# Patient Record
Sex: FEMALE | Race: WHITE | NOT HISPANIC OR LATINO | Employment: OTHER | URBAN - METROPOLITAN AREA
[De-identification: names, ages, dates, MRNs, and addresses within clinical notes are randomized per-mention and may not be internally consistent; named-entity substitution may affect disease eponyms.]

---

## 2017-01-05 ENCOUNTER — PROCEDURE VISIT (OUTPATIENT)
Dept: OPHTHALMOLOGY | Facility: CLINIC | Age: 72
End: 2017-01-05
Payer: COMMERCIAL

## 2017-01-05 DIAGNOSIS — H35.81 RETINAL EDEMA: ICD-10-CM

## 2017-01-05 DIAGNOSIS — H35.342 MACULAR HOLE OF LEFT EYE: Primary | ICD-10-CM

## 2017-01-05 PROCEDURE — 67028 INJECTION EYE DRUG: CPT | Mod: LT,S$GLB,, | Performed by: OPHTHALMOLOGY

## 2017-01-05 PROCEDURE — 99499 UNLISTED E&M SERVICE: CPT | Mod: S$GLB,,, | Performed by: OPHTHALMOLOGY

## 2017-01-05 RX ORDER — CIPROFLOXACIN HYDROCHLORIDE 3 MG/ML
1 SOLUTION/ DROPS OPHTHALMIC 4 TIMES DAILY
Qty: 5 ML | Refills: 0 | Status: SHIPPED | OUTPATIENT
Start: 2017-01-05 | End: 2017-01-09

## 2017-01-05 RX ADMIN — Medication 1.25 MG: at 08:01

## 2017-01-05 NOTE — PROGRESS NOTES
===============================  Kaylin Mccollum,   71 y.o. female   Last visit [facility]: :12/6/2016   Last [place] [facility] dept. 12/6/2016  [facility]:  Corrected distance visual acuity was 20/20 in the right eye and 20/400 in the left eye.   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 (History)      Read Studies:   Vitals    ________________  1/5/2017  1. Macular hole of left eye    2. Retinal edema          1/5/2017  Diagnosis :  cme aw MH  Today:   Avastin (Bevacizumab) 1.25 mg/0.05 ml Intravitreal Injection , OS   Follow up: rtc 1 m o      Call 24/7 for any worsening of vision. Check  OU QD. Gave my home phone number.      Procedure  Note:   OS}  Avastin (Bevacizumab) 1.25 mg/0.05 ml Intravitreal Injection    I have explained the Risks, Benefits and Alternatives of the procedure in detail.  The patient voices understanding and all questions have been answered.  The patient agrees to proceed as discussed.  LIDOCAINE 2%  subconj bleb  was used for anesthesia.  Topical betadine was used for antisepsis.  0.05 cc was  injected 3.7 mm from corneal limbus in the inferotemporal quadrant.  Following injection the IOP was less than thirty (<30) by tonopen.  The eye was then thoroughly irrigated with BSS.  Patient tolerated procedure well.  No complications were observed.  The Patient was educated that mild irritation tonight was normal secondary to topical antispsis use.  Pt was advised to call at any time day or night for pain, redness, or any decline in vision. I gave the patient my home number as well as the clinic on call number. Daily visual checks and Amsler grid testing were reviewed.    CELESTE Petty MD  Procedure ordered:  y  Consent: y  Pre auth: y  MAR:y  Opnote: y  Charge capture:y  Sided procedure note: y  .       ===========================

## 2017-01-05 NOTE — MR AVS SNAPSHOT
[facility]  [address] [address]  Phone: [phone]  Fax: [phone]                  Kaylin Mccollum   2017 8:15 AM   Procedure visit    Description:  Female : 1945   Provider:  CELESTE Petty MD   Department:  [facility]           Reason for Visit     VITREOMCULAR ADHESION           Diagnoses this Visit        Comments    Macular hole of left eye    -  Primary     Retinal edema                To Do List           Future Appointments        Provider Department Dept Phone    8/15/2017 1:30 PM TOUSSAINT, VISUAL-ONE [facility] Op[facility]mology [phone]    8/15/2017 2:00 PM Bhavesh Mccurdy, OD [facility] [phone]      Goals (5 Years of Data)     None      Follow-Up and Disposition     Return in about 1 month (around 2017).       These Medications        Disp Refills Start End    ciprofloxacin HCl (CILOXAN) 0.3 % ophthalmic solution 5 mL 0 2017    Place 1 drop into the left eye 4 (four) times daily. - Left Eye    Pharmacy: [facility] [place] - [address] Ph #: [phone]         [facility]     [facility] Nurse Care Line -  Assistance  Registered nurses in the [facility] On Call Center provide clinical advisement, health education, appointment booking, and other advisory services.  Call for this free service at [phone].             Medications           Message regarding Medications     Verify the changes and/or additions to your medication regime listed below are the same as discussed with your clinician today.  If any of these changes or additions are incorrect, please notify your healthcare provider.        START taking these NEW medications        Refills    ciprofloxacin HCl (CILOXAN) 0.3 % ophthalmic solution 0    Sig: Place 1 drop into the left eye 4 (four) times daily.    Class: Normal    Route: Left Eye      These medications were administered today         Dose Freq    bevacizumab (AVASTIN) 2.5 mg/0.10 mL 1.25 mg 1.25 mg Clinic/HOD 1 time    Sig: Inject 0.05 mLs (1.25 mg total) into the eye one time.    Class: Normal    Route: Intraocular           Verify that the below list of medications is an accurate representation of the medications you are currently taking.  If none reported, the list may be blank. If incorrect, please contact your healthcare provider. Carry this list with you in case of emergency.           Current Medications     b complex vitamins tablet Take 1 tablet by mouth once daily.    biotin 1 mg tablet Take 1,000 mcg by mouth once daily.    butalbital-acetaminophen  mg Tab Take 1 tablet by mouth daily as needed.    canagliflozin (INVOKANA) 100 mg Tab Take 1 tablet (100 mg total) by mouth once daily.    celecoxib (CELEBREX) 100 MG capsule Take 1 capsule (100 mg total) by mouth 2 (two) times daily as needed.    fluoxetine (PROZAC) 40 MG capsule Take 1 capsule (40 mg total) by mouth once daily.    fluticasone (FLONASE) 50 mcg/actuation nasal spray 2 sprays by Each Nare route once daily.    gabapentin (NEURONTIN) 300 MG capsule take 1 capsule by mouth twice a day    lisinopril (PRINIVIL,ZESTRIL) 5 MG tablet Take 1 tablet (5 mg total) by mouth once daily.    melatonin 5 mg Tab Take 1 tablet by mouth every evening.    metformin (GLUCOPHAGE) 1000 MG tablet Take 1 tablet (1,000 mg total) by mouth 2 (two) times daily with meals.    pravastatin (PRAVACHOL) 80 MG tablet Take 1 tablet (80 mg total) by mouth once daily.    tramadol (ULTRAM) 50 mg tablet take 1 to 2 tablet by mouth three times a day    verapamil (CALAN) 80 MG tablet Take 1 tablet (80 mg total) by mouth 2 (two) times daily.    ciprofloxacin HCl (CILOXAN) 0.3 % ophthalmic solution Place 1 drop into the left eye 4 (four) times daily.           Clinical Reference Information           Allergies as of 1/5/2017     Demerol [Meperidine]    Latex, Natural Rubber    Zocor [Simvastatin]       Immunizations Administered on Date of Encounter - 1/5/2017     None      Orders Placed During Today's Visit      Normal Orders This Visit    Prior Authorization Order

## 2017-02-09 ENCOUNTER — PROCEDURE VISIT (OUTPATIENT)
Dept: OPHTHALMOLOGY | Facility: CLINIC | Age: 72
End: 2017-02-09
Payer: MEDICARE

## 2017-02-09 DIAGNOSIS — H35.81 RETINAL EDEMA: ICD-10-CM

## 2017-02-09 DIAGNOSIS — H35.342 MACULAR HOLE OF LEFT EYE: Primary | ICD-10-CM

## 2017-02-09 PROCEDURE — 92012 INTRM OPH EXAM EST PATIENT: CPT | Mod: S$GLB,,, | Performed by: OPHTHALMOLOGY

## 2017-02-09 PROCEDURE — 92134 CPTRZ OPH DX IMG PST SGM RTA: CPT | Mod: S$GLB,,, | Performed by: OPHTHALMOLOGY

## 2017-02-09 NOTE — PROGRESS NOTES
===============================  Kaylin STEWARD Young,   72 y.o. female   Last visit [facility]: :1/5/2017   Last visit [facility] dept. 1/5/2017  [facility]:  Corrected distance visual acuity was 20/25 in the right eye and 20/400 in the left eye.   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 (History)      Read Studies: y  Vitalsy    ________________  2/9/2017  1. Macular hole of left eye    2. Retinal edema      OS macular hole, cme  S/p avastin os last month  No improvement  I recommend surgery  Consult Dr. Goode for surgery  I will be happy to take care of her post operatively   No injection today  .       ===========================

## 2017-02-09 NOTE — Clinical Note
I am sending her to you for surgery, I will be happy to follow her post operatively to save her a trip to [place].

## 2017-02-09 NOTE — MR AVS SNAPSHOT
[facility]  [address] [place] [address]  Phone: [phone]  Fax: [phone]                  Kaylin Mccollum   2017 8:45 AM   Procedure visit    Description:  Female : 1945   Provider:  CELESTE Petty MD   Department:  [facility]           Reason for Visit     VITREOMCULAR ADHESION           Diagnoses this Visit        Comments    Macular hole of left eye    -  Primary     Retinal edema                To Do List           Future Appointments        Provider Department Dept Phone    2017 8:45 AM CELESTE Petty MD [facility] Op[facility] [phone]    8/15/2017 1:30 PM TOUSSAINT, VISUAL-ONE [facility] Ophthal[facility] [phone]    8/15/2017 2:00 PM Bhavesh Mccurdy, OD [facility] Ophthalmology [phone]      Goals (5 Years of Data)     None      Follow-Up and Disposition     Return if symptoms worsen or fail to improve.      [facility] On Call     [facility] On Call Nurse Care Line -  Assistance  Registered nurses in the [facility] On Call Center provide clinical advisement, health education, appointment booking, and other advisory services.  Call for this free service at [phone].             Medications           Message regarding Medications     Verify the changes and/or additions to your medication regime listed below are the same as discussed with your clinician today.  If any of these changes or additions are incorrect, please notify your healthcare provider.             Verify that the below list of medications is an accurate representation of the medications you are currently taking.  If none reported, the list may be blank. If incorrect, please contact your healthcare provider. Carry this list with you in case of emergency.           Current Medications     b complex vitamins tablet Take 1 tablet by mouth once daily.    biotin 1 mg tablet Take 1,000 mcg by mouth once daily.    butalbital-acetaminophen  mg Tab Take 1 tablet by mouth daily as  needed.    canagliflozin (INVOKANA) 100 mg Tab Take 1 tablet (100 mg total) by mouth once daily.    celecoxib (CELEBREX) 100 MG capsule Take 1 capsule (100 mg total) by mouth 2 (two) times daily as needed.    fluoxetine (PROZAC) 40 MG capsule Take 1 capsule (40 mg total) by mouth once daily.    fluticasone (FLONASE) 50 mcg/actuation nasal spray 2 sprays by Each Nare route once daily.    gabapentin (NEURONTIN) 300 MG capsule take 1 capsule by mouth twice a day    melatonin 5 mg Tab Take 1 tablet by mouth every evening.    metformin (GLUCOPHAGE) 1000 MG tablet Take 1 tablet (1,000 mg total) by mouth 2 (two) times daily with meals.    pravastatin (PRAVACHOL) 80 MG tablet Take 1 tablet (80 mg total) by mouth once daily.    tramadol (ULTRAM) 50 mg tablet take 1 to 2 tablet by mouth three times a day    verapamil (CALAN) 80 MG tablet Take 1 tablet (80 mg total) by mouth 2 (two) times daily.    lisinopril (PRINIVIL,ZESTRIL) 5 MG tablet Take 1 tablet (5 mg total) by mouth once daily.           Clinical Reference Information           Allergies as of 2/9/2017     Demerol [Meperidine]    Latex, Natural Rubber    Zocor [Simvastatin]      Immunizations Administered on Date of Encounter - 2/9/2017     None      Orders Placed During Today's Visit      Normal Orders This Visit    Posterior Segment OCT Retina-Both eyes       Language Assistance Services     ATTENTION: Language assistance services are available, free of charge. Please call [phone].      ATENCIÓN: Si habla kat, tiene a collazo disposición servicios gratuitos de asistencia lingüística. Llame al [phone].     [facility] Ý: N?u b?n nói Ti?ng Vi?t, có các d?ch v? h? tr? ngôn ng? mi?n phí dành cho b?n. G?i s? [phone].         [facility] complies with applicable Federal civil rights laws and does not discriminate on the basis of race, color, national origin, age, disability, or sex.

## 2017-02-14 ENCOUNTER — INITIAL CONSULT (OUTPATIENT)
Dept: OPHTHALMOLOGY | Facility: CLINIC | Age: 72
End: 2017-02-14
Payer: COMMERCIAL

## 2017-02-14 DIAGNOSIS — H25.13 NS (NUCLEAR SCLEROSIS), BILATERAL: ICD-10-CM

## 2017-02-14 DIAGNOSIS — H35.342 MACULAR HOLE OF LEFT EYE: Primary | ICD-10-CM

## 2017-02-14 PROBLEM — H25.10 NS (NUCLEAR SCLEROSIS): Status: ACTIVE | Noted: 2017-02-14

## 2017-02-14 PROCEDURE — 92225 PR SPECIAL EYE EXAM, INITIAL: CPT | Mod: LT,S$GLB,, | Performed by: OPHTHALMOLOGY

## 2017-02-14 PROCEDURE — 92014 COMPRE OPH EXAM EST PT 1/>: CPT | Mod: S$GLB,,, | Performed by: OPHTHALMOLOGY

## 2017-02-14 PROCEDURE — 99999 PR PBB SHADOW E&M-EST. PATIENT-LVL III: CPT | Mod: PBBFAC,,, | Performed by: OPHTHALMOLOGY

## 2017-02-14 NOTE — LETTER
February 14, 2017      CELESTE Petty MD  [address] [place] [address]           [facility]  [address]  [place] [address]  Phone: [phone]  Fax: [phone]          Patient: Kaylin Mccollum   MR Number: [id number]   YOB: 1945   Date of Visit: 2/14/2017       Dear Dr. CELESTE Petty:    Thank you for referring Kaylin Mccollum to me for evaluation. Attached you will find relevant portions of my assessment and plan of care.    If you have questions, please do not hesitate to call me. I look forward to following Kaylin Mccollum along with you.    Sincerely,    MINGO Goode MD    Enclosure  CC:  No Recipients    If you would like to receive this communication electronically, please contact [email or web address] or [phone] to request more information on [employer] Link access.    For providers and/or their staff who would like to refer a patient to [facility], please contact us through our one-stop-shop provider referral line, [facility], [place], at [phone].    If you feel you have received this communication in error or would no longer like to receive these types of communications, please e-mail [email or web address]

## 2017-02-14 NOTE — PROGRESS NOTES
OCT   OD: flat, normal foveal contour  OS:  Full thickness central macular hole     A/P    1) FTMH OS  ~4 months duration  - s/p JALIL x1 with Dr. Petty on 1/5/17 without improvement    Plan 25g PPV/MP/C3F8 OS for FTMH OS  Local MAC  LOC 45 min    Risks, benefits, and alternatives to treatment discussed in detail with the patient.  The patient voiced understanding and wished to proceed with the procedure      2) DM  - no retinopathy on exam  - encouraged BG control    3. NS OU    To OR

## 2017-02-14 NOTE — MR AVS SNAPSHOT
[facility]  [address]  [place] [address]  Phone: [phone]  Fax: [phone]                  Kaylin Mccollum   2017 1:30 PM   Initial consult    Description:  Female : 1945   Provider:  MINGO Goode MD   Department[facility]  [facility] - [facility]           Reason for Visit     Concerns About Ocular Health           Diagnoses this Visit        Comments    Macular hole of left eye    -  Primary     NS (nuclear sclerosis), bilateral                To Do List           Future Appointments        Provider Department Dept Phone    8/15/2017 1:30 PM TOUSSAINT, VISUAL-ONE [facility] [phone]    8/15/2017 2:00 PM Bhavesh Mccurdy, OD [facility] [phone]      Goals (5 Years of Data)     None      [facility]     [facility] On Call Nurse Care Line -  Assistance  Registered nurses in the [facility] On Call Center provide clinical advisement, health education, appointment booking, and other advisory services.  Call for this free service at [phone].             Medications           Message regarding Medications     Verify the changes and/or additions to your medication regime listed below are the same as discussed with your clinician today.  If any of these changes or additions are incorrect, please notify your healthcare provider.             Verify that the below list of medications is an accurate representation of the medications you are currently taking.  If none reported, the list may be blank. If incorrect, please contact your healthcare provider. Carry this list with you in case of emergency.           Current Medications     b complex vitamins tablet Take 1 tablet by mouth once daily.    biotin 1 mg tablet Take 1,000 mcg by mouth once daily.    butalbital-acetaminophen  mg Tab Take 1 tablet by mouth daily as needed.    canagliflozin (INVOKANA) 100 mg Tab Take 1 tablet (100 mg total) by mouth once daily.    celecoxib  (CELEBREX) 100 MG capsule Take 1 capsule (100 mg total) by mouth 2 (two) times daily as needed.    fluoxetine (PROZAC) 40 MG capsule Take 1 capsule (40 mg total) by mouth once daily.    fluticasone (FLONASE) 50 mcg/actuation nasal spray 2 sprays by Each Nare route once daily.    gabapentin (NEURONTIN) 300 MG capsule take 1 capsule by mouth twice a day    melatonin 5 mg Tab Take 1 tablet by mouth every evening.    metformin (GLUCOPHAGE) 1000 MG tablet Take 1 tablet (1,000 mg total) by mouth 2 (two) times daily with meals.    pravastatin (PRAVACHOL) 80 MG tablet Take 1 tablet (80 mg total) by mouth once daily.    tramadol (ULTRAM) 50 mg tablet take 1 to 2 tablet by mouth three times a day    verapamil (CALAN) 80 MG tablet Take 1 tablet (80 mg total) by mouth 2 (two) times daily.    lisinopril (PRINIVIL,ZESTRIL) 5 MG tablet Take 1 tablet (5 mg total) by mouth once daily.           Clinical Reference Information           Allergies as of 2/14/2017     Demerol [Meperidine]    Latex, Natural Rubber    Zocor [Simvastatin]      Immunizations Administered on Date of Encounter - 2/14/2017     None      Orders Placed During Today's Visit      Normal Orders This Visit    Posterior Segment OCT Retina-Both eyes       Language Assistance Services     ATTENTION: Language assistance services are available, free of charge. Please call [phone].      ATENCIÓN: Si habla español, tiene a collazo disposición servicios gratuitos de asistencia lingüística. Llame al [phone].     [facility] Ý: N?u b?n nói Ti?ng Vi?t, có các d?ch v? h? tr? ngôn ng? mi?n phí dành cho b?n. G?i s? [phone].         [facility] complies with applicable Federal civil rights laws and does not discriminate on the basis of race, color, national origin, age, disability, or sex.

## 2017-02-17 ENCOUNTER — TELEPHONE (OUTPATIENT)
Dept: OPHTHALMOLOGY | Facility: CLINIC | Age: 72
End: 2017-02-17

## 2017-02-17 DIAGNOSIS — H35.342 LAMELLAR MACULAR HOLE OF LEFT EYE: Primary | ICD-10-CM

## 2017-02-21 ENCOUNTER — ANESTHESIA EVENT (OUTPATIENT)
Dept: SURGERY | Facility: HOSPITAL | Age: 72
End: 2017-02-21
Payer: MEDICARE

## 2017-02-21 RX ORDER — UBIDECARENONE 75 MG
500 CAPSULE ORAL NIGHTLY
COMMUNITY
End: 2017-02-21 | Stop reason: SDUPTHER

## 2017-02-21 RX ORDER — UBIDECARENONE 75 MG
500 CAPSULE ORAL NIGHTLY
COMMUNITY
End: 2020-03-06

## 2017-02-21 NOTE — H&P
Pre-Operative History & Physical  Ophthalmology      SUBJECTIVE:     History of Present Illness:  Patient is a 72 y.o. female presents with full thickness macular hole OS    MEDICATIONS:   No prescriptions prior to admission.       ALLERGIES:   Review of patient's allergies indicates:   Allergen Reactions    Demerol [meperidine] Other (See Comments)     Burning when adm IV  Able to tolerate IM    Latex, natural rubber     Zocor [simvastatin] Other (See Comments)     tighting of muscle       PAST MEDICAL HISTORY:   Past Medical History   Diagnosis Date    Arthritis     Cataract     Diabetes mellitus     General anesthetics causing adverse effect in therapeutic use      bradycardia and tachycardia    Hypertension     Migraines     Mitral valve prolapse     Rotator cuff tear 4/1/2015     PAST SURGICAL HISTORY:   Past Surgical History   Procedure Laterality Date    Cholecystectomy      Knee arthroscopy Bilateral     Shoulder arthroscopy Right 06/04/15     PAST FAMILY HISTORY:   Family History   Problem Relation Age of Onset    Heart disease Mother     Glaucoma Mother     Cataracts Mother     Cancer Mother      colon    Kidney disease Daughter     Diabetes Maternal Grandmother     Heart disease Maternal Grandmother     Diabetes Maternal Grandfather     Cancer Maternal Grandfather      SOCIAL HISTORY:   Social History   Substance Use Topics    Smoking status: Never Smoker    Smokeless tobacco: Never Used    Alcohol use 0.0 oz/week     0 Standard drinks or equivalent per week      Comment: Rarely        MENTAL STATUS: Alert    REVIEW OF SYSTEMS: Negative    OBJECTIVE:     Vital Signs (Most Recent)       Physical Exam:  General: NAD  HEENT: , Atraumatic  Lungs: Adequate respirations  Heart: + pulses  Abdomen: Soft    ASSESSMENT/PLAN:     Patient is a 72 y.o. female with full thickness macular hole OS     - Plan for surgical correction FTMH OS   - Risks/benefits/alternatives of the procedure  including, but not limited to scarring, bleeding, infection, loss or decreased vision, and/or need for possible repeat surgery discussed with the patient and family.   - Informed consent obtained prior to surgery and the patient/family voiced good understanding.

## 2017-02-21 NOTE — PRE-PROCEDURE INSTRUCTIONS
Spoke with Patient.  NPO, medication, and pre-op instructions reviewed.  Stated that she had Bradycardia following an Arthroscopy before 1990.  Stated that she has had several surgeries without any Anesthesia issues since that episode.  Stated that she does not check her glucose at home.  Did not adjust her evening Metformin dose.  Reconciled her medications with the medicine bottles.  Verbalized understanding of instructions.

## 2017-02-21 NOTE — ANESTHESIA PREPROCEDURE EVALUATION
02/21/2017  Kaylin Mccollum is a 72 y.o., female.    [facility] Anesthesia Evaluation    I have reviewed the Patient Summary Reports.    I have reviewed the Nursing Notes.   I have reviewed the Medications.     Review of Systems  Anesthesia Hx:  Post-op bradycardia with previous GA surgery-  No other issues History of prior surgery of interest to airway management or planning: Previous anesthesia: General Denies Family Hx of Anesthesia complications.   Denies Personal Hx of Anesthesia complications.   Social:  Non-Smoker, No Alcohol Use    Cardiovascular:   Exercise tolerance: good Hypertension, well controlled    Pulmonary:  Pulmonary Normal    Renal/:  Renal/ Normal     Hepatic/GI:  Hepatic/GI Normal    Musculoskeletal:   Arthritis     Neurological:   Neuromuscular Disease, Headaches Fibromyalgia   Endocrine:   Diabetes, type 2    Psych:   Psychiatric History depression          Physical Exam  General:  Well nourished, Obesity    Airway/Jaw/Neck:  Airway Findings: Mouth Opening: Normal Tongue: Normal  General Airway Assessment: Adult  Mallampati: II  Improves to II with phonation.  TM Distance: Normal, at least 6 cm      Dental:  Dental Findings: In tact        Mental Status:  Mental Status Findings:  Cooperative, Alert and Oriented         Anesthesia Plan  Type of Anesthesia, risks & benefits discussed:  Anesthesia Type:  general, MAC  Patient's Preference: mac  Intra-op Monitoring Plan:   Intra-op Monitoring Plan Comments:   Post Op Pain Control Plan:   Post Op Pain Control Plan Comments:   Induction:    Beta Blocker:  Patient is not currently on a Beta-Blocker (No further documentation required).       Informed Consent: Patient understands risks and agrees with Anesthesia plan.  Questions answered. Anesthesia consent signed with patient.  ASA Score: 3     Day of Surgery Review of History & Physical:    H&P  update referred to the surgeon.         Ready For Surgery From Anesthesia Perspective.     Stated that she had Bradycardia following an Arthroscopy before 1990.  Stated that she has had several surgeries without any Anesthesia issues since that episode.

## 2017-02-22 ENCOUNTER — HOSPITAL ENCOUNTER (OUTPATIENT)
Facility: HOSPITAL | Age: 72
Discharge: HOME | End: 2017-02-22
Attending: OPHTHALMOLOGY | Admitting: OPHTHALMOLOGY
Payer: MEDICARE

## 2017-02-22 ENCOUNTER — ANESTHESIA (OUTPATIENT)
Dept: SURGERY | Facility: HOSPITAL | Age: 72
End: 2017-02-22
Payer: MEDICARE

## 2017-02-22 ENCOUNTER — SURGERY (OUTPATIENT)
Age: 72
End: 2017-02-22

## 2017-02-22 VITALS
SYSTOLIC BLOOD PRESSURE: 143 MMHG | BODY MASS INDEX: 31.5 KG/M2 | HEIGHT: 70 IN | WEIGHT: 220 LBS | DIASTOLIC BLOOD PRESSURE: 61 MMHG | RESPIRATION RATE: 16 BRPM | TEMPERATURE: 98 F | HEART RATE: 70 BPM | OXYGEN SATURATION: 96 %

## 2017-02-22 DIAGNOSIS — H35.342 MACULAR HOLE, LEFT: ICD-10-CM

## 2017-02-22 DIAGNOSIS — H35.342 MACULAR HOLE OF LEFT EYE: Primary | ICD-10-CM

## 2017-02-22 PROBLEM — H35.349 MACULAR HOLE: Status: ACTIVE | Noted: 2017-02-22

## 2017-02-22 LAB
POCT GLUCOSE: 115 MG/DL (ref 70–110)
POCT GLUCOSE: 115 MG/DL (ref 70–110)

## 2017-02-22 PROCEDURE — 25000003 PHARM REV CODE 250: Performed by: OPHTHALMOLOGY

## 2017-02-22 PROCEDURE — 25000003 PHARM REV CODE 250

## 2017-02-22 PROCEDURE — 37000008 HC ANESTHESIA 1ST 15 MINUTES: Performed by: OPHTHALMOLOGY

## 2017-02-22 PROCEDURE — 25000003 PHARM REV CODE 250: Performed by: NURSE ANESTHETIST, CERTIFIED REGISTERED

## 2017-02-22 PROCEDURE — 71000033 HC RECOVERY, INTIAL HOUR: Performed by: OPHTHALMOLOGY

## 2017-02-22 PROCEDURE — 82962 GLUCOSE BLOOD TEST: CPT | Performed by: OPHTHALMOLOGY

## 2017-02-22 PROCEDURE — 27600004 OPTIME MED/SURG SUP & DEVICES INTRAOCULAR LENS: Performed by: OPHTHALMOLOGY

## 2017-02-22 PROCEDURE — D9220A PRA ANESTHESIA: Mod: CRNA,,, | Performed by: NURSE ANESTHETIST, CERTIFIED REGISTERED

## 2017-02-22 PROCEDURE — 67042 VIT FOR MACULAR HOLE: CPT | Mod: LT,,, | Performed by: OPHTHALMOLOGY

## 2017-02-22 PROCEDURE — 36000707: Performed by: OPHTHALMOLOGY

## 2017-02-22 PROCEDURE — 37000009 HC ANESTHESIA EA ADD 15 MINS: Performed by: OPHTHALMOLOGY

## 2017-02-22 PROCEDURE — 36000706: Performed by: OPHTHALMOLOGY

## 2017-02-22 PROCEDURE — 71000015 HC POSTOP RECOV 1ST HR: Performed by: OPHTHALMOLOGY

## 2017-02-22 PROCEDURE — 63600175 PHARM REV CODE 636 W HCPCS: Performed by: OPHTHALMOLOGY

## 2017-02-22 PROCEDURE — 27201423 OPTIME MED/SURG SUP & DEVICES STERILE SUPPLY: Performed by: OPHTHALMOLOGY

## 2017-02-22 PROCEDURE — 99499 UNLISTED E&M SERVICE: CPT | Mod: ,,, | Performed by: OPHTHALMOLOGY

## 2017-02-22 PROCEDURE — C1784 OCULAR DEV, INTRAOP, DET RET: HCPCS | Performed by: OPHTHALMOLOGY

## 2017-02-22 PROCEDURE — 63600175 PHARM REV CODE 636 W HCPCS: Performed by: NURSE ANESTHETIST, CERTIFIED REGISTERED

## 2017-02-22 PROCEDURE — D9220A PRA ANESTHESIA: Mod: ANES,,, | Performed by: ANESTHESIOLOGY

## 2017-02-22 RX ORDER — EPINEPHRINE 1 MG/ML
INJECTION, SOLUTION INTRACARDIAC; INTRAMUSCULAR; INTRAVENOUS; SUBCUTANEOUS
Status: DISCONTINUED
Start: 2017-02-22 | End: 2017-02-22 | Stop reason: HOSPADM

## 2017-02-22 RX ORDER — NEOMYCIN SULFATE, POLYMYXIN B SULFATE, AND DEXAMETHASONE 3.5; 10000; 1 MG/G; [USP'U]/G; MG/G
OINTMENT OPHTHALMIC
Status: DISCONTINUED | OUTPATIENT
Start: 2017-02-22 | End: 2017-02-22 | Stop reason: HOSPADM

## 2017-02-22 RX ORDER — DEXAMETHASONE SODIUM PHOSPHATE 4 MG/ML
INJECTION, SOLUTION INTRA-ARTICULAR; INTRALESIONAL; INTRAMUSCULAR; INTRAVENOUS; SOFT TISSUE
Status: DISCONTINUED | OUTPATIENT
Start: 2017-02-22 | End: 2017-02-22 | Stop reason: HOSPADM

## 2017-02-22 RX ORDER — LIDOCAINE HYDROCHLORIDE 10 MG/ML
1 INJECTION, SOLUTION EPIDURAL; INFILTRATION; INTRACAUDAL; PERINEURAL ONCE
Status: COMPLETED | OUTPATIENT
Start: 2017-02-22 | End: 2017-02-22

## 2017-02-22 RX ORDER — OXYCODONE AND ACETAMINOPHEN 5; 325 MG/1; MG/1
1 TABLET ORAL EVERY 6 HOURS PRN
Qty: 12 TABLET | Refills: 0 | Status: SHIPPED | OUTPATIENT
Start: 2017-02-22 | End: 2017-04-21

## 2017-02-22 RX ORDER — SODIUM CHLORIDE 9 MG/ML
INJECTION, SOLUTION INTRAVENOUS CONTINUOUS
Status: DISCONTINUED | OUTPATIENT
Start: 2017-02-22 | End: 2017-02-22 | Stop reason: HOSPADM

## 2017-02-22 RX ORDER — FENTANYL CITRATE 50 UG/ML
INJECTION, SOLUTION INTRAMUSCULAR; INTRAVENOUS
Status: DISCONTINUED | OUTPATIENT
Start: 2017-02-22 | End: 2017-02-22

## 2017-02-22 RX ORDER — VANCOMYCIN HYDROCHLORIDE 500 MG/10ML
INJECTION, POWDER, LYOPHILIZED, FOR SOLUTION INTRAVENOUS
Status: DISCONTINUED
Start: 2017-02-22 | End: 2017-02-22 | Stop reason: HOSPADM

## 2017-02-22 RX ORDER — TROPICAMIDE 10 MG/ML
SOLUTION/ DROPS OPHTHALMIC
Status: COMPLETED
Start: 2017-02-22 | End: 2017-02-22

## 2017-02-22 RX ORDER — ACETAMINOPHEN 325 MG/1
650 TABLET ORAL EVERY 4 HOURS PRN
Status: DISCONTINUED | OUTPATIENT
Start: 2017-02-22 | End: 2017-02-22 | Stop reason: HOSPADM

## 2017-02-22 RX ORDER — CYCLOPENTOLATE HYDROCHLORIDE 10 MG/ML
SOLUTION/ DROPS OPHTHALMIC
Status: COMPLETED
Start: 2017-02-22 | End: 2017-02-22

## 2017-02-22 RX ORDER — HYDROCODONE BITARTRATE AND ACETAMINOPHEN 5; 325 MG/1; MG/1
1 TABLET ORAL EVERY 4 HOURS PRN
Status: DISCONTINUED | OUTPATIENT
Start: 2017-02-22 | End: 2017-02-22 | Stop reason: HOSPADM

## 2017-02-22 RX ORDER — ONDANSETRON 4 MG/1
4 TABLET, FILM COATED ORAL EVERY 8 HOURS PRN
Qty: 12 TABLET | Refills: 0 | Status: SHIPPED | OUTPATIENT
Start: 2017-02-22 | End: 2018-05-17

## 2017-02-22 RX ORDER — DEXTROSE MONOHYDRATE 25 G/50ML
INJECTION, SOLUTION INTRAVENOUS
Status: DISCONTINUED
Start: 2017-02-22 | End: 2017-02-22 | Stop reason: HOSPADM

## 2017-02-22 RX ORDER — MIDAZOLAM HYDROCHLORIDE 1 MG/ML
INJECTION, SOLUTION INTRAMUSCULAR; INTRAVENOUS
Status: DISCONTINUED | OUTPATIENT
Start: 2017-02-22 | End: 2017-02-22

## 2017-02-22 RX ORDER — LIDOCAINE HYDROCHLORIDE 10 MG/ML
1 INJECTION, SOLUTION EPIDURAL; INFILTRATION; INTRACAUDAL; PERINEURAL ONCE
Status: DISCONTINUED | OUTPATIENT
Start: 2017-02-22 | End: 2017-02-22 | Stop reason: ALTCHOICE

## 2017-02-22 RX ORDER — PREDNISOLONE ACETATE 10 MG/ML
1 SUSPENSION/ DROPS OPHTHALMIC
Status: DISCONTINUED | OUTPATIENT
Start: 2017-02-22 | End: 2017-02-22 | Stop reason: HOSPADM

## 2017-02-22 RX ORDER — PHENYLEPHRINE HYDROCHLORIDE 25 MG/ML
1 SOLUTION/ DROPS OPHTHALMIC
Status: DISCONTINUED | OUTPATIENT
Start: 2017-02-22 | End: 2017-02-22 | Stop reason: HOSPADM

## 2017-02-22 RX ORDER — TETRACAINE HYDROCHLORIDE 5 MG/ML
1 SOLUTION OPHTHALMIC
Status: DISCONTINUED | OUTPATIENT
Start: 2017-02-22 | End: 2017-02-22 | Stop reason: HOSPADM

## 2017-02-22 RX ORDER — BUPIVACAINE HYDROCHLORIDE 7.5 MG/ML
INJECTION, SOLUTION EPIDURAL; RETROBULBAR
Status: DISCONTINUED | OUTPATIENT
Start: 2017-02-22 | End: 2017-02-22 | Stop reason: HOSPADM

## 2017-02-22 RX ORDER — CYCLOPENTOLATE HYDROCHLORIDE 10 MG/ML
1 SOLUTION/ DROPS OPHTHALMIC
Status: DISCONTINUED | OUTPATIENT
Start: 2017-02-22 | End: 2017-02-22 | Stop reason: HOSPADM

## 2017-02-22 RX ORDER — TETRACAINE HYDROCHLORIDE 5 MG/ML
SOLUTION OPHTHALMIC
Status: COMPLETED
Start: 2017-02-22 | End: 2017-02-22

## 2017-02-22 RX ORDER — MOXIFLOXACIN 5 MG/ML
1 SOLUTION/ DROPS OPHTHALMIC
Status: DISCONTINUED | OUTPATIENT
Start: 2017-02-22 | End: 2017-02-22 | Stop reason: HOSPADM

## 2017-02-22 RX ORDER — TROPICAMIDE 10 MG/ML
1 SOLUTION/ DROPS OPHTHALMIC
Status: DISCONTINUED | OUTPATIENT
Start: 2017-02-22 | End: 2017-02-22 | Stop reason: HOSPADM

## 2017-02-22 RX ORDER — INDOCYANINE GREEN AND WATER 25 MG
KIT INJECTION
Status: DISCONTINUED | OUTPATIENT
Start: 2017-02-22 | End: 2017-02-22 | Stop reason: HOSPADM

## 2017-02-22 RX ORDER — INDOCYANINE GREEN AND WATER 25 MG
KIT INJECTION
Status: DISCONTINUED
Start: 2017-02-22 | End: 2017-02-22 | Stop reason: HOSPADM

## 2017-02-22 RX ORDER — PREDNISOLONE ACETATE 10 MG/ML
SUSPENSION/ DROPS OPHTHALMIC
Status: DISCONTINUED
Start: 2017-02-22 | End: 2017-02-22 | Stop reason: WASHOUT

## 2017-02-22 RX ORDER — MOXIFLOXACIN 5 MG/ML
SOLUTION/ DROPS OPHTHALMIC
Status: DISCONTINUED
Start: 2017-02-22 | End: 2017-02-22 | Stop reason: WASHOUT

## 2017-02-22 RX ORDER — PREDNISOLONE ACETATE 10 MG/ML
SUSPENSION/ DROPS OPHTHALMIC
Status: COMPLETED
Start: 2017-02-22 | End: 2017-02-22

## 2017-02-22 RX ORDER — PHENYLEPHRINE HYDROCHLORIDE 25 MG/ML
SOLUTION/ DROPS OPHTHALMIC
Status: COMPLETED
Start: 2017-02-22 | End: 2017-02-22

## 2017-02-22 RX ORDER — BUPIVACAINE HYDROCHLORIDE 7.5 MG/ML
INJECTION, SOLUTION EPIDURAL; RETROBULBAR
Status: DISCONTINUED
Start: 2017-02-22 | End: 2017-02-22 | Stop reason: HOSPADM

## 2017-02-22 RX ORDER — ONDANSETRON 8 MG/1
8 TABLET, ORALLY DISINTEGRATING ORAL EVERY 8 HOURS PRN
Status: DISCONTINUED | OUTPATIENT
Start: 2017-02-22 | End: 2017-02-22 | Stop reason: HOSPADM

## 2017-02-22 RX ORDER — MOXIFLOXACIN 5 MG/ML
SOLUTION/ DROPS OPHTHALMIC
Status: COMPLETED
Start: 2017-02-22 | End: 2017-02-22

## 2017-02-22 RX ORDER — DEXAMETHASONE SODIUM PHOSPHATE 4 MG/ML
INJECTION, SOLUTION INTRA-ARTICULAR; INTRALESIONAL; INTRAMUSCULAR; INTRAVENOUS; SOFT TISSUE
Status: DISCONTINUED
Start: 2017-02-22 | End: 2017-02-22 | Stop reason: HOSPADM

## 2017-02-22 RX ORDER — LIDOCAINE HYDROCHLORIDE 20 MG/ML
INJECTION, SOLUTION EPIDURAL; INFILTRATION; INTRACAUDAL; PERINEURAL
Status: DISCONTINUED | OUTPATIENT
Start: 2017-02-22 | End: 2017-02-22 | Stop reason: HOSPADM

## 2017-02-22 RX ORDER — PROPOFOL 10 MG/ML
VIAL (ML) INTRAVENOUS
Status: DISCONTINUED | OUTPATIENT
Start: 2017-02-22 | End: 2017-02-22

## 2017-02-22 RX ORDER — LIDOCAINE HYDROCHLORIDE 20 MG/ML
INJECTION, SOLUTION EPIDURAL; INFILTRATION; INTRACAUDAL; PERINEURAL
Status: DISCONTINUED
Start: 2017-02-22 | End: 2017-02-22 | Stop reason: HOSPADM

## 2017-02-22 RX ORDER — NEOMYCIN SULFATE, POLYMYXIN B SULFATE, AND DEXAMETHASONE 3.5; 10000; 1 MG/G; [USP'U]/G; MG/G
OINTMENT OPHTHALMIC
Status: DISCONTINUED
Start: 2017-02-22 | End: 2017-02-22 | Stop reason: HOSPADM

## 2017-02-22 RX ORDER — EPINEPHRINE CONVENIENCE KIT 1 MG/ML(1)
KIT INTRAMUSCULAR; SUBCUTANEOUS
Status: DISCONTINUED | OUTPATIENT
Start: 2017-02-22 | End: 2017-02-22 | Stop reason: HOSPADM

## 2017-02-22 RX ORDER — LIDOCAINE HCL/PF 100 MG/5ML
SYRINGE (ML) INTRAVENOUS
Status: DISCONTINUED | OUTPATIENT
Start: 2017-02-22 | End: 2017-02-22

## 2017-02-22 RX ADMIN — CYCLOPENTOLATE HYDROCHLORIDE 1 DROP: 10 SOLUTION/ DROPS OPHTHALMIC at 07:02

## 2017-02-22 RX ADMIN — SODIUM CHLORIDE: 0.9 INJECTION, SOLUTION INTRAVENOUS at 07:02

## 2017-02-22 RX ADMIN — LIDOCAINE HYDROCHLORIDE 5 ML: 20 INJECTION, SOLUTION EPIDURAL; INFILTRATION; INTRACAUDAL; PERINEURAL at 08:02

## 2017-02-22 RX ADMIN — BALANCED SALT SOLUTION 500 ML: 6.4; .75; .48; .3; 3.9; 1.7 SOLUTION OPHTHALMIC at 08:02

## 2017-02-22 RX ADMIN — PREDNISOLONE ACETATE 1 DROP: 10 SUSPENSION/ DROPS OPHTHALMIC at 07:02

## 2017-02-22 RX ADMIN — INDOCYANINE GREEN 10 MG: KIT INTRAVENOUS at 08:02

## 2017-02-22 RX ADMIN — TROPICAMIDE 1 DROP: 10 SOLUTION/ DROPS OPHTHALMIC at 07:02

## 2017-02-22 RX ADMIN — MIDAZOLAM HYDROCHLORIDE 1 MG: 1 INJECTION, SOLUTION INTRAMUSCULAR; INTRAVENOUS at 08:02

## 2017-02-22 RX ADMIN — FENTANYL CITRATE 50 MCG: 50 INJECTION, SOLUTION INTRAMUSCULAR; INTRAVENOUS at 08:02

## 2017-02-22 RX ADMIN — NEOMYCIN SULFATE, POLYMYXIN B SULFATE, AND DEXAMETHASONE 1 APPLICATION: 3.5; 10000; 1 OINTMENT OPHTHALMIC at 08:02

## 2017-02-22 RX ADMIN — VANCOMYCIN HYDROCHLORIDE 25 MG: 1 INJECTION, POWDER, LYOPHILIZED, FOR SOLUTION INTRAVENOUS at 08:02

## 2017-02-22 RX ADMIN — PROPOFOL 80 MG: 10 INJECTION, EMULSION INTRAVENOUS at 08:02

## 2017-02-22 RX ADMIN — TETRACAINE HYDROCHLORIDE 1 DROP: 5 SOLUTION OPHTHALMIC at 07:02

## 2017-02-22 RX ADMIN — HOMATROPINE HYDROBROMIDE 1 DROP: 50 SOLUTION OPHTHALMIC at 07:02

## 2017-02-22 RX ADMIN — MOXIFLOXACIN 1 DROP: 5 SOLUTION/ DROPS OPHTHALMIC at 07:02

## 2017-02-22 RX ADMIN — PHENYLEPHRINE HYDROCHLORIDE 1 DROP: 2.5 SOLUTION/ DROPS OPHTHALMIC at 07:02

## 2017-02-22 RX ADMIN — LIDOCAINE HYDROCHLORIDE 30 MG: 20 INJECTION, SOLUTION INTRAVENOUS at 08:02

## 2017-02-22 RX ADMIN — PHENYLEPHRINE HYDROCHLORIDE 1 DROP: 25 SOLUTION/ DROPS OPHTHALMIC at 07:02

## 2017-02-22 RX ADMIN — EPINEPHRINE 0.3 MG: 1 INJECTION, SOLUTION INTRAMUSCULAR; SUBCUTANEOUS at 08:02

## 2017-02-22 RX ADMIN — LIDOCAINE HYDROCHLORIDE 0.2 MG: 10 INJECTION, SOLUTION EPIDURAL; INFILTRATION; INTRACAUDAL; PERINEURAL at 07:02

## 2017-02-22 RX ADMIN — BUPIVACAINE HYDROCHLORIDE 5 ML: 7.5 INJECTION, SOLUTION EPIDURAL; RETROBULBAR at 08:02

## 2017-02-22 RX ADMIN — DEXAMETHASONE SODIUM PHOSPHATE 2 MG: 4 INJECTION, SOLUTION INTRAMUSCULAR; INTRAVENOUS at 08:02

## 2017-02-22 RX ADMIN — MOXIFLOXACIN HYDROCHLORIDE 1 DROP: 5 SOLUTION/ DROPS OPHTHALMIC at 07:02

## 2017-02-22 NOTE — IP AVS SNAPSHOT
[facility]  [address]  [facility] [address]  Phone: [phone]           Patient Discharge Instructions     Our goal is to set you up for success. This packet includes information on your condition, medications, and your home care. It will help you to care for yourself so you don't get sicker and need to go back to the hospital.     Please ask your nurse if you have any questions.        There are many details to remember when preparing to leave the hospital. Here is what you will need to do:    1. Take your medicine. If you are prescribed medications, review your Medication List in the following pages. You may have new medications to  at the pharmacy and others that you'll need to stop taking. Review the instructions for how and when to take your medications. Talk with your doctor or nurses if you are unsure of what to do.     2. Go to your follow-up appointments. Specific follow-up information is listed in the following pages. Your may be contacted by a transition nurse or clinical provider about future appointments. Be sure we have all of the phone numbers to reach you, if needed. Please contact your provider's office if you are unable to make an appointment.     3. Watch for warning signs. Your doctor or nurse will give you detailed warning signs to watch for and when to call for assistance. These instructions may also include educational information about your condition. If you experience any of warning signs to your health, call your doctor.               [facility]  Unless otherwise directed by your provider, please contact [facility], our nurse care line that is available for 24/7 assistance.     [phone] (toll-free)    Registered nurses in the [facility] provide clinical advisement, health education, appointment booking, and other advisory services.                    ** Verify the list of medication(s) below is accurate and up  to date. Carry this with you in case of emergency. If your medications have changed, please notify your healthcare provider.             Medication List      START taking these medications        Additional Info                      ondansetron 4 MG tablet   Commonly known as:  ZOFRAN   Quantity:  12 tablet   Refills:  0   Dose:  4 mg    Instructions:  Take 1 tablet (4 mg total) by mouth every 8 (eight) hours as needed for Nausea.     Begin Date    AM    Noon    PM    Bedtime       oxycodone-acetaminophen 5-325 mg per tablet   Commonly known as:  PERCOCET   Quantity:  12 tablet   Refills:  0   Dose:  1 tablet    Instructions:  Take 1 tablet by mouth every 6 (six) hours as needed for Pain.     Begin Date    AM    Noon    PM    Bedtime         CHANGE how you take these medications        Additional Info                      canagliflozin 100 mg Tab   Commonly known as:  INVOKANA   Quantity:  90 tablet   Refills:  3   Dose:  100 mg   What changed:  when to take this    Instructions:  Take 1 tablet (100 mg total) by mouth once daily.     Begin Date    AM    Noon    PM    Bedtime       celecoxib 100 MG capsule   Commonly known as:  CeleBREX   Quantity:  180 capsule   Refills:  1   Dose:  100 mg   What changed:  when to take this    Instructions:  Take 1 capsule (100 mg total) by mouth 2 (two) times daily as needed.     Begin Date    AM    Noon    PM    Bedtime       fluoxetine 40 MG capsule   Commonly known as:  PROZAC   Quantity:  90 capsule   Refills:  3   Dose:  40 mg   What changed:  when to take this    Instructions:  Take 1 capsule (40 mg total) by mouth once daily.     Begin Date    AM    Noon    PM    Bedtime       fluticasone 50 mcg/actuation nasal spray   Commonly known as:  FLONASE   Quantity:  1 Bottle   Refills:  0   Dose:  2 spray   What changed:    - when to take this  - reasons to take this    Instructions:  2 sprays by Each Nare route once daily.     Begin Date    AM    Noon    PM    Bedtime        lisinopril 5 MG tablet   Commonly known as:  PRINIVIL,ZESTRIL   Quantity:  90 tablet   Refills:  3   Dose:  5 mg   What changed:  when to take this    Instructions:  Take 1 tablet (5 mg total) by mouth once daily.     Begin Date    AM    Noon    PM    Bedtime       tramadol 50 mg tablet   Commonly known as:  ULTRAM   Quantity:  180 tablet   Refills:  2   What changed:  See the new instructions.    Instructions:  take 1 to 2 tablet by mouth three times a day     Begin Date    AM    Noon    PM    Bedtime         CONTINUE taking these medications        Additional Info                      biotin 1 mg tablet   Refills:  0   Dose:  1000 mcg    Instructions:  Take 1,000 mcg by mouth every morning.     Begin Date    AM    Noon    PM    Bedtime       butalbital-acetaminophen  mg Tab   Quantity:  30 each   Refills:  1   Dose:  1 tablet    Instructions:  Take 1 tablet by mouth daily as needed.     Begin Date    AM    Noon    PM    Bedtime       gabapentin 300 MG capsule   Commonly known as:  NEURONTIN   Quantity:  180 capsule   Refills:  1    Instructions:  take 1 capsule by mouth twice a day     Begin Date    AM    Noon    PM    Bedtime       melatonin 5 mg Tab   Refills:  0   Dose:  1 tablet    Instructions:  Take 1 tablet by mouth nightly.     Begin Date    AM    Noon    PM    Bedtime       metformin 1000 MG tablet   Commonly known as:  GLUCOPHAGE   Quantity:  180 tablet   Refills:  3   Dose:  1000 mg    Instructions:  Take 1 tablet (1,000 mg total) by mouth 2 (two) times daily with meals.     Begin Date    AM    Noon    PM    Bedtime       ranitidine 150 MG tablet   Commonly known as:  ZANTAC   Refills:  0   Dose:  150 mg    Instructions:  Take 150 mg by mouth nightly.     Begin Date    AM    Noon    PM    Bedtime       verapamil 80 MG tablet   Commonly known as:  CALAN   Quantity:  180 tablet   Refills:  3   Dose:  80 mg    Instructions:  Take 1 tablet (80 mg total) by mouth 2 (two) times daily.     Begin Date     AM    Noon    PM    Bedtime       VITAMIN B-12 500 MCG tablet   Refills:  0   Dose:  500 mcg   Generic drug:  cyanocobalamin    Instructions:  Take 500 mcg by mouth nightly.     Begin Date    AM    Noon    PM    Bedtime            Where to Get Your Medications      You can get these medications from any pharmacy     Bring a paper prescription for each of these medications     ondansetron 4 MG tablet    oxycodone-acetaminophen 5-325 mg per tablet                  Please bring to all follow up appointments:    1. A copy of your discharge instructions.  2. All medicines you are currently taking in their original bottles.  3. Identification and insurance card.    Please arrive 15 minutes ahead of scheduled appointment time.    Please call 24 hours in advance if you must reschedule your appointment and/or time.        Your Scheduled Appointments     Feb 23, 2017  7:50 AM CST   Post OP with MINGO Goode MD   [place] [facility]    [address]  [place] [address]   [phone]            Aug 15, 2017  1:30 PM CDT   Thomas Visual Brock with FIELDS, VISUAL-ONE   [facility] ([facility])    [address] [address]   [phone]            Aug 15, 2017  2:00 PM CDT   Established Patient Visit with Bhavesh Mccurdy, OD   [facility]    [address] [address]   [phone]              Your Future Surgeries/Procedures     Feb 22, 2017   Surgery with MINGO Goode MD   [facility] ([facility])    [address] [address] [phone]              Follow-up Information     Follow up with JOSSIE Goode MD.    Specialty:  Ophthalmology    Why:  [facility] 8:00 am tomorrow - [address]    Contact information:    [address] [address] [phone]          Discharge Instructions     Future Orders    Call MD for:  difficulty  "breathing, headache or visual disturbances     Call MD for:  extreme fatigue     Call MD for:  hives     Call MD for:  persistent dizziness or light-headedness     Call MD for:  persistent nausea and vomiting     Call MD for:  redness, tenderness, or signs of infection (pain, swelling, redness, odor or green/yellow discharge around incision site)     Call MD for:  severe uncontrolled pain     Call MD for:  temperature >100.4     Diet general     Questions:    Total calories:      Fat restriction, if any:      Protein restriction, if any:      Na restriction, if any:      Fluid restriction:      Additional restrictions:      Lifting restrictions     Weight bearing restrictions (specify)         Discharge Instructions       Post Op Instructions:  Patient should Maintain Eye shield & Dressing until seen tomorrow in eye clinic  Tylenol as needed for general discomfort  Use Prescription for pain medication if pain is severe  Use Prescription for Nausea (Zofran) if nausea or vomiting  No excessive exercise   No Bending, Lifting or Straining  Call MD if significant pain or nausea / vomiting uncontrolled by medications  Call MD if temperature in excess of 101' F  Maintain Head in a Face-Down Position  Return to eye clinic for Post Op Examination tomorrow Morning.  Bring Medicine bag to tomorrow's appointment.        Primary Diagnosis     Your primary diagnosis was:  Macular Hole      Admission Information     Date & Time Provider Department CSN    2/22/2017  6:14 AM MINGO Goode MD [facility] [id number]      Care Providers     Provider Role Specialty Primary office phone    MINGO Goode MD Attending Provider Ophthalmology [phone]    MINGO Goode MD Surgeon  Ophthalmology [phone]      Your Vitals Were     BP Pulse Temp Resp Height Weight    159/71 70 97.9 °F (36.6 °C) (Oral) 18 5' 10" (1.778 m) 99.8 kg (220 lb)    SpO2 BMI             98% 31.57 kg/m2         Recent Lab " "Values        3/8/2004 7/15/2004 10/15/2004 12/3/2012 5/6/2014 11/25/2014 6/1/2015 2/9/2016     12:02 PM  7:58 AM  8:14 AM  4:27 PM 10:25 AM  8:09 AM  8:30 AM  9:05 AM    A1C 6.5 (H) 6.2 6.0 7.3 (H) 7.7 (H) 7.2 (H) 6.8 (H) 6.3 (H)      Allergies as of 2/22/2017        Reactions    Demerol [Meperidine] Other (See Comments)    Burning when adm IV  Able to tolerate IM, "Turned my hand purple."    Latex, Natural Rubber Other (See Comments)    "Burns my skin",  Symptoms get worse the longer she is exposed    Zocor [Simvastatin] Other (See Comments)    Tightening of muscles    Sulfa (Sulfonamide Antibiotics)       Advance Directives     An advance directive is a document which, in the event you are no longer able to make decisions for yourself, tells your healthcare team what kind of treatment you do or do not want to receive, or who you would like to make those decisions for you.  If you do not currently have an advance directive, [facility] encourages you to create one.  For more information call:  [phone]), [phone] ([phone]),  or log on to [email or web address].        Language Assistance Services     ATTENTION: Language assistance services are available, free of charge. Please call [phone].      ATENCIÓN: Si habla español, tiene a collazo disposición servicios gratuitos de asistencia lingüística. Llame al [phone].     [facility] Ý: N?u b?n nói Ti?ng Vi?t, có các d?ch v? h? tr? ngôn ng? mi?n phí dành cho b?n. G?i s? [phone].        Diabetes Discharge Instructions                                    [facility] complies with applicable Federal civil rights laws and does not discriminate on the basis of race, color, national origin, age, disability, or sex.        "

## 2017-02-22 NOTE — ANESTHESIA POSTPROCEDURE EVALUATION
"Anesthesia Post Evaluation    Patient: Kaylin Mccollum    Procedure(s) Performed: Procedure(s) (LRB):  REPAIR-RETINA (VITRECTOMY) (Left)    Final Anesthesia Type: general  Patient location during evaluation: PACU  Patient participation: Yes- Able to Participate  Level of consciousness: awake and alert  Post-procedure vital signs: reviewed and stable  Pain management: adequate  Airway patency: patent  PONV status at discharge: No PONV  Anesthetic complications: no      Cardiovascular status: hemodynamically stable  Respiratory status: room air, spontaneous ventilation and unassisted  Hydration status: euvolemic  Follow-up not needed.        Visit Vitals    BP (!) 148/77 (BP Location: Right arm, Patient Position: Lying, BP Method: Automatic)    Pulse 72    Temp 36.7 °C (98.1 °F) (Temporal)    Resp 18    Ht 5' 10" (1.778 m)    Wt 99.8 kg (220 lb)    SpO2 97%    BMI 31.57 kg/m2       Pain/Ana Score: Pain Assessment Performed: Yes (2/22/2017  7:37 AM)  Presence of Pain: denies (2/22/2017  7:37 AM)      "

## 2017-02-22 NOTE — INTERVAL H&P NOTE
The patient has been examined and the H&P has been reviewed:  I concur with the findings and no changes have occurred since H&P was written.      Anesthesia/Surgery risks, benefits and alternative options discussed and understood by patient/family.

## 2017-02-22 NOTE — BRIEF OP NOTE
Pre-Op Dx: FTMH OS    Post Op Dx: same    Procedure Performed: 25g PPV/ILM peel/AFx/C3F8 14% OS    Attending Surgeon: Rupa    Assistant Surgeon: Price    Anesthesia: Local/Mac, retrobulbar injection of 4.0cc mixture 0.75%Marcaine, 2% Xylocaine    Estimated blood loss: Minimal    Complication: None    Specimen: None    Disposition: Stable to recovery    Findings/Outcome: FTMH with taut hyaloid/ILM.  Superior equatorial lattice without breaks    Date of Discharge: 2/22/17    Discharge Disposition: stable to recovery    F/U: tomorrow

## 2017-02-22 NOTE — ANESTHESIA RELEASE NOTE
"Anesthesia Release from PACU Note    Patient: Kaylin Mccollum    Procedure(s) Performed: Procedure(s) (LRB):  REPAIR-RETINA (VITRECTOMY) (Left)    Anesthesia type: MAC    Post pain: Adequate analgesia    Post assessment: no apparent anesthetic complications, tolerated procedure well and no evidence of recall    Last Vitals:   Visit Vitals    BP (!) 148/77 (BP Location: Right arm, Patient Position: Lying, BP Method: Automatic)    Pulse 72    Temp 36.7 °C (98.1 °F) (Temporal)    Resp 18    Ht 5' 10" (1.778 m)    Wt 99.8 kg (220 lb)    SpO2 97%    BMI 31.57 kg/m2       Post vital signs: stable    Level of consciousness: awake, alert  and oriented    Nausea/Vomiting: no nausea/no vomiting    Complications: none    Airway Patency: patent    Respiratory: unassisted, spontaneous ventilation, room air    Cardiovascular: stable and blood pressure at baseline    Hydration: euvolemic  "

## 2017-02-22 NOTE — PLAN OF CARE
Patient and son state they are ready to be discharged. Instructions, eye bag and prescriptions given to patient and family. Both verbalize understanding. Patient tolerating po liquids with no difficulty. Patient states pain is at a tolerable level for them. Anesthesia consent and surgical consent in chart upon patient's discharge from [facility].

## 2017-02-22 NOTE — TRANSFER OF CARE
"Anesthesia Transfer of Care Note    Patient: Kaylin Mccollum    Procedure(s) Performed: Procedure(s) (LRB):  REPAIR-RETINA (VITRECTOMY) (Left)    Patient location: PACU    Anesthesia Type: MAC    Transport from OR: Transported from OR on room air with adequate spontaneous ventilation    Post pain: adequate analgesia    Post assessment: no apparent anesthetic complications    Post vital signs: stable    Level of consciousness: awake and alert    Nausea/Vomiting: no nausea/vomiting    Complications: none          Last vitals:   Visit Vitals    BP (!) 148/77 (BP Location: Right arm, Patient Position: Lying, BP Method: Automatic)    Pulse 72    Temp 36.7 °C (98.1 °F) (Temporal)    Resp 18    Ht 5' 10" (1.778 m)    Wt 99.8 kg (220 lb)    SpO2 97%    BMI 31.57 kg/m2     "

## 2017-02-22 NOTE — OP NOTE
DATE OF PROCEDURE:  02/22/2017.    PREOPERATIVE DIAGNOSIS:  Full-thickness macular hole to the left eye.    POSTOPERATIVE DIAGNOSIS:  Full-thickness macular hole to the left eye.    PROCEDURE PERFORMED:  A 25-gauge pars plana vitrectomy with internal limiting   membrane peel, air-fluid exchange and injection of C3F8 gas 14% to the left eye.    ATTENDING SURGEON:  MINGO Goode M.D.    ASSISTANT SURGEON:  Carlton Thrasher M.D. (FEL).    ANESTHESIA:  Monitored anesthesia care with a retrobulbar injection of 4.0 mL   mixture of 0.75% Marcaine and 2% Xylocaine.    ESTIMATED BLOOD LOSS:  Minimal.    COMPLICATIONS:  None.    DISPOSITION:  Stable to recovery.    INDICATIONS FOR SURGERY:  This is a 72-year-old female who noticed a central   scotoma in her left eye about four to five months ago.  The patient was found to   have a full-thickness macular hole that was confirmed on OCT.  Decision was   made to take the patient to surgery to remove the vitreomacular traction, place   a gas bubble to allow for hole closure to improve vision and improve quality of   life.  Risks, benefits, and alternatives of surgery were discussed in detail;   risks including loss of vision, loss of eye, retinal detachment, infection,   hemorrhage, cataract formation, lens dislocation, glaucoma, hypotony, ptosis and   diplopia.  The patient voiced understanding and wishes to proceed with the   procedure.    DESCRIPTION OF PROCEDURE:  After proper informed consent was obtained, the   patient was brought back to the Operating Suite at [facility] where   MAC anesthesia was induced.  Retrobulbar injection was provided as above without   complication.  The patient was prepped and draped in normal sterile fashion for   ophthalmic surgery and lid speculum was placed in the left eye.  Standard   3-port 25-gauge pars plana vitrectomy was set up with the infusion cannula   inserted 4 mm posterior to the limbus.  The infusion cannula was  turned on only   after observed to be free and clear of all retinal tissue.  Supranasal and   supratemporal trocars were also placed 4 mm posterior to the limbus.  The   vitrector and light pipe were introduced in the vitreous cavity and a core   vitrectomy was performed.  Posterior hyaloid was elevated and propagated out to   the level of the vitreous base.  ICG was used to stain the internal limiting   membrane, which was peeled off under direct contact lens visualization with the   ILM forceps.  Scleral depression was performed 360 degrees to help with the   removal of the cortical vitreous.  No identifiable retinal breaks were found.    An air-fluid exchange was performed.  The supranasal trocar was removed and not   leaking after gentle massage.  A 14% C3F8 gas mixture was created; approximately   40 mL were cycled through the eye.  The supratemporal and infusion trocars were   removed and not leaking after gentle massage.  The eye was normal pressure via   palpation.  Subconjunctival injections of vancomycin and Decadron were given to   the patient.  The drapes were removed from the patient.  She was washed free of   Betadine prep solution.  Maxitrol ointment was placed in the left eye.  The eye   was patch shielded.  MAC anesthesia was reversed and she was brought to Recovery   Room in stable condition, tolerating the procedure well.  Dr. Goode was   present for the entire case.      MICHAEL  dd: 02/22/2017 09:14:39 (CST)  td: 02/22/2017 11:46:42 (CST)  Doc ID   #[id number]  Job ID #[id number]    CC:

## 2017-02-22 NOTE — DISCHARGE INSTRUCTIONS
Post Op Instructions:  Patient should Maintain Eye shield & Dressing until seen tomorrow in eye clinic  Tylenol as needed for general discomfort  Use Prescription for pain medication if pain is severe  Use Prescription for Nausea (Zofran) if nausea or vomiting  No excessive exercise   No Bending, Lifting or Straining  Call MD if significant pain or nausea / vomiting uncontrolled by medications  Call MD if temperature in excess of 101' F  Maintain Head in a Face-Down Position  Return to eye clinic for Post Op Examination tomorrow Morning.  Bring Medicine bag to tomorrow's appointment.

## 2017-02-23 ENCOUNTER — OFFICE VISIT (OUTPATIENT)
Dept: OPHTHALMOLOGY | Facility: CLINIC | Age: 72
End: 2017-02-23
Payer: COMMERCIAL

## 2017-02-23 VITALS — SYSTOLIC BLOOD PRESSURE: 151 MMHG | DIASTOLIC BLOOD PRESSURE: 65 MMHG

## 2017-02-23 DIAGNOSIS — H35.342 MACULAR HOLE OF LEFT EYE: Primary | ICD-10-CM

## 2017-02-23 PROCEDURE — 99024 POSTOP FOLLOW-UP VISIT: CPT | Mod: S$GLB,,, | Performed by: OPHTHALMOLOGY

## 2017-02-23 PROCEDURE — 99999 PR PBB SHADOW E&M-EST. PATIENT-LVL IV: CPT | Mod: PBBFAC,,, | Performed by: OPHTHALMOLOGY

## 2017-02-23 NOTE — PROGRESS NOTES
OCT   OD: flat, normal foveal contour  OS:  Full thickness central macular hole     A/P    1) FTMH OS  ~4 months duration  - s/p JALIL x1 with Dr. Petty on 1/5/17 without improvement    s/p 25g PPV/MP/C3F8 OS for FTMH OS 2/22/17  Doing well, good IOP  Start gtts QID, ointment shield QHS  -at post op 1 week - just continue PF/HA BID    Face down x 3 days    2) DM  - no retinopathy on exam  - encouraged BG control    3. NS OU    To OR

## 2017-02-23 NOTE — MR AVS SNAPSHOT
[place] [facility]   [facility]  [place] [address]  Phone: [phone]  Fax: [phone]                  Kaylin Mccollum   2017 7:50 AM   Office Visit    Description:  Female : 1945   Provider:  MINGO Goode MD   Department:  [place] [facility]           Reason for Visit     Post-op Evaluation                To Do List           Future Appointments        Provider Department Dept Phone    8/15/2017 1:30 PM TOUSSAINT, VISUAL-ONE [facility] [phone]    8/15/2017 2:00 PM Bhavesh Mccurdy, OD [facility] [phone]      Goals (5 Years of Data)     None      Follow-Up and Disposition     Return in about 1 week (around 3/2/2017).      [facility] Call     [facility] On Call Nurse Care Line -  Assistance  Registered nurses in the [facility] provide clinical advisement, health education, appointment booking, and other advisory services.  Call for this free service at [phone].             Medications           Message regarding Medications     Verify the changes and/or additions to your medication regime listed below are the same as discussed with your clinician today.  If any of these changes or additions are incorrect, please notify your healthcare provider.             Verify that the below list of medications is an accurate representation of the medications you are currently taking.  If none reported, the list may be blank. If incorrect, please contact your healthcare provider. Carry this list with you in case of emergency.           Current Medications     biotin 1 mg tablet Take 1,000 mcg by mouth every morning.     butalbital-acetaminophen  mg Tab Take 1 tablet by mouth daily as needed.    canagliflozin (INVOKANA) 100 mg Tab Take 1 tablet (100 mg total) by mouth once daily.    celecoxib (CELEBREX) 100 MG capsule Take 1 capsule (100 mg total) by mouth 2 (two) times daily as needed.    cyanocobalamin (VITAMIN  B-12) 500 MCG tablet Take 500 mcg by mouth nightly.    fluoxetine (PROZAC) 40 MG capsule Take 1 capsule (40 mg total) by mouth once daily.    fluticasone (FLONASE) 50 mcg/actuation nasal spray 2 sprays by Each Nare route once daily.    gabapentin (NEURONTIN) 300 MG capsule take 1 capsule by mouth twice a day    melatonin 5 mg Tab Take 1 tablet by mouth nightly.     metformin (GLUCOPHAGE) 1000 MG tablet Take 1 tablet (1,000 mg total) by mouth 2 (two) times daily with meals.    ondansetron (ZOFRAN) 4 MG tablet Take 1 tablet (4 mg total) by mouth every 8 (eight) hours as needed for Nausea.    oxycodone-acetaminophen (PERCOCET) 5-325 mg per tablet Take 1 tablet by mouth every 6 (six) hours as needed for Pain.    ranitidine (ZANTAC) 150 MG tablet Take 150 mg by mouth nightly.    tramadol (ULTRAM) 50 mg tablet take 1 to 2 tablet by mouth three times a day    verapamil (CALAN) 80 MG tablet Take 1 tablet (80 mg total) by mouth 2 (two) times daily.    lisinopril (PRINIVIL,ZESTRIL) 5 MG tablet Take 1 tablet (5 mg total) by mouth once daily.           Clinical Reference Information           Your Vitals Were     BP                   151/65           Blood Pressure          Most Recent Value    BP  (!)  151/65      Allergies as of 2/23/2017     Demerol [Meperidine]    Latex, Natural Rubber    Zocor [Simvastatin]    Sulfa (Sulfonamide Antibiotics)      Immunizations Administered on Date of Encounter - 2/23/2017     None      Language Assistance Services     ATTENTION: Language assistance services are available, free of charge. Please call [phone].      ATENCIÓN: Si habla kat, tiene a collazo disposición servicios gratuitos de asistencia lingüística. Llame al [phone].     JESS Ý: N?u b?n nói Ti?ng Vi?t, có các d?ch v? h? tr? ngôn ng? mi?n phí dành cho b?n. G?i s? [phone].         [place] [facility] complies with applicable Federal civil rights laws and does not discriminate on the basis of race, color,  national origin, age, disability, or sex.

## 2017-03-02 ENCOUNTER — OFFICE VISIT (OUTPATIENT)
Dept: OPHTHALMOLOGY | Facility: CLINIC | Age: 72
End: 2017-03-02
Payer: COMMERCIAL

## 2017-03-02 DIAGNOSIS — H40.052 OCULAR HYPERTENSION OF LEFT EYE: Primary | ICD-10-CM

## 2017-03-02 PROCEDURE — 99024 POSTOP FOLLOW-UP VISIT: CPT | Mod: S$GLB,,, | Performed by: OPHTHALMOLOGY

## 2017-03-02 PROCEDURE — 99999 PR PBB SHADOW E&M-EST. PATIENT-LVL II: CPT | Mod: PBBFAC,,, | Performed by: OPHTHALMOLOGY

## 2017-03-02 RX ORDER — DORZOLAMIDE HYDROCHLORIDE AND TIMOLOL MALEATE 20; 5 MG/ML; MG/ML
1 SOLUTION/ DROPS OPHTHALMIC 2 TIMES DAILY
Qty: 10 ML | Refills: 1 | Status: SHIPPED | OUTPATIENT
Start: 2017-03-02 | End: 2017-06-29

## 2017-03-02 RX ORDER — BRIMONIDINE TARTRATE 1.5 MG/ML
1 SOLUTION/ DROPS OPHTHALMIC 3 TIMES DAILY
Qty: 5 ML | Refills: 1 | Status: SHIPPED | OUTPATIENT
Start: 2017-03-02 | End: 2017-05-12

## 2017-03-02 NOTE — PROGRESS NOTES
===============================  Kaylin Mccollum,   72 y.o. female   Last visit JCC: :2/9/2017   Last visit eye dept. 2/9/2017  [facility]:  Cor[place] distance visual acuity was 20/30 in the right eye and HM in the left eye.  Tonometry     Tonometry (Applanation, 8:03 AM)      Right Left   Pressure 24 29         Tonometry #2      Right Left   Pressure  35                  Not recorded         Not recorded        Chief Complaint   Patient presents with    Post-op Evaluation     1 week po check/  macular hole os     Ophthalmic Medications     Ophthalmic-Intraocular Press. Reducing, Michelle. Alpha Adrenergic Agonists Start End    brimonidine 0.15 % OPTH DROP (ALPHAGAN) 0.15 % ophthalmic solution 3/2/2017 3/2/2018    Sig: Place 1 drop into the left eye 3 (three) times daily.    Route: Left Eye         HPI     Post-op Evaluation    Additional comments: 1 week po check/  macular hole os           Comments   NS OU  VMA associated shearing MH with CME OS  [facility] OS 2/22/17 (DR. SORENSEN)        AVASTIN OS 1/5/17       Last edited by Rocio Dubois on 3/2/2017  8:00 AM. (History)      Read Studies:   Vitalsy    ________________  3/2/2017  1. Ocular hypertension of left eye      .1 week post MH  T 35  Add  cosopt alphagan  85% bubble  rtc Monday iop check  Face down as tolrated   a1 bi   'pf tid  Ab qid        ===============================

## 2017-03-02 NOTE — MR AVS SNAPSHOT
[facility]  [address]  [place] [address]  Phone: [phone]  Fax: [phone]                  Kalyin Mccollum   3/2/2017 8:00 AM   Office Visit    Description:  Female : 1945   Provider:  CELESTE Petty MD   Department:  [facility]           Reason for Visit     Post-op Evaluation           Diagnoses this Visit        Comments    Ocular hypertension of left eye    -  Primary            To Do List           Future Appointments        Provider Department Dept Phone    3/6/2017 7:45 AM CELESTE Petty MD [facility]mology [phone]    8/15/2017 1:30 PM TOUSSAINT, VISUAL-ONE [facility] Ophthalmology [phone]    8/15/2017 2:00 PM Bhavesh Mccurdy, OD [facility]hthalmology [phone]      Goals (5 Years of Data)     None      Follow-Up and Disposition     Return in about 5 days (around 3/7/2017).       These Medications        Disp Refills Start End    dorzolamide-timolol 2-0.5% (COSOPT) 22.3-6.8 mg/mL ophthalmic solution 10 mL 1 3/2/2017 3/2/2018    Place 1 drop into the left eye 2 (two) times daily. - Left Eye    Pharmacy: [id number] Ph #: [phone]       brimonidine 0.15 % OPTH DROP (ALPHAGAN) 0.15 % ophthalmic solution 5 mL 1 3/2/2017 3/2/2018    Place 1 drop into the left eye 3 (three) times daily. - Left Eye    Pharmacy: [id number] [facility] [address] Ph #: [phone]         [facility] On Call     [facility] On Call Nurse Care Line -  Assistance  Registered nurses in the [facility] provide clinical advisement, health education, appointment booking, and other advisory services.  Call for this free service at [phone].             Medications           Message regarding Medications     Verify the changes and/or additions to your medication regime listed below are the same as discussed with your clinician today.  If any of these changes or  additions are incorrect, please notify your healthcare provider.        START taking these NEW medications        Refills    dorzolamide-timolol 2-0.5% (COSOPT) 22.3-6.8 mg/mL ophthalmic solution 1    Sig: Place 1 drop into the left eye 2 (two) times daily.    Class: Normal    Route: Left Eye    brimonidine 0.15 % OPTH DROP (ALPHAGAN) 0.15 % ophthalmic solution 1    Sig: Place 1 drop into the left eye 3 (three) times daily.    Class: Normal    Route: Left Eye           Verify that the below list of medications is an accurate representation of the medications you are currently taking.  If none reported, the list may be blank. If incorrect, please contact your healthcare provider. Carry this list with you in case of emergency.           Current Medications     biotin 1 mg tablet Take 1,000 mcg by mouth every morning.     butalbital-acetaminophen  mg Tab Take 1 tablet by mouth daily as needed.    canagliflozin (INVOKANA) 100 mg Tab Take 1 tablet (100 mg total) by mouth once daily.    celecoxib (CELEBREX) 100 MG capsule Take 1 capsule (100 mg total) by mouth 2 (two) times daily as needed.    cyanocobalamin (VITAMIN B-12) 500 MCG tablet Take 500 mcg by mouth nightly.    fluoxetine (PROZAC) 40 MG capsule Take 1 capsule (40 mg total) by mouth once daily.    fluticasone (FLONASE) 50 mcg/actuation nasal spray 2 sprays by Each Nare route once daily.    gabapentin (NEURONTIN) 300 MG capsule take 1 capsule by mouth twice a day    melatonin 5 mg Tab Take 1 tablet by mouth nightly.     metformin (GLUCOPHAGE) 1000 MG tablet Take 1 tablet (1,000 mg total) by mouth 2 (two) times daily with meals.    ondansetron (ZOFRAN) 4 MG tablet Take 1 tablet (4 mg total) by mouth every 8 (eight) hours as needed for Nausea.    oxycodone-acetaminophen (PERCOCET) 5-325 mg per tablet Take 1 tablet by mouth every 6 (six) hours as needed for Pain.    ranitidine (ZANTAC) 150 MG tablet Take 150 mg by mouth nightly.    tramadol (ULTRAM) 50 mg  tablet take 1 to 2 tablet by mouth three times a day    verapamil (CALAN) 80 MG tablet Take 1 tablet (80 mg total) by mouth 2 (two) times daily.    brimonidine 0.15 % OPTH DROP (ALPHAGAN) 0.15 % ophthalmic solution Place 1 drop into the left eye 3 (three) times daily.    dorzolamide-timolol 2-0.5% (COSOPT) 22.3-6.8 mg/mL ophthalmic solution Place 1 drop into the left eye 2 (two) times daily.    lisinopril (PRINIVIL,ZESTRIL) 5 MG tablet Take 1 tablet (5 mg total) by mouth once daily.           Clinical Reference Information           Allergies as of 3/2/2017     Demerol [Meperidine]    Latex, Natural Rubber    Zocor [Simvastatin]    Sulfa (Sulfonamide Antibiotics)      Immunizations Administered on Date of Encounter - 3/2/2017     None      Language Assistance Services     ATTENTION: Language assistance services are available, free of charge. Please call [phone].      ATENCIÓN: Si gabriel stephens, tiene a collazo disposición servicios gratuitos de asistencia lingüística. Llame al [phone].     JESS Ý: N?u b?n nói Ti?ng Vi?t, có các d?ch v? h? tr? ngôn ng? mi?n phí dành cho b?n. G?i s? [phone].         [facility] complies with applicable Federal civil rights laws and does not discriminate on the basis of race, color, national origin, age, disability, or sex.

## 2017-03-06 ENCOUNTER — OFFICE VISIT (OUTPATIENT)
Dept: OPHTHALMOLOGY | Facility: CLINIC | Age: 72
End: 2017-03-06
Payer: COMMERCIAL

## 2017-03-06 DIAGNOSIS — Z98.890 POST-OPERATIVE STATE: Primary | ICD-10-CM

## 2017-03-06 PROCEDURE — 99024 POSTOP FOLLOW-UP VISIT: CPT | Mod: S$GLB,,, | Performed by: OPHTHALMOLOGY

## 2017-03-06 PROCEDURE — 99999 PR PBB SHADOW E&M-EST. PATIENT-LVL II: CPT | Mod: PBBFAC,,, | Performed by: OPHTHALMOLOGY

## 2017-03-06 NOTE — MR AVS SNAPSHOT
[facility]  [address] [place] [address]  Phone: [phone]  Fax: [phone]                  Kaylin Mccollum   3/6/2017 7:45 AM   Office Visit    Description:  Female : 1945   Provider:  CELESTE Petty MD   Department:  [facility]           Reason for Visit     Post-op Evaluation           Diagnoses this Visit        Comments    Post-operative state    -  Primary            To Do List           Future Appointments        Provider Department Dept Phone    8/15/2017 1:30 PM TOUSSAINT, VISUAL-ONE [facility] [phone]    8/15/2017 2:00 PM Bhavesh Mccurdy, OD [facility] [phone]      Goals (5 Years of Data)     None      Follow-Up and Disposition     Return in about 1 week (around 3/13/2017).      [facility] On Call     [facility] On Call Nurse Care Line - 24/7 Assistance  Registered nurses in the [facility] On Call Center provide clinical advisement, health education, appointment booking, and other advisory services.  Call for this free service at [phone].             Medications           Message regarding Medications     Verify the changes and/or additions to your medication regime listed below are the same as discussed with your clinician today.  If any of these changes or additions are incorrect, please notify your healthcare provider.             Verify that the below list of medications is an accurate representation of the medications you are currently taking.  If none reported, the list may be blank. If incorrect, please contact your healthcare provider. Carry this list with you in case of emergency.           Current Medications     biotin 1 mg tablet Take 1,000 mcg by mouth every morning.     brimonidine 0.15 % OPTH DROP (ALPHAGAN) 0.15 % ophthalmic solution Place 1 drop into the left eye 3 (three) times daily.    butalbital-acetaminophen  mg Tab Take 1 tablet by mouth daily as needed.    canagliflozin (INVOKANA) 100 mg Tab  Take 1 tablet (100 mg total) by mouth once daily.    celecoxib (CELEBREX) 100 MG capsule Take 1 capsule (100 mg total) by mouth 2 (two) times daily as needed.    cyanocobalamin (VITAMIN B-12) 500 MCG tablet Take 500 mcg by mouth nightly.    dorzolamide-timolol 2-0.5% (COSOPT) 22.3-6.8 mg/mL ophthalmic solution Place 1 drop into the left eye 2 (two) times daily.    fluoxetine (PROZAC) 40 MG capsule Take 1 capsule (40 mg total) by mouth once daily.    fluticasone (FLONASE) 50 mcg/actuation nasal spray 2 sprays by Each Nare route once daily.    gabapentin (NEURONTIN) 300 MG capsule take 1 capsule by mouth twice a day    lisinopril (PRINIVIL,ZESTRIL) 5 MG tablet Take 1 tablet (5 mg total) by mouth once daily.    melatonin 5 mg Tab Take 1 tablet by mouth nightly.     metformin (GLUCOPHAGE) 1000 MG tablet Take 1 tablet (1,000 mg total) by mouth 2 (two) times daily with meals.    ondansetron (ZOFRAN) 4 MG tablet Take 1 tablet (4 mg total) by mouth every 8 (eight) hours as needed for Nausea.    oxycodone-acetaminophen (PERCOCET) 5-325 mg per tablet Take 1 tablet by mouth every 6 (six) hours as needed for Pain.    ranitidine (ZANTAC) 150 MG tablet Take 150 mg by mouth nightly.    tramadol (ULTRAM) 50 mg tablet take 1 to 2 tablet by mouth three times a day    verapamil (CALAN) 80 MG tablet Take 1 tablet (80 mg total) by mouth 2 (two) times daily.           Clinical Reference Information           Allergies as of 3/6/2017     Demerol [Meperidine]    Latex, Natural Rubber    Zocor [Simvastatin]    Sulfa (Sulfonamide Antibiotics)      Immunizations Administered on Date of Encounter - 3/6/2017     None      Language Assistance Services     ATTENTION: Language assistance services are available, free of charge. Please call [phone].      ATENCIÓN: Si marniela kat, tiene a collazo disposición servicios gratuitos de asistencia lingüística. Llame al [phone].     CHÚ Ý: N?u b?n nói Ti?ng Vi?t, có các d?ch v? h? tr? ngôn ng?  mi?n phí dành cho b?n. G?i s? [phone].         [facility] complies with applicable Federal civil rights laws and does not discriminate on the basis of race, color, national origin, age, disability, or sex.

## 2017-03-10 DIAGNOSIS — E11.9 TYPE 2 DIABETES MELLITUS WITHOUT COMPLICATION: ICD-10-CM

## 2017-03-13 ENCOUNTER — OFFICE VISIT (OUTPATIENT)
Dept: OPHTHALMOLOGY | Facility: CLINIC | Age: 72
End: 2017-03-13
Payer: COMMERCIAL

## 2017-03-13 DIAGNOSIS — Z98.890 POST-OPERATIVE STATE: Primary | ICD-10-CM

## 2017-03-13 PROCEDURE — 99024 POSTOP FOLLOW-UP VISIT: CPT | Mod: S$GLB,,, | Performed by: OPHTHALMOLOGY

## 2017-03-13 PROCEDURE — 99999 PR PBB SHADOW E&M-EST. PATIENT-LVL II: CPT | Mod: PBBFAC,,, | Performed by: OPHTHALMOLOGY

## 2017-03-13 NOTE — PROGRESS NOTES
===============================  Kaylin Mccollum,   72 y.o. female   Last visit JCC: :3/6/2017   Last visit eye dept. 3/6/2017  [facility]:  Corrected distance visual acuity was 20/25 in the right eye and CF at 3' in the left eye.   Not recorded         Not recorded         Not recorded        Chief Complaint   Patient presents with    Post-op Evaluation     1 week po check     Ophthalmic Medications     Ophthalmic-Intraocular Press. Reducing, Michelle. Alpha Adrenergic Agonists Start End    brimonidine 0.15 % OPTH DROP (ALPHAGAN) 0.15 % ophthalmic solution 3/2/2017 3/2/2018    Sig: Place 1 drop into the left eye 3 (three) times daily.    Route: Left Eye         HPI     Post-op Evaluation    Additional comments: 1 week po check           Comments   1. NS OU  2. s/p 25g PPV/MP/C3F8 OS for [facility] OS 2/22/17    AVASTIN OS 1/5/17  Cosopt OS BID, Alphagan OS BID, Prednislone OS, Atropine OS       Last edited by Rocio Dubois on 3/13/2017 12:52 PM. (History)      Read Studies:   Vitals    ________________  3/13/2017  1. Post-operative state      Doing well Post PPV/MP for [facility] OS on 2/22/17  csm  rtc 10 days.       ===========================

## 2017-03-13 NOTE — MR AVS SNAPSHOT
[facility]  [address] [place] [address]  Phone: [phone]  Fax: [phone]                  Kaylin Mccollum   3/13/2017 1:00 PM   Office Visit    Description:  Female : 1945   Provider:  CELESTE Petty MD   Department:  [facility]           Reason for Visit     Post-op Evaluation           Diagnoses this Visit        Comments    Post-operative state    -  Primary            To Do List           Future Appointments        Provider Department Dept Phone    3/24/2017 10:45 AM CELESTE Petty MD [facility] Op[facility]mology [phone]    8/15/2017 1:30 PM TOUSSAINT, VISUAL-ONE [facility]mology [phone]    8/15/2017 2:00 PM Bhavesh Mccurdy, ALLEN [facility]hthalmology [phone]      Goals (5 Years of Data)     None      Follow-Up and Disposition     Return in about 10 days (around 3/23/2017).      [facility] On Call     [facility] On Call Nurse Care Line -  Assistance  Registered nurses in the [facility] provide clinical advisement, health education, appointment booking, and other advisory services.  Call for this free service at [phone].             Medications           Message regarding Medications     Verify the changes and/or additions to your medication regime listed below are the same as discussed with your clinician today.  If any of these changes or additions are incorrect, please notify your healthcare provider.             Verify that the below list of medications is an accurate representation of the medications you are currently taking.  If none reported, the list may be blank. If incorrect, please contact your healthcare provider. Carry this list with you in case of emergency.           Current Medications     biotin 1 mg tablet Take 1,000 mcg by mouth every morning.     brimonidine 0.15 % OPTH DROP (ALPHAGAN) 0.15 % ophthalmic solution Place 1 drop into the left eye 3 (three) times daily.    butalbital-acetaminophen  mg Tab  Take 1 tablet by mouth daily as needed.    canagliflozin (INVOKANA) 100 mg Tab Take 1 tablet (100 mg total) by mouth once daily.    celecoxib (CELEBREX) 100 MG capsule Take 1 capsule (100 mg total) by mouth 2 (two) times daily as needed.    cyanocobalamin (VITAMIN B-12) 500 MCG tablet Take 500 mcg by mouth nightly.    dorzolamide-timolol 2-0.5% (COSOPT) 22.3-6.8 mg/mL ophthalmic solution Place 1 drop into the left eye 2 (two) times daily.    fluoxetine (PROZAC) 40 MG capsule Take 1 capsule (40 mg total) by mouth once daily.    fluticasone (FLONASE) 50 mcg/actuation nasal spray 2 sprays by Each Nare route once daily.    gabapentin (NEURONTIN) 300 MG capsule take 1 capsule by mouth twice a day    melatonin 5 mg Tab Take 1 tablet by mouth nightly.     metformin (GLUCOPHAGE) 1000 MG tablet Take 1 tablet (1,000 mg total) by mouth 2 (two) times daily with meals.    ondansetron (ZOFRAN) 4 MG tablet Take 1 tablet (4 mg total) by mouth every 8 (eight) hours as needed for Nausea.    oxycodone-acetaminophen (PERCOCET) 5-325 mg per tablet Take 1 tablet by mouth every 6 (six) hours as needed for Pain.    ranitidine (ZANTAC) 150 MG tablet Take 150 mg by mouth nightly.    tramadol (ULTRAM) 50 mg tablet take 1 to 2 tablet by mouth three times a day    verapamil (CALAN) 80 MG tablet Take 1 tablet (80 mg total) by mouth 2 (two) times daily.    lisinopril (PRINIVIL,ZESTRIL) 5 MG tablet Take 1 tablet (5 mg total) by mouth once daily.           Clinical Reference Information           Allergies as of 3/13/2017     Demerol [Meperidine]    Latex, Natural Rubber    Zocor [Simvastatin]    Sulfa (Sulfonamide Antibiotics)      Immunizations Administered on Date of Encounter - 3/13/2017     None      Language Assistance Services     ATTENTION: Language assistance services are available, free of charge. Please call [phone].      ATENCIÓN: Si habla kat, tiene a collazo disposición servicios gratuitos de asistencia lingüística. Llame al  [phone].     JESS Ý: N?u b?n nói Ti?ng Vi?t, có các d?ch v? h? tr? ngôn ng? mi?n phí dành cho b?n. G?i s? [phone].         [facility] - Ophthalmology complies with applicable Federal civil rights laws and does not discriminate on the basis of race, color, national origin, age, disability, or sex.

## 2017-03-17 DIAGNOSIS — E11.69 HYPERLIPIDEMIA ASSOCIATED WITH TYPE 2 DIABETES MELLITUS: ICD-10-CM

## 2017-03-17 DIAGNOSIS — I34.1 MVP (MITRAL VALVE PROLAPSE): ICD-10-CM

## 2017-03-17 DIAGNOSIS — F32.9 CHRONIC MAJOR DEPRESSIVE DISORDER: Chronic | ICD-10-CM

## 2017-03-17 DIAGNOSIS — M79.7 FIBROMYALGIA: Chronic | ICD-10-CM

## 2017-03-17 DIAGNOSIS — E78.5 HYPERLIPIDEMIA ASSOCIATED WITH TYPE 2 DIABETES MELLITUS: ICD-10-CM

## 2017-03-17 DIAGNOSIS — E11.59 HYPERTENSION ASSOCIATED WITH DIABETES: ICD-10-CM

## 2017-03-17 DIAGNOSIS — I15.2 HYPERTENSION ASSOCIATED WITH DIABETES: ICD-10-CM

## 2017-03-18 DIAGNOSIS — M79.7 FIBROMYALGIA: Chronic | ICD-10-CM

## 2017-03-18 DIAGNOSIS — M47.25 OSTEOARTHRITIS OF SPINE WITH RADICULOPATHY, THORACOLUMBAR REGION: Chronic | ICD-10-CM

## 2017-03-18 DIAGNOSIS — M15.9 PRIMARY OSTEOARTHRITIS INVOLVING MULTIPLE JOINTS: ICD-10-CM

## 2017-03-20 RX ORDER — LISINOPRIL 5 MG/1
TABLET ORAL
Qty: 90 TABLET | Refills: 0 | Status: SHIPPED | OUTPATIENT
Start: 2017-03-20 | End: 2017-09-21 | Stop reason: SDUPTHER

## 2017-03-20 RX ORDER — CANAGLIFLOZIN 100 MG/1
TABLET, FILM COATED ORAL
Qty: 90 TABLET | Refills: 0 | Status: SHIPPED | OUTPATIENT
Start: 2017-03-20 | End: 2017-07-05 | Stop reason: SDUPTHER

## 2017-03-20 RX ORDER — METFORMIN HYDROCHLORIDE 1000 MG/1
TABLET ORAL
Qty: 180 TABLET | Refills: 0 | Status: SHIPPED | OUTPATIENT
Start: 2017-03-20 | End: 2017-09-21 | Stop reason: SDUPTHER

## 2017-03-20 RX ORDER — FLUOXETINE HYDROCHLORIDE 40 MG/1
CAPSULE ORAL
Qty: 90 CAPSULE | Refills: 0 | Status: SHIPPED | OUTPATIENT
Start: 2017-03-20 | End: 2017-07-05 | Stop reason: SDUPTHER

## 2017-03-20 RX ORDER — CELECOXIB 100 MG/1
CAPSULE ORAL
Qty: 180 CAPSULE | Refills: 1 | Status: SHIPPED | OUTPATIENT
Start: 2017-03-20 | End: 2017-11-22 | Stop reason: SDUPTHER

## 2017-03-20 RX ORDER — VERAPAMIL HYDROCHLORIDE 80 MG/1
TABLET ORAL
Qty: 180 TABLET | Refills: 0 | Status: SHIPPED | OUTPATIENT
Start: 2017-03-20 | End: 2017-07-27 | Stop reason: SDUPTHER

## 2017-03-24 ENCOUNTER — OFFICE VISIT (OUTPATIENT)
Dept: OPHTHALMOLOGY | Facility: CLINIC | Age: 72
End: 2017-03-24
Payer: MEDICARE

## 2017-03-24 DIAGNOSIS — H35.342 LAMELLAR MACULAR HOLE OF LEFT EYE: Primary | ICD-10-CM

## 2017-03-24 PROCEDURE — 99999 PR PBB SHADOW E&M-EST. PATIENT-LVL II: CPT | Mod: PBBFAC,,, | Performed by: OPHTHALMOLOGY

## 2017-03-24 PROCEDURE — 99024 POSTOP FOLLOW-UP VISIT: CPT | Mod: S$GLB,,, | Performed by: OPHTHALMOLOGY

## 2017-03-24 NOTE — MR AVS SNAPSHOT
[facility]  [address] [place] [address]  Phone: [phone]  Fax: [phone]                  Kaylin Mccollum   3/24/2017 10:45 AM   Office Visit    Description:  Female : 1945   Provider:  CELESTE Petty MD   Department:  [facility]           Reason for Visit     Post-op Evaluation           Diagnoses this Visit        Comments    Lamellar macular hole of left eye    -  Primary            To Do List           Future Appointments        Provider Department Dept Phone    2017 9:00 AM Renee Sarabia NP [facility] [phone]    8/15/2017 1:30 PM TOUSSAINT, VISUAL-ONE [facility] [phone]    8/15/2017 2:00 PM Bhavesh Mccurdy, OD [facility] [phone]      Goals (5 Years of Data)     None      Follow-Up and Disposition     Return in about 2 weeks (around 2017).      [facility]     [facility] On Call Nurse [facility]  Assistance  Registered nurses in the [facility] provide clinical advisement, health education, appointment booking, and other advisory services.  Call for this free service at [phone].             Medications           Message regarding Medications     Verify the changes and/or additions to your medication regime listed below are the same as discussed with your clinician today.  If any of these changes or additions are incorrect, please notify your healthcare provider.             Verify that the below list of medications is an accurate representation of the medications you are currently taking.  If none reported, the list may be blank. If incorrect, please contact your healthcare provider. Carry this list with you in case of emergency.           Current Medications     biotin 1 mg tablet Take 1,000 mcg by mouth every morning.     brimonidine 0.15 % OPTH DROP (ALPHAGAN) 0.15 % ophthalmic solution Place 1 drop into the left eye 3 (three) times daily.     butalbital-acetaminophen  mg Tab Take 1 tablet by mouth daily as needed.    celecoxib (CELEBREX) 100 MG capsule take 1 capsule by mouth twice a day    cyanocobalamin (VITAMIN B-12) 500 MCG tablet Take 500 mcg by mouth nightly.    dorzolamide-timolol 2-0.5% (COSOPT) 22.3-6.8 mg/mL ophthalmic solution Place 1 drop into the left eye 2 (two) times daily.    fluoxetine (PROZAC) 40 MG capsule take 1 capsule by mouth once daily    fluticasone (FLONASE) 50 mcg/actuation nasal spray 2 sprays by Each Nare route once daily.    gabapentin (NEURONTIN) 300 MG capsule take 1 capsule by mouth twice a day    INVOKANA 100 mg Tab take 1 tablet by mouth once daily    lisinopril (PRINIVIL,ZESTRIL) 5 MG tablet take 1 tablet by mouth once daily    melatonin 5 mg Tab Take 1 tablet by mouth nightly.     metformin (GLUCOPHAGE) 1000 MG tablet take 1 tablet by mouth twice a day with meals    ondansetron (ZOFRAN) 4 MG tablet Take 1 tablet (4 mg total) by mouth every 8 (eight) hours as needed for Nausea.    oxycodone-acetaminophen (PERCOCET) 5-325 mg per tablet Take 1 tablet by mouth every 6 (six) hours as needed for Pain.    ranitidine (ZANTAC) 150 MG tablet Take 150 mg by mouth nightly.    tramadol (ULTRAM) 50 mg tablet take 1 to 2 tablet by mouth three times a day    verapamil (CALAN) 80 MG tablet take 1 tablet by mouth twice a day           Clinical Reference Information           Allergies as of 3/24/2017     Demerol [Meperidine]    Latex, Natural Rubber    Zocor [Simvastatin]    Sulfa (Sulfonamide Antibiotics)      Immunizations Administered on Date of Encounter - 3/24/2017     None      Language Assistance Services     ATTENTION: Language assistance services are available, free of charge. Please call [phone].      ATENCIÓN: Si marniela kat, tiene a collazo disposición servicios gratuitos de asistencia lingüística. Llame al [phone].     CHÚ Ý: N?u b?n nói Ti?ng Vi?t, có các d?ch v? h? tr? ngôn ng? mi?n phí dành cho b?n. G?i  s? [phone].         [facility] complies with applicable Federal civil rights laws and does not discriminate on the basis of race, color, national origin, age, disability, or sex.

## 2017-03-24 NOTE — PROGRESS NOTES
===============================  Kaylin Mccollum,   72 y.o. female   Last visit [facility]: :3/13/2017   [facility] [place] [facility] 3/13/2017  [facility]  Cor[place] visual acuity was 20/25 in the right eye and 20/400 in the left eye.   Not recorded         Not recorded         Not recorded        Chief Complaint   Patient presents with    Post-op Evaluation     s/p ppv for ftmh     Ophthalmic Medications     Ophthalmic-Intraocular Press. Reducing, Michelle. Alpha Adrenergic Agonists Start End    brimonidine 0.15 % OPTH DROP (ALPHAGAN) 0.15 % ophthalmic solution 3/2/2017 3/2/2018    Sig: Place 1 drop into the left eye 3 (three) times daily.    Route: Left Eye         HPI     Post-op Evaluation    Additional comments: s/p ppv for ftmh           Comments   1. NS OU  2. s/p 25g PPV/MP/C3F8 OS for FTMH OS 2/22/17    AVASTIN OS 1/5/17  Cosopt OS BID, Alphagan OS BID, Prednislone OS, Atropine OS       Last edited by DONOVAN Carreno on 3/24/2017 11:00 AM. (History)      Read Studies:   Vitalsy    ________________  3/24/2017  1. Lamellar macular hole of left eye      S/p PPV/MP OS 2/22/17 with Dr. Goode  1/3 bubble, macula looks normal  Slow controlled movements  Continue drops until out  See me back in 2 weeks    .       ===========================

## 2017-03-29 ENCOUNTER — PATIENT OUTREACH (OUTPATIENT)
Dept: ADMINISTRATIVE | Facility: HOSPITAL | Age: 72
End: 2017-03-29
Payer: MEDICARE

## 2017-04-05 ENCOUNTER — PATIENT MESSAGE (OUTPATIENT)
Dept: INTERNAL MEDICINE | Facility: CLINIC | Age: 72
End: 2017-04-05

## 2017-04-05 DIAGNOSIS — G43.909 MIGRAINE WITHOUT STATUS MIGRAINOSUS, NOT INTRACTABLE, UNSPECIFIED MIGRAINE TYPE: ICD-10-CM

## 2017-04-05 RX ORDER — BUTALBITAL AND ACETAMINOPHEN 325; 50 MG/1; MG/1
TABLET ORAL
Refills: 1 | OUTPATIENT
Start: 2017-04-05

## 2017-04-06 DIAGNOSIS — G43.909 MIGRAINE WITHOUT STATUS MIGRAINOSUS, NOT INTRACTABLE, UNSPECIFIED MIGRAINE TYPE: ICD-10-CM

## 2017-04-06 RX ORDER — BUTALBITAL AND ACETAMINOPHEN 325; 50 MG/1; MG/1
1 TABLET ORAL DAILY PRN
Qty: 30 EACH | Refills: 1 | OUTPATIENT
Start: 2017-04-06

## 2017-04-07 ENCOUNTER — HOSPITAL ENCOUNTER (OUTPATIENT)
Dept: RADIOLOGY | Facility: HOSPITAL | Age: 72
Discharge: HOME | End: 2017-04-07
Attending: FAMILY MEDICINE
Payer: MEDICARE

## 2017-04-07 ENCOUNTER — OFFICE VISIT (OUTPATIENT)
Dept: OPHTHALMOLOGY | Facility: CLINIC | Age: 72
End: 2017-04-07
Payer: MEDICARE

## 2017-04-07 DIAGNOSIS — H35.342 MACULAR HOLE, FULL THICKNESS, LEFT: Primary | ICD-10-CM

## 2017-04-07 DIAGNOSIS — Z12.31 ENCOUNTER FOR SCREENING MAMMOGRAM FOR BREAST CANCER: ICD-10-CM

## 2017-04-07 PROCEDURE — 77067 SCR MAMMO BI INCL CAD: CPT | Mod: TC

## 2017-04-07 PROCEDURE — 99024 POSTOP FOLLOW-UP VISIT: CPT | Mod: S$GLB,,, | Performed by: OPHTHALMOLOGY

## 2017-04-07 PROCEDURE — 99999 PR PBB SHADOW E&M-EST. PATIENT-LVL II: CPT | Mod: PBBFAC,,, | Performed by: OPHTHALMOLOGY

## 2017-04-07 PROCEDURE — 77067 SCR MAMMO BI INCL CAD: CPT | Mod: 26,,, | Performed by: RADIOLOGY

## 2017-04-07 NOTE — MR AVS SNAPSHOT
[facility]  [address] [place] [address]  Phone: [phone]  Fax: [phone]                  Kaylin Mccollum   2017 11:15 AM   Office Visit    Description:  Female : 1945   Provider:  CELESTE Petty MD   Department:  [facility]           Reason for Visit     Post-op Evaluation           Diagnoses this Visit        Comments    Macular hole, full thickness, left    -  Primary            To Do List           Future Appointments        Provider Department Dept Phone    2017 1:15 PM [id number] [facility] [phone]    2017 10:45 AM CELESTE Petty MD [facility] [phone]    2017 1:00 PM Renee Sarabia NP [facility] [phone]    8/15/2017 1:30 PM TOUSSAINT, VISUAL-ONE [facility] [phone]    8/15/2017 2:00 PM Bhavesh Mccurdy, OD [facility] [phone]      Goals (5 Years of Data)     None      Follow-Up and Disposition     Return in about 2 weeks (around 2017).      [facility]     [facility] -  Assistance  Unless otherwise directed by your provider, please contact [facility], our nurse care line that is available for  assistance.     Registered nurses in the [facility] provide: appointment scheduling, clinical advisement, health education, and other advisory services.  Call: [phone] (toll free)               Medications           Message regarding Medications     Verify the changes and/or additions to your medication regime listed below are the same as discussed with your clinician today.  If any of these changes or additions are incorrect, please notify your healthcare provider.             Verify that the below list of medications is an accurate representation of the medications you are currently taking.  If none reported, the list may be blank. If incorrect, please contact your healthcare  provider. Carry this list with you in case of emergency.           Current Medications     biotin 1 mg tablet Take 1,000 mcg by mouth every morning.     brimonidine 0.15 % OPTH DROP (ALPHAGAN) 0.15 % ophthalmic solution Place 1 drop into the left eye 3 (three) times daily.    butalbital-acetaminophen  mg Tab Take 1 tablet by mouth daily as needed.    celecoxib (CELEBREX) 100 MG capsule take 1 capsule by mouth twice a day    cyanocobalamin (VITAMIN B-12) 500 MCG tablet Take 500 mcg by mouth nightly.    dorzolamide-timolol 2-0.5% (COSOPT) 22.3-6.8 mg/mL ophthalmic solution Place 1 drop into the left eye 2 (two) times daily.    fluoxetine (PROZAC) 40 MG capsule take 1 capsule by mouth once daily    fluticasone (FLONASE) 50 mcg/actuation nasal spray 2 sprays by Each Nare route once daily.    gabapentin (NEURONTIN) 300 MG capsule take 1 capsule by mouth twice a day    INVOKANA 100 mg Tab take 1 tablet by mouth once daily    lisinopril (PRINIVIL,ZESTRIL) 5 MG tablet take 1 tablet by mouth once daily    melatonin 5 mg Tab Take 1 tablet by mouth nightly.     metformin (GLUCOPHAGE) 1000 MG tablet take 1 tablet by mouth twice a day with meals    ondansetron (ZOFRAN) 4 MG tablet Take 1 tablet (4 mg total) by mouth every 8 (eight) hours as needed for Nausea.    oxycodone-acetaminophen (PERCOCET) 5-325 mg per tablet Take 1 tablet by mouth every 6 (six) hours as needed for Pain.    ranitidine (ZANTAC) 150 MG tablet Take 150 mg by mouth nightly.    tramadol (ULTRAM) 50 mg tablet take 1 to 2 tablet by mouth three times a day    verapamil (CALAN) 80 MG tablet take 1 tablet by mouth twice a day           Clinical Reference Information           Allergies as of 4/7/2017     Demerol [Meperidine]    Latex, Natural Rubber    Zocor [Simvastatin]    Sulfa (Sulfonamide Antibiotics)      Immunizations Administered on Date of Encounter - 4/7/2017     None      Language Assistance Services     ATTENTION: Language assistance services are  available, free of charge. Please call [phone].      ATENCIÓN: Si habla kat, tiene a collazo disposición servicios gratuitos de asistencia lingüística. Llame al [phone].     CHÚ Ý: N?u b?n nói Ti?ng Vi?t, có các d?ch v? h? tr? ngôn ng? mi?n phí dành cho b?n. G?i s? [phone].         [facility] complies with applicable Federal civil rights laws and does not discriminate on the basis of race, color, national origin, age, disability, or sex.

## 2017-04-07 NOTE — PROGRESS NOTES
===============================  Kaylin Mccollum,   72 y.o. female   Last visit [facility]: :3/24/2017   Last visit [facility] dept. 3/24/2017  [facility]:  Uncorrected distance visual acuity was not recorded in the right eye and 20/200 in the left eye. Corrected distance visual acuity was 20/25 in the right eye and not recorded in the left eye.  Tonometry     Tonometry      Right Left   Pressure  22                  Not recorded         Not recorded        Chief Complaint   Patient presents with    Post-op Evaluation     shae os s/p  ppv, pt statesvispin better os with out glasses     Ophthalmic Medications     Ophthalmic-Intraocular Press. Reducing, Michelle. Alpha Adrenergic Agonists Start End    brimonidine 0.15 % OPTH DROP (ALPHAGAN) 0.15 % ophthalmic solution 3/2/2017 3/2/2018    Sig: Place 1 drop into the left eye 3 (three) times daily.    Route: Left Eye         HPI     Post-op Evaluation    Additional comments: shae os s/p  ppv, pt statesvispin better os with out   glasses           Comments   1. NS OU  2. s/p 25g PPV/MP/C3F8 OS for FTMH OS 2/22/17    AVASTIN OS 1/5/17  Cosopt OS BID, Alphagan OS BID, Prednislone OS, Atropine OS       Last edited by DONOVAN Carreno on 4/7/2017 11:25 AM. (History)      Read Studies:   Vitals    ________________  4/7/2017  Problem List Items Addressed This Visit     None      Visit Diagnoses     Macular hole, full thickness, left    -  Primary        .  Doing very well c po MH  PPV 15 bubble  1 ns - 1 vac cts    mr about plano  rtc 2 weeks        ===========================

## 2017-04-21 ENCOUNTER — OFFICE VISIT (OUTPATIENT)
Dept: OPHTHALMOLOGY | Facility: CLINIC | Age: 72
End: 2017-04-21
Payer: MEDICARE

## 2017-04-21 ENCOUNTER — OFFICE VISIT (OUTPATIENT)
Dept: INTERNAL MEDICINE | Facility: CLINIC | Age: 72
End: 2017-04-21
Payer: MEDICARE

## 2017-04-21 VITALS
OXYGEN SATURATION: 97 % | HEART RATE: 85 BPM | TEMPERATURE: 97 F | SYSTOLIC BLOOD PRESSURE: 120 MMHG | BODY MASS INDEX: 31.72 KG/M2 | DIASTOLIC BLOOD PRESSURE: 62 MMHG | WEIGHT: 221.56 LBS | HEIGHT: 70 IN

## 2017-04-21 DIAGNOSIS — I10 HTN (HYPERTENSION), BENIGN: Primary | Chronic | ICD-10-CM

## 2017-04-21 DIAGNOSIS — E78.5 DYSLIPIDEMIA, GOAL LDL BELOW 100: Chronic | ICD-10-CM

## 2017-04-21 DIAGNOSIS — E11.9 TYPE 2 DIABETES MELLITUS WITHOUT COMPLICATION, WITHOUT LONG-TERM CURRENT USE OF INSULIN: Chronic | ICD-10-CM

## 2017-04-21 DIAGNOSIS — G43.909 MIGRAINE WITHOUT STATUS MIGRAINOSUS, NOT INTRACTABLE, UNSPECIFIED MIGRAINE TYPE: ICD-10-CM

## 2017-04-21 DIAGNOSIS — Z98.890 POST-OPERATIVE STATE: Primary | ICD-10-CM

## 2017-04-21 PROCEDURE — 1160F RVW MEDS BY RX/DR IN RCRD: CPT | Mod: S$GLB,,, | Performed by: NURSE PRACTITIONER

## 2017-04-21 PROCEDURE — 3074F SYST BP LT 130 MM HG: CPT | Mod: S$GLB,,, | Performed by: NURSE PRACTITIONER

## 2017-04-21 PROCEDURE — 99999 PR PBB SHADOW E&M-EST. PATIENT-LVL II: CPT | Mod: PBBFAC,,, | Performed by: OPHTHALMOLOGY

## 2017-04-21 PROCEDURE — 3044F HG A1C LEVEL LT 7.0%: CPT | Mod: S$GLB,,, | Performed by: NURSE PRACTITIONER

## 2017-04-21 PROCEDURE — 3078F DIAST BP <80 MM HG: CPT | Mod: S$GLB,,, | Performed by: NURSE PRACTITIONER

## 2017-04-21 PROCEDURE — 99214 OFFICE O/P EST MOD 30 MIN: CPT | Mod: S$GLB,,, | Performed by: NURSE PRACTITIONER

## 2017-04-21 PROCEDURE — 4010F ACE/ARB THERAPY RXD/TAKEN: CPT | Mod: S$GLB,,, | Performed by: NURSE PRACTITIONER

## 2017-04-21 PROCEDURE — 1159F MED LIST DOCD IN RCRD: CPT | Mod: S$GLB,,, | Performed by: NURSE PRACTITIONER

## 2017-04-21 PROCEDURE — 1126F AMNT PAIN NOTED NONE PRSNT: CPT | Mod: S$GLB,,, | Performed by: NURSE PRACTITIONER

## 2017-04-21 PROCEDURE — 99024 POSTOP FOLLOW-UP VISIT: CPT | Mod: S$GLB,,, | Performed by: OPHTHALMOLOGY

## 2017-04-21 PROCEDURE — 99999 PR PBB SHADOW E&M-EST. PATIENT-LVL IV: CPT | Mod: PBBFAC,,, | Performed by: NURSE PRACTITIONER

## 2017-04-21 RX ORDER — BUTALBITAL AND ACETAMINOPHEN 325; 50 MG/1; MG/1
1 TABLET ORAL DAILY PRN
Qty: 30 EACH | Refills: 1 | Status: SHIPPED | OUTPATIENT
Start: 2017-04-21 | End: 2018-08-21 | Stop reason: SDUPTHER

## 2017-04-21 NOTE — MR AVS SNAPSHOT
[facility]  [address] [place] [address]  Phone: [phone]  Fax: [phone]                  Kaylin Mccollum   2017 10:45 AM   Office Visit    Description:  Female : 1945   Provider:  CELESTE Petty MD   Department:  [facility]           Reason for Visit     Post-op Evaluation           Diagnoses this Visit        Comments    Post-operative state    -  Primary            To Do List           Future Appointments        Provider Department Dept Phone    2017 8:30 AM CELESTE Petty MD [facility] - Ophthalmology [phone]    8/15/2017 1:30 PM TOUSSAINT, VISUAL-ONE [facility] - Ophthalmology [phone]    8/15/2017 2:00 PM Bhavesh Mccurdy, ALL[facility] [phone]      Goals (5 Years of Data)     None      Follow-Up and Disposition     Return in about 3 weeks (around 2017).      [facility]     [facility] On Call Nurse Care Line -  Assistance  Unless otherwise directed by your provider, please contact [facility], our nurse care line that is available for  assistance.     Registered nurses in the [facility] provide: appointment scheduling, clinical advisement, health education, and other advisory services.  Call: [phone] (toll free)               Medications           Message regarding Medications     Verify the changes and/or additions to your medication regime listed below are the same as discussed with your clinician today.  If any of these changes or additions are incorrect, please notify your healthcare provider.             Verify that the below list of medications is an accurate representation of the medications you are currently taking.  If none reported, the list may be blank. If incorrect, please contact your healthcare provider. Carry this list with you in case of emergency.           Current Medications     biotin 1 mg tablet Take 1,000 mcg by mouth every morning.     brimonidine 0.15 % OPTH DROP  (ALPHAGAN) 0.15 % ophthalmic solution Place 1 drop into the left eye 3 (three) times daily.    butalbital-acetaminophen  mg Tab Take 1 tablet by mouth daily as needed.    celecoxib (CELEBREX) 100 MG capsule take 1 capsule by mouth twice a day    cyanocobalamin (VITAMIN B-12) 500 MCG tablet Take 500 mcg by mouth nightly.    dorzolamide-timolol 2-0.5% (COSOPT) 22.3-6.8 mg/mL ophthalmic solution Place 1 drop into the left eye 2 (two) times daily.    fluoxetine (PROZAC) 40 MG capsule take 1 capsule by mouth once daily    fluticasone (FLONASE) 50 mcg/actuation nasal spray 2 sprays by Each Nare route once daily.    gabapentin (NEURONTIN) 300 MG capsule take 1 capsule by mouth twice a day    INVOKANA 100 mg Tab take 1 tablet by mouth once daily    lisinopril (PRINIVIL,ZESTRIL) 5 MG tablet take 1 tablet by mouth once daily    melatonin 5 mg Tab Take 1 tablet by mouth nightly.     metformin (GLUCOPHAGE) 1000 MG tablet take 1 tablet by mouth twice a day with meals    ondansetron (ZOFRAN) 4 MG tablet Take 1 tablet (4 mg total) by mouth every 8 (eight) hours as needed for Nausea.    ranitidine (ZANTAC) 150 MG tablet Take 150 mg by mouth nightly.    tramadol (ULTRAM) 50 mg tablet take 1 to 2 tablet by mouth three times a day    verapamil (CALAN) 80 MG tablet take 1 tablet by mouth twice a day           Clinical Reference Information           Allergies as of 4/21/2017     Demerol [Meperidine]    Latex, Natural Rubber    Zocor [Simvastatin]    Sulfa (Sulfonamide Antibiotics)      Immunizations Administered on Date of Encounter - 4/21/2017     None      Language Assistance Services     ATTENTION: Language assistance services are available, free of charge. Please call [phone].      ATENCIÓN: Si gabriel stephens, tiene a collazo disposición servicios gratuitos de asistencia lingüística. Llame al [phone].     CHÚ Ý: N?u b?n nói Ti?ng Vi?t, có các d?ch v? h? tr? ngôn ng? mi?n phí dành cho b?n. G?i s? [phone].          [facility] complies with applicable Federal civil rights laws and does not discriminate on the basis of race, color, national origin, age, disability, or sex.

## 2017-04-21 NOTE — PROGRESS NOTES
Subjective:       Patient ID: Kaylin Mccollum is a 72 y.o. female.    Chief Complaint: 6 month follow up    HPI Comments: Patient presents to clinic today for followup of chronic conditions including HTN, HLD, and diabetes. She is being treated by ophthalomology for macular degneration.      Review of Systems   Constitutional: Positive for fatigue (chronic). Negative for chills, fever and unexpected weight change.   Eyes: Positive for visual disturbance (followed by ophthalomology).   Respiratory: Negative for shortness of breath.    Cardiovascular: Negative for chest pain.   Musculoskeletal: Negative for myalgias.   Neurological: Negative for headaches.       Objective:      Physical Exam   Constitutional: She is oriented to person, place, and time. She appears well-developed and well-nourished. No distress.   HENT:   Head: Normocephalic and atraumatic.   Eyes: Conjunctivae and EOM are normal. Pupils are equal, round, and reactive to light.   Cardiovascular: Normal rate and regular rhythm.  Exam reveals no gallop and no friction rub.    No murmur heard.  Pulmonary/Chest: Effort normal and breath sounds normal.   Neurological: She is alert and oriented to person, place, and time.   Skin: Skin is warm and dry.   Psychiatric: She has a normal mood and affect.   Vitals reviewed.    Protective Sensation (w/ 10 gram monofilament):  Right: Intact  Left: Intact    Visual Inspection:  Normal -  Bilateral, Nails Intact - without Evidence of Foot Deformity- Bilateral and Onychomycosis -  Bilateral    Pedal Pulses:   Right: Present  Left: Present    Posterior tibialis:   Right:Present  Left: Present      Assessment:       1. HTN (hypertension), benign    2. Dyslipidemia, goal LDL below 100    3. Type 2 diabetes mellitus without complication, without long-term current use of insulin    4. Migraine without status migrainosus, not intractable, unspecified migraine type        Plan:   HTN (hypertension),  benign  Comments:  controlled, continue current medications      Dyslipidemia, goal LDL below 100  Comments:  cholesterol 229, patient states she had coffee with cream day of labs, continue current medications    Type 2 diabetes mellitus without complication, without long-term current use of insulin  Comments:  controlled, A1C 6.6, continue current medications    Migraine without status migrainosus, not intractable, unspecified migraine type  -     butalbital-acetaminophen  mg Tab; Take 1 tablet by mouth daily as needed.  Dispense: 30 each; Refill: 1      Health Maintenance reviewed/updated.  Patient states she had PNA vaccines at [facility] pharmacy. She will send records.   Patient to get Tdap from pharmacy.   Return in about 6 months (around 10/21/2017), or if symptoms worsen or fail to improve, for annual wellness.

## 2017-04-21 NOTE — MR AVS SNAPSHOT
[facility]  [address] [address]  Phone: [phone]  Fax: [phone]                  Kaylin Mccollum   2017 1:00 PM   Office Visit    Description:  Female : 1945   Provider:  Renee Sarabia NP   Department:  [facility] - Internal Medicine           Reason for Visit     6 month follow up                To Do List           Future Appointments        Provider Department Dept Phone    2017 8:30 AM CELESTE Petty MD [facility] [phone]    8/15/2017 1:30 PM TOUSSAINT, VISUAL-ONE [facility] - Ophthalmology [phone]    8/15/2017 2:00 PM Bhavesh Mccurdy OD [facility] [phone]    10/25/2017 9:00 AM Lisette Olvera MD [facility] [phone]      Goals (5 Years of Data)     None      Follow-Up and Disposition     Return in about 6 months (around 10/21/2017), or if symptoms worsen or fail to improve, for annual wellness.      [facility] On Call     [facility] On Call Nurse Care Line -  Assistance  Unless otherwise directed by your provider, please contact [facility], our nurse care line that is available for  assistance.     Registered nurses in the [facility] provide: appointment scheduling, clinical advisement, health education, and other advisory services.  Call: [phone] (toll free)               Medications           Message regarding Medications     Verify the changes and/or additions to your medication regime listed below are the same as discussed with your clinician today.  If any of these changes or additions are incorrect, please notify your healthcare provider.        STOP taking these medications     oxycodone-acetaminophen (PERCOCET) 5-325 mg per tablet Take 1 tablet by mouth every 6 (six) hours as needed for Pain.           Verify that the below list of medications is an accurate representation of the medications you are currently taking.  If none reported, the  "list may be blank. If incorrect, please contact your healthcare provider. Carry this list with you in case of emergency.           Current Medications     biotin 1 mg tablet Take 1,000 mcg by mouth every morning.     brimonidine 0.15 % OPTH DROP (ALPHAGAN) 0.15 % ophthalmic solution Place 1 drop into the left eye 3 (three) times daily.    butalbital-acetaminophen  mg Tab Take 1 tablet by mouth daily as needed.    celecoxib (CELEBREX) 100 MG capsule take 1 capsule by mouth twice a day    cyanocobalamin (VITAMIN B-12) 500 MCG tablet Take 500 mcg by mouth nightly.    dorzolamide-timolol 2-0.5% (COSOPT) 22.3-6.8 mg/mL ophthalmic solution Place 1 drop into the left eye 2 (two) times daily.    fluoxetine (PROZAC) 40 MG capsule take 1 capsule by mouth once daily    fluticasone (FLONASE) 50 mcg/actuation nasal spray 2 sprays by Each Nare route once daily.    gabapentin (NEURONTIN) 300 MG capsule take 1 capsule by mouth twice a day    INVOKANA 100 mg Tab take 1 tablet by mouth once daily    lisinopril (PRINIVIL,ZESTRIL) 5 MG tablet take 1 tablet by mouth once daily    melatonin 5 mg Tab Take 1 tablet by mouth nightly.     metformin (GLUCOPHAGE) 1000 MG tablet take 1 tablet by mouth twice a day with meals    ondansetron (ZOFRAN) 4 MG tablet Take 1 tablet (4 mg total) by mouth every 8 (eight) hours as needed for Nausea.    ranitidine (ZANTAC) 150 MG tablet Take 150 mg by mouth nightly.    tramadol (ULTRAM) 50 mg tablet take 1 to 2 tablet by mouth three times a day    verapamil (CALAN) 80 MG tablet take 1 tablet by mouth twice a day           Clinical Reference Information           Your Vitals Were     BP Pulse Temp Height Weight SpO2    120/62 (BP Location: Right arm) 85 96.7 °F (35.9 °C) (Tympanic) 5' 10" (1.778 m) 100.5 kg (221 lb 9 oz) 97%    BMI                31.79 kg/m2          Blood Pressure          Most Recent Value    BP  120/62      Allergies as of 4/21/2017     Demerol [Meperidine]    Latex, Natural Rubber "    Zocor [Simvastatin]    Sulfa (Sulfonamide Antibiotics)      Immunizations Administered on Date of Encounter - 4/21/2017     None      Language Assistance Services     ATTENTION: Language assistance services are available, free of charge. Please call [phone].      ATENCIÓN: Si gabriel stephens, tiene a collazo disposición servicios gratuitos de asistencia lingüística. Llame al [phone].     CHÚ Ý: N?u b?n nói Ti?ng Vi?t, có các d?ch v? h? tr? ngôn ng? mi?n phí dành cho b?n. G?i s? [phone].         [facility] complies with applicable Federal civil rights laws and does not discriminate on the basis of race, color, national origin, age, disability, or sex.

## 2017-04-21 NOTE — PROGRESS NOTES
===============================  04/21/2017   Kaylin Mccollum,   72 y.o. female   Last visit JC: :4/7/2017   Last visit eye dept. 4/7/2017  VA:  Corrected distance visual acuity was 20/25 in the right eye and 20/400 in the left eye.  Tonometry     Tonometry      Right Left   Pressure  18                  Not recorded         Not recorded        Chief Complaint   Patient presents with    Post-op Evaluation     2 week po check  PPV     Ophthalmic Medications     Ophthalmic-Intraocular Press. Reducing, Michelle. Alpha Adrenergic Agonists Start End    brimonidine 0.15 % OPTH DROP (ALPHAGAN) 0.15 % ophthalmic solution 3/2/2017 3/2/2018    Sig: Place 1 drop into the left eye 3 (three) times daily.    Route: Left Eye         HPI     Post-op Evaluation    Additional comments: 2 week po check  PPV           Comments   1. NS OU  2. s/p 25g PPV/MP/C3F8 OS for FTMH OS 2/22/17    AVASTIN OS 1/5/17  Cosopt OS BID, Alphagan OS BID,, Atropine OS       Last edited by Rocio Dubois on 4/21/2017 10:54 AM. (History)      Read Studies:   Vitalsy  ________________  4/21/2017  Problem List Items Addressed This Visit     None      Visit Diagnoses     Post-operative state    -  Primary        2 month post ppv/mp for OS FTMH  MR 20/200 OS.  OS .2+ ns lens no psc, disc sharp 0.5-6, macula looks good no macular hole  IOP ok, continue Cosopt and Alphagan until out  Discontinue atropine  Oct better   rtc 3 weeks        ===========================

## 2017-05-12 ENCOUNTER — OFFICE VISIT (OUTPATIENT)
Dept: OPHTHALMOLOGY | Facility: CLINIC | Age: 72
End: 2017-05-12
Payer: MEDICARE

## 2017-05-12 DIAGNOSIS — Z98.890 POST-OPERATIVE STATE: Primary | ICD-10-CM

## 2017-05-12 DIAGNOSIS — H35.342 MACULAR HOLE, LEFT: ICD-10-CM

## 2017-05-12 PROCEDURE — 99999 PR PBB SHADOW E&M-EST. PATIENT-LVL II: CPT | Mod: PBBFAC,,, | Performed by: OPHTHALMOLOGY

## 2017-05-12 PROCEDURE — 99024 POSTOP FOLLOW-UP VISIT: CPT | Mod: S$GLB,,, | Performed by: OPHTHALMOLOGY

## 2017-05-12 PROCEDURE — 92134 CPTRZ OPH DX IMG PST SGM RTA: CPT | Mod: LT,S$GLB,, | Performed by: OPHTHALMOLOGY

## 2017-05-12 NOTE — MR AVS SNAPSHOT
[facility]  [address] [place] [address]  Phone: [phone]  Fax: [phone]                  Kaylin Mccollum   2017 8:30 AM   Office Visit    Description:  Female : 1945   Provider:  CELESTE Petty MD   Department:  [facility]           Reason for Visit     Eye Exam           Diagnoses this Visit        Comments    Post-operative state    -  Primary     Macular hole, left                To Do List           Future Appointments        Provider Department Dept Phone    8/15/2017 1:30 PM TOUSSAINT, VISUAL-ONE [facility] - Ophthalmology [phone]    8/15/2017 2:00 PM Bhaveshtomy Mccurdy, OD [facility] Ophthalmology [phone]    10/25/2017 9:00 AM Lisette Olvera MD [facility] - Internal Medicine [phone]      Goals (5 Years of Data)     None      Follow-Up and Disposition     Return in about 1 month (around 2017) for shae.      [facility]     [facility] -  Assistance  Unless otherwise directed by your provider, please contact [facility], our nurse care line that is available for  assistance.     Registered nurses in the [facility] provide: appointment scheduling, clinical advisement, health education, and other advisory services.  Call: [phone] (toll free)               Medications           Message regarding Medications     Verify the changes and/or additions to your medication regime listed below are the same as discussed with your clinician today.  If any of these changes or additions are incorrect, please notify your healthcare provider.        STOP taking these medications     tramadol (ULTRAM) 50 mg tablet take 1 to 2 tablet by mouth three times a day    brimonidine 0.15 % OPTH DROP (ALPHAGAN) 0.15 % ophthalmic solution Place 1 drop into the left eye 3 (three) times daily.           Verify that the below list of medications is an accurate representation of the medications you are currently  taking.  If none reported, the list may be blank. If incorrect, please contact your healthcare provider. Carry this list with you in case of emergency.           Current Medications     butalbital-acetaminophen  mg Tab Take 1 tablet by mouth daily as needed.    celecoxib (CELEBREX) 100 MG capsule take 1 capsule by mouth twice a day    cyanocobalamin (VITAMIN B-12) 500 MCG tablet Take 500 mcg by mouth nightly.    dorzolamide-timolol 2-0.5% (COSOPT) 22.3-6.8 mg/mL ophthalmic solution Place 1 drop into the left eye 2 (two) times daily.    fluoxetine (PROZAC) 40 MG capsule take 1 capsule by mouth once daily    fluticasone (FLONASE) 50 mcg/actuation nasal spray 2 sprays by Each Nare route once daily.    gabapentin (NEURONTIN) 300 MG capsule take 1 capsule by mouth twice a day    INVOKANA 100 mg Tab take 1 tablet by mouth once daily    lisinopril (PRINIVIL,ZESTRIL) 5 MG tablet take 1 tablet by mouth once daily    metformin (GLUCOPHAGE) 1000 MG tablet take 1 tablet by mouth twice a day with meals    verapamil (CALAN) 80 MG tablet take 1 tablet by mouth twice a day    biotin 1 mg tablet Take 1,000 mcg by mouth every morning.     melatonin 5 mg Tab Take 1 tablet by mouth nightly.     ondansetron (ZOFRAN) 4 MG tablet Take 1 tablet (4 mg total) by mouth every 8 (eight) hours as needed for Nausea.    ranitidine (ZANTAC) 150 MG tablet Take 150 mg by mouth nightly.           Clinical Reference Information           Allergies as of 5/12/2017     Demerol [Meperidine]    Latex, Natural Rubber    Zocor [Simvastatin]    Sulfa (Sulfonamide Antibiotics)      Immunizations Administered on Date of Encounter - 5/12/2017     None      Orders Placed During Today's Visit      Normal Orders This Visit    Posterior Segment OCT Retina-Both eyes       Language Assistance Services     ATTENTION: Language assistance services are available, free of charge. Please call [phone].      ATENCIÓN: apurva English disposición  servicios gratuitos de asistencia lingüística. Simona matt [phone].     [facility] Ý: N?u b?n nói Ti?ng Vi?t, có các d?ch v? h? tr? ngôn ng? mi?n phí dành cho b?n. G?i s? [phone].         [facility] complies with applicable Federal civil rights laws and does not discriminate on the basis of race, color, national origin, age, disability, or sex.

## 2017-05-12 NOTE — PROGRESS NOTES
===============================  05/12/2017   Kaylin Mccollum,   72 y.o. female   Last visit [facility]: :4/21/2017   Last visit eye dept. 4/21/2017  VA:  Corrected distance visual acuity was 20/20 -2 in the right eye and 20/400 in the left eye.  Tonometry     Tonometry (Applanation, 9:02 AM)      Right Left   Pressure 20 18                  Not recorded         Not recorded        Chief Complaint   Patient presents with    Eye Exam     3 wk OS Macula Hole     Ophthalmic Medications     Ophthalmic-Intraocular Press. Reducing, Michelle. Alpha Adrenergic Agonists Start End    brimonidine 0.15 % OPTH DROP (ALPHAGAN) 0.15 % ophthalmic solution (Discontinued) 3/2/2017 5/12/2017    Sig: Place 1 drop into the left eye 3 (three) times daily.    Route: Left Eye    Reason for Discontinue: Patient no longer taking         HPI     Eye Exam    Additional comments: 3 wk OS Macula Hole           Comments   OS Macula Hole  Having split VA OS again  No pain or discomfort  Only using Cosopt BID OS  1. NS OU  2. s/p 25g PPV/MP/C3F8 OS for FTMH OS 2/22/17    AVASTIN OS 1/5/17  Cosopt OS BID       Last edited by Adrianna Pradhan on 5/12/2017  9:05 AM. (History)      Read Studies: y  Vitalsy  ________________  5/12/2017  Problem List Items Addressed This Visit     None          .    beautifukl surgiocal resulty    but some losss of outer photredceptor layert  No help with refraction  i dont think cat sx would help much  rec pt to see dr Goode       ===========================

## 2017-06-08 ENCOUNTER — TELEPHONE (OUTPATIENT)
Dept: OPHTHALMOLOGY | Facility: CLINIC | Age: 72
End: 2017-06-08

## 2017-06-08 NOTE — TELEPHONE ENCOUNTER
I CALLED DR. CHAVEZ OFFICE AND SPOKE WITH THE OFFICE, THEY ARE GOING TO CALL MS. TORREZ AND SET UP APPT. WITH HER. KF

## 2017-06-08 NOTE — TELEPHONE ENCOUNTER
----- Message from Yasmeencarlos Reece sent at 6/7/2017  3:55 PM CDT -----  Contact: pt  Pt calling to speak to nurse...states that she was recently seen and has questions/ concerns about vision....states that Dr was supposed to contact Dr Dowell and get back with her but has not heard anything back yet...please adv/call pt back at [phone]//thx jw

## 2017-06-13 ENCOUNTER — OFFICE VISIT (OUTPATIENT)
Dept: OPHTHALMOLOGY | Facility: CLINIC | Age: 72
End: 2017-06-13
Payer: MEDICARE

## 2017-06-13 VITALS — HEART RATE: 81 BPM | SYSTOLIC BLOOD PRESSURE: 143 MMHG | DIASTOLIC BLOOD PRESSURE: 74 MMHG

## 2017-06-13 DIAGNOSIS — H35.342 MACULAR HOLE OF LEFT EYE: Primary | ICD-10-CM

## 2017-06-13 DIAGNOSIS — H25.12 NS (NUCLEAR SCLEROSIS), LEFT: ICD-10-CM

## 2017-06-13 PROCEDURE — 92014 COMPRE OPH EXAM EST PT 1/>: CPT | Mod: S$GLB,,, | Performed by: OPHTHALMOLOGY

## 2017-06-13 PROCEDURE — 92134 CPTRZ OPH DX IMG PST SGM RTA: CPT | Mod: S$GLB,,, | Performed by: OPHTHALMOLOGY

## 2017-06-13 PROCEDURE — 92226 PR SPECIAL EYE EXAM, SUBSEQUENT: CPT | Mod: LT,S$GLB,, | Performed by: OPHTHALMOLOGY

## 2017-06-13 PROCEDURE — 99999 PR PBB SHADOW E&M-EST. PATIENT-LVL IV: CPT | Mod: PBBFAC,,, | Performed by: OPHTHALMOLOGY

## 2017-06-13 RX ORDER — NALTREXONE HYDROCHLORIDE 50 MG/1
3 TABLET, FILM COATED ORAL DAILY
COMMUNITY
End: 2017-12-06 | Stop reason: DRUGHIGH

## 2017-06-13 NOTE — PROGRESS NOTES
OCT   OD: flat, normal foveal contour  OS:  Full thickness central macular hole     A/P    1) FTMH OS  ~4 months duration  - s/p JALIL x1 with Dr. Petty on 1/5/17 without improvement    s/p 25g PPV/MP/C3F8 OS for FTMH OS 2/22/17  MH closed - outer segments reorganizing nicely (hole open approx 4-5 months prior to sx)    2) DM  - no retinopathy on exam  - encouraged BG control    3. NS OU  Increased posterior lens changes OS  Will benefit from CE  Consult Dr. Park    Me PRN

## 2017-06-13 NOTE — PROGRESS NOTES
OCT   OD: flat, normal foveal contour  OS:      A/P    1) FTMH OS  - s/p JALIL x1 with Dr. Petty on 1/5/17 without improvement  s/p 25g PPV/MP/C3F8 OS for FTMH OS 2/22/17  VA with some improvement but still poor       2) DM  - no retinopathy on exam  - encouraged BG control    3. NS OU    To OR

## 2017-06-15 ENCOUNTER — PATIENT MESSAGE (OUTPATIENT)
Dept: OPHTHALMOLOGY | Facility: CLINIC | Age: 72
End: 2017-06-15

## 2017-06-29 ENCOUNTER — OFFICE VISIT (OUTPATIENT)
Dept: OPHTHALMOLOGY | Facility: CLINIC | Age: 72
End: 2017-06-29
Payer: MEDICARE

## 2017-06-29 DIAGNOSIS — H40.013 OPEN ANGLE WITH BORDERLINE FINDINGS, LOW RISK, BILATERAL: ICD-10-CM

## 2017-06-29 DIAGNOSIS — H25.13 NS (NUCLEAR SCLEROSIS), BILATERAL: Primary | ICD-10-CM

## 2017-06-29 DIAGNOSIS — H43.822 VITREOMACULAR ADHESION OF LEFT EYE: ICD-10-CM

## 2017-06-29 DIAGNOSIS — H35.342 LAMELLAR MACULAR HOLE OF LEFT EYE: ICD-10-CM

## 2017-06-29 DIAGNOSIS — E11.9 TYPE 2 DIABETES MELLITUS WITHOUT COMPLICATION, WITHOUT LONG-TERM CURRENT USE OF INSULIN: ICD-10-CM

## 2017-06-29 PROCEDURE — 92250 FUNDUS PHOTOGRAPHY W/I&R: CPT | Mod: S$GLB,,, | Performed by: OPHTHALMOLOGY

## 2017-06-29 PROCEDURE — 92014 COMPRE OPH EXAM EST PT 1/>: CPT | Mod: S$GLB,,, | Performed by: OPHTHALMOLOGY

## 2017-06-29 PROCEDURE — 92136 OPHTHALMIC BIOMETRY: CPT | Mod: LT,S$GLB,, | Performed by: OPHTHALMOLOGY

## 2017-06-29 PROCEDURE — 99999 PR PBB SHADOW E&M-EST. PATIENT-LVL III: CPT | Mod: PBBFAC,,, | Performed by: OPHTHALMOLOGY

## 2017-06-29 RX ORDER — KETOROLAC TROMETHAMINE 5 MG/ML
1 SOLUTION OPHTHALMIC 4 TIMES DAILY
Qty: 1 BOTTLE | Refills: 2 | Status: SHIPPED | OUTPATIENT
Start: 2017-06-29 | End: 2017-09-19 | Stop reason: ALTCHOICE

## 2017-06-29 RX ORDER — PREDNISOLONE ACETATE 10 MG/ML
1 SUSPENSION/ DROPS OPHTHALMIC 4 TIMES DAILY
Qty: 1 BOTTLE | Refills: 2 | Status: SHIPPED | OUTPATIENT
Start: 2017-06-29 | End: 2017-09-14

## 2017-06-29 RX ORDER — GATIFLOXACIN 5 MG/ML
1 SOLUTION/ DROPS OPHTHALMIC 2 TIMES DAILY
Qty: 1 BOTTLE | Refills: 2 | Status: SHIPPED | OUTPATIENT
Start: 2017-06-29 | End: 2017-09-14

## 2017-06-29 NOTE — PROGRESS NOTES
SUBJECTIVE:   Kaylin Mccollum is a 72 y.o. female   Corrected distance visual acuity was 20/20 in the right eye and 20/400 in the left eye.   Chief Complaint   Patient presents with    Cataract     cataract eval per Dr. Goode        HPI:  HPI     Cataract    Additional comments: cataract eval per Dr. Goode           Comments   Pt was referred by Dr. Goode for a cataract eval. She's having trouble   seeing her computer and reading clearly with her glasses. Not having any   pain or irritation. Was using Cosopt in the left eye but was told to stop   when she ran out. She's desiring surgery to see better to read and do work   on her computer. Saw Dr. Goode a couple weeks ago and was referred to   Dr. Navarro to see if cataract surgery would improve her vision.       Last edited by Waylon An on 6/29/2017  2:57 PM. (History)        Assessment /Plan :  1. NS (nuclear sclerosis), bilateral  Visually Significant Cataract OS > OD:   Patient reports decreased vision consistent with the clinical amount of lenticular opacity,  which reaches the level of visual significance and affects activities of daily living such as  read. Risks, benefits, and alternatives to cataract surgery were  discussed.  IOL options were discussed, including Premium IOL'S and the associated  side effects and additional patient cost associated with them as well as patient's visual  goals. The pt expressed a desire to proceed with surgery with the potential for some  reasonable degree of visual improvement and was consented.  Risks of loss of vision and eye reviewed as well as possibility of need for spectacle correction after surgery even with premium implants. Verbal and written preop  instructions were provided to the pt.            Pt is interested in traditional monofocal IOL aiming for:       Distance OU and understands that  glasses will be generally needed at all times for near vision and often for finer distance correction  especially for higher degrees of astigmatism.         Final visual acuity may be limited by astigmatism,Diabetes    Pt wishes to have Phaco/IOL    OS     Requests a Monofocal IOL.    Will aim for distance    Other considerations: Macular Hole OS,Glaucoma Suspect,Corneal Precaution      OD will consider IOL for Distance    2. Open angle with borderline findings, low risk, bilateral No evidence of glaucoma at this time but based on risk factors recommend to continue monitoring.     3. Type 2 diabetes mellitus without complication, without long-term current use of insulin No diabetic retinopathy at this time. Reviewed diabetic eye precautions including avoiding tobacco use,  Good glucose control, and importance of regular follow up.      4. Lamellar macular hole of left eye Followed by C   5. Vitreomacular adhesion of left eye        Return for CAT SX OS

## 2017-07-05 DIAGNOSIS — M79.7 FIBROMYALGIA: Chronic | ICD-10-CM

## 2017-07-05 DIAGNOSIS — E11.59 HYPERTENSION ASSOCIATED WITH DIABETES: ICD-10-CM

## 2017-07-05 DIAGNOSIS — I15.2 HYPERTENSION ASSOCIATED WITH DIABETES: ICD-10-CM

## 2017-07-05 DIAGNOSIS — E11.69 HYPERLIPIDEMIA ASSOCIATED WITH TYPE 2 DIABETES MELLITUS: ICD-10-CM

## 2017-07-05 DIAGNOSIS — E78.5 HYPERLIPIDEMIA ASSOCIATED WITH TYPE 2 DIABETES MELLITUS: ICD-10-CM

## 2017-07-05 DIAGNOSIS — F32.9 CHRONIC MAJOR DEPRESSIVE DISORDER: Chronic | ICD-10-CM

## 2017-07-05 RX ORDER — FLUOXETINE HYDROCHLORIDE 40 MG/1
40 CAPSULE ORAL DAILY
Qty: 90 CAPSULE | Refills: 0 | Status: SHIPPED | OUTPATIENT
Start: 2017-07-05 | End: 2017-10-25 | Stop reason: SDUPTHER

## 2017-07-20 ENCOUNTER — OFFICE VISIT (OUTPATIENT)
Dept: OPHTHALMOLOGY | Facility: CLINIC | Age: 72
End: 2017-07-20
Payer: MEDICARE

## 2017-07-20 DIAGNOSIS — Z98.42 CATARACT EXTRACTION STATUS OF EYE, LEFT: ICD-10-CM

## 2017-07-20 DIAGNOSIS — Z98.890 POST-OPERATIVE STATE: Primary | ICD-10-CM

## 2017-07-20 PROCEDURE — 99024 POSTOP FOLLOW-UP VISIT: CPT | Mod: S$GLB,,, | Performed by: OPHTHALMOLOGY

## 2017-07-20 PROCEDURE — 99999 PR PBB SHADOW E&M-EST. PATIENT-LVL II: CPT | Mod: PBBFAC,,, | Performed by: OPHTHALMOLOGY

## 2017-07-20 NOTE — PROGRESS NOTES
"SUBJECTIVE:   Kaylin Mccollum is a 72 y.o. female   Uncorrected distance visual acuity was not recorded in the right eye and 20/80 in the left eye.   Chief Complaint   Patient presents with    Post-op Evaluation     1 day PO PCIOL OS        HPI:  HPI     Post-op Evaluation    Additional comments: 1 day PO PCIOL OS           Comments   1 day PO PCIOL OS  No pain or discomfort  VA improving slightly    EP of dnl, venkat, and jcc  "Jin"    1. NS OD  PCIOL OS (distance)7-19-17  2. s/p 25g PPV/MP/C3F8 OS for FTMH OS 2/22/17  3.Macular hole OS  4. Fhx COAG  5. Type 2 Diabetes  AVASTIN OS 1/5/17-w/[facility]    OS: Gatifloxacin BID, Pred, Ketorolac QID       Last edited by Adrianna Pradhan on 7/20/2017  1:01 PM. (History)        Assessment /Plan :  1. Post-operative state    2. Cataract extraction status of eye, left Exam:  SLE:  S/C: normal  K : trace k fold  AC: trace cell and flare  Iris: normal  Lens: PCIOL    IMP:  PO Day 1 S/P Phaco/IOL OS : Doing well.    Plan:  Continue gtts to operative eye:  Pred 1% QID  Ketorolac QID  Zymaxid BID  Reinstructed in importance of absolute compliance with Post-OP instructions including medications, shield at bedtime, and limitation of activities. Follow up appointments in approximately one and six weeks or call immediately for increased pain, redness or vision loss.                    "

## 2017-07-25 ENCOUNTER — OFFICE VISIT (OUTPATIENT)
Dept: OPHTHALMOLOGY | Facility: CLINIC | Age: 72
End: 2017-07-25
Payer: MEDICARE

## 2017-07-25 DIAGNOSIS — H52.31 ANISOMETROPIA: ICD-10-CM

## 2017-07-25 DIAGNOSIS — Z98.890 POST-OPERATIVE STATE: Primary | ICD-10-CM

## 2017-07-25 DIAGNOSIS — H25.11 NUCLEAR SCLEROSIS, RIGHT: ICD-10-CM

## 2017-07-25 PROCEDURE — 92136 OPHTHALMIC BIOMETRY: CPT | Mod: 26,RT,S$GLB, | Performed by: OPHTHALMOLOGY

## 2017-07-25 PROCEDURE — 99024 POSTOP FOLLOW-UP VISIT: CPT | Mod: S$GLB,,, | Performed by: OPHTHALMOLOGY

## 2017-07-25 PROCEDURE — 99999 PR PBB SHADOW E&M-EST. PATIENT-LVL III: CPT | Mod: PBBFAC,,, | Performed by: OPHTHALMOLOGY

## 2017-07-25 RX ORDER — PREDNISOLONE ACETATE 10 MG/ML
1 SUSPENSION/ DROPS OPHTHALMIC 4 TIMES DAILY
Qty: 1 BOTTLE | Refills: 2 | Status: SHIPPED | OUTPATIENT
Start: 2017-07-25 | End: 2017-09-14 | Stop reason: SDUPTHER

## 2017-07-25 RX ORDER — KETOROLAC TROMETHAMINE 5 MG/ML
1 SOLUTION OPHTHALMIC 4 TIMES DAILY
Qty: 1 BOTTLE | Refills: 2 | Status: SHIPPED | OUTPATIENT
Start: 2017-07-25 | End: 2017-09-14 | Stop reason: SDUPTHER

## 2017-07-25 RX ORDER — GATIFLOXACIN 5 MG/ML
1 SOLUTION/ DROPS OPHTHALMIC 2 TIMES DAILY
Qty: 1 BOTTLE | Refills: 2 | Status: SHIPPED | OUTPATIENT
Start: 2017-07-25 | End: 2017-09-14

## 2017-07-25 NOTE — PROGRESS NOTES
"SUBJECTIVE:   Kaylin Mccollum is a 72 y.o. female   Uncorrected distance visual acuity was 20/200 in the right eye and 20/70 in the left eye.   Chief Complaint   Patient presents with    Post-op Evaluation     OS: Gatifloxacin BID, Pred, Ketorolac QID        HPI:  HPI     Post-op Evaluation    Additional comments: OS: Gatifloxacin BID, Pred, Ketorolac QID           Comments   Patient states no change VA OS. OD VA blurry and ready to schedule sx. No   pain / irritation. Using gtts as directed.        EP of dnl, venkat, and jcc  "Dany-Maria Eugenia"    1. NS OD  PCIOL OS+24.0 SN60WF (distance)7-19-17  2. s/p 25g PPV/MP/C3F8 OS for FTMH OS 2/22/17  3.Macular hole OS  4. Fhx COAG  5. Type 2 Diabetes  AVASTIN OS 1/5/17-w/JC    OS: Gatifloxacin BID, Pred, Ketorolac QID       Last edited by Brenda Henderson MA on 7/25/2017 11:15 AM. (History)        Assessment /Plan :  1. Post-operative state Slit lamp exam:  L/L: nl  K: clear, wound sealed  AC: 0 cell and flare  Iris/Lens: IOL centered and stable      IMP:  PO Week 1 S/P Phaco/ IOL OS : Doing well with no evidence of infection or abnormal inflammation.     Plan:  Pt given and instructed in one week PO instructions. D/C zymaxid and start to taper off Ketorlac and Pred Forte 1% weekly. Can resume normal activitites and d/c eye shield. OTC reading glasses can be used until evaluated for final MR. Follow up in one month or PRN pain, redness, vision loss, or other concerns.     2. Nuclear sclerosis, right   Patient reports decreased vision in the fellow eye consistent with the clinical amount of lenticular opacity, which reaches the level of visual significance and affects activities of daily living including reading and glare. Risks, benefits, and alternatives to cataract surgery were discussed and pt desired to schedule cataract surgery. Pt was consented and the biometry and lens options were reviewed.     Schedule Cataract Surgery OD        Final visual acuity may be limited by " astigmatism and diabetes     Requests a Monofocal  IOL.     Will aim for distance     Other considerations: CORNEAL PRECAUTIONS         3. Anisometropia

## 2017-07-27 DIAGNOSIS — I15.2 HYPERTENSION ASSOCIATED WITH DIABETES: ICD-10-CM

## 2017-07-27 DIAGNOSIS — I34.1 MVP (MITRAL VALVE PROLAPSE): ICD-10-CM

## 2017-07-27 DIAGNOSIS — E11.59 HYPERTENSION ASSOCIATED WITH DIABETES: ICD-10-CM

## 2017-07-27 RX ORDER — VERAPAMIL HYDROCHLORIDE 80 MG/1
TABLET ORAL
Qty: 180 TABLET | Refills: 1 | Status: SHIPPED | OUTPATIENT
Start: 2017-07-27 | End: 2017-10-25 | Stop reason: SDUPTHER

## 2017-08-22 ENCOUNTER — OFFICE VISIT (OUTPATIENT)
Dept: OPHTHALMOLOGY | Facility: CLINIC | Age: 72
End: 2017-08-22
Payer: MEDICARE

## 2017-08-22 DIAGNOSIS — H25.11 NUCLEAR SCLEROSIS, RIGHT: ICD-10-CM

## 2017-08-22 DIAGNOSIS — H52.31 ANISOMETROPIA: ICD-10-CM

## 2017-08-22 DIAGNOSIS — Z98.890 POST-OPERATIVE STATE: Primary | ICD-10-CM

## 2017-08-22 PROCEDURE — 99024 POSTOP FOLLOW-UP VISIT: CPT | Mod: S$GLB,,, | Performed by: OPHTHALMOLOGY

## 2017-08-22 PROCEDURE — 92136 OPHTHALMIC BIOMETRY: CPT | Mod: RT,S$GLB,, | Performed by: OPHTHALMOLOGY

## 2017-08-22 PROCEDURE — 99999 PR PBB SHADOW E&M-EST. PATIENT-LVL III: CPT | Mod: PBBFAC,,, | Performed by: OPHTHALMOLOGY

## 2017-08-22 RX ORDER — KETOROLAC TROMETHAMINE 5 MG/ML
1 SOLUTION OPHTHALMIC 4 TIMES DAILY
Qty: 1 BOTTLE | Refills: 2 | Status: SHIPPED | OUTPATIENT
Start: 2017-08-22 | End: 2017-10-24

## 2017-08-22 RX ORDER — GATIFLOXACIN 5 MG/ML
1 SOLUTION/ DROPS OPHTHALMIC 2 TIMES DAILY
Qty: 1 BOTTLE | Refills: 2 | Status: SHIPPED | OUTPATIENT
Start: 2017-08-22 | End: 2017-10-24

## 2017-08-22 RX ORDER — PREDNISOLONE ACETATE 10 MG/ML
1 SUSPENSION/ DROPS OPHTHALMIC 4 TIMES DAILY
Qty: 1 BOTTLE | Refills: 2 | Status: SHIPPED | OUTPATIENT
Start: 2017-08-22 | End: 2017-10-24

## 2017-08-22 NOTE — PROGRESS NOTES
SUBJECTIVE:   Kaylin Mccollum is a 72 y.o. female   Corrected distance visual acuity was not recorded in the right eye and 20/50 -2 in the left eye.   Chief Complaint   Patient presents with    Post-op Evaluation        HPI:  HPI     PCIOL OS+24.0 SN60WF (distance)7-19-17.  Pt. States vision is          1. NS OD  PCIOL OS+24.0 SN60WF (distance)7-19-17  2. s/p 25g PPV/MP/C3F8 OS for FTMH OS 2/22/17  3.Macular hole OS  4. Fhx COAG  5. Type 2 Diabetes  AVASTIN OS 1/5/17-w/JCC    OS: , Pred, Ketorolac BID    Last edited by Renee Mitchell on 8/22/2017 11:07 AM. (History)        Assessment /Plan :  1. Post-operative state   Exam:    Slit lamp exam:  L/L: nl  S/C: quiet and uninflamed  K: clear, wound sealed  AC: no cell or flare  Iris/Lens: IOL centered and stable      Assessment:    PO Month 1 S/P Phaco/IOL OS : Doing Well and completing healing process. Final visual correction options discussed and appropriate prescriptions and/or OTC reading glass recommendations offered to patient. Pt desires to wait for glasses at this time. Pt instructed to follow up with Dr. Navarro PRN any pain, redness, vision changes or other concerns.     2. Nuclear sclerosis, right   Patient reports decreased vision in the fellow eye consistent with the clinical amount of lenticular opacity, which reaches the level of visual significance and affects activities of daily living including reading and glare. Risks, benefits, and alternatives to cataract surgery were discussed and pt desired to schedule cataract surgery. Pt was consented and the biometry and lens options were reviewed.     Schedule Cataract Surgery OD        Final visual acuity may be limited by astigmatism and diabetes     Requests a Monofocal  IOL.     Will aim for distance     Other considerations: NONE         3. Anisometropia

## 2017-08-29 ENCOUNTER — PATIENT MESSAGE (OUTPATIENT)
Dept: OPHTHALMOLOGY | Facility: CLINIC | Age: 72
End: 2017-08-29

## 2017-09-14 ENCOUNTER — OFFICE VISIT (OUTPATIENT)
Dept: OPHTHALMOLOGY | Facility: CLINIC | Age: 72
End: 2017-09-14
Payer: COMMERCIAL

## 2017-09-14 DIAGNOSIS — Z98.41 CATARACT EXTRACTION STATUS OF RIGHT EYE: ICD-10-CM

## 2017-09-14 DIAGNOSIS — Z98.890 POST-OPERATIVE STATE: Primary | ICD-10-CM

## 2017-09-14 PROCEDURE — 99024 POSTOP FOLLOW-UP VISIT: CPT | Mod: S$GLB,,, | Performed by: OPHTHALMOLOGY

## 2017-09-14 PROCEDURE — 99999 PR PBB SHADOW E&M-EST. PATIENT-LVL II: CPT | Mod: PBBFAC,,, | Performed by: OPHTHALMOLOGY

## 2017-09-14 NOTE — PROGRESS NOTES
"SUBJECTIVE:   Kaylin Mccollum is a 72 y.o. female   Uncorrected distance visual acuity was 20/20 -1 in the right eye and not recorded in the left eye.   Chief Complaint   Patient presents with    Post-op Evaluation        HPI:  HPI     Patient states 0/10 on pain scale.        EP of dnl, venkat, and [facility]  "Jin"    1. PCIOL OD +24.5 SN60WF (distance) 09/13/17  PCIOL OS+24.0 SN60WF (distance)7-19-17  2. s/p 25g PPV/MP/C3F8 OS for FTMH OS 2/22/17  3.Macular hole OS  4. Fhx COAG  5. Type 2 Diabetes  AVASTIN OS 1/5/17-w/[facility]    OD Pred,  QID, Ketorolac QID, GAT BID     Last edited by JAMMIE Cervantes on 9/14/2017  1:09 PM. (History)        Assessment /Plan :  1. Post-operative state    2. Cataract extraction status of right eye Exam:  SLE:  S/C: normal  K : trace k fold  AC: trace cell and flare  Iris: normal  Lens: PCIOL    IMP:  PO Day 1 S/P Phaco/IOL OD : Doing well.    Plan:  Continue gtts to operative eye:  Pred 1% QID  Ketorolac QID  Zymaxid BID  Reinstructed in importance of absolute compliance with Post-OP instructions including medications, shield at bedtime, and limitation of activities. Follow up appointments in approximately one and six weeks or call immediately for increased pain, redness or vision loss.                    "

## 2017-09-19 ENCOUNTER — OFFICE VISIT (OUTPATIENT)
Dept: OPHTHALMOLOGY | Facility: CLINIC | Age: 72
End: 2017-09-19
Payer: COMMERCIAL

## 2017-09-19 DIAGNOSIS — Z98.890 POST-OPERATIVE STATE: Primary | ICD-10-CM

## 2017-09-19 PROCEDURE — 99024 POSTOP FOLLOW-UP VISIT: CPT | Mod: S$GLB,,, | Performed by: OPHTHALMOLOGY

## 2017-09-19 PROCEDURE — 99999 PR PBB SHADOW E&M-EST. PATIENT-LVL II: CPT | Mod: PBBFAC,,, | Performed by: OPHTHALMOLOGY

## 2017-09-19 NOTE — PROGRESS NOTES
"SUBJECTIVE:   Kaylin Mccollum is a 72 y.o. female   Uncorrected distance visual acuity was 20/20 in the right eye and not recorded in the left eye.   Chief Complaint   Patient presents with    Post-op Evaluation     OD Pred, QID, Ketorolac QID, GAT BID        HPI:  HPI     Post-op Evaluation    Additional comments: OD Pred, QID, Ketorolac QID, GAT BID           Comments   Patient states VA great. No pain / irritation. Using OTCR +1.50 - +2.00.   Using gtts as directed.        EP of dnl, venkat, and jcc  "Jin"    1. PCIOL OD +24.5 SN60WF (distance) 09/13/17  PCIOL OS+24.0 SN60WF (distance)7-19-17  2. s/p 25g PPV/MP/C3F8 OS for FTMH OS 2/22/17  3.Macular hole OS  4. Fhx COAG  5. Type 2 Diabetes  AVASTIN OS 1/5/17-w/JCC    OD Pred, QID, Ketorolac QID, GAT BID       Last edited by Brenda Henderson MA on 9/19/2017 10:49 AM. (History)        Assessment /Plan :  1. Post-operative state Slit lamp exam:  L/L: nl  K: clear, wound sealed  AC: 0 cell and flare  Iris/Lens: IOL centered and stable      IMP:  PO Week 1 S/P Phaco/ IOL OD : Doing well with no evidence of infection or abnormal inflammation.     Plan:  Pt given and instructed in one week PO instructions. D/C zymaxid and start to taper off Ketorlac and Pred Forte 1% weekly. Can resume normal activitites and d/c eye shield. OTC reading glasses can be used until evaluated for final MR. Follow up in one month or PRN pain, redness, vision loss, or other concerns.                   "

## 2017-09-21 DIAGNOSIS — E11.69 HYPERLIPIDEMIA ASSOCIATED WITH TYPE 2 DIABETES MELLITUS: ICD-10-CM

## 2017-09-21 DIAGNOSIS — E11.59 HYPERTENSION ASSOCIATED WITH DIABETES: ICD-10-CM

## 2017-09-21 DIAGNOSIS — I15.2 HYPERTENSION ASSOCIATED WITH DIABETES: ICD-10-CM

## 2017-09-21 DIAGNOSIS — E78.5 HYPERLIPIDEMIA ASSOCIATED WITH TYPE 2 DIABETES MELLITUS: ICD-10-CM

## 2017-09-21 DIAGNOSIS — M79.7 FIBROMYALGIA: Chronic | ICD-10-CM

## 2017-09-21 RX ORDER — METFORMIN HYDROCHLORIDE 1000 MG/1
TABLET ORAL
Qty: 60 TABLET | Refills: 0 | Status: SHIPPED | OUTPATIENT
Start: 2017-09-21 | End: 2017-10-25 | Stop reason: SDUPTHER

## 2017-09-21 RX ORDER — GABAPENTIN 300 MG/1
CAPSULE ORAL
Qty: 180 CAPSULE | Refills: 1 | Status: SHIPPED | OUTPATIENT
Start: 2017-09-21 | End: 2018-04-23 | Stop reason: SDUPTHER

## 2017-09-21 RX ORDER — LISINOPRIL 5 MG/1
TABLET ORAL
Qty: 30 TABLET | Refills: 0 | Status: SHIPPED | OUTPATIENT
Start: 2017-09-21 | End: 2017-10-25 | Stop reason: SDUPTHER

## 2017-09-22 RX ORDER — CANAGLIFLOZIN 100 MG/1
TABLET, FILM COATED ORAL
Qty: 90 TABLET | Refills: 0 | Status: SHIPPED | OUTPATIENT
Start: 2017-09-22 | End: 2017-10-25 | Stop reason: SDUPTHER

## 2017-10-24 ENCOUNTER — OFFICE VISIT (OUTPATIENT)
Dept: OPHTHALMOLOGY | Facility: CLINIC | Age: 72
End: 2017-10-24
Payer: COMMERCIAL

## 2017-10-24 DIAGNOSIS — H35.342 MACULAR HOLE OF LEFT EYE: Primary | ICD-10-CM

## 2017-10-24 DIAGNOSIS — E11.9 TYPE 2 DIABETES MELLITUS WITHOUT COMPLICATION, WITHOUT LONG-TERM CURRENT USE OF INSULIN: Chronic | ICD-10-CM

## 2017-10-24 PROBLEM — H25.11 NUCLEAR SCLEROSIS OF RIGHT EYE: Status: RESOLVED | Noted: 2017-07-25 | Resolved: 2017-10-24

## 2017-10-24 PROBLEM — H25.10 NS (NUCLEAR SCLEROSIS): Status: RESOLVED | Noted: 2017-02-14 | Resolved: 2017-10-24

## 2017-10-24 PROCEDURE — 99999 PR PBB SHADOW E&M-EST. PATIENT-LVL III: CPT | Mod: PBBFAC,,, | Performed by: OPHTHALMOLOGY

## 2017-10-24 PROCEDURE — 92014 COMPRE OPH EXAM EST PT 1/>: CPT | Mod: 24,S$GLB,, | Performed by: OPHTHALMOLOGY

## 2017-10-24 NOTE — PROGRESS NOTES
"    ===============================  10/24/2017   Kaylin Mccollum,   72 y.o. female   Last visit [facility]: :5/12/2017   Last visit eye dept. 9/14/2017  VA:  Uncorrected distance visual acuity was 20/25 in the right eye and 20/80 in the left eye.  Tonometry     Tonometry (Applanation, 1:23 PM)       Right Left    Pressure 19 17               Not recorded        Manifest Refraction     Manifest Refraction       Sphere Cylinder Axis Dist VA    Right +0.25 +0.25 165 20/20    Left -0.25 +0.50 180 20/80              Chief Complaint   Patient presents with    MACULAR HOLE  OS     5 week po check  ce od/  pt would like to know what strength readers to buy      Ophthalmic Medications     Ophthalmic - Anti-inflammatory, Glucocorticoids Start End    prednisoLONE acetate (PRED FORTE) 1 % DrpS (Discontinued) 8/22/2017 10/24/2017    Sig: Place 1 drop into the right eye 4 (four) times daily. Eyedrops to start one day before surgery    Route: Right Eye    Reason for Discontinue: Patient no longer taking    Ophthalmic - Anti-inflammatory, NSAIDs Start End    ketorolac 0.5% (ACULAR) 0.5 % Drop (Discontinued) 8/22/2017 10/24/2017    Sig: Place 1 drop into the right eye 4 (four) times daily. Eyedrops to start one day before surgery    Route: Right Eye    Reason for Discontinue: Patient no longer taking         HPI     MACULAR HOLE  OS    Additional comments: 5 week po check  ce od/  pt would like to know what   strength readers to buy            Comments   EP of katherinel, venkat, and jcc  "Jin"    1. PCIOL OD +24.5 SN60WF (distance) 09/13/17  PCIOL OS+24.0 SN60WF (distance)7-19-17  2. s/p 25g PPV/MP/C3F8 OS for FTMH OS 2/22/17  3.Macular hole OS  4. Fhx COAG  5. Type 2 Diabetes  AVASTIN OS 1/5/17-w/JCC           Last edited by Rocio Dubois on 10/24/2017  1:12 PM. (History)          ________________  10/24/2017  Problem List Items Addressed This Visit        Eye/Vision problems    Type 2 diabetes mellitus without complication " (Chronic)    Macular hole - Primary        Recent od CE  Os ppv m h dr crocker  Oct od perfect  No BDR, no DME  pciol ou   Oct notch mh post ppv  od macula good  rtc 1 year         ===========================

## 2017-10-25 ENCOUNTER — OFFICE VISIT (OUTPATIENT)
Dept: INTERNAL MEDICINE | Facility: CLINIC | Age: 72
End: 2017-10-25
Payer: COMMERCIAL

## 2017-10-25 ENCOUNTER — LAB VISIT (OUTPATIENT)
Dept: LAB | Facility: HOSPITAL | Age: 72
End: 2017-10-25
Attending: FAMILY MEDICINE
Payer: COMMERCIAL

## 2017-10-25 VITALS
DIASTOLIC BLOOD PRESSURE: 60 MMHG | OXYGEN SATURATION: 96 % | TEMPERATURE: 98 F | BODY MASS INDEX: 31.28 KG/M2 | HEIGHT: 70 IN | WEIGHT: 218.5 LBS | SYSTOLIC BLOOD PRESSURE: 122 MMHG | HEART RATE: 81 BPM

## 2017-10-25 DIAGNOSIS — Z11.59 NEED FOR HEPATITIS C SCREENING TEST: ICD-10-CM

## 2017-10-25 DIAGNOSIS — I15.2 HYPERTENSION ASSOCIATED WITH DIABETES: ICD-10-CM

## 2017-10-25 DIAGNOSIS — E78.5 HYPERLIPIDEMIA ASSOCIATED WITH TYPE 2 DIABETES MELLITUS: ICD-10-CM

## 2017-10-25 DIAGNOSIS — E11.69 HYPERLIPIDEMIA ASSOCIATED WITH TYPE 2 DIABETES MELLITUS: ICD-10-CM

## 2017-10-25 DIAGNOSIS — E11.59 HYPERTENSION ASSOCIATED WITH DIABETES: ICD-10-CM

## 2017-10-25 DIAGNOSIS — I34.1 MVP (MITRAL VALVE PROLAPSE): ICD-10-CM

## 2017-10-25 DIAGNOSIS — E11.69 DIABETES MELLITUS TYPE 2 IN OBESE: ICD-10-CM

## 2017-10-25 DIAGNOSIS — E66.9 DIABETES MELLITUS TYPE 2 IN OBESE: ICD-10-CM

## 2017-10-25 DIAGNOSIS — M79.7 FIBROMYALGIA: Chronic | ICD-10-CM

## 2017-10-25 DIAGNOSIS — L29.2 VULVAR ITCHING: ICD-10-CM

## 2017-10-25 DIAGNOSIS — F32.9 CHRONIC MAJOR DEPRESSIVE DISORDER: Chronic | ICD-10-CM

## 2017-10-25 DIAGNOSIS — Z00.00 ROUTINE GENERAL MEDICAL EXAMINATION AT A HEALTH CARE FACILITY: Primary | ICD-10-CM

## 2017-10-25 LAB
ALBUMIN SERPL BCP-MCNC: 3.5 G/DL
ALP SERPL-CCNC: 71 U/L
ALT SERPL W/O P-5'-P-CCNC: 11 U/L
ANION GAP SERPL CALC-SCNC: 8 MMOL/L
AST SERPL-CCNC: 16 U/L
BASOPHILS # BLD AUTO: 0.05 K/UL
BASOPHILS NFR BLD: 1 %
BILIRUB SERPL-MCNC: 0.6 MG/DL
BUN SERPL-MCNC: 14 MG/DL
CALCIUM SERPL-MCNC: 9.7 MG/DL
CHLORIDE SERPL-SCNC: 105 MMOL/L
CHOLEST SERPL-MCNC: 226 MG/DL
CHOLEST/HDLC SERPL: 4 {RATIO}
CO2 SERPL-SCNC: 29 MMOL/L
CREAT SERPL-MCNC: 0.9 MG/DL
DIFFERENTIAL METHOD: ABNORMAL
EOSINOPHIL # BLD AUTO: 0.2 K/UL
EOSINOPHIL NFR BLD: 3.1 %
ERYTHROCYTE [DISTWIDTH] IN BLOOD BY AUTOMATED COUNT: 13 %
EST. GFR  (AFRICAN AMERICAN): >60 ML/MIN/1.73 M^2
EST. GFR  (NON AFRICAN AMERICAN): >60 ML/MIN/1.73 M^2
ESTIMATED AVG GLUCOSE: 137 MG/DL
GLUCOSE SERPL-MCNC: 131 MG/DL
HBA1C MFR BLD HPLC: 6.4 %
HCT VFR BLD AUTO: 42.5 %
HCV AB SERPL QL IA: ABNORMAL
HDLC SERPL-MCNC: 56 MG/DL
HDLC SERPL: 24.8 %
HGB BLD-MCNC: 13.5 G/DL
IMM GRANULOCYTES # BLD AUTO: 0.01 K/UL
IMM GRANULOCYTES NFR BLD AUTO: 0.2 %
LDLC SERPL CALC-MCNC: 146.4 MG/DL
LYMPHOCYTES # BLD AUTO: 1.2 K/UL
LYMPHOCYTES NFR BLD: 22.5 %
MCH RBC QN AUTO: 29.2 PG
MCHC RBC AUTO-ENTMCNC: 31.8 G/DL
MCV RBC AUTO: 92 FL
MONOCYTES # BLD AUTO: 0.3 K/UL
MONOCYTES NFR BLD: 6.3 %
NEUTROPHILS # BLD AUTO: 3.4 K/UL
NEUTROPHILS NFR BLD: 66.9 %
NONHDLC SERPL-MCNC: 170 MG/DL
NRBC BLD-RTO: 0 /100 WBC
PLATELET # BLD AUTO: 227 K/UL
PMV BLD AUTO: 10 FL
POTASSIUM SERPL-SCNC: 4.1 MMOL/L
PROT SERPL-MCNC: 6.9 G/DL
RBC # BLD AUTO: 4.62 M/UL
SODIUM SERPL-SCNC: 142 MMOL/L
TRIGL SERPL-MCNC: 118 MG/DL
TSH SERPL DL<=0.005 MIU/L-ACNC: 0.87 UIU/ML
WBC # BLD AUTO: 5.11 K/UL

## 2017-10-25 PROCEDURE — 85025 COMPLETE CBC W/AUTO DIFF WBC: CPT

## 2017-10-25 PROCEDURE — 80053 COMPREHEN METABOLIC PANEL: CPT

## 2017-10-25 PROCEDURE — 36415 COLL VENOUS BLD VENIPUNCTURE: CPT

## 2017-10-25 PROCEDURE — 99999 PR PBB SHADOW E&M-EST. PATIENT-LVL III: CPT | Mod: PBBFAC,,, | Performed by: FAMILY MEDICINE

## 2017-10-25 PROCEDURE — 80061 LIPID PANEL: CPT

## 2017-10-25 PROCEDURE — 84443 ASSAY THYROID STIM HORMONE: CPT

## 2017-10-25 PROCEDURE — 83036 HEMOGLOBIN GLYCOSYLATED A1C: CPT

## 2017-10-25 PROCEDURE — 86803 HEPATITIS C AB TEST: CPT

## 2017-10-25 PROCEDURE — 99397 PER PM REEVAL EST PAT 65+ YR: CPT | Mod: S$GLB,,, | Performed by: FAMILY MEDICINE

## 2017-10-25 RX ORDER — LISINOPRIL 5 MG/1
5 TABLET ORAL DAILY
Qty: 90 TABLET | Refills: 3 | Status: SHIPPED | OUTPATIENT
Start: 2017-10-25 | End: 2018-10-27 | Stop reason: SDUPTHER

## 2017-10-25 RX ORDER — METFORMIN HYDROCHLORIDE 1000 MG/1
1000 TABLET ORAL 2 TIMES DAILY WITH MEALS
Qty: 180 TABLET | Refills: 3 | Status: SHIPPED | OUTPATIENT
Start: 2017-10-25 | End: 2018-11-28 | Stop reason: SDUPTHER

## 2017-10-25 RX ORDER — FLUOXETINE HYDROCHLORIDE 40 MG/1
40 CAPSULE ORAL DAILY
Qty: 90 CAPSULE | Refills: 3 | Status: SHIPPED | OUTPATIENT
Start: 2017-10-25 | End: 2018-11-02 | Stop reason: SDUPTHER

## 2017-10-25 RX ORDER — VERAPAMIL HYDROCHLORIDE 80 MG/1
80 TABLET ORAL 2 TIMES DAILY
Qty: 180 TABLET | Refills: 3 | Status: SHIPPED | OUTPATIENT
Start: 2017-10-25 | End: 2018-08-21

## 2017-10-25 NOTE — PROGRESS NOTES
Subjective:       Patient ID: Kaylin Mccollum is a 72 y.o. female.    Chief Complaint: Annual Exam    Patient presents to clinic today for annual physical exam.      Review of Systems   Constitutional: Positive for fatigue (chronic). Negative for chills, fever and unexpected weight change.   HENT: Negative for congestion, dental problem, ear pain, hearing loss, rhinorrhea and trouble swallowing.    Eyes: Positive for pain and visual disturbance.        Cataract surgery recently, followed by Ophthalmology   Respiratory: Negative for cough and shortness of breath.    Cardiovascular: Negative for chest pain, palpitations and leg swelling.   Gastrointestinal: Negative for abdominal distention, abdominal pain, blood in stool, constipation, diarrhea, nausea and vomiting.   Genitourinary: Negative for difficulty urinating and vaginal discharge.        Chronic vaginal irritation/itching over last 5 months   Musculoskeletal: Negative for arthralgias and myalgias (chronic, fibromyalgia).   Skin: Negative for rash.   Neurological: Negative for dizziness, weakness, numbness and headaches (chronic, migraines).   Hematological: Negative for adenopathy. Does not bruise/bleed easily.   Psychiatric/Behavioral: Positive for dysphoric mood (chronic, occasionally). Negative for sleep disturbance. The patient is nervous/anxious (chronic, occasionally).        Objective:      Physical Exam   Constitutional: She is oriented to person, place, and time. Vital signs are normal. She appears well-developed and well-nourished. No distress.   HENT:   Head: Normocephalic and atraumatic.   Right Ear: Tympanic membrane, external ear and ear canal normal.   Left Ear: Tympanic membrane, external ear and ear canal normal.   Nose: Nose normal. No mucosal edema or rhinorrhea.   Mouth/Throat: Uvula is midline, oropharynx is clear and moist and mucous membranes are normal.   Eyes: Conjunctivae, EOM and lids are normal. Pupils are equal, round, and  reactive to light.   Neck: Normal range of motion. Neck supple. No thyromegaly present.   Cardiovascular: Normal rate and regular rhythm.  Exam reveals no gallop and no friction rub.    No murmur heard.  Pulmonary/Chest: Effort normal and breath sounds normal. She has no wheezes. She has no rhonchi. She has no rales.   Abdominal: Soft. Normal appearance and bowel sounds are normal. She exhibits no distension and no mass. There is no hepatosplenomegaly. There is no tenderness.   Musculoskeletal: Normal range of motion.   Lymphadenopathy:     She has no cervical adenopathy.   Neurological: She is alert and oriented to person, place, and time. She has normal strength. No cranial nerve deficit or sensory deficit. Gait normal.   Reflex Scores:       Patellar reflexes are 2+ on the right side and 2+ on the left side.  Skin: Skin is warm and dry. No lesion and no rash noted. No cyanosis. Nails show no clubbing.   Psychiatric: She has a normal mood and affect.   Vitals reviewed.      Assessment:       1. Routine general medical examination at a health care facility    2. Diabetes mellitus type 2 in obese    3. Hypertension associated with diabetes    4. Hyperlipidemia associated with type 2 diabetes mellitus    5. MVP (mitral valve prolapse)    6. Fibromyalgia    7. Vulvar itching    8. Chronic major depressive disorder    9. Need for hepatitis C screening test        Plan:     Problem List Items Addressed This Visit     Fibromyalgia (Chronic)    Current Assessment & Plan     Stable, continue gabapentin         Relevant Medications    FLUoxetine (PROZAC) 40 MG capsule    Hypertension associated with diabetes (Chronic)    Current Assessment & Plan     Controlled, continue lisinopril and verapamil         Relevant Medications    lisinopril (PRINIVIL,ZESTRIL) 5 MG tablet    verapamil (CALAN) 80 MG tablet    Other Relevant Orders    CBC auto differential    TSH    Chronic major depressive disorder (Chronic)    Current  Assessment & Plan     Stable, continue prozac         Relevant Medications    FLUoxetine (PROZAC) 40 MG capsule    MVP (mitral valve prolapse)    Current Assessment & Plan     Asymptomatic on verapamil         Relevant Medications    verapamil (CALAN) 80 MG tablet    Hyperlipidemia associated with type 2 diabetes mellitus    Current Assessment & Plan     Status pending labs         Diabetes mellitus type 2 in obese    Current Assessment & Plan     Status pending labs, continue metformin and invokana           Relevant Medications    canagliflozin (INVOKANA) 100 mg Tab    metFORMIN (GLUCOPHAGE) 1000 MG tablet      Other Visit Diagnoses     Routine general medical examination at a health care facility    -  Primary    Vulvar itching        Relevant Orders    Ambulatory referral to Obstetrics / Gynecology    Need for hepatitis C screening test        Relevant Orders    Hepatitis C antibody          Health Maintenance reviewed/updated.

## 2017-11-12 ENCOUNTER — PATIENT MESSAGE (OUTPATIENT)
Dept: INTERNAL MEDICINE | Facility: CLINIC | Age: 72
End: 2017-11-12

## 2017-11-22 DIAGNOSIS — M79.7 FIBROMYALGIA: Chronic | ICD-10-CM

## 2017-11-22 DIAGNOSIS — M15.9 PRIMARY OSTEOARTHRITIS INVOLVING MULTIPLE JOINTS: ICD-10-CM

## 2017-11-22 DIAGNOSIS — M47.25 OSTEOARTHRITIS OF SPINE WITH RADICULOPATHY, THORACOLUMBAR REGION: Chronic | ICD-10-CM

## 2017-11-22 RX ORDER — CELECOXIB 100 MG/1
CAPSULE ORAL
Qty: 180 CAPSULE | Refills: 1 | Status: SHIPPED | OUTPATIENT
Start: 2017-11-22 | End: 2018-06-26 | Stop reason: SDUPTHER

## 2017-12-05 DIAGNOSIS — M25.512 LEFT SHOULDER PAIN, UNSPECIFIED CHRONICITY: Primary | ICD-10-CM

## 2017-12-06 ENCOUNTER — HOSPITAL ENCOUNTER (OUTPATIENT)
Dept: RADIOLOGY | Facility: HOSPITAL | Age: 72
Discharge: HOME | End: 2017-12-06
Attending: PHYSICIAN ASSISTANT
Payer: COMMERCIAL

## 2017-12-06 ENCOUNTER — OFFICE VISIT (OUTPATIENT)
Dept: ORTHOPEDICS | Facility: CLINIC | Age: 72
End: 2017-12-06
Payer: COMMERCIAL

## 2017-12-06 VITALS
SYSTOLIC BLOOD PRESSURE: 128 MMHG | WEIGHT: 221.81 LBS | HEIGHT: 70 IN | DIASTOLIC BLOOD PRESSURE: 66 MMHG | BODY MASS INDEX: 31.75 KG/M2 | HEART RATE: 87 BPM | RESPIRATION RATE: 12 BRPM

## 2017-12-06 DIAGNOSIS — E11.69 HYPERLIPIDEMIA ASSOCIATED WITH TYPE 2 DIABETES MELLITUS: ICD-10-CM

## 2017-12-06 DIAGNOSIS — M75.82 ROTATOR CUFF TENDINITIS, LEFT: ICD-10-CM

## 2017-12-06 DIAGNOSIS — G89.29 CHRONIC LEFT SHOULDER PAIN: Primary | ICD-10-CM

## 2017-12-06 DIAGNOSIS — M19.019 ACROMIOCLAVICULAR JOINT ARTHRITIS: ICD-10-CM

## 2017-12-06 DIAGNOSIS — M19.012 DJD OF LEFT AC (ACROMIOCLAVICULAR) JOINT: ICD-10-CM

## 2017-12-06 DIAGNOSIS — M25.512 LEFT SHOULDER PAIN, UNSPECIFIED CHRONICITY: ICD-10-CM

## 2017-12-06 DIAGNOSIS — E78.5 HYPERLIPIDEMIA ASSOCIATED WITH TYPE 2 DIABETES MELLITUS: ICD-10-CM

## 2017-12-06 DIAGNOSIS — M25.512 CHRONIC LEFT SHOULDER PAIN: Primary | ICD-10-CM

## 2017-12-06 PROCEDURE — 99999 PR PBB SHADOW E&M-EST. PATIENT-LVL IV: CPT | Mod: PBBFAC,,, | Performed by: PHYSICIAN ASSISTANT

## 2017-12-06 PROCEDURE — 73030 X-RAY EXAM OF SHOULDER: CPT | Mod: TC,PO,LT

## 2017-12-06 PROCEDURE — 20610 DRAIN/INJ JOINT/BURSA W/O US: CPT | Mod: LT,S$GLB,, | Performed by: PHYSICIAN ASSISTANT

## 2017-12-06 PROCEDURE — 73030 X-RAY EXAM OF SHOULDER: CPT | Mod: 26,LT,, | Performed by: RADIOLOGY

## 2017-12-06 PROCEDURE — 99214 OFFICE O/P EST MOD 30 MIN: CPT | Mod: 25,S$GLB,, | Performed by: PHYSICIAN ASSISTANT

## 2017-12-06 RX ORDER — METHYLPREDNISOLONE ACETATE 80 MG/ML
80 INJECTION, SUSPENSION INTRA-ARTICULAR; INTRALESIONAL; INTRAMUSCULAR; SOFT TISSUE ONCE
Status: COMPLETED | OUTPATIENT
Start: 2017-12-06 | End: 2017-12-06

## 2017-12-06 RX ADMIN — METHYLPREDNISOLONE ACETATE 80 MG: 80 INJECTION, SUSPENSION INTRA-ARTICULAR; INTRALESIONAL; INTRAMUSCULAR; SOFT TISSUE at 03:12
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
## 2017-12-13 DIAGNOSIS — M77.8 TENDINITIS OF LEFT SHOULDER: ICD-10-CM

## 2017-12-13 DIAGNOSIS — G89.29 CHRONIC LEFT SHOULDER PAIN: Primary | ICD-10-CM

## 2017-12-13 DIAGNOSIS — M25.512 CHRONIC LEFT SHOULDER PAIN: Primary | ICD-10-CM

## 2017-12-13 DIAGNOSIS — M19.012 DEGENERATIVE JOINT DISEASE OF LEFT ACROMIOCLAVICULAR JOINT: ICD-10-CM

## 2018-01-25 ENCOUNTER — TELEPHONE (OUTPATIENT)
Dept: ORTHOPEDICS | Facility: CLINIC | Age: 73
End: 2018-01-25

## 2018-01-25 NOTE — TELEPHONE ENCOUNTER
CONTACTED PT TO R/S APPT FROM [facility].  PT STATED SHE WILL R/S ONCE APPROVED BY [employer]

## 2018-03-13 ENCOUNTER — PATIENT MESSAGE (OUTPATIENT)
Dept: INTERNAL MEDICINE | Facility: CLINIC | Age: 73
End: 2018-03-13

## 2018-04-23 DIAGNOSIS — M79.7 FIBROMYALGIA: Chronic | ICD-10-CM

## 2018-04-23 RX ORDER — GABAPENTIN 300 MG/1
CAPSULE ORAL
Qty: 180 CAPSULE | Refills: 1 | OUTPATIENT
Start: 2018-04-23

## 2018-04-23 RX ORDER — GABAPENTIN 300 MG/1
300 CAPSULE ORAL 2 TIMES DAILY
Qty: 180 CAPSULE | Refills: 1 | Status: SHIPPED | OUTPATIENT
Start: 2018-04-23 | End: 2018-12-08 | Stop reason: SDUPTHER

## 2018-04-23 NOTE — TELEPHONE ENCOUNTER
----- Message from Clemencia Murry sent at 4/23/2018  8:58 AM CDT -----  Contact: Pt  ..1. What is the name of the medication you are requesting? Gabapentin   2. What is the dose?   3. How do you take the medication? Orally, topically, etc? Orally   4. How often do you take this medication?   5. Do you need a 30 day or 90 day supply? 90 day   6. How many refills are you requesting?   7. What is your preferred pharmacy and location of the pharmacy?     ..  6      [id number] [address] - [address]  [address] [address]  Phone: [phone] Fax: [phone]      8. Who can we contact with further questions? Pt at..[phone] (home)

## 2018-04-25 ENCOUNTER — PATIENT OUTREACH (OUTPATIENT)
Dept: ADMINISTRATIVE | Facility: HOSPITAL | Age: 73
End: 2018-04-25

## 2018-05-11 DIAGNOSIS — E11.9 TYPE 2 DIABETES MELLITUS WITHOUT COMPLICATION: ICD-10-CM

## 2018-05-14 ENCOUNTER — LAB VISIT (OUTPATIENT)
Dept: LAB | Facility: HOSPITAL | Age: 73
End: 2018-05-14
Attending: FAMILY MEDICINE
Payer: COMMERCIAL

## 2018-05-14 DIAGNOSIS — I15.2 HYPERTENSION ASSOCIATED WITH DIABETES: ICD-10-CM

## 2018-05-14 DIAGNOSIS — E66.9 DIABETES MELLITUS TYPE 2 IN OBESE: ICD-10-CM

## 2018-05-14 DIAGNOSIS — E78.5 HYPERLIPIDEMIA ASSOCIATED WITH TYPE 2 DIABETES MELLITUS: ICD-10-CM

## 2018-05-14 DIAGNOSIS — E11.59 HYPERTENSION ASSOCIATED WITH DIABETES: ICD-10-CM

## 2018-05-14 DIAGNOSIS — E11.69 HYPERLIPIDEMIA ASSOCIATED WITH TYPE 2 DIABETES MELLITUS: ICD-10-CM

## 2018-05-14 DIAGNOSIS — E11.69 DIABETES MELLITUS TYPE 2 IN OBESE: ICD-10-CM

## 2018-05-14 LAB
ALBUMIN SERPL BCP-MCNC: 3.7 G/DL
ALP SERPL-CCNC: 60 U/L
ALT SERPL W/O P-5'-P-CCNC: 14 U/L
ANION GAP SERPL CALC-SCNC: 8 MMOL/L
AST SERPL-CCNC: 19 U/L
BASOPHILS # BLD AUTO: 0.04 K/UL
BASOPHILS NFR BLD: 0.8 %
BILIRUB SERPL-MCNC: 0.3 MG/DL
BUN SERPL-MCNC: 17 MG/DL
CALCIUM SERPL-MCNC: 9.8 MG/DL
CHLORIDE SERPL-SCNC: 105 MMOL/L
CHOLEST SERPL-MCNC: 229 MG/DL
CHOLEST/HDLC SERPL: 4 {RATIO}
CO2 SERPL-SCNC: 28 MMOL/L
CREAT SERPL-MCNC: 0.8 MG/DL
DIFFERENTIAL METHOD: ABNORMAL
EOSINOPHIL # BLD AUTO: 0.2 K/UL
EOSINOPHIL NFR BLD: 2.8 %
ERYTHROCYTE [DISTWIDTH] IN BLOOD BY AUTOMATED COUNT: 12.8 %
EST. GFR  (AFRICAN AMERICAN): >60 ML/MIN/1.73 M^2
EST. GFR  (NON AFRICAN AMERICAN): >60 ML/MIN/1.73 M^2
ESTIMATED AVG GLUCOSE: 140 MG/DL
GLUCOSE SERPL-MCNC: 122 MG/DL
HBA1C MFR BLD HPLC: 6.5 %
HCT VFR BLD AUTO: 43.6 %
HDLC SERPL-MCNC: 57 MG/DL
HDLC SERPL: 24.9 %
HGB BLD-MCNC: 13.8 G/DL
IMM GRANULOCYTES # BLD AUTO: 0.01 K/UL
IMM GRANULOCYTES NFR BLD AUTO: 0.2 %
LDLC SERPL CALC-MCNC: 151 MG/DL
LYMPHOCYTES # BLD AUTO: 1.6 K/UL
LYMPHOCYTES NFR BLD: 29.3 %
MCH RBC QN AUTO: 30.7 PG
MCHC RBC AUTO-ENTMCNC: 31.7 G/DL
MCV RBC AUTO: 97 FL
MONOCYTES # BLD AUTO: 0.4 K/UL
MONOCYTES NFR BLD: 7.2 %
NEUTROPHILS # BLD AUTO: 3.2 K/UL
NEUTROPHILS NFR BLD: 59.7 %
NONHDLC SERPL-MCNC: 172 MG/DL
NRBC BLD-RTO: 0 /100 WBC
PLATELET # BLD AUTO: 219 K/UL
PMV BLD AUTO: 9.6 FL
POTASSIUM SERPL-SCNC: 4.5 MMOL/L
PROT SERPL-MCNC: 6.8 G/DL
RBC # BLD AUTO: 4.5 M/UL
SODIUM SERPL-SCNC: 141 MMOL/L
TRIGL SERPL-MCNC: 105 MG/DL
TSH SERPL DL<=0.005 MIU/L-ACNC: 1.03 UIU/ML
WBC # BLD AUTO: 5.29 K/UL

## 2018-05-14 PROCEDURE — 36415 COLL VENOUS BLD VENIPUNCTURE: CPT

## 2018-05-14 PROCEDURE — 80053 COMPREHEN METABOLIC PANEL: CPT

## 2018-05-14 PROCEDURE — 85025 COMPLETE CBC W/AUTO DIFF WBC: CPT

## 2018-05-14 PROCEDURE — 84443 ASSAY THYROID STIM HORMONE: CPT

## 2018-05-14 PROCEDURE — 83036 HEMOGLOBIN GLYCOSYLATED A1C: CPT

## 2018-05-14 PROCEDURE — 80061 LIPID PANEL: CPT

## 2018-05-17 ENCOUNTER — OFFICE VISIT (OUTPATIENT)
Dept: INTERNAL MEDICINE | Facility: CLINIC | Age: 73
End: 2018-05-17
Payer: COMMERCIAL

## 2018-05-17 VITALS
BODY MASS INDEX: 31.66 KG/M2 | HEART RATE: 77 BPM | HEIGHT: 70 IN | WEIGHT: 221.13 LBS | DIASTOLIC BLOOD PRESSURE: 60 MMHG | OXYGEN SATURATION: 96 % | SYSTOLIC BLOOD PRESSURE: 118 MMHG | TEMPERATURE: 98 F

## 2018-05-17 DIAGNOSIS — E11.59 HYPERTENSION ASSOCIATED WITH DIABETES: Chronic | ICD-10-CM

## 2018-05-17 DIAGNOSIS — M60.9 STATIN-INDUCED MYOSITIS: ICD-10-CM

## 2018-05-17 DIAGNOSIS — Z12.39 BREAST CANCER SCREENING: ICD-10-CM

## 2018-05-17 DIAGNOSIS — R41.3 MEMORY PROBLEM: ICD-10-CM

## 2018-05-17 DIAGNOSIS — Z12.11 COLON CANCER SCREENING: ICD-10-CM

## 2018-05-17 DIAGNOSIS — E11.69 HYPERLIPIDEMIA ASSOCIATED WITH TYPE 2 DIABETES MELLITUS: Primary | ICD-10-CM

## 2018-05-17 DIAGNOSIS — I15.2 HYPERTENSION ASSOCIATED WITH DIABETES: Chronic | ICD-10-CM

## 2018-05-17 DIAGNOSIS — F32.9 CHRONIC MAJOR DEPRESSIVE DISORDER: Chronic | ICD-10-CM

## 2018-05-17 DIAGNOSIS — E78.5 HYPERLIPIDEMIA ASSOCIATED WITH TYPE 2 DIABETES MELLITUS: Primary | ICD-10-CM

## 2018-05-17 DIAGNOSIS — T46.6X5A STATIN-INDUCED MYOSITIS: ICD-10-CM

## 2018-05-17 DIAGNOSIS — E66.9 DIABETES MELLITUS TYPE 2 IN OBESE: ICD-10-CM

## 2018-05-17 DIAGNOSIS — E11.69 DIABETES MELLITUS TYPE 2 IN OBESE: ICD-10-CM

## 2018-05-17 PROCEDURE — 99999 PR PBB SHADOW E&M-EST. PATIENT-LVL V: CPT | Mod: PBBFAC,,, | Performed by: NURSE PRACTITIONER

## 2018-05-17 PROCEDURE — 3078F DIAST BP <80 MM HG: CPT | Mod: CPTII,S$GLB,, | Performed by: NURSE PRACTITIONER

## 2018-05-17 PROCEDURE — 3074F SYST BP LT 130 MM HG: CPT | Mod: CPTII,S$GLB,, | Performed by: NURSE PRACTITIONER

## 2018-05-17 PROCEDURE — 3044F HG A1C LEVEL LT 7.0%: CPT | Mod: CPTII,S$GLB,, | Performed by: NURSE PRACTITIONER

## 2018-05-17 PROCEDURE — 99214 OFFICE O/P EST MOD 30 MIN: CPT | Mod: S$GLB,,, | Performed by: NURSE PRACTITIONER

## 2018-05-17 RX ORDER — SODIUM, POTASSIUM,MAG SULFATES 17.5-3.13G
SOLUTION, RECONSTITUTED, ORAL ORAL
Qty: 354 ML | Refills: 0 | Status: ON HOLD | OUTPATIENT
Start: 2018-05-17 | End: 2018-09-25 | Stop reason: ALTCHOICE

## 2018-05-17 NOTE — PROGRESS NOTES
"Subjective:       Patient ID: Kaylin Mccollum is a 73 y.o. female.    Chief Complaint: 6 month follow up    Patient presents to clinic today for follow up of chronic conditions including HTN, HLD, DMII, and depression. She has concerns for dementia and also requesting "cancer screening". She reports having difficulty remember actors names and names of movies. She also states she has been writing down when she took her medications as she cannot recall if she's taken them or not. She reports a family history of both dementia and cancer. Mother had colon cancer and dementia.      Review of Systems   Constitutional: Negative for chills, fatigue, fever and unexpected weight change.   Eyes: Negative for visual disturbance.   Respiratory: Negative for shortness of breath.    Cardiovascular: Negative for chest pain.   Musculoskeletal: Positive for arthralgias and myalgias.   Neurological: Negative for headaches.   Psychiatric/Behavioral: Positive for dysphoric mood.       Objective:      Physical Exam   Constitutional: She is oriented to person, place, and time. She appears well-developed and well-nourished. No distress.   HENT:   Head: Normocephalic and atraumatic.   Eyes: Conjunctivae and EOM are normal. Pupils are equal, round, and reactive to light.   Cardiovascular: Normal rate and regular rhythm.  Exam reveals no gallop and no friction rub.    No murmur heard.  Pulmonary/Chest: Effort normal and breath sounds normal.   Neurological: She is alert and oriented to person, place, and time.   Skin: Skin is warm and dry.   Psychiatric: She exhibits a depressed mood.   Vitals reviewed.      Protective Sensation (w/ 10 gram monofilament):  Right: Intact  Left: Intact    Visual Inspection:  Normal -  Bilateral and Nails Intact - without Evidence of Foot Deformity- Bilateral  Bilateral great toes onychomycosis   Pedal Pulses:   Right: Present  Left: Present    Posterior tibialis:   Right:Present  Left: Present      Assessment: "       1. Hyperlipidemia associated with type 2 diabetes mellitus    2. Hypertension associated with diabetes    3. Diabetes mellitus type 2 in obese    4. Chronic major depressive disorder    5. Breast cancer screening    6. Colon cancer screening    7. Statin-induced myositis    8. Memory problem        Plan:   Hyperlipidemia associated with type 2 diabetes mellitus  Comments:  cholesterol and LDL above goal, A1C 6.5; continue current medications; allergy to statins    Hypertension associated with diabetes  Comments:  controlled, continue current medications    Diabetes mellitus type 2 in obese  Comments:  controlled, continue current medications    Chronic major depressive disorder  Comments:  stable, continue prozac    Breast cancer screening  -     Mammo Digital Screening Bilateral With CAD; Future; Expected date: 05/18/2018    Colon cancer screening  -     Case request GI: COLONOSCOPY  -     sodium,potassium,mag sulfates (SUPREP BOWEL PREP KIT) 17.5-3.13-1.6 gram SolR; Follow instructions per pharmacy  Dispense: 354 mL; Refill: 0    Statin-induced myositis    Memory problem  -     Ambulatory Referral to Neurology    Other orders  -     Cancel: Microalbumin/creatinine urine ratio; Future; Expected date: 05/10/2018  -     Cancel: Case request GI: COLONOSCOPY      Health Maintenance reviewed/updated.  Labs reviewed, questions answered.  minicog normal today    Follow-up in about 6 months (around 11/17/2018), or if symptoms worsen or fail to improve, for annual wellness/EPP with Dr. Olvera.

## 2018-05-17 NOTE — PATIENT INSTRUCTIONS
Some dietary changes that may help lower cholesterol including avoid red meat, butter, fried foods, cheese, and other foods that have a lot of saturated fat. Other things that might help lower cholesterol include:  Eating more soluble fiber - Soluble fiber is found in fruits, oats, barley, beans, and peas.  A vegan diet - A vegan diet contains no animal products, such as meat, eggs, or milk.  Eggs are OK, but don't overdo it. The news is often littered with stories about the health benefits or risks of eggs. The truth is, eggs are a good source of protein and do not raise cholesterol much, even though they have a lot of cholesterol in them.    Specific foods that may lower my cholesterol:  Foods rich in omega-3 fatty acids include oily fish, and olive and canola oil.   Nuts   Fiber-rich foods

## 2018-05-21 ENCOUNTER — DOCUMENTATION ONLY (OUTPATIENT)
Dept: ENDOSCOPY | Facility: HOSPITAL | Age: 73
End: 2018-05-21

## 2018-06-15 ENCOUNTER — OFFICE VISIT (OUTPATIENT)
Dept: PODIATRY | Facility: CLINIC | Age: 73
End: 2018-06-15
Payer: COMMERCIAL

## 2018-06-15 VITALS
SYSTOLIC BLOOD PRESSURE: 129 MMHG | BODY MASS INDEX: 31.12 KG/M2 | WEIGHT: 217.38 LBS | DIASTOLIC BLOOD PRESSURE: 66 MMHG | HEART RATE: 79 BPM | HEIGHT: 70 IN

## 2018-06-15 DIAGNOSIS — L03.031 ACUTE PARONYCHIA OF TOE OF RIGHT FOOT: ICD-10-CM

## 2018-06-15 DIAGNOSIS — E11.9 COMPREHENSIVE DIABETIC FOOT EXAMINATION, TYPE 2 DM, ENCOUNTER FOR: ICD-10-CM

## 2018-06-15 DIAGNOSIS — B35.1 DERMATOPHYTOSIS OF NAIL: Primary | ICD-10-CM

## 2018-06-15 PROCEDURE — 11750 EXCISION NAIL&NAIL MATRIX: CPT | Mod: T5,S$GLB,, | Performed by: PODIATRIST

## 2018-06-15 PROCEDURE — 3078F DIAST BP <80 MM HG: CPT | Mod: CPTII,S$GLB,, | Performed by: PODIATRIST

## 2018-06-15 PROCEDURE — 99204 OFFICE O/P NEW MOD 45 MIN: CPT | Mod: 25,S$GLB,, | Performed by: PODIATRIST

## 2018-06-15 PROCEDURE — 3074F SYST BP LT 130 MM HG: CPT | Mod: CPTII,S$GLB,, | Performed by: PODIATRIST

## 2018-06-15 PROCEDURE — 3044F HG A1C LEVEL LT 7.0%: CPT | Mod: CPTII,S$GLB,, | Performed by: PODIATRIST

## 2018-06-15 PROCEDURE — 99999 PR PBB SHADOW E&M-EST. PATIENT-LVL III: CPT | Mod: PBBFAC,,, | Performed by: PODIATRIST

## 2018-06-24 NOTE — PROGRESS NOTES
Subjective:     Patient ID: Kaylin Mccollum is a 73 y.o. female.    Chief Complaint: Diabetic Foot Exam (PCP: ELISA Olvera 10/25/2017) and Nail Problem (bilateral great toenails)    Kaylin is a 73 y.o. female who presents to the clinic complaining of bilateral big toenail problems. Patient states the nails have fallen off before and now the right one is swollen, painful, and loose. Patient states the nail feels like its digging into the side of her skin. Patient states the pain has been present for several weeks. Patient does not relate a history of trauma. Patient rates pain 2/10. When asked to indicate where the pain is located, patient points to proximal right nail border. Patient denies other complaints at this time.      Patient Active Problem List   Diagnosis    Fibromyalgia    Osteoarthritis of spine with radiculopathy, thoracolumbar region    Hypertension associated with diabetes    Migraine headache    Osteoarthritis    MVP (mitral valve prolapse)    Chronic major depressive disorder    Acromioclavicular joint arthritis    Chondromalacia of right shoulder    Macular hole    Hyperlipidemia associated with type 2 diabetes mellitus    Diabetes mellitus type 2 in obese    Statin-induced myositis       Medication List with Changes/Refills   Current Medications    BIOTIN 1 MG TABLET    Take 1,000 mcg by mouth every morning.     BUTALBITAL-ACETAMINOPHEN  MG TAB    Take 1 tablet by mouth daily as needed.    CANAGLIFLOZIN (INVOKANA) 100 MG TAB    Take 1 tablet (100 mg total) by mouth once daily.    CELECOXIB (CELEBREX) 100 MG CAPSULE    take 1 capsule by mouth twice a day    CYANOCOBALAMIN (VITAMIN B-12) 500 MCG TABLET    Take 500 mcg by mouth nightly.    FLUOXETINE (PROZAC) 40 MG CAPSULE    Take 1 capsule (40 mg total) by mouth once daily.    FLUTICASONE (FLONASE) 50 MCG/ACTUATION NASAL SPRAY    2 sprays by Each Nare route once daily.    GABAPENTIN (NEURONTIN) 300 MG CAPSULE    Take 1 capsule  "(300 mg total) by mouth 2 (two) times daily.    LISINOPRIL (PRINIVIL,ZESTRIL) 5 MG TABLET    Take 1 tablet (5 mg total) by mouth once daily.    METFORMIN (GLUCOPHAGE) 1000 MG TABLET    Take 1 tablet (1,000 mg total) by mouth 2 (two) times daily with meals.    NALTREXONE CAPSULE    Take 4.5 mg by mouth every evening.    SODIUM,POTASSIUM,MAG SULFATES (SUPREP BOWEL PREP KIT) 17.5-3.13-1.6 GRAM SOLR    Follow instructions per pharmacy    VERAPAMIL (CALAN) 80 MG TABLET    Take 1 tablet (80 mg total) by mouth 2 (two) times daily.       Review of patient's allergies indicates:   Allergen Reactions    Demerol [meperidine] Other (See Comments)     Burning when adm IV  Able to tolerate IM, "Turned my hand purple."    Latex, natural rubber Other (See Comments)     "Burns my skin",  Symptoms get worse the longer she is exposed    Zocor [simvastatin] Other (See Comments)     Tightening of muscles    Statins-hmg-coa reductase inhibitors Other (See Comments)     myopathy    Sulfa (sulfonamide antibiotics)        Past Surgical History:   Procedure Laterality Date    CATARACT EXTRACTION W/  INTRAOCULAR LENS IMPLANT Left 07/19/2017    CHOLECYSTECTOMY      KNEE ARTHROSCOPY Bilateral     PARS PLANA VITRECTOMY W/ REPAIR OF MACULAR HOLE Left 02/22/2017    SHOULDER ARTHROSCOPY Right 06/04/15       Family History   Problem Relation Age of Onset    Heart disease Mother     Glaucoma Mother     Cataracts Mother     Cancer Mother         colon    Kidney disease Daughter     Diabetes Maternal Grandmother     Heart disease Maternal Grandmother     Diabetes Maternal Grandfather     Cancer Maternal Grandfather        Social History     Social History    Marital status:      Spouse name: N/A    Number of children: N/A    Years of education: N/A     Occupational History     [employer]     Social History Main Topics    Smoking status: Never Smoker    Smokeless tobacco: Never Used    Alcohol use 0.0 oz/week    " "  Comment: Rarely    Drug use: No    Sexual activity: Not on file     Other Topics Concern    Not on file     Social History Narrative    . 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       Vitals:    06/15/18 0934   BP: 129/66   Pulse: 79   Weight: 98.6 kg (217 lb 6 oz)   Height: 5' 9.5" (1.765 m)   PainSc: 0-No pain       Hemoglobin A1C   Date Value Ref Range Status   05/14/2018 6.5 (H) 4.0 - 5.6 % Final     Comment:     According to ADA guidelines, hemoglobin A1c <7.0% represents  optimal control in non-pregnant diabetic patients. Different  metrics may apply to specific patient populations.   Standards of Medical Care in Diabetes-2016.  For the purpose of screening for the presence of diabetes:  <5.7%     Consistent with the absence of diabetes  5.7-6.4%  Consistent with increasing risk for diabetes   (prediabetes)  >or=6.5%  Consistent with diabetes  Currently, no consensus exists for use of hemoglobin A1c  for diagnosis of diabetes for children.  This Hemoglobin A1c assay has significant interference with fetal   hemoglobin   (HbF). The results are invalid for patients with abnormal amounts of   HbF,   including those with known Hereditary Persistence   of Fetal Hemoglobin. Heterozygous hemoglobin variants (HbAS, HbAC,   HbAD, HbAE, HbA2) do not significantly interfere with this assay;   however, presence of multiple variants in a sample may impact the %   interference.     10/25/2017 6.4 (H) 4.0 - 5.6 % Final     Comment:     According to ADA guidelines, hemoglobin A1c <7.0% represents  optimal control in non-pregnant diabetic patients. Different  metrics may apply to specific patient populations.   Standards of Medical Care in Diabetes-2016.  For the purpose of screening for the presence of diabetes:  <5.7%     Consistent with the absence of diabetes  5.7-6.4%  Consistent with increasing risk for diabetes   (prediabetes)  >or=6.5%  Consistent with diabetes  Currently, no consensus exists for use of hemoglobin " A1c  for diagnosis of diabetes for children.  This Hemoglobin A1c assay has significant interference with fetal   hemoglobin   (HbF). The results are invalid for patients with abnormal amounts of   HbF,   including those with known Hereditary Persistence   of Fetal Hemoglobin. Heterozygous hemoglobin variants (HbAS, HbAC,   HbAD, HbAE, HbA2) do not significantly interfere with this assay;   however, presence of multiple variants in a sample may impact the %   interference.     04/07/2017 6.6 (H) 4.5 - 6.2 % Final     Comment:     According to ADA guidelines, hemoglobin A1C <7.0% represents  optimal control in non-pregnant diabetic patients.  Different  metrics may apply to specific populations.   Standards of Medical Care in Diabetes - 2016.  For the purpose of screening for the presence of diabetes:  <5.7%     Consistent with the absence of diabetes  5.7-6.4%  Consistent with increasing risk for diabetes   (prediabetes)  >or=6.5%  Consistent with diabetes  Currently no consensus exists for use of hemoglobin A1C  for diagnosis of diabetes for children.         Review of Systems   Constitutional: Negative for chills and fever.   Respiratory: Negative for shortness of breath.    Cardiovascular: Negative for chest pain, palpitations, orthopnea, claudication and leg swelling.   Gastrointestinal: Negative for diarrhea, nausea and vomiting.   Musculoskeletal: Negative for joint pain.   Skin: Negative for rash.   Neurological: Negative for dizziness, tingling, sensory change, focal weakness and weakness.   Psychiatric/Behavioral: Negative.              Objective:       PHYSICAL EXAM: Apperance: Alert and orient in no distress,well developed, and with good attention to grooming and body habits  LOWER EXTREMITY EXAM:  VASCULAR: Dorsalis pedis pulses 2/4 bilateral and Posterior Tibial pulses 2/4 bilateral. Capillary fill time <4 seconds bilateral. No edema observed bilateral. Varicosities absent bilateral. Skin temperature of  the lower extremities is warm to warm, proximal to distal. Hair growth WNL bilateral.  DERMATOLOGICAL: No skin rashes, subcutaneous nodules, lesions, or ulcers observed bilateral. Nails 2,3,4,5 bilateral normal length and thickness. Webspaces 1,2,3,4 bilateral clean, dry and without evidence of break in skin integrity. Right hallux nail observed to be mildly incurvated at proximal borders and slight obstructed in the nail grooves with soft tissue. The right hallux proximal nail fold is grossly edematous and firm from chronic inflammation and scarring. No purulent drainage, no odor, and no increased temperature observed to right hallux.   NEUROLOGICAL: Light touch, sharp-dull, proprioception all present and equal bilaterally.  Vibratory sensation intact at bilateral hallux. Protective sensation intact at all 10 sites as tested with a [place]-Tanya 5.07 monofilament.   MUSCULOSKELETAL: Muscle strength is 5/5 for foot inverters, everters, plantarflexors, and dorsiflexors. Muscle tone is normal. Mild tenderness on palpation of right hallux proximal nail border.         Assessment:       Encounter Diagnoses   Name Primary?    Dermatophytosis of nail Yes    Acute paronychia of toe of right foot     Comprehensive diabetic foot examination, type 2 DM, encounter for          Plan:   Dermatophytosis of nail    Acute paronychia of toe of right foot    Comprehensive diabetic foot examination, type 2 DM, encounter for      I counseled the patient on her conditions, regarding findings of my examination, my impressions, and usual treatment plan.   Greater than 50% of this visit spent on counseling and coordination of care.  Greater than 15 minutes of a 20 minute appointment spent on education about the diabetic foot, neuropathy, and prevention of limb loss.  Shoe inspection. Diabetic Foot Education. Patient reminded of the importance of good nutrition and blood sugar control to help prevent podiatric complications of  diabetes. Patient instructed on proper foot hygeine. We discussed wearing proper shoe gear, daily foot inspections, never walking without protective shoe gear, never putting sharp instruments to feet.    Patient consented verbal and written to permanent total nail avulsion of right great toe.   Procedure performed: A local digital justin was administered to the right hallux of  6cc of 1% Lidocaine plain. Attention was then directed to the right hallux to check for adequate anesthesia. A penrose drain tourniquet was applied to the base of the right hallux for hemostasis.  Attention was then directed the medial and lateral nail border to separate using a spatula the most proximal nail border at the eponychium was released to the area of the matrix. Then the border was released at the medial and lateral aspect and at the plantar aspect distally to proximally. Using a straight hemostat, the free nail plate was clamped and removed. Using a tissue nipper, residual tissue was then removed. The nail bed area was inspected and probed proximally with a curette for any spicules. Next a 60 second application of phenol was applied to the entire nail bed. The area was flushed with copious amounts of sterile normal saline. Betadine was applied to the area and dry sterile dressing of gauze and Coflex. The penrose drain tourniquet was released. Neurovascular status was assessed and noted to be intact. Patient tolerated procedure well.   The patient was given oral and written instructions for post-op care including daily soaks of water and Epson salt followed by application of antibiotic ointment  and light dressing.  The patient was instructed to take Tylenol over-the-counter q4h prn pain and to call clinic immediately if there is increased pain, increased redness, pus, fever, chills, nausea, or vomiting present.   Patient to return 2 weeks or sooner if needed.           Lorna Horvath DPM  [facility]

## 2018-06-26 DIAGNOSIS — M47.25 OSTEOARTHRITIS OF SPINE WITH RADICULOPATHY, THORACOLUMBAR REGION: Chronic | ICD-10-CM

## 2018-06-26 DIAGNOSIS — M15.9 PRIMARY OSTEOARTHRITIS INVOLVING MULTIPLE JOINTS: ICD-10-CM

## 2018-06-26 DIAGNOSIS — M79.7 FIBROMYALGIA: Chronic | ICD-10-CM

## 2018-06-27 RX ORDER — CELECOXIB 100 MG/1
CAPSULE ORAL
Qty: 180 CAPSULE | Refills: 3 | Status: SHIPPED | OUTPATIENT
Start: 2018-06-27 | End: 2019-08-18 | Stop reason: SDUPTHER

## 2018-06-29 ENCOUNTER — TELEPHONE (OUTPATIENT)
Dept: PODIATRY | Facility: CLINIC | Age: 73
End: 2018-06-29

## 2018-06-29 NOTE — TELEPHONE ENCOUNTER
Spoke with Pt and explained that Dr. Horvath is now in [place] on Tuesdays (Pt doesn't want to travel) and that next available on a Thursday is 8/2/18. Pt asked to cancel 7/6/18 apt and take 8/2/18 instead.

## 2018-06-29 NOTE — TELEPHONE ENCOUNTER
----- Message from Meng Carty sent at 6/29/2018  7:43 AM CDT -----  Contact: pt   States she's calling to be worked in to see  on a Tuesday or Thursday for 2 week follow up (toenail avulsion). Pt can be reached at [phone]//[email or web address]

## 2018-07-05 ENCOUNTER — HOSPITAL ENCOUNTER (OUTPATIENT)
Dept: RADIOLOGY | Facility: HOSPITAL | Age: 73
Discharge: HOME | End: 2018-07-05
Attending: NURSE PRACTITIONER
Payer: COMMERCIAL

## 2018-07-05 DIAGNOSIS — Z12.39 BREAST CANCER SCREENING: ICD-10-CM

## 2018-07-05 PROCEDURE — 77063 BREAST TOMOSYNTHESIS BI: CPT | Mod: 26,,, | Performed by: RADIOLOGY

## 2018-07-05 PROCEDURE — 77067 SCR MAMMO BI INCL CAD: CPT | Mod: 26,,, | Performed by: RADIOLOGY

## 2018-07-05 PROCEDURE — 77063 BREAST TOMOSYNTHESIS BI: CPT | Mod: TC

## 2018-07-31 ENCOUNTER — PATIENT MESSAGE (OUTPATIENT)
Dept: INTERNAL MEDICINE | Facility: CLINIC | Age: 73
End: 2018-07-31

## 2018-08-21 ENCOUNTER — OFFICE VISIT (OUTPATIENT)
Dept: NEUROLOGY | Facility: CLINIC | Age: 73
End: 2018-08-21
Payer: COMMERCIAL

## 2018-08-21 VITALS
DIASTOLIC BLOOD PRESSURE: 66 MMHG | SYSTOLIC BLOOD PRESSURE: 116 MMHG | WEIGHT: 217.13 LBS | HEIGHT: 69 IN | BODY MASS INDEX: 32.16 KG/M2 | HEART RATE: 76 BPM

## 2018-08-21 DIAGNOSIS — G43.909 MIGRAINE WITHOUT STATUS MIGRAINOSUS, NOT INTRACTABLE, UNSPECIFIED MIGRAINE TYPE: ICD-10-CM

## 2018-08-21 DIAGNOSIS — I34.1 MVP (MITRAL VALVE PROLAPSE): ICD-10-CM

## 2018-08-21 DIAGNOSIS — G43.109 MIGRAINE WITH AURA AND WITHOUT STATUS MIGRAINOSUS, NOT INTRACTABLE: Primary | Chronic | ICD-10-CM

## 2018-08-21 DIAGNOSIS — I15.2 HYPERTENSION ASSOCIATED WITH DIABETES: ICD-10-CM

## 2018-08-21 DIAGNOSIS — E11.59 HYPERTENSION ASSOCIATED WITH DIABETES: ICD-10-CM

## 2018-08-21 PROCEDURE — 99999 PR PBB SHADOW E&M-EST. PATIENT-LVL III: CPT | Mod: PBBFAC,,, | Performed by: PSYCHIATRY & NEUROLOGY

## 2018-08-21 PROCEDURE — 3074F SYST BP LT 130 MM HG: CPT | Mod: CPTII,S$GLB,, | Performed by: PSYCHIATRY & NEUROLOGY

## 2018-08-21 PROCEDURE — 3044F HG A1C LEVEL LT 7.0%: CPT | Mod: CPTII,S$GLB,, | Performed by: PSYCHIATRY & NEUROLOGY

## 2018-08-21 PROCEDURE — 3078F DIAST BP <80 MM HG: CPT | Mod: CPTII,S$GLB,, | Performed by: PSYCHIATRY & NEUROLOGY

## 2018-08-21 PROCEDURE — 99204 OFFICE O/P NEW MOD 45 MIN: CPT | Mod: S$GLB,,, | Performed by: PSYCHIATRY & NEUROLOGY

## 2018-08-21 RX ORDER — BUTALBITAL, ASPIRIN, AND CAFFEINE 325; 50; 40 MG/1; MG/1; MG/1
1 CAPSULE ORAL EVERY 4 HOURS PRN
Qty: 25 CAPSULE | Refills: 0 | Status: SHIPPED | OUTPATIENT
Start: 2018-08-21 | End: 2018-09-20

## 2018-08-21 RX ORDER — VERAPAMIL HYDROCHLORIDE 120 MG/1
120 TABLET, FILM COATED ORAL 2 TIMES DAILY
Qty: 60 TABLET | Refills: 11 | Status: SHIPPED | OUTPATIENT
Start: 2018-08-21 | End: 2019-10-09 | Stop reason: SDUPTHER

## 2018-08-21 RX ORDER — BUTALBITAL AND ACETAMINOPHEN 325; 50 MG/1; MG/1
1 TABLET ORAL DAILY PRN
Qty: 30 EACH | Refills: 1 | Status: SHIPPED | OUTPATIENT
Start: 2018-08-21 | End: 2018-08-21 | Stop reason: ALTCHOICE
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
## 2018-08-21 NOTE — LETTER
August 21, 2018      Renee Sarabia NP  [address] [place] [address] [address] [place] [id number]           [facility]  [address] [place] [address] [address] [place] [id number]  Phone: [phone]  Fax: [phone]          Patient: Kaylin Mccollum   MR Number: [id number]   YOB: 1945   Date of Visit: 8/21/2018       Dear Renee Sarabia:    Thank you for referring Kaylin Mccollum to me for evaluation. Attached you will find relevant portions of my assessment and plan of care.    If you have questions, please do not hesitate to call me. I look forward to following Kaylin Mccollum along with you.    Sincerely,    Mane Norton MD    Enclosure  CC:  No Recipients    If you would like to receive this communication electronically, please contact [email or web address] or [phone] to request more information on [facility] Link access.    For providers and/or their staff who would like to refer a patient to [facility], please contact us through our one-stop-shop provider referral line, [facility] , at [phone].    If you feel you have received this communication in error or would no longer like to receive these types of communications, please e-mail [email or web address]

## 2018-08-28 ENCOUNTER — OFFICE VISIT (OUTPATIENT)
Dept: OBSTETRICS AND GYNECOLOGY | Facility: CLINIC | Age: 73
End: 2018-08-28
Payer: COMMERCIAL

## 2018-08-28 VITALS
DIASTOLIC BLOOD PRESSURE: 66 MMHG | WEIGHT: 214.19 LBS | BODY MASS INDEX: 31.72 KG/M2 | HEIGHT: 69 IN | SYSTOLIC BLOOD PRESSURE: 128 MMHG

## 2018-08-28 DIAGNOSIS — Z01.419 ENCOUNTER FOR GYNECOLOGICAL EXAMINATION WITHOUT ABNORMAL FINDING: Primary | ICD-10-CM

## 2018-08-28 DIAGNOSIS — B35.4 TINEA CORPORIS: ICD-10-CM

## 2018-08-28 DIAGNOSIS — Z78.0 POSTMENOPAUSAL: ICD-10-CM

## 2018-08-28 DIAGNOSIS — N95.2 ATROPHIC VAGINITIS: ICD-10-CM

## 2018-08-28 PROCEDURE — 88175 CYTOPATH C/V AUTO FLUID REDO: CPT

## 2018-08-28 PROCEDURE — 87210 SMEAR WET MOUNT SALINE/INK: CPT | Mod: S$GLB,,, | Performed by: NURSE PRACTITIONER

## 2018-08-28 PROCEDURE — 99387 INIT PM E/M NEW PAT 65+ YRS: CPT | Mod: S$GLB,,, | Performed by: NURSE PRACTITIONER

## 2018-08-28 PROCEDURE — 99999 PR PBB SHADOW E&M-EST. PATIENT-LVL IV: CPT | Mod: PBBFAC,,, | Performed by: NURSE PRACTITIONER

## 2018-08-28 PROCEDURE — 87070 CULTURE OTHR SPECIMN AEROBIC: CPT

## 2018-08-28 RX ORDER — AMOXICILLIN 875 MG/1
875 TABLET, FILM COATED ORAL 2 TIMES DAILY
Qty: 14 TABLET | Refills: 0 | Status: SHIPPED | OUTPATIENT
Start: 2018-08-28 | End: 2018-09-04

## 2018-08-28 RX ORDER — TRIAMCINOLONE ACETONIDE 0.25 MG/G
OINTMENT TOPICAL 2 TIMES DAILY
Qty: 15 G | Refills: 2 | Status: SHIPPED | OUTPATIENT
Start: 2018-08-28 | End: 2023-09-05

## 2018-08-28 RX ORDER — NYSTATIN AND TRIAMCINOLONE ACETONIDE 100000; 1 [USP'U]/G; MG/G
OINTMENT TOPICAL 2 TIMES DAILY
Qty: 30 G | Refills: 1 | Status: SHIPPED | OUTPATIENT
Start: 2018-08-28 | End: 2018-12-12 | Stop reason: ALTCHOICE

## 2018-08-28 RX ORDER — FLUCONAZOLE 150 MG/1
TABLET ORAL
Qty: 2 TABLET | Refills: 1 | Status: SHIPPED | OUTPATIENT
Start: 2018-08-28 | End: 2018-12-12 | Stop reason: ALTCHOICE

## 2018-08-28 NOTE — PATIENT INSTRUCTIONS
Fungal Skin Infection (Tinea)  A fungal infection occurs when too much fungus grows on or in the body. Fungus normally lives on the skin in small amounts and does not cause harm. But when too much grows on the skin, it causes an infection. This is also known as tinea. Fungal skin infections are common and not usually serious.  The infection often starts as a small red area the size of a pea. The skin may turn dry and flaky. The area may itch. As the fungus grows, it spreads out in a red [place]. Because of how it looks, fungal skin infection is often called ringworm, but it is not caused by a worm. Fungal skin infections can occur on many parts of the body. They can grow on the head, chest, arms, or legs. They can occur on the buttocks. On the feet, fungal infection is known as athletes foot. It causes itchy, sometimes painful sores between the toes and the bottom or sides of the feet. In the groin, the rash is called jock itch.  People with weak immune systems can get a fungal infection more easily. This includes people with diabetes or HIV, or who are being treated for cancer. In these cases, the fungal infection can spread and cause severe illness. Fungal infections are also more common in people who are overweight.  In most cases, treatment is done with antifungal cream or ointment. If the infection is on your scalp, you may take oral medicine. In some cases, a tiny piece of the skin (biopsy) may be taken. This is so it can be tested in a lab.  Common fungal infections are treated with creams on the skin or oral medicine.  Home care  Follow all instructions when using antifungal cream or ointment on your skin. Your healthcare provider may advise using cornstarch powder to keep your skin dry or petroleum jelly to provide a barrier.  General care:  · If you were prescribed an oral medicine, read the patient information. Talk with your healthcare provider about the risks and side effects.  · Let your skin dry  completely after bathing. Carefully dry your feet and between your toes.  · Dress in loose cotton clothing.  · Dont scratch the affected area. This can delay healing and may spread the infection. It can also cause a bacterial infection.  · Keep your skin clean, but dont wash the skin too much. This can irritate your skin.  · Keep in mind that it may take a week before the fungus starts to go away. It can take 2 to 4 weeks to fully clear. To prevent it from coming back, use the medicine until the rash is all gone.  Follow-up care  Follow up with your healthcare provider if the rash does not get better after 10 days of treatment. Also follow up if the rash spreads to other parts of your body.  When to seek medical advice  Call your healthcare provider right away if any of these occur:  · Fever of 100.4°F (38°C) or higher  · Redness or swelling that gets worse  · Pain that gets worse  · Foul-smelling fluid leaking from the skin  Date Last Reviewed: 11/1/2016  © 1335-3627 [employer]. [address] [place] [address]. All rights reserved. This information is not intended as a substitute for professional medical care. Always follow your healthcare professional's instructions.        The Range of Pap Test Results  When your Pap test is sent to the lab, the lab studies your cell samples and reports any abnormal cell changes. Your health care provider can discuss these changes with you. In some cases, an abnormal Pap test is due to an infection. More serious cell changes range from dysplasia to cancer. Talk to your health care provider about your Pap test.    Normal results  Cervical cells, even normal ones, are always changing. As they mature, normal squamous cells move from deeper layers within the cervix. Over time, these cells flatten and cover the surface of the cervix. Within the cervical canal, the cells are different. These glandular cells are taller and not as flat as the cells on the  surface of the cervix. When a Pap test sample shows healthy cells of both types, the results are negative. Keep having Pap tests as often as directed.  Abnormal results  A positive Pap test result means some cells in the sample showed abnormal changes. These results are grouped by the type of cell change and the location, or extent, of the changes. Depending on the results, you may need further testing.  · Inflammation: Noncancerous changes are present. They may be due to normal cell repair. Or, they may be caused by an infection, such as HPV or yeast. Further testing may be needed. (Also called reactive cellular changes.)  · Atypical squamous cells: Test results are unclear. Cells on the surface of the cervix show changes, but their significance is not yet known. Testing for HPV and other sexually transmitted infections (STIs) may be needed. Treatment may be required. (Reported as ASC-US or ASC-H.)  · Atypical glandular cells: Cells lining the cervical canal show abnormal changes. Further testing is likely. You may also have treatment to destroy or remove problem cells. (Reported as AGC.)  · Mild dysplasia: Cells show distinct changes. More testing or HPV typing may be done. You may also have treatment to destroy or remove problem cells. (Reported as low-grade EARLE or AMY 1.)  · Moderate to severe dysplasia: Cells show precancerous changes. Or, noninvasive cancer (carcinoma in situ) may be present. Treatment to destroy or remove problem cells is likely. (Reported as high-grade EARLE or AMY 2 or AMY 3.)  · Cancer: Different types of cancer may be detected by your Pap test. More tests to assess the cancer's extent are likely. The type of treatment will depend on the test results and other factors, such as age and health history. (Reported as squamous cell carcinoma, endocervical adenocarcinoma in situ, or adenocarcinoma.)  Date Last Reviewed: 5/12/2015  © 9218-6244 [employer]. [address] [place] [address],  J[place] [address]. All rights reserved. This information is not intended as a substitute for professional medical care. Always follow your healthcare professional's instructions.        Atrophic Vaginitis    Atrophic vaginitis means the walls of your vagina have become thin. This happens when your body makes too little of the hormone estrogen. Menopause or surgical removal of the ovaries are the most common causes for a drop in estrogen. Breastfeeding can also cause the hormone level to drop.  Symptoms of atrophic vaginitis include:  · Dryness, soreness, burning, or itching in the vagina  · Vaginal discharge  Sex can be uncomfortable, even painful. After sex, you may have bleeding from your vagina. You may also have burning or pain when you urinate.  Home care  Your healthcare provider may recommend 1 or more of these as treatment:  · Vaginal creams, lotions, and lubricants. These products help relieve vaginal dryness. They dont need a prescription. They can be found in the personal care section of most pharmacies. Creams and lotions are used daily to help keep the vagina moist. Lubricants help reduce dryness and pain during sex. Choose water-based lubricants. Dont use petroleum jelly, mineral oil, or other oils. These increase the chance of infection.   · Hormone therapy (HT). HT increases the amount of estrogen in your body. This can help manage or relieve symptoms. HT can be given in pill form. It may be given as a lotion, cream, ring put into the vagina, or a patch on the skin. The risks and benefits of HT vary for each woman. For instance, your risk may be higher if you have had breast cancer. Discuss this treatment with your healthcare provider. Not every woman can use HT.  You dont need to give up (abstain from) sex. In fact, regular sex can help keep vaginal tissues healthy. Take steps to make sex more comfortable by using water-based lubricants.  Preventing infections  Atrophic vaginitis makes an  infection of the vagina or the urinary tract more likely. To help reduce your risk:  · Keep your genitals clean. When you bathe, wash the outside of your vagina with mild soap and water. Clean gently between the folds of your vagina.  · Wipe from front to back after a bowel movement.  · Dont douche unless your healthcare provider tells you to.  · Avoid scented toilet paper, scented vaginal sprays, and scented tampons.  · Avoid wearing clothes that are tight in the crotch. These include pantyhose, jeans, and leggings. Wear cotton underwear. Change it every day.  Follow-up care  Follow up with your healthcare provider, or as advised.  When to seek medical advice  Call your health care provider right away if any of these occur:  · Fever of 100.4°F (38°C) or higher, or as directed by your healthcare provider  · Symptoms dont go away or get worse even with treatment  · Vaginal area swells or becomes painful  · Vaginal area bleeds, but not because of your period  · Bad-smelling discharge from the vagina  · Pain or burning when you urinate or you have trouble passing urine  · Open sores develop around vagina   Date Last Reviewed: 12/1/2016  © 2390-1503 [employer]. [address] [place] [address], [place] [address]. All rights reserved. This information is not intended as a substitute for professional medical care. Always follow your healthcare professional's instructions.

## 2018-08-28 NOTE — PROGRESS NOTES
CC: Well woman exam    Kaylin Mccollum is a 73 y.o. female  presents for well woman exam.  LMP: No LMP recorded. Patient is postmenopausal..  No issues, problems, or complaints.    MMG done in July and is negative.     Past Medical History:   Diagnosis Date    Arthritis     Cataract     Diabetes mellitus     Diabetes mellitus, type 2     Fibromyalgia     General anesthetics causing adverse effect in therapeutic use     bradycardia and tachycardia    Hypertension     Migraines     Mitral valve prolapse     Osteoarthritis     Rotator cuff tear 2015     Past Surgical History:   Procedure Laterality Date    CATARACT EXTRACTION W/  INTRAOCULAR LENS IMPLANT Left 2017    CHOLECYSTECTOMY      KNEE ARTHROSCOPY Bilateral     PARS PLANA VITRECTOMY W/ REPAIR OF MACULAR HOLE Left 2017    SHOULDER ARTHROSCOPY Right 06/04/15     Social History     Socioeconomic History    Marital status:      Spouse name: Not on file    Number of children: Not on file    Years of education: Not on file    Highest education level: Not on file   Social Needs    Financial resource strain: Not on file    Food insecurity - worry: Not on file    Food insecurity - inability: Not on file    Transportation needs - medical: Not on file    Transportation needs - non-medical: Not on file   Occupational History     Employer: [employer]   Tobacco Use    Smoking status: Never Smoker    Smokeless tobacco: Never Used   Substance and Sexual Activity    Alcohol use: Yes     Alcohol/week: 0.0 oz     Comment: Rarely    Drug use: No    Sexual activity: Not Currently   Other Topics Concern    Not on file   Social History Narrative    . 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     Family History   Problem Relation Age of Onset    Heart disease Mother     Glaucoma Mother     Cataracts Mother     Cancer Mother         colon    Kidney disease Daughter     Diabetes Maternal Grandmother     Heart  "disease Maternal Grandmother     Diabetes Maternal Grandfather     Cancer Maternal Grandfather     Breast cancer Paternal Aunt      OB History      Para Term  AB Living    5 4 4   1 4    SAB TAB Ectopic Multiple Live Births    1       4          /66   Ht 5' 9" (1.753 m)   Wt 97.1 kg (214 lb 2.8 oz)   BMI 31.63 kg/m²       ROS:  GENERAL: Denies weight gain or weight loss. Feeling well overall.   SKIN: Denies rash or lesions.   HEAD: Denies head injury or headache.   NODES: Denies enlarged lymph nodes.   CHEST: Denies chest pain or shortness of breath.   CARDIOVASCULAR: Denies palpitations or left sided chest pain.   ABDOMEN: No abdominal pain, constipation, diarrhea, nausea, vomiting or rectal bleeding.   URINARY: No frequency, dysuria, hematuria, or burning on urination.  REPRODUCTIVE: See HPI.   BREASTS: The patient performs breast self-examination and denies pain, lumps, or nipple discharge.   HEMATOLOGIC: No easy bruisability or excessive bleeding.   MUSCULOSKELETAL: Denies joint pain or swelling.   NEUROLOGIC: Denies syncope or weakness.   PSYCHIATRIC: Denies depression, anxiety or mood swings.    PHYSICAL EXAM:  APPEARANCE: Well nourished, well developed, in no acute distress.  AFFECT: WNL, alert and oriented x 3  SKIN: No acne or hirsutism  NECK: Neck symmetric without masses or thyromegaly  NODES: No inguinal, cervical, axillary, or femoral lymph node enlargement  CHEST: Good respiratory effect  ABDOMEN: Soft.  No tenderness or masses.  No hepatosplenomegaly.  No hernias.  BREASTS: Symmetrical, no skin changes or visible lesions.  No palpable masses, nipple discharge bilaterally.  PELVIC: Normal external genitalia without lesions.  Normal hair distribution.  Adequate perineal body, normal urethral meatus.  Vagina moist and well rugated without lesions or discharge.  Cervix pink, without lesions, discharge or tenderness.  No significant cystocele or rectocele.  Bimanual exam shows " uterus to be normal size, regular, mobile and nontender.  Adnexa without masses or tenderness.    EXTREMITIES: No edema.  Physical Exam    1. Encounter for gynecological examination without abnormal finding  Liquid-based pap smear, screening   2. Postmenopausal     3. Tinea corporis  Genital Culture    POCT Wet Prep    nystatin-triamcinolone (MYCOLOG) ointment    triamcinolone acetonide 0.025% (KENALOG) 0.025 % Oint    fluconazole (DIFLUCAN) 150 MG Tab   4. Atrophic vaginitis  amoxicillin (AMOXIL) 875 MG tablet    fluconazole (DIFLUCAN) 150 MG Tab    AND PLAN:    Patient was counseled today on A.C.S. Pap guidelines and recommendations for yearly pelvic exams, mammograms and monthly self breast exams; to see her PCP for other health maintenance.

## 2018-08-31 ENCOUNTER — PATIENT MESSAGE (OUTPATIENT)
Dept: OBSTETRICS AND GYNECOLOGY | Facility: CLINIC | Age: 73
End: 2018-08-31

## 2018-08-31 LAB — BACTERIA GENITAL AEROBE CULT: NORMAL

## 2018-09-17 ENCOUNTER — DOCUMENTATION ONLY (OUTPATIENT)
Dept: ENDOSCOPY | Facility: HOSPITAL | Age: 73
End: 2018-09-17

## 2018-09-17 NOTE — PROGRESS NOTES
Incoming call. Pt rescheduled procedure to 9/25/18. Pt forgot to follow prep instructions.    New suprep instructions sent to pt via portal.

## 2018-09-24 PROBLEM — Z12.11 COLON CANCER SCREENING: Status: ACTIVE | Noted: 2018-09-24

## 2018-09-25 ENCOUNTER — ANESTHESIA (OUTPATIENT)
Dept: ENDOSCOPY | Facility: HOSPITAL | Age: 73
End: 2018-09-25
Payer: MEDICARE

## 2018-09-25 ENCOUNTER — HOSPITAL ENCOUNTER (OUTPATIENT)
Facility: HOSPITAL | Age: 73
Discharge: HOME | End: 2018-09-25
Attending: FAMILY MEDICINE | Admitting: FAMILY MEDICINE
Payer: MEDICARE

## 2018-09-25 ENCOUNTER — ANESTHESIA EVENT (OUTPATIENT)
Dept: ENDOSCOPY | Facility: HOSPITAL | Age: 73
End: 2018-09-25
Payer: MEDICARE

## 2018-09-25 DIAGNOSIS — Z86.010 HISTORY OF COLON POLYPS: ICD-10-CM

## 2018-09-25 DIAGNOSIS — Z12.11 COLON CANCER SCREENING: Primary | ICD-10-CM

## 2018-09-25 DIAGNOSIS — Z80.0 FAMILY HISTORY OF COLON CANCER: ICD-10-CM

## 2018-09-25 DIAGNOSIS — Z12.11 SPECIAL SCREENING FOR MALIGNANT NEOPLASMS, COLON: ICD-10-CM

## 2018-09-25 PROBLEM — Z86.0100 HISTORY OF COLON POLYPS: Status: ACTIVE | Noted: 2018-09-25

## 2018-09-25 LAB — POCT GLUCOSE: 99 MG/DL (ref 70–110)

## 2018-09-25 PROCEDURE — 37000008 HC ANESTHESIA 1ST 15 MINUTES: Performed by: FAMILY MEDICINE

## 2018-09-25 PROCEDURE — G0105 COLORECTAL SCRN; HI RISK IND: HCPCS | Mod: ,,, | Performed by: FAMILY MEDICINE

## 2018-09-25 PROCEDURE — 82962 GLUCOSE BLOOD TEST: CPT | Performed by: FAMILY MEDICINE

## 2018-09-25 PROCEDURE — 25000003 PHARM REV CODE 250: Performed by: FAMILY MEDICINE

## 2018-09-25 PROCEDURE — G0105 COLORECTAL SCRN; HI RISK IND: HCPCS | Performed by: FAMILY MEDICINE

## 2018-09-25 PROCEDURE — 63600175 PHARM REV CODE 636 W HCPCS: Performed by: NURSE ANESTHETIST, CERTIFIED REGISTERED

## 2018-09-25 PROCEDURE — 25000003 PHARM REV CODE 250: Performed by: NURSE ANESTHETIST, CERTIFIED REGISTERED

## 2018-09-25 PROCEDURE — 37000009 HC ANESTHESIA EA ADD 15 MINS: Performed by: FAMILY MEDICINE

## 2018-09-25 RX ORDER — PROPOFOL 10 MG/ML
VIAL (ML) INTRAVENOUS
Status: DISCONTINUED | OUTPATIENT
Start: 2018-09-25 | End: 2018-09-25

## 2018-09-25 RX ORDER — SODIUM CHLORIDE 0.9 % (FLUSH) 0.9 %
3 SYRINGE (ML) INJECTION
Status: DISCONTINUED | OUTPATIENT
Start: 2018-09-25 | End: 2018-09-25 | Stop reason: HOSPADM

## 2018-09-25 RX ORDER — SODIUM CHLORIDE, SODIUM LACTATE, POTASSIUM CHLORIDE, CALCIUM CHLORIDE 600; 310; 30; 20 MG/100ML; MG/100ML; MG/100ML; MG/100ML
INJECTION, SOLUTION INTRAVENOUS CONTINUOUS
Status: DISCONTINUED | OUTPATIENT
Start: 2018-09-26 | End: 2018-09-25 | Stop reason: HOSPADM

## 2018-09-25 RX ORDER — LIDOCAINE HYDROCHLORIDE 10 MG/ML
INJECTION INFILTRATION; PERINEURAL
Status: DISCONTINUED | OUTPATIENT
Start: 2018-09-25 | End: 2018-09-25

## 2018-09-25 RX ADMIN — PROPOFOL 50 MG: 10 INJECTION, EMULSION INTRAVENOUS at 02:09

## 2018-09-25 RX ADMIN — PROPOFOL 80 MG: 10 INJECTION, EMULSION INTRAVENOUS at 02:09

## 2018-09-25 RX ADMIN — SODIUM CHLORIDE, SODIUM LACTATE, POTASSIUM CHLORIDE, AND CALCIUM CHLORIDE: .6; .31; .03; .02 INJECTION, SOLUTION INTRAVENOUS at 02:09

## 2018-09-25 RX ADMIN — LIDOCAINE HYDROCHLORIDE 50 MG: 10 INJECTION, SOLUTION INFILTRATION; PERINEURAL at 02:09

## 2018-09-25 NOTE — ANESTHESIA PREPROCEDURE EVALUATION
09/25/2018  Kaylin Mccollum is a 73 y.o., female.    Anesthesia Evaluation    I have reviewed the Patient Summary Reports.    I have reviewed the Nursing Notes.   I have reviewed the Medications.     Review of Systems  Anesthesia Hx:  Hx of Anesthetic complications    Cardiovascular:   Hypertension ECG has been reviewed. Normal sinus rhythm  Normal ECG  When compared with ECG of 27-NOV-2012 20:36,  T wave inversion no longer evident in Anterior leads  Confirmed by ZACHARIAH FERMIN MD ([id number]) on 5/25/2015 1:43:29 PM   Musculoskeletal:   Arthritis     Neurological:   Neuromuscular Disease, (Fibromyalia) Headaches    Endocrine:   Diabetes, well controlled, type 2    Psych:   Psychiatric History          Physical Exam  General:  Obesity    Airway/Jaw/Neck:  Airway Findings: Mouth Opening: Normal Tongue: Normal  General Airway Assessment: Adult  Mallampati: II  TM Distance: Normal, at least 6 cm      Dental:  Dental Findings: In tact   Chest/Lungs:  Chest/Lungs Findings: Normal Respiratory Rate              Anesthesia Plan  Type of Anesthesia, risks & benefits discussed:  Anesthesia Type:  MAC  Patient's Preference:   Intra-op Monitoring Plan:   Intra-op Monitoring Plan Comments:   Post Op Pain Control Plan:   Post Op Pain Control Plan Comments:   Induction:   IV  Beta Blocker:  Patient is not currently on a Beta-Blocker (No further documentation required).       Informed Consent: Patient understands risks and agrees with Anesthesia plan.  Questions answered. Anesthesia consent signed with patient.  ASA Score: 2     Day of Surgery Review of History & Physical: I have interviewed and examined the patient. I have reviewed the patient's H&P dated:  There are no significant changes.          Ready For Surgery From Anesthesia Perspective.

## 2018-09-25 NOTE — TRANSFER OF CARE
"Anesthesia Transfer of Care Note    Patient: Kaylin Mccollum    Procedure(s) Performed: Procedure(s) (LRB):  COLONOSCOPY (N/A)    Patient location: GI    Anesthesia Type: MAC    Transport from OR: Transported from OR on room air with adequate spontaneous ventilation    Post pain: adequate analgesia    Post assessment: no apparent anesthetic complications    Post vital signs: stable    Level of consciousness: awake, alert and oriented    Nausea/Vomiting: no nausea/vomiting    Complications: none    Transfer of care protocol was followed      Last vitals:   Visit Vitals  BP (!) 155/90 (BP Location: Left arm, Patient Position: Lying)   Pulse 76   Temp 36.8 °C (98.2 °F) (Oral)   Resp 18   Ht 5' 9.5" (1.765 m)   Wt 95.3 kg (210 lb)   SpO2 99%   Breastfeeding? No   BMI 30.57 kg/m²     "

## 2018-09-25 NOTE — ANESTHESIA POSTPROCEDURE EVALUATION
"Anesthesia Post Evaluation    Patient: Kaylin Mccollum    Procedure(s) Performed: Procedure(s) (LRB):  COLONOSCOPY (N/A)    Final Anesthesia Type: MAC  Patient location during evaluation: PACU  Patient participation: Yes- Able to Participate  Level of consciousness: awake and alert  Pain management: adequate  Airway patency: patent    Anesthetic complications: no      Cardiovascular status: blood pressure returned to baseline  Respiratory status: unassisted  Hydration status: euvolemic  Follow-up not needed.        Visit Vitals  BP (!) 169/87 (BP Location: Left arm, Patient Position: Lying)   Pulse 65   Temp 36.8 °C (98.2 °F) (Oral)   Resp 20   Ht 5' 9.5" (1.765 m)   Wt 95.3 kg (210 lb)   SpO2 98%   Breastfeeding? No   BMI 30.57 kg/m²       Pain/Ana Score: Pain Assessment Performed: Yes (9/25/2018  3:20 PM)  Presence of Pain: denies (9/25/2018  3:20 PM)  Ana Score: 9 (9/25/2018  3:20 PM)        "

## 2018-09-25 NOTE — H&P
Short Stay Endoscopy History and Physical    PCP - Lisette Olvera MD    Procedure - Colonoscopy  ASA - 2  Mallampati - per anesthesia  History of Anesthesia problems - no  Family history Anesthesia problems -  no     HPI:  This is a 73 y.o. female here for evaluation of :   Active Hospital Problems    Diagnosis  POA    *Colon cancer screening [Z12.11]  Not Applicable    Special screening for malignant neoplasms, colon [Z12.11]  Not Applicable    History of colon polyps [Z86.010]  Not Applicable    Family history of colon cancer [Z80.0]  Not Applicable     Her mother had colon cancer.        Resolved Hospital Problems   No resolved problems to display.         Health Maintenance       Date Due Completion Date    Colonoscopy 12/10/2017 12/10/2012    Influenza Vaccine 08/01/2018 8/22/2017    Override on 11/21/2014: Done    Pneumococcal (65+) (2 of 2 - PPSV23) 08/22/2018 8/22/2017    Eye Exam 10/24/2018 10/24/2017    Override on 8/10/2016: Done    Override on 7/7/2015: Done (Negative Retinopathy)    DEXA SCAN 05/13/2019 5/13/2014    Lipid Panel 05/14/2019 5/14/2018    Hemoglobin A1c 05/14/2019 5/14/2018    Urine Microalbumin 05/14/2019 5/14/2018    Foot Exam 06/15/2019 6/15/2018    Override on 6/15/2018: Done    Override on 6/19/2014: Done    Mammogram 07/05/2019 7/5/2018    TETANUS VACCINE 04/24/2027 4/24/2017          Screening - yes  History of polyps - yes  Diarrhea - no  Anemia - no  Blood in stools - no  Abdominal pain - no  Other - her mother had colon cancer.    ROS:  CONSTITUTIONAL: Denies weight change,  fatigue, fevers, chills, night sweats.  CARDIOVASCULAR: Denies chest pain, shortness of breath, orthopnea and edema.  RESPIRATORY: Denies cough, hemoptysis, dyspnea, and wheezing.  GI: See HPI.    Medical History:   Past Medical History:   Diagnosis Date    Arthritis     Cataract     Diabetes mellitus     Diabetes mellitus, type 2     Fibromyalgia     General anesthetics causing adverse effect in  "therapeutic use     bradycardia and tachycardia    Hypertension     Migraines     Mitral valve prolapse     Osteoarthritis     Rotator cuff tear 4/1/2015       Surgical History:   Past Surgical History:   Procedure Laterality Date    ARTHROSCOPY-SHOULDER-RIGHT WITH EXCISION OF CA LIGAMENT, PARTIAL ACROMIONECTOMY Right 6/4/2015    Per[facility] at [facility] OR    BURSECTOMY-SHOULDER-RIGHT Right 6/4/2015    [facility] at [facility] OR    CATARACT EXTRACTION W/  INTRAOCULAR LENS IMPLANT Left 07/19/2017    CHOLECYSTECTOMY      ESOPHAGOGASTRODUODENOSCOPY (EGD) N/A 6/24/2016    Performed by Kamaljit Gutierres III, MD at [facility] ENDO    KNEE ARTHROSCOPY Bilateral     PARS PLANA VITRECTOMY W/ REPAIR OF MACULAR HOLE Left 02/22/2017    REPAIR ROTATOR CUFF ARTHROSCOPIC-RIGHT Right 6/4/2015    Performed by [facility]ota [facility] at [facility] OR    REPAIR-RETINA (VITRECTOMY) Left 2/22/2017    Performed by [facility]INGO Goode [facility] at [facility] OR 1ST FLR    SHOULDER ARTHROSCOPY Right 06/04/15    SYNOVECTOMY-SHOULDER-RIGHT Right 6/4/2015    Performed by [facility]ust[facility] Cota [facility] at [facility] OR       Family History:   Family History   Problem Relation Age of Onset    Heart disease Mother     Glaucoma Mother     Cataracts Mother     Cancer Mother         colon    Kidney disease Daughter     Diabetes Maternal Grandmother     Heart disease Maternal Grandmother     Diabetes Maternal Grandfather     Cancer Maternal Grandfather     Breast cancer Paternal Aunt        Social History:   Social History     Tobacco Use    Smoking status: Never Smoker    Smokeless tobacco: Never Used   Substance Use Topics    Alcohol use: Yes     Alcohol/week: 0.0 oz     Comment: Rarely    Drug use: No       Allergies:   Review of patient's allergies indicates:   Allergen Reactions    Demerol [meperidine] Other (See Comments)     Burning when adm IV  Able to tolerate IM, "Turned my hand purple."    Latex, natural rubber Other (See " "Comments)     "Burns my skin",  Symptoms get worse the longer she is exposed    Zocor [simvastatin] Other (See Comments)     Tightening of muscles    Statins-hmg-coa reductase inhibitors Other (See Comments)     myopathy    Sulfa (sulfonamide antibiotics)        Medications:   No current facility-administered medications on file prior to encounter.      Current Outpatient Medications on File Prior to Encounter   Medication Sig Dispense Refill    biotin 1 mg tablet Take 1,000 mcg by mouth every morning.       canagliflozin (INVOKANA) 100 mg Tab Take 1 tablet (100 mg total) by mouth once daily. 90 tablet 3    cyanocobalamin (VITAMIN B-12) 500 MCG tablet Take 500 mcg by mouth nightly.      FLUoxetine (PROZAC) 40 MG capsule Take 1 capsule (40 mg total) by mouth once daily. 90 capsule 3    fluticasone (FLONASE) 50 mcg/actuation nasal spray 2 sprays by Each Nare route once daily. (Patient taking differently: 2 sprays by Each Nare route nightly as needed. ) 1 Bottle 0    gabapentin (NEURONTIN) 300 MG capsule Take 1 capsule (300 mg total) by mouth 2 (two) times daily. 180 capsule 1    lisinopril (PRINIVIL,ZESTRIL) 5 MG tablet Take 1 tablet (5 mg total) by mouth once daily. 90 tablet 3    metFORMIN (GLUCOPHAGE) 1000 MG tablet Take 1 tablet (1,000 mg total) by mouth 2 (two) times daily with meals. 180 tablet 3    naltrexone capsule Take 4.5 mg by mouth every evening.      sodium,potassium,mag sulfates (SUPREP BOWEL PREP KIT) 17.5-3.13-1.6 gram SolR Follow instructions per pharmacy 354 mL 0       Physical Exam:  Vital Signs: see nurses notes.  General Appearance: Well appearing in no acute distress  ENT: OP clear  Chest: CTA B  CV: RRR, no m/r/g  Abd: s/nt/nd/nabs  Ext: no edema    Labs:Reviewed    IMP:  Active Hospital Problems    Diagnosis  POA    *Colon cancer screening [Z12.11]  Not Applicable    Special screening for malignant neoplasms, colon [Z12.11]  Not Applicable    History of colon polyps [Z86.010]  " Not Applicable    Family history of colon cancer [Z80.0]  Not Applicable     Her mother had colon cancer.        Resolved Hospital Problems   No resolved problems to display.         Plan:   I have explained the risks and benefits of colonoscopy to the patient including but not limited to bleeding, perforation, infection, and death. The patient wishes to proceed.

## 2018-09-25 NOTE — OR NURSING
Final time out, anesthesia agrees, patient adequately sedated, view anesthesia for vital signs, medication/fluid documentation.

## 2018-09-25 NOTE — DISCHARGE SUMMARY
Endoscopy Discharge Summary      Admit Date: 9/25/2018    Discharge Date and Time:  9/25/2018 3:06 PM    Attending Physician: Bethel Carmona MD     Discharge Physician: Bethel Carmona MD     Principal Admitting Diagnoses: Colon cancer screening         Discharge Diagnosis: The primary encounter diagnosis was Colon cancer screening. Diagnoses of Special screening for malignant neoplasms, colon, History of colon polyps, and Family history of colon cancer were also pertinent to this visit.     Discharged Condition: Good    Indication for Admission: Colon cancer screening     Hospital Course: Patient was admitted for an inpatient procedure and tolerated the procedure well with no complications.    Significant Diagnostic Studies: Colonoscopy    Pathology (if any):  Specimen (12h ago, onward)    None          Estimated Blood Loss: 0 ml.    Discussed with: patient and family.    Disposition: Home.    Follow Up/Patient Instructions:   Current Discharge Medication List      CONTINUE these medications which have NOT CHANGED    Details   biotin 1 mg tablet Take 1,000 mcg by mouth every morning.       canagliflozin (INVOKANA) 100 mg Tab Take 1 tablet (100 mg total) by mouth once daily.  Qty: 90 tablet, Refills: 3    Associated Diagnoses: Diabetes mellitus type 2 in obese      celecoxib (CELEBREX) 100 MG capsule take 1 capsule by mouth twice a day  Qty: 180 capsule, Refills: 3    Associated Diagnoses: Fibromyalgia; Osteoarthritis of spine with radiculopathy, thoracolumbar region; Primary osteoarthritis involving multiple joints      cyanocobalamin (VITAMIN B-12) 500 MCG tablet Take 500 mcg by mouth nightly.      fluconazole (DIFLUCAN) 150 MG Tab Take one tablet and repeat dose in 3 days  Qty: 2 tablet, Refills: 1    Associated Diagnoses: Tinea corporis; Atrophic vaginitis      FLUoxetine (PROZAC) 40 MG capsule Take 1 capsule (40 mg total) by mouth once daily.  Qty: 90 capsule, Refills: 3    Associated Diagnoses:  Fibromyalgia; Chronic major depressive disorder      fluticasone (FLONASE) 50 mcg/actuation nasal spray 2 sprays by Each Nare route once daily.  Qty: 1 Bottle, Refills: 0    Associated Diagnoses: Sinusitis      gabapentin (NEURONTIN) 300 MG capsule Take 1 capsule (300 mg total) by mouth 2 (two) times daily.  Qty: 180 capsule, Refills: 1    Associated Diagnoses: Fibromyalgia      lisinopril (PRINIVIL,ZESTRIL) 5 MG tablet Take 1 tablet (5 mg total) by mouth once daily.  Qty: 90 tablet, Refills: 3    Associated Diagnoses: Hypertension associated with diabetes      metFORMIN (GLUCOPHAGE) 1000 MG tablet Take 1 tablet (1,000 mg total) by mouth 2 (two) times daily with meals.  Qty: 180 tablet, Refills: 3    Associated Diagnoses: Diabetes mellitus type 2 in obese      naltrexone capsule Take 4.5 mg by mouth every evening.      verapamil (CALAN) 120 MG tablet Take 1 tablet (120 mg total) by mouth 2 (two) times daily.  Qty: 60 tablet, Refills: 11    Associated Diagnoses: MVP (mitral valve prolapse); Hypertension associated with diabetes      nystatin-triamcinolone (MYCOLOG) ointment Apply topically 2 (two) times daily.  Qty: 30 g, Refills: 1    Associated Diagnoses: Tinea corporis      triamcinolone acetonide 0.025% (KENALOG) 0.025 % Oint Apply topically 2 (two) times daily. for 10 days  Qty: 15 g, Refills: 2    Associated Diagnoses: Tinea corporis             Discharge Procedure Orders   Diet general     Call MD for:  temperature >100.4     Call MD for:  persistent nausea and vomiting     Call MD for:  severe uncontrolled pain     Call MD for:  difficulty breathing, headache or visual disturbances     Call MD for:  redness, tenderness, or signs of infection (pain, swelling, redness, odor or green/yellow discharge around incision site)     Call MD for:  hives     Call MD for:  persistent dizziness or light-headedness     No dressing needed       Follow-up Information     Go to Lisette Olvera MD.    Specialty:  Family  Medicine  Why:  As needed  Contact information:  [address] [place] [address] [phone]

## 2018-09-25 NOTE — [employer] PATIENT INSTRUCTIONS
Discharge Summary/Instructions after an Endoscopic Procedure  Patient Name: Kaylin Mccollum  Patient MRN: [id number]  Patient YOB: 1945 Tuesday, September 25, 2018 Bethel Carmona MD  RESTRICTIONS:  During your procedure today, you received medications for sedation.  These   medications may affect your judgment, balance and coordination.  Therefore,   for 24 hours, you have the following restrictions:   - DO NOT drive a car, operate machinery, make legal/financial decisions,   sign important papers or drink alcohol.    ACTIVITY:  Today: no heavy lifting, straining or running due to procedural   sedation/anesthesia.  The following day: return to full activity including work.  DIET:  Eat and drink normally unless instructed otherwise.     TREATMENT FOR COMMON SIDE EFFECTS:  - Mild abdominal pain, nausea, belching, bloating or excessive gas:  rest,   eat lightly and use a heating pad.  - Sore Throat: treat with throat lozenges and/or gargle with warm salt   water.  - Because air was used during the procedure, expelling large amounts of air   from your rectum or belching is normal.  - If a bowel prep was taken, you may not have a bowel movement for 1-3 days.    This is normal.  SYMPTOMS TO WATCH FOR AND REPORT TO YOUR PHYSICIAN:  1. Abdominal pain or bloating, other than gas cramps.  2. Chest pain.  3. Back pain.  4. Signs of infection such as: chills or fever occurring within 24 hours   after the procedure.  5. Rectal bleeding, which would show as bright red, maroon, or black stools.   (A tablespoon of blood from the rectum is not serious, especially if   hemorrhoids are present.)  6. Vomiting.  7. Weakness or dizziness.  GO DIRECTLY TO THE NEAREST EMERGENCY ROOM IF YOU HAVE ANY OF THE FOLLOWING:      Difficulty breathing              Chills and/or fever over 101 F   Persistent vomiting and/or vomiting blood   Severe abdominal pain   Severe chest pain   Black, tarry stools   Bleeding- more than one  tablespoon   Any other symptom or condition that you feel may need urgent attention  Your doctor recommends these additional instructions:  If any biopsies were taken, your doctors clinic will contact you in 1 to 2   weeks with any results.  - Patient has a contact number available for emergencies.  The signs and   symptoms of potential delayed complications were discussed with the   patient.  Return to normal activities tomorrow.  Written discharge   instructions were provided to the patient.   - Resume previous diet.   - Continue present medications.   - Repeat colonoscopy in 5 years for screening purposes.   - Return to primary care physician PRN.   - Discharge patient to home (via wheelchair).  For questions, problems or results please call your physician Bethel Carmona MD at Work:  [phone]  If you have any questions about the above instructions, call the GI   department at [phone] or call the endoscopy unit at [phone]   from 7am until 3 pm.  [facility], EMERGENCY ROOM PHONE NUMBER:   [phone]  IF A COMPLICATION OR EMERGENCY SITUATION ARISES AND YOU ARE UNABLE TO REACH   YOUR PHYSICIAN - GO DIRECTLY TO THE EMERGENCY ROOM.  I have read or have had read to me these discharge instructions for my   procedure and have received a written copy.  I understand these   instructions and will follow-up with my physician if I have any questions.     __________________________________       _____________________________________  Nurse Signature                                          Patient/Designated   Responsible Party Signature  Bethel Carmona MD  9/25/2018 2:58:18 PM  This report has been verified and signed electronically.  [employer]

## 2018-09-26 VITALS
OXYGEN SATURATION: 98 % | HEART RATE: 65 BPM | RESPIRATION RATE: 20 BRPM | DIASTOLIC BLOOD PRESSURE: 87 MMHG | WEIGHT: 210 LBS | TEMPERATURE: 98 F | SYSTOLIC BLOOD PRESSURE: 169 MMHG | HEIGHT: 70 IN | BODY MASS INDEX: 30.06 KG/M2

## 2018-10-27 DIAGNOSIS — I15.2 HYPERTENSION ASSOCIATED WITH DIABETES: ICD-10-CM

## 2018-10-27 DIAGNOSIS — E11.59 HYPERTENSION ASSOCIATED WITH DIABETES: ICD-10-CM

## 2018-10-27 RX ORDER — LISINOPRIL 5 MG/1
TABLET ORAL
Qty: 90 TABLET | Refills: 0 | Status: SHIPPED | OUTPATIENT
Start: 2018-10-27 | End: 2018-11-09 | Stop reason: SDUPTHER

## 2018-11-02 DIAGNOSIS — M79.7 FIBROMYALGIA: Chronic | ICD-10-CM

## 2018-11-02 DIAGNOSIS — F32.9 CHRONIC MAJOR DEPRESSIVE DISORDER: Chronic | ICD-10-CM

## 2018-11-02 RX ORDER — FLUOXETINE HYDROCHLORIDE 40 MG/1
40 CAPSULE ORAL DAILY
Qty: 90 CAPSULE | Refills: 3 | Status: SHIPPED | OUTPATIENT
Start: 2018-11-02 | End: 2019-12-09 | Stop reason: SDUPTHER

## 2018-11-09 DIAGNOSIS — E11.59 HYPERTENSION ASSOCIATED WITH DIABETES: ICD-10-CM

## 2018-11-09 DIAGNOSIS — I15.2 HYPERTENSION ASSOCIATED WITH DIABETES: ICD-10-CM

## 2018-11-09 RX ORDER — LISINOPRIL 5 MG/1
TABLET ORAL
Qty: 90 TABLET | Refills: 1 | Status: SHIPPED | OUTPATIENT
Start: 2018-11-09 | End: 2019-02-08 | Stop reason: SDUPTHER

## 2018-11-28 DIAGNOSIS — E66.9 DIABETES MELLITUS TYPE 2 IN OBESE: ICD-10-CM

## 2018-11-28 DIAGNOSIS — E11.69 DIABETES MELLITUS TYPE 2 IN OBESE: ICD-10-CM

## 2018-11-28 RX ORDER — METFORMIN HYDROCHLORIDE 1000 MG/1
1000 TABLET ORAL 2 TIMES DAILY WITH MEALS
Qty: 180 TABLET | Refills: 0 | Status: SHIPPED | OUTPATIENT
Start: 2018-11-28 | End: 2019-02-27 | Stop reason: SDUPTHER

## 2018-12-08 DIAGNOSIS — M79.7 FIBROMYALGIA: Chronic | ICD-10-CM

## 2018-12-08 DIAGNOSIS — E11.69 DIABETES MELLITUS TYPE 2 IN OBESE: ICD-10-CM

## 2018-12-08 DIAGNOSIS — E66.9 DIABETES MELLITUS TYPE 2 IN OBESE: ICD-10-CM

## 2018-12-10 RX ORDER — GABAPENTIN 300 MG/1
CAPSULE ORAL
Qty: 180 CAPSULE | Refills: 1 | Status: SHIPPED | OUTPATIENT
Start: 2018-12-10 | End: 2019-06-06 | Stop reason: SDUPTHER

## 2018-12-10 RX ORDER — CANAGLIFLOZIN 100 MG/1
TABLET, FILM COATED ORAL
Qty: 90 TABLET | Refills: 0 | Status: SHIPPED | OUTPATIENT
Start: 2018-12-10 | End: 2019-02-20 | Stop reason: ALTCHOICE

## 2018-12-12 ENCOUNTER — OFFICE VISIT (OUTPATIENT)
Dept: INTERNAL MEDICINE | Facility: CLINIC | Age: 73
End: 2018-12-12
Payer: MEDICARE

## 2018-12-12 VITALS
SYSTOLIC BLOOD PRESSURE: 132 MMHG | OXYGEN SATURATION: 95 % | TEMPERATURE: 97 F | WEIGHT: 214.75 LBS | HEART RATE: 83 BPM | HEIGHT: 70 IN | DIASTOLIC BLOOD PRESSURE: 60 MMHG | BODY MASS INDEX: 30.74 KG/M2

## 2018-12-12 DIAGNOSIS — E11.69 HYPERLIPIDEMIA ASSOCIATED WITH TYPE 2 DIABETES MELLITUS: ICD-10-CM

## 2018-12-12 DIAGNOSIS — F32.9 CHRONIC MAJOR DEPRESSIVE DISORDER: Chronic | ICD-10-CM

## 2018-12-12 DIAGNOSIS — Z23 NEED FOR 23-POLYVALENT PNEUMOCOCCAL POLYSACCHARIDE VACCINE: ICD-10-CM

## 2018-12-12 DIAGNOSIS — E11.69 DIABETES MELLITUS TYPE 2 IN OBESE: ICD-10-CM

## 2018-12-12 DIAGNOSIS — M79.7 FIBROMYALGIA: Chronic | ICD-10-CM

## 2018-12-12 DIAGNOSIS — Z11.59 NEED FOR HEPATITIS C SCREENING TEST: ICD-10-CM

## 2018-12-12 DIAGNOSIS — E66.9 DIABETES MELLITUS TYPE 2 IN OBESE: ICD-10-CM

## 2018-12-12 DIAGNOSIS — E11.59 HYPERTENSION ASSOCIATED WITH DIABETES: Chronic | ICD-10-CM

## 2018-12-12 DIAGNOSIS — E78.5 HYPERLIPIDEMIA ASSOCIATED WITH TYPE 2 DIABETES MELLITUS: ICD-10-CM

## 2018-12-12 DIAGNOSIS — I15.2 HYPERTENSION ASSOCIATED WITH DIABETES: Chronic | ICD-10-CM

## 2018-12-12 DIAGNOSIS — Z00.00 ROUTINE GENERAL MEDICAL EXAMINATION AT A HEALTH CARE FACILITY: Primary | ICD-10-CM

## 2018-12-12 PROCEDURE — 3044F HG A1C LEVEL LT 7.0%: CPT | Mod: CPTII,S$GLB,, | Performed by: FAMILY MEDICINE

## 2018-12-12 PROCEDURE — G0008 ADMIN INFLUENZA VIRUS VAC: HCPCS | Mod: S$GLB,,, | Performed by: FAMILY MEDICINE

## 2018-12-12 PROCEDURE — 99999 PR PBB SHADOW E&M-EST. PATIENT-LVL V: CPT | Mod: PBBFAC,,, | Performed by: FAMILY MEDICINE

## 2018-12-12 PROCEDURE — 3078F DIAST BP <80 MM HG: CPT | Mod: CPTII,S$GLB,, | Performed by: FAMILY MEDICINE

## 2018-12-12 PROCEDURE — 99397 PER PM REEVAL EST PAT 65+ YR: CPT | Mod: 25,S$GLB,, | Performed by: FAMILY MEDICINE

## 2018-12-12 PROCEDURE — 90662 IIV NO PRSV INCREASED AG IM: CPT | Mod: S$GLB,,, | Performed by: FAMILY MEDICINE

## 2018-12-12 PROCEDURE — G0009 ADMIN PNEUMOCOCCAL VACCINE: HCPCS | Mod: S$GLB,,, | Performed by: FAMILY MEDICINE

## 2018-12-12 PROCEDURE — 3075F SYST BP GE 130 - 139MM HG: CPT | Mod: CPTII,S$GLB,, | Performed by: FAMILY MEDICINE

## 2018-12-12 PROCEDURE — 90732 PPSV23 VACC 2 YRS+ SUBQ/IM: CPT | Mod: 59,S$GLB,, | Performed by: FAMILY MEDICINE

## 2018-12-19 ENCOUNTER — LAB VISIT (OUTPATIENT)
Dept: LAB | Facility: HOSPITAL | Age: 73
End: 2018-12-19
Attending: FAMILY MEDICINE
Payer: MEDICARE

## 2018-12-19 DIAGNOSIS — E78.5 HYPERLIPIDEMIA ASSOCIATED WITH TYPE 2 DIABETES MELLITUS: ICD-10-CM

## 2018-12-19 DIAGNOSIS — E66.9 DIABETES MELLITUS TYPE 2 IN OBESE: ICD-10-CM

## 2018-12-19 DIAGNOSIS — Z11.59 NEED FOR HEPATITIS C SCREENING TEST: ICD-10-CM

## 2018-12-19 DIAGNOSIS — I15.2 HYPERTENSION ASSOCIATED WITH DIABETES: ICD-10-CM

## 2018-12-19 DIAGNOSIS — E11.59 HYPERTENSION ASSOCIATED WITH DIABETES: ICD-10-CM

## 2018-12-19 DIAGNOSIS — E11.69 HYPERLIPIDEMIA ASSOCIATED WITH TYPE 2 DIABETES MELLITUS: ICD-10-CM

## 2018-12-19 DIAGNOSIS — E11.69 DIABETES MELLITUS TYPE 2 IN OBESE: ICD-10-CM

## 2018-12-19 LAB
ALBUMIN SERPL BCP-MCNC: 3.7 G/DL
ALP SERPL-CCNC: 60 U/L
ALT SERPL W/O P-5'-P-CCNC: 16 U/L
ANION GAP SERPL CALC-SCNC: 8 MMOL/L
AST SERPL-CCNC: 20 U/L
BILIRUB SERPL-MCNC: 0.7 MG/DL
BUN SERPL-MCNC: 17 MG/DL
CALCIUM SERPL-MCNC: 9.7 MG/DL
CHLORIDE SERPL-SCNC: 102 MMOL/L
CHOLEST SERPL-MCNC: 246 MG/DL
CHOLEST/HDLC SERPL: 3.8 {RATIO}
CO2 SERPL-SCNC: 29 MMOL/L
CREAT SERPL-MCNC: 0.8 MG/DL
EST. GFR  (AFRICAN AMERICAN): >60 ML/MIN/1.73 M^2
EST. GFR  (NON AFRICAN AMERICAN): >60 ML/MIN/1.73 M^2
ESTIMATED AVG GLUCOSE: 134 MG/DL
GLUCOSE SERPL-MCNC: 114 MG/DL
HBA1C MFR BLD HPLC: 6.3 %
HDLC SERPL-MCNC: 64 MG/DL
HDLC SERPL: 26 %
LDLC SERPL CALC-MCNC: 163.2 MG/DL
NONHDLC SERPL-MCNC: 182 MG/DL
POTASSIUM SERPL-SCNC: 3.6 MMOL/L
PROT SERPL-MCNC: 7.1 G/DL
SODIUM SERPL-SCNC: 139 MMOL/L
TRIGL SERPL-MCNC: 94 MG/DL

## 2018-12-19 PROCEDURE — 36415 COLL VENOUS BLD VENIPUNCTURE: CPT

## 2018-12-19 PROCEDURE — 83036 HEMOGLOBIN GLYCOSYLATED A1C: CPT

## 2018-12-19 PROCEDURE — 80061 LIPID PANEL: CPT

## 2018-12-19 PROCEDURE — 80053 COMPREHEN METABOLIC PANEL: CPT

## 2018-12-19 PROCEDURE — 86803 HEPATITIS C AB TEST: CPT

## 2018-12-19 NOTE — PROGRESS NOTES
Subjective:       Patient ID: Kaylin Mccollum is a 73 y.o. female.    Chief Complaint: Annual Exam    Patient presents to clinic today for annual physical exam.      Review of Systems   Constitutional: Negative for activity change and unexpected weight change.   HENT: Negative for hearing loss, rhinorrhea and trouble swallowing.    Eyes: Negative for discharge and visual disturbance.   Respiratory: Negative for chest tightness and wheezing.    Cardiovascular: Negative for chest pain and palpitations.   Gastrointestinal: Negative for blood in stool, constipation, diarrhea and vomiting.   Endocrine: Negative for polydipsia and polyuria.   Genitourinary: Positive for hematuria (reports noting blood when wiping x 1 day; has resolved). Negative for difficulty urinating, dysuria and menstrual problem.   Musculoskeletal: Positive for neck pain (chronic, stable). Negative for arthralgias and joint swelling.   Neurological: Positive for headaches. Negative for weakness.   Psychiatric/Behavioral: Negative for confusion and dysphoric mood.       Objective:      Physical Exam   Constitutional: She is oriented to person, place, and time. Vital signs are normal. She appears well-developed and well-nourished. No distress.   HENT:   Head: Normocephalic and atraumatic.   Right Ear: Tympanic membrane, external ear and ear canal normal.   Left Ear: Tympanic membrane, external ear and ear canal normal.   Nose: Nose normal. No mucosal edema or rhinorrhea.   Mouth/Throat: Uvula is midline, oropharynx is clear and moist and mucous membranes are normal.   Eyes: Conjunctivae, EOM and lids are normal. Pupils are equal, round, and reactive to light.   Neck: Normal range of motion. Neck supple. No thyromegaly present.   Cardiovascular: Normal rate and regular rhythm. Exam reveals no gallop and no friction rub.   No murmur heard.  Pulmonary/Chest: Effort normal and breath sounds normal. She has no wheezes. She has no rhonchi. She has no rales.    Abdominal: Soft. Normal appearance and bowel sounds are normal. She exhibits no distension and no mass. There is no hepatosplenomegaly. There is no tenderness.   Musculoskeletal: Normal range of motion.   Lymphadenopathy:     She has no cervical adenopathy.   Neurological: She is alert and oriented to person, place, and time. She has normal strength. No cranial nerve deficit or sensory deficit. Gait normal.   Reflex Scores:       Patellar reflexes are 2+ on the right side and 2+ on the left side.  Skin: Skin is warm and dry. No lesion and no rash noted. No cyanosis. Nails show no clubbing.   Psychiatric: She has a normal mood and affect.   Vitals reviewed.      Assessment:       1. Routine general medical examination at a health care facility    2. Diabetes mellitus type 2 in obese    3. Hypertension associated with diabetes    4. Hyperlipidemia associated with type 2 diabetes mellitus    5. Chronic major depressive disorder    6. Need for hepatitis C screening test    7. Need for 23-polyvalent pneumococcal polysaccharide vaccine    8. Fibromyalgia        Plan:     Problem List Items Addressed This Visit     Fibromyalgia (Chronic)    Current Assessment & Plan     Followed by Rheumatology         Hypertension associated with diabetes (Chronic)    Current Assessment & Plan     Controlled, continue lisinopril and verapamil         Chronic major depressive disorder (Chronic)    Current Assessment & Plan     Stable on prozac         Hyperlipidemia associated with type 2 diabetes mellitus    Current Assessment & Plan     Status pending labs         Diabetes mellitus type 2 in obese    Current Assessment & Plan     Status pending labs, continue metformin and invokana at this time; patient brings in letter from her insurance advising that Invokana will not be available to her in 2019, they advise her to change to jardiance, synjardy xr or farxiga as alternative.           Relevant Orders    Ambulatory referral to  Optometry      Other Visit Diagnoses     Routine general medical examination at a health care facility    -  Primary    Need for hepatitis C screening test        Relevant Orders    Hepatitis C antibody    Need for 23-polyvalent pneumococcal polysaccharide vaccine        Relevant Orders    Pneumococcal Polysaccharide Vaccine (23 Valent) (SQ/IM) (Completed)          Health Maintenance reviewed/updated.  Advised follow up if she notes blood with wiping again, patient expressed understanding.

## 2018-12-19 NOTE — ASSESSMENT & PLAN NOTE
Status pending labs, continue metformin and invokana at this time; patient brings in letter from her insurance advising that Invokana will not be available to her in 2019, they advise her to change to jardiance, synjardy xr or farxiga as alternative.

## 2018-12-20 LAB — HCV AB SERPL QL IA: NEGATIVE

## 2019-01-22 ENCOUNTER — OFFICE VISIT (OUTPATIENT)
Dept: OPHTHALMOLOGY | Facility: CLINIC | Age: 74
End: 2019-01-22
Payer: MEDICARE

## 2019-01-22 DIAGNOSIS — H40.013 OPEN ANGLE WITH BORDERLINE FINDINGS, LOW RISK, BILATERAL: ICD-10-CM

## 2019-01-22 DIAGNOSIS — E11.9 TYPE 2 DIABETES MELLITUS WITHOUT RETINOPATHY: Primary | ICD-10-CM

## 2019-01-22 DIAGNOSIS — H52.7 REFRACTIVE ERROR: ICD-10-CM

## 2019-01-22 DIAGNOSIS — Z96.1 PSEUDOPHAKIA OF BOTH EYES: ICD-10-CM

## 2019-01-22 DIAGNOSIS — H35.342 MACULAR HOLE OF LEFT EYE: ICD-10-CM

## 2019-01-22 PROCEDURE — 92134 CPTRZ OPH DX IMG PST SGM RTA: CPT | Mod: PBBFAC,PN | Performed by: OPTOMETRIST

## 2019-01-22 PROCEDURE — 92014 COMPRE OPH EXAM EST PT 1/>: CPT | Mod: S$PBB,,, | Performed by: OPTOMETRIST

## 2019-01-22 PROCEDURE — 92014 PR EYE EXAM, EST PATIENT,COMPREHESV: ICD-10-PCS | Mod: S$PBB,,, | Performed by: OPTOMETRIST

## 2019-01-22 PROCEDURE — 92015 DETERMINE REFRACTIVE STATE: CPT | Mod: ,,, | Performed by: OPTOMETRIST

## 2019-01-22 PROCEDURE — 99999 PR PBB SHADOW E&M-EST. PATIENT-LVL II: CPT | Mod: PBBFAC,,, | Performed by: OPTOMETRIST

## 2019-01-22 PROCEDURE — 92015 PR REFRACTION: ICD-10-PCS | Mod: ,,, | Performed by: OPTOMETRIST

## 2019-01-22 PROCEDURE — 92134 POSTERIOR SEGMENT OCT RETINA (OCULAR COHERENCE TOMOGRAPHY)-BOTH EYES: ICD-10-PCS | Mod: 26,S$PBB,, | Performed by: OPTOMETRIST

## 2019-01-22 PROCEDURE — 99212 OFFICE O/P EST SF 10 MIN: CPT | Mod: PBBFAC,PN,25 | Performed by: OPTOMETRIST

## 2019-01-22 PROCEDURE — 99999 PR PBB SHADOW E&M-EST. PATIENT-LVL II: ICD-10-PCS | Mod: PBBFAC,,, | Performed by: OPTOMETRIST

## 2019-01-22 NOTE — PROGRESS NOTES
HPI     HPI    Last seen on 10/24/17 by  for macular hole os  Had CT. SX. OU In 2017 w/     Any vision changes since last exam: no  Eye pain: right eye will hurt and go around her head when looking up close  Other ocular symptoms: none    Do you wear currently wear glasses or contacts? readers    Interested in contacts today? no    Do you plan on getting new glasses today? no    Last edited by Chiqui Haque on 1/22/2019  2:08 PM. (History)            Assessment /Plan     For exam results, see Encounter Report.    Type 2 diabetes mellitus without retinopathy  No diabetic retinopathy OD, OS  Continue close care with PCP  Monitor 12 months    Macular hole of left eye  -     Posterior Segment OCT Retina-Both eyes  S/p repair with Dr Dowell  Stable compared to previous mOCT scan  Monitor 12 months    Open angle with borderline findings, low risk, bilateral  Suspect based on ONH cupping asymmetry, +fam hx-mother  Monitor 6 months    Pseudophakia of both eyes  Doing well OU  Monitor 12 months    Refractive error  Eyeglass Final Rx     Eyeglass Final Rx       Sphere Cylinder Axis Add    Right [place] +0.50 175 +2.75    Left +0.25   +2.75    Expiration Date:  1/23/2020              RTC 6 months for 24-2VF, gOCT and IOP check or PRN  Discussed above and all questions were answered.

## 2019-01-22 NOTE — LETTER
January 22, 2019      Lisette Olvera MD  [address] [place] [address] [address] [place] [id number]           [facility]  [address]  [place] [address]  Phone: [phone]  Fax: [phone]          Patient: Kaylin Mccollum   MR Number: [id number]   YOB: 1945   Date of Visit: 1/22/2019       Dear Dr. Lisette Olvera:    Thank you for referring Kaylin Mccollum to me for evaluation. Attached you will find relevant portions of my assessment and plan of care.    If you have questions, please do not hesitate to call me. I look forward to following Kaylin Mccollum along with you.    Sincerely,    Bhavesh Mccurdy, OD    Enclosure  CC:  No Recipients    If you would like to receive this communication electronically, please contact [email or web address] or [phone] to request more information on [employer] access.    For providers and/or their staff who would like to refer a patient to [facility], please contact us through our one-stop-shop provider referral line, [facility], at [phone].    If you feel you have received this communication in error or would no longer like to receive these types of communications, please e-mail [email or web address]

## 2019-02-06 DIAGNOSIS — E11.59 HYPERTENSION ASSOCIATED WITH DIABETES: ICD-10-CM

## 2019-02-06 DIAGNOSIS — I15.2 HYPERTENSION ASSOCIATED WITH DIABETES: ICD-10-CM

## 2019-02-08 RX ORDER — LISINOPRIL 5 MG/1
TABLET ORAL
Qty: 90 TABLET | Refills: 0 | Status: SHIPPED | OUTPATIENT
Start: 2019-02-08 | End: 2019-06-18 | Stop reason: ALTCHOICE

## 2019-02-20 ENCOUNTER — TELEPHONE (OUTPATIENT)
Dept: INTERNAL MEDICINE | Facility: CLINIC | Age: 74
End: 2019-02-20

## 2019-02-20 DIAGNOSIS — E66.9 DIABETES MELLITUS TYPE 2 IN OBESE: Primary | ICD-10-CM

## 2019-02-20 DIAGNOSIS — E11.69 DIABETES MELLITUS TYPE 2 IN OBESE: Primary | ICD-10-CM

## 2019-02-20 NOTE — TELEPHONE ENCOUNTER
Spoke with pt states she uses [facility]. Pharmacy updated.

## 2019-02-20 NOTE — TELEPHONE ENCOUNTER
Jardiance sent to pharmacy to replace invokana. If not covered, will need information from insurance and/or pharmacy on covered alternatives.

## 2019-02-20 NOTE — TELEPHONE ENCOUNTER
Please contact patient. Jardiance is recommended alternative to Invokana. Which pharmacy does she want medication sent to?

## 2019-02-27 DIAGNOSIS — E66.9 DIABETES MELLITUS TYPE 2 IN OBESE: ICD-10-CM

## 2019-02-27 DIAGNOSIS — E11.69 DIABETES MELLITUS TYPE 2 IN OBESE: ICD-10-CM

## 2019-02-27 RX ORDER — METFORMIN HYDROCHLORIDE 1000 MG/1
TABLET ORAL
Qty: 180 TABLET | Refills: 3 | Status: SHIPPED | OUTPATIENT
Start: 2019-02-27 | End: 2020-04-29

## 2019-03-08 DIAGNOSIS — E66.9 DIABETES MELLITUS TYPE 2 IN OBESE: ICD-10-CM

## 2019-03-08 DIAGNOSIS — E11.69 DIABETES MELLITUS TYPE 2 IN OBESE: ICD-10-CM

## 2019-03-11 RX ORDER — CANAGLIFLOZIN 100 MG/1
TABLET, FILM COATED ORAL
Qty: 90 TABLET | Refills: 0 | OUTPATIENT
Start: 2019-03-11

## 2019-03-20 ENCOUNTER — OFFICE VISIT (OUTPATIENT)
Dept: ORTHOPEDICS | Facility: CLINIC | Age: 74
End: 2019-03-20
Payer: MEDICARE

## 2019-03-20 ENCOUNTER — HOSPITAL ENCOUNTER (OUTPATIENT)
Dept: RADIOLOGY | Facility: HOSPITAL | Age: 74
Discharge: HOME | End: 2019-03-20
Attending: ORTHOPAEDIC SURGERY
Payer: MEDICARE

## 2019-03-20 VITALS
WEIGHT: 214 LBS | HEIGHT: 69 IN | SYSTOLIC BLOOD PRESSURE: 140 MMHG | HEART RATE: 87 BPM | DIASTOLIC BLOOD PRESSURE: 76 MMHG | BODY MASS INDEX: 31.7 KG/M2

## 2019-03-20 DIAGNOSIS — M22.42 CHONDROMALACIA, PATELLA, LEFT: ICD-10-CM

## 2019-03-20 DIAGNOSIS — M76.31 IT BAND SYNDROME, RIGHT: ICD-10-CM

## 2019-03-20 DIAGNOSIS — M76.32 IT BAND SYNDROME, LEFT: ICD-10-CM

## 2019-03-20 DIAGNOSIS — M70.62 GREATER TROCHANTERIC BURSITIS, LEFT: ICD-10-CM

## 2019-03-20 DIAGNOSIS — G57.02 PIRIFORMIS SYNDROME, LEFT: ICD-10-CM

## 2019-03-20 DIAGNOSIS — M22.41 CHONDROMALACIA, PATELLA, RIGHT: ICD-10-CM

## 2019-03-20 DIAGNOSIS — S76.311A STRAIN OF RIGHT HAMSTRING, INITIAL ENCOUNTER: ICD-10-CM

## 2019-03-20 DIAGNOSIS — M79.605 LEFT LEG PAIN: Primary | ICD-10-CM

## 2019-03-20 DIAGNOSIS — M53.3 SI (SACROILIAC) PAIN: ICD-10-CM

## 2019-03-20 DIAGNOSIS — M70.61 GREATER TROCHANTERIC BURSITIS, RIGHT: Primary | ICD-10-CM

## 2019-03-20 DIAGNOSIS — M79.605 LEFT LEG PAIN: ICD-10-CM

## 2019-03-20 PROCEDURE — 73564 XR KNEE ORTHO RIGHT WITH FLEXION: ICD-10-PCS | Mod: 26,RT,, | Performed by: RADIOLOGY

## 2019-03-20 PROCEDURE — 73562 X-RAY EXAM OF KNEE 3: CPT | Mod: 26,59,RT, | Performed by: RADIOLOGY

## 2019-03-20 PROCEDURE — 99214 PR OFFICE/OUTPT VISIT, EST, LEVL IV, 30-39 MIN: ICD-10-PCS | Mod: S$PBB,,, | Performed by: ORTHOPAEDIC SURGERY

## 2019-03-20 PROCEDURE — 99214 OFFICE O/P EST MOD 30 MIN: CPT | Mod: PBBFAC,25,PN | Performed by: ORTHOPAEDIC SURGERY

## 2019-03-20 PROCEDURE — 73562 X-RAY EXAM OF KNEE 3: CPT | Mod: TC,59,LT

## 2019-03-20 PROCEDURE — 73562 XR KNEE ORTHO RIGHT WITH FLEXION: ICD-10-PCS | Mod: 26,59,RT, | Performed by: RADIOLOGY

## 2019-03-20 PROCEDURE — 99214 OFFICE O/P EST MOD 30 MIN: CPT | Mod: S$PBB,,, | Performed by: ORTHOPAEDIC SURGERY

## 2019-03-20 PROCEDURE — 73502 XR HIP 2 VIEW RIGHT: ICD-10-PCS | Mod: 26,RT,, | Performed by: RADIOLOGY

## 2019-03-20 PROCEDURE — 73564 X-RAY EXAM KNEE 4 OR MORE: CPT | Mod: 26,RT,, | Performed by: RADIOLOGY

## 2019-03-20 PROCEDURE — 73502 X-RAY EXAM HIP UNI 2-3 VIEWS: CPT | Mod: TC,RT

## 2019-03-20 PROCEDURE — 99999 PR PBB SHADOW E&M-EST. PATIENT-LVL IV: CPT | Mod: PBBFAC,,, | Performed by: ORTHOPAEDIC SURGERY

## 2019-03-20 PROCEDURE — 99999 PR PBB SHADOW E&M-EST. PATIENT-LVL IV: ICD-10-PCS | Mod: PBBFAC,,, | Performed by: ORTHOPAEDIC SURGERY

## 2019-03-20 PROCEDURE — 73502 X-RAY EXAM HIP UNI 2-3 VIEWS: CPT | Mod: 26,RT,, | Performed by: RADIOLOGY

## 2019-03-20 RX ORDER — METHYLPREDNISOLONE 4 MG/1
TABLET ORAL
Qty: 1 PACKAGE | Refills: 0 | Status: SHIPPED | OUTPATIENT
Start: 2019-03-20 | End: 2019-04-10

## 2019-03-20 NOTE — PROGRESS NOTES
Subjective:     Patient ID: Kaylin Mccollum is a 74 y.o. female.    Chief Complaint: Pain of the Right Knee    Patient is here for right knee pain. Reports it has been bothering her for 2 months. Reports no PATRICIO. Reports history of bilateral knee scopes.      Knee Pain    The pain is present in the right knee. This is a new problem. The current episode started more than 1 month ago. The problem occurs intermittently. The problem has been gradually worsening. The quality of the pain is described as dull and aching. The pain is at a severity of 2/10. Associated symptoms include stiffness. Pertinent negatives include no fever or numbness. The symptoms are aggravated by walking, standing and activity. She has tried OTC pain meds and OTC ointments for the symptoms. The treatment provided mild relief. Physical therapy was not tried.      Past Medical History:   Diagnosis Date    Arthritis     Cataract     Diabetes mellitus     Diabetes mellitus, type 2     Fibromyalgia     General anesthetics causing adverse effect in therapeutic use     bradycardia and tachycardia    Hypertension     Migraines     Mitral valve prolapse     Osteoarthritis     Rotator cuff tear 4/1/2015     Past Surgical History:   Procedure Laterality Date    ARTHROSCOPY-SHOULDER-RIGHT WITH EXCISION OF CA LIGAMENT, PARTIAL ACROMIONECTOMY Right 6/4/2015    Performed by [facility]ota [facility] at [facility] OR    BURSECTOMY-SHOULDER-RIGHT Right 6/4/2015    Performed by [facility]ota [facility] at [facility] OR    CATARACT EXTRACTION W/  INTRAOCULAR LENS IMPLANT Left 07/19/2017    CATARACT EXTRACTION W/  INTRAOCULAR LENS IMPLANT Right 2017    CHOLECYSTECTOMY      COLONOSCOPY N/A 9/25/2018    Performed by Bethel Carmona MD at [facility] ENDO    ESOPHAGOGASTRODUODENOSCOPY (EGD) N/A 6/24/2016    Performed by Kamaljit Gutierres III, MD at [facility] ENDO    KNEE ARTHROSCOPY Bilateral     PARS PLANA VITRECTOMY W/ REPAIR OF MACULAR HOLE Left 02/22/2017    REPAIR ROTATOR CUFF  ARTHROSCOPIC-RIGHT Right 6/4/2015    Performed [facility] at [facility] OR    REPAIR-RETINA (VITRECTOMY) Left 2/22/2017    Performed by MINGO Goode [facility] at [facility] OR 1ST [facility]    SHOULDER ARTHROSCOPY Right 06/04/15    SYNOVECTOMY-SHOULDER-RIGHT Right 6/4/2015    Performed by [facility] at [facility] OR     Family History   Problem Relation Age of Onset    Heart disease Mother     Glaucoma Mother     Cataracts Mother     Cancer Mother         colon    Kidney disease Daughter     Diabetes Maternal Grandmother     Heart disease Maternal Grandmother     Diabetes Maternal Grandfather     Cancer Maternal Grandfather     Breast cancer Paternal Aunt      Social History     Socioeconomic History    Marital status:      Spouse name: Not on file    Number of children: Not on file    Years of education: Not on file    Highest education level: Not on file   Social Needs    Financial resource strain: Not on file    Food insecurity - worry: Not on file    Food insecurity - inability: Not on file    Transportation needs - medical: Not on file    Transportation needs - non-medical: Not on file   Occupational History     Employer: [employer]   Tobacco Use    Smoking status: Never Smoker    Smokeless tobacco: Never Used   Substance and Sexual Activity    Alcohol use: Yes     Alcohol/week: 0.0 oz     Comment: Rarely    Drug use: No    Sexual activity: Not Currently   Other Topics Concern    Not on file   Social History Narrative    . 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     Medication List with Changes/Refills   New Medications    METHYLPREDNISOLONE (MEDROL DOSEPACK) 4 MG TABLET    use as directed   Current Medications    BIOTIN 1 MG TABLET    Take 1,000 mcg by mouth every morning.     CELECOXIB (CELEBREX) 100 MG CAPSULE    take 1 capsule by mouth twice a day    CYANOCOBALAMIN (VITAMIN B-12) 500 MCG TABLET    Take 500 mcg by mouth nightly.    EMPAGLIFLOZIN (JARDIANCE) 10 MG TAB    " Take 10 mg by mouth once daily.    FLUOXETINE (PROZAC) 40 MG CAPSULE    Take 1 capsule (40 mg total) by mouth once daily.    FLUTICASONE (FLONASE) 50 MCG/ACTUATION NASAL SPRAY    2 sprays by Each Nare route once daily.    GABAPENTIN (NEURONTIN) 300 MG CAPSULE    TAKE ONE CAPSULE BY MOUTH TWICE DAILY    LISINOPRIL (PRINIVIL,ZESTRIL) 5 MG TABLET    TAKE 1 TABLET BY MOUTH EVERY DAY    METFORMIN (GLUCOPHAGE) 1000 MG TABLET    TAKE 1 TABLET(1000 MG) BY MOUTH TWICE DAILY WITH MEALS    NALTREXONE CAPSULE    Take 4.5 mg by mouth every evening.    TRIAMCINOLONE ACETONIDE 0.025% (KENALOG) 0.025 % OINT    Apply topically 2 (two) times daily. for 10 days    VERAPAMIL (CALAN) 120 MG TABLET    Take 1 tablet (120 mg total) by mouth 2 (two) times daily.     Review of patient's allergies indicates:   Allergen Reactions    Demerol [meperidine] Other (See Comments)     Burning when adm IV  Able to tolerate IM, "Turned my hand purple."    Latex, natural rubber Other (See Comments)     "Burns my skin",  Symptoms get worse the longer she is exposed    Zocor [simvastatin] Other (See Comments)     Tightening of muscles    Statins-hmg-coa reductase inhibitors Other (See Comments)     myopathy    Sulfa (sulfonamide antibiotics)      Review of Systems   Constitution: Negative for fever.   HENT: Negative for hearing loss.    Eyes: Negative for blurred vision.   Cardiovascular: Negative for chest pain and leg swelling.   Respiratory: Negative for shortness of breath.    Endocrine: Negative for polyuria.   Hematologic/Lymphatic: Negative for bleeding problem.   Skin: Negative for rash.   Musculoskeletal: Positive for back pain, joint pain, muscle cramps and stiffness. Negative for joint swelling and muscle weakness.   Gastrointestinal: Negative for melena.   Genitourinary: Negative for hematuria.   Neurological: Negative for loss of balance, numbness and paresthesias.   Psychiatric/Behavioral: Negative for altered mental status. "       Objective:   Body mass index is 31.6 kg/m².  Vitals:    03/20/19 1543   BP: (!) 140/76   Pulse: 87       General: Kaylin is well-developed, well-nourished, appears stated age, in no acute distress, alert and oriented.       General    Vitals reviewed.  Constitutional: She is oriented to person, place, and time. She appears well-developed and well-nourished. No distress.   HENT:   Mouth/Throat: No oropharyngeal exudate.   Eyes: Right eye exhibits no discharge. Left eye exhibits no discharge.   Neck: Normal range of motion.   Cardiovascular: Normal rate.    Pulmonary/Chest: Effort normal and breath sounds normal. No respiratory distress.   Neurological: She is alert and oriented to person, place, and time. She has normal reflexes. No cranial nerve deficit. Coordination normal.   Psychiatric: She has a normal mood and affect. Her behavior is normal. Judgment and thought content normal.     General Musculoskeletal Exam   Gait: normal   Pelvic Obliquity: none      Right Knee Exam     Inspection   Alignment:  normal  Erythema: absent  Scars: absent  Swelling: absent  Effusion: absent  Deformity: absent  Bruising: absent    Tenderness   The patient is tender to palpation of the lateral joint line, iliotibial band and medial hamstring.    Crepitus   The patient has crepitus of the patella.    Range of Motion   Extension: 0   Flexion: 130     Tests   Meniscus   Kaleigh:  Medial - negative Lateral - negative  Ligament Examination Lachman: normal (-1 to 2mm) PCL-Posterior Drawer: normal (0 to 2mm)     MCL - Valgus: normal (0 to 2mm)  LCL - Varus: normal  Patella   Patellar apprehension: negative  Passive Patellar Tilt: neutral  Patellar Tracking: normal  Patellar Glide (quadrants): Lateral - 1   Medial - 2  Q-Angle at 90 degrees: normal  Patellar Grind: negative    Other   Meniscal Cyst: absent  Popliteal (Baker's) Cyst: absent  Sensation: normal    Left Knee Exam     Inspection   Alignment:  normal  Erythema:  absent  Scars: absent  Swelling: absent  Effusion: absent  Deformity: absent  Bruising: absent    Tenderness   The patient tender to palpation of the condyle.    Crepitus   The patient has crepitus of the patella.    Range of Motion   Extension: 0   Flexion: 130     Tests   Meniscus   Kaleigh:  Medial - negative Lateral - negative  Stability Lachman: normal (-1 to 2mm) PCL-Posterior Drawer: normal (0 to 2mm)  MCL - Valgus: normal (0 to 2mm)  LCL - Varus: normal (0 to 2mm)  Patella   Patellar apprehension: negative  Passive Patellar Tilt: neutral  Patellar Tracking: normal  Patellar Glide (Quadrants): Lateral - 1 Medial - 2  Q-Angle at 90 degrees: normal  Patellar Grind: negative    Other   Meniscal Cyst: absent  Popliteal (Baker's) Cyst: absent  Sensation: normal    Right Hip Exam     Inspection   Scars: absent  Swelling: absent  Bruising: absent  No deformity of hip.  Quadriceps Atrophy:  Negative  Erythema: absent    Tenderness   The patient tender to palpation of the SI joint and trochanteric bursa.    Range of Motion   Abduction:  40 normal   Adduction:  20 normal   Extension:  0 normal   Flexion:  110 normal   External rotation:  50 normal   Internal rotation:  20 normal     Tests   Pain w/ forced internal rotation (CHERYLE): present  Pain w/ forced external rotation (FADIR): absent  Usama: positive  Trendelenburg Test: negative  Log Roll: negative    Other   Sensation: normal  Left Hip Exam     Inspection   Scars: absent  Swelling: absent  No deformity of hip.  Quadriceps Atrophy:  negative  Erythema: absent  Bruising: absent    Tenderness   The patient tender to palpation of the SI joint, piriformis and trochanteric bursa.    Range of Motion   Abduction:  40 normal   Adduction:  20 normal   Extension:  0 normal   Flexion:  110 normal   External rotation:  50 normal   Internal rotation: 20 normal     Tests   Pain w/ forced internal rotation (CHERYLE): present  Pain w/ forced external rotation (FADIR):  absent  Usama: positive  Trendelenburg Test: negative  Log Roll: negative    Other   Sensation: normal      Back (L-Spine & T-Spine) / Neck (C-Spine) Exam   Back exam is normal.      Muscle Strength   Right Lower Extremity   Hip Abduction: 5/5   Hip Adduction: 5/5   Hip Flexion: 5/5   Quadriceps:  4/5   Hamstrin/5   Left Lower Extremity   Hip Abduction: 5/5   Hip Adduction: 5/5   Hip Flexion: 5/5   Quadriceps:  4/5   Hamstrin/5     Reflexes     Left Side  Quadriceps:  2+  Achilles:  2+    Right Side   Quadriceps:  2+  Achilles:  2+    Vascular Exam     Right Pulses  Dorsalis Pedis:      2+  Posterior Tibial:      2+        Left Pulses  Dorsalis Pedis:      2+  Posterior Tibial:      2+        Capillary Refill  Right Hand: normal capillary refill  Left Hand: normal capillary refill    Edema  Right Upper Leg: absent  Left Upper Leg: absent      Imaging reviewed and interpreted today   Johnathan Dc MD 3/20/2019       Narrative     EXAMINATION:  XR KNEE ORTHO RIGHT WITH FLEXION    CLINICAL HISTORY:  Pain in left leg    TECHNIQUE:  AP standing as well as PA flexion standing and Merchant views of both knees were performed.  A lateral view of the right knee is also performed.    COMPARISON:  None.    FINDINGS:  Bilateral tricompartmental degenerative changes are seen with findings most pronounced in the medial compartments, left greater than right with joint space narrowing and marginal spurring.  No patellar tilt.  No fracture or dislocation.  No joint effusion.  Superior patellar enthesophyte is noted on the right.  Atherosclerosis is noted.  Soft tissues are symmetric in appearance.      Impression       As above      Electronically signed by: Johnathan Dc MD  Date: 2019  Time: 15:41     Reading Physician Reading Date Result Priority   Johnathan Dc MD 3/20/2019       Narrative     EXAMINATION:  XR HIP 2 VIEW RIGHT    CLINICAL HISTORY:  Pain in left leg    TECHNIQUE:  AP view of the pelvis and frog leg  lateral view of the right hip were performed.    COMPARISON:  Hip radiographs from 05/31/2012.    FINDINGS:  Degenerative changes of the hips are again seen, not significant changed since the comparison exam.  No collapse of the femoral heads.  No fracture or dislocation is evident.  There also degenerative changes of the lumbosacral spine and sacroiliac joints and pubic symphysis.  Pelvic phleboliths are present.  Please note exam detail limited by habitus.      Impression       As above      Electronically signed by: Johanthan Dc MD  Date: 03/20/2019  Time: 15:40       Assessment:     Encounter Diagnoses   Name Primary?    Greater trochanteric bursitis, right Yes    Greater trochanteric bursitis, left     Piriformis syndrome, left     It band syndrome, right     It band syndrome, left     Chondromalacia, patella, right     Chondromalacia, patella, left     Strain of right hamstring, initial encounter     SI (sacroiliac) pain         Plan:     We reviewed with Kaylin today, the pathology and natural history of her diagnosis. We had an extensive discussion as to the conservative treatment and management of their condition. We also discussed the variety of treatment options to include medication, physical therapy, diagnostic testing as well as other treatments.The decision was made to go forward with:    PT  Jose A Dutta for SI injections/GT  F/up 3mo

## 2019-03-26 ENCOUNTER — OFFICE VISIT (OUTPATIENT)
Dept: PAIN MEDICINE | Facility: CLINIC | Age: 74
End: 2019-03-26
Payer: MEDICARE

## 2019-03-26 VITALS
BODY MASS INDEX: 31.7 KG/M2 | WEIGHT: 214 LBS | HEIGHT: 69 IN | HEART RATE: 68 BPM | SYSTOLIC BLOOD PRESSURE: 152 MMHG | DIASTOLIC BLOOD PRESSURE: 80 MMHG

## 2019-03-26 DIAGNOSIS — M47.816 LUMBAR SPONDYLOSIS: ICD-10-CM

## 2019-03-26 DIAGNOSIS — M70.60 GREATER TROCHANTERIC BURSITIS, UNSPECIFIED LATERALITY: ICD-10-CM

## 2019-03-26 DIAGNOSIS — M53.3 SACROILIAC JOINT PAIN: Primary | ICD-10-CM

## 2019-03-26 PROCEDURE — 99214 PR OFFICE/OUTPT VISIT, EST, LEVL IV, 30-39 MIN: ICD-10-PCS | Mod: S$PBB,,, | Performed by: ANESTHESIOLOGY

## 2019-03-26 PROCEDURE — 99213 OFFICE O/P EST LOW 20 MIN: CPT | Mod: PBBFAC,PN | Performed by: ANESTHESIOLOGY

## 2019-03-26 PROCEDURE — 99214 OFFICE O/P EST MOD 30 MIN: CPT | Mod: S$PBB,,, | Performed by: ANESTHESIOLOGY

## 2019-03-26 PROCEDURE — 99999 PR PBB SHADOW E&M-EST. PATIENT-LVL III: ICD-10-PCS | Mod: PBBFAC,,, | Performed by: ANESTHESIOLOGY

## 2019-03-26 PROCEDURE — 99999 PR PBB SHADOW E&M-EST. PATIENT-LVL III: CPT | Mod: PBBFAC,,, | Performed by: ANESTHESIOLOGY

## 2019-03-26 NOTE — H&P (VIEW-ONLY)
Chief Pain Complaint:  Back Pain        History of Present Illness:   Kaylin Mccollum is a 74 y.o. female  who is presenting with a chief complaint of Back Pain  . The patient began experiencing this problem insidiously, and the pain has been gradually worsening over the past 4 month(s). The pain is described as throbbing, cramping, aching and heavy and is located in the bilateral buttocks. Pain is intermittent and lasts hours. The pain is nonradiating. The patient rates her pain a 8 out of ten and interferes with activities of daily living a 7 out of ten. Pain is exacerbated by getting up from a seated position, and is improved by rest. Patient reports no prior trauma, no prior spinal surgery     - pertinent negatives: No fever, No chills, No weight loss, No bladder dysfunction, No bowel dysfunction, No saddle anesthesia  - pertinent positives: none    - medications, other therapies tried (physical therapy, injections):     >> NSAIDs, Tylenol, gabapentin and medrol dose pack    >> Has previously undergone Physical Therapy    >> Has previously undergone spinal injection/s          Lumbar JAMIN with good relief in the past     Imaging / Labs / Studies (reviewed on 3/26/2019):    Results for orders placed during the hospital encounter of 11/23/15   MRI Lumbar Spine Without Contrast    Narrative Technique: Standard noncontrast multiplanar multisequence imaging of the lumbar spine was performed.    Findings: There is anatomic spinal alignment.  Very low degree of degenerative disk base narrowing and disk desiccation observed at L1-2, L2-3, L4-5, and L5-S1.  Vertebral marrow signal pattern and architecture are normal.  Distal cord is unremarkable with conus medullaris terminating at L1.  Small nerve root sleeve cysts incidentally noted in the sacral canal.  There are bilateral renal cysts.    L1-2: Small annular bulge and endplate lipping.  Bilateral facet arthropathy.  No significant foraminal narrowing or canal  stenosis.    L2-3: Small annular bulge and endplate lipping.  Bilateral facet arthropathy.  No significant foraminal narrowing or canal stenosis.    L3-4: Posterior disk bulge with small bilateral foraminal disk protrusions.  Bilateral facet arthropathy and ligament hypertrophy.  Mild inferior foraminal narrowing.  No canal stenosis.    L4-5: Posterior disk bulge, hypertrophic facet arthropathy, and ligament thickening resulting in moderate bilateral foraminal narrowing and moderate central canal stenosis.  AP canal diameter measures 8.4 mm.     L5-S1: Broad based posterior disk bulge and endplate lipping.  Hypertrophic facet arthropathy and ligament thickening.  Moderate bilateral foraminal narrowing.  No significant canal stenosis.    Impression  Multilevel lumbar spondylosis and degenerative disk disease as detailed.      Electronically signed by: JOSE NEW MD  Date:     11/23/15  Time:    09:44      Results for orders placed in visit on 05/31/12   X-Ray Lumbar Spine Ap And Lateral    Narrative DATE OF EXAM: May 31 2012      [facility]   0143  -  L-SPINE 2 OR 3 VIEWS:   \  [id number]     CLINICAL HISTORY:   \719.49 0 JOINT PAIN MULT SITE     PROCEDURE COMMENT:   \     ICD 9 CODE(S):   (\)     CPT 4 CODE(S)/MODIFIER(S):   (\)     Findings: Generalized osteopenia.  Multilevel degenerative vertebral end   plate spurring and facet arthropathy.  Moderate to severe disk space   narrowing and associated vacuum disk phenomenon at L5-S1.  Suggestion of   slight diane-listhesis of L4 on L5.  Intact pedicles.  No compression   fracture.        Impression: As above.  ______________________________________      Electronically signed by: JOSE NEW MD  Date:     05/31/12  Time:    10:04            : EUNICE  Transcribe Date/Time: May 31 2012 10:05A  Dictated by : JOSE NEW MD  Report reviewed by:  Read On:   \  Images were reviewed, findings were verified and document was   electronically  SIGNED BY:  "JOSE NEW MD On: May 31 2012 10:05A          Review of Systems:  CONSTITUTIONAL: patient denies any fever, chills, or weight loss  SKIN: patient denies any rash or itching  RESPIRATORY: patient denies having any shortness of breath  GASTROINTESTINAL: patient denies having any diarrhea, constipation, or bowel incontinence  GENITOURINARY: patient denies having any abnormal bladder function    MUSCULOSKELETAL:  - patient complains of the above noted pain/s (see chief pain complaint)    NEUROLOGICAL:   - pain as above  - strength in Lower extremities is intact, BILATERALLY  - sensation in Lower extremities is intact, BILATERALLY  - patient denies any loss of bowel or bladder control      PSYCHIATRIC: patient denies any change in mood    Other:  All other systems reviewed and are negative      Physical Exam:  BP (!) 152/80 (BP Location: Right arm, Patient Position: Sitting)   Pulse 68   Ht 5' 9" (1.753 m)   Wt 97.1 kg (214 lb)   BMI 31.60 kg/m²  (reviewed on 3/26/2019)  General: Alert and oriented, in no apparent distress.  Gait: normal gait.  Skin: No rashes, No discoloration, No obvious lesions  HEENT: Normocephalic, atraumatic. Pupils equal and round.  Cardiovascular: Regular rate and rhythm , no significant peripheral edema present  Respiratory: Without audible wheezing, without use of accessory muscles of respiration.    Musculoskeletal:    Lumbar Spine    - Pain on flexion of lumbar spine Absent  - Straight Leg Raise:  Absent    - Pain on extension of lumbar spine Absent  - TTP over the lumbar facet joints Absent  - Lumbar facet loading Absent    -Pain on palpation over the SI joint  Present  - CHERYLE: Present  - TTP over bilateral GTB       Neuro:    Strength:  LE R/L: HF: 5/5, HE: 5/5, KF: 5/5; KE: 5/5; FE: 5/5; FF: 5/5    Extremity Reflexes: Brisk and symmetric throughout.      Extremity Sensory: Sensation to pinprick and temperature symmetric. Proprioception intact.      Psych:  Mood and affect is " appropriate      Assessment:    Kaylin Mccollum is a 74 y.o. year old female who is presenting with     Encounter Diagnoses   Name Primary?    Lumbar spondylosis     Sacroiliac joint pain Yes    Greater trochanteric bursitis, unspecified laterality        Plan:    1. Interventional: Schedule patient for Bilateral SI Joint and GTB injection.     2. Pharmacologic: Tylenol, NSAID's PRN.    3. Rehabilitative: PT post injection.    4. Diagnostic: None for now.    5. Follow up: Post injection.    20 minutes were spent in this encounter with more than 50% of the time used for counseling and review of the plan.  Imaging / studies reviewed, detailed above.  I discussed in detail the risks, benefits, and alternatives to any and all potential treatment options.  All questions and concerns were fully addressed today in clinic. Medical decision making moderate.    Thank you for the opportunity to assist in the care of this patient.    Best wishes,    Signed:    Manpreet Dutta MD          Disclaimer:  This note may have been prepared using voice recognition software, it may have not been extensively proofed, as such there could be errors within the text such as sound alike errors.

## 2019-03-26 NOTE — LETTER
March 26, 2019      Richi Callaway MD  [address] [address] [facility] [id number]           [facility]  [address] [address] [address]  Phone: [phone]  Fax: [phone]          Patient: Kaylin Mccollum   MR Number: [id number]   YOB: 1945   Date of Visit: 3/26/2019       Dear Dr. Richi Callaway:    Thank you for referring Kaylin Mccollum to me for evaluation. Attached you will find relevant portions of my assessment and plan of care.    If you have questions, please do not hesitate to call me. I look forward to following Kaylin Mccollum along with you.    Sincerely,    Manpreet Dutta MD    Enclosure  CC:  No Recipients    If you would like to receive this communication electronically, please contact [email or web address] or [phone] to request more information on [employer] Link access.    For providers and/or their staff who would like to refer a patient to [facility], please contact us through our one-stop-shop provider referral line, [facility], at [phone].    If you feel you have received this communication in error or would no longer like to receive these types of communications, please e-mail [email or web address]

## 2019-03-26 NOTE — PROGRESS NOTES
Chief Pain Complaint:  Back Pain        History of Present Illness:   Kaylin Mccollum is a 74 y.o. female  who is presenting with a chief complaint of Back Pain  . The patient began experiencing this problem insidiously, and the pain has been gradually worsening over the past 4 month(s). The pain is described as throbbing, cramping, aching and heavy and is located in the bilateral buttocks. Pain is intermittent and lasts hours. The pain is nonradiating. The patient rates her pain a 8 out of ten and interferes with activities of daily living a 7 out of ten. Pain is exacerbated by getting up from a seated position, and is improved by rest. Patient reports no prior trauma, no prior spinal surgery     - pertinent negatives: No fever, No chills, No weight loss, No bladder dysfunction, No bowel dysfunction, No saddle anesthesia  - pertinent positives: none    - medications, other therapies tried (physical therapy, injections):     >> NSAIDs, Tylenol, gabapentin and medrol dose pack    >> Has previously undergone Physical Therapy    >> Has previously undergone spinal injection/s          Lumbar JAMIN with good relief in the past     Imaging / Labs / Studies (reviewed on 3/26/2019):    Results for orders placed during the hospital encounter of 11/23/15   MRI Lumbar Spine Without Contrast    Narrative Technique: Standard noncontrast multiplanar multisequence imaging of the lumbar spine was performed.    Findings: There is anatomic spinal alignment.  Very low degree of degenerative disk base narrowing and disk desiccation observed at L1-2, L2-3, L4-5, and L5-S1.  Vertebral marrow signal pattern and architecture are normal.  Distal cord is unremarkable with conus medullaris terminating at L1.  Small nerve root sleeve cysts incidentally noted in the sacral canal.  There are bilateral renal cysts.    L1-2: Small annular bulge and endplate lipping.  Bilateral facet arthropathy.  No significant foraminal narrowing or canal  stenosis.    L2-3: Small annular bulge and endplate lipping.  Bilateral facet arthropathy.  No significant foraminal narrowing or canal stenosis.    L3-4: Posterior disk bulge with small bilateral foraminal disk protrusions.  Bilateral facet arthropathy and ligament hypertrophy.  Mild inferior foraminal narrowing.  No canal stenosis.    L4-5: Posterior disk bulge, hypertrophic facet arthropathy, and ligament thickening resulting in moderate bilateral foraminal narrowing and moderate central canal stenosis.  AP canal diameter measures 8.4 mm.     L5-S1: Broad based posterior disk bulge and endplate lipping.  Hypertrophic facet arthropathy and ligament thickening.  Moderate bilateral foraminal narrowing.  No significant canal stenosis.    Impression  Multilevel lumbar spondylosis and degenerative disk disease as detailed.      Electronically signed by: JOSE NEW MD  Date:     11/23/15  Time:    09:44      Results for orders placed in visit on 05/31/12   X-Ray Lumbar Spine Ap And Lateral    Narrative DATE OF EXAM: May 31 2012      [facility]   0143  -  L-SPINE 2 OR 3 VIEWS:   \  [id number]     CLINICAL HISTORY:   \719.49 0 JOINT PAIN MULT SITE     PROCEDURE COMMENT:   \     ICD 9 CODE(S):   (\)     CPT 4 CODE(S)/MODIFIER(S):   (\)     Findings: Generalized osteopenia.  Multilevel degenerative vertebral end   plate spurring and facet arthropathy.  Moderate to severe disk space   narrowing and associated vacuum disk phenomenon at L5-S1.  Suggestion of   slight diane-listhesis of L4 on L5.  Intact pedicles.  No compression   fracture.        Impression: As above.  ______________________________________      Electronically signed by: JOSE NEW MD  Date:     05/31/12  Time:    10:04            : EUNICE  Transcribe Date/Time: May 31 2012 10:05A  Dictated by : JOSE NEW MD  Report reviewed by:  Read On:   \  Images were reviewed, findings were verified and document was   electronically  SIGNED BY:  "JOSE NEW MD On: May 31 2012 10:05A          Review of Systems:  CONSTITUTIONAL: patient denies any fever, chills, or weight loss  SKIN: patient denies any rash or itching  RESPIRATORY: patient denies having any shortness of breath  GASTROINTESTINAL: patient denies having any diarrhea, constipation, or bowel incontinence  GENITOURINARY: patient denies having any abnormal bladder function    MUSCULOSKELETAL:  - patient complains of the above noted pain/s (see chief pain complaint)    NEUROLOGICAL:   - pain as above  - strength in Lower extremities is intact, BILATERALLY  - sensation in Lower extremities is intact, BILATERALLY  - patient denies any loss of bowel or bladder control      PSYCHIATRIC: patient denies any change in mood    Other:  All other systems reviewed and are negative      Physical Exam:  BP (!) 152/80 (BP Location: Right arm, Patient Position: Sitting)   Pulse 68   Ht 5' 9" (1.753 m)   Wt 97.1 kg (214 lb)   BMI 31.60 kg/m²  (reviewed on 3/26/2019)  General: Alert and oriented, in no apparent distress.  Gait: normal gait.  Skin: No rashes, No discoloration, No obvious lesions  HEENT: Normocephalic, atraumatic. Pupils equal and round.  Cardiovascular: Regular rate and rhythm , no significant peripheral edema present  Respiratory: Without audible wheezing, without use of accessory muscles of respiration.    Musculoskeletal:    Lumbar Spine    - Pain on flexion of lumbar spine Absent  - Straight Leg Raise:  Absent    - Pain on extension of lumbar spine Absent  - TTP over the lumbar facet joints Absent  - Lumbar facet loading Absent    -Pain on palpation over the SI joint  Present  - CHERYLE: Present  - TTP over bilateral GTB       Neuro:    Strength:  LE R/L: HF: 5/5, HE: 5/5, KF: 5/5; KE: 5/5; FE: 5/5; FF: 5/5    Extremity Reflexes: Brisk and symmetric throughout.      Extremity Sensory: Sensation to pinprick and temperature symmetric. Proprioception intact.      Psych:  Mood and affect is " appropriate      Assessment:    Kaylin Mccollum is a 74 y.o. year old female who is presenting with     Encounter Diagnoses   Name Primary?    Lumbar spondylosis     Sacroiliac joint pain Yes    Greater trochanteric bursitis, unspecified laterality        Plan:    1. Interventional: Schedule patient for Bilateral SI Joint and GTB injection.     2. Pharmacologic: Tylenol, NSAID's PRN.    3. Rehabilitative: PT post injection.    4. Diagnostic: None for now.    5. Follow up: Post injection.    20 minutes were spent in this encounter with more than 50% of the time used for counseling and review of the plan.  Imaging / studies reviewed, detailed above.  I discussed in detail the risks, benefits, and alternatives to any and all potential treatment options.  All questions and concerns were fully addressed today in clinic. Medical decision making moderate.    Thank you for the opportunity to assist in the care of this patient.    Best wishes,    Signed:    Manpreet Dutta MD          Disclaimer:  This note may have been prepared using voice recognition software, it may have not been extensively proofed, as such there could be errors within the text such as sound alike errors.

## 2019-03-29 ENCOUNTER — CLINICAL SUPPORT (OUTPATIENT)
Dept: REHABILITATION | Facility: HOSPITAL | Age: 74
End: 2019-03-29
Attending: ORTHOPAEDIC SURGERY
Payer: MEDICARE

## 2019-03-29 DIAGNOSIS — M22.40 CHONDROMALACIA OF PATELLA, UNSPECIFIED LATERALITY: ICD-10-CM

## 2019-03-29 DIAGNOSIS — M76.30 ILIOTIBIAL BAND SYNDROME, UNSPECIFIED LATERALITY: ICD-10-CM

## 2019-03-29 DIAGNOSIS — G57.02 PIRIFORMIS SYNDROME OF LEFT SIDE: Primary | ICD-10-CM

## 2019-03-29 DIAGNOSIS — M70.61 TROCHANTERIC BURSITIS OF BOTH HIPS: ICD-10-CM

## 2019-03-29 DIAGNOSIS — M70.62 TROCHANTERIC BURSITIS OF BOTH HIPS: ICD-10-CM

## 2019-03-29 PROCEDURE — 97110 THERAPEUTIC EXERCISES: CPT

## 2019-03-29 PROCEDURE — 97161 PT EVAL LOW COMPLEX 20 MIN: CPT

## 2019-03-29 NOTE — PROGRESS NOTES
[facility]  Physical Therapy Initial Evaluation    Name: Kaylin Mccollum  Clinic Number: [id number]    Therapy Diagnosis: No diagnosis found.  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 15, 2019  History of current condition - Kaylin reports: Sitting on bed with leg curled up under her and now has pain both hips and posterior of both knees medially.  Reports difficulty standing after sitting for a longer period of time.     Pain:  Current 3/10, worst 7/10, best 0/10   Location: bilateral knee , torso  and hip   Description: Burning and Sharp  Aggravating Factors: Getting out of bed/chair  Easing Factors: pain medication    Prior Therapy: 2015  Prior Level of Function: Independent  Current Level of Function: difficulty with prolonged sitting and transistional movements      Medical History:   Past Medical History:   Diagnosis Date    Arthritis     Cataract     Diabetes mellitus     Diabetes mellitus, type 2     Fibromyalgia     General anesthetics causing adverse effect in therapeutic use     bradycardia and tachycardia    Hypertension     Migraines     Mitral valve prolapse     Osteoarthritis     Rotator cuff  "tear 4/1/2015       Surgical History:   Kaylin Mccollum  has a past surgical history that includes Cholecystectomy; Knee arthroscopy (Bilateral); Shoulder arthroscopy (Right, 06/04/15); Pars plana vitrectomy w/ repair of macular hole (Left, 02/22/2017); Colonoscopy (N/A, 9/25/2018); Cataract extraction w/  intraocular lens implant (Left, 07/19/2017); and Cataract extraction w/  intraocular lens implant (Right, 2017).    Medications:   Kaylin has a current medication list which includes the following prescription(s): biotin, celecoxib, cyanocobalamin, empagliflozin, fluoxetine, fluticasone, gabapentin, lisinopril, metformin, methylprednisolone, naltrexone, triamcinolone acetonide 0.025%, and verapamil.    Allergies:   Review of patient's allergies indicates:   Allergen Reactions    Demerol [meperidine] Other (See Comments)     Burning when adm IV  Able to tolerate IM, "Turned my hand purple."    Latex, natural rubber Other (See Comments)     "Burns my skin",  Symptoms get worse the longer she is exposed    Zocor [simvastatin] Other (See Comments)     Tightening of muscles    Statins-hmg-coa reductase inhibitors Other (See Comments)     myopathy    Sulfa (sulfonamide antibiotics)         Pts goals: To not have pain    Objective     Posture/Structure:  Good  Gait: Normal    Neuro/Sensation:    Sensation:  Intact but reports ocassional numb feeling to of right foot    L/sp AROM:   % Pain Present (Y/N)   FB 50 Y   RSB 75 Y   LSB 75 Y   BB 10 Y           (L) (R)  Strength:  Hip flexors  4+/5 4/5  Quadriceps  4+/5 4/5      Hamstrings  4+/5 4+/5     Anterior Tibialis 5/5 5/5     Peroneals  5/5 5/5     Gastrocnemius 5/5 5/5     Adductors  4+/5 4/5     External rotators 4+/5 4+/5       Special Test: Slump Test:  Neg    SLR Test:  Neg      Jordon:  Neg      Usama:   Pos      LOWER EXTREMITY FUNCTIONAL SCALE         1. Any of your usual work, housework or school activities   3/4  2. Your usual hobbies, " sporting     3/4  3. Getting in and out of tub        4. Walking between rooms      0  5. Putting on shoes or socks        6. Squatting        0  7. Lifting an object from the ground        8. Performing light activities around the home   3/4  9. Performing heavy activities around the home   3/4  10. Getting in and out of car      3/4  11. Walking 2 blocks       3/4  12.Walking a mile         13. Getting up and down 1 flight of stairs    3/4  14. Standing for 1 hour        15. Sitting for an hour         16. Running on even ground      0  17. Running on uneven ground       18. Making sharp turns when running fast      19. Hopping          20. Rolling over in bed                  44/80    Function: Patient reports 55% disability based on score of the Lower Extremity Functional Score    TREATMENT   Treatment Time In: 1331  Treatment Time Out: 1347  Total Treatment time separate from Evaluation: 16 minutes    Kaylin received therapeutic exercises to develop strength, ROM and flexibility for 16 minutes includin. Supine single knee to chest 2x10  2. Supine trunk rotation 2x10  3. Bridging 2x10  4. Supine hip abduction with red band 2x10  5. Isometric hip adduction 2x10  6. Standing hip extension 2x10      Home Exercises and Patient Education Provided    Education provided:   -Education on condition, HEP  1. Supine trunk rotation 2x10  2. Bridging 2x10  3. Supine hip abduction with red band 2x10  4. Isometric hip adduction 2x10  5. Standing hip extension 2x10    Written Home Exercises Provided: yes.  Exercises were reviewed and Kaylin was able to demonstrate them prior to the end of the session.  Kaylin demonstrated good  understanding of the education provided.       Assessment   Kaylin is a 74 y.o. female referred to outpatient Physical Therapy with a medical diagnosis of left piriformis syndrome, (B)Trochanteric Bursitis,(B) IT band syndrome, (B) patella chondromalacia .  Pt presents with pain in bilateral hips, low back, and legs.    Pt prognosis is Good.   Pt will benefit from skilled outpatient Physical Therapy to address the deficits stated above and in the chart below, provide pt/family education, and to maximize pt's level of independence.     Plan of care discussed with patient: Yes  Pt's spiritual, cultural and educational needs considered and patient is agreeable to the plan of care and goals as stated below:     Anticipated Barriers for therapy: None    Medical Necessity is demonstrated by the following  History  Co-morbidities and personal factors that may impact the plan of care Co-morbidities:   diabetes    Personal Factors:   age     low   Examination  Body Structures and Functions, activity limitations and participation restrictions that may impact the plan of care Body Regions:   back  lower extremities    Body Systems:    ROM  strength    Participation Restrictions:   None    Activity limitations:   Learning and applying knowledge  no deficits    General Tasks and Commands  no deficits    Communication  no deficits    Mobility  lifting and carrying objects  walking    Self care  washing oneself (bathing, drying, washing hands)  caring for body parts (brushing teeth, shaving, grooming)    Domestic Life  doing house work (cleaning house, washing dishes, laundry)    Interactions/Relationships  no deficits    Life Areas  no deficits    Community and Social Life  recreation and leisure         low   Clinical Presentation stable and uncomplicated low   Decision Making/ Complexity Score: low     Goals:  Short Term Goals: In 3 weeks   1.I with HEP  2.Pt to increase BLE strength from 4/5 to 4+/5    3.Pt to have pain less than 4/10 at all times.  4.Pt to score greater than 65% impaired on the Lower Extremity Functional Scale    Long Term Goals: In 6 weeks  1. Pt to score greater than 85% impaired on the Lower Extremity Functional Scale  2. Patient to demo increase in LE  strength to 5/5  3. Patient to have decreased pain to 1/10 at all times.  4.Patient to perform daily activities including bath and shopping without limitation.      Plan   Plan of care Certification: 3/29/2019 to 4/29/2019.    Outpatient Physical Therapy 2 times weekly for 6 weeks to include the following interventions: Manual Therapy, Moist Heat/ Ice, Therapeutic Activites and Therapeutic Exercise.     Jose Roberto Marte, PT    Thank you for this referral.    These services are reasonable and necessary for the conditions set forth above while under my care.

## 2019-04-05 ENCOUNTER — CLINICAL SUPPORT (OUTPATIENT)
Dept: REHABILITATION | Facility: HOSPITAL | Age: 74
End: 2019-04-05
Attending: ORTHOPAEDIC SURGERY
Payer: MEDICARE

## 2019-04-05 DIAGNOSIS — M76.31 ILIOTIBIAL BAND SYNDROME OF BOTH SIDES: ICD-10-CM

## 2019-04-05 DIAGNOSIS — M22.41 CHONDROMALACIA OF BOTH PATELLAE: ICD-10-CM

## 2019-04-05 DIAGNOSIS — M76.32 ILIOTIBIAL BAND SYNDROME OF BOTH SIDES: ICD-10-CM

## 2019-04-05 DIAGNOSIS — M22.42 CHONDROMALACIA OF BOTH PATELLAE: ICD-10-CM

## 2019-04-05 DIAGNOSIS — M70.62 TROCHANTERIC BURSITIS OF BOTH HIPS: Primary | ICD-10-CM

## 2019-04-05 DIAGNOSIS — M70.61 TROCHANTERIC BURSITIS OF BOTH HIPS: Primary | ICD-10-CM

## 2019-04-05 PROCEDURE — 97014 ELECTRIC STIMULATION THERAPY: CPT

## 2019-04-05 PROCEDURE — 97110 THERAPEUTIC EXERCISES: CPT

## 2019-04-05 NOTE — PROGRESS NOTES
Physical Therapy Daily Treatment Note     Name: Kaylin Mccollum  Clinic Number: [id number]    Therapy Diagnosis: No diagnosis found.  Physician: Richi Callaway, *    Visit Date: 4/5/2019    Physician Orders: PT Eval and Treat  Medical Diagnosis: Trochanteric bursitis, IT band syndrome,strain of right hamstring    Evaluation Date: 3/29/2019   Authorization Period Expiration: 4/29/2019  Plan of Care Certification Period: 3/19/2019  Visit #/Visits authorized: 2/20    Time In: 10:50  Time Out: 11:50  Total Billable Time:60 minutes    Precautions: Standard    Subjective     Pt reports: Left quad and knee tenderness.  She was compliant with home exercise program.  Response to previous treatment: Good  Functional change: No change    Pain: 2/10  Location: left knee  and upper legs     Objective     Kaylin received therapeutic exercises to develop strength, ROM and core stabilization for 45 minutes including:    Nustep 15 min  For endurance and repetative hip/knee motion   Supine single knee to chest 2x15   Supine trunk rotation 2x15   Bridging 2x15   Supine hip abduction with red band 2x15   Isometric hip adduction 2x15   Standing hip extension 2x15   Standing hip adduction 2x15    Kaylin received the following supervised modalities after being cleared for contradictions: IFC Electrical Stimulation:  Kaylin received IFC Electrical Stimulation for pain control applied to the lumbar area. Pt received modulated stimulation at  for 15 minutes. Kyalin tolderated treatment well without any adverse effects.      Kaylin received e stim with ice pack for 15 minutes to lumbar.    Home Exercises Provided and Patient Education Provided     Education provided:   Home exs performance and proper body mechanics    Written Home Exercises Provided: Patient instructed to cont prior HEP.  Exercises were reviewed and Kaylin was able to demonstrate them prior to the end of the session.  Kaylin demonstrated good   understanding of the education provided.       Assessment     Patient with complaints of right hip and knee pain.  She is performing exercises at home and brought a red therapy band.  Kaylin is progressing well towards her goals.   Pt prognosis is Good.     Pt will continue to benefit from skilled outpatient physical therapy to address the deficits listed in the problem list box on initial evaluation, provide pt/family education and to maximize pt's level of independence in the home and community environment.     Pt's spiritual, cultural and educational needs considered and pt agreeable to plan of care and goals.     Anticipated barriers to physical therapy: None    Goals:   Short Term Goals: In 3 weeks   1.I with HEP  2.Pt to increase BLE strength from 4/5 to 4+/5    3.Pt to have pain less than 4/10 at all times.  4.Pt to score greater than 65% impaired on the Lower Extremity Functional Scale     Long Term Goals: In 6 weeks  1. Pt to score greater than 85% impaired on the Lower Extremity Functional Scale  2. Patient to demo increase in LE strength to 5/5  3. Patient to have decreased pain to 1/10 at all times.  4.Patient to perform daily activities including bath and shopping without limitation.     Plan     Outpatient Physical Therapy 2 times weekly for 6 weeks to include the following interventions: Manual Therapy, Moist Heat/ Ice, Therapeutic Activites and Therapeutic Exercise.      Jose Roberto Marte, PT

## 2019-04-09 ENCOUNTER — CLINICAL SUPPORT (OUTPATIENT)
Dept: REHABILITATION | Facility: HOSPITAL | Age: 74
End: 2019-04-09
Attending: ORTHOPAEDIC SURGERY
Payer: MEDICARE

## 2019-04-09 DIAGNOSIS — M22.41 CHONDROMALACIA OF BOTH PATELLAE: ICD-10-CM

## 2019-04-09 DIAGNOSIS — M76.31 ILIOTIBIAL BAND SYNDROME OF BOTH SIDES: ICD-10-CM

## 2019-04-09 DIAGNOSIS — M70.62 TROCHANTERIC BURSITIS OF BOTH HIPS: Primary | ICD-10-CM

## 2019-04-09 DIAGNOSIS — M76.32 ILIOTIBIAL BAND SYNDROME OF BOTH SIDES: ICD-10-CM

## 2019-04-09 DIAGNOSIS — G57.02 PIRIFORMIS SYNDROME OF LEFT SIDE: ICD-10-CM

## 2019-04-09 DIAGNOSIS — M22.42 CHONDROMALACIA OF BOTH PATELLAE: ICD-10-CM

## 2019-04-09 DIAGNOSIS — M70.61 TROCHANTERIC BURSITIS OF BOTH HIPS: Primary | ICD-10-CM

## 2019-04-09 PROCEDURE — 97110 THERAPEUTIC EXERCISES: CPT

## 2019-04-09 PROCEDURE — 97014 ELECTRIC STIMULATION THERAPY: CPT

## 2019-04-09 NOTE — PROGRESS NOTES
Physical Therapy Daily Treatment Note     Name: Kaylin Mccollum  Clinic Number: [id number]    Therapy Diagnosis:   Encounter Diagnoses   Name Primary?    Trochanteric bursitis of both hips Yes    Piriformis syndrome of left side     Iliotibial band syndrome of both sides     Chondromalacia of both patellae      Physician: Richi Callaway, *    Visit Date: 4/9/2019    Physician Orders: PT Eval and Treat  Medical Diagnosis: Trochanteric bursitis, IT band syndrome,strain of right hamstring    Evaluation Date: 3/29/2019   Authorization Period Expiration: 4/29/2019  Plan of Care Certification Period: 3/19/2019  Visit #/Visits authorized: 3/20    Time In: 1:08  Time Out: 2:00  Total Billable Time:52 minutes    Precautions: Standard    Subjective     Pt reports: Has complaints of soreness over weekend  She was compliant with home exercise program.  Response to previous treatment: Complaints of lumbar pain    Pain: 3/10  Location: Low back pain    Objective     Kaylin received therapeutic exercises to develop strength, ROM and core stabilization for 37 minutes including:    Recumbent bike 15 min for endurance and repetative hip/knee motion   Supine single knee to chest 2x15   Supine trunk rotation 2x15   Bridging 2x15   Supine hip abduction with red band 2x15   Isometric hip adduction 2x15   Standing hip extension 2x15   Standing hip adduction 2x15  Shuttle 5 bands 2x15  Sit to stand from elevated surface 2 x 15    Kaylin received the following supervised modalities after being cleared for contradictions: IFC Electrical Stimulation:  Kaylin received IFC Electrical Stimulation for pain control applied to the lumbar area. Pt received modulated stimulation at  for 15 minutes. Kaylni tolderated treatment well without any adverse effects.      Kaylin received e stim with ice pack for 15 minutes to lumbar.    Home Exercises Provided and Patient Education Provided     Education provided:   Home exs performance  and proper body mechanics    Written Home Exercises Provided: Patient instructed to cont prior HEP.  Exercises were reviewed and Kaylin was able to demonstrate them prior to the end of the session.  Kaylin demonstrated good  understanding of the education provided.       Assessment     The patient reports improvement in sit<>stand activity.   Complains of low back pain today and soreness over weekend.   She performed exs supine seated and standing to iprove LE strength to decrease pain and improve functional mobility  Kaylin is progressing well towards her goals.   Pt prognosis is Good.     Pt will continue to benefit from skilled outpatient physical therapy to address the deficits listed in the problem list box on initial evaluation, provide pt/family education and to maximize pt's level of independence in the home and community environment.     Pt's spiritual, cultural and educational needs considered and pt agreeable to plan of care and goals.     Anticipated barriers to physical therapy: None    Goals:   Short Term Goals: In 3 weeks   1.I with HEP  2.Pt to increase BLE strength from 4/5 to 4+/5    3.Pt to have pain less than 4/10 at all times.  4.Pt to score greater than 65% impaired on the Lower Extremity Functional Scale     Long Term Goals: In 6 weeks  1. Pt to score greater than 85% impaired on the Lower Extremity Functional Scale  2. Patient to demo increase in LE strength to 5/5  3. Patient to have decreased pain to 1/10 at all times.  4.Patient to perform daily activities including bath and shopping without limitation.     Plan     Outpatient Physical Therapy 2 times weekly for 6 weeks to include the following interventions: Manual Therapy, Moist Heat/ Ice, Therapeutic Activites and Therapeutic Exercise.      Jose Roberto Marte, PT

## 2019-04-10 ENCOUNTER — HOSPITAL ENCOUNTER (OUTPATIENT)
Facility: HOSPITAL | Age: 74
Discharge: HOME | End: 2019-04-10
Attending: ANESTHESIOLOGY | Admitting: ANESTHESIOLOGY
Payer: MEDICARE

## 2019-04-10 VITALS
DIASTOLIC BLOOD PRESSURE: 74 MMHG | RESPIRATION RATE: 17 BRPM | HEART RATE: 65 BPM | BODY MASS INDEX: 28.92 KG/M2 | SYSTOLIC BLOOD PRESSURE: 146 MMHG | OXYGEN SATURATION: 95 % | HEIGHT: 70 IN | WEIGHT: 202 LBS

## 2019-04-10 DIAGNOSIS — M47.816 LUMBAR SPONDYLOSIS: ICD-10-CM

## 2019-04-10 DIAGNOSIS — M70.60 GREATER TROCHANTERIC BURSITIS, UNSPECIFIED LATERALITY: ICD-10-CM

## 2019-04-10 DIAGNOSIS — M53.3 SACROILIAC JOINT PAIN: Primary | ICD-10-CM

## 2019-04-10 PROCEDURE — 63600175 PHARM REV CODE 636 W HCPCS: Performed by: ANESTHESIOLOGY

## 2019-04-10 PROCEDURE — 20610 DRAIN/INJ JOINT/BURSA W/O US: CPT | Mod: 50,59,, | Performed by: ANESTHESIOLOGY

## 2019-04-10 PROCEDURE — 77002 PR FLUOROSCOPIC GUIDANCE NEEDLE PLACEMENT: ICD-10-PCS | Mod: 26,59,, | Performed by: ANESTHESIOLOGY

## 2019-04-10 PROCEDURE — 27096 PR INJECTION,SACROILIAC JOINT: ICD-10-PCS | Mod: 50,,, | Performed by: ANESTHESIOLOGY

## 2019-04-10 PROCEDURE — 77002 NEEDLE LOCALIZATION BY XRAY: CPT | Mod: 26,59,, | Performed by: ANESTHESIOLOGY

## 2019-04-10 PROCEDURE — 25000003 PHARM REV CODE 250

## 2019-04-10 PROCEDURE — 25000003 PHARM REV CODE 250: Performed by: ANESTHESIOLOGY

## 2019-04-10 PROCEDURE — 63600175 PHARM REV CODE 636 W HCPCS

## 2019-04-10 PROCEDURE — 20610 PR DRAIN/INJECT LARGE JOINT/BURSA: ICD-10-PCS | Mod: 50,59,, | Performed by: ANESTHESIOLOGY

## 2019-04-10 PROCEDURE — 27096 INJECT SACROILIAC JOINT: CPT | Mod: 50,,, | Performed by: ANESTHESIOLOGY

## 2019-04-10 RX ORDER — LIDOCAINE HYDROCHLORIDE 20 MG/ML
INJECTION, SOLUTION EPIDURAL; INFILTRATION; INTRACAUDAL; PERINEURAL
Status: DISCONTINUED | OUTPATIENT
Start: 2019-04-10 | End: 2019-04-10 | Stop reason: HOSPADM

## 2019-04-10 RX ORDER — METHYLPREDNISOLONE ACETATE 40 MG/ML
INJECTION, SUSPENSION INTRA-ARTICULAR; INTRALESIONAL; INTRAMUSCULAR; SOFT TISSUE
Status: DISCONTINUED | OUTPATIENT
Start: 2019-04-10 | End: 2019-04-10 | Stop reason: HOSPADM

## 2019-04-10 NOTE — OP NOTE
PROCEDURE: Sacroiliac joint and Greater trochanteric bursa injection under fluoroscopic guidance       SIDE: bilateral Sacroiliac injection and bilateral greater trochanteric bursa injection      PROCEDURE DATE: 4/10/2019    PREOPERATIVE DIAGNOSIS: Sacroiliitis, greater trochanteric bursitis  POSTOPERATIVE DIAGNOSIS: Sacroiliitis, greater trochanteric bursitis    PROVIDER: Manpreet Dutta MD  Assistant(s): none    Anesthesia: Local, No Sedation     >> 0 mg of VERSED    >> 0 mcg of FENTANYL     INDICATION: The patient has a history of pain due to sacroiliitis unresponsive to conservative treatments. Fluoroscopy was used to optimize visualization of needle placement and to maximize safety.       PROCEDURE DESCRIPTION: The patient was seen and identified in the preoperative area. Risks, benefits, complications, and alternatives were discussed with the patient. The patient agreed to proceed with the procedure and signed the consent. The site and side of the procedure was identified and marked.     The patient was taken to the procedural suite. The patient was positioned in prone orientation on procedure table. A time out was performed prior to any intervention. The procedure, site, side, and allergies were stated and agreed to by all present. The lumbosacral area extending to the skin overlying the femoral greater trochanter was widely prepped with ChloraPrep. The procedural site was draped in usual sterile fashion. Vital signs were closely monitored throughout this procedure.     The fluoroscopic camera was placed in contralateral oblique view and was adjusted until the anterior and posterior joint margins of the targeted sacroiliac joint aligned in linear array. The lower pole of the joint was identified, marked, and localized with 1% Lidocaine. A 25 gauge 3.5 inch spinal needle was introduced and advanced to the joint under fluoroscopic guidance. The joint space was entered and after negative aspiration, 2 mL of  injectate was posited into the joint space. The needle was then withdrawn outside of the joint space and after negative aspiration 1 mL of solution was injected outside of the joint space. The injectant solution used was comprised of 7 mL of 1% PF Lidocaine and 3 mL of Methylprednisolone (40 mg/mL). This techniques was performed for the above noted joint/s.    Following Sacroiliac Joint injection, the stylet was replaced and the needle was withdrawn intact. The RIGHT and LEFT greater trochanter was then identified with fluoroscopy. The targeted site of entry was localized with 1% PF Lidocaine. The above noted spinal needle was advanced to the lateral border of the greater trochanter until the osseus interface was met.  After negative aspiration, 2 mL of the above noted solution was injected. No pain or paresthesia was noted on injection. Following injection, the stylet was replaced and the needle was withdrawn intact. This technique was used for the above noted greater trochanteric bursa / bursae. The skin was cleaned and bandages were applied.    Description of Findings: Not applicable    Prosthetic devices, grafts, tissues, or devices implanted: None    Specimen Removed: No    Estimated Blood Loss: minimal    COMPLICATIONS: None    DISPOSITION / PLANS: The patient was transferred to the recovery area in a stable condition for observation. The patient was reexamined prior to discharge. There was no evidence of acute neurologic injury following the procedure.  Patient was discharged from the recovery room after meeting discharge criteria. Home discharge instructions were given to the patient by the staff.

## 2019-04-10 NOTE — PLAN OF CARE
Problem: Adult Inpatient Plan of Care  Goal: Plan of Care Review  Outcome: Outcome(s) achieved Date Met: 04/10/19  Patient d/c home in stable condition via wheelchair with ride. Verbalized understanding of d/c instructions. Patient voiced no complaints. Patient stood at side of bed, walked steps with no new motor deficits. Neuro intact.

## 2019-04-10 NOTE — DISCHARGE SUMMARY
[facility]  Discharge Note       Description of Procedure: Bilateral Sacroiliac Joint and Greater Trochanteric Bursa Injection under Fluoroscopic Guidance    Procedure Date: 4/10/2019    Admit Date: 4/10/2019  Discharge Date: 4/10/2019     Attending Physician: Luzmaria Case   Discharge Provider: Luzmaria Case    Preoperative Diagnosis: Sacroiliitis and Greater Trochanteric Bursitis     Postoperative Diagnosis: as above, same as preoperative diagnosis    Discharged Condition: Stable    Hospital Course: Patient was admitted for an outpatient procedure. The procedure was tolerated well with no complications.    Final Diagnoses: Same as principal problem.    Disposition: Home, self-care.    Follow up/Patient Instructions:  Follow-up in clinic in 2-3 weeks.    Medications: No medications were prescribed today. The patient was advised to resume normal medication regimen without change.  Specific information was provided regarding restarting any anticoagulant/s.    Discharge Procedure Orders (must include Diet, Follow-up, Activity):  Light activity for the remainder of the day, resume normal activity tomorrow. Resume normal diet. Follow-up in clinic in 2-3 weeks.

## 2019-05-24 ENCOUNTER — OFFICE VISIT (OUTPATIENT)
Dept: PAIN MEDICINE | Facility: CLINIC | Age: 74
End: 2019-05-24
Payer: MEDICARE

## 2019-05-24 VITALS
BODY MASS INDEX: 28.92 KG/M2 | HEART RATE: 76 BPM | WEIGHT: 202 LBS | DIASTOLIC BLOOD PRESSURE: 67 MMHG | SYSTOLIC BLOOD PRESSURE: 121 MMHG | RESPIRATION RATE: 18 BRPM | HEIGHT: 70 IN

## 2019-05-24 DIAGNOSIS — M70.62 GREATER TROCHANTERIC BURSITIS OF BOTH HIPS: ICD-10-CM

## 2019-05-24 DIAGNOSIS — M70.61 GREATER TROCHANTERIC BURSITIS OF BOTH HIPS: ICD-10-CM

## 2019-05-24 DIAGNOSIS — M17.11 PRIMARY OSTEOARTHRITIS OF RIGHT KNEE: ICD-10-CM

## 2019-05-24 DIAGNOSIS — M46.1 SACROILIITIS: ICD-10-CM

## 2019-05-24 DIAGNOSIS — M47.816 LUMBAR SPONDYLOSIS: Primary | ICD-10-CM

## 2019-05-24 DIAGNOSIS — M53.3 SACROILIAC JOINT PAIN: ICD-10-CM

## 2019-05-24 PROCEDURE — 99214 PR OFFICE/OUTPT VISIT, EST, LEVL IV, 30-39 MIN: ICD-10-PCS | Mod: S$PBB,,, | Performed by: PHYSICIAN ASSISTANT

## 2019-05-24 PROCEDURE — 99999 PR PBB SHADOW E&M-EST. PATIENT-LVL V: CPT | Mod: PBBFAC,,, | Performed by: PHYSICIAN ASSISTANT

## 2019-05-24 PROCEDURE — 99215 OFFICE O/P EST HI 40 MIN: CPT | Mod: PBBFAC | Performed by: PHYSICIAN ASSISTANT

## 2019-05-24 PROCEDURE — 99214 OFFICE O/P EST MOD 30 MIN: CPT | Mod: S$PBB,,, | Performed by: PHYSICIAN ASSISTANT

## 2019-05-24 PROCEDURE — 99999 PR PBB SHADOW E&M-EST. PATIENT-LVL V: ICD-10-PCS | Mod: PBBFAC,,, | Performed by: PHYSICIAN ASSISTANT

## 2019-05-24 NOTE — PROGRESS NOTES
Chief Pain Complaint:  Back pain  Right knee pain    Interval History: Patient was seen on 4/10/19. At that time she underwent Bilateral SI Joint + GTB injection.  The patient reports that she is/was better following the procedure.  she reports great pain relief, but low back pain is returning  The changes have NOT continued through this visit.      History of Present Illness:   Kaylin Mccollum is a 74 y.o. female  who is presenting with a chief complaint of Low-back Pain. The patient began experiencing this problem insidiously, and the pain has been gradually worsening over time. The pain is described as throbbing, cramping, aching and heavy and is located in the bilateral buttocks. Pain is intermittent and lasts hours. The pain is nonradiating. The patient rates her pain a 8 out of ten and interferes with activities of daily living a 7 out of ten. Pain is exacerbated by getting up from a seated position, and is improved by rest. Patient reports no prior trauma, no prior spinal surgery     She also complains of right knee pain. The patient began experiencing this problem insidiously. The pain is described as cramping, aching and is located in the right knee. Pain is intermittent and lasts hours. The pain is nonradiating. The patient rates her pain a 8 out of ten and interferes with activities of daily living a 7 out of ten. Pain is exacerbated by getting up from a seated position and standing, and is improved by rest. Patient reports no prior trauma, prior arthroscopy bilaterally in 1990s.      - pertinent negatives: No fever, No chills, No weight loss, No bladder dysfunction, No bowel dysfunction, No saddle anesthesia  - pertinent positives: none    - medications, other therapies tried (physical therapy, injections):     >> NSAIDs, Tylenol, gabapentin and medrol dose pack    >> Has previously undergone Physical Therapy    >> Has previously undergone spinal injection/s   - Lumbar JAMIN with good relief in the  past   - Bilateral SI Joint + GTB injection on 4/10/19 with great relief for about 4 weeks        Imaging / Labs / Studies (reviewed on 5/24/2019):    Results for orders placed during the hospital encounter of 11/23/15   MRI Lumbar Spine Without Contrast    Narrative Technique: Standard noncontrast multiplanar multisequence imaging of the lumbar spine was performed.  Findings: There is anatomic spinal alignment.  Very low degree of degenerative disk base narrowing and disk desiccation observed at L1-2, L2-3, L4-5, and L5-S1.  Vertebral marrow signal pattern and architecture are normal.  Distal cord is unremarkable with conus medullaris terminating at L1.  Small nerve root sleeve cysts incidentally noted in the sacral canal.  There are bilateral renal cysts.  L1-2: Small annular bulge and endplate lipping.  Bilateral facet arthropathy.  No significant foraminal narrowing or canal stenosis.  L2-3: Small annular bulge and endplate lipping.  Bilateral facet arthropathy.  No significant foraminal narrowing or canal stenosis.  L3-4: Posterior disk bulge with small bilateral foraminal disk protrusions.  Bilateral facet arthropathy and ligament hypertrophy.  Mild inferior foraminal narrowing.  No canal stenosis.  L4-5: Posterior disk bulge, hypertrophic facet arthropathy, and ligament thickening resulting in moderate bilateral foraminal narrowing and moderate central canal stenosis.  AP canal diameter measures 8.4 mm.   L5-S1: Broad based posterior disk bulge and endplate lipping.  Hypertrophic facet arthropathy and ligament thickening.  Moderate bilateral foraminal narrowing.  No significant canal stenosis.    Impression  Multilevel lumbar spondylosis and degenerative disk disease as detailed.     Results for orders placed in visit on 05/31/12   X-Ray Lumbar Spine Ap And Lateral    Narrative Findings: Generalized osteopenia.  Multilevel degenerative vertebral end plate spurring and facet arthropathy.  Moderate to severe  "disk space narrowing and associated vacuum disk phenomenon at L5-S1.  Suggestion of slight diane-listhesis of L4 on L5.  Intact pedicles.  No compression fracture.         Review of Systems:  CONSTITUTIONAL: patient denies any fever, chills, or weight loss  SKIN: patient denies any rash or itching  RESPIRATORY: patient denies having any shortness of breath  GASTROINTESTINAL: patient denies having any diarrhea, constipation, or bowel incontinence  GENITOURINARY: patient denies having any abnormal bladder function    MUSCULOSKELETAL:  - patient complains of the above noted pain/s (see chief pain complaint)    NEUROLOGICAL:   - pain as above  - strength in Lower extremities is intact, BILATERALLY  - sensation in Lower extremities is intact, BILATERALLY  - patient denies any loss of bowel or bladder control      PSYCHIATRIC: patient denies any change in mood    Other:  All other systems reviewed and are negative      Physical Exam:  Vitals:  /67 (BP Location: Left arm, Patient Position: Sitting, BP Method: Medium (Automatic))   Pulse 76   Resp 18   Ht 5' 10" (1.778 m)   Wt 91.6 kg (202 lb)   BMI 28.98 kg/m²   (reviewed on 5/24/2019)    General: alert and oriented, in no apparent distress.  Gait: normal gait.  Skin: no rashes, no discoloration, no obvious lesions  HEENT: normocephalic, atraumatic. Pupils equal and round.  Cardiovascular: no significant peripheral edema present.  Respiratory: without use of accessory muscles of respiration.    Musculoskeletal - Lumbar Spine:  - Pain on flexion of lumbar spine: Absent   - Pain on extension of lumbar spine: Absent         - Lumbar facet loading: Absent   - TTP over the lumbar facet joints: Absent  - TTP over the lumbar paraspinals: Absent  - TTP over the SI joints:  Present bilaterally, R>L  - TTP over GT bursa: Present, mild, less so than over SIJ  - Straight Leg Raise: Negative  - CHERYLE: Present    Right Knee:  - TTP: Present over medial/ lateral joint line  - " Pain with extension: Present  - Pain with flexion: Present  - Crepitus: Present     Neuro - Lower Extremities:  - BLE Strength: R/L: HF: 5/5, HE: 5/5, KF: 5/5; KE: 5/5; FE: 5/5; FF: 5/5  - Extremity Reflexes: Brisk and symmetric throughout  - Sensory: Sensation to light touch intact bilaterally      Psych:  Mood and affect is appropriate        Assessment:  Kaylin Mccollum is a 74 y.o. year old female who is presenting with     Encounter Diagnoses   Name Primary?    Lumbar spondylosis Yes    Sacroiliac joint pain     Sacroiliitis     Greater trochanteric bursitis of both hips     Primary osteoarthritis of right knee        Plan:  1. Interventional:   - S/p Bilateral SI Joint + GTB injection on 4/10/19 with great relief for about 4 weeks.  - Schedule right then left SIJ RFA with RN IV sedation.  - Schedule right genicular nerve block with local.  Procedure discussed with patient and also discussed possible RFA.  Coolief handout given.     2. Pharmacologic: Continue Celebrex 100mg BID PRN.    3. Rehabilitative: Consider PT post injection.    4. Diagnostic: None for now.    5. Follow up: 4 weeks Post injection.    - I discussed the risks, benefits, and alternatives to potential treatment options. All questions and concerns were fully addressed today in clinic. Dr. Dutta was consulted regarding the patient plan and agrees.

## 2019-05-30 ENCOUNTER — PATIENT OUTREACH (OUTPATIENT)
Dept: ADMINISTRATIVE | Facility: HOSPITAL | Age: 74
End: 2019-05-30

## 2019-05-30 DIAGNOSIS — E11.69 DIABETES MELLITUS TYPE 2 IN OBESE: ICD-10-CM

## 2019-05-30 DIAGNOSIS — Z13.820 ENCOUNTER FOR SCREENING FOR OSTEOPOROSIS: ICD-10-CM

## 2019-05-30 DIAGNOSIS — M89.9 DISORDER OF BONE: ICD-10-CM

## 2019-05-30 DIAGNOSIS — Z12.31 ENCOUNTER FOR SCREENING MAMMOGRAM FOR MALIGNANT NEOPLASM OF BREAST: Primary | ICD-10-CM

## 2019-05-30 DIAGNOSIS — R59.9 ENLARGED LYMPH NODES: ICD-10-CM

## 2019-05-30 DIAGNOSIS — E66.9 DIABETES MELLITUS TYPE 2 IN OBESE: ICD-10-CM

## 2019-06-04 ENCOUNTER — DOCUMENTATION ONLY (OUTPATIENT)
Dept: REHABILITATION | Facility: HOSPITAL | Age: 74
End: 2019-06-04

## 2019-06-06 DIAGNOSIS — M79.7 FIBROMYALGIA: Chronic | ICD-10-CM

## 2019-06-06 RX ORDER — GABAPENTIN 300 MG/1
CAPSULE ORAL
Qty: 180 CAPSULE | Refills: 1 | Status: SHIPPED | OUTPATIENT
Start: 2019-06-06 | End: 2019-07-22 | Stop reason: SDUPTHER

## 2019-06-07 ENCOUNTER — TELEPHONE (OUTPATIENT)
Dept: INTERNAL MEDICINE | Facility: CLINIC | Age: 74
End: 2019-06-07

## 2019-06-07 DIAGNOSIS — R35.0 URINARY FREQUENCY: Primary | ICD-10-CM

## 2019-06-07 NOTE — TELEPHONE ENCOUNTER
Patient is scheduled for labs/urine on Monday and states she is having urine frequency. Patient is asking if she can have a urinalysis and culture done as well.

## 2019-06-07 NOTE — TELEPHONE ENCOUNTER
----- Message from Karley Alen sent at 6/7/2019  4:11 PM CDT -----  Contact: Self  Patient requesting a call back regarding lab work. She states that she has a Urine test scheduled but she would like a culture done also. She believes she has a UTI. Please call patient back at [phone]

## 2019-06-10 ENCOUNTER — LAB VISIT (OUTPATIENT)
Dept: LAB | Facility: HOSPITAL | Age: 74
End: 2019-06-10
Attending: FAMILY MEDICINE
Payer: MEDICARE

## 2019-06-10 DIAGNOSIS — E11.69 DIABETES MELLITUS TYPE 2 IN OBESE: ICD-10-CM

## 2019-06-10 DIAGNOSIS — E66.9 DIABETES MELLITUS TYPE 2 IN OBESE: ICD-10-CM

## 2019-06-10 LAB
ESTIMATED AVG GLUCOSE: 131 MG/DL (ref 68–131)
HBA1C MFR BLD HPLC: 6.2 % (ref 4–5.6)

## 2019-06-10 PROCEDURE — 36415 COLL VENOUS BLD VENIPUNCTURE: CPT

## 2019-06-10 PROCEDURE — 83036 HEMOGLOBIN GLYCOSYLATED A1C: CPT

## 2019-06-18 ENCOUNTER — OFFICE VISIT (OUTPATIENT)
Dept: INTERNAL MEDICINE | Facility: CLINIC | Age: 74
End: 2019-06-18
Payer: MEDICARE

## 2019-06-18 VITALS
DIASTOLIC BLOOD PRESSURE: 66 MMHG | TEMPERATURE: 97 F | SYSTOLIC BLOOD PRESSURE: 137 MMHG | HEART RATE: 75 BPM | WEIGHT: 202.63 LBS | OXYGEN SATURATION: 98 % | BODY MASS INDEX: 29.07 KG/M2

## 2019-06-18 DIAGNOSIS — E66.9 DIABETES MELLITUS TYPE 2 IN OBESE: Primary | ICD-10-CM

## 2019-06-18 DIAGNOSIS — F32.9 CHRONIC MAJOR DEPRESSIVE DISORDER: Chronic | ICD-10-CM

## 2019-06-18 DIAGNOSIS — E11.69 HYPERLIPIDEMIA ASSOCIATED WITH TYPE 2 DIABETES MELLITUS: ICD-10-CM

## 2019-06-18 DIAGNOSIS — E11.59 HYPERTENSION ASSOCIATED WITH DIABETES: ICD-10-CM

## 2019-06-18 DIAGNOSIS — I15.2 HYPERTENSION ASSOCIATED WITH DIABETES: ICD-10-CM

## 2019-06-18 DIAGNOSIS — E11.69 DIABETES MELLITUS TYPE 2 IN OBESE: Primary | ICD-10-CM

## 2019-06-18 DIAGNOSIS — E78.5 HYPERLIPIDEMIA ASSOCIATED WITH TYPE 2 DIABETES MELLITUS: ICD-10-CM

## 2019-06-18 DIAGNOSIS — R39.15 URINARY URGENCY: ICD-10-CM

## 2019-06-18 DIAGNOSIS — R33.9 INCOMPLETE EMPTYING OF BLADDER: ICD-10-CM

## 2019-06-18 PROCEDURE — 99999 PR PBB SHADOW E&M-EST. PATIENT-LVL IV: CPT | Mod: PBBFAC,,, | Performed by: FAMILY MEDICINE

## 2019-06-18 PROCEDURE — 99214 PR OFFICE/OUTPT VISIT, EST, LEVL IV, 30-39 MIN: ICD-10-PCS | Mod: S$PBB,,, | Performed by: FAMILY MEDICINE

## 2019-06-18 PROCEDURE — 99214 OFFICE O/P EST MOD 30 MIN: CPT | Mod: PBBFAC | Performed by: FAMILY MEDICINE

## 2019-06-18 PROCEDURE — 99999 PR PBB SHADOW E&M-EST. PATIENT-LVL IV: ICD-10-PCS | Mod: PBBFAC,,, | Performed by: FAMILY MEDICINE

## 2019-06-18 PROCEDURE — 99214 OFFICE O/P EST MOD 30 MIN: CPT | Mod: S$PBB,,, | Performed by: FAMILY MEDICINE

## 2019-06-18 RX ORDER — LOSARTAN POTASSIUM 25 MG/1
25 TABLET ORAL DAILY
Qty: 90 TABLET | Refills: 3 | Status: SHIPPED | OUTPATIENT
Start: 2019-06-18 | End: 2020-07-01

## 2019-06-18 NOTE — PROGRESS NOTES
Subjective:       Patient ID: Kaylin Mccollum is a 74 y.o. female.    Chief Complaint: Follow-up (right hip and thigh pain)    Patient presents to clinic today for followup of chronic conditions. She reports seeing Dr. Callaway for right hip/knee pain. She reports continued urinary urgency and incomplete emptying. She reports taking OTC AZO with some relief.     Review of Systems   Constitutional: Negative for chills, fatigue, fever and unexpected weight change.   Eyes: Negative for visual disturbance.   Respiratory: Negative for shortness of breath.    Cardiovascular: Negative for chest pain.   Genitourinary: Positive for urgency. Negative for dysuria.   Musculoskeletal: Negative for myalgias.   Neurological: Negative for headaches.       Objective:      Physical Exam   Constitutional: She is oriented to person, place, and time. She appears well-developed and well-nourished. No distress.   HENT:   Head: Normocephalic and atraumatic.   Eyes: Pupils are equal, round, and reactive to light. Conjunctivae and EOM are normal. No scleral icterus.   Cardiovascular: Normal rate and regular rhythm. Exam reveals no gallop and no friction rub.   No murmur heard.  Pulmonary/Chest: Effort normal and breath sounds normal.   Neurological: She is alert and oriented to person, place, and time. No cranial nerve deficit. Gait normal.   Psychiatric: She has a normal mood and affect.   Vitals reviewed.        Protective Sensation (w/ 10 gram monofilament):  Right: Intact  Left: Intact    Visual Inspection:  Normal -  Bilateral    Pedal Pulses:   Right: Present  Left: Present    Posterior tibialis:   Right:Present  Left: Present        Assessment:       1. Diabetes mellitus type 2 in obese    2. Hypertension associated with diabetes    3. Hyperlipidemia associated with type 2 diabetes mellitus    4. Chronic major depressive disorder    5. Urinary urgency    6. Incomplete emptying of bladder        Plan:     Problem List Items Addressed  This Visit     Chronic major depressive disorder (Chronic)    Current Assessment & Plan     stable         Diabetes mellitus type 2 in obese - Primary    Current Assessment & Plan     a1c 6.2, continue metformin and jardiance         Hyperlipidemia associated with type 2 diabetes mellitus    Current Assessment & Plan     Status pending labs         Hypertension associated with diabetes (Chronic)    Current Assessment & Plan     Controlled, continue current meds         Relevant Medications    losartan (COZAAR) 25 MG tablet    Incomplete emptying of bladder    Relevant Orders    US Bladder Post Void Residual    Urinary urgency    Relevant Orders    Urinalysis (Completed)    Urine culture          Health Maintenance reviewed/updated.

## 2019-06-19 ENCOUNTER — LAB VISIT (OUTPATIENT)
Dept: LAB | Facility: HOSPITAL | Age: 74
End: 2019-06-19
Payer: MEDICARE

## 2019-06-19 DIAGNOSIS — R31.9 URINARY TRACT INFECTION WITH HEMATURIA, SITE UNSPECIFIED: Primary | ICD-10-CM

## 2019-06-19 DIAGNOSIS — E78.5 HYPERLIPIDEMIA ASSOCIATED WITH TYPE 2 DIABETES MELLITUS: ICD-10-CM

## 2019-06-19 DIAGNOSIS — E11.69 HYPERLIPIDEMIA ASSOCIATED WITH TYPE 2 DIABETES MELLITUS: ICD-10-CM

## 2019-06-19 DIAGNOSIS — N39.0 URINARY TRACT INFECTION WITH HEMATURIA, SITE UNSPECIFIED: Primary | ICD-10-CM

## 2019-06-19 PROBLEM — R33.9 INCOMPLETE EMPTYING OF BLADDER: Status: ACTIVE | Noted: 2019-06-19

## 2019-06-19 PROBLEM — R39.15 URINARY URGENCY: Status: ACTIVE | Noted: 2019-06-19

## 2019-06-19 LAB
ALBUMIN SERPL BCP-MCNC: 3.7 G/DL (ref 3.5–5.2)
ALP SERPL-CCNC: 59 U/L (ref 55–135)
ALT SERPL W/O P-5'-P-CCNC: 12 U/L (ref 10–44)
ANION GAP SERPL CALC-SCNC: 9 MMOL/L (ref 8–16)
AST SERPL-CCNC: 18 U/L (ref 10–40)
BILIRUB SERPL-MCNC: 0.3 MG/DL (ref 0.1–1)
BUN SERPL-MCNC: 20 MG/DL (ref 8–23)
CALCIUM SERPL-MCNC: 9.7 MG/DL (ref 8.7–10.5)
CHLORIDE SERPL-SCNC: 105 MMOL/L (ref 95–110)
CHOLEST SERPL-MCNC: 228 MG/DL (ref 120–199)
CHOLEST/HDLC SERPL: 4 {RATIO} (ref 2–5)
CO2 SERPL-SCNC: 29 MMOL/L (ref 23–29)
CREAT SERPL-MCNC: 0.8 MG/DL (ref 0.5–1.4)
EST. GFR  (AFRICAN AMERICAN): >60 ML/MIN/1.73 M^2
EST. GFR  (NON AFRICAN AMERICAN): >60 ML/MIN/1.73 M^2
GLUCOSE SERPL-MCNC: 80 MG/DL (ref 70–110)
HDLC SERPL-MCNC: 57 MG/DL (ref 40–75)
HDLC SERPL: 25 % (ref 20–50)
LDLC SERPL CALC-MCNC: 149.4 MG/DL (ref 63–159)
NONHDLC SERPL-MCNC: 171 MG/DL
POTASSIUM SERPL-SCNC: 4.3 MMOL/L (ref 3.5–5.1)
PROT SERPL-MCNC: 6.6 G/DL (ref 6–8.4)
SODIUM SERPL-SCNC: 143 MMOL/L (ref 136–145)
TRIGL SERPL-MCNC: 108 MG/DL (ref 30–150)

## 2019-06-19 PROCEDURE — 36415 COLL VENOUS BLD VENIPUNCTURE: CPT

## 2019-06-19 PROCEDURE — 80053 COMPREHEN METABOLIC PANEL: CPT

## 2019-06-19 PROCEDURE — 80061 LIPID PANEL: CPT

## 2019-06-19 RX ORDER — CIPROFLOXACIN 500 MG/1
500 TABLET ORAL 2 TIMES DAILY
Qty: 14 TABLET | Refills: 0 | Status: SHIPPED | OUTPATIENT
Start: 2019-06-19 | End: 2019-06-21

## 2019-06-21 RX ORDER — FLUCONAZOLE 150 MG/1
150 TABLET ORAL DAILY
Qty: 7 TABLET | Refills: 0 | Status: SHIPPED | OUTPATIENT
Start: 2019-06-21 | End: 2019-06-28

## 2019-06-26 ENCOUNTER — OFFICE VISIT (OUTPATIENT)
Dept: ORTHOPEDICS | Facility: CLINIC | Age: 74
End: 2019-06-26
Payer: MEDICARE

## 2019-06-26 VITALS
HEIGHT: 70 IN | HEART RATE: 71 BPM | DIASTOLIC BLOOD PRESSURE: 73 MMHG | SYSTOLIC BLOOD PRESSURE: 152 MMHG | BODY MASS INDEX: 28.92 KG/M2 | WEIGHT: 202 LBS

## 2019-06-26 DIAGNOSIS — M76.31 IT BAND SYNDROME, RIGHT: ICD-10-CM

## 2019-06-26 DIAGNOSIS — M53.3 SI (SACROILIAC) PAIN: ICD-10-CM

## 2019-06-26 DIAGNOSIS — M79.7 FIBROMYALGIA: Primary | ICD-10-CM

## 2019-06-26 PROCEDURE — 99214 OFFICE O/P EST MOD 30 MIN: CPT | Mod: PBBFAC | Performed by: ORTHOPAEDIC SURGERY

## 2019-06-26 PROCEDURE — 99999 PR PBB SHADOW E&M-EST. PATIENT-LVL IV: CPT | Mod: PBBFAC,,, | Performed by: ORTHOPAEDIC SURGERY

## 2019-06-26 PROCEDURE — 99214 PR OFFICE/OUTPT VISIT, EST, LEVL IV, 30-39 MIN: ICD-10-PCS | Mod: S$PBB,,, | Performed by: ORTHOPAEDIC SURGERY

## 2019-06-26 PROCEDURE — 99999 PR PBB SHADOW E&M-EST. PATIENT-LVL IV: ICD-10-PCS | Mod: PBBFAC,,, | Performed by: ORTHOPAEDIC SURGERY

## 2019-06-26 PROCEDURE — 99214 OFFICE O/P EST MOD 30 MIN: CPT | Mod: S$PBB,,, | Performed by: ORTHOPAEDIC SURGERY

## 2019-06-26 NOTE — PROGRESS NOTES
Subjective:     Patient ID: Kaylin Mccollum is a 74 y.o. female.    Chief Complaint: Pain and Follow-up of the Right Knee and Pain and Follow-up of the Right Hip    Follow-up right hip and knee pain.  Had injections with Dr. timoteo Trujillo significant improvement.  Has some rfa scheduled with him.  History of fibromyalgia    Knee Pain    The pain is present in the right knee and right hip. This is a new problem. The current episode started more than 1 month ago. The problem occurs intermittently. The problem has been gradually improving. The quality of the pain is described as dull and aching. The pain is at a severity of 2/10. Associated symptoms include stiffness. Pertinent negatives include no fever or numbness. The symptoms are aggravated by walking, standing and activity. She has tried OTC pain meds and OTC ointments for the symptoms. The treatment provided mild relief. Physical therapy was not tried.      Past Medical History:   Diagnosis Date    Arthritis     Cataract     Diabetes mellitus     Diabetes mellitus, type 2     Fibromyalgia     General anesthetics causing adverse effect in therapeutic use     bradycardia and tachycardia    Hypertension     Migraines     Mitral valve prolapse     Osteoarthritis     Rotator cuff tear 4/1/2015     Past Surgical History:   Procedure Laterality Date    ARTHROSCOPY-SHOULDER-RIGHT WITH EXCISION OF CA LIGAMENT, PARTIAL ACROMIONECTOMY Right 6/4/2015    Performed [facility] at [facility] OR    BURSECTOMY-SHOULDER-RIGHT Right 6/4/2015    [facility] at [facility] OR    CATARACT EXTRACTION W/  INTRAOCULAR LENS IMPLANT Left 07/19/2017    CATARACT EXTRACTION W/  INTRAOCULAR LENS IMPLANT Right 2017    CHOLECYSTECTOMY      COLONOSCOPY N/A 9/25/2018    Performed by Bethel Carmona MD at [facility] ENDO    ESOPHAGOGASTRODUODENOSCOPY (EGD) N/A 6/24/2016    Performed by Kamaljit Gutierres III, MD at [facility] ENDO    KNEE ARTHROSCOPY Bilateral     PARS  PLANA VITRECTOMY W/ REPAIR OF MACULAR HOLE Left 02/22/2017    REPAIR ROTATOR CUFF ARTHROSCOPIC-RIGHT Right 6/4/2015    [facility] OR    REPAIR-RETINA (VITRECTOMY) Left 2/22/2017    [facility]    SHOULDER ARTHROSCOPY Right 06/04/15    SYNOVECTOMY-SHOULDER-RIGHT Right 6/4/2015    Per[facility] OR     Family History   Problem Relation Age of Onset    Heart disease Mother     Glaucoma Mother     Cataracts Mother     Cancer Mother         colon    Kidney disease Daughter     Diabetes Maternal Grandmother     Heart disease Maternal Grandmother     Diabetes Maternal Grandfather     Cancer Maternal Grandfather     Breast cancer Paternal Aunt      Social History     Socioeconomic History    Marital status:      Spouse name: Not on file    Number of children: Not on file    Years of education: Not on file    Highest education level: Not on file   Occupational History     Employer: [employer]   Social Needs    Financial resource strain: Not on file    Food insecurity:     Worry: Not on file     Inability: Not on file    Transportation needs:     Medical: Not on file     Non-medical: Not on file   Tobacco Use    Smoking status: Never Smoker    Smokeless tobacco: Never Used   Substance and Sexual Activity    Alcohol use: Yes     Alcohol/week: 0.0 oz     Comment: Rarely    Drug use: No    Sexual activity: Not Currently   Lifestyle    Physical activity:     Days per week: Not on file     Minutes per session: Not on file    Stress: Not on file   Relationships    Social connections:     Talks on phone: Not on file     Gets together: Not on file     Attends Hinduism service: Not on file     Active member of club or organization: Not on file     Attends meetings of clubs or organizations: Not on file     Relationship status: Not on file   Other Topics Concern    Not on file   Social History  "Narrative    . 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     Medication List with Changes/Refills   Current Medications    BIOTIN 1 MG TABLET    Take 1,000 mcg by mouth every morning.     CELECOXIB (CELEBREX) 100 MG CAPSULE    take 1 capsule by mouth twice a day    CYANOCOBALAMIN (VITAMIN B-12) 500 MCG TABLET    Take 500 mcg by mouth nightly.    EMPAGLIFLOZIN (JARDIANCE) 10 MG TAB    Take 10 mg by mouth once daily.    FLUCONAZOLE (DIFLUCAN) 150 MG TAB    Take 1 tablet (150 mg total) by mouth once daily. for 7 days    FLUOXETINE (PROZAC) 40 MG CAPSULE    Take 1 capsule (40 mg total) by mouth once daily.    FLUTICASONE (FLONASE) 50 MCG/ACTUATION NASAL SPRAY    2 sprays by Each Nare route once daily.    GABAPENTIN (NEURONTIN) 300 MG CAPSULE    TAKE 1 CAPSULE BY MOUTH TWICE DAILY    LOSARTAN (COZAAR) 25 MG TABLET    Take 1 tablet (25 mg total) by mouth once daily.    METFORMIN (GLUCOPHAGE) 1000 MG TABLET    TAKE 1 TABLET(1000 MG) BY MOUTH TWICE DAILY WITH MEALS    NALTREXONE CAPSULE    Take 4.5 mg by mouth every evening.    TRIAMCINOLONE ACETONIDE 0.025% (KENALOG) 0.025 % OINT    Apply topically 2 (two) times daily. for 10 days    VERAPAMIL (CALAN) 120 MG TABLET    Take 1 tablet (120 mg total) by mouth 2 (two) times daily.     Review of patient's allergies indicates:   Allergen Reactions    Demerol [meperidine] Other (See Comments)     Burning when adm IV  Able to tolerate IM, "Turned my hand purple."    Latex, natural rubber Other (See Comments)     "Burns my skin",  Symptoms get worse the longer she is exposed    Zocor [simvastatin] Other (See Comments)     Tightening of muscles    Azithromycin     Statins-hmg-coa reductase inhibitors Other (See Comments)     myopathy    Sulfa (sulfonamide antibiotics)     Nickel sutures [surgical stainless steel] Rash     Any nickel     Review of Systems   Constitution: Negative for fever.   HENT: Negative for hearing loss.    Eyes: Negative for blurred vision.   Cardiovascular: " Negative for chest pain and leg swelling.   Respiratory: Negative for shortness of breath.    Endocrine: Negative for polyuria.   Hematologic/Lymphatic: Negative for bleeding problem.   Skin: Negative for rash.   Musculoskeletal: Positive for back pain, joint pain, muscle cramps and stiffness. Negative for joint swelling and muscle weakness.   Gastrointestinal: Negative for melena.   Genitourinary: Negative for hematuria.   Neurological: Negative for loss of balance, numbness and paresthesias.   Psychiatric/Behavioral: Negative for altered mental status.       Objective:   Body mass index is 28.98 kg/m².  Vitals:    06/26/19 0914   BP: (!) 152/73   Pulse: 71       General: Kaylin is well-developed, well-nourished, appears stated age, in no acute distress, alert and oriented.       General    Vitals reviewed.  Constitutional: She is oriented to person, place, and time. She appears well-developed and well-nourished. No distress.   HENT:   Mouth/Throat: No oropharyngeal exudate.   Eyes: Right eye exhibits no discharge. Left eye exhibits no discharge.   Neck: Normal range of motion.   Cardiovascular: Normal rate.    Pulmonary/Chest: Effort normal and breath sounds normal. No respiratory distress.   Neurological: She is alert and oriented to person, place, and time. She has normal reflexes. No cranial nerve deficit. Coordination normal.   Psychiatric: She has a normal mood and affect. Her behavior is normal. Judgment and thought content normal.     General Musculoskeletal Exam   Gait: normal   Pelvic Obliquity: none      Right Knee Exam     Inspection   Alignment:  normal  Erythema: absent  Scars: absent  Swelling: absent  Effusion: absent  Deformity: absent  Bruising: absent    Tenderness   The patient is tender to palpation of the lateral joint line, iliotibial band and medial hamstring.    Crepitus   The patient has crepitus of the patella.    Range of Motion   Extension: 0   Flexion: 130     Tests   Meniscus    Kaleigh:  Medial - negative Lateral - negative  Ligament Examination Lachman: normal (-1 to 2mm) PCL-Posterior Drawer: normal (0 to 2mm)     MCL - Valgus: normal (0 to 2mm)  LCL - Varus: normal  Patella   Patellar apprehension: negative  Passive Patellar Tilt: neutral  Patellar Tracking: normal  Patellar Glide (quadrants): Lateral - 1   Medial - 2  Q-Angle at 90 degrees: normal  Patellar Grind: negative    Other   Meniscal Cyst: absent  Popliteal (Baker's) Cyst: absent  Sensation: normal    Left Knee Exam     Inspection   Alignment:  normal  Erythema: absent  Scars: absent  Swelling: absent  Effusion: absent  Deformity: absent  Bruising: absent    Tenderness   The patient tender to palpation of the condyle.    Crepitus   The patient has crepitus of the patella.    Range of Motion   Extension: 0   Flexion: 130     Tests   Meniscus   Kaleigh:  Medial - negative Lateral - negative  Stability Lachman: normal (-1 to 2mm) PCL-Posterior Drawer: normal (0 to 2mm)  MCL - Valgus: normal (0 to 2mm)  LCL - Varus: normal (0 to 2mm)  Patella   Patellar apprehension: negative  Passive Patellar Tilt: neutral  Patellar Tracking: normal  Patellar Glide (Quadrants): Lateral - 1 Medial - 2  Q-Angle at 90 degrees: normal  Patellar Grind: negative    Other   Meniscal Cyst: absent  Popliteal (Baker's) Cyst: absent  Sensation: normal    Right Hip Exam     Inspection   Scars: absent  Swelling: absent  Bruising: absent  No deformity of hip.  Quadriceps Atrophy:  Negative  Erythema: absent    Tenderness   The patient tender to palpation of the SI joint and trochanteric bursa.    Range of Motion   Abduction:  40 normal   Adduction:  20 normal   Extension:  0 normal   Flexion:  110 normal   External rotation:  50 normal   Internal rotation:  20 normal     Tests   Pain w/ forced internal rotation (CHERYLE): present  Pain w/ forced external rotation (FADIR): absent  Usama: positive  Trendelenburg Test: negative  Log Roll: negative    Other    Sensation: normal  Left Hip Exam     Inspection   Scars: absent  Swelling: absent  No deformity of hip.  Quadriceps Atrophy:  negative  Erythema: absent  Bruising: absent    Tenderness   The patient tender to palpation of the SI joint, piriformis and trochanteric bursa.    Range of Motion   Abduction:  40 normal   Adduction:  20 normal   Extension:  0 normal   Flexion:  110 normal   External rotation:  50 normal   Internal rotation: 20 normal     Tests   Pain w/ forced internal rotation (CHERYLE): present  Pain w/ forced external rotation (FADIR): absent  Usama: positive  Trendelenburg Test: negative  Log Roll: negative    Other   Sensation: normal      Back (L-Spine & T-Spine) / Neck (C-Spine) Exam   Back exam is normal.      Muscle Strength   Right Lower Extremity   Hip Abduction: 5/5   Hip Adduction: 5/5   Hip Flexion: 5/5   Quadriceps:  4/5   Hamstrin/5   Left Lower Extremity   Hip Abduction: 5/5   Hip Adduction: 5/5   Hip Flexion: 5/5   Quadriceps:  4/5   Hamstrin/5     Reflexes     Left Side  Quadriceps:  2+  Achilles:  2+    Right Side   Quadriceps:  2+  Achilles:  2+    Vascular Exam     Right Pulses  Dorsalis Pedis:      2+  Posterior Tibial:      2+        Left Pulses  Dorsalis Pedis:      2+  Posterior Tibial:      2+        Capillary Refill  Right Hand: normal capillary refill  Left Hand: normal capillary refill    Edema  Right Upper Leg: absent  Left Upper Leg: absent      Imaging reviewed and interpreted today   Johnathan Dc MD 3/20/2019       Narrative     EXAMINATION:  XR KNEE ORTHO RIGHT WITH FLEXION    CLINICAL HISTORY:  Pain in left leg    TECHNIQUE:  AP standing as well as PA flexion standing and Merchant views of both knees were performed.  A lateral view of the right knee is also performed.    COMPARISON:  None.    FINDINGS:  Bilateral tricompartmental degenerative changes are seen with findings most pronounced in the medial compartments, left greater than right with joint space  narrowing and marginal spurring.  No patellar tilt.  No fracture or dislocation.  No joint effusion.  Superior patellar enthesophyte is noted on the right.  Atherosclerosis is noted.  Soft tissues are symmetric in appearance.      Impression       As above      Electronically signed by: Johnathan Dc MD  Date: 03/20/2019  Time: 15:41     Reading Physician Reading Date Result Priority   Johnathan Dc MD 3/20/2019       Narrative     EXAMINATION:  XR HIP 2 VIEW RIGHT    CLINICAL HISTORY:  Pain in left leg    TECHNIQUE:  AP view of the pelvis and frog leg lateral view of the right hip were performed.    COMPARISON:  Hip radiographs from 05/31/2012.    FINDINGS:  Degenerative changes of the hips are again seen, not significant changed since the comparison exam.  No collapse of the femoral heads.  No fracture or dislocation is evident.  There also degenerative changes of the lumbosacral spine and sacroiliac joints and pubic symphysis.  Pelvic phleboliths are present.  Please note exam detail limited by habitus.      Impression       As above      Electronically signed by: Johnathan Dc MD  Date: 03/20/2019  Time: 15:40       Assessment:     Encounter Diagnoses   Name Primary?    It band syndrome, right     SI (sacroiliac) pain     Fibromyalgia Yes        Plan:     We reviewed with Kaylin today, the pathology and natural history of her diagnosis. We had an extensive discussion as to the conservative treatment and management of their condition. We also discussed the variety of treatment options to include medication, physical therapy, diagnostic testing as well as other treatments.The decision was made to go forward with:    PT for it band  Continue following with Luzmaria for SI injections/GT  Rheumatology referral for fibromyalgia management

## 2019-06-26 NOTE — H&P (VIEW-ONLY)
Subjective:     Patient ID: Kaylin Mccollum is a 74 y.o. female.    Chief Complaint: Pain and Follow-up of the Right Knee and Pain and Follow-up of the Right Hip    Follow-up right hip and knee pain.  Had injections with Dr. timoteo Trujillo significant improvement.  Has some rfa scheduled with him.  History of fibromyalgia    Knee Pain    The pain is present in the right knee and right hip. This is a new problem. The current episode started more than 1 month ago. The problem occurs intermittently. The problem has been gradually improving. The quality of the pain is described as dull and aching. The pain is at a severity of 2/10. Associated symptoms include stiffness. Pertinent negatives include no fever or numbness. The symptoms are aggravated by walking, standing and activity. She has tried OTC pain meds and OTC ointments for the symptoms. The treatment provided mild relief. Physical therapy was not tried.      Past Medical History:   Diagnosis Date    Arthritis     Cataract     Diabetes mellitus     Diabetes mellitus, type 2     Fibromyalgia     General anesthetics causing adverse effect in therapeutic use     bradycardia and tachycardia    Hypertension     Migraines     Mitral valve prolapse     Osteoarthritis     Rotator cuff tear 4/1/2015     Past Surgical History:   Procedure Laterality Date    ARTHROSCOPY-SHOULDER-RIGHT WITH EXCISION OF CA LIGAMENT, PARTIAL ACROMIONECTOMY Right 6/4/2015    Performed [facility] at [facility]    BURSECTOMY-SHOULDER-RIGHT Right 6/4/2015    [facility] at [facility]    CATARACT EXTRACTION W/  INTRAOCULAR LENS IMPLANT Left 07/19/2017    CATARACT EXTRACTION W/  INTRAOCULAR LENS IMPLANT Right 2017    CHOLECYSTECTOMY      COLONOSCOPY N/A 9/25/2018    Performed by Bethel Carmona [facility] at [facility]    ESOPHAGOGASTRODUODENOSCOPY (EGD) N/A 6/24/2016    Performed by Kamaljit Gutierres III, MD at [facility] END[facility]    KNEE ARTHROSCOPY Bilateral     PARS  PLANA VITRECTOMY W/ REPAIR OF MACULAR HOLE Left 02/22/2017    REPAIR ROTATOR CUFF ARTHROSCOPIC-RIGHT Right 6/4/2015    [facility]    REPAIR-RETINA (VITRECTOMY) Left 2/22/2017    [facility]    SHOULDER ARTHROSCOPY Right 06/04/15    SYNOVECTOMY-SHOULDER-RIGHT Right 6/4/2015    [facility] OR     Family History   Problem Relation Age of Onset    Heart disease Mother     Glaucoma Mother     Cataracts Mother     Cancer Mother         colon    Kidney disease Daughter     Diabetes Maternal Grandmother     Heart disease Maternal Grandmother     Diabetes Maternal Grandfather     Cancer Maternal Grandfather     Breast cancer Paternal Aunt      Social History     Socioeconomic History    Marital status:      Spouse name: Not on file    Number of children: Not on file    Years of education: Not on file    Highest education level: Not on file   Occupational History     Employer: [employer]   Social Needs    Financial resource strain: Not on file    Food insecurity:     Worry: Not on file     Inability: Not on file    Transportation needs:     Medical: Not on file     Non-medical: Not on file   Tobacco Use    Smoking status: Never Smoker    Smokeless tobacco: Never Used   Substance and Sexual Activity    Alcohol use: Yes     Alcohol/week: 0.0 oz     Comment: Rarely    Drug use: No    Sexual activity: Not Currently   Lifestyle    Physical activity:     Days per week: Not on file     Minutes per session: Not on file    Stress: Not on file   Relationships    Social connections:     Talks on phone: Not on file     Gets together: Not on file     Attends Cheondoism service: Not on file     Active member of club or organization: Not on file     Attends meetings of clubs or organizations: Not on file     Relationship status: Not on file   Other Topics Concern    Not on file   Social History  "Narrative    . 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     Medication List with Changes/Refills   Current Medications    BIOTIN 1 MG TABLET    Take 1,000 mcg by mouth every morning.     CELECOXIB (CELEBREX) 100 MG CAPSULE    take 1 capsule by mouth twice a day    CYANOCOBALAMIN (VITAMIN B-12) 500 MCG TABLET    Take 500 mcg by mouth nightly.    EMPAGLIFLOZIN (JARDIANCE) 10 MG TAB    Take 10 mg by mouth once daily.    FLUCONAZOLE (DIFLUCAN) 150 MG TAB    Take 1 tablet (150 mg total) by mouth once daily. for 7 days    FLUOXETINE (PROZAC) 40 MG CAPSULE    Take 1 capsule (40 mg total) by mouth once daily.    FLUTICASONE (FLONASE) 50 MCG/ACTUATION NASAL SPRAY    2 sprays by Each Nare route once daily.    GABAPENTIN (NEURONTIN) 300 MG CAPSULE    TAKE 1 CAPSULE BY MOUTH TWICE DAILY    LOSARTAN (COZAAR) 25 MG TABLET    Take 1 tablet (25 mg total) by mouth once daily.    METFORMIN (GLUCOPHAGE) 1000 MG TABLET    TAKE 1 TABLET(1000 MG) BY MOUTH TWICE DAILY WITH MEALS    NALTREXONE CAPSULE    Take 4.5 mg by mouth every evening.    TRIAMCINOLONE ACETONIDE 0.025% (KENALOG) 0.025 % OINT    Apply topically 2 (two) times daily. for 10 days    VERAPAMIL (CALAN) 120 MG TABLET    Take 1 tablet (120 mg total) by mouth 2 (two) times daily.     Review of patient's allergies indicates:   Allergen Reactions    Demerol [meperidine] Other (See Comments)     Burning when adm IV  Able to tolerate IM, "Turned my hand purple."    Latex, natural rubber Other (See Comments)     "Burns my skin",  Symptoms get worse the longer she is exposed    Zocor [simvastatin] Other (See Comments)     Tightening of muscles    Azithromycin     Statins-hmg-coa reductase inhibitors Other (See Comments)     myopathy    Sulfa (sulfonamide antibiotics)     Nickel sutures [surgical stainless steel] Rash     Any nickel     Review of Systems   Constitution: Negative for fever.   HENT: Negative for hearing loss.    Eyes: Negative for blurred vision.   Cardiovascular: " Negative for chest pain and leg swelling.   Respiratory: Negative for shortness of breath.    Endocrine: Negative for polyuria.   Hematologic/Lymphatic: Negative for bleeding problem.   Skin: Negative for rash.   Musculoskeletal: Positive for back pain, joint pain, muscle cramps and stiffness. Negative for joint swelling and muscle weakness.   Gastrointestinal: Negative for melena.   Genitourinary: Negative for hematuria.   Neurological: Negative for loss of balance, numbness and paresthesias.   Psychiatric/Behavioral: Negative for altered mental status.       Objective:   Body mass index is 28.98 kg/m².  Vitals:    06/26/19 0914   BP: (!) 152/73   Pulse: 71       General: Kaylin is well-developed, well-nourished, appears stated age, in no acute distress, alert and oriented.       General    Vitals reviewed.  Constitutional: She is oriented to person, place, and time. She appears well-developed and well-nourished. No distress.   HENT:   Mouth/Throat: No oropharyngeal exudate.   Eyes: Right eye exhibits no discharge. Left eye exhibits no discharge.   Neck: Normal range of motion.   Cardiovascular: Normal rate.    Pulmonary/Chest: Effort normal and breath sounds normal. No respiratory distress.   Neurological: She is alert and oriented to person, place, and time. She has normal reflexes. No cranial nerve deficit. Coordination normal.   Psychiatric: She has a normal mood and affect. Her behavior is normal. Judgment and thought content normal.     General Musculoskeletal Exam   Gait: normal   Pelvic Obliquity: none      Right Knee Exam     Inspection   Alignment:  normal  Erythema: absent  Scars: absent  Swelling: absent  Effusion: absent  Deformity: absent  Bruising: absent    Tenderness   The patient is tender to palpation of the lateral joint line, iliotibial band and medial hamstring.    Crepitus   The patient has crepitus of the patella.    Range of Motion   Extension: 0   Flexion: 130     Tests   Meniscus    Kaleigh:  Medial - negative Lateral - negative  Ligament Examination Lachman: normal (-1 to 2mm) PCL-Posterior Drawer: normal (0 to 2mm)     MCL - Valgus: normal (0 to 2mm)  LCL - Varus: normal  Patella   Patellar apprehension: negative  Passive Patellar Tilt: neutral  Patellar Tracking: normal  Patellar Glide (quadrants): Lateral - 1   Medial - 2  Q-Angle at 90 degrees: normal  Patellar Grind: negative    Other   Meniscal Cyst: absent  Popliteal (Baker's) Cyst: absent  Sensation: normal    Left Knee Exam     Inspection   Alignment:  normal  Erythema: absent  Scars: absent  Swelling: absent  Effusion: absent  Deformity: absent  Bruising: absent    Tenderness   The patient tender to palpation of the condyle.    Crepitus   The patient has crepitus of the patella.    Range of Motion   Extension: 0   Flexion: 130     Tests   Meniscus   Kaleigh:  Medial - negative Lateral - negative  Stability Lachman: normal (-1 to 2mm) PCL-Posterior Drawer: normal (0 to 2mm)  MCL - Valgus: normal (0 to 2mm)  LCL - Varus: normal (0 to 2mm)  Patella   Patellar apprehension: negative  Passive Patellar Tilt: neutral  Patellar Tracking: normal  Patellar Glide (Quadrants): Lateral - 1 Medial - 2  Q-Angle at 90 degrees: normal  Patellar Grind: negative    Other   Meniscal Cyst: absent  Popliteal (Baker's) Cyst: absent  Sensation: normal    Right Hip Exam     Inspection   Scars: absent  Swelling: absent  Bruising: absent  No deformity of hip.  Quadriceps Atrophy:  Negative  Erythema: absent    Tenderness   The patient tender to palpation of the SI joint and trochanteric bursa.    Range of Motion   Abduction:  40 normal   Adduction:  20 normal   Extension:  0 normal   Flexion:  110 normal   External rotation:  50 normal   Internal rotation:  20 normal     Tests   Pain w/ forced internal rotation (CHERYLE): present  Pain w/ forced external rotation (FADIR): absent  Usama: positive  Trendelenburg Test: negative  Log Roll: negative    Other    Sensation: normal  Left Hip Exam     Inspection   Scars: absent  Swelling: absent  No deformity of hip.  Quadriceps Atrophy:  negative  Erythema: absent  Bruising: absent    Tenderness   The patient tender to palpation of the SI joint, piriformis and trochanteric bursa.    Range of Motion   Abduction:  40 normal   Adduction:  20 normal   Extension:  0 normal   Flexion:  110 normal   External rotation:  50 normal   Internal rotation: 20 normal     Tests   Pain w/ forced internal rotation (CHERYLE): present  Pain w/ forced external rotation (FADIR): absent  Usama: positive  Trendelenburg Test: negative  Log Roll: negative    Other   Sensation: normal      Back (L-Spine & T-Spine) / Neck (C-Spine) Exam   Back exam is normal.      Muscle Strength   Right Lower Extremity   Hip Abduction: 5/5   Hip Adduction: 5/5   Hip Flexion: 5/5   Quadriceps:  4/5   Hamstrin/5   Left Lower Extremity   Hip Abduction: 5/5   Hip Adduction: 5/5   Hip Flexion: 5/5   Quadriceps:  4/5   Hamstrin/5     Reflexes     Left Side  Quadriceps:  2+  Achilles:  2+    Right Side   Quadriceps:  2+  Achilles:  2+    Vascular Exam     Right Pulses  Dorsalis Pedis:      2+  Posterior Tibial:      2+        Left Pulses  Dorsalis Pedis:      2+  Posterior Tibial:      2+        Capillary Refill  Right Hand: normal capillary refill  Left Hand: normal capillary refill    Edema  Right Upper Leg: absent  Left Upper Leg: absent      Imaging reviewed and interpreted today   Johnathan Dc MD 3/20/2019       Narrative     EXAMINATION:  XR KNEE ORTHO RIGHT WITH FLEXION    CLINICAL HISTORY:  Pain in left leg    TECHNIQUE:  AP standing as well as PA flexion standing and Merchant views of both knees were performed.  A lateral view of the right knee is also performed.    COMPARISON:  None.    FINDINGS:  Bilateral tricompartmental degenerative changes are seen with findings most pronounced in the medial compartments, left greater than right with joint space  narrowing and marginal spurring.  No patellar tilt.  No fracture or dislocation.  No joint effusion.  Superior patellar enthesophyte is noted on the right.  Atherosclerosis is noted.  Soft tissues are symmetric in appearance.      Impression       As above      Electronically signed by: Johnathan Dc MD  Date: 03/20/2019  Time: 15:41     Reading Physician Reading Date Result Priority   Johnathan Dc MD 3/20/2019       Narrative     EXAMINATION:  XR HIP 2 VIEW RIGHT    CLINICAL HISTORY:  Pain in left leg    TECHNIQUE:  AP view of the pelvis and frog leg lateral view of the right hip were performed.    COMPARISON:  Hip radiographs from 05/31/2012.    FINDINGS:  Degenerative changes of the hips are again seen, not significant changed since the comparison exam.  No collapse of the femoral heads.  No fracture or dislocation is evident.  There also degenerative changes of the lumbosacral spine and sacroiliac joints and pubic symphysis.  Pelvic phleboliths are present.  Please note exam detail limited by habitus.      Impression       As above      Electronically signed by: Johnathan Dc MD  Date: 03/20/2019  Time: 15:40       Assessment:     Encounter Diagnoses   Name Primary?    It band syndrome, right     SI (sacroiliac) pain     Fibromyalgia Yes        Plan:     We reviewed with Kaylin today, the pathology and natural history of her diagnosis. We had an extensive discussion as to the conservative treatment and management of their condition. We also discussed the variety of treatment options to include medication, physical therapy, diagnostic testing as well as other treatments.The decision was made to go forward with:    PT for it band  Continue following with Luzmaria for SI injections/GT  Rheumatology referral for fibromyalgia management

## 2019-07-03 ENCOUNTER — CLINICAL SUPPORT (OUTPATIENT)
Dept: REHABILITATION | Facility: HOSPITAL | Age: 74
End: 2019-07-03
Attending: ORTHOPAEDIC SURGERY
Payer: MEDICARE

## 2019-07-03 DIAGNOSIS — G89.29 CHRONIC PAIN OF BOTH HIPS: ICD-10-CM

## 2019-07-03 DIAGNOSIS — R29.898 WEAKNESS OF BOTH LOWER EXTREMITIES: ICD-10-CM

## 2019-07-03 DIAGNOSIS — M25.551 CHRONIC PAIN OF BOTH HIPS: ICD-10-CM

## 2019-07-03 DIAGNOSIS — M53.86 DECREASED ROM OF LUMBAR SPINE: ICD-10-CM

## 2019-07-03 DIAGNOSIS — M25.552 CHRONIC PAIN OF BOTH HIPS: ICD-10-CM

## 2019-07-03 DIAGNOSIS — G89.29 CHRONIC BILATERAL LOW BACK PAIN WITHOUT SCIATICA: ICD-10-CM

## 2019-07-03 DIAGNOSIS — M54.50 CHRONIC BILATERAL LOW BACK PAIN WITHOUT SCIATICA: ICD-10-CM

## 2019-07-03 PROCEDURE — 97110 THERAPEUTIC EXERCISES: CPT

## 2019-07-03 PROCEDURE — 97161 PT EVAL LOW COMPLEX 20 MIN: CPT

## 2019-07-03 NOTE — PLAN OF CARE
"[facility]  Physical Therapy Initial Evaluation    Name: Kaylin Mccollum "Jillian"  Clinic Number: [id number]    Therapy Diagnosis:   Encounter Diagnoses   Name Primary?    Decreased ROM of lumbar spine     Chronic bilateral low back pain without sciatica     Chronic pain of both hips     Weakness of both lower extremities      Physician: Richi Callaway, *     Physician Orders: PT Eval and Treat   Medical Diagnosis from Referral:   M76.31 (ICD-10-CM) - It band syndrome, right   M53.3 (ICD-10-CM) - SI (sacroiliac) pain   Evaluation Date: 7/3/2019  Authorization Period Expiration: 6/25/2020  Plan of Care Expiration: 7/31/2019  Visit # / Visits authorized: 1/1    Time In: 3:10  Time Out: 4:15  Total Billable Time: 60 minutes    Precautions: Standard and Diabetes    Subjective   Date of onset: 6/26/2019  History of current condition - Kaylin reports: first having an onset of low back and hip pain from how she was sitting in her bed in November 2015. She states that pain has went away however has come back around January 2019. Pt reports also having R knee pain as well. Jillian had gotten cortisone shots in April and has felt little relief. She is now planning to have a procedure to get multiple nerves burned.      Pain:  Current 3/10, worst 7/10, best 3/10   Location: bilateral back , buttocks  and hips   Description: Burning  Aggravating Factors: Walking for prolonged periods  Easing Factors: ice, heating pad and biofreeze    Prior Therapy: in April for her back  Social History: lives alone in apartment on 2nd floor  Occupation: retired  Prior Level of Function: independent  Current Level of Function: able to complete all ADL's however has difficulty with squatting and forward bending.    Imaging, bone scan films: degenerative changes in the hip joint and lumbosacral spine.    Medical History:   Past Medical History:   Diagnosis Date    Arthritis     Cataract     Diabetes mellitus  " "   Diabetes mellitus, type 2     Fibromyalgia     General anesthetics causing adverse effect in therapeutic use     bradycardia and tachycardia    Hypertension     Migraines     Mitral valve prolapse     Osteoarthritis     Rotator cuff tear 4/1/2015       Surgical History:   Kaylin Mccollum  has a past surgical history that includes Cholecystectomy; Knee arthroscopy (Bilateral); Shoulder arthroscopy (Right, 06/04/15); Pars plana vitrectomy w/ repair of macular hole (Left, 02/22/2017); Colonoscopy (N/A, 9/25/2018); Cataract extraction w/  intraocular lens implant (Left, 07/19/2017); and Cataract extraction w/  intraocular lens implant (Right, 2017).    Medications:   Kaylin has a current medication list which includes the following prescription(s): biotin, celecoxib, cyanocobalamin, empagliflozin, fluoxetine, fluticasone propionate, gabapentin, losartan, metformin, naltrexone, triamcinolone acetonide 0.025%, and verapamil.    Allergies:   Review of patient's allergies indicates:   Allergen Reactions    Demerol [meperidine] Other (See Comments)     Burning when adm IV  Able to tolerate IM, "Turned my hand purple."    Latex, natural rubber Other (See Comments)     "Burns my skin",  Symptoms get worse the longer she is exposed    Zocor [simvastatin] Other (See Comments)     Tightening of muscles    Azithromycin     Statins-hmg-coa reductase inhibitors Other (See Comments)     myopathy    Sulfa (sulfonamide antibiotics)     Nickel sutures [surgical stainless steel] Rash     Any nickel        Pts goals: to decrease pain    Objective     Gait: WNL    Neuro/Sensation: Sensation: WNL    Balance: SL R= 15 seconds, L= WNL.    L/sp AROM:     % Pain Present (Y/N)   FB 75deg N   BB 20deg Y     Hip AROM:     Degrees (R/L) Pain Present (Y/N)   Hip flex WNL N              R L  Strength:  Hip " flexors  4/5 4+/5  Quadriceps  4/5 5/5      Hamstrings  4+/5 4+/5     PF   5/5 5/5     DF   5/5 5/5     IR   4/5 5/5     ER   4/5 5/5     Gluteus Medius 4-/5 4+/5     Gluteus Germain 4-/5       4/5       Special Test:  Distraction:  +    SLR Test:  -     Leg Length  +       SI Provocation: -    Usama:   -    CHERYLE   -        Palpation: Tender to touch along R medial distal hamstring, piriformis, sacrum and SI joint    Function: Patient reports score of 46 on the LEFS Scale.    LOWER EXTREMITY FUNCTIONAL SCALE         1. Any of your usual work, housework or school activities   3/4  2. Your usual hobbies, sporting     3/4  3. Getting in and out of tub      3/4  4. Walking between rooms      4/4  5. Putting on shoes or socks      3/4  6. Squatting        2/4  7. Lifting an object from the ground      3/4  8. Performing light activities around the home    4/4  9. Performing heavy activities around the home   1/4  10. Getting in and out of car      3/4  11. Walking 2 blocks       3/4  12.Walking a mile       1/4  13. Getting up and down 1 flight of stairs    2/4  14. Standing for 1 hour      4/4  15. Sitting for an hour       4/4  16. Running on even ground      0/4  17. Running on uneven ground     0/4  18. Making sharp turns when running fast    0/4  19. Hopping        0/4  20. Rolling over in bed       3/4     TREATMENT   Treatment Time In: 3:50  Treatment Time Out: 4:15  Total Treatment time separate from Evaluation: 25 minutes    Kaylin received therapeutic exercises to develop strength, endurance, ROM, flexibility and core stabilization for 25 minutes including:  Piriformis stretch 30 sec hold, 3x  DKTC 30sec hold, 3x  LTR 3 minutes  TA contraction 3 second hold, 3 minutes    Home Exercises and Patient Education Provided    Education provided:   -Education on condition, HEP, and plan of care.    Written Home Exercises Provided: yes.  Exercises were reviewed and Kaylin was able to demonstrate them prior to the  end of the session.  Kaylin demonstrated good  understanding of the education provided.     See EMR under Media for exercises provided 7/3/2019.    Assessment   Kaylin is a 74 y.o. female referred to outpatient Physical Therapy with a medical diagnosis of SI joint pain and IT band syndrome. The patient presents with impairments which include decreased ROM, decreased strength, decreased joint mobility and decreased muscle length.  These impairments are limiting patient's ability to perform ADL's pain free. Pt prognosis is Good due to personal factors and co-morbidities listed below. Pt will benefit from skilled outpatient Physical Therapy to address the deficits stated above and in the chart below, provide pt/family education, and to maximize pt's level of independence.     Pt prognosis is Good.   Pt will benefit from skilled outpatient Physical Therapy to address the deficits stated above and in the chart below, provide pt/family education, and to maximize pt's level of independence.     Plan of care discussed with patient: Yes  Pt's spiritual, cultural and educational needs considered and patient is agreeable to the plan of care and goals as stated below:     Anticipated Barriers for therapy: fibromyalgia    Medical Necessity is demonstrated by the following  History  Co-morbidities and personal factors that may impact the plan of care Co-morbidities:   diabetes and HTN    Personal Factors:   no deficits     low   Examination  Body Structures and Functions, activity limitations and participation restrictions that may impact the plan of care Body Regions:   back  lower extremities    Body Systems:    ROM  strength    Participation Restrictions:   none    Activity limitations:   Learning and applying knowledge  no deficits    General Tasks and Commands  no deficits    Communication  no deficits    Mobility  no deficits    Self care  no deficits    Domestic Life  no deficits    Interactions/Relationships  no  deficits    Life Areas  no deficits    Community and Social Life  no deficits         moderate   Clinical Presentation stable and uncomplicated low   Decision Making/ Complexity Score: low     Goals:  Short Term Goals: In 3 weeks   1.I with HEP  2.Pt to increase lumbar ROM to 90 degrees flexion.  4.Pt to increase BLE strength by 1/2 grade.   5.Pt to have pain less than 2/10 at all times.  6.Pt to score 60 or more on the LEFS.  7. Pt to be educated on postural/body mechanics awareness.    Long Term Goals: In 6 weeks  1.Pt to score 70 or more on the LEFS.  2. Patient to demo increase in LE strength to 5/5  3. Patient to have decreased pain to 3/10 at worse.  4. Patient to demo increase lumbar ROM to 100 deg flexion.  5. Patient to perform daily activities including all ADLs without limitation.      Plan   Plan of care Certification: 7/3/2019 to 7/31/2019.    Outpatient Physical Therapy 2 times weekly for 6 - 8 weeks to include the following interventions: Electrical Stimulation TENS, Manual Therapy, Moist Heat/ Ice, Patient Education and Therapeutic Exercise.     Lyubov Salter, PT    Thank you for this referral.    These services are reasonable and necessary for the conditions set forth above while under my care.

## 2019-07-10 ENCOUNTER — CLINICAL SUPPORT (OUTPATIENT)
Dept: REHABILITATION | Facility: HOSPITAL | Age: 74
End: 2019-07-10
Attending: ORTHOPAEDIC SURGERY
Payer: MEDICARE

## 2019-07-10 DIAGNOSIS — R29.898 WEAKNESS OF BOTH LOWER EXTREMITIES: ICD-10-CM

## 2019-07-10 DIAGNOSIS — G89.29 CHRONIC BILATERAL LOW BACK PAIN WITHOUT SCIATICA: ICD-10-CM

## 2019-07-10 DIAGNOSIS — M53.86 DECREASED ROM OF LUMBAR SPINE: ICD-10-CM

## 2019-07-10 DIAGNOSIS — M25.552 CHRONIC PAIN OF BOTH HIPS: ICD-10-CM

## 2019-07-10 DIAGNOSIS — G89.29 CHRONIC PAIN OF BOTH HIPS: ICD-10-CM

## 2019-07-10 DIAGNOSIS — M54.50 CHRONIC BILATERAL LOW BACK PAIN WITHOUT SCIATICA: ICD-10-CM

## 2019-07-10 DIAGNOSIS — M25.551 CHRONIC PAIN OF BOTH HIPS: ICD-10-CM

## 2019-07-10 PROCEDURE — 97140 MANUAL THERAPY 1/> REGIONS: CPT

## 2019-07-10 PROCEDURE — 97110 THERAPEUTIC EXERCISES: CPT

## 2019-07-10 NOTE — PROGRESS NOTES
Physical Therapy Daily Treatment Note     Name: Kaylin Mccollum  Clinic Number: [id number]    Therapy Diagnosis:   Encounter Diagnoses   Name Primary?    Decreased ROM of lumbar spine     Chronic bilateral low back pain without sciatica     Chronic pain of both hips     Weakness of both lower extremities      Physician: Richi Callaway, *  Visit Date: 7/10/2019  Physician Orders: PT Eval and Treat   Medical Diagnosis from Referral:   M76.31 (ICD-10-CM) - It band syndrome, right   M53.3 (ICD-10-CM) - SI (sacroiliac) pain   Evaluation Date: 7/3/2019  Authorization Period Expiration: 6/25/2020  Plan of Care Expiration: 7/31/2019  Visit # / Visits authorized: 2/20     Time In: 3:02  Time Out: 4:02  Total Billable Time: 60 minutes    Precautions: Standard and Diabetes    Subjective     Pt reports: increased pain today secondary to having a fibromyalgia flare up over the weekend and is still recovering from it today.  She was compliant with home exercise program.  Response to previous treatment: patient reported minimal decrease in pain after last treatment  Functional change: pt reported no functional change    Pain: 5/10  Location: bilateral back      Objective     Kaylin received therapeutic exercises to develop strength, endurance, ROM, flexibility and core stabilization for 45 minutes including:  Nustep 5 minutes to gain thoracic and lumbar mobility  Piriformis stretch 30 sec hold, 3x  DKTC 30sec hold, 3x  LTR 3 minutes  TA contraction 3 second hold, 3 minutes  SL clams 3 sets of 10  SL hip abduction 2 sets of 10  Prone hip extension 2 sets of 10  Shuttle squats 5 bands 3 minutes  paloff press GTB 1 set of 15     Kaylin received the following manual therapy techniques: Joint mobilizations and Soft tissue Mobilization were applied to the: low back and B hips for 15 minutes, including:  Soft tissue to thoracic and lumbar paraspinals and B quadratus lumborum    Home Exercises Provided and Patient Education  Provided     Education provided:   - encouraged to continue HEP and to be mobile with flare ups however just decrease intensity with activities.     Written Home Exercises Provided: Patient instructed to cont prior HEP.  Exercises were reviewed and Kaylin was able to demonstrate them prior to the end of the session.  Kaylin demonstrated good  understanding of the education provided.     See EMR under Patient Instructions for exercises provided prior visit.    Assessment   Patient demonstrated poor hip strength, however she was able to tolerate new therex. She required frequent rest breaks during therex.   She responded well after treatment today.   Kaylin is progressing well towards her goals.   Pt prognosis is Excellent.     Pt will continue to benefit from skilled outpatient physical therapy to address the deficits listed in the problem list box on initial evaluation, provide pt/family education and to maximize pt's level of independence in the home and community environment.     Pt's spiritual, cultural and educational needs considered and pt agreeable to plan of care and goals.     Anticipated barriers to physical therapy: fibromyalgia    Goals:  Short Term Goals: In 3 weeks   1.I with HEP  2.Pt to increase lumbar ROM to 90 degrees flexion.  4.Pt to increase BLE strength by 1/2 grade.   5.Pt to have pain less than 2/10 at all times.  6.Pt to score 60 or more on the LEFS.  7. Pt to be educated on postural/body mechanics awareness.     Long Term Goals: In 6 weeks  1.Pt to score 70 or more on the LEFS.  2. Patient to demo increase in LE strength to 5/5  3. Patient to have decreased pain to 3/10 at worse.  4. Patient to demo increase lumbar ROM to 100 deg flexion.  5. Patient to perform daily activities including all ADLs without limitation.      Plan   Continue to strength B hip musculature and core stabilization as well as increase flexibility.     Lyubov Salter, PT

## 2019-07-11 ENCOUNTER — HOSPITAL ENCOUNTER (OUTPATIENT)
Facility: HOSPITAL | Age: 74
Discharge: HOME | End: 2019-07-11
Attending: ANESTHESIOLOGY | Admitting: ANESTHESIOLOGY
Payer: MEDICARE

## 2019-07-11 VITALS
TEMPERATURE: 96 F | HEIGHT: 70 IN | DIASTOLIC BLOOD PRESSURE: 61 MMHG | RESPIRATION RATE: 18 BRPM | SYSTOLIC BLOOD PRESSURE: 127 MMHG | OXYGEN SATURATION: 96 % | WEIGHT: 200.06 LBS | HEART RATE: 61 BPM | BODY MASS INDEX: 28.64 KG/M2

## 2019-07-11 DIAGNOSIS — M53.3 SACROILIAC JOINT DYSFUNCTION: ICD-10-CM

## 2019-07-11 PROCEDURE — 64636 DESTROY L/S FACET JNT ADDL: CPT | Mod: LT,,, | Performed by: ANESTHESIOLOGY

## 2019-07-11 PROCEDURE — 63600175 PHARM REV CODE 636 W HCPCS: Performed by: ANESTHESIOLOGY

## 2019-07-11 PROCEDURE — S0020 INJECTION, BUPIVICAINE HYDRO: HCPCS | Performed by: ANESTHESIOLOGY

## 2019-07-11 PROCEDURE — 25000003 PHARM REV CODE 250: Performed by: ANESTHESIOLOGY

## 2019-07-11 PROCEDURE — 99152 PR MOD CONSCIOUS SEDATION, SAME PHYS, 5+ YRS, FIRST 15 MIN: ICD-10-PCS | Mod: ,,, | Performed by: ANESTHESIOLOGY

## 2019-07-11 PROCEDURE — 64635 PR DESTROY LUMB/SAC FACET JNT: ICD-10-PCS | Mod: LT,,, | Performed by: ANESTHESIOLOGY

## 2019-07-11 PROCEDURE — 64635 DESTROY LUMB/SAC FACET JNT: CPT | Mod: LT,,, | Performed by: ANESTHESIOLOGY

## 2019-07-11 PROCEDURE — 64636 DESTROY L/S FACET JNT ADDL: CPT | Performed by: ANESTHESIOLOGY

## 2019-07-11 PROCEDURE — 64635 DESTROY LUMB/SAC FACET JNT: CPT | Performed by: ANESTHESIOLOGY

## 2019-07-11 PROCEDURE — 99152 MOD SED SAME PHYS/QHP 5/>YRS: CPT | Mod: ,,, | Performed by: ANESTHESIOLOGY

## 2019-07-11 PROCEDURE — 64636 PR DESTROY L/S FACET JNT ADDL: ICD-10-PCS | Mod: LT,,, | Performed by: ANESTHESIOLOGY

## 2019-07-11 RX ORDER — FENTANYL CITRATE 50 UG/ML
INJECTION, SOLUTION INTRAMUSCULAR; INTRAVENOUS
Status: DISCONTINUED | OUTPATIENT
Start: 2019-07-11 | End: 2019-07-11 | Stop reason: HOSPADM

## 2019-07-11 RX ORDER — BUPIVACAINE HYDROCHLORIDE 5 MG/ML
INJECTION, SOLUTION EPIDURAL; INTRACAUDAL
Status: DISCONTINUED | OUTPATIENT
Start: 2019-07-11 | End: 2019-07-11 | Stop reason: HOSPADM

## 2019-07-11 RX ORDER — MIDAZOLAM HYDROCHLORIDE 1 MG/ML
INJECTION, SOLUTION INTRAMUSCULAR; INTRAVENOUS
Status: DISCONTINUED | OUTPATIENT
Start: 2019-07-11 | End: 2019-07-11 | Stop reason: HOSPADM

## 2019-07-11 RX ORDER — METHYLPREDNISOLONE ACETATE 80 MG/ML
INJECTION, SUSPENSION INTRA-ARTICULAR; INTRALESIONAL; INTRAMUSCULAR; SOFT TISSUE
Status: DISCONTINUED | OUTPATIENT
Start: 2019-07-11 | End: 2019-07-11 | Stop reason: HOSPADM

## 2019-07-11 RX ORDER — LIDOCAINE HYDROCHLORIDE 20 MG/ML
INJECTION, SOLUTION EPIDURAL; INFILTRATION; INTRACAUDAL; PERINEURAL
Status: DISCONTINUED | OUTPATIENT
Start: 2019-07-11 | End: 2019-07-11 | Stop reason: HOSPADM

## 2019-07-11 NOTE — DISCHARGE INSTRUCTIONS

## 2019-07-11 NOTE — OP NOTE
PROCEDURE: L5 dorsal ramus and Sacral 1, Sacral 2, and Sacral 3 lateral branch radiofrequency (RF) ablation under fluoroscopic guidance    SIDE: left    LEVEL:   Sacral ala (Corresponding to the L5 dorsal ramus),   Sacral 1 lateral branch,   Sacral 2 lateral brach,   Sacral 3 lateral branch    PROCEDURE DATE: 7/11/2019    PREOPERATIVE DIAGNOSIS: Sacroiliitis, Sacroiliac Joint Dysfunction  POSTOPERATIVE DIAGNOSIS: Sacroiliitis, Sacroiliac Joint Dysfunction    PROVIDER: Manpreet Dutta MD  Assistant(s): None    ANESTHESIA: Local, IV Sedation    >> 1 mg of VERSED    >> 150 mcg of FENTANYL     INDICATION: The patient has chronic sacroiliac joint pain unresponsive to conservative treatments. Fluoroscopy was used to optimize visualization of needle placement and to maximize safety.        PROCEDURE DESCRIPTION / TECHNIQUE:   The patient was seen and identified in the preoperative area. Risks, benefits, complications, and alternatives were discussed with the patient. The patient agreed to proceed with the procedure and signed the consent. The site and side of the procedure was identified and marked. An iv was started.    The patient was taken to the procedural suite. The patient was positioned in prone orientation on procedure table. A time out was performed prior to any intervention. The procedure, site, side, and allergies were stated and agreed to by all present. The lumbosacral area was widely prepped with ChloraPrep. The procedural site was draped in usual sterile fashion. Vital signs were closely monitored throughout this procedure. Conscious sedation was used for this procedure to decrease patient anxiety.     Using fluoroscopic imaging, the dorsal left S1, S2, and S3 foramen were identified. Superior and inferior points along the lateral aspect of each foramen (corresponding to 2 o'clock and 4 o'clock for right side foramen and 10 o'clock and 8 o'clock for left side foramen) were identified and marked. Superficial  tissues were localized with 2% PF Lidocaine. [employer] 100 mm 18-gauge radiofrequency cannulae with curved 10-mm active tips were advanced under fluoroscopic guidance until osseus interface was met at the above noted locations. A RF needle was also advanced to the Sacral Superior Articular Process (SAP)-Sacral Ala junction under fluoroscopic guidance. After osseus contact was made, the needle was walked off of the sulcus. The fluoroscope was obliqued to the ipsilateral side and the needle was repositioned as needed until the active tip was aligned along the base of the SAP and the needle tip met the anterior fluoroscopic shadow of the SAP. Lateral view was also checked to ensure appropriate depth of the needle tips. RF needles were also positioned along the inferior 1/2 of the joint, needles were positioned using a leap-frog technique just medial to the dorsal SI joint line.     Nerves were tested sequentially. Sensory testing was not performed at 50 Hz up to  0.5 volt with production of concordant pain. Motor testing was performed at 2 Hz up to 1.5 volts with elicitation of mulitifidus muscle contraction and with no radicular pain, paresthesias, or distal muscle contraction beyond the buttocks produced during motor testing. Following testing, RF electrodes were removed and 1 mL of a solution containing 2% Lidocaine. was injected through each cannula following negative aspiration. The RF electrodes were then replaced and each targeted medial branch was sequentially subjected to thermal coagulation at 80 degrees Celsius for 90 seconds. RF electrodes were then removed and 1 mL of a solution containing 5 mL 0.25% Bupivacaine and 2 mL Methylprednisolone (40 mg/mL) was injected through each cannula following negative aspiration, stylets were replaced, and each cannua was removed intact.    Description of Findings: Not applicable    Prosthetic devices, grafts, tissues, or devices implanted: None    Specimen Removed:  No    Estimated Blood Loss: minimal    COMPLICATIONS: None    DISPOSITION / PLANS: The patient was transferred to the recovery area in a stable condition for observation. The patient was reexamined prior to discharge. There was no evidence of acute neurologic injury following the procedure.  Patient was discharged from the recovery room after meeting discharge criteria. Home discharge instructions were given to the patient by the staff.

## 2019-07-11 NOTE — PLAN OF CARE
Patient d/c home in stable condition via wheelchair with ride. Verbalized understanding of d/c instructions. Patient voiced no complaints. Patient stood at side of bed, walked steps with no new motor deficits. Neuro intact.

## 2019-07-11 NOTE — PLAN OF CARE
Pt aaa o x's 4, denies any pain/discomfort, what to expect during recovery discussed with pt at bedside. Pt and verbalized understanding of post op instructions. All questions and concerns addressed, will continue to monitor until discharged.

## 2019-07-11 NOTE — DISCHARGE SUMMARY
[facility]  Discharge Note       Description of Procedure: Left SI Joint RFA under Fluoroscopic Guidance    Procedure Date: 7/11/2019    Admit Date: 7/11/2019  Discharge Date: 7/11/2019     Attending Physician: Luzmaria Case   Discharge Provider: Luzmaria Case    Preoperative Diagnosis: Sacroiliitis     Postoperative Diagnosis: as above, same as preoperative diagnosis    Discharged Condition: Stable    Hospital Course: Patient was admitted for an outpatient procedure. The procedure was tolerated well with no complications.    Final Diagnoses: Same as principal problem.    Disposition: Home, self-care.    Follow up/Patient Instructions:  Follow-up in clinic in 2-3 weeks.    Medications: No medications were prescribed today. The patient was advised to resume normal medication regimen without change.  Specific information was provided regarding restarting any anticoagulant/s.    Discharge Procedure Orders (must include Diet, Follow-up, Activity):  Light activity for the remainder of the day, resume normal activity tomorrow. Resume normal diet. Follow-up in clinic in 2-3 weeks.

## 2019-07-12 LAB — POCT GLUCOSE: 121 MG/DL (ref 70–110)

## 2019-07-15 ENCOUNTER — TELEPHONE (OUTPATIENT)
Dept: PAIN MEDICINE | Facility: CLINIC | Age: 74
End: 2019-07-15

## 2019-07-15 NOTE — TELEPHONE ENCOUNTER
Pt s/p Left SIJ RFA 7/11/19.  on the phone with pt now. Informed pt that steroids may take up to two weeks until full effects are noted. Pt was able to verbalize all understanding. All questions answered.//lp

## 2019-07-15 NOTE — TELEPHONE ENCOUNTER
----- Message from Chiqui Ramesh LPN sent at 7/15/2019  3:00 PM CDT -----  Contact: Patient  Discussed with pt. She was confused about who was doing the injections for her hip. After discussing she requested that I send this to Dr. Dutta for hip injections/burn. She says she has severe pain in her hips after PT. Please call pt to discuss. Thank you so much.   ----- Message -----  From: Carlota Krishnamurthy  Sent: 7/15/2019   1:38 PM  To: [facility] Staff    Patient needs to know if she can get an inj/burn for her hip as well, as therapy has caused her a lot of pain, please call her back at [phone]. Thank you

## 2019-07-22 ENCOUNTER — INITIAL CONSULT (OUTPATIENT)
Dept: RHEUMATOLOGY | Facility: CLINIC | Age: 74
End: 2019-07-22
Payer: MEDICARE

## 2019-07-22 ENCOUNTER — APPOINTMENT (OUTPATIENT)
Dept: RADIOLOGY | Facility: HOSPITAL | Age: 74
End: 2019-07-22
Attending: NURSE PRACTITIONER
Payer: MEDICARE

## 2019-07-22 VITALS
HEART RATE: 80 BPM | DIASTOLIC BLOOD PRESSURE: 66 MMHG | BODY MASS INDEX: 27.81 KG/M2 | WEIGHT: 193.81 LBS | SYSTOLIC BLOOD PRESSURE: 126 MMHG

## 2019-07-22 DIAGNOSIS — Z13.21 ENCOUNTER FOR VITAMIN DEFICIENCY SCREENING: ICD-10-CM

## 2019-07-22 DIAGNOSIS — M79.7 FIBROMYALGIA: Primary | ICD-10-CM

## 2019-07-22 DIAGNOSIS — M81.0 AGE-RELATED OSTEOPOROSIS WITHOUT CURRENT PATHOLOGICAL FRACTURE: ICD-10-CM

## 2019-07-22 DIAGNOSIS — E11.42 DIABETIC POLYNEUROPATHY ASSOCIATED WITH TYPE 2 DIABETES MELLITUS: ICD-10-CM

## 2019-07-22 DIAGNOSIS — M89.9 DISORDER OF BONE: ICD-10-CM

## 2019-07-22 DIAGNOSIS — M47.25 OSTEOARTHRITIS OF SPINE WITH RADICULOPATHY, THORACOLUMBAR REGION: ICD-10-CM

## 2019-07-22 DIAGNOSIS — Z13.820 ENCOUNTER FOR SCREENING FOR OSTEOPOROSIS: ICD-10-CM

## 2019-07-22 PROCEDURE — 99999 PR PBB SHADOW E&M-EST. PATIENT-LVL III: ICD-10-PCS | Mod: PBBFAC,,, | Performed by: INTERNAL MEDICINE

## 2019-07-22 PROCEDURE — 77080 DXA BONE DENSITY AXIAL: CPT | Mod: 26,,, | Performed by: RADIOLOGY

## 2019-07-22 PROCEDURE — 77080 DXA BONE DENSITY AXIAL: CPT | Mod: TC

## 2019-07-22 PROCEDURE — 99999 PR PBB SHADOW E&M-EST. PATIENT-LVL III: CPT | Mod: PBBFAC,,, | Performed by: INTERNAL MEDICINE

## 2019-07-22 PROCEDURE — 77080 DEXA BONE DENSITY SPINE HIP: ICD-10-PCS | Mod: 26,,, | Performed by: RADIOLOGY

## 2019-07-22 PROCEDURE — 99204 PR OFFICE/OUTPT VISIT, NEW, LEVL IV, 45-59 MIN: ICD-10-PCS | Mod: S$PBB,,, | Performed by: INTERNAL MEDICINE

## 2019-07-22 PROCEDURE — 99204 OFFICE O/P NEW MOD 45 MIN: CPT | Mod: S$PBB,,, | Performed by: INTERNAL MEDICINE

## 2019-07-22 PROCEDURE — 99213 OFFICE O/P EST LOW 20 MIN: CPT | Mod: PBBFAC,25 | Performed by: INTERNAL MEDICINE

## 2019-07-22 RX ORDER — GABAPENTIN 300 MG/1
300 CAPSULE ORAL 3 TIMES DAILY
Qty: 270 CAPSULE | Refills: 1 | Status: SHIPPED | OUTPATIENT
Start: 2019-07-22 | End: 2019-12-04 | Stop reason: SDUPTHER

## 2019-07-22 NOTE — LETTER
July 22, 2019      Richi Callaway MD  [address]  [place] [address]           [facility]  [address]  [place] [address]  Phone: [phone]  Fax: [phone]          Patient: Kaylin Mccollum   MR Number: [id number]   YOB: 1945   Date of Visit: 7/22/2019       Dear Dr. Richi Callaway:    Thank you for referring Kaylin Mccollum to me for evaluation. Attached you will find relevant portions of my assessment and plan of care.    If you have questions, please do not hesitate to call me. I look forward to following Kaylin Mccollum along with you.    Sincerely,    Jessica Whitley MD    Enclosure  CC:  No Recipients    If you would like to receive this communication electronically, please contact [email or web address] or [phone] to request more information on [employer] access.    For providers and/or their staff who would like to refer a patient to [facility], please contact us through our one-stop-shop provider referral line, [facility], at [phone].    If you feel you have received this communication in error or would no longer like to receive these types of communications, please e-mail [email or web address]

## 2019-07-22 NOTE — H&P (VIEW-ONLY)
CC:  Chief Complaint   Patient presents with    Consult     Fibro- pain in neck, spine, & both hips       History of Present Illness:  Kaylin Archuleta a 74 y.o.yo female   Patient Active Problem List   Diagnosis    Fibromyalgia    Osteoarthritis of spine with radiculopathy, thoracolumbar region    Hypertension associated with diabetes    Migraine headache    Osteoarthritis    MVP (mitral valve prolapse)    Chronic major depressive disorder    Acromioclavicular joint arthritis    Chondromalacia of right shoulder    Macular hole    Hyperlipidemia associated with type 2 diabetes mellitus    Diabetes mellitus type 2 in obese    Statin-induced myositis    Colon cancer screening    Special screening for malignant neoplasms, colon    History of colon polyps    Family history of colon cancer    Lumbar spondylosis    Urinary urgency    Incomplete emptying of bladder    Decreased ROM of lumbar spine    Chronic bilateral low back pain without sciatica    Chronic pain of both hips    Weakness of both lower extremities     Here to reestablish care for fibromyalgia  She was evaluated in our clinic in the past  She had negative RF/DENNIS in the past  HLA B27 negative    For many years she had chronic neck and back pain  She also hurts in her knees  Pain aggravated with activity  Relief with Celebrex she has been on for few years now  She takes gabapentin 300 mg p.o. b.i.d.  Hands and feet do not bother her much  Denies any joint swelling  No history of gout    Currently she sees pain management and received JAMIN which helped  She has also received steroid injections in her hips  Scheduled for nerve block injections in right knee    History of diabetes with diabetic neuropathy involving upper and lower extremities  She was on tramadol in the past  But now switched to naltrexone and she is doing well    No history of rashes  No significant dry eyes or dry mouth    She does not take any vitamin-D  supplements  In 90s when she was on statins she developed statin induced myopathy and so there was stopped    Past Medical History:   Diagnosis Date    Arthritis     Cataract     Diabetes mellitus     Diabetes mellitus, type 2     Fibromyalgia     General anesthetics causing adverse effect in therapeutic use     bradycardia and tachycardia    Hypertension     Migraines     Mitral valve prolapse     Osteoarthritis     Rotator cuff tear 4/1/2015       Past Surgical History:   Procedure Laterality Date    ARTHROSCOPY-SHOULDER-RIGHT WITH EXCISION OF CA LIGAMENT, PARTIAL ACROMIONECTOMY Right 6/4/2015    [facility] OR    BURSECTOMY-SHOULDER-RIGHT Right 6/4/2015    [facility] at [facility] OR    CATARACT EXTRACTION W/  INTRAOCULAR LENS IMPLANT Left 07/19/2017    CATARACT EXTRACTION W/  INTRAOCULAR LENS IMPLANT Right 2017    CHOLECYSTECTOMY      COLONOSCOPY N/A 9/25/2018    Performed by Bethel Carmona [facility] at [facility] ENDO    ESOPHAGOGASTRODUODENOSCOPY (EGD) N/A 6/24/2016    Performed by Kamaljit Gutierres III, MD at [facility] ENDO    KNEE ARTHROSCOPY Bilateral     PARS PLANA VITRECTOMY W/ REPAIR OF MACULAR HOLE Left 02/22/2017    REPAIR ROTATOR CUFF ARTHROSCOPIC-RIGHT Right 6/4/2015    [facility] at [facility] OR    REPAIR-RETINA (VITRECTOMY) Left 2/22/2017    [facility] OR 1ST F[facility]    Right SIJ RFA Left 7/11/2019    [facility] PAIN MGT    SHOULDER ARTHROSCOPY Right 06/04/15    SYNOVECTOMY-SHOULDER-RIGHT Right 6/4/2015    Performed by [facility] at [facility] OR         Social History     Tobacco Use    Smoking status: Never Smoker    Smokeless tobacco: Never Used   Substance Use Topics    Alcohol use: Yes     Alcohol/week: 0.0 oz     Comment: Rarely    Drug use: No       Family History   Problem Relation Age of Onset    Heart disease Mother     Glaucoma Mother     Cataracts Mother   "   Cancer Mother         colon    Kidney disease Daughter     Diabetes Maternal Grandmother     Heart disease Maternal Grandmother     Diabetes Maternal Grandfather     Cancer Maternal Grandfather     Breast cancer Paternal Aunt        Review of patient's allergies indicates:   Allergen Reactions    Demerol [meperidine] Other (See Comments)     Burning when adm IV  Able to tolerate IM, "Turned my hand purple."    Latex, natural rubber Other (See Comments)     "Burns my skin",  Symptoms get worse the longer she is exposed    Zocor [simvastatin] Other (See Comments)     Tightening of muscles    Azithromycin     Statins-hmg-coa reductase inhibitors Other (See Comments)     myopathy    Sulfa (sulfonamide antibiotics)     Nickel sutures [surgical stainless steel] Rash     Any nickel         Statin use:  No  Calcium supplements :  No  Vitamin d supplements :  No    DEXA :  Reviewed.  Osteopenia with low to moderate FRAX  Medications :  Exercise :    Review of Systems:  Constitutional: Denies fever, chills.  Intentional weight loss with dietary changes  Fatigue: no  Muscle weakness: no  Headaches: no new headaches  Eyes: No redness or dryness.  No recent visual changes.  ENT: Denies dry mouth. No oral or nasal ulcers.  Card: No chest pain.  Resp: No cough or sob.   Gastro: No nausea or vomiting.  No heartburn.  Constipation: no  Diarrhea: no  Genito:  No dysuria.  No genital ulcers.  Skin: No rash.  Raynauds:no  Neuro: + numbness / tingling.   Psych: No depression, anxiety  Endo:  no excess thirst.  Heme: no abnormal bleeding or bruising  Clots:none       OBJECTIVE:     Vital Signs   Vitals:    07/22/19 1303   BP: 126/66   Pulse: 80     Physical Exam:  General Appearance:  NAD.   Gait: not favoring.  HEENT: PERRL.  Eyes not dry or injected.  No nasal ulcers.  Mouth not dry, no oral lesions.  Lymph: cervical, supraclavicular or axillary nodes: none abnormal   Cardio: no irregularity of S1 or S2.  No gallops or " rubs.   Resp: Normal respiratory motion. Clear to auscultation bilaterally.   No abnormal chest conformation.  Abd: Soft, non-tender, nondistended.  No masses.   Skin: Head and neck,  and extremities examined. No rashes.   Neuro: Ox3.   Cranial nerves II-XII grossly intact.     Musculoskeletal Exam:  No synovitis  Spine :  No palpable swelling  Pain on range of motion      Tender points:+++  Muscle strength:Equal and full in all mm groups of the upper and lower ext.    Laboratory:   Results for orders placed or performed during the hospital encounter of 07/11/19   POCT glucose   Result Value Ref Range    POCT Glucose 121 (H) 70 - 110 mg/dL     Lab Results   Component Value Date    RF Negative 03/08/2004     Lab Results   Component Value Date    DENNIS Negative 03/08/2004     DEXA:    FINDINGS:  The L1 to L4 vertebral bone mineral density is equal to 1.338 g/cm squared with a T score of 0.2.  There has been a 1.1% increase which is not statistically significant relative to the prior study.    The left femoral neck bone mineral density is equal to 0.897 g/cm squared with a T score of -1.0.  There has been  a 5.5% decrease which is statistically significant relative to the prior study.    There is a 9.3% risk of a major osteoporotic fracture and a 1.2% risk of hip fracture in the next 10 years (FRAX).      Impression       Osteopenia as above         Imaging :FINDINGS:  Degenerative changes of the hips are again seen, not significant changed since the comparison exam.  No collapse of the femoral heads.  No fracture or dislocation is evident.  There also degenerative changes of the lumbosacral spine and sacroiliac joints and pubic symphysis.  Pelvic phleboliths are present.  Please note exam detail limited by habitus.    Notes reviewed  Other procedures:    ASSESSMENT/PLAN:     Fibromyalgia  -     gabapentin (NEURONTIN) 300 MG capsule; Take 1 capsule (300 mg total) by mouth 3 (three) times daily.  Dispense: 270 capsule;  Refill: 1  -     CK; Standing  -     Rheumatoid factor; Future; Expected date: 07/22/2019  -     Cyclic citrul peptide antibody, IgG; Future; Expected date: 07/22/2019    Osteoarthritis of spine with radiculopathy, thoracolumbar region    Diabetic polyneuropathy associated with type 2 diabetes mellitus    Encounter for vitamin deficiency screening  -     Vitamin D; Future; Expected date: 07/22/2019    Age-related osteoporosis without current pathological fracture  -     Vitamin D; Future; Expected date: 07/22/2019      1:  Fibromyalgia:  No evidence of autoimmune arthritis noted on exam  Increase gabapentin to 300 mg p.o. T.i.d.  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 Medication literature given to patient up discharge  Went over uptodate information /literature on the meds prescribed today    Patient advised to hold DMARD and/or biologic therapy for signs of infection or for surgery. If you are unsure what to do please call our office for instruction. [facility] [phone]        Disclaimer: This note was prepared using voice recognition system and is likely to have sound alike errors and is not proof read.  Please call me with any questions.

## 2019-07-22 NOTE — H&P (VIEW-ONLY)
CC:  Chief Complaint   Patient presents with    Consult     Fibro- pain in neck, spine, & both hips       History of Present Illness:  Kaylin Archuleta a 74 y.o.yo female   Patient Active Problem List   Diagnosis    Fibromyalgia    Osteoarthritis of spine with radiculopathy, thoracolumbar region    Hypertension associated with diabetes    Migraine headache    Osteoarthritis    MVP (mitral valve prolapse)    Chronic major depressive disorder    Acromioclavicular joint arthritis    Chondromalacia of right shoulder    Macular hole    Hyperlipidemia associated with type 2 diabetes mellitus    Diabetes mellitus type 2 in obese    Statin-induced myositis    Colon cancer screening    Special screening for malignant neoplasms, colon    History of colon polyps    Family history of colon cancer    Lumbar spondylosis    Urinary urgency    Incomplete emptying of bladder    Decreased ROM of lumbar spine    Chronic bilateral low back pain without sciatica    Chronic pain of both hips    Weakness of both lower extremities     Here to reestablish care for fibromyalgia  She was evaluated in our clinic in the past  She had negative RF/DENNIS in the past  HLA B27 negative    For many years she had chronic neck and back pain  She also hurts in her knees  Pain aggravated with activity  Relief with Celebrex she has been on for few years now  She takes gabapentin 300 mg p.o. b.i.d.  Hands and feet do not bother her much  Denies any joint swelling  No history of gout    Currently she sees pain management and received JAMIN which helped  She has also received steroid injections in her hips  Scheduled for nerve block injections in right knee    History of diabetes with diabetic neuropathy involving upper and lower extremities  She was on tramadol in the past  But now switched to naltrexone and she is doing well    No history of rashes  No significant dry eyes or dry mouth    She does not take any vitamin-D  supplements  In 90s when she was on statins she developed statin induced myopathy and so there was stopped    Past Medical History:   Diagnosis Date    Arthritis     Cataract     Diabetes mellitus     Diabetes mellitus, type 2     Fibromyalgia     General anesthetics causing adverse effect in therapeutic use     bradycardia and tachycardia    Hypertension     Migraines     Mitral valve prolapse     Osteoarthritis     Rotator cuff tear 4/1/2015       Past Surgical History:   Procedure Laterality Date    ARTHROSCOPY-SHOULDER-RIGHT WITH EXCISION OF CA LIGAMENT, PARTIAL ACROMIONECTOMY Right 6/4/2015    [facility]    BURSECTOMY-SHOULDER-RIGHT Right 6/4/2015    [facility] at [facility] OR    CATARACT EXTRACTION W/  INTRAOCULAR LENS IMPLANT Left 07/19/2017    CATARACT EXTRACTION W/  INTRAOCULAR LENS IMPLANT Right 2017    CHOLECYSTECTOMY      COLONOSCOPY N/A 9/25/2018    Performed by Bethel Carmona [facility] at [facility] ENDO    ESOPHAGOGASTRODUODENOSCOPY (EGD) N/A 6/24/2016    Performed by Kamaljit Gutierres III, MD at [facility] ENDO    KNEE ARTHROSCOPY Bilateral     PARS PLANA VITRECTOMY W/ REPAIR OF MACULAR HOLE Left 02/22/2017    REPAIR ROTATOR CUFF ARTHROSCOPIC-RIGHT Right 6/4/2015    Per[facility] at [facility] OR    REPAIR-RETINA (VITRECTOMY) Left 2/22/2017    Performed by [facility] at [facility] OR 1ST FLR    Right SIJ RFA Left 7/11/2019    Performed by Manpreet Dutta [facility] at [facility] PAIN MGT    SHOULDER ARTHROSCOPY Right 06/04/15    SYNOVECTOMY-SHOULDER-RIGHT Right 6/4/2015    Performed by [facility]ota [facility] at [facility] OR         Social History     Tobacco Use    Smoking status: Never Smoker    Smokeless tobacco: Never Used   Substance Use Topics    Alcohol use: Yes     Alcohol/week: 0.0 oz     Comment: Rarely    Drug use: No       Family History   Problem Relation Age of Onset    Heart disease Mother     Glaucoma Mother     Cataracts Mother   "   Cancer Mother         colon    Kidney disease Daughter     Diabetes Maternal Grandmother     Heart disease Maternal Grandmother     Diabetes Maternal Grandfather     Cancer Maternal Grandfather     Breast cancer Paternal Aunt        Review of patient's allergies indicates:   Allergen Reactions    Demerol [meperidine] Other (See Comments)     Burning when adm IV  Able to tolerate IM, "Turned my hand purple."    Latex, natural rubber Other (See Comments)     "Burns my skin",  Symptoms get worse the longer she is exposed    Zocor [simvastatin] Other (See Comments)     Tightening of muscles    Azithromycin     Statins-hmg-coa reductase inhibitors Other (See Comments)     myopathy    Sulfa (sulfonamide antibiotics)     Nickel sutures [surgical stainless steel] Rash     Any nickel         Statin use:  No  Calcium supplements :  No  Vitamin d supplements :  No    DEXA :  Reviewed.  Osteopenia with low to moderate FRAX  Medications :  Exercise :    Review of Systems:  Constitutional: Denies fever, chills.  Intentional weight loss with dietary changes  Fatigue: no  Muscle weakness: no  Headaches: no new headaches  Eyes: No redness or dryness.  No recent visual changes.  ENT: Denies dry mouth. No oral or nasal ulcers.  Card: No chest pain.  Resp: No cough or sob.   Gastro: No nausea or vomiting.  No heartburn.  Constipation: no  Diarrhea: no  Genito:  No dysuria.  No genital ulcers.  Skin: No rash.  Raynauds:no  Neuro: + numbness / tingling.   Psych: No depression, anxiety  Endo:  no excess thirst.  Heme: no abnormal bleeding or bruising  Clots:none       OBJECTIVE:     Vital Signs   Vitals:    07/22/19 1303   BP: 126/66   Pulse: 80     Physical Exam:  General Appearance:  NAD.   Gait: not favoring.  HEENT: PERRL.  Eyes not dry or injected.  No nasal ulcers.  Mouth not dry, no oral lesions.  Lymph: cervical, supraclavicular or axillary nodes: none abnormal   Cardio: no irregularity of S1 or S2.  No gallops or " rubs.   Resp: Normal respiratory motion. Clear to auscultation bilaterally.   No abnormal chest conformation.  Abd: Soft, non-tender, nondistended.  No masses.   Skin: Head and neck,  and extremities examined. No rashes.   Neuro: Ox3.   Cranial nerves II-XII grossly intact.     Musculoskeletal Exam:  No synovitis  Spine :  No palpable swelling  Pain on range of motion      Tender points:+++  Muscle strength:Equal and full in all mm groups of the upper and lower ext.    Laboratory:   Results for orders placed or performed during the hospital encounter of 07/11/19   POCT glucose   Result Value Ref Range    POCT Glucose 121 (H) 70 - 110 mg/dL     Lab Results   Component Value Date    RF Negative 03/08/2004     Lab Results   Component Value Date    DENNIS Negative 03/08/2004     DEXA:    FINDINGS:  The L1 to L4 vertebral bone mineral density is equal to 1.338 g/cm squared with a T score of 0.2.  There has been a 1.1% increase which is not statistically significant relative to the prior study.    The left femoral neck bone mineral density is equal to 0.897 g/cm squared with a T score of -1.0.  There has been  a 5.5% decrease which is statistically significant relative to the prior study.    There is a 9.3% risk of a major osteoporotic fracture and a 1.2% risk of hip fracture in the next 10 years (FRAX).      Impression       Osteopenia as above         Imaging :FINDINGS:  Degenerative changes of the hips are again seen, not significant changed since the comparison exam.  No collapse of the femoral heads.  No fracture or dislocation is evident.  There also degenerative changes of the lumbosacral spine and sacroiliac joints and pubic symphysis.  Pelvic phleboliths are present.  Please note exam detail limited by habitus.    Notes reviewed  Other procedures:    ASSESSMENT/PLAN:     Fibromyalgia  -     gabapentin (NEURONTIN) 300 MG capsule; Take 1 capsule (300 mg total) by mouth 3 (three) times daily.  Dispense: 270 capsule;  Refill: 1  -     CK; Standing  -     Rheumatoid factor; Future; Expected date: 07/22/2019  -     Cyclic citrul peptide antibody, IgG; Future; Expected date: 07/22/2019    Osteoarthritis of spine with radiculopathy, thoracolumbar region    Diabetic polyneuropathy associated with type 2 diabetes mellitus    Encounter for vitamin deficiency screening  -     Vitamin D; Future; Expected date: 07/22/2019    Age-related osteoporosis without current pathological fracture  -     Vitamin D; Future; Expected date: 07/22/2019      1:  Fibromyalgia:  No evidence of autoimmune arthritis noted on exam  Increase gabapentin to 300 mg p.o. T.i.d.  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 Medication literature given to patient up discharge  Went over uptodate information /literature on the meds prescribed today    Patient advised to hold DMARD and/or biologic therapy for signs of infection or for surgery. If you are unsure what to do please call our office for instruction. [facility] [phone]        Disclaimer: This note was prepared using voice recognition system and is likely to have sound alike errors and is not proof read.  Please call me with any questions.

## 2019-07-22 NOTE — PROGRESS NOTES
CC:  Chief Complaint   Patient presents with    Consult     Fibro- pain in neck, spine, & both hips       History of Present Illness:  Kaylin Archuleta a 74 y.o.yo female   Patient Active Problem List   Diagnosis    Fibromyalgia    Osteoarthritis of spine with radiculopathy, thoracolumbar region    Hypertension associated with diabetes    Migraine headache    Osteoarthritis    MVP (mitral valve prolapse)    Chronic major depressive disorder    Acromioclavicular joint arthritis    Chondromalacia of right shoulder    Macular hole    Hyperlipidemia associated with type 2 diabetes mellitus    Diabetes mellitus type 2 in obese    Statin-induced myositis    Colon cancer screening    Special screening for malignant neoplasms, colon    History of colon polyps    Family history of colon cancer    Lumbar spondylosis    Urinary urgency    Incomplete emptying of bladder    Decreased ROM of lumbar spine    Chronic bilateral low back pain without sciatica    Chronic pain of both hips    Weakness of both lower extremities     Here to reestablish care for fibromyalgia  She was evaluated in our clinic in the past  She had negative RF/DENNIS in the past  HLA B27 negative    For many years she had chronic neck and back pain  She also hurts in her knees  Pain aggravated with activity  Relief with Celebrex she has been on for few years now  She takes gabapentin 300 mg p.o. b.i.d.  Hands and feet do not bother her much  Denies any joint swelling  No history of gout    Currently she sees pain management and received JAMIN which helped  She has also received steroid injections in her hips  Scheduled for nerve block injections in right knee    History of diabetes with diabetic neuropathy involving upper and lower extremities  She was on tramadol in the past  But now switched to naltrexone and she is doing well    No history of rashes  No significant dry eyes or dry mouth    She does not take any vitamin-D  supplements  In 90s when she was on statins she developed statin induced myopathy and so there was stopped    Past Medical History:   Diagnosis Date    Arthritis     Cataract     Diabetes mellitus     Diabetes mellitus, type 2     Fibromyalgia     General anesthetics causing adverse effect in therapeutic use     bradycardia and tachycardia    Hypertension     Migraines     Mitral valve prolapse     Osteoarthritis     Rotator cuff tear 4/1/2015       Past Surgical History:   Procedure Laterality Date    ARTHROSCOPY-SHOULDER-RIGHT WITH EXCISION OF CA LIGAMENT, PARTIAL ACROMIONECTOMY Right 6/4/2015    [facility] [place]    BURSECTOMY-SHOULDER-RIGHT Right 6/4/2015    Performed by [facility] at [facility]    CATARACT EXTRACTION W/  INTRAOCULAR LENS IMPLANT Left 07/19/2017    CATARACT EXTRACTION W/  INTRAOCULAR LENS IMPLANT Right 2017    CHOLECYSTECTOMY      COLONOSCOPY N/A 9/25/2018    Performed by Bethel Carmona MD at [facility] ENDO    ESOPHAGOGASTRODUODENOSCOPY (EGD) N/A 6/24/2016    Performed by Kamaljit Gutierres III, MD at [facility] ENDO    KNEE ARTHROSCOPY Bilateral     PARS PLANA VITRECTOMY W/ REPAIR OF MACULAR HOLE Left 02/22/2017    REPAIR ROTATOR CUFF ARTHROSCOPIC-RIGHT Right 6/4/2015    Performed by [facility]o [facility] at [facility] OR    REPAIR-RETINA (VITRECTOMY) Left 2/22/2017    [facility]formed [facility] at [facility] OR 1ST FLR    Right SIJ RFA Left 7/11/2019    Performed by [facility] at [facility] PAIN MGT    SHOULDER ARTHROSCOPY Right 06/04/15    SYNOVECTOMY-SHOULDER-RIGHT Right 6/4/2015    Performed by [facility]usto Cota MD at [facility] OR         Social History     Tobacco Use    Smoking status: Never Smoker    Smokeless tobacco: Never Used   Substance Use Topics    Alcohol use: Yes     Alcohol/week: 0.0 oz     Comment: Rarely    Drug use: No       Family History   Problem Relation Age of Onset    Heart disease Mother     Glaucoma Mother     Cataracts Mother   "   Cancer Mother         colon    Kidney disease Daughter     Diabetes Maternal Grandmother     Heart disease Maternal Grandmother     Diabetes Maternal Grandfather     Cancer Maternal Grandfather     Breast cancer Paternal Aunt        Review of patient's allergies indicates:   Allergen Reactions    Demerol [meperidine] Other (See Comments)     Burning when adm IV  Able to tolerate IM, "Turned my hand purple."    Latex, natural rubber Other (See Comments)     "Burns my skin",  Symptoms get worse the longer she is exposed    Zocor [simvastatin] Other (See Comments)     Tightening of muscles    Azithromycin     Statins-hmg-coa reductase inhibitors Other (See Comments)     myopathy    Sulfa (sulfonamide antibiotics)     Nickel sutures [surgical stainless steel] Rash     Any nickel         Statin use:  No  Calcium supplements :  No  Vitamin d supplements :  No    DEXA :  Reviewed.  Osteopenia with low to moderate FRAX  Medications :  Exercise :    Review of Systems:  Constitutional: Denies fever, chills.  Intentional weight loss with dietary changes  Fatigue: no  Muscle weakness: no  Headaches: no new headaches  Eyes: No redness or dryness.  No recent visual changes.  ENT: Denies dry mouth. No oral or nasal ulcers.  Card: No chest pain.  Resp: No cough or sob.   Gastro: No nausea or vomiting.  No heartburn.  Constipation: no  Diarrhea: no  Genito:  No dysuria.  No genital ulcers.  Skin: No rash.  Raynauds:no  Neuro: + numbness / tingling.   Psych: No depression, anxiety  Endo:  no excess thirst.  Heme: no abnormal bleeding or bruising  Clots:none       OBJECTIVE:     Vital Signs   Vitals:    07/22/19 1303   BP: 126/66   Pulse: 80     Physical Exam:  General Appearance:  NAD.   Gait: not favoring.  HEENT: PERRL.  Eyes not dry or injected.  No nasal ulcers.  Mouth not dry, no oral lesions.  Lymph: cervical, supraclavicular or axillary nodes: none abnormal   Cardio: no irregularity of S1 or S2.  No gallops or " rubs.   Resp: Normal respiratory motion. Clear to auscultation bilaterally.   No abnormal chest conformation.  Abd: Soft, non-tender, nondistended.  No masses.   Skin: Head and neck,  and extremities examined. No rashes.   Neuro: Ox3.   Cranial nerves II-XII grossly intact.     Musculoskeletal Exam:  No synovitis  Spine :  No palpable swelling  Pain on range of motion      Tender points:+++  Muscle strength:Equal and full in all mm groups of the upper and lower ext.    Laboratory:   Results for orders placed or performed during the hospital encounter of 07/11/19   POCT glucose   Result Value Ref Range    POCT Glucose 121 (H) 70 - 110 mg/dL     Lab Results   Component Value Date    RF Negative 03/08/2004     Lab Results   Component Value Date    DENNIS Negative 03/08/2004     DEXA:    FINDINGS:  The L1 to L4 vertebral bone mineral density is equal to 1.338 g/cm squared with a T score of 0.2.  There has been a 1.1% increase which is not statistically significant relative to the prior study.    The left femoral neck bone mineral density is equal to 0.897 g/cm squared with a T score of -1.0.  There has been  a 5.5% decrease which is statistically significant relative to the prior study.    There is a 9.3% risk of a major osteoporotic fracture and a 1.2% risk of hip fracture in the next 10 years (FRAX).      Impression       Osteopenia as above         Imaging :FINDINGS:  Degenerative changes of the hips are again seen, not significant changed since the comparison exam.  No collapse of the femoral heads.  No fracture or dislocation is evident.  There also degenerative changes of the lumbosacral spine and sacroiliac joints and pubic symphysis.  Pelvic phleboliths are present.  Please note exam detail limited by habitus.    Notes reviewed  Other procedures:    ASSESSMENT/PLAN:     Fibromyalgia  -     gabapentin (NEURONTIN) 300 MG capsule; Take 1 capsule (300 mg total) by mouth 3 (three) times daily.  Dispense: 270 capsule;  Refill: 1  -     CK; Standing  -     Rheumatoid factor; Future; Expected date: 07/22/2019  -     Cyclic citrul peptide antibody, IgG; Future; Expected date: 07/22/2019    Osteoarthritis of spine with radiculopathy, thoracolumbar region    Diabetic polyneuropathy associated with type 2 diabetes mellitus    Encounter for vitamin deficiency screening  -     Vitamin D; Future; Expected date: 07/22/2019    Age-related osteoporosis without current pathological fracture  -     Vitamin D; Future; Expected date: 07/22/2019      1:  Fibromyalgia:  No evidence of autoimmune arthritis noted on exam  Increase gabapentin to 300 mg p.o. T.i.d.  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 Medication literature given to patient up discharge  Went over uptodate information /literature on the meds prescribed today    Patient advised to hold DMARD and/or biologic therapy for signs of infection or for surgery. If you are unsure what to do please call our office for instruction. [facility] [phone]        Disclaimer: This note was prepared using voice recognition system and is likely to have sound alike errors and is not proof read.  Please call me with any questions.

## 2019-07-23 ENCOUNTER — OFFICE VISIT (OUTPATIENT)
Dept: OPHTHALMOLOGY | Facility: CLINIC | Age: 74
End: 2019-07-23
Payer: MEDICARE

## 2019-07-23 ENCOUNTER — APPOINTMENT (OUTPATIENT)
Dept: OPHTHALMOLOGY | Facility: CLINIC | Age: 74
End: 2019-07-23
Payer: MEDICARE

## 2019-07-23 DIAGNOSIS — H40.013 OPEN ANGLE WITH BORDERLINE FINDINGS OF BOTH EYES: Primary | ICD-10-CM

## 2019-07-23 PROCEDURE — 99999 PR PBB SHADOW E&M-EST. PATIENT-LVL II: CPT | Mod: PBBFAC,,, | Performed by: OPTOMETRIST

## 2019-07-23 PROCEDURE — 92012 INTRM OPH EXAM EST PATIENT: CPT | Mod: S$PBB,,, | Performed by: OPTOMETRIST

## 2019-07-23 PROCEDURE — 99212 OFFICE O/P EST SF 10 MIN: CPT | Mod: PBBFAC,25 | Performed by: OPTOMETRIST

## 2019-07-23 PROCEDURE — 92133 CPTRZD OPH DX IMG PST SGM ON: CPT | Mod: PBBFAC | Performed by: OPTOMETRIST

## 2019-07-23 PROCEDURE — 92083 HUMPHREY VISUAL FIELD - OU - BOTH EYES: ICD-10-PCS | Mod: 26,S$PBB,, | Performed by: OPTOMETRIST

## 2019-07-23 PROCEDURE — 92012 PR EYE EXAM, EST PATIENT,INTERMED: ICD-10-PCS | Mod: S$PBB,,, | Performed by: OPTOMETRIST

## 2019-07-23 PROCEDURE — 92083 EXTENDED VISUAL FIELD XM: CPT | Mod: PBBFAC | Performed by: OPTOMETRIST

## 2019-07-23 PROCEDURE — 92133 POSTERIOR SEGMENT OCT OPTIC NERVE(OCULAR COHERENCE TOMOGRAPHY) - OU - BOTH EYES: ICD-10-PCS | Mod: 26,S$PBB,, | Performed by: OPTOMETRIST

## 2019-07-23 PROCEDURE — 99999 PR PBB SHADOW E&M-EST. PATIENT-LVL II: ICD-10-PCS | Mod: PBBFAC,,, | Performed by: OPTOMETRIST

## 2019-07-23 NOTE — PROGRESS NOTES
HPI     Pt was last seen on 1/22/19 with DNL for full exam. PT was told to rtc 6   months for 24-2VF, gOCT and IOP check   Medication eye drops if any: none  Last HVF: 7/23/19  Last gOCT: 7/23/19  Last SDP: 6/29/17       Last edited by Enid Herrera MA on 7/23/2019  2:07 PM. (History)            Assessment /Plan     For exam results, see Encounter Report.    Open angle with borderline findings of both eyes  -     Posterior Segment OCT Optic Nerve- Both eyes  -     Thomas Visual Field - OU - Extended - Both Eyes      No VF defects OD, OS  gOCT stable overall OD, OS  Monitor 6 months    RTC 6 months for dilation with Optos SDPs or PRN  Discussed above and all questions were answered.

## 2019-07-25 ENCOUNTER — HOSPITAL ENCOUNTER (OUTPATIENT)
Facility: HOSPITAL | Age: 74
Discharge: HOME | End: 2019-07-25
Attending: ANESTHESIOLOGY | Admitting: ANESTHESIOLOGY
Payer: MEDICARE

## 2019-07-25 VITALS
RESPIRATION RATE: 18 BRPM | WEIGHT: 194.75 LBS | OXYGEN SATURATION: 100 % | HEIGHT: 70 IN | TEMPERATURE: 98 F | SYSTOLIC BLOOD PRESSURE: 156 MMHG | BODY MASS INDEX: 27.88 KG/M2 | DIASTOLIC BLOOD PRESSURE: 70 MMHG | HEART RATE: 72 BPM

## 2019-07-25 DIAGNOSIS — M53.3 SACROILIAC JOINT DYSFUNCTION: Primary | ICD-10-CM

## 2019-07-25 LAB — POCT GLUCOSE: 127 MG/DL (ref 70–110)

## 2019-07-25 PROCEDURE — 64635 DESTROY LUMB/SAC FACET JNT: CPT | Performed by: ANESTHESIOLOGY

## 2019-07-25 PROCEDURE — 99152 PR MOD CONSCIOUS SEDATION, SAME PHYS, 5+ YRS, FIRST 15 MIN: ICD-10-PCS | Mod: ,,, | Performed by: ANESTHESIOLOGY

## 2019-07-25 PROCEDURE — 64636 DESTROY L/S FACET JNT ADDL: CPT | Performed by: ANESTHESIOLOGY

## 2019-07-25 PROCEDURE — 82962 GLUCOSE BLOOD TEST: CPT | Performed by: ANESTHESIOLOGY

## 2019-07-25 PROCEDURE — 64635 DESTROY LUMB/SAC FACET JNT: CPT | Mod: RT,,, | Performed by: ANESTHESIOLOGY

## 2019-07-25 PROCEDURE — S0020 INJECTION, BUPIVICAINE HYDRO: HCPCS | Performed by: ANESTHESIOLOGY

## 2019-07-25 PROCEDURE — 25000003 PHARM REV CODE 250: Performed by: ANESTHESIOLOGY

## 2019-07-25 PROCEDURE — 63600175 PHARM REV CODE 636 W HCPCS: Performed by: ANESTHESIOLOGY

## 2019-07-25 PROCEDURE — 64636 DESTROY L/S FACET JNT ADDL: CPT | Mod: RT,,, | Performed by: ANESTHESIOLOGY

## 2019-07-25 PROCEDURE — 99152 MOD SED SAME PHYS/QHP 5/>YRS: CPT | Mod: ,,, | Performed by: ANESTHESIOLOGY

## 2019-07-25 PROCEDURE — 64635 PR DESTROY LUMB/SAC FACET JNT: ICD-10-PCS | Mod: RT,,, | Performed by: ANESTHESIOLOGY

## 2019-07-25 PROCEDURE — 64636 PR DESTROY L/S FACET JNT ADDL: ICD-10-PCS | Mod: RT,,, | Performed by: ANESTHESIOLOGY

## 2019-07-25 RX ORDER — FENTANYL CITRATE 50 UG/ML
INJECTION, SOLUTION INTRAMUSCULAR; INTRAVENOUS
Status: DISCONTINUED | OUTPATIENT
Start: 2019-07-25 | End: 2019-07-25 | Stop reason: HOSPADM

## 2019-07-25 RX ORDER — BUPIVACAINE HYDROCHLORIDE 5 MG/ML
INJECTION, SOLUTION EPIDURAL; INTRACAUDAL
Status: DISCONTINUED | OUTPATIENT
Start: 2019-07-25 | End: 2019-07-25 | Stop reason: HOSPADM

## 2019-07-25 RX ORDER — LIDOCAINE HYDROCHLORIDE 20 MG/ML
INJECTION, SOLUTION EPIDURAL; INFILTRATION; INTRACAUDAL; PERINEURAL
Status: DISCONTINUED | OUTPATIENT
Start: 2019-07-25 | End: 2019-07-25 | Stop reason: HOSPADM

## 2019-07-25 RX ORDER — MIDAZOLAM HYDROCHLORIDE 1 MG/ML
INJECTION, SOLUTION INTRAMUSCULAR; INTRAVENOUS
Status: DISCONTINUED | OUTPATIENT
Start: 2019-07-25 | End: 2019-07-25 | Stop reason: HOSPADM

## 2019-07-25 RX ORDER — METHYLPREDNISOLONE ACETATE 40 MG/ML
INJECTION, SUSPENSION INTRA-ARTICULAR; INTRALESIONAL; INTRAMUSCULAR; SOFT TISSUE
Status: DISCONTINUED | OUTPATIENT
Start: 2019-07-25 | End: 2019-07-25 | Stop reason: HOSPADM

## 2019-07-25 NOTE — PLAN OF CARE
Pt aaa o x's 4, denies any pain/discomfort, what to expect during recovery discussed with pt and family at bedside. Pt and family verbalized understanding of post op instructions. All questions and concerns addressed, will continue to monitor until discharged.

## 2019-07-25 NOTE — OP NOTE
PROCEDURE: L5 dorsal ramus and Sacral 1, Sacral 2, and Sacral 3 lateral branch radiofrequency (RF) ablation under fluoroscopic guidance     SIDE: Right     LEVEL:   Sacral ala (Corresponding to the L5 dorsal ramus),   Sacral 1 lateral branch,   Sacral 2 lateral brach,   Sacral 3 lateral branch     PROCEDURE DATE: 7/25/2019     PREOPERATIVE DIAGNOSIS: Sacroiliitis, Sacroiliac Joint Dysfunction  POSTOPERATIVE DIAGNOSIS: Sacroiliitis, Sacroiliac Joint Dysfunction     PROVIDER: Manpreet Dutta MD  Assistant(s): None     ANESTHESIA: Local, IV Sedation    >> 2 mg of VERSED    >> 200 mcg of FENTANYL      INDICATION: The patient has chronic sacroiliac joint pain unresponsive to conservative treatments. Fluoroscopy was used to optimize visualization of needle placement and to maximize safety.         PROCEDURE DESCRIPTION / TECHNIQUE:   The patient was seen and identified in the preoperative area. Risks, benefits, complications, and alternatives were discussed with the patient. The patient agreed to proceed with the procedure and signed the consent. The site and side of the procedure was identified and marked. An iv was started.     The patient was taken to the procedural suite. The patient was positioned in prone orientation on procedure table. A time out was performed prior to any intervention. The procedure, site, side, and allergies were stated and agreed to by all present. The lumbosacral area was widely prepped with ChloraPrep. The procedural site was draped in usual sterile fashion. Vital signs were closely monitored throughout this procedure. Conscious sedation was used for this procedure to decrease patient anxiety.      Using fluoroscopic imaging, the dorsal left S1, S2, and S3 foramen were identified. Superior and inferior points along the lateral aspect of each foramen (corresponding to 2 o'clock and 4 o'clock for right side foramen and 10 o'clock and 8 o'clock for left side foramen) were identified and marked.  Superficial tissues were localized with 2% PF Lidocaine. Xsilon 100 mm 18-gauge radiofrequency cannulae with curved 10-mm active tips were advanced under fluoroscopic guidance until osseus interface was met at the above noted locations. A RF needle was also advanced to the Sacral Superior Articular Process (SAP)-Sacral Ala junction under fluoroscopic guidance. After osseus contact was made, the needle was walked off of the sulcus. The fluoroscope was obliqued to the ipsilateral side and the needle was repositioned as needed until the active tip was aligned along the base of the SAP and the needle tip met the anterior fluoroscopic shadow of the SAP. Lateral view was also checked to ensure appropriate depth of the needle tips. RF needles were also positioned along the inferior 1/2 of the joint, needles were positioned using a leap-frog technique just medial to the dorsal SI joint line.      Nerves were tested sequentially. Sensory testing was not performed at 50 Hz up to  0.5 volt with production of concordant pain. Motor testing was performed at 2 Hz up to 1.5 volts with elicitation of mulitifidus muscle contraction and with no radicular pain, paresthesias, or distal muscle contraction beyond the buttocks produced during motor testing. Following testing, RF electrodes were removed and 1 mL of a solution containing 2% Lidocaine. was injected through each cannula following negative aspiration. The RF electrodes were then replaced and each targeted medial branch was sequentially subjected to thermal coagulation at 80 degrees Celsius for 90 seconds. RF electrodes were then removed and 1 mL of a solution containing 5 mL 0.25% Bupivacaine and 2 mL Methylprednisolone (40 mg/mL) was injected through each cannula following negative aspiration, stylets were replaced, and each cannua was removed intact.     Description of Findings: Not applicable     Prosthetic devices, grafts, tissues, or devices implanted: None     Specimen  Removed: No     Estimated Blood Loss: minimal     COMPLICATIONS: None     DISPOSITION / PLANS: The patient was transferred to the recovery area in a stable condition for observation. The patient was reexamined prior to discharge. There was no evidence of acute neurologic injury following the procedure.  Patient was discharged from the recovery room after meeting discharge criteria. Home discharge instructions were given to the patient by the staff.

## 2019-07-25 NOTE — DISCHARGE INSTRUCTIONS

## 2019-07-25 NOTE — DISCHARGE SUMMARY
[facility]  Discharge Note       Description of Procedure: Right SI Joint RFA under Fluoroscopic Guidance    Procedure Date: 7/25/2019    Admit Date: 7/25/2019  Discharge Date: 7/25/2019     Attending Physician: Luzmaria Case   Discharge Provider: Luzmaria Case    Preoperative Diagnosis: Sacroiliitis     Postoperative Diagnosis: as above, same as preoperative diagnosis    Discharged Condition: Stable    Hospital Course: Patient was admitted for an outpatient procedure. The procedure was tolerated well with no complications.    Final Diagnoses: Same as principal problem.    Disposition: Home, self-care.    Follow up/Patient Instructions:  Follow-up in clinic in 2-3 weeks.    Medications: No medications were prescribed today. The patient was advised to resume normal medication regimen without change.  Specific information was provided regarding restarting any anticoagulant/s.    Discharge Procedure Orders (must include Diet, Follow-up, Activity):  Light activity for the remainder of the day, resume normal activity tomorrow. Resume normal diet. Follow-up in clinic in 2-3 weeks.

## 2019-08-02 ENCOUNTER — TELEPHONE (OUTPATIENT)
Dept: REHABILITATION | Facility: HOSPITAL | Age: 74
End: 2019-08-02

## 2019-08-07 ENCOUNTER — CLINICAL SUPPORT (OUTPATIENT)
Dept: REHABILITATION | Facility: HOSPITAL | Age: 74
End: 2019-08-07
Attending: ORTHOPAEDIC SURGERY
Payer: MEDICARE

## 2019-08-07 DIAGNOSIS — G89.29 CHRONIC PAIN OF BOTH HIPS: ICD-10-CM

## 2019-08-07 DIAGNOSIS — M53.86 DECREASED ROM OF LUMBAR SPINE: ICD-10-CM

## 2019-08-07 DIAGNOSIS — M25.551 CHRONIC PAIN OF BOTH HIPS: ICD-10-CM

## 2019-08-07 DIAGNOSIS — R29.898 WEAKNESS OF BOTH LOWER EXTREMITIES: ICD-10-CM

## 2019-08-07 DIAGNOSIS — M54.50 CHRONIC BILATERAL LOW BACK PAIN WITHOUT SCIATICA: ICD-10-CM

## 2019-08-07 DIAGNOSIS — M25.552 CHRONIC PAIN OF BOTH HIPS: ICD-10-CM

## 2019-08-07 DIAGNOSIS — G89.29 CHRONIC BILATERAL LOW BACK PAIN WITHOUT SCIATICA: ICD-10-CM

## 2019-08-07 PROCEDURE — 97140 MANUAL THERAPY 1/> REGIONS: CPT

## 2019-08-07 PROCEDURE — 97110 THERAPEUTIC EXERCISES: CPT

## 2019-08-08 ENCOUNTER — HOSPITAL ENCOUNTER (OUTPATIENT)
Facility: HOSPITAL | Age: 74
Discharge: HOME | End: 2019-08-08
Attending: ANESTHESIOLOGY | Admitting: ANESTHESIOLOGY
Payer: MEDICARE

## 2019-08-08 VITALS
RESPIRATION RATE: 19 BRPM | HEART RATE: 69 BPM | DIASTOLIC BLOOD PRESSURE: 58 MMHG | WEIGHT: 196.75 LBS | BODY MASS INDEX: 28.17 KG/M2 | HEIGHT: 70 IN | SYSTOLIC BLOOD PRESSURE: 119 MMHG | TEMPERATURE: 97 F | OXYGEN SATURATION: 98 %

## 2019-08-08 DIAGNOSIS — M25.562 CHRONIC PAIN OF BOTH KNEES: Primary | ICD-10-CM

## 2019-08-08 DIAGNOSIS — M25.561 CHRONIC PAIN OF BOTH KNEES: Primary | ICD-10-CM

## 2019-08-08 DIAGNOSIS — G89.29 CHRONIC PAIN OF BOTH KNEES: Primary | ICD-10-CM

## 2019-08-08 LAB — POCT GLUCOSE: 124 MG/DL (ref 70–110)

## 2019-08-08 PROCEDURE — 82962 GLUCOSE BLOOD TEST: CPT | Performed by: ANESTHESIOLOGY

## 2019-08-08 PROCEDURE — 64450 NJX AA&/STRD OTHER PN/BRANCH: CPT | Mod: RT,,, | Performed by: ANESTHESIOLOGY

## 2019-08-08 PROCEDURE — 25000003 PHARM REV CODE 250: Performed by: ANESTHESIOLOGY

## 2019-08-08 PROCEDURE — 99152 PR MOD CONSCIOUS SEDATION, SAME PHYS, 5+ YRS, FIRST 15 MIN: ICD-10-PCS | Mod: ,,, | Performed by: ANESTHESIOLOGY

## 2019-08-08 PROCEDURE — 64450 PR NERVE BLOCK INJ, ANES/STEROID, OTHER PERIPHERAL: ICD-10-PCS | Mod: RT,,, | Performed by: ANESTHESIOLOGY

## 2019-08-08 PROCEDURE — 64450 NJX AA&/STRD OTHER PN/BRANCH: CPT | Performed by: ANESTHESIOLOGY

## 2019-08-08 PROCEDURE — 63600175 PHARM REV CODE 636 W HCPCS: Performed by: ANESTHESIOLOGY

## 2019-08-08 PROCEDURE — 99152 MOD SED SAME PHYS/QHP 5/>YRS: CPT | Mod: ,,, | Performed by: ANESTHESIOLOGY

## 2019-08-08 RX ORDER — LIDOCAINE HYDROCHLORIDE 20 MG/ML
INJECTION, SOLUTION EPIDURAL; INFILTRATION; INTRACAUDAL; PERINEURAL
Status: DISCONTINUED | OUTPATIENT
Start: 2019-08-08 | End: 2019-08-08 | Stop reason: HOSPADM

## 2019-08-08 RX ORDER — FENTANYL CITRATE 50 UG/ML
INJECTION, SOLUTION INTRAMUSCULAR; INTRAVENOUS
Status: DISCONTINUED | OUTPATIENT
Start: 2019-08-08 | End: 2019-08-08 | Stop reason: HOSPADM

## 2019-08-08 RX ORDER — MIDAZOLAM HYDROCHLORIDE 1 MG/ML
INJECTION, SOLUTION INTRAMUSCULAR; INTRAVENOUS
Status: DISCONTINUED | OUTPATIENT
Start: 2019-08-08 | End: 2019-08-08 | Stop reason: HOSPADM

## 2019-08-08 RX ORDER — METHYLPREDNISOLONE ACETATE 40 MG/ML
INJECTION, SUSPENSION INTRA-ARTICULAR; INTRALESIONAL; INTRAMUSCULAR; SOFT TISSUE
Status: DISCONTINUED | OUTPATIENT
Start: 2019-08-08 | End: 2019-08-08 | Stop reason: HOSPADM

## 2019-08-08 NOTE — PLAN OF CARE
Physical Therapy Updated Plan of Care/Daily Treatment Note     Name: Kaylin Mccollum  Clinic Number: [id number]    Therapy Diagnosis:   Encounter Diagnoses   Name Primary?    Decreased ROM of lumbar spine     Chronic bilateral low back pain without sciatica     Chronic pain of both hips     Weakness of both lower extremities      Physician: Richi Callaway, *  Visit Date: 8/7/2019  Physician Orders: PT Eval and Treat   Medical Diagnosis from Referral:   M76.31 (ICD-10-CM) - It band syndrome, right   M53.3 (ICD-10-CM) - SI (sacroiliac) pain   Evaluation Date: 7/3/2019  Authorization Period Expiration: 6/25/2020  Plan of Care Expiration: 7/31/2019  Visit # / Visits authorized: 2/20    Current Certification Period:  7/3/2019 to 7/31/2019     Time In: 3:27  Time Out: 4:30  Total Billable Time: 60 minutes    Precautions: Standard and Diabetes    Subjective     Pt reports: missing PT secondary to recovering from her nerve burning procedures on July 11th and 25th. She states she is feeling a lot of relief from both procedures. However, Kaylin reports feeling a little bit of pain in the posterior left hip and down the left lower extremity to the left knee.    She was compliant with home exercise program.  Response to previous treatment: patient reported minimal decrease in pain after last treatment  Functional change: pt reported no functional change    Pain: 4/10  Location: bilateral back      Objective     Kaylin received therapeutic exercises to develop strength, endurance, ROM, flexibility and core stabilization for 40 minutes including:  Nustep 5 minutes to gain thoracic and lumbar mobility  Piriformis stretch 30 sec hold, 3x  DKTC 30sec hold, 3x  LTR 3 minutes  TA contraction 3 second hold, 3 minutes  SL clams 3 sets of 10  SL hip abduction 2 sets of 10 - not today  Prone hip extension 2 sets of 10 - not today  Bridges 2 x 10  Shuttle squats 5 bands 3 minutes  paloff press GTB 1 set of 15 - not today      Kaylin received the following manual therapy techniques: Joint mobilizations and Soft tissue Mobilization were applied to the: low back and B hips for 20 minutes, including:  Soft tissue to thoracic and lumbar paraspinals and B quadratus lumborum       L/sp AROM:       % Pain Present (Y/N)   FB 75deg N   BB 20deg Y                                                                                                        R          L  Strength:                    Hip flexors                   4/5       4+/5  Quadriceps                  5/5       5/5                                             Hamstrings                  4+/5     4+/5                                      PF                                5/5       5/5                                      DF                               5/5       5/5                                      IR                                4+/5       5/5                                      ER                               4/5       5/5                                      Gluteus Medius           4/5      4+/5                                      Gluteus Germain        4-/5       4/5    LOWER EXTREMITY FUNCTIONAL SCALE                                                                1. Any of your usual work, housework or school activities                 1/4  2. Your usual hobbies, sporting                                                          4/4  3. Getting in and out of tub                                                                 4/4  4. Walking between rooms                                                                 4/4  5. Putting on shoes or socks                                                              4/4  6. Squatting                                                                                         1/4  7. Lifting an object from the ground                                                    4/4  8. Performing light activities around the home                                     4/4  9. Performing heavy activities around the home                                2/4  10. Getting in and out of car                                                               3/4  11. Walking 2 blocks                                                                           2/4  12.Walking a mile                                                                               0/4  13. Getting up and down 1 flight of stairs                                           3/4  14. Standing for 1 hour                                                                       4/4  15. Sitting for an hour                                                                         4/4  16. Running on even ground                                                              0/4  17. Running on uneven ground                                                          0/4  18. Making sharp turns when running fast                                         0/4  19. Hopping                                                                                         0/4  20. Rolling over in bed                                                                         3/4      Score: 47/80     Home Exercises Provided and Patient Education Provided     Education provided:   - encouraged to continue HEP and to be mobile with flare ups however just decrease intensity with activities.     Written Home Exercises Provided: Patient instructed to cont prior HEP.  Exercises were reviewed and Kaylin was able to demonstrate them prior to the end of the session.  Kaylin demonstrated good  understanding of the education provided.     See EMR under Patient Instructions for exercises provided prior visit.       Assessment   Patient demonstrated poor hip strength, however she was able to tolerate new therex.   She responded well after treatment today.     UPDATE: Overall, no significant improvements noted with therapy secondary to noncompliance  due to neural procedures. However, due to decreased pain after the procedures, patient has shown very minimal improvements with BLE strength and LEFS measure. No change in ROM.    Kaylin is progressing well towards her goals.   Pt prognosis is Excellent.     Pt will continue to benefit from skilled outpatient physical therapy to address the deficits listed in the problem list box on initial evaluation, provide pt/family education and to maximize pt's level of independence in the home and community environment.     Pt's spiritual, cultural and educational needs considered and pt agreeable to plan of care and goals.     Anticipated barriers to physical therapy: fibromyalgia    Goals:  Short Term Goals: In 3 weeks   1.I with HEP MET  2.Pt to increase lumbar ROM to 90 degrees flexion.  3.Pt to increase BLE strength by 1/2 grade. PARTIALLY MET  4.Pt to have pain less than 2/10 at all times.  5.Pt to score 60 or more on the LEFS.  6. Pt to be educated on postural/body mechanics awareness.     Long Term Goals: In 6 weeks  1.Pt to score 70 or more on the LEFS.  2. Patient to demo increase in LE strength to 5/5  3. Patient to have decreased pain to 3/10 at worse.  4. Patient to demo increase lumbar ROM to 100 deg flexion.  5. Patient to perform daily activities including all ADLs without limitation.    New Short Term Goals Status:  Met 1/6 - continue per initial plan of care  Long Term Goal Status:   continue per initial plan of care.  Reasons for Recertification of Therapy:  Patient has been unable to attend therapy secondary to recovery time recommended for neural procedures. She has shown very minimal improvements secondary to decreased pain after procedure alone. However, Kaylin continues to have deficits in hip strength, and lumbar ROM in which continues to limit her overall function. She will continue to benefit from PT.    Plan   Continue to strength B hip musculature and core stabilization as well as increase  flexibility.     Updated Certification Period: 8/7/2019 to 9/4/2019  Recommended Treatment Plan: 2 times per week for 6-8 weeks: Electrical Stimulation IFC, Manual Therapy, Moist Heat/ Ice, Patient Education and Therapeutic Exercise.    Lyubov Salter, PT  8/7/2019      I CERTIFY THE NEED FOR THESE SERVICES FURNISHED UNDER THIS PLAN OF TREATMENT AND WHILE UNDER MY CARE    Physician's comments:        Physician's Signature: ___________________________________________________

## 2019-08-08 NOTE — DISCHARGE INSTRUCTIONS

## 2019-08-08 NOTE — DISCHARGE SUMMARY
[facility]  Discharge Note       Description of Procedure: Right Genicular Nerve Block (Superior Lateral, Superior Medial, Inferior Medial Genicular Nerves) under Fluoroscopic Guidance    Procedure Date: 8/8/2019    Admit Date: 8/8/2019  Discharge Date: 8/8/2019     Attending Physician: Luzmaria Case   Discharge Provider: Luzmaria Case    Preoperative Diagnosis: Right Knee Pain (chronic) and Right Knee Osteoarthritis     Postoperative Diagnosis: as above, same as preoperative diagnosis    Discharged Condition: Stable    Hospital Course: Patient was admitted for an outpatient procedure. The procedure was tolerated well with no complications.    Final Diagnoses: Same as principal problem.    Disposition: Home, self-care.    Follow up/Patient Instructions:  Follow-up in clinic in 2-3 weeks.    Medications: No medications were prescribed today. The patient was advised to resume normal medication regimen without change.  Specific information was provided regarding restarting any anticoagulant/s.    Discharge Procedure Orders (must include Diet, Follow-up, Activity):  Light activity for the remainder of the day, resume normal activity tomorrow. Resume normal diet. Follow-up in clinic in 2-3 weeks.

## 2019-08-08 NOTE — OP NOTE
PROCEDURE: Genicular Nerve Block (Superior Lateral, Superior Medial, and Inferior Medial Genicular Nerve Block) under fluoroscopic guidance    SIDE: right     PROCEDURE DATE: 8/8/2019    PREOPERATIVE DIAGNOSIS: Mononeuropathies of Lower Limb, Chronic Knee Pain  POSTOPERATIVE DIAGNOSIS: Mononeuropathies of Lower Limb, Chronic Knee Pain    PROVIDER: Manpreet Dutta MD  Assistant(s): None    ANESTHESIA: Local, IV Sedation    >> 2 mg of VERSED    >> 100 mcg of FENTANYL     INDICATION: The patient has knee pain unresponsive to conservative treatments. Fluoroscopy was used to optimize visualization of needle placement and to maximize safety.        PROCEDURE DESCRIPTION / TECHNIQUE:   The patient was seen and identified in the preoperative area. Risks, benefits, complications, and alternatives were discussed with the patient. The patient agreed to proceed with the procedure and signed the consent. The site and side of the procedure was identified and marked. An iv was started.    The patient was taken to the procedural suite. The patient was positioned in supine orientation on procedure table.  The procedural knee/s was/were exposed. A pillow was placed under the procedural knee to offset lateral alignment in order to optimize lateral fluoroscopic visualization. A time out was performed prior to any intervention. The procedure, site, side, and allergies were stated and agreed to by all present. The anterior, lateral, and medial aspects of the procedural knee/s was/were widely prepped with ChloraPrep. The procedural site/s was/were draped in usual sterile fashion. Vital signs were closely monitored throughout this procedure. Conscious sedation was used for this procedure to decrease patient anxiety.    AP fluoroscopy was used to identify the interface of the femoral shaft and the superior medial and superior lateral femoral epicondyle, as well as the interface of the tibial shaft and the medial tibial epicondyle. These  targeted sites were marked and localized with 1% Lidocaine. The target sites were approached under fluoroscopic guidance using 3 seperate 25 gauge 3.5 inch spinal needles. The needles were advanced until osseus contact was made. The fluoroscope was moved into a lateral orientation and the needles were advanced further until each needle tip reached the median aspect of the femoral or tibial shaft. Osseus contact was maintained. After negative aspiration, 1.5 to 2 mL of an intectate solution comprised of 5 mL of 1% PF Lidocaine and 1 mL of Methylprednisolone (40 mg/mL) was injected at each targeted site. After injection, the stylets were replaced and all needles were removed intact. This injection technique was performed for all of the above noted sites. Following injection, the injection sites were cleaned and bandages were applied. The patient tolerated the procedure well.      Description of Findings: Not applicable    Prosthetic devices, grafts, tissues, or devices implanted: None    Specimen Removed: No    Estimated Blood Loss: minimal    COMPLICATIONS: None    DISPOSITION / PLANS: The patient was transferred to the recovery area in a stable condition for observation. The patient was reexamined prior to discharge. There was no evidence of acute neurologic injury following the procedure.  Patient was discharged from the recovery room after meeting discharge criteria. Home discharge instructions were given to the patient by the staff.

## 2019-08-08 NOTE — PLAN OF CARE
"Patient d/c home in stable condition via wheelchair with ride. Verbalized understanding of d/c instructions. Patient reported "the pain is still the same when ambulating to the wheelchair" 3/10 to rt knee. Patient stood at side of bed, walked steps with no new motor deficits. Neuro intact. Daughter is driving patient home.  "

## 2019-08-08 NOTE — PROGRESS NOTES
08/08/19 0843   Handoff Report   Received From [facility]    Pain/Comfort/Sleep   Preferred Pain Scale number (Numeric Rating Pain Scale)   Pain Body Location - Side Right   Pain Body Location - Orientation posterior   Pain Body Location knee   Pain Rating (0-10): Rest 3   Pain Rating (0-10): Activity 3   Frequency intermittent  (discomfort to behind the knee)   Ana Postanesthesia Score   Activity 2-->moves 4 extremities voluntarily or on command   Circulation 2-->BP within 20% of preanesthetic level   Consciousness 2-->fully awake   Respiration 2-->able to breathe and cough freely   HEENT   HEENT WDL WDL   Mouth/Teeth WDL   Mouth/Teeth WDL WDL   Neck WDL   Neck WDL WDL   Cognitive   Cognitive/Neuro/Behavioral WDL WDL   Oxygen Therapy   O2 Device (Oxygen Therapy) nasal cannula        Peripheral IV - Single Lumen 08/08/19 0710 22 G Right Wrist   Placement Date/Time: 08/08/19 0710   Present Prior to Hospital Arrival?: No  Size/Length: 22 G  Orientation: Right  Location: Wrist  Site Prep: Chlorhexidine   Inserted by: (c) RN  Insertion attempts (enter comment if more than 2 attempts): 1  Patient...   Site Assessment Clean;Dry;Intact;No redness;No swelling   Line Status No blood return;Saline locked   Dressing Status Clean;Dry;Intact

## 2019-08-18 DIAGNOSIS — M15.9 PRIMARY OSTEOARTHRITIS INVOLVING MULTIPLE JOINTS: ICD-10-CM

## 2019-08-18 DIAGNOSIS — E66.9 DIABETES MELLITUS TYPE 2 IN OBESE: ICD-10-CM

## 2019-08-18 DIAGNOSIS — E11.69 DIABETES MELLITUS TYPE 2 IN OBESE: ICD-10-CM

## 2019-08-18 DIAGNOSIS — M79.7 FIBROMYALGIA: Chronic | ICD-10-CM

## 2019-08-18 DIAGNOSIS — M47.25 OSTEOARTHRITIS OF SPINE WITH RADICULOPATHY, THORACOLUMBAR REGION: Chronic | ICD-10-CM

## 2019-08-18 RX ORDER — EMPAGLIFLOZIN 10 MG/1
TABLET, FILM COATED ORAL
Qty: 90 TABLET | Refills: 3 | Status: SHIPPED | OUTPATIENT
Start: 2019-08-18 | End: 2020-01-25 | Stop reason: ALTCHOICE

## 2019-08-19 RX ORDER — CELECOXIB 100 MG/1
CAPSULE ORAL
Qty: 180 CAPSULE | Refills: 0 | Status: SHIPPED | OUTPATIENT
Start: 2019-08-19 | End: 2020-01-09

## 2019-08-20 ENCOUNTER — CLINICAL SUPPORT (OUTPATIENT)
Dept: REHABILITATION | Facility: HOSPITAL | Age: 74
End: 2019-08-20
Attending: ORTHOPAEDIC SURGERY
Payer: MEDICARE

## 2019-08-20 ENCOUNTER — TELEPHONE (OUTPATIENT)
Dept: RADIOLOGY | Facility: HOSPITAL | Age: 74
End: 2019-08-20

## 2019-08-20 DIAGNOSIS — R29.898 WEAKNESS OF BOTH LOWER EXTREMITIES: ICD-10-CM

## 2019-08-20 DIAGNOSIS — G89.29 CHRONIC PAIN OF BOTH HIPS: ICD-10-CM

## 2019-08-20 DIAGNOSIS — M25.551 CHRONIC PAIN OF BOTH HIPS: ICD-10-CM

## 2019-08-20 DIAGNOSIS — M25.552 CHRONIC PAIN OF BOTH HIPS: ICD-10-CM

## 2019-08-20 DIAGNOSIS — M54.50 CHRONIC BILATERAL LOW BACK PAIN WITHOUT SCIATICA: ICD-10-CM

## 2019-08-20 DIAGNOSIS — M53.86 DECREASED ROM OF LUMBAR SPINE: ICD-10-CM

## 2019-08-20 DIAGNOSIS — G89.29 CHRONIC BILATERAL LOW BACK PAIN WITHOUT SCIATICA: ICD-10-CM

## 2019-08-20 PROCEDURE — 97140 MANUAL THERAPY 1/> REGIONS: CPT

## 2019-08-20 PROCEDURE — 97110 THERAPEUTIC EXERCISES: CPT

## 2019-08-20 NOTE — PROGRESS NOTES
Physical Therapy Updated Plan of Care/Daily Treatment Note      Name: Kaylin Mccollum  Clinic Number: [id number]     Therapy Diagnosis:        Encounter Diagnoses   Name Primary?    Decreased ROM of lumbar spine      Chronic bilateral low back pain without sciatica      Chronic pain of both hips      Weakness of both lower extremities        Physician: Richi Callaway, *  Visit Date: 8/7/2019  Physician Orders: PT Eval and Treat   Medical Diagnosis from Referral:   M76.31 (ICD-10-CM) - It band syndrome, right   M53.3 (ICD-10-CM) - SI (sacroiliac) pain   Evaluation Date: 7/3/2019  Authorization Period Expiration: 6/25/2020  Plan of Care Expiration: 9/4/2019  Visit # / Visits authorized: 4/20     Time In: 3:30  Time Out: 4:32  Total Billable Time: 60 minutes     Precautions: Standard and Diabetes     Subjective      Pt reports: missing PT secondary to recovering from her knee injection. She states she is feeling a lot of relief from all of her recent procedures.    She was compliant with home exercise program.  Response to previous treatment: patient reported minimal decrease in pain after last treatment  Functional change: pt reported no functional change     Pain: 4/10  Location: bilateral back       Objective      Kaylin received therapeutic exercises to develop strength, endurance, ROM, flexibility and core stabilization for 45 minutes including:  Nustep 5 minutes to gain thoracic and lumbar mobility  Piriformis stretch 30 sec hold, 3x  Hamstring stretch 30 sec 3x  DKTC 30sec hold, 3x  LTR 3 minutes  TA contraction 3 second hold, 3 minutes  SL clamsYTB 3 sets of 10  SL hip abduction 2 sets of 10   Prone hip extension 2 sets of 10   Bridges 2 x 10  Shuttle squats 5 bands 3 minutes  paloff press GTB 1 set of 15 - not today  Step ups 2 x 10     Kaylin received the following manual therapy techniques: Joint mobilizations and Soft tissue Mobilization were applied to the: low back and B hips for 15  minutes, including:  Soft tissue to thoracic and lumbar paraspinals and B quadratus lumborum  Neutral gapping over pillow in SL        Home Exercises Provided and Patient Education Provided      Education provided:   - encouraged to continue HEP and to be mobile with flare ups however just decrease intensity with activities.      Written Home Exercises Provided: Patient instructed to cont prior HEP.  Exercises were reviewed and Kaylin was able to demonstrate them prior to the end of the session.  Kaylin demonstrated good  understanding of the education provided.      See EMR under Patient Instructions for exercises provided prior visit.         Assessment   Patient demonstrated improved hip strength, however she still demonstrates weakness. She was able to tolerate new therex with decrease pain after neutral gapping and left lumbar distraction/soft tissue.   She responded well after treatment today.      UPDATE: Overall, no significant improvements noted with therapy secondary to noncompliance due to neural procedures. However, due to decreased pain after the procedures, patient has shown very minimal improvements with BLE strength and LEFS measure. No change in ROM.     Kaylin is progressing well towards her goals.   Pt prognosis is Excellent.      Pt will continue to benefit from skilled outpatient physical therapy to address the deficits listed in the problem list box on initial evaluation, provide pt/family education and to maximize pt's level of independence in the home and community environment.      Pt's spiritual, cultural and educational needs considered and pt agreeable to plan of care and goals.     Anticipated barriers to physical therapy: fibromyalgia     Goals:  Short Term Goals: In 3 weeks   1.I with HEP MET  2.Pt to increase lumbar ROM to 90 degrees flexion.  3.Pt to increase BLE strength by 1/2 grade. PARTIALLY MET  4.Pt to have pain less than 2/10 at all times.  5.Pt to score 60 or more on  the LEFS.  6. Pt to be educated on postural/body mechanics awareness.     Long Term Goals: In 6 weeks  1.Pt to score 70 or more on the LEFS.  2. Patient to demo increase in LE strength to 5/5  3. Patient to have decreased pain to 3/10 at worse.  4. Patient to demo increase lumbar ROM to 100 deg flexion.  5. Patient to perform daily activities including all ADLs without limitation.     New Short Term Goals Status:  Met 1/6 - continue per initial plan of care  Long Term Goal Status:   continue per initial plan of care.  Reasons for Recertification of Therapy:  Patient has been unable to attend therapy secondary to recovery time recommended for neural procedures. She has shown very minimal improvements secondary to decreased pain after procedure alone. However, Kaylin continues to have deficits in hip strength, and lumbar ROM in which continues to limit her overall function. She will continue to benefit from PT.     Plan   Continue to strength B hip musculature and core stabilization as well as increase flexibility.        Lyubov Salter, PT  8/7/2019

## 2019-08-21 ENCOUNTER — HOSPITAL ENCOUNTER (OUTPATIENT)
Dept: RADIOLOGY | Facility: HOSPITAL | Age: 74
Discharge: HOME | End: 2019-08-21
Attending: NURSE PRACTITIONER
Payer: MEDICARE

## 2019-08-21 ENCOUNTER — HOSPITAL ENCOUNTER (OUTPATIENT)
Dept: RADIOLOGY | Facility: HOSPITAL | Age: 74
Discharge: HOME | End: 2019-08-21
Attending: FAMILY MEDICINE
Payer: MEDICARE

## 2019-08-21 DIAGNOSIS — Z12.31 ENCOUNTER FOR SCREENING MAMMOGRAM FOR MALIGNANT NEOPLASM OF BREAST: ICD-10-CM

## 2019-08-21 DIAGNOSIS — R59.9 ENLARGED LYMPH NODES: ICD-10-CM

## 2019-08-21 DIAGNOSIS — R33.9 INCOMPLETE EMPTYING OF BLADDER: ICD-10-CM

## 2019-08-21 PROCEDURE — 77067 MAMMO DIGITAL SCREENING BILAT WITH TOMOSYNTHESIS_CAD: ICD-10-PCS | Mod: 26,,, | Performed by: RADIOLOGY

## 2019-08-21 PROCEDURE — 76857 US BLADDER POST VOID RESIDUAL: ICD-10-PCS | Mod: 26,,, | Performed by: RADIOLOGY

## 2019-08-21 PROCEDURE — 76857 US EXAM PELVIC LIMITED: CPT | Mod: 26,,, | Performed by: RADIOLOGY

## 2019-08-21 PROCEDURE — 77067 SCR MAMMO BI INCL CAD: CPT | Mod: 26,,, | Performed by: RADIOLOGY

## 2019-08-21 PROCEDURE — 77063 MAMMO DIGITAL SCREENING BILAT WITH TOMOSYNTHESIS_CAD: ICD-10-PCS | Mod: 26,,, | Performed by: RADIOLOGY

## 2019-08-21 PROCEDURE — 77063 BREAST TOMOSYNTHESIS BI: CPT | Mod: 26,,, | Performed by: RADIOLOGY

## 2019-08-21 PROCEDURE — 76857 US EXAM PELVIC LIMITED: CPT | Mod: TC

## 2019-08-21 PROCEDURE — 77067 SCR MAMMO BI INCL CAD: CPT | Mod: TC

## 2019-08-22 ENCOUNTER — CLINICAL SUPPORT (OUTPATIENT)
Dept: REHABILITATION | Facility: HOSPITAL | Age: 74
End: 2019-08-22
Attending: ORTHOPAEDIC SURGERY
Payer: MEDICARE

## 2019-08-22 DIAGNOSIS — G89.29 CHRONIC PAIN OF BOTH HIPS: ICD-10-CM

## 2019-08-22 DIAGNOSIS — M25.551 CHRONIC PAIN OF BOTH HIPS: ICD-10-CM

## 2019-08-22 DIAGNOSIS — M53.86 DECREASED ROM OF LUMBAR SPINE: ICD-10-CM

## 2019-08-22 DIAGNOSIS — R29.898 WEAKNESS OF BOTH LOWER EXTREMITIES: ICD-10-CM

## 2019-08-22 DIAGNOSIS — M25.552 CHRONIC PAIN OF BOTH HIPS: ICD-10-CM

## 2019-08-22 DIAGNOSIS — M54.50 CHRONIC BILATERAL LOW BACK PAIN WITHOUT SCIATICA: ICD-10-CM

## 2019-08-22 DIAGNOSIS — G89.29 CHRONIC BILATERAL LOW BACK PAIN WITHOUT SCIATICA: ICD-10-CM

## 2019-08-22 PROCEDURE — 97110 THERAPEUTIC EXERCISES: CPT

## 2019-08-22 PROCEDURE — 97140 MANUAL THERAPY 1/> REGIONS: CPT

## 2019-08-22 PROCEDURE — 97014 ELECTRIC STIMULATION THERAPY: CPT

## 2019-08-22 NOTE — PROGRESS NOTES
Physical Therapy Updated Plan of Care/Daily Treatment Note      Name: Kaylin Mccollum  Clinic Number: [id number]     Therapy Diagnosis:        Encounter Diagnoses   Name Primary?    Decreased ROM of lumbar spine      Chronic bilateral low back pain without sciatica      Chronic pain of both hips      Weakness of both lower extremities        Physician: Richi Callaway, *  Visit Date: 8/7/2019  Physician Orders: PT Eval and Treat   Medical Diagnosis from Referral:   M76.31 (ICD-10-CM) - It band syndrome, right   M53.3 (ICD-10-CM) - SI (sacroiliac) pain   Evaluation Date: 7/3/2019  Authorization Period Expiration: 6/25/2020  Plan of Care Expiration: 9/4/2019  Visit # / Visits authorized: 5/20     Time In: 3:25  Time Out: 4:50  Total Billable Time: 70 minutes     Precautions: Standard and Diabetes     Subjective      Pt reports: feeling the best she has ever felt after therapy on Tuesday.  She was compliant with home exercise program.  Response to previous treatment: patient reported minimal decrease in pain after last treatment  Functional change: pt reported no functional change     Pain: 4/10  Location: bilateral back       Objective      Kaylin received therapeutic exercises to develop strength, endurance, ROM, flexibility and core stabilization for 4 minutes including:  Nustep 5 minutes to gain thoracic and lumbar mobility  Piriformis stretch 30 sec hold, 3x  Hamstring stretch 30 sec 3x  DKTC 30sec hold, 3x  LTR 3 minutes  TA contraction 3 second hold, 3 minutes  SL clamsYTB 3 sets of 10  SL hip abduction 2 sets of 10   Prone hip extension 2 sets of 10   Bridges 2 x 10  Shuttle squats 5 bands 3 minutes  paloff press GTB 1 set of 15 - not today  Step ups 2 x 10     Kaylin received the following manual therapy techniques: Joint mobilizations and Soft tissue Mobilization were applied to the: low back and B hips for 15 minutes, including:  Soft tissue to thoracic and lumbar paraspinals and B quadratus  lumborum  Neutral gapping over pillow in SL    Sidelying distraction       Kaylin received the following supervised modalities after being cleared for contradictions: IFC Electrical Stimulation:  Kaylin received IFC Electrical Stimulation for pain control applied to the thoracolumbar spine. Pt received stimulation for 15 minutes. Kaylin tolderated treatment well without any adverse effects.    Kaylin received hot pack for 15 minutes to thoracolumbar spine.         Home Exercises Provided and Patient Education Provided      Education provided:   - encouraged to continue HEP and to be mobile with flare ups however just decrease intensity with activities.      Written Home Exercises Provided: Patient instructed to cont prior HEP.  Exercises were reviewed and Kaylin was able to demonstrate them prior to the end of the session.  Kaylin demonstrated good  understanding of the education provided.      See EMR under Patient Instructions for exercises provided prior visit.         Assessment   Patient demonstrated improved hip strength, however she still demonstrates weakness. She was able to tolerate new therex with decrease pain after neutral gapping and left lumbar distraction/soft tissue. Patient demonstrated a tender spot along her right thoracolumbar paraspinal throughout treatment, however pain decreased after treatment.  She responded well with therex today.       Kaylin is progressing well towards her goals.   Pt prognosis is Excellent.      Pt will continue to benefit from skilled outpatient physical therapy to address the deficits listed in the problem list box on initial evaluation, provide pt/family education and to maximize pt's level of independence in the home and community environment.      Pt's spiritual, cultural and educational needs considered and pt agreeable to plan of care and goals.     Anticipated barriers to physical therapy: fibromyalgia     Goals:  Short Term Goals: In 3 weeks   1.I  with HEP MET  2.Pt to increase lumbar ROM to 90 degrees flexion.  3.Pt to increase BLE strength by 1/2 grade. PARTIALLY MET  4.Pt to have pain less than 2/10 at all times.  5.Pt to score 60 or more on the LEFS.  6. Pt to be educated on postural/body mechanics awareness.     Long Term Goals: In 6 weeks  1.Pt to score 70 or more on the LEFS.  2. Patient to demo increase in LE strength to 5/5  3. Patient to have decreased pain to 3/10 at worse.  4. Patient to demo increase lumbar ROM to 100 deg flexion.  5. Patient to perform daily activities including all ADLs without limitation.     New Short Term Goals Status:  Met 1/6 - continue per initial plan of care  Long Term Goal Status:   continue per initial plan of care.  Reasons for Recertification of Therapy:  Patient has been unable to attend therapy secondary to recovery time recommended for neural procedures. She has shown very minimal improvements secondary to decreased pain after procedure alone. However, Kaylin continues to have deficits in hip strength, and lumbar ROM in which continues to limit her overall function. She will continue to benefit from PT.     Plan   Continue to strength B hip musculature and core stabilization as well as increase flexibility.        Lyubov Salter, PT  8/7/2019

## 2019-08-27 ENCOUNTER — OFFICE VISIT (OUTPATIENT)
Dept: PAIN MEDICINE | Facility: CLINIC | Age: 74
End: 2019-08-27
Payer: MEDICARE

## 2019-08-27 ENCOUNTER — CLINICAL SUPPORT (OUTPATIENT)
Dept: REHABILITATION | Facility: HOSPITAL | Age: 74
End: 2019-08-27
Attending: ORTHOPAEDIC SURGERY
Payer: MEDICARE

## 2019-08-27 VITALS
WEIGHT: 196 LBS | HEIGHT: 70 IN | BODY MASS INDEX: 28.06 KG/M2 | HEART RATE: 82 BPM | RESPIRATION RATE: 18 BRPM | DIASTOLIC BLOOD PRESSURE: 65 MMHG | SYSTOLIC BLOOD PRESSURE: 128 MMHG

## 2019-08-27 DIAGNOSIS — M53.86 DECREASED ROM OF LUMBAR SPINE: ICD-10-CM

## 2019-08-27 DIAGNOSIS — G89.29 CHRONIC BILATERAL LOW BACK PAIN WITHOUT SCIATICA: ICD-10-CM

## 2019-08-27 DIAGNOSIS — M70.61 GREATER TROCHANTERIC BURSITIS OF BOTH HIPS: ICD-10-CM

## 2019-08-27 DIAGNOSIS — M54.50 CHRONIC BILATERAL LOW BACK PAIN WITHOUT SCIATICA: ICD-10-CM

## 2019-08-27 DIAGNOSIS — M25.552 CHRONIC PAIN OF BOTH HIPS: ICD-10-CM

## 2019-08-27 DIAGNOSIS — M70.62 GREATER TROCHANTERIC BURSITIS OF BOTH HIPS: ICD-10-CM

## 2019-08-27 DIAGNOSIS — G89.29 CHRONIC PAIN OF BOTH HIPS: ICD-10-CM

## 2019-08-27 DIAGNOSIS — R29.898 WEAKNESS OF BOTH LOWER EXTREMITIES: ICD-10-CM

## 2019-08-27 DIAGNOSIS — M17.11 PRIMARY OSTEOARTHRITIS OF RIGHT KNEE: ICD-10-CM

## 2019-08-27 DIAGNOSIS — M46.1 SACROILIITIS: ICD-10-CM

## 2019-08-27 DIAGNOSIS — M47.816 LUMBAR SPONDYLOSIS: Primary | ICD-10-CM

## 2019-08-27 DIAGNOSIS — M25.551 CHRONIC PAIN OF BOTH HIPS: ICD-10-CM

## 2019-08-27 DIAGNOSIS — M53.3 SACROILIAC JOINT PAIN: ICD-10-CM

## 2019-08-27 PROCEDURE — 97110 THERAPEUTIC EXERCISES: CPT

## 2019-08-27 PROCEDURE — 99214 OFFICE O/P EST MOD 30 MIN: CPT | Mod: PBBFAC,25 | Performed by: PHYSICIAN ASSISTANT

## 2019-08-27 PROCEDURE — 99999 PR PBB SHADOW E&M-EST. PATIENT-LVL IV: CPT | Mod: PBBFAC,,, | Performed by: PHYSICIAN ASSISTANT

## 2019-08-27 PROCEDURE — 96372 THER/PROPH/DIAG INJ SC/IM: CPT | Mod: PBBFAC

## 2019-08-27 PROCEDURE — 99999 PR PBB SHADOW E&M-EST. PATIENT-LVL IV: ICD-10-PCS | Mod: PBBFAC,,, | Performed by: PHYSICIAN ASSISTANT

## 2019-08-27 PROCEDURE — 97014 ELECTRIC STIMULATION THERAPY: CPT

## 2019-08-27 PROCEDURE — 99214 PR OFFICE/OUTPT VISIT, EST, LEVL IV, 30-39 MIN: ICD-10-PCS | Mod: S$PBB,,, | Performed by: PHYSICIAN ASSISTANT

## 2019-08-27 PROCEDURE — 97140 MANUAL THERAPY 1/> REGIONS: CPT

## 2019-08-27 PROCEDURE — 99214 OFFICE O/P EST MOD 30 MIN: CPT | Mod: S$PBB,,, | Performed by: PHYSICIAN ASSISTANT

## 2019-08-27 RX ORDER — KETOROLAC TROMETHAMINE 30 MG/ML
30 INJECTION, SOLUTION INTRAMUSCULAR; INTRAVENOUS
Status: COMPLETED | OUTPATIENT
Start: 2019-08-27 | End: 2019-08-27

## 2019-08-27 RX ADMIN — KETOROLAC TROMETHAMINE 30 MG: 30 INJECTION, SOLUTION INTRAMUSCULAR; INTRAVENOUS at 01:08

## 2019-08-27 NOTE — PROGRESS NOTES
Physical Therapy Updated Plan of Care/Daily Treatment Note      Name: Kaylin Mccollum  Clinic Number: [id number]     Therapy Diagnosis:        Encounter Diagnoses   Name Primary?    Decreased ROM of lumbar spine      Chronic bilateral low back pain without sciatica      Chronic pain of both hips      Weakness of both lower extremities        Physician: Richi Callaway, *  Visit Date: 8/7/2019  Physician Orders: PT Eval and Treat   Medical Diagnosis from Referral:   M76.31 (ICD-10-CM) - It band syndrome, right   M53.3 (ICD-10-CM) - SI (sacroiliac) pain   Evaluation Date: 7/3/2019  Authorization Period Expiration: 6/25/2020  Plan of Care Expiration: 9/4/2019  Visit # / Visits authorized: 6/20     Time In: 3:30  Time Out: 4:50  Total Billable Time: 70 minutes     Precautions: Standard and Diabetes     Subjective      Pt reports: feeling a crick in her back over the weekend.  She was compliant with home exercise program.  Response to previous treatment: patient reported minimal decrease in pain after last treatment  Functional change: pt reported no functional change     Pain: 4/10  Location: bilateral back       Objective      Kaylin received therapeutic exercises to develop strength, endurance, ROM, flexibility and core stabilization for 45 minutes including:  Nustep 5 minutes to gain thoracic and lumbar mobility  Piriformis stretch 30 sec hold, 3x  Hamstring stretch 30 sec 3x  DKTC 30sec hold, 3x  LTR 3 minutes  TA contraction 3 second hold, 3 minutes  SL clamsYTB 3 sets of 10  SL hip abduction 2 sets of 10   Prone hip extension 2 sets of 10   Bridges 2 x 10  Shuttle squats 5 bands 3 minutes  paloff press GTB 1 set of 15 - not today  Step ups 2 x 10 - not today  Seated flexion stretch 3 way with ball 10s hold, 3x     Kaylin received the following manual therapy techniques: Joint mobilizations and Soft tissue Mobilization were applied to the: low back and B hips for 25 minutes, including:  Soft tissue  to thoracic and lumbar paraspinals and B quadratus lumborum  Neutral gapping over pillow in SL  - not today  Sidelying distraction - not today       Kaylin received the following supervised modalities after being cleared for contradictions: IFC Electrical Stimulation:  Kaylin received IFC Electrical Stimulation for pain control applied to the thoracolumbar spine. Pt received stimulation for 15 minutes. Kaylin tolderated treatment well without any adverse effects.    Kaylin received hot pack for 15 minutes to thoracolumbar spine.         Home Exercises Provided and Patient Education Provided      Education provided:   - encouraged to continue HEP and to be mobile with flare ups however just decrease intensity with activities. Educated on importance of breathing with stretching.     Written Home Exercises Provided: Patient instructed to cont prior HEP.  Exercises were reviewed and Kaylin was able to demonstrate them prior to the end of the session.  Kaylin demonstrated good  understanding of the education provided.      See EMR under Patient Instructions for exercises provided prior visit.         Assessment   Patient demonstrated improved hip strength, however she still demonstrates weakness. She was able to tolerate new therex with decrease pain after lumbar distraction and paraspinal soft tissue mobilization. Patient demonstrated a tender spot along her right thoracolumbar paraspinal throughout treatment, however pain decreased after treatment.  She responded well with therex today.       Kaylin is progressing well towards her goals.   Pt prognosis is Excellent.      Pt will continue to benefit from skilled outpatient physical therapy to address the deficits listed in the problem list box on initial evaluation, provide pt/family education and to maximize pt's level of independence in the home and community environment.      Pt's spiritual, cultural and educational needs considered and pt agreeable to  plan of care and goals.     Anticipated barriers to physical therapy: fibromyalgia     Goals:  Short Term Goals: In 3 weeks   1.I with HEP MET  2.Pt to increase lumbar ROM to 90 degrees flexion.  3.Pt to increase BLE strength by 1/2 grade. PARTIALLY MET  4.Pt to have pain less than 2/10 at all times.  5.Pt to score 60 or more on the LEFS.  6. Pt to be educated on postural/body mechanics awareness.     Long Term Goals: In 6 weeks  1.Pt to score 70 or more on the LEFS.  2. Patient to demo increase in LE strength to 5/5  3. Patient to have decreased pain to 3/10 at worse.  4. Patient to demo increase lumbar ROM to 100 deg flexion.  5. Patient to perform daily activities including all ADLs without limitation.        Plan   Continue to strength B hip musculature and core stabilization as well as increase flexibility.        Lyubov Salter, PT  8/7/2019

## 2019-08-27 NOTE — PATIENT INSTRUCTIONS
Pain Management Pre-Procedure Instructions  (also available in your [employer] account)    Patient Name:___Kaylin Mccollum____MRN: [id number] you are scheduled to have the following procedure:__ Joint Injection  _with______Manpreet Dutta MD on: _9/5/19______ at: [facility]    You will be contacted the day before your procedure to be given an arrival and procedure time                                                                                                            Day of Procedure   Ensure you have obtained arrival time from the Pain Management department  o We will call 48 hours in advance with your arrival time. Please check any voicemails you may have  o If you arrive past your scheduled procedure time, you may be asked to reschedule your procedure.   For your safety, ensure you have a  with you to remain present throughout your procedure   o If you arrive without a responsible adult to stay with you and drive you home, you may be asked to reschedule your procedure   Take all of your prescribed medications (exceptions noted below) with a small amount of water  o [x] Nothing by mouth two (2) hours before your procedure.  It is ok to take your regular medications with a small sip of water.  o [] Nothing by mouth after midnight the night before your procedure.  It is ok to take your regular medications with a small sip of water.     Wear loose, comfortable clothing    You may wear glasses, dentures, contact lenses and/or hearing aids. Please leave all valuable items at home.   Contact the Pain Management department at [phone] or via [employer] if you are:  o Running a fever above 100 degrees  o Feel ill, have any type of infection, or are taking antibiotics now or have in the past 2 weeks  o Have had any outpatient procedures in the past 2 weeks (colonoscopy, major dental work, etc.)  o If you are allergic to iodine, IVP dye or shellfish.      Contact Information: [phone],  ask to speak to the pain management department with any questions or concerns or send a message via [employer]

## 2019-08-27 NOTE — PROGRESS NOTES
Chief Pain Complaint:  Back pain  Right knee pain    Interval History: S/p left SIJ RFA on 7/11/19 and right SIJ RFA on 7-25-19.  The patient reports that she is/was better following the procedure.   she reports 80% pain relief.  The changes lasted 4 weeks so far.  The changes have continued through this visit.  She continues to have left GT bursa pain.     Interval History: Patient was seen on 8/8/19. At that time she underwent right genicular nerve block.  The patient reports that she is/was better following the procedure.  she reports 90% pain relief.  The changes lasted 3 weeks so far.  The changes have continued through this visit.        History of Present Illness:   Kaylin Mccollum is a 74 y.o. female  who is presenting with a chief complaint of Low-back Pain. The patient began experiencing this problem insidiously, and the pain has been gradually worsening over time. The pain is described as throbbing, cramping, aching and heavy and is located in the bilateral buttocks. Pain is intermittent and lasts hours. The pain is nonradiating. The patient rates her pain a 8 out of ten and interferes with activities of daily living a 7 out of ten. Pain is exacerbated by getting up from a seated position, and is improved by rest. Patient reports no prior trauma, no prior spinal surgery     She also complains of right knee pain. The patient began experiencing this problem insidiously. The pain is described as cramping, aching and is located in the right knee. Pain is intermittent and lasts hours. The pain is nonradiating. The patient rates her pain a 8 out of ten and interferes with activities of daily living a 7 out of ten. Pain is exacerbated by getting up from a seated position and standing, and is improved by rest. Patient reports no prior trauma, prior arthroscopy bilaterally in 1990s.      - pertinent negatives: No fever, No chills, No weight loss, No bladder dysfunction, No bowel dysfunction, No saddle  anesthesia  - pertinent positives: none    - medications, other therapies tried (physical therapy, injections):     >> NSAIDs, Tylenol, gabapentin and medrol dose pack    >> Has previously undergone Physical Therapy    >> Has previously undergone spinal injection/s   - Lumbar JAMIN with good relief in the past   - Bilateral SI Joint + GTB injection on 4/10/19 with great relief for about 4 weeks   - left SIJ RFA on 7/11/19 and right SIJ RFA on 7-25-19 with 80% pain relief   - right genicular nerve block on 8/8/19 with 90% pain relief      Imaging / Labs / Studies (reviewed on 8/27/2019):    Results for orders placed during the hospital encounter of 11/23/15   MRI Lumbar Spine Without Contrast    Narrative Technique: Standard noncontrast multiplanar multisequence imaging of the lumbar spine was performed.  Findings: There is anatomic spinal alignment.  Very low degree of degenerative disk base narrowing and disk desiccation observed at L1-2, L2-3, L4-5, and L5-S1.  Vertebral marrow signal pattern and architecture are normal.  Distal cord is unremarkable with conus medullaris terminating at L1.  Small nerve root sleeve cysts incidentally noted in the sacral canal.  There are bilateral renal cysts.  L1-2: Small annular bulge and endplate lipping.  Bilateral facet arthropathy.  No significant foraminal narrowing or canal stenosis.  L2-3: Small annular bulge and endplate lipping.  Bilateral facet arthropathy.  No significant foraminal narrowing or canal stenosis.  L3-4: Posterior disk bulge with small bilateral foraminal disk protrusions.  Bilateral facet arthropathy and ligament hypertrophy.  Mild inferior foraminal narrowing.  No canal stenosis.  L4-5: Posterior disk bulge, hypertrophic facet arthropathy, and ligament thickening resulting in moderate bilateral foraminal narrowing and moderate central canal stenosis.  AP canal diameter measures 8.4 mm.   L5-S1: Broad based posterior disk bulge and endplate lipping.   "Hypertrophic facet arthropathy and ligament thickening.  Moderate bilateral foraminal narrowing.  No significant canal stenosis.    Impression  Multilevel lumbar spondylosis and degenerative disk disease as detailed.     Results for orders placed in visit on 05/31/12   X-Ray Lumbar Spine Ap And Lateral    Narrative Findings: Generalized osteopenia.  Multilevel degenerative vertebral end plate spurring and facet arthropathy.  Moderate to severe disk space narrowing and associated vacuum disk phenomenon at L5-S1.  Suggestion of slight diane-listhesis of L4 on L5.  Intact pedicles.  No compression fracture.         Review of Systems:  CONSTITUTIONAL: patient denies any fever, chills, or weight loss  SKIN: patient denies any rash or itching  RESPIRATORY: patient denies having any shortness of breath  GASTROINTESTINAL: patient denies having any diarrhea, constipation, or bowel incontinence  GENITOURINARY: patient denies having any abnormal bladder function    MUSCULOSKELETAL:  - patient complains of the above noted pain/s (see chief pain complaint)    NEUROLOGICAL:   - pain as above  - strength in Lower extremities is intact, BILATERALLY  - sensation in Lower extremities is intact, BILATERALLY  - patient denies any loss of bowel or bladder control      PSYCHIATRIC: patient denies any change in mood    Other:  All other systems reviewed and are negative      Physical Exam:  Vitals:  /65 (BP Location: Right arm, Patient Position: Sitting, BP Method: Medium (Automatic))   Pulse 82   Resp 18   Ht 5' 10" (1.778 m)   Wt 88.9 kg (196 lb)   BMI 28.12 kg/m²   (reviewed on 8/27/2019)    General: alert and oriented, in no apparent distress.  Gait: normal gait.  Skin: no rashes, no discoloration, no obvious lesions  HEENT: normocephalic, atraumatic. Pupils equal and round.  Cardiovascular: no significant peripheral edema present.  Respiratory: without use of accessory muscles of respiration.    Musculoskeletal - Lumbar " Spine:  - Pain on extension of lumbar spine: Absent         - Lumbar facet loading: Absent   - TTP over the lumbar facet joints: Absent  - TTP over the lumbar paraspinals: Absent  - TTP over the SI joints:  Present bilaterally, mild, improved  - TTP over GT bursa: Present on left > right  - Straight Leg Raise: Negative  - CHERYLE: Present    Right Knee:  - TTP: Present over medial/ lateral joint line  - Pain with extension: Present  - Pain with flexion: Present  - Crepitus: Present     Neuro - Lower Extremities:  - BLE Strength: R/L: HF: 5/5, HE: 5/5, KF: 5/5; KE: 5/5; FE: 5/5; FF: 5/5  - Extremity Reflexes: Brisk and symmetric throughout  - Sensory: Sensation to light touch intact bilaterally      Psych:  Mood and affect is appropriate        Assessment:  Kaylin Mccollum is a 74 y.o. year old female who is presenting with     Encounter Diagnoses   Name Primary?    Lumbar spondylosis Yes    Sacroiliac joint pain     Sacroiliitis     Greater trochanteric bursitis of both hips     Primary osteoarthritis of right knee        Plan:  1. Interventional:   - S/p left SIJ RFA on 7/11/19 and right SIJ RFA on 7-25-19 with 80% pain relief  - S/p right genicular nerve block on 8/8/19 with 90% pain relief  - Schedule bilateral GT bursa injections.     Procedure note:  An IM injection of ketolorac 30mg/1mL injection was administered during clinic visit.  This was well tolerated.    2. Pharmacologic: Continue Celebrex 100mg BID PRN.    3. Rehabilitative: Continue physical therapy, which is helping some.     4. Diagnostic: None for now.    5. Follow up: 4 weeks Post injection.    - I discussed the risks, benefits, and alternatives to potential treatment options. All questions and concerns were fully addressed today in clinic. Dr. Dutta was consulted regarding the patient plan and agrees.

## 2019-08-29 ENCOUNTER — CLINICAL SUPPORT (OUTPATIENT)
Dept: REHABILITATION | Facility: HOSPITAL | Age: 74
End: 2019-08-29
Attending: ORTHOPAEDIC SURGERY
Payer: MEDICARE

## 2019-08-29 DIAGNOSIS — M25.551 CHRONIC PAIN OF BOTH HIPS: ICD-10-CM

## 2019-08-29 DIAGNOSIS — G89.29 CHRONIC BILATERAL LOW BACK PAIN WITHOUT SCIATICA: ICD-10-CM

## 2019-08-29 DIAGNOSIS — R29.898 WEAKNESS OF BOTH LOWER EXTREMITIES: ICD-10-CM

## 2019-08-29 DIAGNOSIS — M25.552 CHRONIC PAIN OF BOTH HIPS: ICD-10-CM

## 2019-08-29 DIAGNOSIS — M54.50 CHRONIC BILATERAL LOW BACK PAIN WITHOUT SCIATICA: ICD-10-CM

## 2019-08-29 DIAGNOSIS — M53.86 DECREASED ROM OF LUMBAR SPINE: ICD-10-CM

## 2019-08-29 DIAGNOSIS — G89.29 CHRONIC PAIN OF BOTH HIPS: ICD-10-CM

## 2019-08-29 PROCEDURE — 97140 MANUAL THERAPY 1/> REGIONS: CPT

## 2019-08-29 PROCEDURE — 97014 ELECTRIC STIMULATION THERAPY: CPT

## 2019-08-29 PROCEDURE — 97110 THERAPEUTIC EXERCISES: CPT

## 2019-08-29 NOTE — PROGRESS NOTES
Physical Therapy Updated Plan of Care/Daily Treatment Note      Name: Kaylin Mccollum  Clinic Number: [id number]     Therapy Diagnosis:        Encounter Diagnoses   Name Primary?    Decreased ROM of lumbar spine      Chronic bilateral low back pain without sciatica      Chronic pain of both hips      Weakness of both lower extremities        Physician: Richi Callaway, *  Visit Date: 8/7/2019  Physician Orders: PT Eval and Treat   Medical Diagnosis from Referral:   M76.31 (ICD-10-CM) - It band syndrome, right   M53.3 (ICD-10-CM) - SI (sacroiliac) pain   Evaluation Date: 7/3/2019  Authorization Period Expiration: 6/25/2020  Plan of Care Expiration: 9/4/2019  Visit # / Visits authorized: 6/20     Time In: 3:22  Time Out: 4:45  Total Billable Time: 75 minutes     Precautions: Standard and Diabetes     Subjective      Pt reports: feeling a crick in her back over the weekend.  She was compliant with home exercise program.  Response to previous treatment: patient reported minimal decrease in pain after last treatment  Functional change: pt reported no functional change     Pain: 4/10  Location: bilateral back       Objective      Kaylin received therapeutic exercises to develop strength, endurance, ROM, flexibility and core stabilization for 45 minutes including:  Nustep 5 minutes to gain thoracic and lumbar mobility  Piriformis stretch 30 sec hold, 3x  Hamstring stretch 30 sec 3x  DKTC 30sec hold, 3x  LTR 3 minutes  TA contraction 3 second hold, 3 minutes  SL clamsYTB 3 sets of 10  SL hip abduction 2 sets of 10   Prone hip extension 2 sets of 10   Bridges 2 x 10  Shuttle squats 5 bands 3 minutes  paloff press GTB 1 set of 15 - not today  Step ups 2 x 10 - not today  Seated flexion stretch 3 way with ball 10s hold, 3x     Kaylin received the following manual therapy techniques: Joint mobilizations and Soft tissue Mobilization were applied to the: low back and B hips for 15 minutes, including:  Soft tissue  to thoracic and lumbar paraspinals and B quadratus lumborum  Neutral gapping over pillow in SL  - not today  Sidelying distraction - not today       Kaylin received the following supervised modalities after being cleared for contradictions: IFC Electrical Stimulation:  Kaylin received IFC Electrical Stimulation for pain control applied to the thoracolumbar spine. Pt received stimulation for 15 minutes. Kaylin tolderated treatment well without any adverse effects.    Kaylin received hot pack for 15 minutes to thoracolumbar spine.         Home Exercises Provided and Patient Education Provided      Education provided:   - encouraged to continue HEP and to be mobile with flare ups however just decrease intensity with activities. Educated on importance of breathing with stretching.     Written Home Exercises Provided: Patient instructed to cont prior HEP.  Exercises were reviewed and Kaylin was able to demonstrate them prior to the end of the session.  Kaylin demonstrated good  understanding of the education provided.      See EMR under Patient Instructions for exercises provided prior visit.         Assessment   Patient demonstrated improved hip strength, however she still demonstrates weakness. She was able to tolerate new therex with decrease pain after lumbar distraction and paraspinal soft tissue mobilization. Patient demonstrated a tender spot along her right thoracolumbar paraspinal throughout treatment, however pain decreased after treatment.  She responded well with therex today.       Kaylin is progressing well towards her goals.   Pt prognosis is Excellent.      Pt will continue to benefit from skilled outpatient physical therapy to address the deficits listed in the problem list box on initial evaluation, provide pt/family education and to maximize pt's level of independence in the home and community environment.      Pt's spiritual, cultural and educational needs considered and pt agreeable to  plan of care and goals.     Anticipated barriers to physical therapy: fibromyalgia     Goals:  Short Term Goals: In 3 weeks   1.I with HEP MET  2.Pt to increase lumbar ROM to 90 degrees flexion.  3.Pt to increase BLE strength by 1/2 grade. PARTIALLY MET  4.Pt to have pain less than 2/10 at all times.  5.Pt to score 60 or more on the LEFS.  6. Pt to be educated on postural/body mechanics awareness.     Long Term Goals: In 6 weeks  1.Pt to score 70 or more on the LEFS.  2. Patient to demo increase in LE strength to 5/5  3. Patient to have decreased pain to 3/10 at worse.  4. Patient to demo increase lumbar ROM to 100 deg flexion.  5. Patient to perform daily activities including all ADLs without limitation.        Plan   Continue to strength B hip musculature and core stabilization as well as increase flexibility.        Lyubov Salter, PT  8/7/2019

## 2019-09-05 ENCOUNTER — HOSPITAL ENCOUNTER (OUTPATIENT)
Facility: HOSPITAL | Age: 74
Discharge: HOME | End: 2019-09-05
Attending: ANESTHESIOLOGY | Admitting: ANESTHESIOLOGY
Payer: MEDICARE

## 2019-09-05 VITALS
WEIGHT: 202.5 LBS | HEART RATE: 73 BPM | HEIGHT: 70 IN | SYSTOLIC BLOOD PRESSURE: 141 MMHG | DIASTOLIC BLOOD PRESSURE: 65 MMHG | TEMPERATURE: 99 F | OXYGEN SATURATION: 99 % | RESPIRATION RATE: 16 BRPM | BODY MASS INDEX: 28.99 KG/M2

## 2019-09-05 DIAGNOSIS — M70.62 GREATER TROCHANTERIC BURSITIS OF BOTH HIPS: ICD-10-CM

## 2019-09-05 DIAGNOSIS — M70.61 GREATER TROCHANTERIC BURSITIS OF BOTH HIPS: ICD-10-CM

## 2019-09-05 LAB — POCT GLUCOSE: 96 MG/DL (ref 70–110)

## 2019-09-05 PROCEDURE — 20610 DRAIN/INJ JOINT/BURSA W/O US: CPT | Mod: 50 | Performed by: ANESTHESIOLOGY

## 2019-09-05 PROCEDURE — 25500020 PHARM REV CODE 255: Performed by: ANESTHESIOLOGY

## 2019-09-05 PROCEDURE — 20610 DRAIN/INJ JOINT/BURSA W/O US: CPT | Mod: 50,,, | Performed by: ANESTHESIOLOGY

## 2019-09-05 PROCEDURE — 63600175 PHARM REV CODE 636 W HCPCS: Performed by: ANESTHESIOLOGY

## 2019-09-05 PROCEDURE — 25000003 PHARM REV CODE 250: Performed by: ANESTHESIOLOGY

## 2019-09-05 PROCEDURE — 82962 GLUCOSE BLOOD TEST: CPT | Performed by: ANESTHESIOLOGY

## 2019-09-05 PROCEDURE — 77002 NEEDLE LOCALIZATION BY XRAY: CPT | Mod: 26,,, | Performed by: ANESTHESIOLOGY

## 2019-09-05 PROCEDURE — 20610 PR DRAIN/INJECT LARGE JOINT/BURSA: ICD-10-PCS | Mod: 50,,, | Performed by: ANESTHESIOLOGY

## 2019-09-05 PROCEDURE — 77002 PR FLUOROSCOPIC GUIDANCE NEEDLE PLACEMENT: ICD-10-PCS | Mod: 26,,, | Performed by: ANESTHESIOLOGY

## 2019-09-05 RX ORDER — METHYLPREDNISOLONE ACETATE 40 MG/ML
INJECTION, SUSPENSION INTRA-ARTICULAR; INTRALESIONAL; INTRAMUSCULAR; SOFT TISSUE
Status: DISCONTINUED | OUTPATIENT
Start: 2019-09-05 | End: 2019-09-05 | Stop reason: HOSPADM

## 2019-09-05 RX ORDER — LIDOCAINE HYDROCHLORIDE 20 MG/ML
INJECTION, SOLUTION EPIDURAL; INFILTRATION; INTRACAUDAL; PERINEURAL
Status: DISCONTINUED | OUTPATIENT
Start: 2019-09-05 | End: 2019-09-05 | Stop reason: HOSPADM

## 2019-09-05 NOTE — DISCHARGE INSTRUCTIONS

## 2019-09-06 NOTE — DISCHARGE SUMMARY
[facility]  Discharge Note       Description of Procedure: Greater Trochanteric Bursa Injection under Fluoroscopic Guidance    Procedure Date: 9/5/2019  Admit Date: 9/5/2019  Discharge Date: 9/5/2019     Attending Physician: Luzmaria Case   Discharge Provider: Luzmaria Case    Preoperative Diagnosis: Greater Trochanteric Bursitis     Postoperative Diagnosis: as above, same as preoperative diagnosis    Discharged Condition: Stable    Hospital Course: Patient was admitted for an outpatient procedure. The procedure was tolerated well with no complications.    Final Diagnoses: Same as principal problem.    Disposition: Home, self-care.    Follow up/Patient Instructions:  Follow-up in clinic in 2-3 weeks.    Medications: No medications were prescribed today. The patient was advised to resume normal medication regimen without change.  Specific information was provided regarding restarting any anticoagulant/s.    Discharge Procedure Orders (must include Diet, Follow-up, Activity):  Light activity for the remainder of the day, resume normal activity tomorrow. Resume normal diet. Follow-up in clinic in 2-3 weeks.

## 2019-09-06 NOTE — OP NOTE
PROCEDURE: Greater trochanteric bursa injection under fluoroscopic guidance       SIDE: bilateral greater trochanteric bursa injection      PROCEDURE DATE: 9/5/2019  PREOPERATIVE DIAGNOSIS: Sacroiliitis, greater trochanteric bursitis  POSTOPERATIVE DIAGNOSIS: Sacroiliitis, greater trochanteric bursitis    PROVIDER: Manpreet Dutta MD  Assistant(s): none    Anesthesia: Local, No Sedation     >> 0 mg of VERSED    >> 0 mcg of FENTANYL     INDICATION: The patient has a history of pain due to sacroiliitis unresponsive to conservative treatments. Fluoroscopy was used to optimize visualization of needle placement and to maximize safety.       PROCEDURE DESCRIPTION: The patient was seen and identified in the preoperative area. Risks, benefits, complications, and alternatives were discussed with the patient. The patient agreed to proceed with the procedure and signed the consent. The site and side of the procedure was identified and marked.     The patient was taken to the procedural suite. The patient was positioned in prone orientation on procedure table. A time out was performed prior to any intervention. The procedure, site, side, and allergies were stated and agreed to by all present. The lumbosacral area extending to the skin overlying the femoral greater trochanter was widely prepped with ChloraPrep. The procedural site was draped in usual sterile fashion. Vital signs were closely monitored throughout this procedure.     The RIGHT and LEFT greater trochanter was then identified with fluoroscopy. The targeted site of entry was localized with 1% PF Lidocaine. A 25 gauge 3.5 inch spinal needlewas advanced to the lateral border of the greater trochanter until the osseus interface was met.  After negative aspiration, 2 mL of  4 mL of 1% PF Lidocaine and 1 mL of Methylprednisolone (40 mg/mL) was injected. This techniques was performed for the above noted joint/s. was injected. No pain or paresthesia was noted on injection.  Following injection, the stylet was replaced and the needle was withdrawn intact. This technique was used for the above noted greater trochanteric bursa / bursae. The skin was cleaned and bandages were applied.    Description of Findings: Not applicable    Prosthetic devices, grafts, tissues, or devices implanted: None    Specimen Removed: No    Estimated Blood Loss: minimal    COMPLICATIONS: None    DISPOSITION / PLANS: The patient was transferred to the recovery area in a stable condition for observation. The patient was reexamined prior to discharge. There was no evidence of acute neurologic injury following the procedure.  Patient was discharged from the recovery room after meeting discharge criteria. Home discharge instructions were given to the patient by the staff.

## 2019-10-01 ENCOUNTER — DOCUMENTATION ONLY (OUTPATIENT)
Dept: REHABILITATION | Facility: HOSPITAL | Age: 74
End: 2019-10-01

## 2019-10-02 ENCOUNTER — CLINICAL SUPPORT (OUTPATIENT)
Dept: REHABILITATION | Facility: HOSPITAL | Age: 74
End: 2019-10-02
Attending: ORTHOPAEDIC SURGERY
Payer: MEDICARE

## 2019-10-02 DIAGNOSIS — M25.552 CHRONIC PAIN OF BOTH HIPS: ICD-10-CM

## 2019-10-02 DIAGNOSIS — G89.29 CHRONIC BILATERAL LOW BACK PAIN WITHOUT SCIATICA: ICD-10-CM

## 2019-10-02 DIAGNOSIS — M54.50 CHRONIC BILATERAL LOW BACK PAIN WITHOUT SCIATICA: ICD-10-CM

## 2019-10-02 DIAGNOSIS — R29.898 WEAKNESS OF BOTH LOWER EXTREMITIES: ICD-10-CM

## 2019-10-02 DIAGNOSIS — M25.551 CHRONIC PAIN OF BOTH HIPS: ICD-10-CM

## 2019-10-02 DIAGNOSIS — M53.86 DECREASED ROM OF LUMBAR SPINE: ICD-10-CM

## 2019-10-02 DIAGNOSIS — G89.29 CHRONIC PAIN OF BOTH HIPS: ICD-10-CM

## 2019-10-02 PROCEDURE — 97110 THERAPEUTIC EXERCISES: CPT

## 2019-10-02 PROCEDURE — 97164 PT RE-EVAL EST PLAN CARE: CPT | Mod: 59

## 2019-10-02 NOTE — PLAN OF CARE
Outpatient Therapy Updated Plan of Care     Visit Date: 10/2/2019    Name: Kaylin Mccollum  Clinic Number: [id number]    Therapy Diagnosis:   Encounter Diagnoses   Name Primary?    Decreased ROM of lumbar spine     Chronic bilateral low back pain without sciatica     Chronic pain of both hips     Weakness of both lower extremities      Physician: Richi Callaway, *    Physician: Richi Callaway, *  Visit Date: 10/02/2019  Physician Orders: PT Eval and Treat   Medical Diagnosis from Referral:   M76.31 (ICD-10-CM) - It band syndrome, right   M53.3 (ICD-10-CM) - SI (sacroiliac) pain   Evaluation Date: 7/3/2019  Authorization Period Expiration: 6/25/2020  Plan of Care Expiration: 11/06/2019  Visit # / Visits authorized: 7/20    Time In: 10:40  Time Out: 11:20  Total Billable Time: 20 minutes    Precautions: Standard and Diabetes  Functional Level Prior to Evaluation:  Independent     Subjective     Update: Patient reports that she had injections in her hips at the beginning of September.  Her pain in the hips initially after her injections was increased but then improved over a little while.  She had missed several PT appointments due to taking care of her dying Uncle.  She reports now that her pain in the low back and hips are much better.  She is currently having some pain in the mid/lower back area in 1 spot on the left and 1 on the R.  She did put her granddaughter's bike in the car on Monday and that may be why she is having some pain on the left side of her mid/low back.     Objective     Update:    L/sp AROM:  WNL some soreness in with lateral flexion bilaterally                                                                                                     R          L  Strength:                    Hip flexors                   4+/5       4+/5  Quadriceps                  4+/5       5/5                                             Hamstrings                  4+/5     4+/5                                       PF                                5/5       5/5                                      DF                               5/5       5/5                                      IR                                 5/5       5/5                                      ER                               5/5       5/5                                      Gluteus Medius           4-/5      4+/5                                      Gluteus Germain        4-/5       4/5                                                                                         Palpation: Tender to touch along R back musculature lateral to T9-T11 and along L back musculature at T10-T12     Function: Patient reports score of 46 on the LEFS Scale.       LEFS score of 43/80=53.8%                                                                Kaylin received therapeutic exercises to develop strength, endurance, ROM, flexibility and core stabilization for 20 minutes including:  Performed today:  SKTC 15 sec x 5  LTR 10x  Piriformis Stretch 30 sec x 3  Supine ham stretch 30 sec x 3  Ab bracing 5 sec hold x 10    Not performed this visit:   Nustep 5 minutes to gain thoracic and lumbar mobility  SL clamsYTB 3 sets of 10  SL hip abduction 2 sets of 10   Prone hip extension 2 sets of 10   Bridges 2 x 10  Shuttle squats 5 bands 3 minutes  paloff press GTB 1 set of 15 - not today  Step ups 2 x 10 - not today  Seated flexion stretch 3 way with ball 10s hold, 3x     Kaylin received the following manual therapy techniques: Joint mobilizations and Soft tissue Mobilization were applied to the: low back and B hips for 0 minutes, including:  Soft tissue to thoracic and lumbar paraspinals and B quadratus lumborum  Neutral gapping over pillow in SL  - not today  Sidelying distraction - not today        Kaylin received the following supervised modalities after being cleared for contradictions: IFC Electrical Stimulation:  Kaylin received IFC Electrical  Stimulation for pain control applied to the thoracolumbar spine. Pt received stimulation for 0 minutes. Kaylin tolderated treatment well without any adverse effects.     Kaylin received hot pack for 0 minutes to thoracolumbar spine.            Assessment     Update: Patient has improved pain in her hips and low back since receiving injections at the beginning of September to her hips.  She is able to do more around the house but will still be careful at times as to what she does.  She is having some pain in the sides of the back and has continued weakness in her hips/core/back and will benefit from continued therapy to address her symptoms and improve her overall ability to do her ADLs.       Previous Short Term Goals Status:     Short Term Goals: In 2 weeks   1.I with HEP MET  2.Pt to increase BLE strength by 1/2 grade. PARTIALLY MET  New Short Term Goals Status:     1.Pt to increase BLE strength by 1/2 grade.  Long Term Goal Status:   modified:  Long Term Goals: In 5 weeks  1.Pt to score 70 or more on the LEFS.  2. Patient to demo increase in BLE and core strength to 5/5  3. Patient to have decreased pain to 3/10 at worse.  4. Pt will be able to perform her usual work/housework without difficulty.  Reasons for Recertification of Therapy:   Patient still has difficulty with ADLS.  She has pain in her mid/low back area and still demonstrates weakness in her core and her BLEs.      Plan     Updated Certification Period: 10/2/2019 to 11/06/2019  Recommended Treatment Plan: 2 times per week for 5 weeks: Electrical Stimulation mid/low back, Manual Therapy, Moist Heat/ Ice, Neuromuscular Re-ed, Patient Education, Self Care and Therapeutic Exercise  Other Recommendations: none    Tarah Estevez, PT  10/2/2019      I CERTIFY THE NEED FOR THESE SERVICES FURNISHED UNDER THIS PLAN OF TREATMENT AND WHILE UNDER MY CARE    Physician's comments:        Physician's Signature:  ___________________________________________________

## 2019-10-03 ENCOUNTER — OFFICE VISIT (OUTPATIENT)
Dept: PAIN MEDICINE | Facility: CLINIC | Age: 74
End: 2019-10-03
Payer: MEDICARE

## 2019-10-03 VITALS
RESPIRATION RATE: 18 BRPM | HEIGHT: 70 IN | SYSTOLIC BLOOD PRESSURE: 124 MMHG | DIASTOLIC BLOOD PRESSURE: 63 MMHG | HEART RATE: 78 BPM | WEIGHT: 195 LBS | BODY MASS INDEX: 27.92 KG/M2

## 2019-10-03 DIAGNOSIS — M70.61 GREATER TROCHANTERIC BURSITIS OF BOTH HIPS: ICD-10-CM

## 2019-10-03 DIAGNOSIS — M47.816 LUMBAR SPONDYLOSIS: Primary | ICD-10-CM

## 2019-10-03 DIAGNOSIS — M53.3 SACROILIAC JOINT PAIN: ICD-10-CM

## 2019-10-03 DIAGNOSIS — M17.11 PRIMARY OSTEOARTHRITIS OF RIGHT KNEE: ICD-10-CM

## 2019-10-03 DIAGNOSIS — M46.1 SACROILIITIS: ICD-10-CM

## 2019-10-03 DIAGNOSIS — M70.62 GREATER TROCHANTERIC BURSITIS OF BOTH HIPS: ICD-10-CM

## 2019-10-03 PROCEDURE — 99999 PR PBB SHADOW E&M-EST. PATIENT-LVL IV: ICD-10-PCS | Mod: PBBFAC,,, | Performed by: PHYSICIAN ASSISTANT

## 2019-10-03 PROCEDURE — 99214 OFFICE O/P EST MOD 30 MIN: CPT | Mod: PBBFAC | Performed by: PHYSICIAN ASSISTANT

## 2019-10-03 PROCEDURE — 99214 PR OFFICE/OUTPT VISIT, EST, LEVL IV, 30-39 MIN: ICD-10-PCS | Mod: S$PBB,,, | Performed by: PHYSICIAN ASSISTANT

## 2019-10-03 PROCEDURE — 99214 OFFICE O/P EST MOD 30 MIN: CPT | Mod: S$PBB,,, | Performed by: PHYSICIAN ASSISTANT

## 2019-10-03 PROCEDURE — 99999 PR PBB SHADOW E&M-EST. PATIENT-LVL IV: CPT | Mod: PBBFAC,,, | Performed by: PHYSICIAN ASSISTANT

## 2019-10-03 NOTE — PROGRESS NOTES
Chief Pain Complaint:  Back pain  Right knee pain    Interval History: Patient was seen on 9/5/19. At that time she underwent bilateral GT bursa injections.  The patient reports that she is/was better following the procedure.  she reports 90% pain relief.  The changes lasted 4 weeks so far.  The changes have continued through this visit. She reports 3/10 pain today.    Interval History: S/p left SIJ RFA on 7/11/19 and right SIJ RFA on 7-25-19.  The patient reports that she is/was better following the procedure.   she reports 80% pain relief.  The changes lasted 4 weeks so far.  The changes have continued through this visit.  She continues to have left GT bursa pain.     Interval History: Patient was seen on 8/8/19. At that time she underwent right genicular nerve block.  The patient reports that she is/was better following the procedure.  she reports 90% pain relief.  The changes lasted 3 weeks so far.  The changes have continued through this visit.        History of Present Illness:   Kaylin Mccollum is a 74 y.o. female  who is presenting with a chief complaint of Low-back Pain. The patient began experiencing this problem insidiously, and the pain has been gradually worsening over time. The pain is described as throbbing, cramping, aching and heavy and is located in the bilateral buttocks. Pain is intermittent and lasts hours. The pain is nonradiating. The patient rates her pain a 8 out of ten and interferes with activities of daily living a 7 out of ten. Pain is exacerbated by getting up from a seated position, and is improved by rest. Patient reports no prior trauma, no prior spinal surgery     She also complains of right knee pain. The patient began experiencing this problem insidiously. The pain is described as cramping, aching and is located in the right knee. Pain is intermittent and lasts hours. The pain is nonradiating. The patient rates her pain a 8 out of ten and interferes with activities of daily  living a 7 out of ten. Pain is exacerbated by getting up from a seated position and standing, and is improved by rest. Patient reports no prior trauma, prior arthroscopy bilaterally in 1990s.      - pertinent negatives: No fever, No chills, No weight loss, No bladder dysfunction, No bowel dysfunction, No saddle anesthesia  - pertinent positives: none    - medications, other therapies tried (physical therapy, injections):     >> NSAIDs, Tylenol, gabapentin and medrol dose pack    >> Has previously undergone Physical Therapy    >> Has previously undergone spinal injection/s   - Lumbar JAMIN with good relief in the past   - Bilateral SI Joint + GTB injection on 4/10/19 with great relief for about 4 weeks   - left SIJ RFA on 7/11/19 and right SIJ RFA on 7-25-19 with 80% pain relief   - right genicular nerve block on 8/8/19 with 90% pain relief   - bilateral GT bursa injections on 9/5/19 with 90% pain relief    Imaging / Labs / Studies (reviewed on 10/3/2019):    Results for orders placed during the hospital encounter of 11/23/15   MRI Lumbar Spine Without Contrast    Narrative Technique: Standard noncontrast multiplanar multisequence imaging of the lumbar spine was performed.  Findings: There is anatomic spinal alignment.  Very low degree of degenerative disk base narrowing and disk desiccation observed at L1-2, L2-3, L4-5, and L5-S1.  Vertebral marrow signal pattern and architecture are normal.  Distal cord is unremarkable with conus medullaris terminating at L1.  Small nerve root sleeve cysts incidentally noted in the sacral canal.  There are bilateral renal cysts.  L1-2: Small annular bulge and endplate lipping.  Bilateral facet arthropathy.  No significant foraminal narrowing or canal stenosis.  L2-3: Small annular bulge and endplate lipping.  Bilateral facet arthropathy.  No significant foraminal narrowing or canal stenosis.  L3-4: Posterior disk bulge with small bilateral foraminal disk protrusions.  Bilateral facet  "arthropathy and ligament hypertrophy.  Mild inferior foraminal narrowing.  No canal stenosis.  L4-5: Posterior disk bulge, hypertrophic facet arthropathy, and ligament thickening resulting in moderate bilateral foraminal narrowing and moderate central canal stenosis.  AP canal diameter measures 8.4 mm.   L5-S1: Broad based posterior disk bulge and endplate lipping.  Hypertrophic facet arthropathy and ligament thickening.  Moderate bilateral foraminal narrowing.  No significant canal stenosis.    Impression  Multilevel lumbar spondylosis and degenerative disk disease as detailed.     Results for orders placed in visit on 05/31/12   X-Ray Lumbar Spine Ap And Lateral    Narrative Findings: Generalized osteopenia.  Multilevel degenerative vertebral end plate spurring and facet arthropathy.  Moderate to severe disk space narrowing and associated vacuum disk phenomenon at L5-S1.  Suggestion of slight diane-listhesis of L4 on L5.  Intact pedicles.  No compression fracture.         Review of Systems:  CONSTITUTIONAL: patient denies any fever, chills, or weight loss  SKIN: patient denies any rash or itching  RESPIRATORY: patient denies having any shortness of breath  GASTROINTESTINAL: patient denies having any diarrhea, constipation, or bowel incontinence  GENITOURINARY: patient denies having any abnormal bladder function    MUSCULOSKELETAL:  - patient complains of the above noted pain/s (see chief pain complaint)    NEUROLOGICAL:   - pain as above  - strength in Lower extremities is intact, BILATERALLY  - sensation in Lower extremities is intact, BILATERALLY  - patient denies any loss of bowel or bladder control      PSYCHIATRIC: patient denies any change in mood    Other:  All other systems reviewed and are negative      Physical Exam:  Vitals:  /63 (BP Location: Right arm, Patient Position: Sitting, BP Method: Medium (Automatic))   Pulse 78   Resp 18   Ht 5' 10" (1.778 m)   Wt 88.5 kg (195 lb)   BMI 27.98 " kg/m²   (reviewed on 10/3/2019)    General: alert and oriented, in no apparent distress.  Gait: normal gait.  Skin: no rashes, no discoloration, no obvious lesions  HEENT: normocephalic, atraumatic. Pupils equal and round.  Cardiovascular: no significant peripheral edema present.  Respiratory: without use of accessory muscles of respiration.    Musculoskeletal - Lumbar Spine:  - Pain on flexion of lumbar spine: Absent   - Pain on extension of lumbar spine: Absent         - Lumbar facet loading: Absent   - TTP over the lumbar facet joints: Absent  - TTP over the lumbar paraspinals: Absent  - TTP over the SI joints:  Absent bilaterally   - TTP over GT bursa: Present on left > right  - Straight Leg Raise: Negative  - CHERYLE: Present    Right Knee:  - TTP: Present over medial/ lateral joint line  - Pain with extension: Present  - Pain with flexion: Present  - Crepitus: Present     Neuro - Lower Extremities:  - BLE Strength: R/L: HF: 5/5, HE: 5/5, KF: 5/5; KE: 5/5; FE: 5/5; FF: 5/5  - Extremity Reflexes: Brisk and symmetric throughout  - Sensory: Sensation to light touch intact bilaterally      Psych:  Mood and affect is appropriate        Assessment:  Kaylin Mccollum is a 74 y.o. year old female who is presenting with     Encounter Diagnoses   Name Primary?    Lumbar spondylosis Yes    Sacroiliac joint pain     Sacroiliitis     Greater trochanteric bursitis of both hips     Primary osteoarthritis of right knee        Plan:  1. Interventional: S/p bilateral GT bursa injections on 9/5/19 with 90% pain relief.    2. Pharmacologic: Continue Celebrex 100mg BID PRN.    3. Rehabilitative: Continue physical therapy, which is helping some.     4. Diagnostic: None for now.    5. Follow up: PRN     - I discussed the risks, benefits, and alternatives to potential treatment options. All questions and concerns were fully addressed today in clinic. Dr. Dutta was consulted regarding the patient plan and agrees.

## 2019-10-09 DIAGNOSIS — E11.59 HYPERTENSION ASSOCIATED WITH DIABETES: ICD-10-CM

## 2019-10-09 DIAGNOSIS — I15.2 HYPERTENSION ASSOCIATED WITH DIABETES: ICD-10-CM

## 2019-10-09 DIAGNOSIS — I34.1 MVP (MITRAL VALVE PROLAPSE): ICD-10-CM

## 2019-10-10 RX ORDER — VERAPAMIL HYDROCHLORIDE 120 MG/1
TABLET, FILM COATED ORAL
Qty: 60 TABLET | Refills: 5 | Status: SHIPPED | OUTPATIENT
Start: 2019-10-10 | End: 2020-04-29

## 2019-11-11 ENCOUNTER — DOCUMENTATION ONLY (OUTPATIENT)
Dept: REHABILITATION | Facility: HOSPITAL | Age: 74
End: 2019-11-11

## 2019-11-11 NOTE — DISCHARGE SUMMARY
Outpatient Therapy Discharge Summary     Name: Kaylin Mccollum  [facility] Number: [id number]    Therapy Diagnosis:        Encounter Diagnoses   Name Primary?    Decreased ROM of lumbar spine      Chronic bilateral low back pain without sciatica      Chronic pain of both hips      Weakness of both lower extremities        Physician: Richi Callaway, *    Physician Orders: PT Eval and Treat   Medical Diagnosis from Referral:   M76.31 (ICD-10-CM) - It band syndrome, right   M53.3 (ICD-10-CM) - SI (sacroiliac) pain   Evaluation Date: 7/3/2019    Date of Last visit: 10/2/2019  Total Visits Received: 7  Cancelled Visits: 7  No Show Visits: 0    Assessment    Goals:   Short Term Goals: In 2 weeks   1.I with HEP MET  2.Pt to increase BLE strength by 1/2 grade. PARTIALLY MET  New Short Term Goals Status:     1.Pt to increase BLE strength by 1/2 grade.  Long Term Goal Status:   modified:  Long Term Goals: In 5 weeks  1.Pt to score 70 or more on the LEFS.  2. Patient to demo increase in BLE and core strength to 5/5  3. Patient to have decreased pain to 3/10 at worse.  4. Pt will be able to perform her usual work/housework without difficulty.    Discharge reason: Patient has not attended therapy since 10/2/2019    Plan   This patient is discharged from Physical Therapy

## 2019-12-03 DIAGNOSIS — M79.7 FIBROMYALGIA: Chronic | ICD-10-CM

## 2019-12-04 RX ORDER — GABAPENTIN 300 MG/1
CAPSULE ORAL
Qty: 180 CAPSULE | Refills: 1 | Status: ON HOLD | OUTPATIENT
Start: 2019-12-04 | End: 2021-01-05 | Stop reason: SDUPTHER

## 2019-12-09 DIAGNOSIS — M79.7 FIBROMYALGIA: Chronic | ICD-10-CM

## 2019-12-09 DIAGNOSIS — F32.9 CHRONIC MAJOR DEPRESSIVE DISORDER: Chronic | ICD-10-CM

## 2019-12-10 RX ORDER — FLUOXETINE HYDROCHLORIDE 40 MG/1
CAPSULE ORAL
Qty: 90 CAPSULE | Refills: 1 | Status: SHIPPED | OUTPATIENT
Start: 2019-12-10 | End: 2020-09-10

## 2019-12-10 NOTE — TELEPHONE ENCOUNTER
----- Message from María Lo sent at 12/10/2019  9:42 AM CST -----  Contact: pt  Would like to consult with nurse regarding refill request. States pharmacy is waiting on response from doctor to fill medication, and she'll be out tomorrow. Please give a call back at [phone].          Thanks,  María STEWARD

## 2019-12-12 ENCOUNTER — TELEPHONE (OUTPATIENT)
Dept: INTERNAL MEDICINE | Facility: CLINIC | Age: 74
End: 2019-12-12

## 2019-12-12 NOTE — TELEPHONE ENCOUNTER
----- Message from Monique Jones sent at 12/11/2019  7:08 PM CST -----  Contact: pt  Needs prior auth for jardiance    Please call [phone]

## 2019-12-13 ENCOUNTER — TELEPHONE (OUTPATIENT)
Dept: INTERNAL MEDICINE | Facility: CLINIC | Age: 74
End: 2019-12-13

## 2019-12-13 NOTE — TELEPHONE ENCOUNTER
----- Message from Mee Sarabia sent at 12/13/2019  2:43 PM CST -----  Contact: pt   Pt states that she has been trying to get a refill on JARDIANCE 10 mg Tab for the last week and nothing still has been called in. Pt is requesting that someone from the office contact her regarding this matter.     ..[phone]

## 2020-01-08 DIAGNOSIS — M47.25 OSTEOARTHRITIS OF SPINE WITH RADICULOPATHY, THORACOLUMBAR REGION: Chronic | ICD-10-CM

## 2020-01-08 DIAGNOSIS — M15.9 PRIMARY OSTEOARTHRITIS INVOLVING MULTIPLE JOINTS: ICD-10-CM

## 2020-01-08 DIAGNOSIS — M79.7 FIBROMYALGIA: Chronic | ICD-10-CM

## 2020-01-09 RX ORDER — CELECOXIB 100 MG/1
CAPSULE ORAL
Qty: 180 CAPSULE | Refills: 3 | Status: SHIPPED | OUTPATIENT
Start: 2020-01-09 | End: 2020-04-22 | Stop reason: SDUPTHER

## 2020-01-10 ENCOUNTER — TELEPHONE (OUTPATIENT)
Dept: INTERNAL MEDICINE | Facility: CLINIC | Age: 75
End: 2020-01-10

## 2020-01-10 NOTE — TELEPHONE ENCOUNTER
Spoke with pt states she has been having bone pain specifically femur pain.   States her mother  from bone cancer and has had some other aunts and uncles die from cancer also.   Asking for lab to be added to labs on 1/15/20

## 2020-01-10 NOTE — TELEPHONE ENCOUNTER
----- Message from Lucia Neil sent at 1/10/2020  9:34 AM CST -----  Contact: pt  She's calling in regards to testing for cancer, pls call pt back at [phone] (home)

## 2020-01-13 NOTE — TELEPHONE ENCOUNTER
There is not any other lab I would recommend for this. She has routine labs scheduled for 1/15/20. I would recommend we consider imaging, I'd like to discuss when I see her and examine her. Then we can decide on imaging.

## 2020-01-16 ENCOUNTER — OFFICE VISIT (OUTPATIENT)
Dept: PAIN MEDICINE | Facility: CLINIC | Age: 75
End: 2020-01-16
Payer: MEDICARE

## 2020-01-16 ENCOUNTER — HOSPITAL ENCOUNTER (OUTPATIENT)
Dept: RADIOLOGY | Facility: HOSPITAL | Age: 75
Discharge: HOME | End: 2020-01-16
Attending: ANESTHESIOLOGY
Payer: MEDICARE

## 2020-01-16 VITALS
HEIGHT: 70 IN | HEART RATE: 82 BPM | BODY MASS INDEX: 29.51 KG/M2 | SYSTOLIC BLOOD PRESSURE: 127 MMHG | WEIGHT: 206.13 LBS | DIASTOLIC BLOOD PRESSURE: 72 MMHG

## 2020-01-16 DIAGNOSIS — M47.816 LUMBAR SPONDYLOSIS: ICD-10-CM

## 2020-01-16 DIAGNOSIS — M70.62 GREATER TROCHANTERIC BURSITIS OF BOTH HIPS: ICD-10-CM

## 2020-01-16 DIAGNOSIS — M25.561 CHRONIC PAIN OF BOTH KNEES: ICD-10-CM

## 2020-01-16 DIAGNOSIS — M54.16 LUMBAR RADICULOPATHY: ICD-10-CM

## 2020-01-16 DIAGNOSIS — M25.561 CHRONIC PAIN OF RIGHT KNEE: ICD-10-CM

## 2020-01-16 DIAGNOSIS — M70.61 GREATER TROCHANTERIC BURSITIS OF BOTH HIPS: ICD-10-CM

## 2020-01-16 DIAGNOSIS — M25.562 CHRONIC PAIN OF BOTH KNEES: ICD-10-CM

## 2020-01-16 DIAGNOSIS — M47.812 CERVICAL SPONDYLOSIS: ICD-10-CM

## 2020-01-16 DIAGNOSIS — M47.812 CERVICAL SPONDYLOSIS: Primary | ICD-10-CM

## 2020-01-16 DIAGNOSIS — M53.3 SACROILIAC JOINT PAIN: ICD-10-CM

## 2020-01-16 DIAGNOSIS — M70.60 GREATER TROCHANTERIC BURSITIS, UNSPECIFIED LATERALITY: ICD-10-CM

## 2020-01-16 DIAGNOSIS — G89.29 CHRONIC PAIN OF BOTH KNEES: ICD-10-CM

## 2020-01-16 DIAGNOSIS — G89.29 CHRONIC PAIN OF RIGHT KNEE: ICD-10-CM

## 2020-01-16 PROCEDURE — 99999 PR PBB SHADOW E&M-EST. PATIENT-LVL III: CPT | Mod: PBBFAC,,, | Performed by: ANESTHESIOLOGY

## 2020-01-16 PROCEDURE — 72052 X-RAY EXAM NECK SPINE 6/>VWS: CPT | Mod: 26,,, | Performed by: RADIOLOGY

## 2020-01-16 PROCEDURE — 1125F PR PAIN SEVERITY QUANTIFIED, PAIN PRESENT: ICD-10-PCS | Mod: ,,, | Performed by: ANESTHESIOLOGY

## 2020-01-16 PROCEDURE — 72052 XR CERVICAL SPINE 5 VIEW WITH FLEX AND EXT: ICD-10-PCS | Mod: 26,,, | Performed by: RADIOLOGY

## 2020-01-16 PROCEDURE — 99213 OFFICE O/P EST LOW 20 MIN: CPT | Mod: PBBFAC | Performed by: ANESTHESIOLOGY

## 2020-01-16 PROCEDURE — 1125F AMNT PAIN NOTED PAIN PRSNT: CPT | Mod: ,,, | Performed by: ANESTHESIOLOGY

## 2020-01-16 PROCEDURE — 72052 X-RAY EXAM NECK SPINE 6/>VWS: CPT | Mod: TC

## 2020-01-16 PROCEDURE — 1159F PR MEDICATION LIST DOCUMENTED IN MEDICAL RECORD: ICD-10-PCS | Mod: ,,, | Performed by: ANESTHESIOLOGY

## 2020-01-16 PROCEDURE — 99214 PR OFFICE/OUTPT VISIT, EST, LEVL IV, 30-39 MIN: ICD-10-PCS | Mod: S$PBB,,, | Performed by: ANESTHESIOLOGY

## 2020-01-16 PROCEDURE — 99214 OFFICE O/P EST MOD 30 MIN: CPT | Mod: S$PBB,,, | Performed by: ANESTHESIOLOGY

## 2020-01-16 PROCEDURE — 99999 PR PBB SHADOW E&M-EST. PATIENT-LVL III: ICD-10-PCS | Mod: PBBFAC,,, | Performed by: ANESTHESIOLOGY

## 2020-01-16 PROCEDURE — 1159F MED LIST DOCD IN RCRD: CPT | Mod: ,,, | Performed by: ANESTHESIOLOGY

## 2020-01-16 RX ORDER — GABAPENTIN 300 MG/1
CAPSULE ORAL
Qty: 120 CAPSULE | Refills: 0 | Status: SHIPPED | OUTPATIENT
Start: 2020-01-16 | End: 2020-02-15

## 2020-01-16 RX ORDER — DICLOFENAC SODIUM 10 MG/G
4 GEL TOPICAL 3 TIMES DAILY PRN
Qty: 2 TUBE | Refills: 0 | Status: SHIPPED | OUTPATIENT
Start: 2020-01-16 | End: 2021-01-26 | Stop reason: SDUPTHER

## 2020-01-16 NOTE — PROGRESS NOTES
Chief Pain Complaint:  Back pain  Right knee pain    History of Present Illness:   Kaylin Mccollum is a 74 y.o. female  who is presenting with a chief complaint of Low-back Pain. The patient began experiencing this problem insidiously, and the pain has been gradually worsening over time. The pain is described as throbbing, cramping, aching and heavy and is located in the bilateral buttocks. Pain is intermittent and lasts hours. The pain is nonradiating. The patient rates her pain a 8 out of ten and interferes with activities of daily living a 7 out of ten. Pain is exacerbated by getting up from a seated position, and is improved by rest. Patient reports no prior trauma, no prior spinal surgery     Kaylin Mccollum is a 74 y.o. female  who is presenting with a chief complaint of lumbar back pain. The patient began experiencing this problem insidiously, and the pain has been gradually worsening over the past 5 month(s). The pain is described as throbbing, shooting, burning and electrical and is located in the bilateral lumbar spine. Pain is intermittent and lasts hours. The pain radiates to bilateral lower extremities L4 distribudution. The patient rates her pain a 8 out of ten and interferes with activities of daily living a 7 out of ten. Pain is exacerbated by flexion of the lumbar spine, ambulation, and is improved by rest.     She also complains of right knee pain. The patient began experiencing this problem insidiously. The pain is described as cramping, aching and is located in the right knee. Pain is intermittent and lasts hours. The pain is nonradiating. The patient rates her pain a 8 out of ten and interferes with activities of daily living a 7 out of ten. Pain is exacerbated by getting up from a seated position and standing, and is improved by rest. Patient reports no prior trauma, prior arthroscopy bilaterally in 1990s.      - pertinent negatives: No fever, No chills, No weight loss, No bladder  dysfunction, No bowel dysfunction, No saddle anesthesia  - pertinent positives: none    - medications, other therapies tried (physical therapy, injections):     >> NSAIDs, Tylenol, gabapentin and medrol dose pack    >> Has previously undergone Physical Therapy    >> Has previously undergone spinal injection/s   - Lumbar JAMIN with good relief in the past   - Bilateral SI Joint + GTB injection on 4/10/19 with great relief for about 4 weeks   - left SIJ RFA on 7/11/19 and right SIJ RFA on 7-25-19 with 80% pain relief   - right genicular nerve block on 8/8/19 with 90% pain relief   - bilateral GT bursa injections on 9/5/19 with 90% pain relief    Imaging / Labs / Studies (reviewed on 1/16/2020):    Results for orders placed during the hospital encounter of 11/23/15   MRI Lumbar Spine Without Contrast    Narrative Technique: Standard noncontrast multiplanar multisequence imaging of the lumbar spine was performed.  Findings: There is anatomic spinal alignment.  Very low degree of degenerative disk base narrowing and disk desiccation observed at L1-2, L2-3, L4-5, and L5-S1.  Vertebral marrow signal pattern and architecture are normal.  Distal cord is unremarkable with conus medullaris terminating at L1.  Small nerve root sleeve cysts incidentally noted in the sacral canal.  There are bilateral renal cysts.  L1-2: Small annular bulge and endplate lipping.  Bilateral facet arthropathy.  No significant foraminal narrowing or canal stenosis.  L2-3: Small annular bulge and endplate lipping.  Bilateral facet arthropathy.  No significant foraminal narrowing or canal stenosis.  L3-4: Posterior disk bulge with small bilateral foraminal disk protrusions.  Bilateral facet arthropathy and ligament hypertrophy.  Mild inferior foraminal narrowing.  No canal stenosis.  L4-5: Posterior disk bulge, hypertrophic facet arthropathy, and ligament thickening resulting in moderate bilateral foraminal narrowing and moderate central canal stenosis.  " AP canal diameter measures 8.4 mm.   L5-S1: Broad based posterior disk bulge and endplate lipping.  Hypertrophic facet arthropathy and ligament thickening.  Moderate bilateral foraminal narrowing.  No significant canal stenosis.    Impression  Multilevel lumbar spondylosis and degenerative disk disease as detailed.     Results for orders placed in visit on 05/31/12   X-Ray Lumbar Spine Ap And Lateral    Narrative Findings: Generalized osteopenia.  Multilevel degenerative vertebral end plate spurring and facet arthropathy.  Moderate to severe disk space narrowing and associated vacuum disk phenomenon at L5-S1.  Suggestion of slight diane-listhesis of L4 on L5.  Intact pedicles.  No compression fracture.         Review of Systems:  CONSTITUTIONAL: patient denies any fever, chills, or weight loss  SKIN: patient denies any rash or itching  RESPIRATORY: patient denies having any shortness of breath  GASTROINTESTINAL: patient denies having any diarrhea, constipation, or bowel incontinence  GENITOURINARY: patient denies having any abnormal bladder function    MUSCULOSKELETAL:  - patient complains of the above noted pain/s (see chief pain complaint)    NEUROLOGICAL:   - pain as above  - strength in Lower extremities is intact, BILATERALLY  - sensation in Lower extremities is intact, BILATERALLY  - patient denies any loss of bowel or bladder control      PSYCHIATRIC: patient denies any change in mood    Other:  All other systems reviewed and are negative      Physical Exam:  Vitals:  /72   Pulse 82   Ht 5' 10" (1.778 m)   Wt 93.5 kg (206 lb 2.1 oz)   BMI 29.58 kg/m²   (reviewed on 1/16/2020)    General: alert and oriented, in no apparent distress.  Gait: normal gait.  Skin: no rashes, no discoloration, no obvious lesions  HEENT: normocephalic, atraumatic. Pupils equal and round.  Cardiovascular: no significant peripheral edema present.  Respiratory: without use of accessory muscles of " respiration.    Musculoskeletal - Lumbar Spine:  - Pain on flexion of lumbar spine: Absent   - Pain on extension of lumbar spine: Absent         - Lumbar facet loading: Absent   - TTP over the lumbar facet joints: Absent  - TTP over the lumbar paraspinals: Absent  - TTP over the SI joints:  Absent bilaterally   - TTP over GT bursa: Present on left > right  - Straight Leg Raise: Negative  - CHERYLE: Present    Right Knee:  - TTP: Present over medial/ lateral joint line  - Pain with extension: Present  - Pain with flexion: Present  - Crepitus: Present     Neuro - Lower Extremities:  - BLE Strength: R/L: HF: 5/5, HE: 5/5, KF: 5/5; KE: 5/5; FE: 5/5; FF: 5/5  - Extremity Reflexes: Brisk and symmetric throughout  - Sensory: Sensation to light touch intact bilaterally      Psych:  Mood and affect is appropriate        Assessment:  Kaylin Mccollum is a 74 y.o. year old female who is presenting with     Encounter Diagnoses   Name Primary?    Lumbar spondylosis     Chronic pain of both knees     Greater trochanteric bursitis of both hips     Lumbar radiculopathy     Chronic pain of right knee     Sacroiliac joint pain     Greater trochanteric bursitis, unspecified laterality     Cervical spondylosis Yes       Plan:  1. Interventional: Consider Bilateral L4-5 TFESI with RN IV sedation pending MRI results. Consider Right Genicular RFA.    S/p bilateral GT bursa injections on 9/5/19 with 90% pain relief.    2. Pharmacologic: Continue Celebrex 100mg BID PRN.Increase Gabapentin from 300mg PO BID to 300/600 then 300/900 mg PO BID. Voltaren gel.     3. Rehabilitative: Continue physical therapy, which is helping some.     4. Diagnostic: Update Lumbar MRI. Right Knee MRI. Cervical Xray.     5. Follow up: 4 weeks.

## 2020-01-21 ENCOUNTER — OFFICE VISIT (OUTPATIENT)
Dept: OPHTHALMOLOGY | Facility: CLINIC | Age: 75
End: 2020-01-21
Payer: MEDICARE

## 2020-01-21 DIAGNOSIS — H40.013 OPEN ANGLE WITH BORDERLINE FINDINGS OF BOTH EYES: ICD-10-CM

## 2020-01-21 DIAGNOSIS — E11.9 TYPE 2 DIABETES MELLITUS WITHOUT RETINOPATHY: Primary | ICD-10-CM

## 2020-01-21 DIAGNOSIS — Z96.1 PSEUDOPHAKIA OF BOTH EYES: ICD-10-CM

## 2020-01-21 DIAGNOSIS — H26.492 LEFT POSTERIOR CAPSULAR OPACIFICATION: ICD-10-CM

## 2020-01-21 DIAGNOSIS — H35.342 MACULAR HOLE OF LEFT EYE: ICD-10-CM

## 2020-01-21 PROCEDURE — 99999 PR PBB SHADOW E&M-EST. PATIENT-LVL II: CPT | Mod: PBBFAC,,, | Performed by: OPTOMETRIST

## 2020-01-21 PROCEDURE — 92014 PR EYE EXAM, EST PATIENT,COMPREHESV: ICD-10-PCS | Mod: S$PBB,,, | Performed by: OPTOMETRIST

## 2020-01-21 PROCEDURE — 92250 FUNDUS PHOTOGRAPHY W/I&R: CPT | Mod: PBBFAC | Performed by: OPTOMETRIST

## 2020-01-21 PROCEDURE — 92250 COLOR FUNDUS PHOTOGRAPHY - OU - BOTH EYES: ICD-10-PCS | Mod: 26,S$PBB,, | Performed by: OPTOMETRIST

## 2020-01-21 PROCEDURE — 99212 OFFICE O/P EST SF 10 MIN: CPT | Mod: PBBFAC | Performed by: OPTOMETRIST

## 2020-01-21 PROCEDURE — 99999 PR PBB SHADOW E&M-EST. PATIENT-LVL II: ICD-10-PCS | Mod: PBBFAC,,, | Performed by: OPTOMETRIST

## 2020-01-21 PROCEDURE — 92014 COMPRE OPH EXAM EST PT 1/>: CPT | Mod: S$PBB,,, | Performed by: OPTOMETRIST

## 2020-01-21 NOTE — PROGRESS NOTES
HPI     PT was last seen on 7/23/19 with DNL for 24-2VF, gOCT and IOP check. PT   was told to rtc 6 months for dilation and optos SDPs.  Medication eye drops if any: none  Last HVF: 7/23/19  Last gOCT: 7/23/19  Last SDP: 1/21/20    Last edited by Enid Herrera MA on 1/21/2020  2:18 PM. (History)            Assessment /Plan     For exam results, see Encounter Report.    Type 2 diabetes mellitus without retinopathy  No diabetic retinopathy in either eye  Continue close care with PCP   Monitor 12 months    Open angle with borderline findings of both eyes  -     Color Fundus Photography - OU - Both Eyes  Stable IOP  Baseline optos SDPs done today   Monitor 6 months    Macular hole of left eye  S/p repair with Dr. crocker  Stable    Pseudophakia of both eyes  Left posterior capsular opacification  Mild with good vision  Monitor 12 months    RTC 6 months for 24-2VF, gOCT and IOP check or PRN if any problems.   Discussed above and answered questions.

## 2020-01-22 ENCOUNTER — OFFICE VISIT (OUTPATIENT)
Dept: INTERNAL MEDICINE | Facility: CLINIC | Age: 75
End: 2020-01-22
Payer: MEDICARE

## 2020-01-22 ENCOUNTER — OFFICE VISIT (OUTPATIENT)
Dept: RHEUMATOLOGY | Facility: CLINIC | Age: 75
End: 2020-01-22
Payer: MEDICARE

## 2020-01-22 ENCOUNTER — IMMUNIZATION (OUTPATIENT)
Dept: PHARMACY | Facility: CLINIC | Age: 75
End: 2020-01-22
Payer: MEDICARE

## 2020-01-22 VITALS
SYSTOLIC BLOOD PRESSURE: 121 MMHG | WEIGHT: 204.38 LBS | DIASTOLIC BLOOD PRESSURE: 59 MMHG | BODY MASS INDEX: 29.32 KG/M2 | HEART RATE: 76 BPM

## 2020-01-22 VITALS
HEART RATE: 80 BPM | WEIGHT: 204.38 LBS | OXYGEN SATURATION: 96 % | BODY MASS INDEX: 29.26 KG/M2 | DIASTOLIC BLOOD PRESSURE: 64 MMHG | SYSTOLIC BLOOD PRESSURE: 120 MMHG | HEIGHT: 70 IN | TEMPERATURE: 96 F

## 2020-01-22 DIAGNOSIS — F32.9 CHRONIC MAJOR DEPRESSIVE DISORDER: Chronic | ICD-10-CM

## 2020-01-22 DIAGNOSIS — E11.59 HYPERTENSION ASSOCIATED WITH DIABETES: Chronic | ICD-10-CM

## 2020-01-22 DIAGNOSIS — E66.9 DIABETES MELLITUS TYPE 2 IN OBESE: ICD-10-CM

## 2020-01-22 DIAGNOSIS — E11.69 DIABETES MELLITUS TYPE 2 IN OBESE: ICD-10-CM

## 2020-01-22 DIAGNOSIS — E11.69 HYPERLIPIDEMIA ASSOCIATED WITH TYPE 2 DIABETES MELLITUS: ICD-10-CM

## 2020-01-22 DIAGNOSIS — E78.5 HYPERLIPIDEMIA ASSOCIATED WITH TYPE 2 DIABETES MELLITUS: ICD-10-CM

## 2020-01-22 DIAGNOSIS — M79.7 FIBROMYALGIA: Primary | ICD-10-CM

## 2020-01-22 DIAGNOSIS — I15.2 HYPERTENSION ASSOCIATED WITH DIABETES: Chronic | ICD-10-CM

## 2020-01-22 DIAGNOSIS — Z78.9 STATIN INTOLERANCE: ICD-10-CM

## 2020-01-22 PROBLEM — M51.24 DISPLACEMENT OF THORACIC INTERVERTEBRAL DISC WITHOUT MYELOPATHY: Status: ACTIVE | Noted: 2019-04-04

## 2020-01-22 PROBLEM — M54.50 CHRONIC LOW BACK PAIN: Status: ACTIVE | Noted: 2019-04-04

## 2020-01-22 PROBLEM — G89.29 CHRONIC LOW BACK PAIN: Status: ACTIVE | Noted: 2019-04-04

## 2020-01-22 PROBLEM — G89.29 CHRONIC THORACIC BACK PAIN: Status: ACTIVE | Noted: 2019-04-04

## 2020-01-22 PROBLEM — M50.20 DISPLACEMENT OF CERVICAL INTERVERTEBRAL DISC WITHOUT MYELOPATHY: Status: ACTIVE | Noted: 2019-04-04

## 2020-01-22 PROBLEM — M54.6 CHRONIC THORACIC BACK PAIN: Status: ACTIVE | Noted: 2019-04-04

## 2020-01-22 PROCEDURE — 99213 OFFICE O/P EST LOW 20 MIN: CPT | Mod: PBBFAC,25 | Performed by: INTERNAL MEDICINE

## 2020-01-22 PROCEDURE — 99999 PR PBB SHADOW E&M-EST. PATIENT-LVL IV: ICD-10-PCS | Mod: PBBFAC,,, | Performed by: FAMILY MEDICINE

## 2020-01-22 PROCEDURE — 99214 OFFICE O/P EST MOD 30 MIN: CPT | Mod: PBBFAC,27,25 | Performed by: FAMILY MEDICINE

## 2020-01-22 PROCEDURE — 99214 PR OFFICE/OUTPT VISIT, EST, LEVL IV, 30-39 MIN: ICD-10-PCS | Mod: S$PBB,,, | Performed by: FAMILY MEDICINE

## 2020-01-22 PROCEDURE — 1159F MED LIST DOCD IN RCRD: CPT | Mod: ,,, | Performed by: INTERNAL MEDICINE

## 2020-01-22 PROCEDURE — 99214 OFFICE O/P EST MOD 30 MIN: CPT | Mod: S$PBB,,, | Performed by: INTERNAL MEDICINE

## 2020-01-22 PROCEDURE — 99999 PR PBB SHADOW E&M-EST. PATIENT-LVL IV: CPT | Mod: PBBFAC,,, | Performed by: FAMILY MEDICINE

## 2020-01-22 PROCEDURE — 1159F MED LIST DOCD IN RCRD: CPT | Mod: ,,, | Performed by: FAMILY MEDICINE

## 2020-01-22 PROCEDURE — 1125F AMNT PAIN NOTED PAIN PRSNT: CPT | Mod: ,,, | Performed by: FAMILY MEDICINE

## 2020-01-22 PROCEDURE — 1125F PR PAIN SEVERITY QUANTIFIED, PAIN PRESENT: ICD-10-PCS | Mod: ,,, | Performed by: FAMILY MEDICINE

## 2020-01-22 PROCEDURE — 1125F AMNT PAIN NOTED PAIN PRSNT: CPT | Mod: ,,, | Performed by: INTERNAL MEDICINE

## 2020-01-22 PROCEDURE — 1159F PR MEDICATION LIST DOCUMENTED IN MEDICAL RECORD: ICD-10-PCS | Mod: ,,, | Performed by: INTERNAL MEDICINE

## 2020-01-22 PROCEDURE — 1125F PR PAIN SEVERITY QUANTIFIED, PAIN PRESENT: ICD-10-PCS | Mod: ,,, | Performed by: INTERNAL MEDICINE

## 2020-01-22 PROCEDURE — 99999 PR PBB SHADOW E&M-EST. PATIENT-LVL III: CPT | Mod: PBBFAC,,, | Performed by: INTERNAL MEDICINE

## 2020-01-22 PROCEDURE — 99214 PR OFFICE/OUTPT VISIT, EST, LEVL IV, 30-39 MIN: ICD-10-PCS | Mod: S$PBB,,, | Performed by: INTERNAL MEDICINE

## 2020-01-22 PROCEDURE — 99999 PR PBB SHADOW E&M-EST. PATIENT-LVL III: ICD-10-PCS | Mod: PBBFAC,,, | Performed by: INTERNAL MEDICINE

## 2020-01-22 PROCEDURE — 1159F PR MEDICATION LIST DOCUMENTED IN MEDICAL RECORD: ICD-10-PCS | Mod: ,,, | Performed by: FAMILY MEDICINE

## 2020-01-22 PROCEDURE — G0008 ADMIN INFLUENZA VIRUS VAC: HCPCS | Mod: PBBFAC

## 2020-01-22 PROCEDURE — 99214 OFFICE O/P EST MOD 30 MIN: CPT | Mod: S$PBB,,, | Performed by: FAMILY MEDICINE

## 2020-01-22 NOTE — PROGRESS NOTES
CC:  Chief Complaint   Patient presents with    Fibromyalgia     6mo f/u- right knee & sciatic nerve pain today       History of Present Illness:  Kaylin Archuleta a 75 y.o.yo female   Here for routine follow-up of fibromyalgia  So far negative RF/CCP and a previous DENNIS negative  HLA B27 negative  Since last visit noted to have normal vitamin-D and CK levels  She is currently on gabapentin 300 mg p.o. t.i.d.   For many years she had chronic neck and back pain  She also hurts in her knees  Pain aggravated with activity  Relief with Celebrex she has been on for few years now    Hands and feet do not bother her much  Denies any joint swelling  No history of gout    Currently she sees pain management and received JAMIN which helped  She has also received steroid injections in her hips  Scheduled for nerve block injections in right knee    History of diabetes with diabetic neuropathy involving upper and lower extremities  She was on tramadol in the past  But now switched to naltrexone and she is doing well    No history of rashes  No significant dry eyes or dry mouth    She does not take any vitamin-D supplements  In 90s when she was on statins she developed statin induced myopathy and so there was stopped    Past Medical History:   Diagnosis Date    Arthritis     Cataract     Diabetes mellitus     Diabetes mellitus, type 2     Fibromyalgia     General anesthetics causing adverse effect in therapeutic use     bradycardia and tachycardia    Hypertension     Migraines     Mitral valve prolapse     Osteoarthritis     Rotator cuff tear 4/1/2015       Past Surgical History:   Procedure Laterality Date    CATARACT EXTRACTION W/  INTRAOCULAR LENS IMPLANT Left 07/19/2017    CATARACT EXTRACTION W/  INTRAOCULAR LENS IMPLANT Right 2017    CHOLECYSTECTOMY      COLONOSCOPY N/A 9/25/2018    Procedure: COLONOSCOPY;  Surgeon: Bethel Carmona MD;  Location: [facility];  Service: Endoscopy;  Laterality: N/A;    INJECTION  "OF ANESTHETIC AGENT AROUND NERVE Right 8/8/2019    Procedure: Right Genicular nerve block with local;  Surgeon: Manpreet Dutta MD;  Location: [facility];  Service: Pain Management;  Laterality: Right;    INJECTION OF JOINT Bilateral 9/5/2019    Procedure: Bilateral GT bursa injection;  Surgeon: Manpreet Dutta MD;  Location: [facility];  Service: Pain Management;  Laterality: Bilateral;    KNEE ARTHROSCOPY Bilateral     PARS PLANA VITRECTOMY W/ REPAIR OF MACULAR HOLE Left 02/22/2017    RADIOFREQUENCY THERMOCOAGULATION Left 7/11/2019    Procedure: Right SIJ RFA;  Surgeon: Manpreet Dutta MD;  Location: [facility];  Service: Pain Management;  Laterality: Left;    RADIOFREQUENCY THERMOCOAGULATION Right 7/25/2019    Procedure: Right SIJ RFA;  Surgeon: Manpreet Dutta MD;  Location: [facility];  Service: Pain Management;  Laterality: Right;    SHOULDER ARTHROSCOPY Right 06/04/15         Social History     Tobacco Use    Smoking status: Never Smoker    Smokeless tobacco: Never Used   Substance Use Topics    Alcohol use: Yes     Alcohol/week: 0.0 standard drinks     Comment: Rarely    Drug use: No       Family History   Problem Relation Age of Onset    Heart disease Mother     Glaucoma Mother     Cataracts Mother     Cancer Mother         colon    Kidney disease Daughter     Diabetes Maternal Grandmother     Heart disease Maternal Grandmother     Diabetes Maternal Grandfather     Cancer Maternal Grandfather     Breast cancer Paternal Aunt        Review of patient's allergies indicates:   Allergen Reactions    Demerol [meperidine] Other (See Comments)     Burning when adm IV  Able to tolerate IM, "Turned my hand purple."    Latex, natural rubber Other (See Comments)     "Burns my skin",  Symptoms get worse the longer she is exposed    Zocor [simvastatin] Other (See Comments)     Tightening of muscles    Statins-hmg-coa reductase inhibitors Other (See Comments)     myopathy    Sulfa (sulfonamide " antibiotics)     Nickel sutures [surgical stainless steel] Rash     Any nickel         Statin use:  No  Calcium supplements :  No  Vitamin d supplements :  No    DEXA :  Reviewed.  Osteopenia with low to moderate FRAX  Medications :  Exercise :    Review of Systems:  Constitutional: Denies fever, chills.  Intentional weight loss with dietary changes  Fatigue: no  Muscle weakness: no  Headaches: no new headaches  Eyes: No redness or dryness.  No recent visual changes.  ENT: Denies dry mouth. No oral or nasal ulcers.  Card: No chest pain.  Resp: No cough or sob.   Gastro: No nausea or vomiting.  No heartburn.  Constipation: no  Diarrhea: no  Genito:  No dysuria.  No genital ulcers.  Skin: No rash.  Raynauds:no  Neuro: + numbness / tingling.   Psych: No depression, anxiety  Endo:  no excess thirst.  Heme: no abnormal bleeding or bruising  Clots:none       OBJECTIVE:     Vital Signs   Vitals:    01/22/20 1405   BP: (!) 121/59   Pulse: 76     Physical Exam:  General Appearance:  NAD.   Gait: not favoring.  HEENT: PERRL.  Eyes not dry or injected.  No nasal ulcers.  Mouth not dry, no oral lesions.  Lymph: cervical, supraclavicular or axillary nodes: none abnormal   Cardio: no irregularity of S1 or S2.  No gallops or rubs.   Resp: Normal respiratory motion. Clear to auscultation bilaterally.   No abnormal chest conformation.  Abd: Soft, non-tender, nondistended.  No masses.   Skin: Head and neck,  and extremities examined. No rashes.   Neuro: Ox3.   Cranial nerves II-XII grossly intact.     Musculoskeletal Exam:  No synovitis  Spine :  No palpable swelling  Pain on range of motion      Tender points:+++  Muscle strength:Equal and full in all mm groups of the upper and lower ext.    Laboratory:   Results for orders placed or performed in visit on 01/16/20   Lipid panel   Result Value Ref Range    Cholesterol 234 (H) 120 - 199 mg/dL    Triglycerides 77 30 - 150 mg/dL    HDL 65 40 - 75 mg/dL    LDL Cholesterol 153.6 63.0 -  159.0 mg/dL    Hdl/Cholesterol Ratio 27.8 20.0 - 50.0 %    Total Cholesterol/HDL Ratio 3.6 2.0 - 5.0    Non-HDL Cholesterol 169 mg/dL   Hemoglobin A1c   Result Value Ref Range    Hemoglobin A1C 6.4 (H) 4.0 - 5.6 %    Estimated Avg Glucose 137 (H) 68 - 131 mg/dL   Comprehensive metabolic panel   Result Value Ref Range    Sodium 139 136 - 145 mmol/L    Potassium 3.7 3.5 - 5.1 mmol/L    Chloride 101 95 - 110 mmol/L    CO2 29 23 - 29 mmol/L    Glucose 86 70 - 110 mg/dL    BUN, Bld 24 (H) 8 - 23 mg/dL    Creatinine 0.8 0.5 - 1.4 mg/dL    Calcium 9.9 8.7 - 10.5 mg/dL    Total Protein 7.1 6.0 - 8.4 g/dL    Albumin 3.9 3.5 - 5.2 g/dL    Total Bilirubin 0.4 0.1 - 1.0 mg/dL    Alkaline Phosphatase 67 55 - 135 U/L    AST 21 10 - 40 U/L    ALT 13 10 - 44 U/L    Anion Gap 9 8 - 16 mmol/L    eGFR if African American >60.0 >60 mL/min/1.73 m^2    eGFR if non African American >60.0 >60 mL/min/1.73 m^2   CBC auto differential   Result Value Ref Range    WBC 4.85 3.90 - 12.70 K/uL    RBC 4.45 4.00 - 5.40 M/uL    Hemoglobin 13.4 12.0 - 16.0 g/dL    Hematocrit 43.1 37.0 - 48.5 %    Mean Corpuscular Volume 97 82 - 98 fL    Mean Corpuscular Hemoglobin 30.1 27.0 - 31.0 pg    Mean Corpuscular Hemoglobin Conc 31.1 (L) 32.0 - 36.0 g/dL    RDW 12.7 11.5 - 14.5 %    Platelets 219 150 - 350 K/uL    MPV 10.1 9.2 - 12.9 fL    Immature Granulocytes 0.2 0.0 - 0.5 %    Gran # (ANC) 2.9 1.8 - 7.7 K/uL    Immature Grans (Abs) 0.01 0.00 - 0.04 K/uL    Lymph # 1.5 1.0 - 4.8 K/uL    Mono # 0.3 0.3 - 1.0 K/uL    Eos # 0.1 0.0 - 0.5 K/uL    Baso # 0.05 0.00 - 0.20 K/uL    nRBC 0 0 /100 WBC    Gran% 58.8 38.0 - 73.0 %    Lymph% 30.9 18.0 - 48.0 %    Mono% 6.6 4.0 - 15.0 %    Eosinophil% 2.5 0.0 - 8.0 %    Basophil% 1.0 0.0 - 1.9 %    Differential Method Automated    TSH   Result Value Ref Range    TSH 0.719 0.400 - 4.000 uIU/mL     Lab Results   Component Value Date    RF <10.0 07/22/2019     Lab Results   Component Value Date    DENNIS Negative 03/08/2004      DEXA:    FINDINGS:  The L1 to L4 vertebral bone mineral density is equal to 1.338 g/cm squared with a T score of 0.2.  There has been a 1.1% increase which is not statistically significant relative to the prior study.    The left femoral neck bone mineral density is equal to 0.897 g/cm squared with a T score of -1.0.  There has been  a 5.5% decrease which is statistically significant relative to the prior study.    There is a 9.3% risk of a major osteoporotic fracture and a 1.2% risk of hip fracture in the next 10 years (FRAX).      Impression       Osteopenia as above         Imaging :FINDINGS:  Degenerative changes of the hips are again seen, not significant changed since the comparison exam.  No collapse of the femoral heads.  No fracture or dislocation is evident.  There also degenerative changes of the lumbosacral spine and sacroiliac joints and pubic symphysis.  Pelvic phleboliths are present.  Please note exam detail limited by habitus.    Notes reviewed  Other procedures:    ASSESSMENT/PLAN:     Fibromyalgia      1:  Fibromyalgia:  No evidence of autoimmune arthritis noted on exam  C/w gabapentin to 300 mg p.o. T.i.d.  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 Medication literature given to patient up discharge  Went over uptodate information /literature on the meds prescribed today    If you are unsure what to do please call our office for instruction. [facility] [phone]      Disclaimer: This  note was prepared using voice recognition system and is likely to have sound alike errors and is not proof read.  Please call me with any questions.

## 2020-01-22 NOTE — Clinical Note
CMP, lipid in 3 months please. Thank you!Also, need to attempt PA for invokana (had dry mouth with jardiance). Thank you.

## 2020-01-23 ENCOUNTER — TELEPHONE (OUTPATIENT)
Dept: RADIOLOGY | Facility: HOSPITAL | Age: 75
End: 2020-01-23

## 2020-01-24 ENCOUNTER — HOSPITAL ENCOUNTER (OUTPATIENT)
Dept: RADIOLOGY | Facility: HOSPITAL | Age: 75
Discharge: HOME | End: 2020-01-24
Attending: ANESTHESIOLOGY
Payer: MEDICARE

## 2020-01-24 DIAGNOSIS — G89.29 CHRONIC PAIN OF RIGHT KNEE: ICD-10-CM

## 2020-01-24 DIAGNOSIS — M54.16 LUMBAR RADICULOPATHY: ICD-10-CM

## 2020-01-24 DIAGNOSIS — M25.561 CHRONIC PAIN OF RIGHT KNEE: ICD-10-CM

## 2020-01-24 PROCEDURE — 73721 MRI JNT OF LWR EXTRE W/O DYE: CPT | Mod: 26,RT,, | Performed by: RADIOLOGY

## 2020-01-24 PROCEDURE — 72148 MRI LUMBAR SPINE W/O DYE: CPT | Mod: TC

## 2020-01-24 PROCEDURE — 72148 MRI LUMBAR SPINE W/O DYE: CPT | Mod: 26,,, | Performed by: RADIOLOGY

## 2020-01-24 PROCEDURE — 73721 MRI JNT OF LWR EXTRE W/O DYE: CPT | Mod: TC,RT

## 2020-01-24 PROCEDURE — 72148 MRI LUMBAR SPINE WITHOUT CONTRAST: ICD-10-PCS | Mod: 26,,, | Performed by: RADIOLOGY

## 2020-01-24 PROCEDURE — 73721 MRI KNEE WITHOUT CONTRAST RIGHT: ICD-10-PCS | Mod: 26,RT,, | Performed by: RADIOLOGY

## 2020-01-25 PROBLEM — Z78.9 STATIN INTOLERANCE: Status: ACTIVE | Noted: 2020-01-25

## 2020-01-25 RX ORDER — EZETIMIBE 10 MG/1
10 TABLET ORAL DAILY
Qty: 90 TABLET | Refills: 3 | Status: SHIPPED | OUTPATIENT
Start: 2020-01-25 | End: 2021-01-26 | Stop reason: SDUPTHER

## 2020-01-26 NOTE — PROGRESS NOTES
Subjective:       Patient ID: Kaylin Mccollum is a 75 y.o. female.    Chief Complaint: Annual Exam    Patient presents to clinic today for annual physical exam. Patient reports she discussed her leg pain concerns with Dr. Dutta and he is planning MRI. She states he advised this is likely sciatic pain. She also reports dry mouth since starting Jardiance. She desires to switch back to Invokana. Patient is otherwise without concerns today.      Review of Systems   Constitutional: Positive for fatigue. Negative for chills, fever and unexpected weight change.   HENT: Positive for rhinorrhea (for a few days). Negative for congestion, dental problem, ear pain, hearing loss and trouble swallowing.    Eyes: Negative for pain and visual disturbance.   Respiratory: Negative for cough and shortness of breath.    Cardiovascular: Negative for chest pain, palpitations and leg swelling.   Gastrointestinal: Positive for constipation. Negative for abdominal distention, abdominal pain, blood in stool, diarrhea, nausea and vomiting.   Genitourinary: Positive for difficulty urinating. Negative for vaginal discharge.   Musculoskeletal: Positive for arthralgias and myalgias.   Skin: Negative for rash.   Neurological: Positive for numbness and headaches. Negative for dizziness and weakness.   Hematological: Negative for adenopathy. Bruises/bleeds easily.   Psychiatric/Behavioral: Negative for dysphoric mood and sleep disturbance. The patient is not nervous/anxious.      Above positive ROS are chronic/stable per patient report unless otherwise specified.  Objective:      Physical Exam   Constitutional: She is oriented to person, place, and time. Vital signs are normal. She appears well-developed and well-nourished. No distress.   HENT:   Head: Normocephalic and atraumatic.   Right Ear: Tympanic membrane, external ear and ear canal normal.   Left Ear: Tympanic membrane, external ear and ear canal normal.   Nose: Nose normal. No mucosal  edema or rhinorrhea.   Mouth/Throat: Uvula is midline, oropharynx is clear and moist and mucous membranes are normal.   Eyes: Pupils are equal, round, and reactive to light. Conjunctivae, EOM and lids are normal.   Neck: Normal range of motion. Neck supple. No thyromegaly present.   Cardiovascular: Normal rate and regular rhythm. Exam reveals no gallop and no friction rub.   No murmur heard.  Pulmonary/Chest: Effort normal and breath sounds normal. She has no wheezes. She has no rhonchi. She has no rales.   Abdominal: Soft. Normal appearance and bowel sounds are normal. She exhibits no distension and no mass. There is no hepatosplenomegaly. There is no tenderness.   Musculoskeletal: Normal range of motion.   Lymphadenopathy:     She has no cervical adenopathy.   Neurological: She is alert and oriented to person, place, and time. She has normal strength. No cranial nerve deficit or sensory deficit. Gait normal.   Reflex Scores:       Patellar reflexes are 2+ on the right side and 2+ on the left side.  Skin: Skin is warm and dry. No lesion and no rash noted. No cyanosis. Nails show no clubbing.   Psychiatric: She has a normal mood and affect.   Vitals reviewed.      Assessment:       1. Chronic major depressive disorder    2. Hyperlipidemia associated with type 2 diabetes mellitus    3. Hypertension associated with diabetes    4. Diabetes mellitus type 2 in obese    5. Statin intolerance        Plan:     Problem List Items Addressed This Visit     Chronic major depressive disorder (Chronic)    Current Assessment & Plan     Stable on prozac         Diabetes mellitus type 2 in obese    Current Assessment & Plan     Controlled; a1c 6.4; will attempt PA for invokana due to side effects with jardiance         Hyperlipidemia associated with type 2 diabetes mellitus    Current Assessment & Plan     ; above goal; patient will try zetia; statin intolerant; reassess labs in 3 months.         Hypertension associated with  diabetes (Chronic)    Current Assessment & Plan     Controlled, coontinue losartan and verapamil         Statin intolerance          Health Maintenance reviewed/updated. Flu vaccine today. Discussed shingrix.    ADDENDUM:  - recent labs from Dr. Schneider are within acceptable limits.  - CXR negative for acute process.  - EKG unchanged from previouis.  - Chronic conditions stable.  - May proceed with knee arthroscopy at acceptable risk.

## 2020-02-03 ENCOUNTER — OFFICE VISIT (OUTPATIENT)
Dept: ORTHOPEDICS | Facility: CLINIC | Age: 75
End: 2020-02-03
Payer: MEDICARE

## 2020-02-03 ENCOUNTER — OFFICE VISIT (OUTPATIENT)
Dept: PAIN MEDICINE | Facility: CLINIC | Age: 75
End: 2020-02-03
Payer: MEDICARE

## 2020-02-03 VITALS
WEIGHT: 204 LBS | SYSTOLIC BLOOD PRESSURE: 138 MMHG | HEIGHT: 70 IN | BODY MASS INDEX: 29.2 KG/M2 | DIASTOLIC BLOOD PRESSURE: 73 MMHG | HEART RATE: 93 BPM

## 2020-02-03 VITALS
SYSTOLIC BLOOD PRESSURE: 117 MMHG | HEART RATE: 83 BPM | DIASTOLIC BLOOD PRESSURE: 57 MMHG | BODY MASS INDEX: 29.2 KG/M2 | HEIGHT: 70 IN | WEIGHT: 204 LBS

## 2020-02-03 DIAGNOSIS — Z01.818 PREOP TESTING: Primary | ICD-10-CM

## 2020-02-03 DIAGNOSIS — M25.561 CHRONIC PAIN OF RIGHT KNEE: ICD-10-CM

## 2020-02-03 DIAGNOSIS — S83.241A TEAR OF MEDIAL MENISCUS OF RIGHT KNEE, CURRENT, UNSPECIFIED TEAR TYPE, INITIAL ENCOUNTER: Primary | ICD-10-CM

## 2020-02-03 DIAGNOSIS — M94.261 CHONDROMALACIA, RIGHT KNEE: ICD-10-CM

## 2020-02-03 DIAGNOSIS — M70.62 GREATER TROCHANTERIC BURSITIS OF BOTH HIPS: ICD-10-CM

## 2020-02-03 DIAGNOSIS — G89.29 CHRONIC PAIN OF RIGHT KNEE: ICD-10-CM

## 2020-02-03 DIAGNOSIS — S83.241A TEAR OF MEDIAL MENISCUS OF RIGHT KNEE, CURRENT, UNSPECIFIED TEAR TYPE, INITIAL ENCOUNTER: ICD-10-CM

## 2020-02-03 DIAGNOSIS — M47.816 LUMBAR SPONDYLOSIS: ICD-10-CM

## 2020-02-03 DIAGNOSIS — M54.16 LUMBAR RADICULOPATHY: ICD-10-CM

## 2020-02-03 DIAGNOSIS — M70.60 GREATER TROCHANTERIC BURSITIS, UNSPECIFIED LATERALITY: ICD-10-CM

## 2020-02-03 DIAGNOSIS — M53.3 SACROILIAC JOINT PAIN: ICD-10-CM

## 2020-02-03 DIAGNOSIS — M70.61 GREATER TROCHANTERIC BURSITIS OF BOTH HIPS: ICD-10-CM

## 2020-02-03 DIAGNOSIS — S83.281A TEAR OF LATERAL MENISCUS OF RIGHT KNEE, CURRENT, INITIAL ENCOUNTER: Primary | ICD-10-CM

## 2020-02-03 PROCEDURE — 99999 PR PBB SHADOW E&M-EST. PATIENT-LVL III: CPT | Mod: PBBFAC,,, | Performed by: ANESTHESIOLOGY

## 2020-02-03 PROCEDURE — 99214 PR OFFICE/OUTPT VISIT, EST, LEVL IV, 30-39 MIN: ICD-10-PCS | Mod: S$PBB,,, | Performed by: ANESTHESIOLOGY

## 2020-02-03 PROCEDURE — 99214 OFFICE O/P EST MOD 30 MIN: CPT | Mod: S$PBB,,, | Performed by: ANESTHESIOLOGY

## 2020-02-03 PROCEDURE — 99213 OFFICE O/P EST LOW 20 MIN: CPT | Mod: PBBFAC | Performed by: ANESTHESIOLOGY

## 2020-02-03 PROCEDURE — 99214 OFFICE O/P EST MOD 30 MIN: CPT | Mod: S$PBB,,, | Performed by: ORTHOPAEDIC SURGERY

## 2020-02-03 PROCEDURE — 99214 OFFICE O/P EST MOD 30 MIN: CPT | Mod: PBBFAC,27 | Performed by: ORTHOPAEDIC SURGERY

## 2020-02-03 PROCEDURE — 99214 PR OFFICE/OUTPT VISIT, EST, LEVL IV, 30-39 MIN: ICD-10-PCS | Mod: S$PBB,,, | Performed by: ORTHOPAEDIC SURGERY

## 2020-02-03 PROCEDURE — 99999 PR PBB SHADOW E&M-EST. PATIENT-LVL III: ICD-10-PCS | Mod: PBBFAC,,, | Performed by: ANESTHESIOLOGY

## 2020-02-03 PROCEDURE — 99999 PR PBB SHADOW E&M-EST. PATIENT-LVL IV: ICD-10-PCS | Mod: PBBFAC,,, | Performed by: ORTHOPAEDIC SURGERY

## 2020-02-03 PROCEDURE — 99999 PR PBB SHADOW E&M-EST. PATIENT-LVL IV: CPT | Mod: PBBFAC,,, | Performed by: ORTHOPAEDIC SURGERY

## 2020-02-03 NOTE — LETTER
February 3, 2020      Manpreet Dutta MD  [address]  [place] [address]           [facility]  [address] [place] [id number]  Phone: [phone]  Fax: [phone]          Patient: Kaylin Mccollum   MR Number: [id number]   YOB: 1945   Date of Visit: 2/3/2020       Dear Dr. Manpreet Dutta:    Thank you for referring Kaylin Mccollum to me for evaluation. Attached you will find relevant portions of my assessment and plan of care.    If you have questions, please do not hesitate to call me. I look forward to following Kaylin Mccollum along with you.    Sincerely,    Les Schneider MD    Enclosure  CC:  No Recipients    If you would like to receive this communication electronically, please contact [email or web address] or [phone] to request more information on [employer] Link access.    For providers and/or their staff who would like to refer a patient to [facility], please contact us through our one-stop-shop provider referral line, [facility] , at [phone].    If you feel you have received this communication in error or would no longer like to receive these types of communications, please e-mail [email or web address]

## 2020-02-03 NOTE — PATIENT INSTRUCTIONS
Right knee medial and lateral meniscus tear also chondromalacia/arthritis most pronounced underneath the kneecap  Since having catching locking feeling my recommendation would be arthroscopic surgery and partial resection of torn meniscus.  That will tell us the extent also of her arthritic condition so we can treated in the future.  No need for total knee replacement at this point  Surgeries approximately 20 min under general anesthesia there is slight risk of nerve damage, vascular damage, infection, blood clot, stiffness.  Will take roughly 6 weeks to heal  I usually do not give any crutches for surgery or after surgery. She will be allowed to ambulate as tolerated even though she limps.  Usually the dressing is capped for 3 days and then may be removed and then may shower.  No soaking in tub bottle allowed for at least 2 weeks  Postop sutures will be removed 2 weeks later in the office    Will obtain medical clearance prior to surgery since it is done under a general anesthetic

## 2020-02-03 NOTE — PROGRESS NOTES
Subjective:     Patient ID: Kaylin Mccollum is a 75 y.o. female.    Chief Complaint: Pain of the Right Knee    HPI:  75-year-old retired from  who had been falling down stairs numerous occasions she has been having pain and catching and locking since several months now.  She gets up the knee catches she has to wiggle it some how to unlock it before she can walk. She does have quite a bit of lumbar pain that radiates with burning sensation down to the right anterior tibia and dorsal of the foot.  She is under the care of pain management for that.  Pain in the knee is around 4/10 with catching locking feeling.  Able to ambulate once the catching locking goes away for her minimum 200-300 yd without discomfort.  Unable to squat unable to do stairs due to the catching locking feeling.  She does ambulate without any assistive devices.  She does have an MRI in the electronic records.  At 1 point she had falling down the stairs and sustained right shoulder rotator cuff tear requiring repair.  Denies any numbness tingling in the hands.  She does have some cervical lumbar pain    Past Medical History:   Diagnosis Date    Arthritis     Cataract     Diabetes mellitus     Diabetes mellitus, type 2     Fibromyalgia     General anesthetics causing adverse effect in therapeutic use     bradycardia and tachycardia    Hypertension     Migraines     Mitral valve prolapse     Osteoarthritis     Rotator cuff tear 4/1/2015     Past Surgical History:   Procedure Laterality Date    CATARACT EXTRACTION W/  INTRAOCULAR LENS IMPLANT Left 07/19/2017    CATARACT EXTRACTION W/  INTRAOCULAR LENS IMPLANT Right 2017    CHOLECYSTECTOMY      COLONOSCOPY N/A 9/25/2018    Procedure: COLONOSCOPY;  Surgeon: Bethel Carmona MD;  [facility];  Service: Endoscopy;  Laterality: N/A;    INJECTION OF ANESTHETIC AGENT AROUND NERVE Right 8/8/2019    Procedure: Right Genicular nerve block with local;  Surgeon: Manpreet Dutta MD;   Location: [facility];  Service: Pain Management;  Laterality: Right;    INJECTION OF JOINT Bilateral 9/5/2019    Procedure: Bilateral GT bursa injection;  Surgeon: Manpreet Dutta MD;  [facility];  Service: Pain Management;  Laterality: Bilateral;    KNEE ARTHROSCOPY Bilateral     PARS PLANA VITRECTOMY W/ REPAIR OF MACULAR HOLE Left 02/22/2017    RADIOFREQUENCY THERMOCOAGULATION Left 7/11/2019    Procedure: Right SIJ RFA;  Surgeon: Manpreet Dutta MD;  [facility];  Service: Pain Management;  Laterality: Left;    RADIOFREQUENCY THERMOCOAGULATION Right 7/25/2019    Procedure: Right SIJ RFA;  Surgeon: Manpreet Dutta MD;  Location: [facility];  Service: Pain Management;  Laterality: Right;    SHOULDER ARTHROSCOPY Right 06/04/15     Family History   Problem Relation Age of Onset    Heart disease Mother     Glaucoma Mother     Cataracts Mother     Cancer Mother         colon    Kidney disease Daughter     Diabetes Maternal Grandmother     Heart disease Maternal Grandmother     Diabetes Maternal Grandfather     Cancer Maternal Grandfather     Breast cancer Paternal Aunt      Social History     Socioeconomic History    Marital status:      Spouse name: Not on file    Number of children: Not on file    Years of education: Not on file    Highest education level: Not on file   Occupational History     Employer: [employer]   Social Needs    Financial resource strain: Not on file    Food insecurity:     Worry: Not on file     Inability: Not on file    Transportation needs:     Medical: Not on file     Non-medical: Not on file   Tobacco Use    Smoking status: Never Smoker    Smokeless tobacco: Never Used   Substance and Sexual Activity    Alcohol use: Yes     Alcohol/week: 0.0 standard drinks     Comment: Rarely    Drug use: No    Sexual activity: Not Currently   Lifestyle    Physical activity:     Days per week: Not on file     Minutes per session: Not on file     Stress: Not on file   Relationships    Social connections:     Talks on phone: Not on file     Gets together: Not on file     Attends [employer] service: Not on file     Active member of club or organization: Not on file     Attends meetings of clubs or organizations: Not on file     Relationship status: Not on file   Other Topics Concern    Not on file   Social History Narrative    . 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     Medication List with Changes/Refills   Current Medications    BIOTIN 1 MG TABLET    Take 1,000 mcg by mouth every morning.     CANAGLIFLOZIN (INVOKANA) 100 MG TAB    Take 1 tablet (100 mg total) by mouth once daily.    CELECOXIB (CELEBREX) 100 MG CAPSULE    TAKE 1 CAPSULE BY MOUTH TWICE DAILY.    CYANOCOBALAMIN (VITAMIN B-12) 500 MCG TABLET    Take 500 mcg by mouth nightly.    DICLOFENAC SODIUM (VOLTAREN) 1 % GEL    Apply 4 g topically 3 (three) times daily as needed.    EZETIMIBE (ZETIA) 10 MG TABLET    Take 1 tablet (10 mg total) by mouth once daily.    FLUOXETINE 40 MG CAPSULE    TAKE 1 CAPSULE(40 MG) BY MOUTH EVERY DAY    FLUTICASONE (FLONASE) 50 MCG/ACTUATION NASAL SPRAY    2 sprays by Each Nare route once daily.    GABAPENTIN (NEURONTIN) 300 MG CAPSULE    TAKE 1 CAPSULE BY MOUTH TWICE DAILY    GABAPENTIN (NEURONTIN) 300 MG CAPSULE    Take 1 capsule (300 mg total) by mouth every morning AND 3 capsules (900 mg total) every evening. Can take 3 caps qhs or tid if tolerated, may cause drowsiness.    LOSARTAN (COZAAR) 25 MG TABLET    Take 1 tablet (25 mg total) by mouth once daily.    METFORMIN (GLUCOPHAGE) 1000 MG TABLET    TAKE 1 TABLET(1000 MG) BY MOUTH TWICE DAILY WITH MEALS    NALTREXONE CAPSULE    Take 4.5 mg by mouth every evening.    TRIAMCINOLONE ACETONIDE 0.025% (KENALOG) 0.025 % OINT    Apply topically 2 (two) times daily. for 10 days    VERAPAMIL (CALAN) 120 MG TABLET    TAKE 1 TABLET(120 MG) BY MOUTH TWICE DAILY     Review of patient's allergies indicates:   Allergen  "Reactions    Demerol [meperidine] Other (See Comments)     Burning when adm IV  Able to tolerate IM, "Turned my hand purple."    Latex, natural rubber Other (See Comments)     "Burns my skin",  Symptoms get worse the longer she is exposed    Zocor [simvastatin] Other (See Comments)     Tightening of muscles    Statins-hmg-coa reductase inhibitors Other (See Comments)     myopathy    Sulfa (sulfonamide antibiotics)     Nickel sutures [surgical stainless steel] Rash     Any nickel     Review of Systems   Constitution: Negative for decreased appetite.   HENT: Negative for tinnitus.    Eyes: Negative for double vision.   Cardiovascular: Negative for chest pain.   Respiratory: Negative for wheezing.    Hematologic/Lymphatic: Negative for bleeding problem.   Skin: Negative for dry skin.   Musculoskeletal: Positive for arthritis, back pain, joint pain, neck pain and stiffness. Negative for gout.   Gastrointestinal: Negative for abdominal pain.   Genitourinary: Negative for bladder incontinence.   Neurological: Negative for numbness, paresthesias and sensory change.   Psychiatric/Behavioral: Negative for altered mental status.       Objective:   Body mass index is 29.27 kg/m².  Vitals:    02/03/20 1356   BP: (!) 117/57   Pulse: 83          General    Constitutional: She is oriented to person, place, and time. She appears well-developed.   HENT:   Head: Atraumatic.   Eyes: EOM are normal.   Cardiovascular: Normal rate.    Pulmonary/Chest: Effort normal.   Abdominal: Soft.   Neurological: She is alert and oriented to person, place, and time.   Psychiatric: Judgment normal.            Cervical spine with slight limitation of rotation with clicking in the neck. Some mild discomfort to extreme rotation.  Bilateral upper extremity neurovascularly intact. Radial ulnar pulses 2+.  Sensory intact to touch.  Motor strength is 5/5 throughout.  A lumbar with tenderness around L4-5 paraspinal and mostly to words the right " side.  Pelvis is level  Straight leg raising slightly positive on the left negative on the right  Passive hip motion within normal limits.  No pain in the groin no pain over the greater trochanters.  Hip flexors, abductors, adductors, quads, hamstrings, ankle extensors and flexors all 5/5 and even bilaterally.  And left knee full motion collaterals and cruciate stable negative Kaleigh's negative pain to palpation.  Surgical scar from knee scope done years ago.  Right knee with mild to moderate swelling.  Severe pain medial joint as well as lateral joint with positive Kaleigh medially and laterally.  Collaterals and cruciates are stable to valgus varus stressing as well as anterior posterior drawer. Range of motion 0-120 degrees.  Calves are soft nontender  EHL 5/5 plantar flexion 5/5.  DP 1+ PT 1+      Relevant imaging results reviewed and interpreted by me, discussed with the patient and / or family today   MRI reviewed with the patient showed her the pictures as well as reviewed the report consistent with medial and lateral meniscus stairs, patellofemoral chondromalacia as well as medially  Assessment:     Encounter Diagnoses   Name Primary?    Tear of medial meniscus of right knee, current, unspecified tear type, initial encounter     Tear of lateral meniscus of right knee, current, initial encounter Yes    Chondromalacia, right knee         Plan:   Tear of lateral meniscus of right knee, current, initial encounter    Tear of medial meniscus of right knee, current, unspecified tear type, initial encounter  -     Ambulatory referral/consult to Orthopedics    Chondromalacia, right knee         Patient Instructions   Right knee medial and lateral meniscus tear also chondromalacia/arthritis most pronounced underneath the kneecap  Since having catching locking feeling my recommendation would be arthroscopic surgery and partial resection of torn meniscus.  That will tell us the extent also of her arthritic  condition so we can treated in the future.  No need for total knee replacement at this point  Surgeries approximately 20 min under general anesthesia there is slight risk of nerve damage, vascular damage, infection, blood clot, stiffness.  Will take roughly 6 weeks to heal  I usually do not give any crutches for surgery or after surgery. She will be allowed to ambulate as tolerated even though she limps.  Usually the dressing is capped for 3 days and then may be removed and then may shower.  No soaking in tub bottle allowed for at least 2 weeks  Postop sutures will be removed 2 weeks later in the office    Will obtain medical clearance prior to surgery since it is done under a general anesthetic    All questions asked and answered      Disclaimer: This note was prepared using a voice recognition system and is likely to have sound alike errors within the text.

## 2020-02-03 NOTE — PROGRESS NOTES
Chief Pain Complaint:  Back pain  Right knee pain    History of Present Illness:   Kaylin Mccollum is a 75 y.o. female  who is presenting with a chief complaint of Low-back Pain. The patient began experiencing this problem insidiously, and the pain has been gradually worsening over time. The pain is described as throbbing, cramping, aching and heavy and is located in the bilateral buttocks. Pain is intermittent and lasts hours. The pain is nonradiating. The patient rates her pain a 3 out of ten and interferes with activities of daily living a 3 out of ten. Pain is exacerbated by getting up from a seated position, and is improved by rest. Patient reports no prior trauma, no prior spinal surgery     Kaylin Mccollum is a 75 y.o. female  who is presenting with a chief complaint of lumbar back pain. The patient began experiencing this problem insidiously, and the pain has been gradually worsening over the past 5 month(s). The pain is described as throbbing, shooting, burning and electrical and is located in the bilateral lumbar spine. Pain is intermittent and lasts hours. The pain radiates to bilateral lower extremities L4 distribudution. The patient rates her pain a 3 out of ten and interferes with activities of daily living a 3 out of ten. Pain is exacerbated by flexion of the lumbar spine, ambulation, and is improved by rest.     She also complains of right knee pain. The patient began experiencing this problem insidiously. The pain is described as cramping, aching and is located in the right knee. Pain is intermittent and lasts hours. The pain is nonradiating. The patient rates her pain a 8 out of ten and interferes with activities of daily living a 7 out of ten. Pain is exacerbated by getting up from a seated position and standing, and is improved by rest. Patient reports no prior trauma, prior arthroscopy bilaterally in 1990s.      - pertinent negatives: No fever, No chills, No weight loss, No bladder  dysfunction, No bowel dysfunction, No saddle anesthesia  - pertinent positives: none    - medications, other therapies tried (physical therapy, injections):     >> NSAIDs, Tylenol, gabapentin and medrol dose pack    >> Has previously undergone Physical Therapy    >> Has previously undergone spinal injection/s   - Lumbar JAMIN with good relief in the past   - Bilateral SI Joint + GTB injection on 4/10/19 with great relief for about 4 weeks   - left SIJ RFA on 7/11/19 and right SIJ RFA on 7-25-19 with 80% pain relief   - right genicular nerve block on 8/8/19 with 90% pain relief   - bilateral GT bursa injections on 9/5/19 with 90% pain relief    Imaging / Labs / Studies (reviewed on 2/3/2020):    Results for orders placed during the hospital encounter of 11/23/15   MRI Lumbar Spine Without Contrast    Narrative Technique: Standard noncontrast multiplanar multisequence imaging of the lumbar spine was performed.  Findings: There is anatomic spinal alignment.  Very low degree of degenerative disk base narrowing and disk desiccation observed at L1-2, L2-3, L4-5, and L5-S1.  Vertebral marrow signal pattern and architecture are normal.  Distal cord is unremarkable with conus medullaris terminating at L1.  Small nerve root sleeve cysts incidentally noted in the sacral canal.  There are bilateral renal cysts.  L1-2: Small annular bulge and endplate lipping.  Bilateral facet arthropathy.  No significant foraminal narrowing or canal stenosis.  L2-3: Small annular bulge and endplate lipping.  Bilateral facet arthropathy.  No significant foraminal narrowing or canal stenosis.  L3-4: Posterior disk bulge with small bilateral foraminal disk protrusions.  Bilateral facet arthropathy and ligament hypertrophy.  Mild inferior foraminal narrowing.  No canal stenosis.  L4-5: Posterior disk bulge, hypertrophic facet arthropathy, and ligament thickening resulting in moderate bilateral foraminal narrowing and moderate central canal stenosis.   "AP canal diameter measures 8.4 mm.   L5-S1: Broad based posterior disk bulge and endplate lipping.  Hypertrophic facet arthropathy and ligament thickening.  Moderate bilateral foraminal narrowing.  No significant canal stenosis.    Impression  Multilevel lumbar spondylosis and degenerative disk disease as detailed.     Results for orders placed in visit on 05/31/12   X-Ray Lumbar Spine Ap And Lateral    Narrative Findings: Generalized osteopenia.  Multilevel degenerative vertebral end plate spurring and facet arthropathy.  Moderate to severe disk space narrowing and associated vacuum disk phenomenon at L5-S1.  Suggestion of slight diane-listhesis of L4 on L5.  Intact pedicles.  No compression fracture.         Review of Systems:  CONSTITUTIONAL: patient denies any fever, chills, or weight loss  SKIN: patient denies any rash or itching  RESPIRATORY: patient denies having any shortness of breath  GASTROINTESTINAL: patient denies having any diarrhea, constipation, or bowel incontinence  GENITOURINARY: patient denies having any abnormal bladder function    MUSCULOSKELETAL:  - patient complains of the above noted pain/s (see chief pain complaint)    NEUROLOGICAL:   - pain as above  - strength in Lower extremities is intact, BILATERALLY  - sensation in Lower extremities is intact, BILATERALLY  - patient denies any loss of bowel or bladder control      PSYCHIATRIC: patient denies any change in mood    Other:  All other systems reviewed and are negative      Physical Exam:  Vitals:  /73 (BP Location: Right arm, Patient Position: Sitting)   Pulse 93   Ht 5' 10" (1.778 m)   Wt 92.5 kg (204 lb)   BMI 29.27 kg/m²   (reviewed on 2/3/2020)    General: alert and oriented, in no apparent distress.  Gait: normal gait.  Skin: no rashes, no discoloration, no obvious lesions  HEENT: normocephalic, atraumatic. Pupils equal and round.  Cardiovascular: no significant peripheral edema present.  Respiratory: without use of " accessory muscles of respiration.    Musculoskeletal - Lumbar Spine:  - Pain on flexion of lumbar spine: Absent   - Pain on extension of lumbar spine: Absent         - Lumbar facet loading: Absent   - TTP over the lumbar facet joints: Absent  - TTP over the lumbar paraspinals: Absent  - TTP over the SI joints:  Absent bilaterally   - TTP over GT bursa: Present on left > right  - Straight Leg Raise: Negative  - CHERYLE: Present    Right Knee:  - TTP: Present over medial/ lateral joint line  - Pain with extension: Present  - Pain with flexion: Present  - Crepitus: Present     Neuro - Lower Extremities:  - BLE Strength: R/L: HF: 5/5, HE: 5/5, KF: 5/5; KE: 5/5; FE: 5/5; FF: 5/5  - Extremity Reflexes: Brisk and symmetric throughout  - Sensory: Sensation to light touch intact bilaterally      Psych:  Mood and affect is appropriate        Assessment:  Kaylin Mccollum is a 75 y.o. year old female who is presenting with     Encounter Diagnoses   Name Primary?    Tear of medial meniscus of right knee, current, unspecified tear type, initial encounter Yes    Lumbar spondylosis     Greater trochanteric bursitis of both hips     Lumbar radiculopathy     Chronic pain of right knee     Sacroiliac joint pain     Greater trochanteric bursitis, unspecified laterality        Plan:  1. Interventional: Consider Bilateral L4-5 TFESI with RN IV sedation. Consider Right Genicular RFA.    S/p bilateral GT bursa injections on 9/5/19 with 90% pain relief.    2. Pharmacologic: Continue Celebrex 100mg BID PRN.Increase Gabapentin from 300mg PO BID to 300/600 then 300/900 mg PO BID. Voltaren gel.     3. Rehabilitative: Continue physical therapy, which is helping some.     4. Diagnostic: New Lumbar MRI reviewed with patient. Right Knee MRI reviewed with patient.    5. Consult: Orthopedic for medial and lateral menisci tear of the right knee.     6. Follow up: PRN.

## 2020-03-03 ENCOUNTER — HOSPITAL ENCOUNTER (OUTPATIENT)
Dept: CARDIOLOGY | Facility: HOSPITAL | Age: 75
Discharge: HOME | End: 2020-03-03
Attending: ORTHOPAEDIC SURGERY
Payer: MEDICARE

## 2020-03-03 ENCOUNTER — HOSPITAL ENCOUNTER (OUTPATIENT)
Dept: RADIOLOGY | Facility: HOSPITAL | Age: 75
Discharge: HOME | End: 2020-03-03
Attending: ORTHOPAEDIC SURGERY
Payer: MEDICARE

## 2020-03-03 DIAGNOSIS — Z01.818 PREOP TESTING: ICD-10-CM

## 2020-03-03 PROCEDURE — 93010 ELECTROCARDIOGRAM REPORT: CPT | Mod: ,,, | Performed by: INTERNAL MEDICINE

## 2020-03-03 PROCEDURE — 93005 ELECTROCARDIOGRAM TRACING: CPT

## 2020-03-03 PROCEDURE — 71046 X-RAY EXAM CHEST 2 VIEWS: CPT | Mod: TC

## 2020-03-03 PROCEDURE — 71046 X-RAY EXAM CHEST 2 VIEWS: CPT | Mod: 26,,, | Performed by: RADIOLOGY

## 2020-03-03 PROCEDURE — 93010 EKG 12-LEAD: ICD-10-PCS | Mod: ,,, | Performed by: INTERNAL MEDICINE

## 2020-03-03 PROCEDURE — 71046 XR CHEST PA AND LATERAL PRE-OP: ICD-10-PCS | Mod: 26,,, | Performed by: RADIOLOGY

## 2020-03-06 NOTE — PRE ADMISSION SCREENING
Pre op instructions reviewed with patient per phone:    To confirm, Your surgeon has instructed you:  Surgery is scheduled 03/10/20 at 0700.      Please report to [facility] 1st floor main lobby by 0530.  Pre admit office to call afternoon prior to surgery with final arrival time.  If surgery is on Monday, Pre admit office to call Friday afternoon with with final arrival time changes      INSTRUCTIONS IMPORTANT!!!  ¨ No smoking after 12 midnight, the night before surgery.  ¨ No solid food after 12 midnight, but you may have clear liquids up until 3 hours prior to surgery.  This includes: grape, cranberry, and apple juice (not orange, and no coffee.)   ¨ OK to brush teeth, but no gum, candy or mints!    ¨ Take only these medicines with a small swallow of water-morning of surgery.  Fluoxetine, Gabapentin        ____  Do not wear makeup, including mascara.  ____  No powder, lotions or creams to surgical area.  ____  Please remove all jewelry, including piercings and leave at home.  ____  No money or valuables needed. Please leave at home.  ____  Please bring identification and insurance information to hospital.  ____  If going home the same day, arrange for a ride home. You will not be able to   drive if Anesthesia was used.  ____  Children, under 12 years old, must remain in the waiting room with an adult.  They are not allowed in patient areas.  ____  Wear loose fitting clothing. Allow for dressings, bandages.  ____  Stop Aspirin, Ibuprofen, Motrin and Aleve at least 5-7 days before surgery, unless otherwise instructed by your doctor, or the nurse.   You MAY use Tylenol/acetaminophen until day of surgery.  ____  If you take diabetic medication, do not take am of surgery unless instructed by   Doctor.  ____ Stop taking any Fish Oil supplement or any Vitamins that contain Vitamin E at least 5 days prior to surgery.          Bathing Instructions-- The night before surgery and the morning prior  to coming to the hospital:   -Do not shave the surgical area.   -Shower and wash your hair and body as usual with your regular soap and shampoo.   -Rinse your hair and body completely.   -Use one packet of hibiclens to wash the surgical site (using your hand) gently for 5 minutes.  Do not scrub you skin too hard.   -Do not use hibiclens on your head, face, or genitals.   -Do not wash with regular soap after you use the hibiclens.   -Rinse your body thoroughly.   -Dry with clean, soft towel.  Do not use lotion, cream, deodorant, or powders on   the surgical site.    Use antibacterial soap in place of hibiclens if your surgery is on the head, face or genitals.         Surgical Site Infection    Prevention of surgical site infections:     -Keep incisions clean and dry.   -Do not soak/submerge incisions in water until completely healed.   -Do not apply lotions, powders, creams, or deodorants to site.   -Always make sure hands are cleaned with antibacterial soap/ alcohol-based   prior to touching the surgical site.  (This includes doctors, nurses, staff, and yourself.)    Signs and symptoms:   -Redness and pain around the area where you had surgery   -Drainage of cloudy fluid from your surgical wound   -Fever over 100.4  I have read or had read and explained to me, and understand the above information.

## 2020-03-09 ENCOUNTER — TELEPHONE (OUTPATIENT)
Dept: ORTHOPEDICS | Facility: CLINIC | Age: 75
End: 2020-03-09

## 2020-03-09 NOTE — TELEPHONE ENCOUNTER
Tried to reach out to patient in regards to clearance for surgery - no answer - patients voicemail is not set up

## 2020-03-09 NOTE — PRE ADMISSION SCREENING
Dr Lopez, anesthesiologist, informed of Dr Schneider's note stating pt will obtain medical clearance due to pt having general anesthesia.  Dr Lopez stated pt will need to be cleared prior to having surgery.  Dr Schneider's nurse, Lyndsay, informed.  Msg left for Ziggy Mccollum RN

## 2020-03-09 NOTE — TELEPHONE ENCOUNTER
Spoke with Lyndsay LIANG LPN who states pt is cleared for surgery. Called pt back to relay this message, and told her I did not yet see this in her chart, so she should keep her phone near her in case someone calls to tell her otherwise. She expressed verbal understanding and was thankful for the returned call, AL, LPN.

## 2020-03-09 NOTE — TELEPHONE ENCOUNTER
Unable to leave message - patients voicemail is not set up - patient has not gotten clearance sent to our office for her upcoming surgery. Message was sent to Dr. Olvera's office as high priority on 03/05/2020. No response from Dr. Gomez office or patient.

## 2020-03-09 NOTE — TELEPHONE ENCOUNTER
Spoke with pt who states she spoke with someone around 3:30pm who told her to arrive for 5:30am. She says she has not been told anything about needing clearance. Sent Urgent message to Dr. Lisette Olvera's office for possible clearance without being seen due to being seen recently, AL, LPN.

## 2020-03-10 ENCOUNTER — HOSPITAL ENCOUNTER (OUTPATIENT)
Facility: HOSPITAL | Age: 75
Discharge: HOME | End: 2020-03-10
Attending: ORTHOPAEDIC SURGERY | Admitting: ORTHOPAEDIC SURGERY
Payer: MEDICARE

## 2020-03-10 ENCOUNTER — ANESTHESIA (OUTPATIENT)
Dept: SURGERY | Facility: HOSPITAL | Age: 75
End: 2020-03-10
Payer: MEDICARE

## 2020-03-10 ENCOUNTER — ANESTHESIA EVENT (OUTPATIENT)
Dept: SURGERY | Facility: HOSPITAL | Age: 75
End: 2020-03-10
Payer: MEDICARE

## 2020-03-10 DIAGNOSIS — S83.241D ACUTE MEDIAL MENISCUS TEAR OF RIGHT KNEE, SUBSEQUENT ENCOUNTER: Primary | ICD-10-CM

## 2020-03-10 PROBLEM — S83.241A ACUTE MEDIAL MENISCUS TEAR OF RIGHT KNEE: Status: ACTIVE | Noted: 2020-03-10

## 2020-03-10 LAB
POCT GLUCOSE: 143 MG/DL (ref 70–110)
POCT GLUCOSE: 144 MG/DL (ref 70–110)

## 2020-03-10 PROCEDURE — 63600175 PHARM REV CODE 636 W HCPCS: Performed by: ORTHOPAEDIC SURGERY

## 2020-03-10 PROCEDURE — 63600175 PHARM REV CODE 636 W HCPCS: Performed by: NURSE ANESTHETIST, CERTIFIED REGISTERED

## 2020-03-10 PROCEDURE — 27201423 OPTIME MED/SURG SUP & DEVICES STERILE SUPPLY: Performed by: ORTHOPAEDIC SURGERY

## 2020-03-10 PROCEDURE — 71000033 HC RECOVERY, INTIAL HOUR: Performed by: ORTHOPAEDIC SURGERY

## 2020-03-10 PROCEDURE — 71000015 HC POSTOP RECOV 1ST HR: Performed by: ORTHOPAEDIC SURGERY

## 2020-03-10 PROCEDURE — 71000039 HC RECOVERY, EACH ADD'L HOUR: Performed by: ORTHOPAEDIC SURGERY

## 2020-03-10 PROCEDURE — 29880 PR KNEE SCOPE MED/LAT MENISCECTOMY: ICD-10-PCS | Mod: RT,,, | Performed by: ORTHOPAEDIC SURGERY

## 2020-03-10 PROCEDURE — 37000009 HC ANESTHESIA EA ADD 15 MINS: Performed by: ORTHOPAEDIC SURGERY

## 2020-03-10 PROCEDURE — 25000003 PHARM REV CODE 250: Performed by: ORTHOPAEDIC SURGERY

## 2020-03-10 PROCEDURE — 36000711: Performed by: ORTHOPAEDIC SURGERY

## 2020-03-10 PROCEDURE — 37000008 HC ANESTHESIA 1ST 15 MINUTES: Performed by: ORTHOPAEDIC SURGERY

## 2020-03-10 PROCEDURE — 25000003 PHARM REV CODE 250: Performed by: NURSE ANESTHETIST, CERTIFIED REGISTERED

## 2020-03-10 PROCEDURE — 29880 ARTHRS KNE SRG MNISECTMY M&L: CPT | Mod: RT,,, | Performed by: ORTHOPAEDIC SURGERY

## 2020-03-10 PROCEDURE — 36000710: Performed by: ORTHOPAEDIC SURGERY

## 2020-03-10 PROCEDURE — 63600175 PHARM REV CODE 636 W HCPCS: Performed by: ANESTHESIOLOGY

## 2020-03-10 RX ORDER — LIDOCAINE HYDROCHLORIDE 10 MG/ML
INJECTION, SOLUTION EPIDURAL; INFILTRATION; INTRACAUDAL; PERINEURAL
Status: DISCONTINUED | OUTPATIENT
Start: 2020-03-10 | End: 2020-03-10

## 2020-03-10 RX ORDER — ONDANSETRON 2 MG/ML
4 INJECTION INTRAMUSCULAR; INTRAVENOUS EVERY 12 HOURS PRN
Status: DISCONTINUED | OUTPATIENT
Start: 2020-03-10 | End: 2020-03-10 | Stop reason: HOSPADM

## 2020-03-10 RX ORDER — SODIUM CHLORIDE 9 MG/ML
INJECTION, SOLUTION INTRAVENOUS CONTINUOUS
Status: DISCONTINUED | OUTPATIENT
Start: 2020-03-10 | End: 2020-03-10 | Stop reason: HOSPADM

## 2020-03-10 RX ORDER — HYDROCODONE BITARTRATE AND ACETAMINOPHEN 5; 325 MG/1; MG/1
1 TABLET ORAL EVERY 4 HOURS PRN
Status: DISCONTINUED | OUTPATIENT
Start: 2020-03-10 | End: 2020-03-10 | Stop reason: HOSPADM

## 2020-03-10 RX ORDER — PROPOFOL 10 MG/ML
VIAL (ML) INTRAVENOUS
Status: DISCONTINUED | OUTPATIENT
Start: 2020-03-10 | End: 2020-03-10

## 2020-03-10 RX ORDER — CEFAZOLIN SODIUM 2 G/50ML
2 SOLUTION INTRAVENOUS
Status: COMPLETED | OUTPATIENT
Start: 2020-03-10 | End: 2020-03-10

## 2020-03-10 RX ORDER — BUPIVACAINE HYDROCHLORIDE 2.5 MG/ML
INJECTION, SOLUTION EPIDURAL; INFILTRATION; INTRACAUDAL
Status: DISCONTINUED | OUTPATIENT
Start: 2020-03-10 | End: 2020-03-10 | Stop reason: HOSPADM

## 2020-03-10 RX ORDER — MEPERIDINE HYDROCHLORIDE 25 MG/ML
12.5 INJECTION INTRAMUSCULAR; INTRAVENOUS; SUBCUTANEOUS ONCE AS NEEDED
Status: COMPLETED | OUTPATIENT
Start: 2020-03-10 | End: 2020-03-10

## 2020-03-10 RX ORDER — DIPHENHYDRAMINE HYDROCHLORIDE 50 MG/ML
25 INJECTION INTRAMUSCULAR; INTRAVENOUS EVERY 6 HOURS PRN
Status: DISCONTINUED | OUTPATIENT
Start: 2020-03-10 | End: 2020-03-10 | Stop reason: HOSPADM

## 2020-03-10 RX ORDER — HYDROMORPHONE HYDROCHLORIDE 2 MG/ML
0.2 INJECTION, SOLUTION INTRAMUSCULAR; INTRAVENOUS; SUBCUTANEOUS EVERY 5 MIN PRN
Status: DISCONTINUED | OUTPATIENT
Start: 2020-03-10 | End: 2020-03-10 | Stop reason: HOSPADM

## 2020-03-10 RX ORDER — SUCCINYLCHOLINE CHLORIDE 20 MG/ML
INJECTION INTRAMUSCULAR; INTRAVENOUS
Status: DISCONTINUED | OUTPATIENT
Start: 2020-03-10 | End: 2020-03-10

## 2020-03-10 RX ORDER — SODIUM CHLORIDE 0.9 % (FLUSH) 0.9 %
3 SYRINGE (ML) INJECTION EVERY 8 HOURS
Status: DISCONTINUED | OUTPATIENT
Start: 2020-03-10 | End: 2020-03-10 | Stop reason: HOSPADM

## 2020-03-10 RX ORDER — HYDROCODONE BITARTRATE AND ACETAMINOPHEN 5; 325 MG/1; MG/1
1 TABLET ORAL EVERY 8 HOURS PRN
Qty: 21 TABLET | Refills: 0 | Status: SHIPPED | OUTPATIENT
Start: 2020-03-10 | End: 2020-07-22

## 2020-03-10 RX ORDER — SODIUM CHLORIDE, SODIUM LACTATE, POTASSIUM CHLORIDE, CALCIUM CHLORIDE 600; 310; 30; 20 MG/100ML; MG/100ML; MG/100ML; MG/100ML
INJECTION, SOLUTION INTRAVENOUS CONTINUOUS PRN
Status: DISCONTINUED | OUTPATIENT
Start: 2020-03-10 | End: 2020-03-10

## 2020-03-10 RX ORDER — CHLORHEXIDINE GLUCONATE ORAL RINSE 1.2 MG/ML
10 SOLUTION DENTAL
Status: DISCONTINUED | OUTPATIENT
Start: 2020-03-10 | End: 2020-03-10 | Stop reason: HOSPADM

## 2020-03-10 RX ORDER — METOCLOPRAMIDE HYDROCHLORIDE 5 MG/ML
10 INJECTION INTRAMUSCULAR; INTRAVENOUS EVERY 10 MIN PRN
Status: DISCONTINUED | OUTPATIENT
Start: 2020-03-10 | End: 2020-03-10 | Stop reason: HOSPADM

## 2020-03-10 RX ORDER — ROCURONIUM BROMIDE 10 MG/ML
INJECTION, SOLUTION INTRAVENOUS
Status: DISCONTINUED | OUTPATIENT
Start: 2020-03-10 | End: 2020-03-10

## 2020-03-10 RX ORDER — ONDANSETRON 2 MG/ML
INJECTION INTRAMUSCULAR; INTRAVENOUS
Status: DISCONTINUED | OUTPATIENT
Start: 2020-03-10 | End: 2020-03-10

## 2020-03-10 RX ORDER — FENTANYL CITRATE 50 UG/ML
INJECTION, SOLUTION INTRAMUSCULAR; INTRAVENOUS
Status: DISCONTINUED | OUTPATIENT
Start: 2020-03-10 | End: 2020-03-10

## 2020-03-10 RX ADMIN — PROPOFOL 150 MG: 10 INJECTION, EMULSION INTRAVENOUS at 07:03

## 2020-03-10 RX ADMIN — HYDROCODONE BITARTRATE AND ACETAMINOPHEN 1 TABLET: 5; 325 TABLET ORAL at 08:03

## 2020-03-10 RX ADMIN — HYDROMORPHONE HYDROCHLORIDE 0.2 MG: 2 INJECTION INTRAMUSCULAR; INTRAVENOUS; SUBCUTANEOUS at 08:03

## 2020-03-10 RX ADMIN — CEFAZOLIN SODIUM 2 G: 2 SOLUTION INTRAVENOUS at 07:03

## 2020-03-10 RX ADMIN — CHLORHEXIDINE GLUCONATE 0.12% ORAL RINSE 10 ML: 1.2 LIQUID ORAL at 06:03

## 2020-03-10 RX ADMIN — SUCCINYLCHOLINE CHLORIDE 120 MG: 20 INJECTION, SOLUTION INTRAMUSCULAR; INTRAVENOUS at 07:03

## 2020-03-10 RX ADMIN — MEPERIDINE HYDROCHLORIDE 12.5 MG: 25 INJECTION INTRAMUSCULAR; INTRAVENOUS; SUBCUTANEOUS at 08:03

## 2020-03-10 RX ADMIN — LIDOCAINE HYDROCHLORIDE 50 MG: 10 INJECTION, SOLUTION EPIDURAL; INFILTRATION; INTRACAUDAL; PERINEURAL at 07:03

## 2020-03-10 RX ADMIN — FENTANYL CITRATE 100 MCG: 50 INJECTION, SOLUTION INTRAMUSCULAR; INTRAVENOUS at 06:03

## 2020-03-10 RX ADMIN — ROCURONIUM BROMIDE 5 MG: 10 INJECTION, SOLUTION INTRAVENOUS at 07:03

## 2020-03-10 RX ADMIN — PROPOFOL 50 MG: 10 INJECTION, EMULSION INTRAVENOUS at 07:03

## 2020-03-10 RX ADMIN — SODIUM CHLORIDE, SODIUM LACTATE, POTASSIUM CHLORIDE, AND CALCIUM CHLORIDE: 600; 310; 30; 20 INJECTION, SOLUTION INTRAVENOUS at 06:03

## 2020-03-10 RX ADMIN — ONDANSETRON 4 MG: 2 INJECTION, SOLUTION INTRAMUSCULAR; INTRAVENOUS at 07:03

## 2020-03-10 RX ADMIN — HYDROMORPHONE HYDROCHLORIDE 0.2 MG: 2 INJECTION INTRAMUSCULAR; INTRAVENOUS; SUBCUTANEOUS at 09:03

## 2020-03-10 NOTE — OP NOTE
[facility] -   Orthopedic Surgery  Operative Note    SUMMARY     Date of Procedure: 3/10/2020     Procedure: Procedure(s) (LRB):  ARTHROSCOPY, KNEE (Right)  CHONDROPLASTY, KNEE (Right)  MENISCECTOMY, KNEE, MEDIAL (Right)     Lateral meniscectomy right knee  Chondroplasty anterior compartment  Surgeon(s) and Role:     * Les Schneider MD - Primary    Assisting Trinity Segundo needed to hold the leg    Pre-Operative Diagnosis: Tear of medial meniscus of right knee, current, unspecified tear type, initial encounter [S83.241A]  Chondromalacia, right knee [M94.261]  Also lateral meniscus tear  Post-Operative Diagnosis: Post-Op Diagnosis Codes:     * Tear of medial meniscus of right knee, current, unspecified tear type, initial encounter [S83.241A]     * Chondromalacia, right knee [M94.261]  Right knee medial and lateral complex meniscal tears chondromalacia tricompartmental  Anesthesia: General    Significant Surgical Tasks Conducted by the Assistant(s), if Applicable:  No assistant    Complications: none     Estimated Blood Loss (EBL):  20 cc mL           Implants: None    Specimens: None           Condition: Stable    Disposition: PACU - hemodynamically stable.    Attestation: I performed the procedure.  Tourniquet time 22 min drop for 10 min due to equipment failure and then reapplied for 12 min  Description:   Patient had positive MRI of complex medial and lateral meniscus tears with chondromalacia tricompartmental.  A pros and cons discussed of surgery accepted.  Discussed the need for keeping the dressing dry for 3 4 days at home and then may remove and get the wounds wet with showering.  No soaking in the tub as allowed for 2 weeks.  She is to cover her wounds with Band-Aids.  She will be prescribed Norco 5 for postop pain and expressed that she can take it.  Procedure  After administering general anesthesia patient was prepped and draped usual sterile fashion.  Esmarch used thigh tourniquet  inflated 300 mm of mercury.  Anterolateral portal identified small stab incision was made blunt trocar used to and through the joint.  She had chondromalacia type 3 tricompartmental.  She has complex posterior medial and lateral meniscus tear.  ACL was intact PCL was intact.  The at crab meat appearance to the patella.  There is softness and crab meat appearance to the medial and lateral tibial area as as well as femoral condyles.  Wet and fight the anteromedial portal spinal needle small stab incision was made blunt trocar used to enter the joint.  We probed all 3 compartments and we proceeded with partial medial meniscectomy posteriorly using the biters and the shaver to balance it as well as the lateral partial meniscectomy using the shaver and the biters to balance it.  We did use the shaver again to clean up some rough edges.  Then we directed our attention to some crab meat appearance of cartilage floating on the medial and lateral condyles that were debrided as well on the tibia.  We then moved the shaver to the anterior compartment the partial chondroplasty superficial areas of the large pieces of chondral fragments coming down from the patella.  Irrigated copiously knee joint drained that we proceeded with 3 inspecting the whole knee and then took the scope out.  Injected Marcaine 5 cc 0.25% Marcaine in each portal 5 cc intra-articular.  Closed the skin using 3 O nylon simple sutures tourniquet deflated sterile dressing applied taken to recovery room.  Patient did have Bethel stockings on

## 2020-03-10 NOTE — ANESTHESIA PREPROCEDURE EVALUATION
03/10/2020  Kaylin Mccollum is a 75 y.o., female.    Pre-op Assessment    I have reviewed the Patient Summary Reports.    I have reviewed the Nursing Notes.   I have reviewed the Medications.     Review of Systems  Anesthesia Hx:  No problems with previous Anesthesia  History of prior surgery of interest to airway management or planning: Previous anesthesia: General  Denies Personal Hx of Anesthesia complications.   Social:  Non-Smoker    Hematology/Oncology:  Hematology Normal   Oncology Normal     EENT/Dental:EENT/Dental Normal   Cardiovascular:   Exercise tolerance: good Hypertension ECG has been reviewed.    Pulmonary:  Pulmonary Normal    Renal/:  Renal/ Normal     Hepatic/GI:  Hepatic/GI Normal    Musculoskeletal:  Musculoskeletal Normal    Neurological:   CVA: Migraines. Headaches    Endocrine:   Diabetes, type 2    Dermatological:  Skin Normal    Psych:   Psychiatric History depression          Physical Exam  General:  Well nourished    Airway/Jaw/Neck:  Airway Findings: Mouth Opening: Normal Tongue: Normal  Mallampati: III      Dental:  Dental Findings: In tact   Chest/Lungs:  Chest/Lungs Clear    Heart/Vascular:  Heart Findings: Normal Heart murmur: negative       Mental Status:  Mental Status Findings:  Cooperative, Alert and Oriented         Anesthesia Plan  Type of Anesthesia, risks & benefits discussed:  Anesthesia Type:  general  Patient's Preference:   Intra-op Monitoring Plan: standard ASA monitors  Intra-op Monitoring Plan Comments:   Post Op Pain Control Plan: multimodal analgesia  Post Op Pain Control Plan Comments:   Induction:   IV  Beta Blocker:  Patient is not currently on a Beta-Blocker (No further documentation required).       Informed Consent: Patient understands risks and agrees with Anesthesia plan.  Questions answered. Anesthesia consent signed with patient.  ASA Score: 3      Day of Surgery Review of History & Physical: I have interviewed and examined the patient. I have reviewed the patient's H&P dated:    H&P update referred to the surgeon.

## 2020-03-10 NOTE — PLAN OF CARE
Pt AAOX3, denies pain/discomfort. Pre-op checklist done. Side effects of anesthesia and expectations during recovery discussed with pt and friend. Both verbalized understanding. Questions answered. Will cont to monitor.

## 2020-03-10 NOTE — ANESTHESIA POSTPROCEDURE EVALUATION
Anesthesia Post Evaluation    Patient: Kaylin Mccollum    Procedure(s) Performed: Procedure(s) (LRB):  ARTHROSCOPY, KNEE (Right)  CHONDROPLASTY, KNEE (Right)  MENISCECTOMY, KNEE, MEDIAL (Right)    Final Anesthesia Type: general    Patient location during evaluation: PACU  Patient participation: Yes- Able to Participate  Level of consciousness: awake and alert  Post-procedure vital signs: reviewed and stable  Pain management: adequate  Airway patency: patent  ELVIE mitigation strategies: Extubation while patient is awake  PONV status at discharge: No PONV  Anesthetic complications: no      Cardiovascular status: hemodynamically stable  Respiratory status: spontaneous ventilation  Hydration status: euvolemic  Follow-up not needed.          Vitals Value Taken Time   /76 3/10/2020  9:15 AM   Temp 36.7 °C (98 °F) 3/10/2020  9:15 AM   Pulse 75 3/10/2020  9:15 AM   Resp 16 3/10/2020  9:15 AM   SpO2 98 % 3/10/2020  9:15 AM         Event Time     Out of Recovery 09:17:49          Pain/Ana Score: Pain Rating Prior to Med Admin: 5 (3/10/2020  9:00 AM)  Ana Score: 10 (3/10/2020  9:28 AM)

## 2020-03-10 NOTE — H&P
Subjective:     Patient ID: Kaylin Mccollum is a 75 y.o. female.    Chief Complaint: No chief complaint on file.    HPI:  75-year-old retired from  who had been falling down stairs numerous occasions she has been having pain and catching and locking since several months now.  She gets up the knee catches she has to wiggle it some how to unlock it before she can walk. She does have quite a bit of lumbar pain that radiates with burning sensation down to the right anterior tibia and dorsal of the foot.  She is under the care of pain management for that.  Pain in the knee is around 4/10 with catching locking feeling.  Able to ambulate once the catching locking goes away for her minimum 200-300 yd without discomfort.  Unable to squat unable to do stairs due to the catching locking feeling.  She does ambulate without any assistive devices.  She does have an MRI in the electronic records.  At 1 point she had falling down the stairs and sustained right shoulder rotator cuff tear requiring repair.  Denies any numbness tingling in the hands.  She does have some cervical lumbar pain    Past Medical History:   Diagnosis Date    Arthritis     Cataract     Diabetes mellitus     Diabetes mellitus, type 2     Fibromyalgia     General anesthetics causing adverse effect in therapeutic use     bradycardia     Hypertension     Migraines     Mitral valve prolapse     Osteoarthritis     Rotator cuff tear 4/1/2015     Past Surgical History:   Procedure Laterality Date    CATARACT EXTRACTION W/  INTRAOCULAR LENS IMPLANT Left 07/19/2017    CATARACT EXTRACTION W/  INTRAOCULAR LENS IMPLANT Right 2017    CHOLECYSTECTOMY      COLONOSCOPY N/A 9/25/2018    Procedure: COLONOSCOPY;  Surgeon: Bethel Carmona MD;  Location: [facility];  Service: Endoscopy;  Laterality: N/A;    EYE SURGERY      INJECTION OF ANESTHETIC AGENT AROUND NERVE Right 8/8/2019    Procedure: Right Genicular nerve block with local;  Surgeon: Manpreet  MD Luzmaria;  Location: [facility];  Service: Pain Management;  Laterality: Right;    INJECTION OF JOINT Bilateral 9/5/2019    Procedure: Bilateral GT bursa injection;  Surgeon: Manpreet Dutta MD;  Location: [facility];  Service: Pain Management;  Laterality: Bilateral;    KNEE ARTHROSCOPY Bilateral     PARS PLANA VITRECTOMY W/ REPAIR OF MACULAR HOLE Left 02/22/2017    RADIOFREQUENCY THERMOCOAGULATION Left 7/11/2019    Procedure: Right SIJ RFA;  Surgeon: Manpreet Dutta MD;  Location: [facility];  Service: Pain Management;  Laterality: Left;    RADIOFREQUENCY THERMOCOAGULATION Right 7/25/2019    Procedure: Right SIJ RFA;  Surgeon: Manpreet Dutta MD;  Location: [facility];  Service: Pain Management;  Laterality: Right;    SHOULDER ARTHROSCOPY Right 06/04/15     Family History   Problem Relation Age of Onset    Heart disease Mother     Glaucoma Mother     Cataracts Mother     Cancer Mother         colon    Kidney disease Daughter     Anesthesia problems Daughter         cardiac arrest during nephrectomy    Diabetes Maternal Grandmother     Heart disease Maternal Grandmother     Diabetes Maternal Grandfather     Cancer Maternal Grandfather     Breast cancer Paternal Aunt      Social History     Socioeconomic History    Marital status:      Spouse name: Not on file    Number of children: Not on file    Years of education: Not on file    Highest education level: Not on file   Occupational History     Employer: [employer]   Social Needs    Financial resource strain: Not on file    Food insecurity:     Worry: Not on file     Inability: Not on file    Transportation needs:     Medical: Not on file     Non-medical: Not on file   Tobacco Use    Smoking status: Never Smoker    Smokeless tobacco: Never Used   Substance and Sexual Activity    Alcohol use: Yes     Alcohol/week: 0.0 standard drinks     Comment: Rarely  No alcohol 72h prior to sx    Drug use: No    Sexual activity:  Not Currently   Lifestyle    Physical activity:     Days per week: Not on file     Minutes per session: Not on file    Stress: Not on file   Relationships    Social connections:     Talks on phone: Not on file     Gets together: Not on file     Attends Sabianism service: Not on file     Active member of club or organization: Not on file     Attends meetings of clubs or organizations: Not on file     Relationship status: Not on file   Other Topics Concern    Not on file   Social History Narrative    . 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     Current Discharge Medication List      CONTINUE these medications which have NOT CHANGED    Details   biotin 1 mg tablet Take 1,000 mcg by mouth every morning.       canagliflozin (INVOKANA) 100 mg Tab Take 1 tablet (100 mg total) by mouth once daily.  Qty: 90 tablet, Refills: 3    Associated Diagnoses: Diabetes mellitus type 2 in obese      celecoxib (CELEBREX) 100 MG capsule TAKE 1 CAPSULE BY MOUTH TWICE DAILY.  Qty: 180 capsule, Refills: 3    Associated Diagnoses: Fibromyalgia; Osteoarthritis of spine with radiculopathy, thoracolumbar region; Primary osteoarthritis involving multiple joints      ezetimibe (ZETIA) 10 mg tablet Take 1 tablet (10 mg total) by mouth once daily.  Qty: 90 tablet, Refills: 3    Associated Diagnoses: Hyperlipidemia associated with type 2 diabetes mellitus      FLUoxetine 40 MG capsule TAKE 1 CAPSULE(40 MG) BY MOUTH EVERY DAY  Qty: 90 capsule, Refills: 1    Associated Diagnoses: Fibromyalgia; Chronic major depressive disorder      gabapentin (NEURONTIN) 300 MG capsule TAKE 1 CAPSULE BY MOUTH TWICE DAILY  Qty: 180 capsule, Refills: 1    Associated Diagnoses: Fibromyalgia      losartan (COZAAR) 25 MG tablet Take 1 tablet (25 mg total) by mouth once daily.  Qty: 90 tablet, Refills: 3    Associated Diagnoses: Hypertension associated with diabetes      metFORMIN (GLUCOPHAGE) 1000 MG tablet TAKE 1 TABLET(1000 MG) BY MOUTH TWICE DAILY WITH MEALS  Qty:  "180 tablet, Refills: 3    Associated Diagnoses: Diabetes mellitus type 2 in obese      triamcinolone acetonide 0.025% (KENALOG) 0.025 % Oint Apply topically 2 (two) times daily. for 10 days  Qty: 15 g, Refills: 2    Associated Diagnoses: Tinea corporis      verapamil (CALAN) 120 MG tablet TAKE 1 TABLET(120 MG) BY MOUTH TWICE DAILY  Qty: 60 tablet, Refills: 5    Associated Diagnoses: MVP (mitral valve prolapse); Hypertension associated with diabetes      diclofenac sodium (VOLTAREN) 1 % Gel Apply 4 g topically 3 (three) times daily as needed.  Qty: 2 Tube, Refills: 0      fluticasone (FLONASE) 50 mcg/actuation nasal spray 2 sprays by Each Nare route once daily.  Qty: 1 Bottle, Refills: 0    Associated Diagnoses: Sinusitis      naltrexone capsule Take 4.5 mg by mouth every evening.            Review of patient's allergies indicates:   Allergen Reactions    Demerol [meperidine] Other (See Comments)     Burning when adm IV  Able to tolerate IM, "Turned the veins in my hand purple."    Latex, natural rubber Other (See Comments)     "Burns my skin",  Symptoms get worse the longer she is exposed    Zocor [simvastatin] Other (See Comments)     Tightening of muscles    Statins-hmg-coa reductase inhibitors Other (See Comments)     myopathy    Sulfa (sulfonamide antibiotics)      Blurred vision    Nickel sutures [surgical stainless steel] Rash     Any nickel     Review of Systems   Constitution: Negative for decreased appetite.   HENT: Negative for tinnitus.    Eyes: Negative for double vision.   Cardiovascular: Negative for chest pain.   Respiratory: Negative for wheezing.    Hematologic/Lymphatic: Negative for bleeding problem.   Skin: Negative for dry skin.   Musculoskeletal: Positive for arthritis, back pain, joint pain, neck pain and stiffness. Negative for gout.   Gastrointestinal: Negative for abdominal pain.   Genitourinary: Negative for bladder incontinence.   Neurological: Negative for numbness, paresthesias " and sensory change.   Psychiatric/Behavioral: Negative for altered mental status.       Objective:   Body mass index is 28.7 kg/m².  There were no vitals filed for this visit.       General    Constitutional: She is oriented to person, place, and time. She appears well-developed.   HENT:   Head: Atraumatic.   Eyes: EOM are normal.   Cardiovascular: Normal rate.    Pulmonary/Chest: Effort normal.   Abdominal: Soft.   Neurological: She is alert and oriented to person, place, and time.   Psychiatric: Judgment normal.            Cervical spine with slight limitation of rotation with clicking in the neck. Some mild discomfort to extreme rotation.  Bilateral upper extremity neurovascularly intact. Radial ulnar pulses 2+.  Sensory intact to touch.  Motor strength is 5/5 throughout.  A lumbar with tenderness around L4-5 paraspinal and mostly to words the right side.  Pelvis is level  Straight leg raising slightly positive on the left negative on the right  Passive hip motion within normal limits.  No pain in the groin no pain over the greater trochanters.  Hip flexors, abductors, adductors, quads, hamstrings, ankle extensors and flexors all 5/5 and even bilaterally.  And left knee full motion collaterals and cruciate stable negative Kaleigh's negative pain to palpation.  Surgical scar from knee scope done years ago.  Right knee with mild to moderate swelling.  Severe pain medial joint as well as lateral joint with positive Kaleigh medially and laterally.  Collaterals and cruciates are stable to valgus varus stressing as well as anterior posterior drawer. Range of motion 0-120 degrees.  Calves are soft nontender  EHL 5/5 plantar flexion 5/5.  DP 1+ PT 1+      Relevant imaging results reviewed and interpreted by me, discussed with the patient and / or family today   MRI reviewed with the patient showed her the pictures as well as reviewed the report consistent with medial and lateral meniscus tears, patellofemoral  chondromalacia as well as medially  Assessment:     Encounter Diagnoses   Name Primary?    Tear of medial meniscus of right knee, current, unspecified tear type, initial encounter     Tear of lateral meniscus of right knee, current, initial encounter Yes    Chondromalacia, right knee         Plan:   Acute medial meniscus tear of right knee, subsequent encounter  -     Full code; Standing  -     Place in Outpatient; Standing  -     Vital signs; Standing  -     Insert peripheral IV; Standing  -     Cleanse with Chlorhexidine (CHG); Standing  -     Diet NPO; Standing  -     Place UMM hose; Standing  -     Place sequential compression device; Standing  -     Chlorohexidine Gluconate Bath; Standing    Other orders  -     0.9%  NaCl infusion  -     IP VTE LOW RISK PATIENT; Standing  -     cefazolin (ANCEF) 2 gram in dextrose 5% 50 mL IVPB (premix)  -     chlorhexidine 0.12 % solution 10 mL  -     nozaseptin (NOZIN) nasal          There are no outpatient Patient Instructions on file for this admission.  All questions asked and answered  Pros and cons discussed with the patient as well as alternatives.  The risk of nerve damage, vascular damage, infection, blood clot, fat clot, loss of motion, failure of the procedure to achieve is intended purpose, the need for further care and treatment    Disclaimer: This note was prepared using a voice recognition system and is likely to have sound alike errors within the text.

## 2020-03-10 NOTE — DISCHARGE SUMMARY
[facility]  Discharge Note  Short Stay    Procedure(s) (LRB):  ARTHROSCOPY, KNEE (Right)  CHONDROPLASTY, KNEE (Right)  MENISCECTOMY, KNEE, MEDIAL (Right)     OUTCOME: Patient tolerated treatment/procedure well without complication and is now ready for discharge.    DISPOSITION: Home or Self Care    FINAL DIAGNOSIS:  <principal problem not specified>  Right knee complex medial and lateral meniscus tear and chondromalacia type 3 tricompartmental  FOLLOWUP: In orthopedic clinic

## 2020-03-10 NOTE — TRANSFER OF CARE
"Anesthesia Transfer of Care Note    Patient: Kaylin Mccollum    Procedure(s) Performed: Procedure(s) (LRB):  ARTHROSCOPY, KNEE (Right)  CHONDROPLASTY, KNEE (Right)  MENISCECTOMY, KNEE, MEDIAL (Right)    Patient location: PACU    Anesthesia Type: general    Transport from OR: Transported from OR on room air with adequate spontaneous ventilation    Post pain: adequate analgesia    Post assessment: no apparent anesthetic complications and tolerated procedure well    Post vital signs: stable    Level of consciousness: responds to stimulation    Nausea/Vomiting: no nausea/vomiting    Complications: none    Transfer of care protocol was followed      Last vitals:   Visit Vitals  /60   Pulse 75   Temp 36.7 °C (98.1 °F) (Temporal)   Resp 18   Ht 5' 10" (1.778 m)   Wt 91 kg (200 lb 11.7 oz)   SpO2 97%   Breastfeeding? No   BMI 28.80 kg/m²     "

## 2020-03-11 VITALS
HEART RATE: 75 BPM | BODY MASS INDEX: 28.74 KG/M2 | RESPIRATION RATE: 16 BRPM | TEMPERATURE: 98 F | DIASTOLIC BLOOD PRESSURE: 76 MMHG | WEIGHT: 200.75 LBS | SYSTOLIC BLOOD PRESSURE: 166 MMHG | HEIGHT: 70 IN | OXYGEN SATURATION: 98 %

## 2020-03-16 ENCOUNTER — TELEPHONE (OUTPATIENT)
Dept: ORTHOPEDICS | Facility: CLINIC | Age: 75
End: 2020-03-16

## 2020-03-16 NOTE — TELEPHONE ENCOUNTER
Spoke with patient and rescheduled their 03/25/2020 appointment due to Rocio Salter PA-C being out of the office. Understanding verbalized.

## 2020-03-25 ENCOUNTER — TELEPHONE (OUTPATIENT)
Dept: ORTHOPEDICS | Facility: CLINIC | Age: 75
End: 2020-03-25

## 2020-03-25 ENCOUNTER — DOCUMENTATION ONLY (OUTPATIENT)
Dept: REHABILITATION | Facility: HOSPITAL | Age: 75
End: 2020-03-25

## 2020-03-25 NOTE — PROGRESS NOTES
Outpatient Therapy Discharge Summary     Name: Kaylin Mccollum  [facility] Number: [id number]    Therapy Diagnosis: No diagnosis found.  Physician: Richi Callaway, *     Physician Orders: PT Eval and treat  Medical Diagnosis from Referral:   M70.61 (ICD-10-CM) - Greater trochanteric bursitis, right   M70.62 (ICD-10-CM) - Greater trochanteric bursitis, left   G57.02 (ICD-10-CM) - Piriformis syndrome, left   M76.31 (ICD-10-CM) - It band syndrome, right   M76.32 (ICD-10-CM) - It band syndrome, left   M22.41 (ICD-10-CM) - Chondromalacia, patella, right   M22.42 (ICD-10-CM) - Chondromalacia, patella, left   S76.311A (ICD-10-CM) - Strain of right hamstring, initial encounter      Evaluation Date: 3/29/2019    Date of Last visit: 6/4/2019  Total Visits Received: 3  Cancelled Visits: 0  No Show Visits: 0    Assessment    Goals: Short Term Goals: In 3 weeks   1.I with HEP  2.Pt to increase BLE strength from 4/5 to 4+/5    3.Pt to have pain less than 4/10 at all times.  4.Pt to score greater than 65% impaired on the Lower Extremity Functional Scale     Long Term Goals: In 6 weeks  1. Pt to score greater than 85% impaired on the Lower Extremity Functional Scale  2. Patient to demo increase in LE strength to 5/5  3. Patient to have decreased pain to 1/10 at all times.  4.Patient to perform daily activities including bath and shopping without limitation.    Discharge reason: Patient has not attended therapy since 6/4/2019    Plan   This patient is discharged from Physical Therapy

## 2020-03-25 NOTE — TELEPHONE ENCOUNTER
Spoke with patient and informed her that her message for sent to Rocio Salter PA-C for review and that I would contact her with any further questions or concerns. Patient states that her surgery site looks good and verbalized understanding.

## 2020-03-25 NOTE — TELEPHONE ENCOUNTER
----- Message from Shahrzad Sarabia sent at 3/25/2020  2:44 PM CDT -----  Contact: Patient  Kaylin called in to say that she removed her own stitches and don't feel the need to come in. Please call her at [phone].    Thanks  td

## 2020-03-27 ENCOUNTER — TELEPHONE (OUTPATIENT)
Dept: ORTHOPEDICS | Facility: CLINIC | Age: 75
End: 2020-03-27

## 2020-03-27 NOTE — TELEPHONE ENCOUNTER
I spoke with Ms. Mccollum on 03/25/2020.  She advised me she removed her own stitches.  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  She denied any surrounding erythema.  She denies drainage.  She has been working on some range of motion exercises on her own.  I have advised her to take it easy and not over do it.  She does not need any further pain medication.  She may continue Tylenol as needed.  Additionally, she will keep her follow-up appointment as scheduled with Dr. Schneider in approximately 1 month.  She has been instructed to call if she develops fevers, erythema, purulent drainage.  She may shower and get the incisions wet with clean running water and antibacterial soap.  She should avoid soaking it and submerge water.  Patient verbalized understanding of all instructions.    ----- Message from Katie Servin sent at 3/25/2020  3:19 PM CDT -----  Contact: Patient  EDGAR, Please advise  Katie  ----- Message -----  From: Shahrzad Sarabia  Sent: 3/25/2020   2:44 PM CDT  To: Gaetano Ewing called in to say that she removed her own stitches and don't feel the need to come in. Please call her at [phone].    Thanks  td

## 2020-04-01 ENCOUNTER — TELEPHONE (OUTPATIENT)
Dept: ORTHOPEDICS | Facility: CLINIC | Age: 75
End: 2020-04-01

## 2020-04-01 NOTE — TELEPHONE ENCOUNTER
Called the patient about their appointment on 04/09/20 with Dr. Schneider. Called the patient to let them know that we have to reschedule their appointment to a virtual visit with him due to the Covid-19 concerns. No answer No voice mail.FP

## 2020-04-02 ENCOUNTER — TELEPHONE (OUTPATIENT)
Dept: ORTHOPEDICS | Facility: CLINIC | Age: 75
End: 2020-04-02

## 2020-04-02 NOTE — TELEPHONE ENCOUNTER
Spoke with patient regarding her appt 4-9-20 with Orthopedics. Informed patient [facility] is committed to taking critical steps to ensure our patients health and safety; to prevent the spread of COVID-19, the [facility] is directing all clinic patient visits should now be prioritized for urgent and life-altering conditions. So at this time we can offer a telehealth visit or we can reschedule her appointment to a later date. Pt opted to reschedule visit to a later date, let her know we will call her in several weeks. Dutch RODRÍGUEZ

## 2020-04-22 DIAGNOSIS — M15.9 PRIMARY OSTEOARTHRITIS INVOLVING MULTIPLE JOINTS: ICD-10-CM

## 2020-04-22 DIAGNOSIS — M47.25 OSTEOARTHRITIS OF SPINE WITH RADICULOPATHY, THORACOLUMBAR REGION: Chronic | ICD-10-CM

## 2020-04-22 DIAGNOSIS — M79.7 FIBROMYALGIA: Chronic | ICD-10-CM

## 2020-04-22 RX ORDER — CELECOXIB 100 MG/1
100 CAPSULE ORAL 2 TIMES DAILY
Qty: 180 CAPSULE | Refills: 3 | Status: SHIPPED | OUTPATIENT
Start: 2020-04-22 | End: 2020-07-22

## 2020-04-22 NOTE — TELEPHONE ENCOUNTER
----- Message from María Lo sent at 4/22/2020  3:59 PM CDT -----  Contact: pt  .Type:  RX Refill Request    Who Called: pt  Refill or New Rx:refill  RX Name and Strength:celecoxib (CELEBREX) 100 MG capsule  How is the patient currently taking it? (ex. 1XDay):2xdaily  Is this a 30 day or 90 day RX:90  Preferred Pharmacy with phone number:.  [facility] #[id number]  [place] - [address] [facility] [address]  [address] [place] [place] [address]  Phone: [phone] Fax: [phone]  Local or Mail Order:local  Ordering Provider:Juan Pablo  Would the patient rather a call back or a response via [facility]? Call back  Best Call Back Number:[phone]  Additional Information:

## 2020-04-28 DIAGNOSIS — I15.2 HYPERTENSION ASSOCIATED WITH DIABETES: ICD-10-CM

## 2020-04-28 DIAGNOSIS — E11.59 HYPERTENSION ASSOCIATED WITH DIABETES: ICD-10-CM

## 2020-04-28 DIAGNOSIS — I34.1 MVP (MITRAL VALVE PROLAPSE): ICD-10-CM

## 2020-04-28 DIAGNOSIS — E66.9 DIABETES MELLITUS TYPE 2 IN OBESE: ICD-10-CM

## 2020-04-28 DIAGNOSIS — E11.69 DIABETES MELLITUS TYPE 2 IN OBESE: ICD-10-CM

## 2020-04-29 ENCOUNTER — TELEPHONE (OUTPATIENT)
Dept: PAIN MEDICINE | Facility: CLINIC | Age: 75
End: 2020-04-29

## 2020-04-29 RX ORDER — VERAPAMIL HYDROCHLORIDE 120 MG/1
TABLET, FILM COATED ORAL
Qty: 60 TABLET | Refills: 5 | Status: SHIPPED | OUTPATIENT
Start: 2020-04-29 | End: 2020-06-08 | Stop reason: SDUPTHER

## 2020-04-29 RX ORDER — METFORMIN HYDROCHLORIDE 1000 MG/1
TABLET ORAL
Qty: 180 TABLET | Refills: 2 | Status: SHIPPED | OUTPATIENT
Start: 2020-04-29 | End: 2021-01-15

## 2020-04-29 NOTE — TELEPHONE ENCOUNTER
Per  clinic encounter note dated 2/3/2020:    1. Interventional: Consider Bilateral L4-5 TFESI with RN IV sedation. Consider Right Genicular RFA.    S/p bilateral GT bursa injections on 9/5/19 with 90% pain relief.    Contacted pt. Pt would like to schedule injection. Per  appointment needed for eval. Offered pt virtual visit with  tomorrow. Pt declined and requested audio. Appt scheduled. All questions answered.//lp

## 2020-04-29 NOTE — TELEPHONE ENCOUNTER
----- Message from Marcela Singleton sent at 4/29/2020  4:10 PM CDT -----  Pt is requesting a call back regarding lower back and hip pain. Would like an injection. Please call pt back at [phone]

## 2020-04-30 ENCOUNTER — OFFICE VISIT (OUTPATIENT)
Dept: PAIN MEDICINE | Facility: CLINIC | Age: 75
End: 2020-04-30
Payer: MEDICARE

## 2020-04-30 DIAGNOSIS — M54.16 LUMBAR RADICULOPATHY: ICD-10-CM

## 2020-04-30 DIAGNOSIS — M51.36 DDD (DEGENERATIVE DISC DISEASE), LUMBAR: ICD-10-CM

## 2020-04-30 DIAGNOSIS — M46.1 SACROILIITIS: Primary | ICD-10-CM

## 2020-04-30 DIAGNOSIS — M70.60 GREATER TROCHANTERIC BURSITIS, UNSPECIFIED LATERALITY: ICD-10-CM

## 2020-04-30 DIAGNOSIS — M47.816 LUMBAR SPONDYLOSIS: ICD-10-CM

## 2020-04-30 PROCEDURE — 99441 PR PHYSICIAN TELEPHONE EVALUATION 5-10 MIN: ICD-10-PCS | Mod: 95,,, | Performed by: PHYSICAL MEDICINE & REHABILITATION

## 2020-04-30 PROCEDURE — 99441 PR PHYSICIAN TELEPHONE EVALUATION 5-10 MIN: CPT | Mod: 95,,, | Performed by: PHYSICAL MEDICINE & REHABILITATION

## 2020-04-30 NOTE — H&P (VIEW-ONLY)
Contacted the patient over the telephone to conduct follow-up encounter.  The telephone encounter was offered due to the concerns of the COVID-19 and to limit excess traffic and in and out of the clinic and to mitigate risk of potential spread of this virus during the pandemic to keep our patients and staff safe.    The reason for the audio only service rather than synchronous audio and video virtual visit was related to technical difficulties or patient preference/necessity.        Chronic Pain-Telephone-Established Note (Follow up visit)      The patient location is:  place of residence, [place]  The chief complaint leading to consultation is:   lower back and hip pain  Visit type: Virtual visit with synchronous audio  Total time spent with patient:  5-10 minutes  Each patient to whom he or she provides medical services by telemedicine is: (1) informed of the relationship between the physician and patient and the respective role of any other health care provider with respect to management of the patient; and (2) notified that he or she may decline to receive medical services by telemedicine and may withdraw from such care at any time.     Notes:    SUBJECTIVE:    Kaylin Mccollum was contacted via telephone for a follow-up appointment for low back and hip pain.  She was last seen 2-3 months ago where she was being considered for bilateral L4-5 TFESI.  She currently reports that her back pain is more towards the tail bone area and has lateral hip pain bilaterally.  She denies much pain radiating down into the lower extremities at this time.  She was continued on Celebrex 100 mg twice daily as needed.  Her gabapentin was increased from 300 mg twice daily to 300/600, then 300/900.  Since the last visit, Kaylin Mccollum states the pain has been worsening. Current pain intensity is 8/10.    Interval HPI 02/03/2020:  Kaylin Mccollum is a 75 y.o. female  who is presenting with a chief complaint of Low-back Pain. The  patient began experiencing this problem insidiously, and the pain has been gradually worsening over time. The pain is described as throbbing, cramping, aching and heavy and is located in the bilateral buttocks. Pain is intermittent and lasts hours. The pain is nonradiating. The patient rates her pain a 3 out of ten and interferes with activities of daily living a 3 out of ten. Pain is exacerbated by getting up from a seated position, and is improved by rest. Patient reports no prior trauma, no prior spinal surgery      Initial HPI:  Kaylin Mccollum is a 75 y.o. female  who is presenting with a chief complaint of lumbar back pain. The patient began experiencing this problem insidiously, and the pain has been gradually worsening over the past 5 month(s). The pain is described as throbbing, shooting, burning and electrical and is located in the bilateral lumbar spine. Pain is intermittent and lasts hours. The pain radiates to bilateral lower extremities L4 distribudution. The patient rates her pain a 3 out of ten and interferes with activities of daily living a 3 out of ten. Pain is exacerbated by flexion of the lumbar spine, ambulation, and is improved by rest.      She also complains of right knee pain. The patient began experiencing this problem insidiously. The pain is described as cramping, aching and is located in the right knee. Pain is intermittent and lasts hours. The pain is nonradiating. The patient rates her pain a 8 out of ten and interferes with activities of daily living a 7 out of ten. Pain is exacerbated by getting up from a seated position and standing, and is improved by rest. Patient reports no prior trauma, prior arthroscopy bilaterally in 1990s.        - pertinent negatives: No fever, No chills, No weight loss, No bladder dysfunction, No bowel dysfunction, No saddle anesthesia  - pertinent positives: none    - medications, other therapies tried (physical therapy, injections):     >> NSAIDs,  Tylenol, gabapentin and medrol dose pack    >> Has previously undergone Physical Therapy    >> Has previously undergone spinal injection/s              - Lumbar JAMIN with good relief in the past              - Bilateral SI Joint + GTB injection on 4/10/19 with great relief for about 4 weeks              - left SIJ RFA on 7/11/19 and right SIJ RFA on 7-25-19 with 80% pain relief              - right genicular nerve block on 8/8/19 with 90% pain relief              - bilateral GT bursa injections on 9/5/19 with 90% pain relief     Imaging / Labs / Studies (reviewed on 2/3/2020):     MRI lumbar spine 01/24/2020:  Alignment: There is minimal grade 1 anterolisthesis of L4 on L5.    Vertebrae: Multilevel small Schmorl's nodes noted.  Vertebral body heights are otherwise within normal limits.  No evidence of fracture or marrow replacement process.    Discs: There is disc desiccation and mild disc height loss at L1-2, L2-3, and L5-S1.  Disc desiccation noted at L4-5 with preserved disc height.    Cord: Normal.  Conus terminates at L1    Degenerative findings:    T12-L1:  No spinal canal or neuroforaminal stenosis.    L1-L2: Mild broad-based disc bulge.  Mild bilateral facet arthropathy. No spinal canal or neuroforaminal stenosis.    L2-L3: Mild broad-based disc bulge.  Mild bilateral facet arthropathy. No spinal canal or neuroforaminal stenosis.    L3-L4: Mild broad-based disc bulge.  Mild bilateral facet arthropathy. No spinal canal or neuroforaminal stenosis.    L4-L5: Severe bilateral facet arthropathy with ligamentum flavum thickening.  Mild uncovering of the intervertebral disc.  Mild spinal canal stenosis and mild bilateral neural foraminal narrowing.    L5-S1: Moderate bilateral facet arthropathy and mild broad-based disc bulge.  Mild bilateral neural foraminal narrowing.  No spinal canal stenosis.    No significant change from prior.    Paraspinal muscles & soft tissues: Multiple probable bilateral renal cyst appear  "grossly similar to prior.              PMHx,PSHx, Social history, and Family history:  I have reviewed the patient's medical, surgical, social, and family history in detail and updated the computerized patient record.        Review of patient's allergies indicates:   Allergen Reactions    Demerol [meperidine] Other (See Comments)     Burning when adm IV  Able to tolerate IM, "Turned the veins in my hand purple."    Latex, natural rubber Other (See Comments)     "Burns my skin",  Symptoms get worse the longer she is exposed    Zocor [simvastatin] Other (See Comments)     Tightening of muscles    Statins-hmg-coa reductase inhibitors Other (See Comments)     myopathy    Sulfa (sulfonamide antibiotics)      Blurred vision    Nickel sutures [surgical stainless steel] Rash     Any nickel       Current Outpatient Medications   Medication Sig    biotin 1 mg tablet Take 1,000 mcg by mouth every morning.     canagliflozin (INVOKANA) 100 mg Tab Take 1 tablet (100 mg total) by mouth once daily.    celecoxib (CELEBREX) 100 MG capsule Take 1 capsule (100 mg total) by mouth 2 (two) times daily.    diclofenac sodium (VOLTAREN) 1 % Gel Apply 4 g topically 3 (three) times daily as needed.    ezetimibe (ZETIA) 10 mg tablet Take 1 tablet (10 mg total) by mouth once daily.    FLUoxetine 40 MG capsule TAKE 1 CAPSULE(40 MG) BY MOUTH EVERY DAY (Patient taking differently: Take 40 mg by mouth once daily. )    fluticasone (FLONASE) 50 mcg/actuation nasal spray 2 sprays by Each Nare route once daily. (Patient taking differently: 2 sprays by Each Nare route nightly as needed. )    gabapentin (NEURONTIN) 300 MG capsule TAKE 1 CAPSULE BY MOUTH TWICE DAILY (Patient taking differently: Take 300 mg by mouth 2 (two) times daily. )    HYDROcodone-acetaminophen (NORCO) 5-325 mg per tablet Take 1 tablet by mouth every 8 (eight) hours as needed for Pain.    losartan (COZAAR) 25 MG tablet Take 1 tablet (25 mg total) by mouth once daily. "    metFORMIN (GLUCOPHAGE) 1000 MG tablet TAKE 1 TABLET BY MOUTH TWICE DAILY WITH MEALS    naltrexone capsule Take 4.5 mg by mouth every evening.     triamcinolone acetonide 0.025% (KENALOG) 0.025 % Oint Apply topically 2 (two) times daily. for 10 days    verapamiL (CALAN) 120 MG tablet TAKE 1 TABLET(120 MG) BY MOUTH TWICE DAILY     No current facility-administered medications for this visit.        REVIEW OF SYSTEMS:    GENERAL:  No weight loss, malaise or fevers.  HEENT:   No recent changes in vision or hearing  NECK:  Negative for lumps, no difficulty with swallowing.  RESPIRATORY:  Negative for cough, wheezing or shortness of breath, patient denies any recent URI.  CARDIOVASCULAR:  Negative for chest pain, leg swelling or palpitations.  GI:  Negative for abdominal discomfort, blood in stools or black stools or change in bowel habits.  MUSCULOSKELETAL:  See HPI.  SKIN:  Negative for lesions, rash, and itching.  PSYCH:  No mood disorder or recent psychosocial stressors.  Patients sleep is not disturbed secondary to pain.  HEMATOLOGY/LYMPHOLOGY:  Negative for prolonged bleeding, bruising easily or swollen nodes.  Patient is not currently taking any anti-coagulants  NEURO:   No history of headaches, syncope, paralysis, seizures or tremors.  All other reviewed and negative other than HPI.      LABS:  Lab Results   Component Value Date    WBC 5.39 03/03/2020    HGB 13.2 03/03/2020    HCT 43.3 03/03/2020    MCV 99 (H) 03/03/2020     03/03/2020       CMP  Sodium   Date Value Ref Range Status   03/03/2020 142 136 - 145 mmol/L Final     Potassium   Date Value Ref Range Status   03/03/2020 4.2 3.5 - 5.1 mmol/L Final     Chloride   Date Value Ref Range Status   03/03/2020 103 95 - 110 mmol/L Final     CO2   Date Value Ref Range Status   03/03/2020 31 (H) 23 - 29 mmol/L Final     Glucose   Date Value Ref Range Status   03/03/2020 91 70 - 110 mg/dL Final     BUN, Bld   Date Value Ref Range Status   03/03/2020 16 8 -  23 mg/dL Final     Creatinine   Date Value Ref Range Status   03/03/2020 0.8 0.5 - 1.4 mg/dL Final     Calcium   Date Value Ref Range Status   03/03/2020 9.7 8.7 - 10.5 mg/dL Final     Total Protein   Date Value Ref Range Status   03/03/2020 6.9 6.0 - 8.4 g/dL Final     Albumin   Date Value Ref Range Status   03/03/2020 3.7 3.5 - 5.2 g/dL Final     Total Bilirubin   Date Value Ref Range Status   03/03/2020 0.3 0.1 - 1.0 mg/dL Final     Comment:     For infants and newborns, interpretation of results should be based  on gestational age, weight and in agreement with clinical  observations.  Premature Infant recommended reference ranges:  Up to 24 hours.............<8.0 mg/dL  Up to 48 hours............<12.0 mg/dL  3-5 days..................<15.0 mg/dL  6-29 days.................<15.0 mg/dL       Alkaline Phosphatase   Date Value Ref Range Status   03/03/2020 64 55 - 135 U/L Final     AST   Date Value Ref Range Status   03/03/2020 33 10 - 40 U/L Final     ALT   Date Value Ref Range Status   03/03/2020 29 10 - 44 U/L Final     Anion Gap   Date Value Ref Range Status   03/03/2020 8 8 - 16 mmol/L Final     eGFR if    Date Value Ref Range Status   03/03/2020 >60.0 >60 mL/min/1.73 m^2 Final     eGFR if non    Date Value Ref Range Status   03/03/2020 >60.0 >60 mL/min/1.73 m^2 Final     Comment:     Calculation used to obtain the estimated glomerular filtration  rate (eGFR) is the CKD-EPI equation.          Lab Results   Component Value Date    HGBA1C 6.4 (H) 01/16/2020         Limited evaluation secondary to telephone encounter.    ASSESSMENT: 75 y.o. year old female with lower back and bilateral hip pain, consistent with     1. Sacroiliitis     2. Greater trochanteric bursitis, unspecified laterality     3. Lumbar spondylosis     4. Lumbar radiculopathy     5. DDD (degenerative disc disease), lumbar           PLAN:   - Interventions: Scheduled for bilateral sacroiliac joint and bilateral  greater trochanteric bursa injections under fluoroscopy.. Explained the risks and benefits of the procedure in detail with the patient today in clinic along with alternative treatment options, and the patient elected to pursue the intervention.   - Anticoagulation: no  - Medications: I have stressed the importance of physical activity and a home exercise plan to help with pain and improve health. and Patient can continue with medications for now since they are providing benefits, using them appropriately, and without side effects.  - Therapy:  Advised to continue with activities and home exercises as tolerated  - Labs:  Reviewed  - Imaging:  Reviewed imaging available  - Consults/Referrals:  None at this time  - Records:  Reviewed/Obtain old records from outside physicians and imaging  - Follow up visit: return to clinic 4 weeks post procedure  - Counseled patient regarding the importance of activity modification and physical therapy  - This condition does not require this patient to take time off of work, and the primary goal of our Pain Management services is to improve the patient's functional capacity.  - Patient Questions: Answered all of the patient's questions regarding diagnosis, therapy, and treatment    The above plan and management options were discussed at length with patient. Patient is in agreement with the above and verbalized understanding.      Daniel Burns MD  Interventional Pain Management  [facility]    Disclaimer:  This note was prepared using voice recognition system and is likely to have sound alike errors that may have been overlooked even after proof reading.  Please call me with any questions                 This service was not originating from a related E/M service provided within the previous 7 days nor will  to an E/M service or procedure within the next 24 hours or my soonest available appointment.  Prevailing standard of care was able to be met in this audio-only  visit.

## 2020-05-01 ENCOUNTER — PATIENT MESSAGE (OUTPATIENT)
Dept: RHEUMATOLOGY | Facility: CLINIC | Age: 75
End: 2020-05-01

## 2020-05-01 ENCOUNTER — TELEPHONE (OUTPATIENT)
Dept: RHEUMATOLOGY | Facility: CLINIC | Age: 75
End: 2020-05-01

## 2020-05-01 ENCOUNTER — OFFICE VISIT (OUTPATIENT)
Dept: RHEUMATOLOGY | Facility: CLINIC | Age: 75
End: 2020-05-01
Payer: MEDICARE

## 2020-05-01 ENCOUNTER — TELEPHONE (OUTPATIENT)
Dept: PAIN MEDICINE | Facility: CLINIC | Age: 75
End: 2020-05-01

## 2020-05-01 ENCOUNTER — LAB VISIT (OUTPATIENT)
Dept: LAB | Facility: HOSPITAL | Age: 75
End: 2020-05-01
Attending: FAMILY MEDICINE
Payer: MEDICARE

## 2020-05-01 DIAGNOSIS — E11.69 HYPERLIPIDEMIA ASSOCIATED WITH TYPE 2 DIABETES MELLITUS: ICD-10-CM

## 2020-05-01 DIAGNOSIS — M15.9 PRIMARY OSTEOARTHRITIS INVOLVING MULTIPLE JOINTS: Primary | ICD-10-CM

## 2020-05-01 DIAGNOSIS — E11.42 DIABETIC POLYNEUROPATHY ASSOCIATED WITH TYPE 2 DIABETES MELLITUS: ICD-10-CM

## 2020-05-01 DIAGNOSIS — Z03.818 ENCOUNTER FOR OBSERVATION FOR SUSPECTED EXPOSURE TO OTHER BIOLOGICAL AGENTS RULED OUT: Primary | ICD-10-CM

## 2020-05-01 DIAGNOSIS — M79.7 FIBROMYALGIA: ICD-10-CM

## 2020-05-01 DIAGNOSIS — E78.5 HYPERLIPIDEMIA ASSOCIATED WITH TYPE 2 DIABETES MELLITUS: ICD-10-CM

## 2020-05-01 LAB
ALBUMIN SERPL BCP-MCNC: 3.7 G/DL (ref 3.5–5.2)
ALP SERPL-CCNC: 61 U/L (ref 55–135)
ALT SERPL W/O P-5'-P-CCNC: 13 U/L (ref 10–44)
ANION GAP SERPL CALC-SCNC: 8 MMOL/L (ref 8–16)
AST SERPL-CCNC: 18 U/L (ref 10–40)
BILIRUB SERPL-MCNC: 0.4 MG/DL (ref 0.1–1)
BUN SERPL-MCNC: 20 MG/DL (ref 8–23)
CALCIUM SERPL-MCNC: 9.5 MG/DL (ref 8.7–10.5)
CHLORIDE SERPL-SCNC: 102 MMOL/L (ref 95–110)
CHOLEST SERPL-MCNC: 226 MG/DL (ref 120–199)
CHOLEST/HDLC SERPL: 3.6 {RATIO} (ref 2–5)
CO2 SERPL-SCNC: 31 MMOL/L (ref 23–29)
CREAT SERPL-MCNC: 0.9 MG/DL (ref 0.5–1.4)
EST. GFR  (AFRICAN AMERICAN): >60 ML/MIN/1.73 M^2
EST. GFR  (NON AFRICAN AMERICAN): >60 ML/MIN/1.73 M^2
GLUCOSE SERPL-MCNC: 91 MG/DL (ref 70–110)
HDLC SERPL-MCNC: 62 MG/DL (ref 40–75)
HDLC SERPL: 27.4 % (ref 20–50)
LDLC SERPL CALC-MCNC: 145.2 MG/DL (ref 63–159)
NONHDLC SERPL-MCNC: 164 MG/DL
POTASSIUM SERPL-SCNC: 4 MMOL/L (ref 3.5–5.1)
PROT SERPL-MCNC: 6.8 G/DL (ref 6–8.4)
SODIUM SERPL-SCNC: 141 MMOL/L (ref 136–145)
TRIGL SERPL-MCNC: 94 MG/DL (ref 30–150)

## 2020-05-01 PROCEDURE — 99214 PR OFFICE/OUTPT VISIT, EST, LEVL IV, 30-39 MIN: ICD-10-PCS | Mod: 95,,, | Performed by: INTERNAL MEDICINE

## 2020-05-01 PROCEDURE — 80061 LIPID PANEL: CPT

## 2020-05-01 PROCEDURE — 80053 COMPREHEN METABOLIC PANEL: CPT

## 2020-05-01 PROCEDURE — 99214 OFFICE O/P EST MOD 30 MIN: CPT | Mod: 95,,, | Performed by: INTERNAL MEDICINE

## 2020-05-01 PROCEDURE — 36415 COLL VENOUS BLD VENIPUNCTURE: CPT

## 2020-05-01 RX ORDER — TRAMADOL HYDROCHLORIDE 50 MG/1
50 TABLET ORAL EVERY 12 HOURS PRN
Qty: 40 TABLET | Refills: 0 | Status: ON HOLD | OUTPATIENT
Start: 2020-05-01 | End: 2022-03-01 | Stop reason: HOSPADM

## 2020-05-01 NOTE — PRE-PROCEDURE INSTRUCTIONS
Spoke with  patient     regarding procedure scheduled on 5/6/2020  Arrival time 1030  Has patient been sick with fever or on antibiotics within the last 7 days?no  Has patient received a vaccination within the last 7 days?no  Has the patient stopped all medications as directed?yes (po DM meds the morning of)  Does patient have a pacemaker and or defibrillator?no  Does the patient have a ride to and from procedure and someone reliable to remain with patient? Yolette daughter-[phone]  Is the patient diabetic?yes  Does the patient have sleep apnea? Or use O2 at home?no no  Is the patient receiving sedation?yes  Is the patient instructed to remain NPO beginning at midnight the night before their procedure?yes  Procedure location confirmed with patient?yes  covid testing scheduled 5/4/2020 @ 6227

## 2020-05-01 NOTE — TELEPHONE ENCOUNTER
----- Message from Nellie Varner sent at 5/1/2020  9:39 AM CDT -----  Contact: pt  ..Type:  Same Day Appointment Request    Caller is requesting a same day appointment.  Caller declined first available appointment listed below.    Name of Caller: pt  When is the first available appointment?   Symptoms: pain   Best Call Back Number: [phone] (home)   Additional Information:  Pt also needs a refill on tramadol

## 2020-05-01 NOTE — TELEPHONE ENCOUNTER
----- Message from Trinity Echeverria sent at 5/1/2020 11:03 AM CDT -----  Contact: pt call back [phone]  Pt came in to clinic today requesting to get an injection in both hips and both sides of her spine asap she is also asking about getting tested for the covid before the injections and would like a phone call as soon as she can get one. Thank you much

## 2020-05-01 NOTE — PROGRESS NOTES
The patient location is: Home  The chief complaint leading to consultation is: severe pain , acute visit  Visit type: audiovisual  Total time spent with patient: 25 min   Each patient to whom he or she provides medical services by telemedicine is:  (1) informed of the relationship between the physician and patient and the respective role of any other health care provider with respect to management of the patient; and (2) notified that he or she may decline to receive medical services by telemedicine and may withdraw from such care at any time.      History of Present Illness:  Kaylin Archuleta a 75 y.o.yo female   Presents via video visit with complains of generalized aches and severe flare of fibromyalgia  She complains of severe bilateral hip pain, low back pain and is soon scheduled to have bilateral sacroiliac and greater trochanteric bursa injections from pain management  However this could not be immediately done due to COVID situation  She experiences significant relief with tramadol taken twice or 3 times a day and is requesting a refill  She is currently not on any other narcotics or pain medications  No other joint swelling  No incontinence, loss of sensations    History  So far negative RF/CCP and a previous DENNIS negative  HLA B27 negative  Since last visit noted to have normal vitamin-D and CK levels  She is currently on gabapentin 300 mg p.o. t.i.d.   For many years she had chronic neck and back pain  She also hurts in her knees  Pain aggravated with activity  Relief with Celebrex she has been on for few years now    Hands and feet do not bother her much  Denies any joint swelling  No history of gout      History of diabetes with diabetic neuropathy involving upper and lower extremities  She was on tramadol in the past  But now switched to naltrexone and she is doing well    No history of rashes  No significant dry eyes or dry mouth    She does not take any vitamin-D supplements  In 90s when she was on  statins she developed statin induced myopathy and so there was stopped    Past Medical History:   Diagnosis Date    Arthritis     Cataract     Diabetes mellitus     Diabetes mellitus, type 2     Fibromyalgia     General anesthetics causing adverse effect in therapeutic use     bradycardia     Hypertension     Migraines     Mitral valve prolapse     Osteoarthritis     Rotator cuff tear 4/1/2015       Past Surgical History:   Procedure Laterality Date    ARTHROSCOPY OF KNEE Right 3/10/2020    Procedure: ARTHROSCOPY, KNEE;  Surgeon: Les Schneider MD;  Location: [facility] OR;  Service: Orthopedics;  Laterality: Right;    CATARACT EXTRACTION W/  INTRAOCULAR LENS IMPLANT Left 07/19/2017    CATARACT EXTRACTION W/  INTRAOCULAR LENS IMPLANT Right 2017    CHOLECYSTECTOMY      CHONDROPLASTY OF KNEE Right 3/10/2020    Procedure: CHONDROPLASTY, KNEE;  Surgeon: Les Schneider MD;  Location: [facility] OR;  Service: Orthopedics;  Laterality: Right;  Anterior compartment     COLONOSCOPY N/A 9/25/2018    Procedure: COLONOSCOPY;  Surgeon: Bethel Carmona MD;  Location: [facility] ENDO;  Service: Endoscopy;  Laterality: N/A;    EXCISION OF MEDIAL MENISCUS OF KNEE Right 3/10/2020    Procedure: MENISCECTOMY, KNEE, MEDIAL;  Surgeon: Les Schneider MD;  Location: [facility] OR;  Service: Orthopedics;  Laterality: Right;  Partial, Medial , Lateral     EYE SURGERY      INJECTION OF ANESTHETIC AGENT AROUND NERVE Right 8/8/2019    Procedure: Right Genicular nerve block with local;  Surgeon: Manpreet Dutta MD;  Location: [facility] PAIN [facility]GT;  Service: Pain Management;  Laterality: Right;    INJECTION OF JOINT Bilateral 9/5/2019    Procedure: Bilateral GT bursa injection;  Surgeon: Manpreet Dutta MD;  Location: [facility] PAIN [facility]GT;  Service: Pain Management;  Laterality: Bilateral;    KNEE ARTHROSCOPY Bilateral     PARS PLANA VITRECTOMY W/ REPAIR OF MACULAR HOLE Left 02/22/2017    RADIOFREQUENCY THERMOCOAGULATION Left 7/11/2019     "Procedure: Right SIJ RFA;  Surgeon: Manpreet Dutta MD;  Location: [facility] PAIN [facility]GT;  Service: Pain Management;  Laterality: Left;    RADIOFREQUENCY THERMOCOAGULATION Right 7/25/2019    Procedure: Right SIJ RFA;  Surgeon: Manpreet Dutta MD;  Location: [facility] PAIN [facility]GT;  Service: Pain Management;  Laterality: Right;    SHOULDER ARTHROSCOPY Right 06/04/15         Social History     Tobacco Use    Smoking status: Never Smoker    Smokeless tobacco: Never Used   Substance Use Topics    Alcohol use: Yes     Alcohol/week: 0.0 standard drinks     Comment: Rarely  No alcohol 72h prior to sx    Drug use: No       Family History   Problem Relation Age of Onset    Heart disease Mother     Glaucoma Mother     Cataracts Mother     Cancer Mother         colon    Kidney disease Daughter     Anesthesia problems Daughter         cardiac arrest during nephrectomy    Diabetes Maternal Grandmother     Heart disease Maternal Grandmother     Diabetes Maternal Grandfather     Cancer Maternal Grandfather     Breast cancer Paternal Aunt        Review of patient's allergies indicates:   Allergen Reactions    Demerol [meperidine] Other (See Comments)     Burning when adm IV  Able to tolerate IM, "Turned the veins in my hand purple."    Latex, natural rubber Other (See Comments)     "Burns my skin",  Symptoms get worse the longer she is exposed    Zocor [simvastatin] Other (See Comments)     Tightening of muscles    Statins-hmg-coa reductase inhibitors Other (See Comments)     myopathy    Sulfa (sulfonamide antibiotics)      Blurred vision    Nickel sutures [surgical stainless steel] Rash     Any nickel         Statin use:  No  Calcium supplements :  No  Vitamin d supplements :  No    DEXA :  Reviewed.  Osteopenia with low to moderate FRAX  Medications :  Exercise :    Review of Systems:  Constitutional: Denies fever, chills.  Intentional weight loss with dietary changes  Fatigue: no  Muscle weakness: no  Headaches: no new " headaches  Eyes: No redness or dryness.  No recent visual changes.  ENT: Denies dry mouth. No oral or nasal ulcers.  Card: No chest pain.  Resp: No cough or sob.   Gastro: No nausea or vomiting.  No heartburn.  Constipation: no  Diarrhea: no  Genito:  No dysuria.  No genital ulcers.  Skin: No rash.  Raynauds:no  Neuro: + numbness / tingling.   Psych: No depression, anxiety  Endo:  no excess thirst.  Heme: no abnormal bleeding or bruising  Clots:none       OBJECTIVE:     Vital Signs   There were no vitals filed for this visit.  Physical Exam:  General Appearance:  NAD.         Laboratory:   Results for orders placed or performed during the hospital encounter of 03/10/20   POCT glucose   Result Value Ref Range    POCT Glucose 143 (H) 70 - 110 mg/dL   POCT glucose   Result Value Ref Range    POCT Glucose 144 (H) 70 - 110 mg/dL     Lab Results   Component Value Date    RF <10.0 07/22/2019     Lab Results   Component Value Date    DENNIS Negative 03/08/2004     DEXA:    FINDINGS:  The L1 to L4 vertebral bone mineral density is equal to 1.338 g/cm squared with a T score of 0.2.  There has been a 1.1% increase which is not statistically significant relative to the prior study.    The left femoral neck bone mineral density is equal to 0.897 g/cm squared with a T score of -1.0.  There has been  a 5.5% decrease which is statistically significant relative to the prior study.    There is a 9.3% risk of a major osteoporotic fracture and a 1.2% risk of hip fracture in the next 10 years (FRAX).      Impression       Osteopenia as above         Imaging :FINDINGS:  Degenerative changes of the hips are again seen, not significant changed since the comparison exam.  No collapse of the femoral heads.  No fracture or dislocation is evident.  There also degenerative changes of the lumbosacral spine and sacroiliac joints and pubic symphysis.  Pelvic phleboliths are present.  Please note exam detail limited by habitus.    Notes  reviewed  Other procedures:    ASSESSMENT/PLAN:     Primary osteoarthritis involving multiple joints  -     traMADoL (ULTRAM) 50 mg tablet; Take 1 tablet (50 mg total) by mouth every 12 (twelve) hours as needed for Pain.  Dispense: 40 tablet; Refill: 0    Fibromyalgia    Diabetic polyneuropathy associated with type 2 diabetes mellitus      1:  Fibromyalgia:  No evidence of autoimmune arthritis noted on exam  C/w gabapentin to 300 mg p.o. T.i.d.  Continue to follow up with pain management  Tramadol 1 tablet Q 12 hr p.r.n.    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 Medication literature given to patient up discharge  Went over uptodate information /literature on the meds prescribed today    If you are unsure what to do please call our office for instruction. [facility] [phone]      Disclaimer: This note was prepared using voice recognition system and is likely to have sound alike errors and is not proof read.  Please call me with any questions.

## 2020-05-01 NOTE — TELEPHONE ENCOUNTER
Returned call to pt, states having pain all over & having inj by Ortho, but wants to se if Dr. Whitley can perscribe something until then. Advised how to set up my chart so we can do virtual visit, pt agrees/ scheduled for 12 today

## 2020-05-04 ENCOUNTER — LAB VISIT (OUTPATIENT)
Dept: OTOLARYNGOLOGY | Facility: CLINIC | Age: 75
End: 2020-05-04
Payer: MEDICARE

## 2020-05-04 ENCOUNTER — PATIENT MESSAGE (OUTPATIENT)
Dept: PAIN MEDICINE | Facility: HOSPITAL | Age: 75
End: 2020-05-04

## 2020-05-04 DIAGNOSIS — Z03.818 ENCOUNTER FOR OBSERVATION FOR SUSPECTED EXPOSURE TO OTHER BIOLOGICAL AGENTS RULED OUT: ICD-10-CM

## 2020-05-04 PROCEDURE — U0002 COVID-19 LAB TEST NON-CDC: HCPCS

## 2020-05-04 RX ORDER — TRAMADOL HYDROCHLORIDE 50 MG/1
100 TABLET ORAL EVERY 12 HOURS PRN
Qty: 120 TABLET | Refills: 0 | Status: SHIPPED | OUTPATIENT
Start: 2020-05-04 | End: 2020-06-03

## 2020-05-05 LAB — SARS-COV-2 RNA RESP QL NAA+PROBE: NOT DETECTED

## 2020-05-06 ENCOUNTER — HOSPITAL ENCOUNTER (OUTPATIENT)
Facility: HOSPITAL | Age: 75
Discharge: HOME | End: 2020-05-06
Attending: ANESTHESIOLOGY | Admitting: ANESTHESIOLOGY
Payer: MEDICARE

## 2020-05-06 VITALS
HEIGHT: 70 IN | SYSTOLIC BLOOD PRESSURE: 131 MMHG | HEART RATE: 72 BPM | RESPIRATION RATE: 16 BRPM | WEIGHT: 206.38 LBS | DIASTOLIC BLOOD PRESSURE: 62 MMHG | OXYGEN SATURATION: 98 % | BODY MASS INDEX: 29.54 KG/M2 | TEMPERATURE: 98 F

## 2020-05-06 DIAGNOSIS — M70.60 GREATER TROCHANTERIC BURSITIS, UNSPECIFIED LATERALITY: ICD-10-CM

## 2020-05-06 DIAGNOSIS — M70.62 GREATER TROCHANTERIC BURSITIS OF BOTH HIPS: ICD-10-CM

## 2020-05-06 DIAGNOSIS — M53.3 SACROILIAC JOINT DYSFUNCTION: Primary | ICD-10-CM

## 2020-05-06 DIAGNOSIS — M70.61 GREATER TROCHANTERIC BURSITIS OF BOTH HIPS: ICD-10-CM

## 2020-05-06 DIAGNOSIS — M46.1 SACROILIITIS: ICD-10-CM

## 2020-05-06 LAB — POCT GLUCOSE: 114 MG/DL (ref 70–110)

## 2020-05-06 PROCEDURE — 20610 PR DRAIN/INJECT LARGE JOINT/BURSA: ICD-10-PCS | Mod: 50,59,, | Performed by: ANESTHESIOLOGY

## 2020-05-06 PROCEDURE — 27096 PR INJECTION,SACROILIAC JOINT: ICD-10-PCS | Mod: 50,,, | Performed by: ANESTHESIOLOGY

## 2020-05-06 PROCEDURE — 27096 INJECT SACROILIAC JOINT: CPT | Mod: 50,,, | Performed by: ANESTHESIOLOGY

## 2020-05-06 PROCEDURE — 99152 MOD SED SAME PHYS/QHP 5/>YRS: CPT | Performed by: ANESTHESIOLOGY

## 2020-05-06 PROCEDURE — 27096 INJECT SACROILIAC JOINT: CPT | Mod: 50 | Performed by: ANESTHESIOLOGY

## 2020-05-06 PROCEDURE — 63600175 PHARM REV CODE 636 W HCPCS: Performed by: ANESTHESIOLOGY

## 2020-05-06 PROCEDURE — 25500020 PHARM REV CODE 255: Performed by: ANESTHESIOLOGY

## 2020-05-06 PROCEDURE — 25000003 PHARM REV CODE 250: Performed by: ANESTHESIOLOGY

## 2020-05-06 PROCEDURE — 20610 DRAIN/INJ JOINT/BURSA W/O US: CPT | Mod: 50 | Performed by: ANESTHESIOLOGY

## 2020-05-06 PROCEDURE — 20610 DRAIN/INJ JOINT/BURSA W/O US: CPT | Mod: 50,59,, | Performed by: ANESTHESIOLOGY

## 2020-05-06 RX ORDER — METHYLPREDNISOLONE ACETATE 40 MG/ML
INJECTION, SUSPENSION INTRA-ARTICULAR; INTRALESIONAL; INTRAMUSCULAR; SOFT TISSUE
Status: DISCONTINUED | OUTPATIENT
Start: 2020-05-06 | End: 2020-05-06 | Stop reason: HOSPADM

## 2020-05-06 RX ORDER — LIDOCAINE HYDROCHLORIDE 20 MG/ML
INJECTION, SOLUTION EPIDURAL; INFILTRATION; INTRACAUDAL; PERINEURAL
Status: DISCONTINUED | OUTPATIENT
Start: 2020-05-06 | End: 2020-05-06 | Stop reason: HOSPADM

## 2020-05-06 RX ORDER — MIDAZOLAM HYDROCHLORIDE 1 MG/ML
INJECTION, SOLUTION INTRAMUSCULAR; INTRAVENOUS
Status: DISCONTINUED | OUTPATIENT
Start: 2020-05-06 | End: 2020-05-06 | Stop reason: HOSPADM

## 2020-05-06 RX ORDER — HYDROCODONE BITARTRATE AND ACETAMINOPHEN 10; 325 MG/1; MG/1
1 TABLET ORAL
Qty: 20 TABLET | Refills: 0 | Status: SHIPPED | OUTPATIENT
Start: 2020-05-06 | End: 2020-07-22

## 2020-05-06 RX ORDER — FENTANYL CITRATE 50 UG/ML
INJECTION, SOLUTION INTRAMUSCULAR; INTRAVENOUS
Status: DISCONTINUED | OUTPATIENT
Start: 2020-05-06 | End: 2020-05-06 | Stop reason: HOSPADM

## 2020-05-06 NOTE — OP NOTE
PROCEDURE: Sacroiliac joint and Greater trochanteric bursa injection under fluoroscopic guidance       SIDE: bilateral greater trochanteric bursa injection      PROCEDURE DATE: 5/6/2020    PREOPERATIVE DIAGNOSIS: Sacroiliitis, greater trochanteric bursitis  POSTOPERATIVE DIAGNOSIS: Sacroiliitis, greater trochanteric bursitis    PROVIDER: Hayes Pearl MD  Assistant(s): none    Anesthesia: Local, IV Sedation     >> 2 mg of VERSED    >> 50 mcg of FENTANYL     INDICATION: The patient has a history of pain due to sacroiliitis unresponsive to conservative treatments. Fluoroscopy was used to optimize visualization of needle placement and to maximize safety.       PROCEDURE DESCRIPTION: The patient was seen and identified in the preoperative area. Risks, benefits, complications, and alternatives were discussed with the patient. The patient agreed to proceed with the procedure and signed the consent. The site and side of the procedure was identified and marked.     The patient was taken to the procedural suite. The patient was positioned in prone orientation on procedure table. A time out was performed prior to any intervention. The procedure, site, side, and allergies were stated and agreed to by all present. The lumbosacral area extending to the skin overlying the femoral greater trochanter was widely prepped with ChloraPrep. The procedural site was draped in usual sterile fashion. Vital signs were closely monitored throughout this procedure.     The fluoroscopic camera was placed in contralateral oblique view and was adjusted until the anterior and posterior joint margins of the targeted sacroiliac joint aligned in linear array. The lower pole of the joint was identified, marked, and localized with 1% Lidocaine. A 25 gauge 3.5 inch spinal needle was introduced and advanced to the joint under fluoroscopic guidance. The joint space was entered and after negative aspiration, 2 mL of injectate was posited into the joint  space. The needle was then withdrawn outside of the joint space and after negative aspiration 1 mL of solution was injected outside of the joint space. The injectant solution used was comprised of 7 mL of 1% PF Lidocaine and 3 mL of Methylprednisolone (40 mg/mL). This techniques was performed for the above noted joint/s.    Following Sacroiliac Joint injection, the stylet was replaced and the needle was withdrawn intact. The RIGHT and LEFT greater trochanter was then identified with fluoroscopy. The targeted site of entry was localized with 1% PF Lidocaine. The above noted spinal needle was advanced to the lateral border of the greater trochanter until the osseus interface was met.  After negative aspiration, 2 mL of the above noted solution was injected. No pain or paresthesia was noted on injection. Following injection, the stylet was replaced and the needle was withdrawn intact. This technique was used for the above noted greater trochanteric bursa / bursae. The skin was cleaned and bandages were applied.    Description of Findings: Not applicable    Prosthetic devices, grafts, tissues, or devices implanted: None    Specimen Removed: No    Estimated Blood Loss: minimal    COMPLICATIONS: None    DISPOSITION / PLANS: The patient was transferred to the recovery area in a stable condition for observation. The patient was reexamined prior to discharge. There was no evidence of acute neurologic injury following the procedure.  Patient was discharged from the recovery room after meeting discharge criteria. Home discharge instructions were given to the patient by the staff.

## 2020-05-06 NOTE — DISCHARGE SUMMARY
[facility]  Discharge Note       Description of Procedure: Bilateral Sacroiliac Joint and Greater Trochanteric Bursa Injection under Fluoroscopic Guidance    Procedure Date: 5/6/2020    Admit Date: 5/6/2020  Discharge Date: 5/6/2020     Attending Physician: Luzmaria Case   Discharge Provider: Luzmaria Case    Preoperative Diagnosis: Sacroiliitis and Greater Trochanteric Bursitis     Postoperative Diagnosis: as above, same as preoperative diagnosis    Discharged Condition: Stable    Hospital Course: Patient was admitted for an outpatient procedure. The procedure was tolerated well with no complications.    Final Diagnoses: Same as principal problem.    Disposition: Home, self-care.    Follow up/Patient Instructions:  Follow-up in clinic in 2-3 weeks.    Medications: No medications were prescribed today. The patient was advised to resume normal medication regimen without change.  Specific information was provided regarding restarting any anticoagulant/s.    Discharge Procedure Orders (must include Diet, Follow-up, Activity):  Light activity for the remainder of the day, resume normal activity tomorrow. Resume normal diet. Follow-up in clinic in 2-3 weeks.

## 2020-05-21 ENCOUNTER — OFFICE VISIT (OUTPATIENT)
Dept: ORTHOPEDICS | Facility: CLINIC | Age: 75
End: 2020-05-21
Payer: MEDICARE

## 2020-05-21 VITALS
HEART RATE: 73 BPM | SYSTOLIC BLOOD PRESSURE: 139 MMHG | DIASTOLIC BLOOD PRESSURE: 74 MMHG | WEIGHT: 206 LBS | BODY MASS INDEX: 29.49 KG/M2 | HEIGHT: 70 IN

## 2020-05-21 DIAGNOSIS — M94.261 CHONDROMALACIA, KNEE, RIGHT: Primary | ICD-10-CM

## 2020-05-21 DIAGNOSIS — S83.281D ACUTE LATERAL MENISCUS TEAR OF RIGHT KNEE, SUBSEQUENT ENCOUNTER: ICD-10-CM

## 2020-05-21 DIAGNOSIS — Z98.890 S/P RIGHT KNEE ARTHROSCOPY: ICD-10-CM

## 2020-05-21 DIAGNOSIS — S83.241D ACUTE MEDIAL MENISCUS TEAR OF RIGHT KNEE, SUBSEQUENT ENCOUNTER: ICD-10-CM

## 2020-05-21 DIAGNOSIS — M17.11 PRIMARY OSTEOARTHRITIS OF RIGHT KNEE: Primary | ICD-10-CM

## 2020-05-21 PROCEDURE — 99999 PR PBB SHADOW E&M-EST. PATIENT-LVL III: CPT | Mod: PBBFAC,,, | Performed by: ORTHOPAEDIC SURGERY

## 2020-05-21 PROCEDURE — 99213 OFFICE O/P EST LOW 20 MIN: CPT | Mod: PBBFAC | Performed by: ORTHOPAEDIC SURGERY

## 2020-05-21 PROCEDURE — 20610 LARGE JOINT ASPIRATION/INJECTION: R KNEE: ICD-10-PCS | Mod: S$PBB,58,RT, | Performed by: ORTHOPAEDIC SURGERY

## 2020-05-21 PROCEDURE — 99999 PR PBB SHADOW E&M-EST. PATIENT-LVL III: ICD-10-PCS | Mod: PBBFAC,,, | Performed by: ORTHOPAEDIC SURGERY

## 2020-05-21 PROCEDURE — 99024 PR POST-OP FOLLOW-UP VISIT: ICD-10-PCS | Mod: POP,,, | Performed by: ORTHOPAEDIC SURGERY

## 2020-05-21 PROCEDURE — 99024 POSTOP FOLLOW-UP VISIT: CPT | Mod: POP,,, | Performed by: ORTHOPAEDIC SURGERY

## 2020-05-21 PROCEDURE — 20610 DRAIN/INJ JOINT/BURSA W/O US: CPT | Mod: PBBFAC | Performed by: ORTHOPAEDIC SURGERY

## 2020-05-21 RX ORDER — METHYLPREDNISOLONE ACETATE 80 MG/ML
80 INJECTION, SUSPENSION INTRA-ARTICULAR; INTRALESIONAL; INTRAMUSCULAR; SOFT TISSUE
Status: DISCONTINUED | OUTPATIENT
Start: 2020-05-21 | End: 2020-05-21 | Stop reason: HOSPADM

## 2020-05-21 RX ADMIN — METHYLPREDNISOLONE ACETATE 80 MG: 80 INJECTION, SUSPENSION INTRALESIONAL; INTRAMUSCULAR; INTRASYNOVIAL; SOFT TISSUE at 11:05

## 2020-05-21 NOTE — PROGRESS NOTES
Subjective:     Patient ID: Kaylin Mccollum is a 75 y.o. female.    Chief Complaint: Post-op Evaluation and Pain of the Right Knee    HPI:  75-year-old retired from  who had been falling down stairs numerous occasions she has been having pain and catching and locking since several months now.  She gets up the knee catches she has to wiggle it some how to unlock it before she can walk. She does have quite a bit of lumbar pain that radiates with burning sensation down to the right anterior tibia and dorsal of the foot.  She is under the care of pain management for that.  Pain in the knee is around 4/10 with catching locking feeling.  Able to ambulate once the catching locking goes away for her minimum 200-300 yd without discomfort.  Unable to squat unable to do stairs due to the catching locking feeling.  She does ambulate without any assistive devices.  She does have an MRI in the electronic records.  At 1 point she had falling down the stairs and sustained right shoulder rotator cuff tear requiring repair.  Denies any numbness tingling in the hands.  She does have some cervical lumbar pain    05/21/2020  Status post her right knee arthroscopy 03/10/2020.  Findings of complex medial and lateral meniscus tears as well chondromalacia type 3 anterior and medial.  She was doing great postop for 5 weeks another side in twisted her knee and starting some pain.  Any twisting maneuver she hurts quite a bit.  Pain is 4/10.  She did her independent exercise program.  Denies any fever or chills or shortness of breath or difficulty chewing or swallowing.  Complains of some stiffness and swelling    Past Medical History:   Diagnosis Date    Arthritis     Cataract     Diabetes mellitus     Diabetes mellitus, type 2     Fibromyalgia     General anesthetics causing adverse effect in therapeutic use     bradycardia     Hypertension     Migraines     Mitral valve prolapse     Osteoarthritis     Rotator cuff tear  4/1/2015     Past Surgical History:   Procedure Laterality Date    ARTHROSCOPY OF KNEE Right 3/10/2020    Procedure: ARTHROSCOPY, KNEE;  Surgeon: Les Schneider MD;  Location: [facility] OR;  Service: Orthopedics;  Laterality: Right;    CATARACT EXTRACTION W/  INTRAOCULAR LENS IMPLANT Left 07/19/2017    CATARACT EXTRACTION W/  INTRAOCULAR LENS IMPLANT Right 2017    CHOLECYSTECTOMY      CHONDROPLASTY OF KNEE Right 3/10/2020    Procedure: CHONDROPLASTY, KNEE;  Surgeon: Les Schneider MD;  Location: [facility] OR;  Service: Orthopedics;  Laterality: Right;  Anterior compartment     COLONOSCOPY N/A 9/25/2018    Procedure: COLONOSCOPY;  Surgeon: Bethel Carmona MD;  Location: [facility] ENDO;  Service: Endoscopy;  Laterality: N/A;    EXCISION OF MEDIAL MENISCUS OF KNEE Right 3/10/2020    Procedure: MENISCECTOMY, KNEE, MEDIAL;  Surgeon: Les Schneider MD;  Location: [facility] OR;  Service: Orthopedics;  Laterality: Right;  Partial, Medial , Lateral     EYE SURGERY      INJECTION OF ANESTHETIC AGENT AROUND NERVE Right 8/8/2019    Procedure: Right Genicular nerve block with local;  Surgeon: Manpreet Dutta MD;  Location: [facility] PAIN MGT;  Service: Pain Management;  Laterality: Right;    INJECTION OF ANESTHETIC AGENT INTO SACROILIAC JOINT Bilateral 5/6/2020    Procedure: Bilateral Sacroiliac Joint Injection;  Surgeon: Manpreet Dutta MD;  Location: [facility] PAIN MGT;  Service: Pain Management;  Laterality: Bilateral;    INJECTION OF JOINT Bilateral 9/5/2019    Procedure: Bilateral GT bursa injection;  Surgeon: Manpreet Dutta MD;  Location: [facility] PAIN MGT;  Service: Pain Management;  Laterality: Bilateral;    INJECTION OF JOINT Bilateral 5/6/2020    Procedure: Bilateral GT bursa injection;  Surgeon: Manpreet Dutta MD;  Location: [facility] PAIN MGT;  Service: Pain Management;  Laterality: Bilateral;    KNEE ARTHROSCOPY Bilateral     PARS PLANA VITRECTOMY W/ REPAIR OF MACULAR HOLE Left 02/22/2017    RADIOFREQUENCY THERMOCOAGULATION Left  7/11/2019    Procedure: Right SIJ RFA;  Surgeon: Manpreet Dutta MD;  Location: [facility] PAIN MGT;  Service: Pain Management;  Laterality: Left;    RADIOFREQUENCY THERMOCOAGULATION Right 7/25/2019    Procedure: Right SIJ RFA;  Surgeon: Manpreet Dutta MD;  Location: [facility] PAIN MGT;  Service: Pain Management;  Laterality: Right;    SHOULDER ARTHROSCOPY Right 06/04/15     Family History   Problem Relation Age of Onset    Heart disease Mother     Glaucoma Mother     Cataracts Mother     Cancer Mother         colon    Kidney disease Daughter     Anesthesia problems Daughter         cardiac arrest during nephrectomy    Diabetes Maternal Grandmother     Heart disease Maternal Grandmother     Diabetes Maternal Grandfather     Cancer Maternal Grandfather     Breast cancer Paternal Aunt      Social History     Socioeconomic History    Marital status:      Spouse name: Not on file    Number of children: Not on file    Years of education: Not on file    Highest education level: Not on file   Occupational History     Employer: [employer]   Social Needs    Financial resource strain: Not on file    Food insecurity:     Worry: Not on file     Inability: Not on file    Transportation needs:     Medical: Not on file     Non-medical: Not on file   Tobacco Use    Smoking status: Never Smoker    Smokeless tobacco: Never Used   Substance and Sexual Activity    Alcohol use: Yes     Alcohol/week: 0.0 standard drinks     Comment: Rarely  No alcohol 72h prior to sx    Drug use: No    Sexual activity: Not Currently   Lifestyle    Physical activity:     Days per week: Not on file     Minutes per session: Not on file    Stress: Not on file   Relationships    Social connections:     Talks on phone: Not on file     Gets together: Not on file     Attends Orthodox service: Not on file     Active member of club or organization: Not on file     Attends meetings of clubs or organizations: Not on file     Relationship  status: Not on file   Other Topics Concern    Not on file   Social History Narrative    . 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     Medication List with Changes/Refills   Current Medications    BIOTIN 1 MG TABLET    Take 1,000 mcg by mouth every morning.     CANAGLIFLOZIN (INVOKANA) 100 MG TAB    Take 1 tablet (100 mg total) by mouth once daily.    CELECOXIB (CELEBREX) 100 MG CAPSULE    Take 1 capsule (100 mg total) by mouth 2 (two) times daily.    DICLOFENAC SODIUM (VOLTAREN) 1 % GEL    Apply 4 g topically 3 (three) times daily as needed.    EZETIMIBE (ZETIA) 10 MG TABLET    Take 1 tablet (10 mg total) by mouth once daily.    FLUOXETINE 40 MG CAPSULE    TAKE 1 CAPSULE(40 MG) BY MOUTH EVERY DAY    FLUTICASONE (FLONASE) 50 MCG/ACTUATION NASAL SPRAY    2 sprays by Each Nare route once daily.    GABAPENTIN (NEURONTIN) 300 MG CAPSULE    TAKE 1 CAPSULE BY MOUTH TWICE DAILY    HYDROCODONE-ACETAMINOPHEN (NORCO)  MG PER TABLET    Take 1 tablet by mouth every 24 hours as needed. HYDROCODONE-ACETAMINOPHEN (Norco)  MG ORAL TAB - 1 tab po q 6 hrs prn pain    HYDROCODONE-ACETAMINOPHEN (NORCO) 5-325 MG PER TABLET    Take 1 tablet by mouth every 8 (eight) hours as needed for Pain.    LOSARTAN (COZAAR) 25 MG TABLET    Take 1 tablet (25 mg total) by mouth once daily.    METFORMIN (GLUCOPHAGE) 1000 MG TABLET    TAKE 1 TABLET BY MOUTH TWICE DAILY WITH MEALS    TRAMADOL (ULTRAM) 50 MG TABLET    Take 1 tablet (50 mg total) by mouth every 12 (twelve) hours as needed for Pain.    TRAMADOL (ULTRAM) 50 MG TABLET    Take 2 tablets (100 mg total) by mouth every 12 (twelve) hours as needed for Pain.    TRIAMCINOLONE ACETONIDE 0.025% (KENALOG) 0.025 % OINT    Apply topically 2 (two) times daily. for 10 days    VERAPAMIL (CALAN) 120 MG TABLET    TAKE 1 TABLET(120 MG) BY MOUTH TWICE DAILY     Review of patient's allergies indicates:   Allergen Reactions    Demerol [meperidine] Other (See Comments)     Burning when adm IV  Able to  "tolerate IM, "Turned the veins in my hand purple."    Latex, natural rubber Other (See Comments)     "Burns my skin",  Symptoms get worse the longer she is exposed    Zocor [simvastatin] Other (See Comments)     Tightening of muscles    Statins-hmg-coa reductase inhibitors Other (See Comments)     myopathy    Sulfa (sulfonamide antibiotics)      Blurred vision    Nickel sutures [surgical stainless steel] Rash     Any nickel     Review of Systems   Constitution: Negative for decreased appetite.   HENT: Negative for tinnitus.    Eyes: Negative for double vision.   Cardiovascular: Negative for chest pain.   Respiratory: Negative for wheezing.    Hematologic/Lymphatic: Negative for bleeding problem.   Skin: Negative for dry skin.   Musculoskeletal: Positive for arthritis, back pain, joint pain, neck pain and stiffness. Negative for gout.   Gastrointestinal: Negative for abdominal pain.   Genitourinary: Negative for bladder incontinence.   Neurological: Negative for numbness, paresthesias and sensory change.   Psychiatric/Behavioral: Negative for altered mental status.       Objective:   Body mass index is 29.56 kg/m².  Vitals:    05/21/20 1116   BP: 139/74   Pulse: 73          General    Constitutional: She is oriented to person, place, and time. She appears well-developed.   HENT:   Head: Atraumatic.   Eyes: EOM are normal.   Cardiovascular: Normal rate.    Pulmonary/Chest: Effort normal.   Abdominal: Soft.   Neurological: She is alert and oriented to person, place, and time.   Psychiatric: Judgment normal.            Cervical spine with slight limitation of rotation with clicking in the neck. Some mild discomfort to extreme rotation.  Bilateral upper extremity neurovascularly intact. Radial ulnar pulses 2+.  Sensory intact to touch.  Motor strength is 5/5 throughout.  A lumbar with tenderness around L4-5 paraspinal and mostly to words the right side.  Pelvis is level  Straight leg raising slightly positive on the " left negative on the right  Passive hip motion within normal limits.  No pain in the groin no pain over the greater trochanters.  Hip flexors, abductors, adductors, quads, hamstrings, ankle extensors and flexors all 5/5 and even bilaterally.  And left knee full motion collaterals and cruciate stable negative Kaleigh's negative pain to palpation.  Surgical scar from knee scope done years ago.  Right knee with mild to moderate swelling.  Surgical scars healed well.  Crepitus with motion anteriorly.  Painful to any twisting motion.  Collaterals and cruciates are stable to valgus varus stressing as well as anterior posterior drawer. Range of motion 0-120 degrees.  Calves are soft nontender  No pitting edema  EHL 5/5 plantar flexion 5/5.  DP 1+ PT 1+      Relevant imaging results reviewed and interpreted by me, discussed with the patient and / or family today   MRI reviewed with the patient showed her the pictures as well as reviewed the report consistent with medial and lateral meniscus stairs, patellofemoral chondromalacia as well as medially  Assessment:     Encounter Diagnoses   Name Primary?    S/P right knee arthroscopy     Chondromalacia, knee, right Yes    Acute medial meniscus tear of right knee, subsequent encounter     Acute lateral meniscus tear of right knee, subsequent encounter         Plan:   Chondromalacia, knee, right  -     Large Joint Aspiration/Injection: R knee    S/P right knee arthroscopy    Acute medial meniscus tear of right knee, subsequent encounter    Acute lateral meniscus tear of right knee, subsequent encounter         Patient Instructions   Injection of right knee with 80 mg Depo-Medrol mixed with 5 cc 1% lidocaine 05/21/2020  Ice the needed next few days  Approval for Synvisc-One injection/rooster comb/hyaluronic into the right knee  Return to clinic in 4 weeks    All questions asked and answered      Disclaimer: This note was prepared using a voice recognition system and is likely  to have sound alike errors within the text.

## 2020-05-21 NOTE — PROCEDURES
Large Joint Aspiration/Injection: R knee  Date/Time: 5/21/2020 11:20 AM  Performed by: Les Schneider MD  Authorized by: Les Schneider MD     Consent Done?:  Yes (Verbal)  Site marked: the procedure site was marked    Timeout: prior to procedure the correct patient, procedure, and site was verified      Local anesthesia used?: Yes    Local anesthetic:  Lidocaine 1% without epinephrine    Details:  Needle Size:  22 G  Ultrasonic Guidance for needle placement?: No    Approach:  Anterolateral  Location:  Knee  Site:  R knee  Medications:  80 mg methylPREDNISolone acetate 80 mg/mL  Patient tolerance:  Patient tolerated the procedure well with no immediate complications

## 2020-06-06 ENCOUNTER — PATIENT MESSAGE (OUTPATIENT)
Dept: INTERNAL MEDICINE | Facility: CLINIC | Age: 75
End: 2020-06-06

## 2020-06-06 DIAGNOSIS — E11.59 HYPERTENSION ASSOCIATED WITH DIABETES: ICD-10-CM

## 2020-06-06 DIAGNOSIS — I34.1 MVP (MITRAL VALVE PROLAPSE): ICD-10-CM

## 2020-06-06 DIAGNOSIS — I15.2 HYPERTENSION ASSOCIATED WITH DIABETES: ICD-10-CM

## 2020-06-08 RX ORDER — VERAPAMIL HYDROCHLORIDE 120 MG/1
120 TABLET, FILM COATED ORAL 2 TIMES DAILY
Qty: 180 TABLET | Refills: 1 | Status: SHIPPED | OUTPATIENT
Start: 2020-06-08 | End: 2021-02-25 | Stop reason: SDUPTHER

## 2020-06-11 ENCOUNTER — OFFICE VISIT (OUTPATIENT)
Dept: PAIN MEDICINE | Facility: CLINIC | Age: 75
End: 2020-06-11
Payer: MEDICARE

## 2020-06-11 DIAGNOSIS — M17.11 PRIMARY OSTEOARTHRITIS OF RIGHT KNEE: ICD-10-CM

## 2020-06-11 DIAGNOSIS — M47.816 LUMBAR SPONDYLOSIS: ICD-10-CM

## 2020-06-11 DIAGNOSIS — M25.561 CHRONIC PAIN OF BOTH KNEES: ICD-10-CM

## 2020-06-11 DIAGNOSIS — G89.29 CHRONIC PAIN OF BOTH KNEES: ICD-10-CM

## 2020-06-11 DIAGNOSIS — M54.16 LUMBAR RADICULOPATHY: ICD-10-CM

## 2020-06-11 DIAGNOSIS — M25.562 CHRONIC PAIN OF BOTH KNEES: ICD-10-CM

## 2020-06-11 DIAGNOSIS — M46.1 SACROILIITIS: Primary | ICD-10-CM

## 2020-06-11 DIAGNOSIS — M70.60 GREATER TROCHANTERIC BURSITIS, UNSPECIFIED LATERALITY: ICD-10-CM

## 2020-06-11 PROCEDURE — 99213 PR OFFICE/OUTPT VISIT, EST, LEVL III, 20-29 MIN: ICD-10-PCS | Mod: 95,,, | Performed by: ANESTHESIOLOGY

## 2020-06-11 PROCEDURE — 99213 OFFICE O/P EST LOW 20 MIN: CPT | Mod: 95,,, | Performed by: ANESTHESIOLOGY

## 2020-06-11 NOTE — PROGRESS NOTES
Chief Pain Complaint:  Back pain  Right knee pain    History of Present Illness:   Kaylin Mccollum is a 75 y.o. female  who is presenting with a chief complaint of Low-back Pain. The patient began experiencing this problem insidiously, and the pain has been gradually worsening over time. The pain is described as throbbing, cramping, aching and heavy and is located in the bilateral buttocks. Pain is intermittent and lasts hours. The pain is nonradiating. The patient rates her pain a 3 out of ten and interferes with activities of daily living a 3 out of ten. Pain is exacerbated by getting up from a seated position, and is improved by rest. Patient reports no prior trauma, no prior spinal surgery     Kaylin Mccollum is a 75 y.o. female  who is presenting with a chief complaint of lumbar back pain. The patient began experiencing this problem insidiously, and the pain has been gradually worsening over the past 5 month(s). The pain is described as throbbing, shooting, burning and electrical and is located in the bilateral lumbar spine. Pain is intermittent and lasts hours. The pain radiates to bilateral lower extremities L4 distribudution. The patient rates her pain a 3 out of ten and interferes with activities of daily living a 3 out of ten. Pain is exacerbated by flexion of the lumbar spine, ambulation, and is improved by rest.     She also complains of right knee pain. The patient began experiencing this problem insidiously. The pain is described as cramping, aching and is located in the right knee. Pain is intermittent and lasts hours. The pain is nonradiating. The patient rates her pain a 8 out of ten and interferes with activities of daily living a 7 out of ten. Pain is exacerbated by getting up from a seated position and standing, and is improved by rest. Patient reports no prior trauma, prior arthroscopy bilaterally in 1990s.      - pertinent negatives: No fever, No chills, No weight loss, No bladder  dysfunction, No bowel dysfunction, No saddle anesthesia  - pertinent positives: none    - medications, other therapies tried (physical therapy, injections):     >> NSAIDs, Tylenol, gabapentin and medrol dose pack    >> Has previously undergone Physical Therapy    >> Has previously undergone spinal injection/s   - Lumbar JAMIN with good relief in the past   - Bilateral SI Joint + GTB injection on 4/10/19 with great relief for about 4 weeks   - left SIJ RFA on 7/11/19 and right SIJ RFA on 7-25-19 with 80% pain relief   - right genicular nerve block on 8/8/19 with 90% pain relief   - bilateral GT bursa injections on 9/5/19 with 90% pain relief   - Bilateral SI and GTB 5/6/2020 with 60% pain relief.      Imaging / Labs / Studies (reviewed on 6/11/2020):    Results for orders placed during the hospital encounter of 11/23/15   MRI Lumbar Spine Without Contrast    Narrative Technique: Standard noncontrast multiplanar multisequence imaging of the lumbar spine was performed.  Findings: There is anatomic spinal alignment.  Very low degree of degenerative disk base narrowing and disk desiccation observed at L1-2, L2-3, L4-5, and L5-S1.  Vertebral marrow signal pattern and architecture are normal.  Distal cord is unremarkable with conus medullaris terminating at L1.  Small nerve root sleeve cysts incidentally noted in the sacral canal.  There are bilateral renal cysts.  L1-2: Small annular bulge and endplate lipping.  Bilateral facet arthropathy.  No significant foraminal narrowing or canal stenosis.  L2-3: Small annular bulge and endplate lipping.  Bilateral facet arthropathy.  No significant foraminal narrowing or canal stenosis.  L3-4: Posterior disk bulge with small bilateral foraminal disk protrusions.  Bilateral facet arthropathy and ligament hypertrophy.  Mild inferior foraminal narrowing.  No canal stenosis.  L4-5: Posterior disk bulge, hypertrophic facet arthropathy, and ligament thickening resulting in moderate  bilateral foraminal narrowing and moderate central canal stenosis.  AP canal diameter measures 8.4 mm.   L5-S1: Broad based posterior disk bulge and endplate lipping.  Hypertrophic facet arthropathy and ligament thickening.  Moderate bilateral foraminal narrowing.  No significant canal stenosis.    Impression  Multilevel lumbar spondylosis and degenerative disk disease as detailed.     Results for orders placed in visit on 05/31/12   X-Ray Lumbar Spine Ap And Lateral    Narrative Findings: Generalized osteopenia.  Multilevel degenerative vertebral end plate spurring and facet arthropathy.  Moderate to severe disk space narrowing and associated vacuum disk phenomenon at L5-S1.  Suggestion of slight diane-listhesis of L4 on L5.  Intact pedicles.  No compression fracture.         Review of Systems:  CONSTITUTIONAL: patient denies any fever, chills, or weight loss  SKIN: patient denies any rash or itching  RESPIRATORY: patient denies having any shortness of breath  GASTROINTESTINAL: patient denies having any diarrhea, constipation, or bowel incontinence  GENITOURINARY: patient denies having any abnormal bladder function    MUSCULOSKELETAL:  - patient complains of the above noted pain/s (see chief pain complaint)    NEUROLOGICAL:   - pain as above  - strength in Lower extremities is intact, BILATERALLY  - sensation in Lower extremities is intact, BILATERALLY  - patient denies any loss of bowel or bladder control      PSYCHIATRIC: patient denies any change in mood    Other:  All other systems reviewed and are negative      Physical Exam:  Vitals:  There were no vitals taken for this visit.  (reviewed on 6/11/2020)    General: alert and oriented, in no apparent distress.  Gait: normal gait.  Skin: no rashes, no discoloration, no obvious lesions  HEENT: normocephalic, atraumatic. Pupils equal and round.  Cardiovascular: no significant peripheral edema present.  Respiratory: without use of accessory muscles of  respiration.    Musculoskeletal - Lumbar Spine:  - Pain on flexion of lumbar spine: Absent   - Pain on extension of lumbar spine: Absent         - Lumbar facet loading: Absent   - TTP over the lumbar facet joints: Absent  - TTP over the lumbar paraspinals: Absent  - TTP over the SI joints:  Absent bilaterally   - TTP over GT bursa: Present on left > right  - Straight Leg Raise: Negative  - CHERYLE: Present    Right Knee:  - TTP: Present over medial/ lateral joint line  - Pain with extension: Present  - Pain with flexion: Present  - Crepitus: Present     Neuro - Lower Extremities:  - BLE Strength: R/L: HF: 5/5, HE: 5/5, KF: 5/5; KE: 5/5; FE: 5/5; FF: 5/5  - Extremity Reflexes: Brisk and symmetric throughout  - Sensory: Sensation to light touch intact bilaterally      Psych:  Mood and affect is appropriate        Assessment:  Kaylin Mccollum is a 75 y.o. year old female who is presenting with     Encounter Diagnoses   Name Primary?    Sacroiliitis Yes    Greater trochanteric bursitis, unspecified laterality     Lumbar spondylosis     Lumbar radiculopathy     Chronic pain of both knees     Primary osteoarthritis of right knee        Plan:  1. Interventional:  Consider Right Genicular RFA.    S/p Bilateral SI and GTB 5/6/2020 with 60% pain relief.    S/p bilateral GT bursa injections on 9/5/19 with 90% pain relief.    2. Pharmacologic: Continue Celebrex 100mg BID PRN.Increase Gabapentin from 300mg PO BID to 300/600 then 300/900 mg PO BID. Voltaren gel.     3. Rehabilitative: Continue physical therapy, which is helping some.     4. Diagnostic: New Lumbar MRI reviewed with patient. Right Knee MRI reviewed with patient.    5. Consult: Orthopedic for medial and lateral menisci tear of the right knee.     6. Follow up: PRN.

## 2020-06-25 ENCOUNTER — OFFICE VISIT (OUTPATIENT)
Dept: ORTHOPEDICS | Facility: CLINIC | Age: 75
End: 2020-06-25
Payer: MEDICARE

## 2020-06-25 VITALS
WEIGHT: 206 LBS | DIASTOLIC BLOOD PRESSURE: 66 MMHG | HEIGHT: 70 IN | SYSTOLIC BLOOD PRESSURE: 120 MMHG | HEART RATE: 80 BPM | BODY MASS INDEX: 29.49 KG/M2

## 2020-06-25 DIAGNOSIS — S83.241D ACUTE MEDIAL MENISCUS TEAR OF RIGHT KNEE, SUBSEQUENT ENCOUNTER: ICD-10-CM

## 2020-06-25 DIAGNOSIS — S83.281D ACUTE LATERAL MENISCUS TEAR OF RIGHT KNEE, SUBSEQUENT ENCOUNTER: ICD-10-CM

## 2020-06-25 DIAGNOSIS — Z98.890 S/P RIGHT KNEE ARTHROSCOPY: ICD-10-CM

## 2020-06-25 DIAGNOSIS — M94.261 CHONDROMALACIA OF RIGHT KNEE: Primary | ICD-10-CM

## 2020-06-25 PROCEDURE — 99999 PR PBB SHADOW E&M-EST. PATIENT-LVL IV: ICD-10-PCS | Mod: PBBFAC,,, | Performed by: ORTHOPAEDIC SURGERY

## 2020-06-25 PROCEDURE — 99213 OFFICE O/P EST LOW 20 MIN: CPT | Mod: 25,S$PBB,, | Performed by: ORTHOPAEDIC SURGERY

## 2020-06-25 PROCEDURE — 99999 PR PBB SHADOW E&M-EST. PATIENT-LVL IV: CPT | Mod: PBBFAC,,, | Performed by: ORTHOPAEDIC SURGERY

## 2020-06-25 PROCEDURE — 20610 LARGE JOINT ASPIRATION/INJECTION: R KNEE: ICD-10-PCS | Mod: S$PBB,RT,, | Performed by: ORTHOPAEDIC SURGERY

## 2020-06-25 PROCEDURE — 99213 PR OFFICE/OUTPT VISIT, EST, LEVL III, 20-29 MIN: ICD-10-PCS | Mod: 25,S$PBB,, | Performed by: ORTHOPAEDIC SURGERY

## 2020-06-25 PROCEDURE — 99214 OFFICE O/P EST MOD 30 MIN: CPT | Mod: PBBFAC,25 | Performed by: ORTHOPAEDIC SURGERY

## 2020-06-25 PROCEDURE — 20610 DRAIN/INJ JOINT/BURSA W/O US: CPT | Mod: PBBFAC | Performed by: ORTHOPAEDIC SURGERY

## 2020-06-25 RX ORDER — CELECOXIB 200 MG/1
200 CAPSULE ORAL DAILY
Qty: 30 CAPSULE | Refills: 3 | Status: SHIPPED | OUTPATIENT
Start: 2020-06-25 | End: 2021-01-25 | Stop reason: SDUPTHER

## 2020-06-25 RX ADMIN — Medication 48 MG: at 01:06

## 2020-06-25 NOTE — PROGRESS NOTES
Subjective:     Patient ID: Kaylin Mccollum is a 75 y.o. female.    Chief Complaint: Pain of the Right Knee    HPI:  75-year-old retired from  who had been falling down stairs numerous occasions she has been having pain and catching and locking since several months now.  She gets up the knee catches she has to wiggle it some how to unlock it before she can walk. She does have quite a bit of lumbar pain that radiates with burning sensation down to the right anterior tibia and dorsal of the foot.  She is under the care of pain management for that.  Pain in the knee is around 4/10 with catching locking feeling.  Able to ambulate once the catching locking goes away for her minimum 200-300 yd without discomfort.  Unable to squat unable to do stairs due to the catching locking feeling.  She does ambulate without any assistive devices.  She does have an MRI in the electronic records.  At 1 point she had falling down the stairs and sustained right shoulder rotator cuff tear requiring repair.  Denies any numbness tingling in the hands.  She does have some cervical lumbar pain    05/21/2020  Status post her right knee arthroscopy 03/10/2020.  Findings of complex medial and lateral meniscus tears as well chondromalacia type 3 anterior and medial.  She was doing great postop for 5 weeks another side in twisted her knee and starting some pain.  Any twisting maneuver she hurts quite a bit.  Pain is 4/10.  She did her independent exercise program.  Denies any fever or chills or shortness of breath or difficulty chewing or swallowing.  Complains of some stiffness and swelling    06/25/2020  Right knee arthroscopy 03/10/2020 with complex medial and lateral meniscus tears as well as chondromalacia type 3 anterior medial compartment.  The other day could not get up from kneeling on the floor.  Her pain now is coming back as 6/10 with swelling and tightness in the knee.  Last visit given a steroid injection which helped for a  little bit but not too much.  She does take Celebrex 100 mg twice a day and a helps very little.  Last visit discussed injecting Synvisc-One into her knee and she wants to proceed.  I did tell her that Synvisc-One might take 6-8 weeks to see any effect.  In my not work and we had to resort to other treatments down the road.  Still feeling occasional catching.  Denies any fever chills shortness of breath difficulty chewing swallowing loss of bowel bladder control or loss of taste and smell  Past Medical History:   Diagnosis Date    Arthritis     Cataract     Diabetes mellitus     Diabetes mellitus, type 2     Fibromyalgia     General anesthetics causing adverse effect in therapeutic use     bradycardia     Hypertension     Migraines     Mitral valve prolapse     Osteoarthritis     Rotator cuff tear 4/1/2015     Past Surgical History:   Procedure Laterality Date    ARTHROSCOPY OF KNEE Right 3/10/2020    Procedure: ARTHROSCOPY, KNEE;  Surgeon: Les Schneider MD;  Location: [facility] OR;  Service: Orthopedics;  Laterality: Right;    CATARACT EXTRACTION W/  INTRAOCULAR LENS IMPLANT Left 07/19/2017    CATARACT EXTRACTION W/  INTRAOCULAR LENS IMPLANT Right 2017    CHOLECYSTECTOMY      CHONDROPLASTY OF KNEE Right 3/10/2020    Procedure: CHONDROPLASTY, KNEE;  Surgeon: Les Schneider MD;  Location: [facility] OR;  Service: Orthopedics;  Laterality: Right;  Anterior compartment     COLONOSCOPY N/A 9/25/2018    Procedure: COLONOSCOPY;  Surgeon: Bethel Carmona MD;  Location: [facility] ENDO;  Service: Endoscopy;  Laterality: N/A;    EXCISION OF MEDIAL MENISCUS OF KNEE Right 3/10/2020    Procedure: MENISCECTOMY, KNEE, MEDIAL;  Surgeon: Les Schneider MD;  Location: [facility] OR;  Service: Orthopedics;  Laterality: Right;  Partial, Medial , Lateral     EYE SURGERY      INJECTION OF ANESTHETIC AGENT AROUND NERVE Right 8/8/2019    Procedure: Right Genicular nerve block with local;  Surgeon: Manpreet Dutta MD;   Location: [facility];  Service: Pain Management;  Laterality: Right;    INJECTION OF ANESTHETIC AGENT INTO SACROILIAC JOINT Bilateral 5/6/2020    Procedure: Bilateral Sacroiliac Joint Injection;  Surgeon: Manpreet Dutta MD;  Location: [facility];  Service: Pain Management;  Laterality: Bilateral;    INJECTION OF JOINT Bilateral 9/5/2019    Procedure: Bilateral GT bursa injection;  Surgeon: Manpreet Dutta MD;  Location: [facility];  Service: Pain Management;  Laterality: Bilateral;    INJECTION OF JOINT Bilateral 5/6/2020    Procedure: Bilateral GT bursa injection;  Surgeon: Manpreet Dutta MD;  Location: [facility];  Service: Pain Management;  Laterality: Bilateral;    KNEE ARTHROSCOPY Bilateral     PARS PLANA VITRECTOMY W/ REPAIR OF MACULAR HOLE Left 02/22/2017    RADIOFREQUENCY THERMOCOAGULATION Left 7/11/2019    Procedure: Right SIJ RFA;  Surgeon: Manpreet Dutta MD;  Location: [facility];  Service: Pain Management;  Laterality: Left;    RADIOFREQUENCY THERMOCOAGULATION Right 7/25/2019    Procedure: Right SIJ RFA;  Surgeon: Manpreet Dutta MD;  Location: [facility];  Service: Pain Management;  Laterality: Right;    SHOULDER ARTHROSCOPY Right 06/04/15     Family History   Problem Relation Age of Onset    Heart disease Mother     Glaucoma Mother     Cataracts Mother     Cancer Mother         colon    Kidney disease Daughter     Anesthesia problems Daughter         cardiac arrest during nephrectomy    Diabetes Maternal Grandmother     Heart disease Maternal Grandmother     Diabetes Maternal Grandfather     Cancer Maternal Grandfather     Breast cancer Paternal Aunt      Social History     Socioeconomic History    Marital status:      Spouse name: Not on file    Number of children: Not on file    Years of education: Not on file    Highest education level: Not on file   Occupational History     Employer: [employer]   Social Needs    Financial resource strain: Not on file     Food insecurity     Worry: Not on file     Inability: Not on file    Transportation needs     Medical: Not on file     Non-medical: Not on file   Tobacco Use    Smoking status: Never Smoker    Smokeless tobacco: Never Used   Substance and Sexual Activity    Alcohol use: Yes     Alcohol/week: 0.0 standard drinks     Comment: Rarely  No alcohol 72h prior to sx    Drug use: No    Sexual activity: Not Currently   Lifestyle    Physical activity     Days per week: Not on file     Minutes per session: Not on file    Stress: Not on file   Relationships    Social connections     Talks on phone: Not on file     Gets together: Not on file     Attends [employer] service: Not on file     Active member of club or organization: Not on file     Attends meetings of clubs or organizations: Not on file     Relationship status: Not on file   Other Topics Concern    Not on file   Social History Narrative    . 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     Medication List with Changes/Refills   New Medications    CELECOXIB (CELEBREX) 200 MG CAPSULE    Take 1 capsule (200 mg total) by mouth once daily. Take with food   Current Medications    BIOTIN 1 MG TABLET    Take 1,000 mcg by mouth every morning.     CANAGLIFLOZIN (INVOKANA) 100 MG TAB    Take 1 tablet (100 mg total) by mouth once daily.    CELECOXIB (CELEBREX) 100 MG CAPSULE    Take 1 capsule (100 mg total) by mouth 2 (two) times daily.    DICLOFENAC SODIUM (VOLTAREN) 1 % GEL    Apply 4 g topically 3 (three) times daily as needed.    EZETIMIBE (ZETIA) 10 MG TABLET    Take 1 tablet (10 mg total) by mouth once daily.    FLUOXETINE 40 MG CAPSULE    TAKE 1 CAPSULE(40 MG) BY MOUTH EVERY DAY    FLUTICASONE (FLONASE) 50 MCG/ACTUATION NASAL SPRAY    2 sprays by Each Nare route once daily.    GABAPENTIN (NEURONTIN) 300 MG CAPSULE    TAKE 1 CAPSULE BY MOUTH TWICE DAILY    HYDROCODONE-ACETAMINOPHEN (NORCO)  MG PER TABLET    Take 1 tablet by mouth every 24 hours as needed.  "HYDROCODONE-ACETAMINOPHEN ([place])  MG ORAL TAB - 1 tab po q 6 hrs prn pain    HYDROCODONE-ACETAMINOPHEN (NORCO) 5-325 MG PER TABLET    Take 1 tablet by mouth every 8 (eight) hours as needed for Pain.    LOSARTAN (COZAAR) 25 MG TABLET    Take 1 tablet (25 mg total) by mouth once daily.    METFORMIN (GLUCOPHAGE) 1000 MG TABLET    TAKE 1 TABLET BY MOUTH TWICE DAILY WITH MEALS    TRAMADOL (ULTRAM) 50 MG TABLET    Take 1 tablet (50 mg total) by mouth every 12 (twelve) hours as needed for Pain.    TRIAMCINOLONE ACETONIDE 0.025% (KENALOG) 0.025 % OINT    Apply topically 2 (two) times daily. for 10 days    VERAPAMIL (CALAN) 120 MG TABLET    Take 1 tablet (120 mg total) by mouth 2 (two) times daily.     Review of patient's allergies indicates:   Allergen Reactions    Demerol [meperidine] Other (See Comments)     Burning when adm IV  Able to tolerate IM, "Turned the veins in my hand purple."    Latex, natural rubber Other (See Comments)     "Burns my skin",  Symptoms get worse the longer she is exposed    Zocor [simvastatin] Other (See Comments)     Tightening of muscles    Statins-hmg-coa reductase inhibitors Other (See Comments)     myopathy    Sulfa (sulfonamide antibiotics)      Blurred vision    Nickel sutures [surgical stainless steel] Rash     Any nickel     Review of Systems   Constitution: Negative for decreased appetite.   HENT: Negative for tinnitus.    Eyes: Negative for double vision.   Cardiovascular: Negative for chest pain.   Respiratory: Negative for wheezing.    Hematologic/Lymphatic: Negative for bleeding problem.   Skin: Negative for dry skin.   Musculoskeletal: Positive for arthritis, back pain, joint pain, neck pain and stiffness. Negative for gout.   Gastrointestinal: Negative for abdominal pain.   Genitourinary: Negative for bladder incontinence.   Neurological: Negative for numbness, paresthesias and sensory change.   Psychiatric/Behavioral: Negative for altered mental status. "       Objective:   Body mass index is 29.56 kg/m².  Vitals:    06/25/20 1343   BP: 120/66   Pulse: 80          General    Constitutional: She is oriented to person, place, and time. She appears well-developed.   HENT:   Head: Atraumatic.   Eyes: EOM are normal.   Cardiovascular: Normal rate.    Pulmonary/Chest: Effort normal.   Abdominal: Soft.   Neurological: She is alert and oriented to person, place, and time.   Psychiatric: Judgment normal.            Cervical spine with slight limitation of rotation with clicking in the neck. Some mild discomfort to extreme rotation.  Bilateral upper extremity neurovascularly intact. Radial ulnar pulses 2+.  Sensory intact to touch.  Motor strength is 5/5 throughout.  A lumbar with tenderness around L4-5 paraspinal and mostly to words the right side.  Pelvis is level  Straight leg raising slightly positive on the left negative on the right  Passive hip motion within normal limits.  No pain in the groin no pain over the greater trochanters.  Hip flexors, abductors, adductors, quads, hamstrings, ankle extensors and flexors all 5/5 and even bilaterally.  And left knee full motion collaterals and cruciate stable negative Kaleigh's negative pain to palpation.  Surgical scar from knee scope done years ago.  Right knee with mild to moderate swelling.  Surgical scars healed well.  Crepitus with motion anteriorly.  Painful to any twisting motion.  Collaterals and cruciates are stable to valgus varus stressing as well as anterior posterior drawer. Range of motion 0-120 degrees.  Calves are soft nontender  No pitting edema  EHL 5/5 plantar flexion 5/5.  DP 1+ PT 1+      Relevant imaging results reviewed and interpreted by me, discussed with the patient and / or family today   MRI reviewed with the patient showed her the pictures as well as reviewed the report consistent with medial and lateral meniscus stairs, patellofemoral chondromalacia as well as medially  Assessment:     Encounter  Diagnoses   Name Primary?    Chondromalacia of right knee Yes    Acute medial meniscus tear of right knee, subsequent encounter     Acute lateral meniscus tear of right knee, subsequent encounter     S/P right knee arthroscopy         Plan:   Chondromalacia of right knee  -     Large Joint Aspiration/Injection: R knee    Acute medial meniscus tear of right knee, subsequent encounter    Acute lateral meniscus tear of right knee, subsequent encounter    S/P right knee arthroscopy    Other orders  -     celecoxib (CELEBREX) 200 MG capsule; Take 1 capsule (200 mg total) by mouth once daily. Take with food  Dispense: 30 capsule; Refill: 3         Patient Instructions   Injection of the right knee with Synvisc-One 06/25/2020  Apply ice to the right knee for the next several days  May take Tylenol 650 mg 3 times a day as needed  Try to take Celebrex 200 mg once a day (new prescription is 100 mg twice a day)  Return to clinic in 8 weeks8    All questions asked and answered      Disclaimer: This note was prepared using a voice recognition system and is likely to have sound alike errors within the text.

## 2020-06-25 NOTE — PROCEDURES
Large Joint Aspiration/Injection: R knee    Date/Time: 6/25/2020 1:40 PM  Performed by: Les Schneider MD  Authorized by: Les Schneider MD     Consent Done?:  Yes (Verbal)  Site marked: the procedure site was marked    Timeout: prior to procedure the correct patient, procedure, and site was verified      Local anesthesia used?: Yes      Details:  Needle Size:  22 G  Ultrasonic Guidance for needle placement?: No    Approach:  Anterolateral  Location:  Knee  Site:  R knee  Medications:  48 mg hylan g-f 20 48 mg/6 mL  Patient tolerance:  Patient tolerated the procedure well with no immediate complications

## 2020-06-25 NOTE — PATIENT INSTRUCTIONS
Injection of the right knee with Synvisc-One 06/25/2020  Apply ice to the right knee for the next several days  May take Tylenol 650 mg 3 times a day as needed  Try to take Celebrex 200 mg once a day (new prescription is 100 mg twice a day)  Return to clinic in 8 weeks8

## 2020-06-30 ENCOUNTER — PATIENT MESSAGE (OUTPATIENT)
Dept: INTERNAL MEDICINE | Facility: CLINIC | Age: 75
End: 2020-06-30

## 2020-06-30 DIAGNOSIS — I15.2 HYPERTENSION ASSOCIATED WITH DIABETES: ICD-10-CM

## 2020-06-30 DIAGNOSIS — E11.59 HYPERTENSION ASSOCIATED WITH DIABETES: ICD-10-CM

## 2020-07-01 RX ORDER — LOSARTAN POTASSIUM 25 MG/1
25 TABLET ORAL DAILY
Qty: 90 TABLET | Refills: 3 | OUTPATIENT
Start: 2020-07-01 | End: 2021-07-01

## 2020-07-01 RX ORDER — LOSARTAN POTASSIUM 25 MG/1
TABLET ORAL
Qty: 90 TABLET | Refills: 1 | Status: SHIPPED | OUTPATIENT
Start: 2020-07-01 | End: 2021-01-19 | Stop reason: SDUPTHER

## 2020-07-21 ENCOUNTER — APPOINTMENT (OUTPATIENT)
Dept: OPHTHALMOLOGY | Facility: CLINIC | Age: 75
End: 2020-07-21
Payer: MEDICARE

## 2020-07-21 ENCOUNTER — OFFICE VISIT (OUTPATIENT)
Dept: OPHTHALMOLOGY | Facility: CLINIC | Age: 75
End: 2020-07-21
Payer: MEDICARE

## 2020-07-21 DIAGNOSIS — H40.013 OPEN ANGLE WITH BORDERLINE FINDINGS OF BOTH EYES: Primary | ICD-10-CM

## 2020-07-21 PROCEDURE — 99999 PR PBB SHADOW E&M-EST. PATIENT-LVL III: CPT | Mod: PBBFAC,,, | Performed by: OPTOMETRIST

## 2020-07-21 PROCEDURE — 99213 OFFICE O/P EST LOW 20 MIN: CPT | Mod: PBBFAC | Performed by: OPTOMETRIST

## 2020-07-21 PROCEDURE — 99999 PR PBB SHADOW E&M-EST. PATIENT-LVL III: ICD-10-PCS | Mod: PBBFAC,,, | Performed by: OPTOMETRIST

## 2020-07-21 PROCEDURE — 92133 POSTERIOR SEGMENT OCT OPTIC NERVE(OCULAR COHERENCE TOMOGRAPHY) - OU - BOTH EYES: ICD-10-PCS | Mod: 26,S$PBB,, | Performed by: OPTOMETRIST

## 2020-07-21 PROCEDURE — 92012 INTRM OPH EXAM EST PATIENT: CPT | Mod: S$PBB,,, | Performed by: OPTOMETRIST

## 2020-07-21 PROCEDURE — 92083 EXTENDED VISUAL FIELD XM: CPT | Mod: PBBFAC | Performed by: OPTOMETRIST

## 2020-07-21 PROCEDURE — 92133 CPTRZD OPH DX IMG PST SGM ON: CPT | Mod: PBBFAC | Performed by: OPTOMETRIST

## 2020-07-21 PROCEDURE — 92083 HUMPHREY VISUAL FIELD - OU - BOTH EYES: ICD-10-PCS | Mod: 26,S$PBB,, | Performed by: OPTOMETRIST

## 2020-07-21 PROCEDURE — 92012 PR EYE EXAM, EST PATIENT,INTERMED: ICD-10-PCS | Mod: S$PBB,,, | Performed by: OPTOMETRIST

## 2020-07-21 NOTE — PROGRESS NOTES
HPI     PT was last seen on 1/21/2020 with DNL for Dm eye exam. PT was told to   RTC  6 months for 24-2 VF, gOCT and IOP check.  Medication eye drops if any: None  Last HVF: 7/21/20  Last gOCT: 7/21/20  Last SDPs:1/21/20      Last edited by Silvia Kelly, PCT on 7/21/2020  2:40 PM. (History)              Assessment /Plan     For exam results, see Encounter Report.    Open angle with borderline findings of both eyes  -     Posterior Segment OCT Optic Nerve- Both eyes  -     Thomas Visual Field - OU - Extended - Both Eyes  Good IOPs today and stable gOCT OD, OS  VF OD shows nasal step but does not correlate with gOCT  Will monitor closely for now    RTC 3 months for IOP check or PRN if any problems.   Discussed above and answered questions.

## 2020-07-22 ENCOUNTER — LAB VISIT (OUTPATIENT)
Dept: LAB | Facility: HOSPITAL | Age: 75
End: 2020-07-22
Attending: FAMILY MEDICINE
Payer: MEDICARE

## 2020-07-22 ENCOUNTER — IMMUNIZATION (OUTPATIENT)
Dept: PHARMACY | Facility: CLINIC | Age: 75
End: 2020-07-22
Payer: MEDICARE

## 2020-07-22 ENCOUNTER — OFFICE VISIT (OUTPATIENT)
Dept: INTERNAL MEDICINE | Facility: CLINIC | Age: 75
End: 2020-07-22
Payer: MEDICARE

## 2020-07-22 VITALS
OXYGEN SATURATION: 94 % | HEART RATE: 92 BPM | BODY MASS INDEX: 29.01 KG/M2 | DIASTOLIC BLOOD PRESSURE: 72 MMHG | TEMPERATURE: 98 F | HEIGHT: 70 IN | SYSTOLIC BLOOD PRESSURE: 104 MMHG | WEIGHT: 202.63 LBS

## 2020-07-22 DIAGNOSIS — E11.69 HYPERLIPIDEMIA ASSOCIATED WITH TYPE 2 DIABETES MELLITUS: ICD-10-CM

## 2020-07-22 DIAGNOSIS — I15.2 HYPERTENSION ASSOCIATED WITH DIABETES: ICD-10-CM

## 2020-07-22 DIAGNOSIS — E11.69 DIABETES MELLITUS TYPE 2 IN OBESE: ICD-10-CM

## 2020-07-22 DIAGNOSIS — E11.59 HYPERTENSION ASSOCIATED WITH DIABETES: ICD-10-CM

## 2020-07-22 DIAGNOSIS — E11.59 HYPERTENSION ASSOCIATED WITH DIABETES: Primary | Chronic | ICD-10-CM

## 2020-07-22 DIAGNOSIS — E78.5 HYPERLIPIDEMIA ASSOCIATED WITH TYPE 2 DIABETES MELLITUS: ICD-10-CM

## 2020-07-22 DIAGNOSIS — F32.9 CHRONIC MAJOR DEPRESSIVE DISORDER: Chronic | ICD-10-CM

## 2020-07-22 DIAGNOSIS — E66.9 DIABETES MELLITUS TYPE 2 IN OBESE: ICD-10-CM

## 2020-07-22 DIAGNOSIS — I15.2 HYPERTENSION ASSOCIATED WITH DIABETES: Primary | Chronic | ICD-10-CM

## 2020-07-22 DIAGNOSIS — Z12.31 ENCOUNTER FOR SCREENING MAMMOGRAM FOR BREAST CANCER: ICD-10-CM

## 2020-07-22 PROCEDURE — 36415 COLL VENOUS BLD VENIPUNCTURE: CPT

## 2020-07-22 PROCEDURE — 83036 HEMOGLOBIN GLYCOSYLATED A1C: CPT

## 2020-07-22 PROCEDURE — 99999 PR PBB SHADOW E&M-EST. PATIENT-LVL III: ICD-10-PCS | Mod: PBBFAC,,, | Performed by: FAMILY MEDICINE

## 2020-07-22 PROCEDURE — 99213 OFFICE O/P EST LOW 20 MIN: CPT | Mod: PBBFAC | Performed by: FAMILY MEDICINE

## 2020-07-22 PROCEDURE — 82043 UR ALBUMIN QUANTITATIVE: CPT

## 2020-07-22 PROCEDURE — 99999 PR PBB SHADOW E&M-EST. PATIENT-LVL III: CPT | Mod: PBBFAC,,, | Performed by: FAMILY MEDICINE

## 2020-07-22 PROCEDURE — 99214 PR OFFICE/OUTPT VISIT, EST, LEVL IV, 30-39 MIN: ICD-10-PCS | Mod: S$PBB,,, | Performed by: FAMILY MEDICINE

## 2020-07-22 PROCEDURE — 99214 OFFICE O/P EST MOD 30 MIN: CPT | Mod: S$PBB,,, | Performed by: FAMILY MEDICINE

## 2020-07-22 NOTE — PROGRESS NOTES
Subjective:       Patient ID: Kaylin Mccollum is a 75 y.o. female.    Chief Complaint: Follow-up    Patient presents to clinic today for followup of hypertension, hyperlipidemia and diabetes. Hypertension controlled on losartan and verapamil. Hyperlipidemia controlled on zetia. Diabetes status pending labs on invokana and metformin.     Review of Systems   Constitutional: Positive for activity change. Negative for unexpected weight change.   HENT: Negative for hearing loss, rhinorrhea and trouble swallowing.    Eyes: Negative for discharge and visual disturbance.   Respiratory: Negative for chest tightness and wheezing.    Cardiovascular: Negative for chest pain and palpitations.   Gastrointestinal: Negative for blood in stool, constipation, diarrhea and vomiting.   Endocrine: Negative for polydipsia and polyuria.   Genitourinary: Negative for difficulty urinating, dysuria, hematuria and menstrual problem.   Musculoskeletal: Positive for arthralgias and neck pain. Negative for joint swelling.   Neurological: Positive for weakness and headaches.   Psychiatric/Behavioral: Negative for confusion and dysphoric mood.       Objective:      Physical Exam  Vitals signs reviewed.   Constitutional:       General: She is not in acute distress.     Appearance: She is well-developed.   HENT:      Head: Normocephalic and atraumatic.   Eyes:      General: No scleral icterus.     Conjunctiva/sclera: Conjunctivae normal.      Pupils: Pupils are equal, round, and reactive to light.   Pulmonary:      Effort: Pulmonary effort is normal.   Neurological:      Mental Status: She is alert and oriented to person, place, and time.      Cranial Nerves: No cranial nerve deficit.      Gait: Gait normal.           Protective Sensation (w/ 10 gram monofilament):  Right: Intact  Left: Intact    Visual Inspection:  Normal -  Bilateral    Pedal Pulses:   Right: Present  Left: Present    Posterior tibialis:   Right:Present  Left:  Present        Assessment:       1. Hypertension associated with diabetes    2. Hyperlipidemia associated with type 2 diabetes mellitus    3. Diabetes mellitus type 2 in obese    4. Encounter for screening mammogram for breast cancer    5. Chronic major depressive disorder        Plan:     Problem List Items Addressed This Visit     Chronic major depressive disorder (Chronic)    Current Assessment & Plan     Stable on prozac         Diabetes mellitus type 2 in obese    Current Assessment & Plan     Status pending labs, continue invokana and metformin           Relevant Orders    Microalbumin/creatinine urine ratio    Hyperlipidemia associated with type 2 diabetes mellitus    Current Assessment & Plan     Controlled on zetia         Hypertension associated with diabetes - Primary (Chronic)    Current Assessment & Plan     Controlled, continue losartan and verapamil           Other Visit Diagnoses     Encounter for screening mammogram for breast cancer        Relevant Orders    Mammo Digital Screening [facility] reviewed/updated.

## 2020-07-23 LAB
ALBUMIN/CREAT UR: 11.6 UG/MG (ref 0–30)
CREAT UR-MCNC: 69 MG/DL (ref 15–325)
ESTIMATED AVG GLUCOSE: 151 MG/DL (ref 68–131)
HBA1C MFR BLD HPLC: 6.9 % (ref 4–5.6)
MICROALBUMIN UR DL<=1MG/L-MCNC: 8 UG/ML

## 2020-08-18 ENCOUNTER — TELEPHONE (OUTPATIENT)
Dept: PAIN MEDICINE | Facility: CLINIC | Age: 75
End: 2020-08-18

## 2020-08-18 DIAGNOSIS — M70.62 GREATER TROCHANTERIC BURSITIS OF BOTH HIPS: ICD-10-CM

## 2020-08-18 DIAGNOSIS — M47.816 LUMBAR SPONDYLOSIS: Primary | ICD-10-CM

## 2020-08-18 DIAGNOSIS — M70.61 GREATER TROCHANTERIC BURSITIS OF BOTH HIPS: ICD-10-CM

## 2020-08-18 DIAGNOSIS — M53.3 SACROILIAC JOINT PAIN: ICD-10-CM

## 2020-08-18 NOTE — TELEPHONE ENCOUNTER
Spoke with patient regarding appointment on 08/27/2020. Patient stated that she is having increased pain in her lower back and hips. Patient states that it is the same pain that she has seen Dr. Dutta for before. She also stated that she was told if she starts hurting again to call and that Dr. Dutta will work her in for an injection. Advised patient that Dr. Dutta is in procedures right now but that he will be back in clinic later this afternoon. I will talk to Dr. Dutta regarding the procedure and give her a call back. Patient verbalized understanding.

## 2020-08-18 NOTE — TELEPHONE ENCOUNTER
----- Message from Karley Lowry sent at 8/18/2020 12:48 PM CDT -----  Contact: pt  Pt requesting to have 8/27 appt rescheduled for something sooner. Please call pt back at [phone]

## 2020-08-18 NOTE — TELEPHONE ENCOUNTER
Spoke with patient to schedule procedure per Dr. Dutta. Patient stated that she is currently on a round of antibiotics for an upcoming dental procedure on 09/03/2020. Advised patient that unfortunately we can not get her in for a sooner appointment for her procedure with Dr. Dutta because she will have to finish that round of antibiotics and be off of them for atleast 1.5 weeks prior to her procedure. Patient verbalized understanding and wanted to know if she could be put on the schedule for procedure with Dr. Dutta as soon as possible after her dental procedure on 09/03/2020.  Injection scheduled. Pre-injection instructions taught and mailed to patient. Advised patient to let us know if she gets put on another round of antibiotics after her dental procedure because we will have to reschedule her again if so. Patient verbalized understanding. All questions answered.

## 2020-08-20 ENCOUNTER — OFFICE VISIT (OUTPATIENT)
Dept: ORTHOPEDICS | Facility: CLINIC | Age: 75
End: 2020-08-20
Payer: MEDICARE

## 2020-08-20 VITALS
BODY MASS INDEX: 28.92 KG/M2 | HEART RATE: 77 BPM | DIASTOLIC BLOOD PRESSURE: 65 MMHG | SYSTOLIC BLOOD PRESSURE: 115 MMHG | WEIGHT: 202 LBS | HEIGHT: 70 IN

## 2020-08-20 DIAGNOSIS — M94.261 CHONDROMALACIA, RIGHT KNEE: Primary | ICD-10-CM

## 2020-08-20 DIAGNOSIS — Z98.890 S/P RIGHT KNEE ARTHROSCOPY: ICD-10-CM

## 2020-08-20 DIAGNOSIS — S83.281S ACUTE LATERAL MENISCUS TEAR OF RIGHT KNEE, SEQUELA: ICD-10-CM

## 2020-08-20 DIAGNOSIS — S83.241S ACUTE MEDIAL MENISCUS TEAR OF RIGHT KNEE, SEQUELA: ICD-10-CM

## 2020-08-20 PROCEDURE — 20610 DRAIN/INJ JOINT/BURSA W/O US: CPT | Mod: PBBFAC | Performed by: ORTHOPAEDIC SURGERY

## 2020-08-20 PROCEDURE — 99214 OFFICE O/P EST MOD 30 MIN: CPT | Mod: PBBFAC | Performed by: ORTHOPAEDIC SURGERY

## 2020-08-20 PROCEDURE — 99213 OFFICE O/P EST LOW 20 MIN: CPT | Mod: 25,S$PBB,, | Performed by: ORTHOPAEDIC SURGERY

## 2020-08-20 PROCEDURE — 99213 PR OFFICE/OUTPT VISIT, EST, LEVL III, 20-29 MIN: ICD-10-PCS | Mod: 25,S$PBB,, | Performed by: ORTHOPAEDIC SURGERY

## 2020-08-20 PROCEDURE — 99999 PR PBB SHADOW E&M-EST. PATIENT-LVL IV: CPT | Mod: PBBFAC,,, | Performed by: ORTHOPAEDIC SURGERY

## 2020-08-20 PROCEDURE — 99999 PR PBB SHADOW E&M-EST. PATIENT-LVL IV: ICD-10-PCS | Mod: PBBFAC,,, | Performed by: ORTHOPAEDIC SURGERY

## 2020-08-20 PROCEDURE — 20610 LARGE JOINT ASPIRATION/INJECTION: R KNEE: ICD-10-PCS | Mod: S$PBB,RT,, | Performed by: ORTHOPAEDIC SURGERY

## 2020-08-20 RX ORDER — METHYLPREDNISOLONE ACETATE 80 MG/ML
80 INJECTION, SUSPENSION INTRA-ARTICULAR; INTRALESIONAL; INTRAMUSCULAR; SOFT TISSUE
Status: DISCONTINUED | OUTPATIENT
Start: 2020-08-20 | End: 2020-08-20 | Stop reason: HOSPADM

## 2020-08-20 RX ADMIN — METHYLPREDNISOLONE ACETATE 80 MG: 80 INJECTION, SUSPENSION INTRALESIONAL; INTRAMUSCULAR; INTRASYNOVIAL; SOFT TISSUE at 03:08

## 2020-08-20 NOTE — PROCEDURES
Large Joint Aspiration/Injection: R knee    Date/Time: 8/20/2020 3:00 PM  Performed by: Les Schneider MD  Authorized by: Les Schneider MD     Consent Done?:  Yes (Verbal)  Site marked: the procedure site was marked    Timeout: prior to procedure the correct patient, procedure, and site was verified      Local anesthesia used?: Yes    Local anesthetic:  Lidocaine 1% without epinephrine    Details:  Needle Size:  22 G  Ultrasonic Guidance for needle placement?: No    Approach:  Anterolateral  Location:  Knee  Site:  R knee  Medications:  80 mg methylPREDNISolone acetate 80 mg/mL  Patient tolerance:  Patient tolerated the procedure well with no immediate complications

## 2020-08-20 NOTE — PROGRESS NOTES
Subjective:     Patient ID: Kaylin Mccollum is a 75 y.o. female.    Chief Complaint: Pain of the Right Knee    HPI:  75-year-old retired from  who had been falling down stairs numerous occasions she has been having pain and catching and locking since several months now.  She gets up the knee catches she has to wiggle it some how to unlock it before she can walk. She does have quite a bit of lumbar pain that radiates with burning sensation down to the right anterior tibia and dorsal of the foot.  She is under the care of pain management for that.  Pain in the knee is around 4/10 with catching locking feeling.  Able to ambulate once the catching locking goes away for her minimum 200-300 yd without discomfort.  Unable to squat unable to do stairs due to the catching locking feeling.  She does ambulate without any assistive devices.  She does have an MRI in the electronic records.  At 1 point she had falling down the stairs and sustained right shoulder rotator cuff tear requiring repair.  Denies any numbness tingling in the hands.  She does have some cervical lumbar pain    05/21/2020  Status post her right knee arthroscopy 03/10/2020.  Findings of complex medial and lateral meniscus tears as well chondromalacia type 3 anterior and medial.  She was doing great postop for 5 weeks another side in twisted her knee and starting some pain.  Any twisting maneuver she hurts quite a bit.  Pain is 4/10.  She did her independent exercise program.  Denies any fever or chills or shortness of breath or difficulty chewing or swallowing.  Complains of some stiffness and swelling    06/25/2020  Right knee arthroscopy 03/10/2020 with complex medial and lateral meniscus tears as well as chondromalacia type 3 anterior medial compartment.  The other day could not get up from kneeling on the floor.  Her pain now is coming back as 6/10 with swelling and tightness in the knee.  Last visit given a steroid injection which helped for a  little bit but not too much.  She does take Celebrex 100 mg twice a day and a helps very little.  Last visit discussed injecting Synvisc-One into her knee and she wants to proceed.  I did tell her that Synvisc-One might take 6-8 weeks to see any effect.  In my not work and we had to resort to other treatments down the road.  Still feeling occasional catching.  Denies any fever chills shortness of breath difficulty chewing swallowing loss of bowel bladder control or loss of taste and smell    08/20/2020  Chondromalacia of the right knee anterior and medial compartment and status post partial medial lateral meniscectomy.  Synvisc-One given 06/25/2020 seems to have helped but still having quite excessive pain right now since her daughter was diagnosed with PE and had to run around with her.  But overall she feels Synvisc-One seems to have helped.  She is requesting a steroid injection today.  She does take Celebrex at night.  She is to have back injections she is holding off at this point.  Pain is 4/10.  Denies any fever or chills or shortness of breath or difficulty chewing or swallowing loss of bowel bladder control  Past Medical History:   Diagnosis Date    Arthritis     Cataract     Diabetes mellitus     Diabetes mellitus, type 2     Fibromyalgia     General anesthetics causing adverse effect in therapeutic use     bradycardia     Hypertension     Migraines     Mitral valve prolapse     Osteoarthritis     Rotator cuff tear 4/1/2015     Past Surgical History:   Procedure Laterality Date    ARTHROSCOPY OF KNEE Right 3/10/2020    Procedure: ARTHROSCOPY, KNEE;  Surgeon: Les Schneider MD;  Location: [facility];  Service: Orthopedics;  Laterality: Right;    CATARACT EXTRACTION W/  INTRAOCULAR LENS IMPLANT Left 07/19/2017    CATARACT EXTRACTION W/  INTRAOCULAR LENS IMPLANT Right 2017    CHOLECYSTECTOMY      CHONDROPLASTY OF KNEE Right 3/10/2020    Procedure: CHONDROPLASTY, KNEE;  Surgeon: Les GABRIEL  MD Wyatt;  Location: [facility] OR;  Service: Orthopedics;  Laterality: Right;  Anterior compartment     COLONOSCOPY N/A 9/25/2018    Procedure: COLONOSCOPY;  Surgeon: Bethel Carmona MD;  Location: [facility] ENDO;  Service: Endoscopy;  Laterality: N/A;    EXCISION OF MEDIAL MENISCUS OF KNEE Right 3/10/2020    Procedure: MENISCECTOMY, KNEE, MEDIAL;  Surgeon: Les Schneider MD;  Location: [facility] OR;  Service: Orthopedics;  Laterality: Right;  Partial, Medial , Lateral     EYE SURGERY      INJECTION OF ANESTHETIC AGENT AROUND NERVE Right 8/8/2019    Procedure: Right Genicular nerve block with local;  Surgeon: Manpreet Dutta MD;  Location: [facility] PAIN MGT;  Service: Pain Management;  Laterality: Right;    INJECTION OF ANESTHETIC AGENT INTO SACROILIAC JOINT Bilateral 5/6/2020    Procedure: Bilateral Sacroiliac Joint Injection;  Surgeon: Manpreet Dutta MD;  [facility];  Service: Pain Management;  Laterality: Bilateral;    INJECTION OF JOINT Bilateral 9/5/2019    Procedure: Bilateral GT bursa injection;  Surgeon: Manpreet Dutta MD;  [facility];  Service: Pain Management;  Laterality: Bilateral;    INJECTION OF JOINT Bilateral 5/6/2020    Procedure: Bilateral GT bursa injection;  Surgeon: Manpreet Dutta MD;  Location: [facility] PAIN [facility]GT;  Service: Pain Management;  Laterality: Bilateral;    KNEE ARTHROSCOPY Bilateral     PARS PLANA VITRECTOMY W/ REPAIR OF MACULAR HOLE Left 02/22/2017    RADIOFREQUENCY THERMOCOAGULATION Left 7/11/2019    Procedure: Right SIJ RFA;  Surgeon: Manpreet Dutta MD;  [facility];  Service: Pain Management;  Laterality: Left;    RADIOFREQUENCY THERMOCOAGULATION Right 7/25/2019    Procedure: Right SIJ RFA;  Surgeon: Manpreet Dutta MD;  [facility];  Service: Pain Management;  Laterality: Right;    SHOULDER ARTHROSCOPY Right 06/04/15     Family History   Problem Relation Age of Onset    Heart disease Mother     Glaucoma Mother     Cataracts Mother      Cancer Mother         colon    Kidney disease Daughter     Anesthesia problems Daughter         cardiac arrest during nephrectomy    Diabetes Maternal Grandmother     Heart disease Maternal Grandmother     Diabetes Maternal Grandfather     Cancer Maternal Grandfather     Breast cancer Paternal Aunt      Social History     Socioeconomic History    Marital status:      Spouse name: Not on file    Number of children: Not on file    Years of education: Not on file    Highest education level: Not on file   Occupational History     Employer: [employer]   Social Needs    Financial resource strain: Not hard at all    Food insecurity     Worry: Never true     Inability: Never true    Transportation needs     Medical: No     Non-medical: No   Tobacco Use    Smoking status: Never Smoker    Smokeless tobacco: Never Used   Substance and Sexual Activity    Alcohol use: Yes     Alcohol/week: 0.0 standard drinks     Frequency: Never     Drinks per session: Patient refused     Binge frequency: Never     Comment: Rarely  No alcohol 72h prior to sx    Drug use: No    Sexual activity: Not Currently   Lifestyle    Physical activity     Days per week: 0 days     Minutes per session: 0 min    Stress: Not at all   Relationships    Social connections     Talks on phone: More than three times a week     Gets together: More than three times a week     Attends Gnosticist service: Not on file     Active member of club or organization: No     Attends meetings of clubs or organizations: Never     Relationship status:    Other Topics Concern    Not on file   Social History Narrative    . 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     Medication List with Changes/Refills   Current Medications    BIOTIN 1 MG TABLET    Take 1,000 mcg by mouth every morning.     CANAGLIFLOZIN (INVOKANA) 100 MG TAB    Take 1 tablet (100 mg total) by mouth once daily.    CELECOXIB (CELEBREX) 200 MG CAPSULE    Take 1 capsule  "(200 mg total) by mouth once daily. Take with food    DICLOFENAC SODIUM (VOLTAREN) 1 % GEL    Apply 4 g topically 3 (three) times daily as needed.    EZETIMIBE (ZETIA) 10 MG TABLET    Take 1 tablet (10 mg total) by mouth once daily.    FLUOXETINE 40 MG CAPSULE    TAKE 1 CAPSULE(40 MG) BY MOUTH EVERY DAY    FLUTICASONE (FLONASE) 50 MCG/ACTUATION NASAL SPRAY    2 sprays by Each Nare route once daily.    GABAPENTIN (NEURONTIN) 300 MG CAPSULE    TAKE 1 CAPSULE BY MOUTH TWICE DAILY    LOSARTAN (COZAAR) 25 MG TABLET    TAKE 1 TABLET(25 MG) BY MOUTH EVERY DAY    METFORMIN (GLUCOPHAGE) 1000 MG TABLET    TAKE 1 TABLET BY MOUTH TWICE DAILY WITH MEALS    TRAMADOL (ULTRAM) 50 MG TABLET    Take 1 tablet (50 mg total) by mouth every 12 (twelve) hours as needed for Pain.    TRIAMCINOLONE ACETONIDE 0.025% (KENALOG) 0.025 % OINT    Apply topically 2 (two) times daily. for 10 days    VERAPAMIL (CALAN) 120 MG TABLET    Take 1 tablet (120 mg total) by mouth 2 (two) times daily.     Review of patient's allergies indicates:   Allergen Reactions    Demerol [meperidine] Other (See Comments)     Burning when adm IV  Able to tolerate IM, "Turned the veins in my hand purple."    Latex, natural rubber Other (See Comments)     "Burns my skin",  Symptoms get worse the longer she is exposed    Zocor [simvastatin] Other (See Comments)     Tightening of muscles    Statins-hmg-coa reductase inhibitors Other (See Comments)     myopathy    Sulfa (sulfonamide antibiotics)      Blurred vision    Nickel sutures [surgical stainless steel] Rash     Any nickel     Review of Systems   Constitution: Negative for decreased appetite.   HENT: Negative for tinnitus.    Eyes: Negative for double vision.   Cardiovascular: Negative for chest pain.   Respiratory: Negative for wheezing.    Hematologic/Lymphatic: Negative for bleeding problem.   Skin: Negative for dry skin.   Musculoskeletal: Positive for arthritis, back pain, joint pain, neck pain and stiffness. " Negative for gout.   Gastrointestinal: Negative for abdominal pain.   Genitourinary: Negative for bladder incontinence.   Neurological: Negative for numbness, paresthesias and sensory change.   Psychiatric/Behavioral: Negative for altered mental status.       Objective:   Body mass index is 28.98 kg/m².  Vitals:    08/20/20 1514   BP: 115/65   Pulse: 77          General    Constitutional: She is oriented to person, place, and time. She appears well-developed.   HENT:   Head: Atraumatic.   Eyes: EOM are normal.   Cardiovascular: Normal rate.    Pulmonary/Chest: Effort normal.   Abdominal: Soft.   Neurological: She is alert and oriented to person, place, and time.   Psychiatric: Judgment normal.            Cervical spine with slight limitation of rotation with clicking in the neck. Some mild discomfort to extreme rotation.  Bilateral upper extremity neurovascularly intact. Radial ulnar pulses 2+.  Sensory intact to touch.  Motor strength is 5/5 throughout.  A lumbar with tenderness around L4-5 paraspinal and mostly to words the right side.  Pelvis is level  Straight leg raising slightly positive on the left negative on the right  Passive hip motion within normal limits.  No pain in the groin no pain over the greater trochanters.  Hip flexors, abductors, adductors, quads, hamstrings, ankle extensors and flexors all 5/5 and even bilaterally.  And left knee full motion collaterals and cruciate stable negative Kaleigh's negative pain to palpation.  Surgical scar from knee scope done years ago.  Right knee with mild to moderate swelling.  Surgical scars healed well.  Crepitus with motion anteriorly.  Painful to any twisting motion.  Collaterals and cruciates are stable to valgus varus stressing as well as anterior posterior drawer. Range of motion 0-120 degrees.  Calves are soft nontender.  Multiple varicositiesNo pitting edema  EHL 5/5 plantar flexion 5/5.  DP 1+ PT 1+  Skin is warm to touch no obvious  lesions    Relevant imaging results reviewed and interpreted by me, discussed with the patient and / or family today   MRI reviewed with the patient showed her the pictures as well as reviewed the report consistent with medial and lateral meniscus stairs, patellofemoral chondromalacia as well as medially  Assessment:     Encounter Diagnoses   Name Primary?    Chondromalacia, right knee Yes    Acute medial meniscus tear of right knee, sequela     Acute lateral meniscus tear of right knee, sequela     S/P right knee arthroscopy         Plan:   Chondromalacia, right knee  -     Large Joint Aspiration/Injection: R knee    Acute medial meniscus tear of right knee, sequela    Acute lateral meniscus tear of right knee, sequela    S/P right knee arthroscopy         Patient Instructions   Synvisc-One/rooster comb given and the right knee on 06/25/2020 could be repeated once every 6 months so you do in December  Steroid injection last given 05/21/2020 into the right knee.  We will inject today 80 mg Depo-Medrol mixed with 5 cc 1% lidocaine 08/20/2020  Continue with Celebrex and Tylenol as needed  Continue with the brace  Return to clinic in 3 months    All questions asked and answered      Disclaimer: This note was prepared using a voice recognition system and is likely to have sound alike errors within the text.

## 2020-08-20 NOTE — PATIENT INSTRUCTIONS
Synvisc-One/rooster comb given and the right knee on 06/25/2020 could be repeated once every 6 months so you do in December  Steroid injection last given 05/21/2020 into the right knee.  We will inject today 80 mg Depo-Medrol mixed with 5 cc 1% lidocaine 08/20/2020  Continue with Celebrex and Tylenol as needed  Continue with the brace  Return to clinic in 3 months

## 2020-08-28 RX ORDER — HYDROCODONE BITARTRATE AND ACETAMINOPHEN 10; 325 MG/1; MG/1
1 TABLET ORAL EVERY 6 HOURS PRN
Qty: 10 TABLET | Refills: 0 | Status: ON HOLD | OUTPATIENT
Start: 2020-08-28 | End: 2022-04-28 | Stop reason: HOSPADM

## 2020-09-03 NOTE — PRE-PROCEDURE INSTRUCTIONS
Spoke with patient in regards to procedure on 9/10 with Dr. Dutta. Patient had dental procedure today and prescribed Amoxicillin. Patient will call MD to reschedule.

## 2020-09-10 DIAGNOSIS — F32.9 CHRONIC MAJOR DEPRESSIVE DISORDER: Chronic | ICD-10-CM

## 2020-09-10 DIAGNOSIS — M79.7 FIBROMYALGIA: Chronic | ICD-10-CM

## 2020-09-10 RX ORDER — FLUOXETINE HYDROCHLORIDE 40 MG/1
CAPSULE ORAL
Qty: 90 CAPSULE | Refills: 1 | Status: SHIPPED | OUTPATIENT
Start: 2020-09-10 | End: 2021-04-28

## 2020-09-25 ENCOUNTER — TELEPHONE (OUTPATIENT)
Dept: PAIN MEDICINE | Facility: CLINIC | Age: 75
End: 2020-09-25

## 2020-09-25 NOTE — TELEPHONE ENCOUNTER
Patient called stating that she is ready to reschedule her procedure with Dr. Dutta. Injection scheduled. Pre-injection instructions taught and mailed to patient. Patient verbalized understanding. All questions answered.

## 2020-10-01 NOTE — PRE-PROCEDURE INSTRUCTIONS
Spoke with patient regarding procedure scheduled on 10/8    Arrival time 0640    Has patient been sick with fever or on antibiotics within the last 7 days? No    Has patient received a vaccination within the last 7 days? no    Has the patient stopped all medications as directed? Hold invokana and metformin AM of procedure.     Does patient have a pacemaker and or defibrillator? no    Does the patient have a ride to and from procedure and someone reliable to remain with patient?  Daughter Yolette [phone]    Is the patient diabetic? yes    Does the patient have sleep apnea? Or use O2 at home? No and no     Is the patient receiving sedation? yes    Is the patient instructed to remain NPO beginning at midnight the night before their procedure? yes    Procedure location confirmed with patient? Yes    Covid- Denies signs/symptoms. Instructed to notify PAT/MD if any changes.

## 2020-10-08 ENCOUNTER — HOSPITAL ENCOUNTER (OUTPATIENT)
Facility: HOSPITAL | Age: 75
Discharge: HOME | End: 2020-10-08
Attending: ANESTHESIOLOGY | Admitting: ANESTHESIOLOGY
Payer: MEDICARE

## 2020-10-08 VITALS
TEMPERATURE: 98 F | SYSTOLIC BLOOD PRESSURE: 163 MMHG | BODY MASS INDEX: 30.13 KG/M2 | DIASTOLIC BLOOD PRESSURE: 75 MMHG | HEART RATE: 70 BPM | WEIGHT: 210.44 LBS | OXYGEN SATURATION: 97 % | HEIGHT: 70 IN | RESPIRATION RATE: 19 BRPM

## 2020-10-08 DIAGNOSIS — M53.3 SACROILIAC JOINT DYSFUNCTION: ICD-10-CM

## 2020-10-08 LAB — POCT GLUCOSE: 122 MG/DL (ref 70–110)

## 2020-10-08 PROCEDURE — 99152 MOD SED SAME PHYS/QHP 5/>YRS: CPT | Performed by: ANESTHESIOLOGY

## 2020-10-08 PROCEDURE — 27096 PR INJECTION,SACROILIAC JOINT: ICD-10-PCS | Mod: 50,,, | Performed by: ANESTHESIOLOGY

## 2020-10-08 PROCEDURE — 25500020 PHARM REV CODE 255: Performed by: ANESTHESIOLOGY

## 2020-10-08 PROCEDURE — 20610 PR DRAIN/INJECT LARGE JOINT/BURSA: ICD-10-PCS | Mod: 59,50,, | Performed by: ANESTHESIOLOGY

## 2020-10-08 PROCEDURE — 25000003 PHARM REV CODE 250: Performed by: ANESTHESIOLOGY

## 2020-10-08 PROCEDURE — 27096 INJECT SACROILIAC JOINT: CPT | Mod: 50 | Performed by: ANESTHESIOLOGY

## 2020-10-08 PROCEDURE — 63600175 PHARM REV CODE 636 W HCPCS: Performed by: ANESTHESIOLOGY

## 2020-10-08 PROCEDURE — 27096 INJECT SACROILIAC JOINT: CPT | Mod: 50,,, | Performed by: ANESTHESIOLOGY

## 2020-10-08 PROCEDURE — 20610 DRAIN/INJ JOINT/BURSA W/O US: CPT | Mod: 59,50,, | Performed by: ANESTHESIOLOGY

## 2020-10-08 PROCEDURE — 20610 DRAIN/INJ JOINT/BURSA W/O US: CPT | Mod: 50 | Performed by: ANESTHESIOLOGY

## 2020-10-08 RX ORDER — LIDOCAINE HYDROCHLORIDE 20 MG/ML
INJECTION, SOLUTION EPIDURAL; INFILTRATION; INTRACAUDAL; PERINEURAL
Status: DISCONTINUED | OUTPATIENT
Start: 2020-10-08 | End: 2020-10-08 | Stop reason: HOSPADM

## 2020-10-08 RX ORDER — MIDAZOLAM HYDROCHLORIDE 1 MG/ML
INJECTION, SOLUTION INTRAMUSCULAR; INTRAVENOUS
Status: DISCONTINUED | OUTPATIENT
Start: 2020-10-08 | End: 2020-10-08 | Stop reason: HOSPADM

## 2020-10-08 RX ORDER — FENTANYL CITRATE 50 UG/ML
INJECTION, SOLUTION INTRAMUSCULAR; INTRAVENOUS
Status: DISCONTINUED | OUTPATIENT
Start: 2020-10-08 | End: 2020-10-08 | Stop reason: HOSPADM

## 2020-10-08 RX ORDER — LIDOCAINE HYDROCHLORIDE 10 MG/ML
INJECTION, SOLUTION EPIDURAL; INFILTRATION; INTRACAUDAL; PERINEURAL
Status: DISCONTINUED | OUTPATIENT
Start: 2020-10-08 | End: 2020-10-08 | Stop reason: HOSPADM

## 2020-10-08 RX ORDER — METHYLPREDNISOLONE ACETATE 40 MG/ML
INJECTION, SUSPENSION INTRA-ARTICULAR; INTRALESIONAL; INTRAMUSCULAR; SOFT TISSUE
Status: DISCONTINUED | OUTPATIENT
Start: 2020-10-08 | End: 2020-10-08 | Stop reason: HOSPADM

## 2020-10-08 NOTE — DISCHARGE INSTRUCTIONS

## 2020-10-08 NOTE — H&P
Chief Pain Complaint:  Back pain  Right knee pain     History of Present Illness:   Kaylin Mccollum is a 75 y.o. female  who is presenting with a chief complaint of Low-back Pain. The patient began experiencing this problem insidiously, and the pain has been gradually worsening over time. The pain is described as throbbing, cramping, aching and heavy and is located in the bilateral buttocks. Pain is intermittent and lasts hours. The pain is nonradiating. The patient rates her pain a 3 out of ten and interferes with activities of daily living a 3 out of ten. Pain is exacerbated by getting up from a seated position, and is improved by rest. Patient reports no prior trauma, no prior spinal surgery      Kaylin Mccollum is a 75 y.o. female  who is presenting with a chief complaint of lumbar back pain. The patient began experiencing this problem insidiously, and the pain has been gradually worsening over the past 5 month(s). The pain is described as throbbing, shooting, burning and electrical and is located in the bilateral lumbar spine. Pain is intermittent and lasts hours. The pain radiates to bilateral lower extremities L4 distribudution. The patient rates her pain a 3 out of ten and interferes with activities of daily living a 3 out of ten. Pain is exacerbated by flexion of the lumbar spine, ambulation, and is improved by rest.      She also complains of right knee pain. The patient began experiencing this problem insidiously. The pain is described as cramping, aching and is located in the right knee. Pain is intermittent and lasts hours. The pain is nonradiating. The patient rates her pain a 8 out of ten and interferes with activities of daily living a 7 out of ten. Pain is exacerbated by getting up from a seated position and standing, and is improved by rest. Patient reports no prior trauma, prior arthroscopy bilaterally in 1990s.        - pertinent negatives: No fever, No chills, No weight loss, No bladder  dysfunction, No bowel dysfunction, No saddle anesthesia  - pertinent positives: none    - medications, other therapies tried (physical therapy, injections):     >> NSAIDs, Tylenol, gabapentin and medrol dose pack    >> Has previously undergone Physical Therapy    >> Has previously undergone spinal injection/s              - Lumbar JAMIN with good relief in the past              - Bilateral SI Joint + GTB injection on 4/10/19 with great relief for about 4 weeks              - left SIJ RFA on 7/11/19 and right SIJ RFA on 7-25-19 with 80% pain relief              - right genicular nerve block on 8/8/19 with 90% pain relief              - bilateral GT bursa injections on 9/5/19 with 90% pain relief              - Bilateral SI and GTB 5/6/2020 with 60% pain relief.       Imaging / Labs / Studies (reviewed on 6/11/2020):          Results for orders placed during the hospital encounter of 11/23/15   MRI Lumbar Spine Without Contrast     Narrative Technique: Standard noncontrast multiplanar multisequence imaging of the lumbar spine was performed.  Findings: There is anatomic spinal alignment.  Very low degree of degenerative disk base narrowing and disk desiccation observed at L1-2, L2-3, L4-5, and L5-S1.  Vertebral marrow signal pattern and architecture are normal.  Distal cord is unremarkable with conus medullaris terminating at L1.  Small nerve root sleeve cysts incidentally noted in the sacral canal.  There are bilateral renal cysts.  L1-2: Small annular bulge and endplate lipping.  Bilateral facet arthropathy.  No significant foraminal narrowing or canal stenosis.  L2-3: Small annular bulge and endplate lipping.  Bilateral facet arthropathy.  No significant foraminal narrowing or canal stenosis.  L3-4: Posterior disk bulge with small bilateral foraminal disk protrusions.  Bilateral facet arthropathy and ligament hypertrophy.  Mild inferior foraminal narrowing.  No canal stenosis.  L4-5: Posterior disk bulge, hypertrophic  facet arthropathy, and ligament thickening resulting in moderate bilateral foraminal narrowing and moderate central canal stenosis.  AP canal diameter measures 8.4 mm.   L5-S1: Broad based posterior disk bulge and endplate lipping.  Hypertrophic facet arthropathy and ligament thickening.  Moderate bilateral foraminal narrowing.  No significant canal stenosis.     Impression  Multilevel lumbar spondylosis and degenerative disk disease as detailed.           Results for orders placed in visit on 05/31/12   X-Ray Lumbar Spine Ap And Lateral     Narrative Findings: Generalized osteopenia.  Multilevel degenerative vertebral end plate spurring and facet arthropathy.  Moderate to severe disk space narrowing and associated vacuum disk phenomenon at L5-S1.  Suggestion of slight diane-listhesis of L4 on L5.  Intact pedicles.  No compression fracture.            Review of Systems:  CONSTITUTIONAL: patient denies any fever, chills, or weight loss  SKIN: patient denies any rash or itching  RESPIRATORY: patient denies having any shortness of breath  GASTROINTESTINAL: patient denies having any diarrhea, constipation, or bowel incontinence  GENITOURINARY: patient denies having any abnormal bladder function     MUSCULOSKELETAL:  - patient complains of the above noted pain/s (see chief pain complaint)     NEUROLOGICAL:   - pain as above  - strength in Lower extremities is intact, BILATERALLY  - sensation in Lower extremities is intact, BILATERALLY  - patient denies any loss of bowel or bladder control      PSYCHIATRIC: patient denies any change in mood     Other:  All other systems reviewed and are negative        Physical Exam:  Vitals:  There were no vitals taken for this visit.  (reviewed on 6/11/2020)     General: alert and oriented, in no apparent distress.  Gait: normal gait.  Skin: no rashes, no discoloration, no obvious lesions  HEENT: normocephalic, atraumatic. Pupils equal and round.  Cardiovascular: no significant  peripheral edema present.  Respiratory: without use of accessory muscles of respiration.     Musculoskeletal - Lumbar Spine:  - Pain on flexion of lumbar spine: Absent   - Pain on extension of lumbar spine: Absent         - Lumbar facet loading: Absent   - TTP over the lumbar facet joints: Absent  - TTP over the lumbar paraspinals: Absent  - TTP over the SI joints:  Absent bilaterally   - TTP over GT bursa: Present on left > right  - Straight Leg Raise: Negative  - CHERYLE: Present     Right Knee:  - TTP: Present over medial/ lateral joint line  - Pain with extension: Present  - Pain with flexion: Present  - Crepitus: Present      Neuro - Lower Extremities:  - BLE Strength: R/L: HF: 5/5, HE: 5/5, KF: 5/5; KE: 5/5; FE: 5/5; FF: 5/5  - Extremity Reflexes: Brisk and symmetric throughout  - Sensory: Sensation to light touch intact bilaterally      Psych:  Mood and affect is appropriate           Assessment:  Kaylin Mccollum is a 75 y.o. year old female who is presenting with           Encounter Diagnoses   Name Primary?    Sacroiliitis Yes    Greater trochanteric bursitis, unspecified laterality      Lumbar spondylosis      Lumbar radiculopathy      Chronic pain of both knees      Primary osteoarthritis of right knee           Plan: Proceed with Bilateral SI and GTB with RN IV sedation.

## 2020-10-08 NOTE — OP NOTE
PROCEDURE: Sacroiliac joint and Greater trochanteric bursa injection under fluoroscopic guidance       SIDE: bilateral Sacroiliac injection and bilateral greater trochanteric bursa injection      PROCEDURE DATE: 10/8/2020    PREOPERATIVE DIAGNOSIS: Sacroiliitis, greater trochanteric bursitis  POSTOPERATIVE DIAGNOSIS: Sacroiliitis, greater trochanteric bursitis    PROVIDER: Manpreet Dutta MD  Assistant(s): none    Anesthesia: Local, IV Sedation     >> 2 mg of VERSED    >> 50 mcg of FENTANYL     INDICATION: The patient has a history of pain due to sacroiliitis unresponsive to conservative treatments. Fluoroscopy was used to optimize visualization of needle placement and to maximize safety.       PROCEDURE DESCRIPTION: The patient was seen and identified in the preoperative area. Risks, benefits, complications, and alternatives were discussed with the patient. The patient agreed to proceed with the procedure and signed the consent. The site and side of the procedure was identified and marked.     The patient was taken to the procedural suite. The patient was positioned in prone orientation on procedure table. A time out was performed prior to any intervention. The procedure, site, side, and allergies were stated and agreed to by all present. The lumbosacral area extending to the skin overlying the femoral greater trochanter was widely prepped with ChloraPrep. The procedural site was draped in usual sterile fashion. Vital signs were closely monitored throughout this procedure.     The fluoroscopic camera was placed in contralateral oblique view and was adjusted until the anterior and posterior joint margins of the targeted sacroiliac joint aligned in linear array. The lower pole of the joint was identified, marked, and localized with 1% Lidocaine. A 25 gauge 3.5 inch spinal needle was introduced and advanced to the joint under fluoroscopic guidance. The joint space was entered and after negative aspiration, 2 mL of  injectate was posited into the joint space. The needle was then withdrawn outside of the joint space and after negative aspiration 1 mL of solution was injected outside of the joint space. The injectant solution used was comprised of 7 mL of 1% PF Lidocaine and 3 mL of Methylprednisolone (40 mg/mL). This techniques was performed for the above noted joint/s.    Following Sacroiliac Joint injection, the stylet was replaced and the needle was withdrawn intact. The RIGHT and LEFT greater trochanter was then identified with fluoroscopy. The targeted site of entry was localized with 1% PF Lidocaine. The above noted spinal needle was advanced to the lateral border of the greater trochanter until the osseus interface was met.  After negative aspiration, 2 mL of the above noted solution was injected. No pain or paresthesia was noted on injection. Following injection, the stylet was replaced and the needle was withdrawn intact. This technique was used for the above noted greater trochanteric bursa / bursae. The skin was cleaned and bandages were applied.    Description of Findings: Not applicable    Prosthetic devices, grafts, tissues, or devices implanted: None    Specimen Removed: No    Estimated Blood Loss: minimal    COMPLICATIONS: None    DISPOSITION / PLANS: The patient was transferred to the recovery area in a stable condition for observation. The patient was reexamined prior to discharge. There was no evidence of acute neurologic injury following the procedure.  Patient was discharged from the recovery room after meeting discharge criteria. Home discharge instructions were given to the patient by the staff.

## 2020-10-08 NOTE — DISCHARGE SUMMARY
[facility]  Discharge Note       Description of Procedure: Bilateral Sacroiliac Joint and Greater Trochanteric Bursa Injection under Fluoroscopic Guidance    Procedure Date: 10/8/2020    Admit Date: 10/8/2020  Discharge Date: 10/8/2020     Attending Physician: Manpreet Dutta   Discharge Provider: Manpreet Dutta    Preoperative Diagnosis: Sacroiliitis and Greater Trochanteric Bursitis     Postoperative Diagnosis: as above, same as preoperative diagnosis    Discharged Condition: Stable    Hospital Course: Patient was admitted for an outpatient procedure. The procedure was tolerated well with no complications.    Final Diagnoses: Same as principal problem.    Disposition: Home, self-care.    Follow up/Patient Instructions:  Follow-up in clinic in 2-3 weeks.    Medications: No medications were prescribed today. The patient was advised to resume normal medication regimen without change.  Specific information was provided regarding restarting any anticoagulant/s.    Discharge Procedure Orders (must include Diet, Follow-up, Activity):  Light activity for the remainder of the day, resume normal activity tomorrow. Resume normal diet. Follow-up in clinic in 2-3 weeks.

## 2020-11-19 ENCOUNTER — IMMUNIZATION (OUTPATIENT)
Dept: INTERNAL MEDICINE | Facility: CLINIC | Age: 75
End: 2020-11-19
Payer: MEDICARE

## 2020-11-19 ENCOUNTER — OFFICE VISIT (OUTPATIENT)
Dept: ORTHOPEDICS | Facility: CLINIC | Age: 75
End: 2020-11-19
Payer: MEDICARE

## 2020-11-19 VITALS
BODY MASS INDEX: 30.06 KG/M2 | WEIGHT: 210 LBS | HEIGHT: 70 IN | DIASTOLIC BLOOD PRESSURE: 64 MMHG | SYSTOLIC BLOOD PRESSURE: 122 MMHG | HEART RATE: 77 BPM

## 2020-11-19 DIAGNOSIS — Z98.890 S/P RIGHT KNEE ARTHROSCOPY: ICD-10-CM

## 2020-11-19 DIAGNOSIS — M94.261 CHONDROMALACIA, RIGHT KNEE: Primary | ICD-10-CM

## 2020-11-19 DIAGNOSIS — M17.11 PRIMARY OSTEOARTHRITIS OF RIGHT KNEE: Primary | ICD-10-CM

## 2020-11-19 DIAGNOSIS — S83.241S ACUTE MEDIAL MENISCUS TEAR OF RIGHT KNEE, SEQUELA: ICD-10-CM

## 2020-11-19 DIAGNOSIS — M70.62 GREATER TROCHANTERIC BURSITIS OF BOTH HIPS: ICD-10-CM

## 2020-11-19 DIAGNOSIS — S83.281S ACUTE LATERAL MENISCUS TEAR OF RIGHT KNEE, SEQUELA: ICD-10-CM

## 2020-11-19 DIAGNOSIS — M70.61 GREATER TROCHANTERIC BURSITIS OF BOTH HIPS: ICD-10-CM

## 2020-11-19 PROCEDURE — 99214 OFFICE O/P EST MOD 30 MIN: CPT | Mod: S$PBB,,, | Performed by: ORTHOPAEDIC SURGERY

## 2020-11-19 PROCEDURE — 99999 PR PBB SHADOW E&M-EST. PATIENT-LVL IV: CPT | Mod: PBBFAC,,, | Performed by: ORTHOPAEDIC SURGERY

## 2020-11-19 PROCEDURE — 90694 VACC AIIV4 NO PRSRV 0.5ML IM: CPT | Mod: PBBFAC

## 2020-11-19 PROCEDURE — 99214 PR OFFICE/OUTPT VISIT, EST, LEVL IV, 30-39 MIN: ICD-10-PCS | Mod: S$PBB,,, | Performed by: ORTHOPAEDIC SURGERY

## 2020-11-19 PROCEDURE — 99214 OFFICE O/P EST MOD 30 MIN: CPT | Mod: PBBFAC | Performed by: ORTHOPAEDIC SURGERY

## 2020-11-19 PROCEDURE — 99999 PR PBB SHADOW E&M-EST. PATIENT-LVL IV: ICD-10-PCS | Mod: PBBFAC,,, | Performed by: ORTHOPAEDIC SURGERY

## 2020-11-19 PROCEDURE — G0008 ADMIN INFLUENZA VIRUS VAC: HCPCS | Mod: PBBFAC

## 2020-11-19 RX ORDER — CELECOXIB 200 MG/1
200 CAPSULE ORAL DAILY
Qty: 90 CAPSULE | Refills: 2 | Status: SHIPPED | OUTPATIENT
Start: 2020-11-19 | End: 2021-01-25 | Stop reason: SDUPTHER

## 2020-11-19 NOTE — PROGRESS NOTES
Subjective:     Patient ID: Kaylin Mccollum is a 75 y.o. female.    Chief Complaint: Pain of the Right Knee    HPI:  75-year-old retired from  who had been falling down stairs numerous occasions she has been having pain and catching and locking since several months now.  She gets up the knee catches she has to wiggle it some how to unlock it before she can walk. She does have quite a bit of lumbar pain that radiates with burning sensation down to the right anterior tibia and dorsal of the foot.  She is under the care of pain management for that.  Pain in the knee is around 4/10 with catching locking feeling.  Able to ambulate once the catching locking goes away for her minimum 200-300 yd without discomfort.  Unable to squat unable to do stairs due to the catching locking feeling.  She does ambulate without any assistive devices.  She does have an MRI in the electronic records.  At 1 point she had falling down the stairs and sustained right shoulder rotator cuff tear requiring repair.  Denies any numbness tingling in the hands.  She does have some cervical lumbar pain    05/21/2020  Status post her right knee arthroscopy 03/10/2020.  Findings of complex medial and lateral meniscus tears as well chondromalacia type 3 anterior and medial.  She was doing great postop for 5 weeks another side in twisted her knee and starting some pain.  Any twisting maneuver she hurts quite a bit.  Pain is 4/10.  She did her independent exercise program.  Denies any fever or chills or shortness of breath or difficulty chewing or swallowing.  Complains of some stiffness and swelling    06/25/2020  Right knee arthroscopy 03/10/2020 with complex medial and lateral meniscus tears as well as chondromalacia type 3 anterior medial compartment.  The other day could not get up from kneeling on the floor.  Her pain now is coming back as 6/10 with swelling and tightness in the knee.  Last visit given a steroid injection which helped for a  little bit but not too much.  She does take Celebrex 100 mg twice a day and a helps very little.  Last visit discussed injecting Synvisc-One into her knee and she wants to proceed.  I did tell her that Synvisc-One might take 6-8 weeks to see any effect.  In my not work and we had to resort to other treatments down the road.  Still feeling occasional catching.  Denies any fever chills shortness of breath difficulty chewing swallowing loss of bowel bladder control or loss of taste and smell    08/20/2020  Chondromalacia of the right knee anterior and medial compartment and status post partial medial lateral meniscectomy.  Synvisc-One given 06/25/2020 seems to have helped but still having quite excessive pain right now since her daughter was diagnosed with PE and had to run around with her.  But overall she feels Synvisc-One seems to have helped.  She is requesting a steroid injection today.  She does take Celebrex at night.  She is to have back injections she is holding off at this point.  Pain is 4/10.  Denies any fever or chills or shortness of breath or difficulty chewing or swallowing loss of bowel bladder control    11/19/2020  Right knee arthroscopy 03/10/2020 with medial and lateral partial meniscectomies and finding of chondromalacia type 3 of the anterior medial compartment.  She received Synvisc-One on 06/25/2020 with good relief.  She thought she is getting her Synvisc-One today and I refreshed her memory that it should be 6 months so she is not due until December 25th.  We need to get that approved.  She complains on occasional thigh pain occasional mid tibia pain.  She does have history of fibromyalgia and used to get hip injections by Dr. izquierdo last of which 10/08/2020 bilateral SI and bilateral greater trochanteric areas.  She does complain of both hips hurting over the greater trochanters.  Celebrex seems to help as well as the brace on her knee.  She asked she is going for dental work if she can take  ibuprofen I did tell her not with Celebrex she may take Tylenol.  Denies any fever or chills or shortness of breath or difficulty with chewing or swallowing loss of bowel bladder control or blurry vision double vision or loss of sense smell or taste  Past Medical History:   Diagnosis Date    Arthritis     Cataract     Diabetes mellitus     Diabetes mellitus, type 2     Fibromyalgia     General anesthetics causing adverse effect in therapeutic use     bradycardia     Hypertension     Migraines     Mitral valve prolapse     Osteoarthritis     Rotator cuff tear 4/1/2015     Past Surgical History:   Procedure Laterality Date    ARTHROSCOPY OF KNEE Right 3/10/2020    Procedure: ARTHROSCOPY, KNEE;  Surgeon: Les Schneider MD;  Location: [facility] OR;  Service: Orthopedics;  Laterality: Right;    CATARACT EXTRACTION W/  INTRAOCULAR LENS IMPLANT Left 07/19/2017    CATARACT EXTRACTION W/  INTRAOCULAR LENS IMPLANT Right 2017    CHOLECYSTECTOMY      CHONDROPLASTY OF KNEE Right 3/10/2020    Procedure: CHONDROPLASTY, KNEE;  Surgeon: Les Schneider MD;  Location: [facility] OR;  Service: Orthopedics;  Laterality: Right;  Anterior compartment     COLONOSCOPY N/A 9/25/2018    Procedure: COLONOSCOPY;  Surgeon: Bethel Carmona MD;  Location: [facility] ENDO;  Service: Endoscopy;  Laterality: N/A;    EXCISION OF MEDIAL MENISCUS OF KNEE Right 3/10/2020    Procedure: MENISCECTOMY, KNEE, MEDIAL;  Surgeon: Les Schneider MD;  Location: [facility] OR;  Service: Orthopedics;  Laterality: Right;  Partial, Medial , Lateral     EYE SURGERY      INJECTION OF ANESTHETIC AGENT AROUND NERVE Right 8/8/2019    Procedure: Right Genicular nerve block with local;  Surgeon: Manpreet Dutta MD;  Location: [facility] PAIN MGT;  Service: Pain Management;  Laterality: Right;    INJECTION OF ANESTHETIC AGENT INTO SACROILIAC JOINT Bilateral 5/6/2020    Procedure: Bilateral Sacroiliac Joint Injection;  Surgeon: Manpreet Dutta MD;  Location: [facility] PAIN  MGT;  Service: Pain Management;  Laterality: Bilateral;    INJECTION OF ANESTHETIC AGENT INTO SACROILIAC JOINT Bilateral 10/8/2020    Procedure: Bilateral SI and Bilateral GTB with RN IV sedation;  Surgeon: Manpreet Dutta MD;  Location: [facility] PAIN MGT;  Service: Pain Management;  Laterality: Bilateral;    INJECTION OF JOINT Bilateral 9/5/2019    Procedure: Bilateral GT bursa injection;  Surgeon: Manpreet Dutta MD;  Location: [facility] PAIN MGT;  Service: Pain Management;  Laterality: Bilateral;    INJECTION OF JOINT Bilateral 5/6/2020    Procedure: Bilateral GT bursa injection;  Surgeon: Manpreet Dutta MD;  Location: [facility] PAIN MGT;  Service: Pain Management;  Laterality: Bilateral;    KNEE ARTHROSCOPY Bilateral     PARS PLANA VITRECTOMY W/ REPAIR OF MACULAR HOLE Left 02/22/2017    RADIOFREQUENCY THERMOCOAGULATION Left 7/11/2019    Procedure: Right SIJ RFA;  Surgeon: Manpreet Dutta MD;  Location: [facility] PAIN MGT;  Service: Pain Management;  Laterality: Left;    RADIOFREQUENCY THERMOCOAGULATION Right 7/25/2019    Procedure: Right SIJ RFA;  Surgeon: Manpreet Dutta MD;  [facility];  Service: Pain Management;  Laterality: Right;    SHOULDER ARTHROSCOPY Right 06/04/15     Family History   Problem Relation Age of Onset    Heart disease Mother     Glaucoma Mother     Cataracts Mother     Cancer Mother         colon    Kidney disease Daughter     Anesthesia problems Daughter         cardiac arrest during nephrectomy    Diabetes Maternal Grandmother     Heart disease Maternal Grandmother     Diabetes Maternal Grandfather     Cancer Maternal Grandfather     Breast cancer Paternal Aunt      Social History     Socioeconomic History    Marital status:      Spouse name: Not on file    Number of children: Not on file    Years of education: Not on file    Highest education level: Not on file   Occupational History     Employer: [employer]   Social Needs    Financial resource strain: Not hard at all     Food insecurity     Worry: Never true     Inability: Never true    Transportation needs     Medical: No     Non-medical: No   Tobacco Use    Smoking status: Never Smoker    Smokeless tobacco: Never Used   Substance and Sexual Activity    Alcohol use: Yes     Alcohol/week: 0.0 standard drinks     Frequency: Never     Drinks per session: Patient refused     Binge frequency: Never     Comment: Rarely  No alcohol 72h prior to sx    Drug use: No    Sexual activity: Not Currently   Lifestyle    Physical activity     Days per week: 0 days     Minutes per session: 0 min    Stress: Not at all   Relationships    Social connections     Talks on phone: More than three times a week     Gets together: More than three times a week     Attends Sikh service: Not on file     Active member of club or organization: No     Attends meetings of clubs or organizations: Never     Relationship status:    Other Topics Concern    Not on file   Social History Narrative    . 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     Medication List with Changes/Refills   New Medications    CELECOXIB (CELEBREX) 200 MG CAPSULE    Take 1 capsule (200 mg total) by mouth once daily. Take with food    FLU VAC 2020 65UP-BKDTX30A,PF, (FLUAD QUAD 2020-21,65Y UP,,PF,) 60 MCG (15 MCG X 4)/0.5 ML SYRG    Inject 0.5 mLs into the muscle once. for 1 dose   Current Medications    BIOTIN 1 MG TABLET    Take 1,000 mcg by mouth every morning.     CANAGLIFLOZIN (INVOKANA) 100 MG TAB    Take 1 tablet (100 mg total) by mouth once daily.    CELECOXIB (CELEBREX) 200 MG CAPSULE    Take 1 capsule (200 mg total) by mouth once daily. Take with food    DICLOFENAC SODIUM (VOLTAREN) 1 % GEL    Apply 4 g topically 3 (three) times daily as needed.    EZETIMIBE (ZETIA) 10 MG TABLET    Take 1 tablet (10 mg total) by mouth once daily.    FLUOXETINE 40 MG CAPSULE    TAKE 1 CAPSULE(40 MG) BY MOUTH EVERY DAY    FLUTICASONE (FLONASE) 50 MCG/ACTUATION NASAL SPRAY    2  "sprays by Each Nare route once daily.    GABAPENTIN (NEURONTIN) 300 MG CAPSULE    TAKE 1 CAPSULE BY MOUTH TWICE DAILY    HYDROCODONE-ACETAMINOPHEN (NORCO)  MG PER TABLET    Take 1 tablet by mouth every 6 (six) hours as needed. HYDROCODONE-ACETAMINOPHEN (Norco)  MG ORAL TAB - 1 tab po q 6 hrs prn pain    LOSARTAN (COZAAR) 25 MG TABLET    TAKE 1 TABLET(25 MG) BY MOUTH EVERY DAY    METFORMIN (GLUCOPHAGE) 1000 MG TABLET    TAKE 1 TABLET BY MOUTH TWICE DAILY WITH MEALS    TRAMADOL (ULTRAM) 50 MG TABLET    Take 1 tablet (50 mg total) by mouth every 12 (twelve) hours as needed for Pain.    TRIAMCINOLONE ACETONIDE 0.025% (KENALOG) 0.025 % OINT    Apply topically 2 (two) times daily. for 10 days    VERAPAMIL (CALAN) 120 MG TABLET    Take 1 tablet (120 mg total) by mouth 2 (two) times daily.     Review of patient's allergies indicates:   Allergen Reactions    Demerol [meperidine] Other (See Comments)     Burning when adm IV  Able to tolerate IM, "Turned the veins in my hand purple."    Latex, natural rubber Other (See Comments)     "Burns my skin",  Symptoms get worse the longer she is exposed    Zocor [simvastatin] Other (See Comments)     Tightening of muscles    Statins-hmg-coa reductase inhibitors Other (See Comments)     myopathy    Sulfa (sulfonamide antibiotics)      Blurred vision    Nickel sutures [surgical stainless steel] Rash     Any nickel     Review of Systems   Constitution: Negative for decreased appetite.   HENT: Negative for tinnitus.    Eyes: Negative for double vision.   Cardiovascular: Negative for chest pain.   Respiratory: Negative for wheezing.    Hematologic/Lymphatic: Negative for bleeding problem.   Skin: Negative for dry skin.   Musculoskeletal: Positive for arthritis, back pain, joint pain, neck pain and stiffness. Negative for gout.   Gastrointestinal: Negative for abdominal pain.   Genitourinary: Negative for bladder incontinence.   Neurological: Negative for numbness, " paresthesias and sensory change.   Psychiatric/Behavioral: Negative for altered mental status.       Objective:   Body mass index is 30.13 kg/m².  Vitals:    11/19/20 1501   BP: 122/64   Pulse: 77          General    Constitutional: She is oriented to person, place, and time. She appears well-developed.   HENT:   Head: Atraumatic.   Eyes: EOM are normal.   Cardiovascular: Normal rate.    Pulmonary/Chest: Effort normal.   Abdominal: Soft.   Neurological: She is alert and oriented to person, place, and time.   Psychiatric: Judgment normal.            Cervical spine with slight limitation of rotation with clicking in the neck. Some mild discomfort to extreme rotation.  Bilateral upper extremity neurovascularly intact. Radial ulnar pulses 2+.  Sensory intact to touch.  Motor strength is 5/5 throughout.  A lumbar with tenderness around L4-5 paraspinal and mostly to words the right side.  Pelvis is level  Straight leg raising slightly positive on the left negative on the right  Passive hip motion within normal limits.  No pain in the groin .  Very painful pain over the greater trochanters to touch bilaterally.  Hip flexors, abductors, adductors, quads, hamstrings, ankle extensors and flexors all 5/5 and even bilaterally.  And left knee full motion collaterals and cruciate stable negative Kaleigh's negative pain to palpation.  Surgical scar from knee scope done years ago.  Right knee with mild to moderate swelling.  Surgical scars healed well.  Crepitus with motion anteriorly.  Painful to any twisting motion.  Collaterals and cruciates are stable to valgus varus stressing as well as anterior posterior drawer. Range of motion 0-120 degrees.  Calves are soft nontender.  Multiple varicositiesNo pitting edema  EHL 5/5 plantar flexion 5/5.  DP 1+ PT 1+  Skin is warm to touch no obvious lesions    Relevant imaging results reviewed and interpreted by me, discussed with the patient and / or family today   X-ray from 2019  bilateral knees with mild medial joint narrowing and small osteophytic changes  MRI reviewed with the patient showed her the pictures as well as reviewed the report consistent with medial and lateral meniscus stairs, patellofemoral chondromalacia as well as medially  Assessment:     Encounter Diagnoses   Name Primary?    Chondromalacia, right knee Yes    Acute medial meniscus tear of right knee, sequela     Acute lateral meniscus tear of right knee, sequela     S/P right knee arthroscopy     Greater trochanteric bursitis of both hips         Plan:   Chondromalacia, right knee  -     celecoxib (CELEBREX) 200 MG capsule; Take 1 capsule (200 mg total) by mouth once daily. Take with food  Dispense: 90 capsule; Refill: 2    Acute medial meniscus tear of right knee, sequela    Acute lateral meniscus tear of right knee, sequela    S/P right knee arthroscopy    Greater trochanteric bursitis of both hips  -     celecoxib (CELEBREX) 200 MG capsule; Take 1 capsule (200 mg total) by mouth once daily. Take with food  Dispense: 90 capsule; Refill: 2         Patient Instructions   Injected Synvisc-One 06/25/2020 and steroid 08/20/2020  Will get approval for Synvisc-One to the right knee  Continue with the Celebrex 200 mg may take 2 a day if needed  May supplement with Tylenol maximum 650 mg 3 times a day  Continue with independent exercises and bracing as needed  Return to the clinic after December 25th    All questions asked and answered    Kellgren Jaswinder scale 2.  Disclaimer: This note was prepared using a voice recognition system and is likely to have sound alike errors within the text.

## 2020-11-19 NOTE — PATIENT INSTRUCTIONS
Injected Synvisc-One 06/25/2020 and steroid 08/20/2020  Will get approval for Synvisc-One to the right knee  Continue with the Celebrex 200 mg may take 2 a day if needed  May supplement with Tylenol maximum 650 mg 3 times a day  Continue with independent exercises and bracing as needed  Return to the clinic after December 25th

## 2020-12-14 ENCOUNTER — PATIENT OUTREACH (OUTPATIENT)
Dept: ADMINISTRATIVE | Facility: OTHER | Age: 75
End: 2020-12-14

## 2020-12-14 NOTE — PROGRESS NOTES
PATIENT DID NOT ANSWER CALL TO SCHEDULE DUE MAMMOGRAM, NO VOICE MAIL.  SENT MY CHART MESSAGE/TICKET FOR PATIENT TO  SCHEDULE MAMMOGRAM PER PATIENT PRESENCES.

## 2020-12-15 ENCOUNTER — OFFICE VISIT (OUTPATIENT)
Dept: PAIN MEDICINE | Facility: CLINIC | Age: 75
End: 2020-12-15
Attending: FAMILY MEDICINE
Payer: MEDICARE

## 2020-12-15 VITALS
DIASTOLIC BLOOD PRESSURE: 66 MMHG | WEIGHT: 220 LBS | BODY MASS INDEX: 31.5 KG/M2 | HEART RATE: 88 BPM | HEIGHT: 70 IN | SYSTOLIC BLOOD PRESSURE: 146 MMHG | RESPIRATION RATE: 18 BRPM

## 2020-12-15 DIAGNOSIS — M70.61 GREATER TROCHANTERIC BURSITIS OF BOTH HIPS: ICD-10-CM

## 2020-12-15 DIAGNOSIS — M47.816 LUMBAR SPONDYLOSIS: ICD-10-CM

## 2020-12-15 DIAGNOSIS — M53.3 SACROILIAC JOINT PAIN: Primary | ICD-10-CM

## 2020-12-15 DIAGNOSIS — M70.62 GREATER TROCHANTERIC BURSITIS OF BOTH HIPS: ICD-10-CM

## 2020-12-15 PROCEDURE — 99213 OFFICE O/P EST LOW 20 MIN: CPT | Mod: PBBFAC | Performed by: ANESTHESIOLOGY

## 2020-12-15 PROCEDURE — 96372 THER/PROPH/DIAG INJ SC/IM: CPT | Mod: S$PBB,,, | Performed by: ANESTHESIOLOGY

## 2020-12-15 PROCEDURE — 99999 PR PBB SHADOW E&M-EST. PATIENT-LVL III: ICD-10-PCS | Mod: PBBFAC,,, | Performed by: ANESTHESIOLOGY

## 2020-12-15 PROCEDURE — 99213 OFFICE O/P EST LOW 20 MIN: CPT | Mod: 25,S$PBB,, | Performed by: ANESTHESIOLOGY

## 2020-12-15 PROCEDURE — 99999 PR PBB SHADOW E&M-EST. PATIENT-LVL III: CPT | Mod: PBBFAC,,, | Performed by: ANESTHESIOLOGY

## 2020-12-15 PROCEDURE — 99213 PR OFFICE/OUTPT VISIT, EST, LEVL III, 20-29 MIN: ICD-10-PCS | Mod: 25,S$PBB,, | Performed by: ANESTHESIOLOGY

## 2020-12-15 PROCEDURE — 96372 PR INJECTION,THERAP/PROPH/DIAG2ST, IM OR SUBCUT: ICD-10-PCS | Mod: S$PBB,,, | Performed by: ANESTHESIOLOGY

## 2020-12-15 RX ORDER — METHYLPREDNISOLONE ACETATE 40 MG/ML
40 INJECTION, SUSPENSION INTRA-ARTICULAR; INTRALESIONAL; INTRAMUSCULAR; SOFT TISSUE
Status: COMPLETED | OUTPATIENT
Start: 2020-12-15 | End: 2020-12-15

## 2020-12-15 RX ADMIN — METHYLPREDNISOLONE ACETATE 40 MG: 40 INJECTION, SUSPENSION INTRA-ARTICULAR; INTRALESIONAL; INTRAMUSCULAR; SOFT TISSUE at 10:12

## 2020-12-15 NOTE — PROGRESS NOTES
Chief Pain Complaint:  Back pain  Right knee pain    History of Present Illness:   Kaylin Mccollum is a 75 y.o. female  who is presenting with a chief complaint of Low-back Pain. The patient began experiencing this problem insidiously, and the pain has been gradually worsening over time. The pain is described as throbbing, cramping, aching and heavy and is located in the bilateral buttocks. Pain is intermittent and lasts hours. The pain is nonradiating. The patient rates her pain a 3 out of ten and interferes with activities of daily living a 3 out of ten. Pain is exacerbated by getting up from a seated position, and is improved by rest. Patient reports no prior trauma, no prior spinal surgery     Kaylin Mccollum is a 75 y.o. female  who is presenting with a chief complaint of lumbar back pain. The patient began experiencing this problem insidiously, and the pain has been gradually worsening over the past 5 month(s). The pain is described as throbbing, shooting, burning and electrical and is located in the bilateral lumbar spine. Pain is intermittent and lasts hours. The pain radiates to bilateral lower extremities L4 distribudution. The patient rates her pain a 3 out of ten and interferes with activities of daily living a 3 out of ten. Pain is exacerbated by flexion of the lumbar spine, ambulation, and is improved by rest.     She also complains of right knee pain. The patient began experiencing this problem insidiously. The pain is described as cramping, aching and is located in the right knee. Pain is intermittent and lasts hours. The pain is nonradiating. The patient rates her pain a 8 out of ten and interferes with activities of daily living a 7 out of ten. Pain is exacerbated by getting up from a seated position and standing, and is improved by rest. Patient reports no prior trauma, prior arthroscopy bilaterally in 1990s.      - pertinent negatives: No fever, No chills, No weight loss, No bladder  dysfunction, No bowel dysfunction, No saddle anesthesia  - pertinent positives: none    - medications, other therapies tried (physical therapy, injections):     >> NSAIDs, Tylenol, gabapentin and medrol dose pack    >> Has previously undergone Physical Therapy    >> Has previously undergone spinal injection/s   - Lumbar JAMIN with good relief in the past   - Bilateral SI Joint + GTB injection on 4/10/19 with great relief for about 4 weeks   - left SIJ RFA on 7/11/19 and right SIJ RFA on 7-25-19 with 80% pain relief   - right genicular nerve block on 8/8/19 with 90% pain relief   - bilateral GT bursa injections on 9/5/19 with 90% pain relief   - Bilateral SI and GTB 5/6/2020 with 60% pain relief.     - Bilateral SI and GTB 10/08/2020 with 60% pain relief.      Imaging / Labs / Studies (reviewed on 12/15/2020):    Results for orders placed during the hospital encounter of 11/23/15   MRI Lumbar Spine Without Contrast    Narrative Technique: Standard noncontrast multiplanar multisequence imaging of the lumbar spine was performed.  Findings: There is anatomic spinal alignment.  Very low degree of degenerative disk base narrowing and disk desiccation observed at L1-2, L2-3, L4-5, and L5-S1.  Vertebral marrow signal pattern and architecture are normal.  Distal cord is unremarkable with conus medullaris terminating at L1.  Small nerve root sleeve cysts incidentally noted in the sacral canal.  There are bilateral renal cysts.  L1-2: Small annular bulge and endplate lipping.  Bilateral facet arthropathy.  No significant foraminal narrowing or canal stenosis.  L2-3: Small annular bulge and endplate lipping.  Bilateral facet arthropathy.  No significant foraminal narrowing or canal stenosis.  L3-4: Posterior disk bulge with small bilateral foraminal disk protrusions.  Bilateral facet arthropathy and ligament hypertrophy.  Mild inferior foraminal narrowing.  No canal stenosis.  L4-5: Posterior disk bulge, hypertrophic facet  "arthropathy, and ligament thickening resulting in moderate bilateral foraminal narrowing and moderate central canal stenosis.  AP canal diameter measures 8.4 mm.   L5-S1: Broad based posterior disk bulge and endplate lipping.  Hypertrophic facet arthropathy and ligament thickening.  Moderate bilateral foraminal narrowing.  No significant canal stenosis.    Impression  Multilevel lumbar spondylosis and degenerative disk disease as detailed.     Results for orders placed in visit on 05/31/12   X-Ray Lumbar Spine Ap And Lateral    Narrative Findings: Generalized osteopenia.  Multilevel degenerative vertebral end plate spurring and facet arthropathy.  Moderate to severe disk space narrowing and associated vacuum disk phenomenon at L5-S1.  Suggestion of slight diane-listhesis of L4 on L5.  Intact pedicles.  No compression fracture.         Review of Systems:  CONSTITUTIONAL: patient denies any fever, chills, or weight loss  SKIN: patient denies any rash or itching  RESPIRATORY: patient denies having any shortness of breath  GASTROINTESTINAL: patient denies having any diarrhea, constipation, or bowel incontinence  GENITOURINARY: patient denies having any abnormal bladder function    MUSCULOSKELETAL:  - patient complains of the above noted pain/s (see chief pain complaint)    NEUROLOGICAL:   - pain as above  - strength in Lower extremities is intact, BILATERALLY  - sensation in Lower extremities is intact, BILATERALLY  - patient denies any loss of bowel or bladder control      PSYCHIATRIC: patient denies any change in mood    Other:  All other systems reviewed and are negative      Physical Exam:  Vitals:  BP (!) 146/66 (BP Location: Right arm, Patient Position: Sitting, BP Method: Medium (Automatic))   Pulse 88   Resp 18   Ht 5' 10" (1.778 m)   Wt 99.8 kg (220 lb)   BMI 31.57 kg/m²   (reviewed on 12/15/2020)    General: alert and oriented, in no apparent distress.  Gait: normal gait.  Skin: no rashes, no " discoloration, no obvious lesions  HEENT: normocephalic, atraumatic. Pupils equal and round.  Cardiovascular: no significant peripheral edema present.  Respiratory: without use of accessory muscles of respiration.    Musculoskeletal - Lumbar Spine:  - Pain on flexion of lumbar spine: Absent   - Pain on extension of lumbar spine: Absent         - Lumbar facet loading: Absent   - TTP over the lumbar facet joints: Absent  - TTP over the lumbar paraspinals: Absent  - TTP over the SI joints:  Absent bilaterally   - TTP over GT bursa: Present on left > right  - Straight Leg Raise: Negative  - CHERYLE: Present    Right Knee:  - TTP: Present over medial/ lateral joint line  - Pain with extension: Present  - Pain with flexion: Present  - Crepitus: Present     Neuro - Lower Extremities:  - BLE Strength: R/L: HF: 5/5, HE: 5/5, KF: 5/5; KE: 5/5; FE: 5/5; FF: 5/5  - Extremity Reflexes: Brisk and symmetric throughout  - Sensory: Sensation to light touch intact bilaterally      Psych:  Mood and affect is appropriate        Assessment:  Kaylin Mccollum is a 75 y.o. year old female who is presenting with     Encounter Diagnoses   Name Primary?    Sacroiliac joint pain Yes    Greater trochanteric bursitis of both hips     Lumbar spondylosis        Plan:  1. Interventional: Schedule patient for Bilateral SI GTB with Dr Burns. Left Trapezius TPI done in clinic.   S/p Bilateral SI and GTB 10/08/2020 with 60% pain relief.    S/p Bilateral SI and GTB 5/6/2020 with 60% pain relief.    S/p bilateral GT bursa injections on 9/5/19 with 90% pain relief.    2. Pharmacologic: Continue Celebrex 100mg BID PRN.Increase Gabapentin from 300mg PO BID to 300/600 then 300/900 mg PO BID. Voltaren gel.     3. Rehabilitative: Continue physical therapy, which is helping some.     4. Diagnostic: New Lumbar MRI reviewed with patient. Right Knee MRI reviewed with patient.    5. Consult: Orthopedic for medial and lateral menisci tear of the right knee.     6.  Follow up: Post Injection.       PROCEDURE NOTE    Diagnosis: Myofascial pain  Procedure: trigger point injections to left trapezius      Risks and benefits of procedure explained to patient including risks of infection, bleeding, pain, or damage to surrounding tissues. All questions answered. Informed consent obtained prior to proceeding. Areas marked and prepped in sterile fashion. Using a 27g 1.25inch needle, a  5 cc mixture of depo medrol 1cc (40mg) and 1% lidocaine was injected evenly into the above mentioned muscles. None to minimal bleeding noted. ER and post injection instructions given.

## 2020-12-16 NOTE — PROGRESS NOTES
THIS IS THE SECOND ATTEMPT TO CONTACT PATIENT.  PATIENT DID NOT ANSWER CALL TO SCHEDULE DUE MAMMOGRAM, NO VOICE MAIL..

## 2020-12-28 ENCOUNTER — OFFICE VISIT (OUTPATIENT)
Dept: ORTHOPEDICS | Facility: CLINIC | Age: 75
End: 2020-12-28
Payer: MEDICARE

## 2020-12-28 VITALS
WEIGHT: 220 LBS | SYSTOLIC BLOOD PRESSURE: 126 MMHG | HEIGHT: 70 IN | HEART RATE: 76 BPM | DIASTOLIC BLOOD PRESSURE: 70 MMHG | BODY MASS INDEX: 31.5 KG/M2

## 2020-12-28 DIAGNOSIS — Z98.890 S/P RIGHT KNEE ARTHROSCOPY: ICD-10-CM

## 2020-12-28 DIAGNOSIS — M25.512 LEFT SHOULDER PAIN, UNSPECIFIED CHRONICITY: ICD-10-CM

## 2020-12-28 DIAGNOSIS — M25.561 PAIN IN BOTH KNEES, UNSPECIFIED CHRONICITY: Primary | ICD-10-CM

## 2020-12-28 DIAGNOSIS — S83.241S ACUTE MEDIAL MENISCUS TEAR OF RIGHT KNEE, SEQUELA: ICD-10-CM

## 2020-12-28 DIAGNOSIS — M70.62 GREATER TROCHANTERIC BURSITIS OF BOTH HIPS: ICD-10-CM

## 2020-12-28 DIAGNOSIS — M70.61 GREATER TROCHANTERIC BURSITIS OF BOTH HIPS: ICD-10-CM

## 2020-12-28 DIAGNOSIS — M94.261 CHONDROMALACIA, RIGHT KNEE: Primary | ICD-10-CM

## 2020-12-28 DIAGNOSIS — M77.8 LEFT SHOULDER TENDINITIS: ICD-10-CM

## 2020-12-28 DIAGNOSIS — S83.281S ACUTE LATERAL MENISCUS TEAR OF RIGHT KNEE, SEQUELA: ICD-10-CM

## 2020-12-28 DIAGNOSIS — M25.562 PAIN IN BOTH KNEES, UNSPECIFIED CHRONICITY: Primary | ICD-10-CM

## 2020-12-28 PROCEDURE — 99214 OFFICE O/P EST MOD 30 MIN: CPT | Mod: PBBFAC | Performed by: ORTHOPAEDIC SURGERY

## 2020-12-28 PROCEDURE — 20610 DRAIN/INJ JOINT/BURSA W/O US: CPT | Mod: PBBFAC | Performed by: ORTHOPAEDIC SURGERY

## 2020-12-28 PROCEDURE — 99214 PR OFFICE/OUTPT VISIT, EST, LEVL IV, 30-39 MIN: ICD-10-PCS | Mod: 25,S$PBB,, | Performed by: ORTHOPAEDIC SURGERY

## 2020-12-28 PROCEDURE — 99999 PR PBB SHADOW E&M-EST. PATIENT-LVL IV: ICD-10-PCS | Mod: PBBFAC,,, | Performed by: ORTHOPAEDIC SURGERY

## 2020-12-28 PROCEDURE — 99214 OFFICE O/P EST MOD 30 MIN: CPT | Mod: 25,S$PBB,, | Performed by: ORTHOPAEDIC SURGERY

## 2020-12-28 PROCEDURE — 20610 LARGE JOINT ASPIRATION/INJECTION: R KNEE: ICD-10-PCS | Mod: 50,S$PBB,, | Performed by: ORTHOPAEDIC SURGERY

## 2020-12-28 PROCEDURE — 99999 PR PBB SHADOW E&M-EST. PATIENT-LVL IV: CPT | Mod: PBBFAC,,, | Performed by: ORTHOPAEDIC SURGERY

## 2020-12-28 RX ORDER — CELECOXIB 200 MG/1
200 CAPSULE ORAL 2 TIMES DAILY
Qty: 120 CAPSULE | Refills: 1 | Status: SHIPPED | OUTPATIENT
Start: 2020-12-28 | End: 2021-01-25 | Stop reason: SDUPTHER

## 2020-12-28 RX ORDER — METHYLPREDNISOLONE ACETATE 80 MG/ML
80 INJECTION, SUSPENSION INTRA-ARTICULAR; INTRALESIONAL; INTRAMUSCULAR; SOFT TISSUE
Status: DISCONTINUED | OUTPATIENT
Start: 2020-12-28 | End: 2020-12-28 | Stop reason: HOSPADM

## 2020-12-28 RX ADMIN — METHYLPREDNISOLONE ACETATE 80 MG: 80 INJECTION, SUSPENSION INTRALESIONAL; INTRAMUSCULAR; INTRASYNOVIAL; SOFT TISSUE at 01:12

## 2020-12-28 RX ADMIN — Medication 48 MG: at 01:12

## 2020-12-28 NOTE — PATIENT INSTRUCTIONS
Right knee injection of Synvisc-One 12/28/2020  Ice to the next several days  Renew Celebrex 200 mg twice a day as needed  Take Tylenol 650 mg 3 times a day if needed   injection of left shoulder 12/28/2020 with 80 mg Depo-Medrol and 5 cc 1% lidocaine  X-ray next visit of the left shoulder  X-ray next visit of the right knee  Return in 8 weeks

## 2020-12-28 NOTE — PROCEDURES
Large Joint Aspiration/Injection: R knee    Date/Time: 12/28/2020 1:20 PM  Performed by: Les Schneider MD  Authorized by: Les Schneider MD     Consent Done?:  Yes (Verbal)  Site marked: the procedure site was marked    Timeout: prior to procedure the correct patient, procedure, and site was verified      Local anesthesia used?: Yes      Details:  Needle Size:  22 G  Ultrasonic Guidance for needle placement?: No    Approach:  Anterolateral  Location:  Knee  Site:  R knee  Medications:  48 mg hylan g-f 20 48 mg/6 mL  Patient tolerance:  Patient tolerated the procedure well with no immediate complications

## 2020-12-28 NOTE — PROCEDURES
Large Joint Aspiration/Injection: L subacromial bursa    Date/Time: 12/28/2020 1:20 PM  Performed by: Les Schneider MD  Authorized by: Les Schneider MD     Consent Done?:  Yes (Verbal)  Site marked: the procedure site was marked    Timeout: prior to procedure the correct patient, procedure, and site was verified      Local anesthesia used?: Yes    Local anesthetic:  Lidocaine 1% without epinephrine  Approach:  Posterior  Location:  Shoulder  Site:  L subacromial bursa  Medications:  80 mg methylPREDNISolone acetate 80 mg/mL  Patient tolerance:  Patient tolerated the procedure well with no immediate complications

## 2020-12-28 NOTE — PROGRESS NOTES
Subjective:     Patient ID: Kaylin Mccollum is a 75 y.o. female.    Chief Complaint: Pain of the Right Knee    HPI:  75-year-old retired from  who had been falling down stairs numerous occasions she has been having pain and catching and locking since several months now.  She gets up the knee catches she has to wiggle it some how to unlock it before she can walk. She does have quite a bit of lumbar pain that radiates with burning sensation down to the right anterior tibia and dorsal of the foot.  She is under the care of pain management for that.  Pain in the knee is around 4/10 with catching locking feeling.  Able to ambulate once the catching locking goes away for her minimum 200-300 yd without discomfort.  Unable to squat unable to do stairs due to the catching locking feeling.  She does ambulate without any assistive devices.  She does have an MRI in the electronic records.  At 1 point she had falling down the stairs and sustained right shoulder rotator cuff tear requiring repair.  Denies any numbness tingling in the hands.  She does have some cervical lumbar pain    05/21/2020  Status post her right knee arthroscopy 03/10/2020.  Findings of complex medial and lateral meniscus tears as well chondromalacia type 3 anterior and medial.  She was doing great postop for 5 weeks another side in twisted her knee and starting some pain.  Any twisting maneuver she hurts quite a bit.  Pain is 4/10.  She did her independent exercise program.  Denies any fever or chills or shortness of breath or difficulty chewing or swallowing.  Complains of some stiffness and swelling    06/25/2020  Right knee arthroscopy 03/10/2020 with complex medial and lateral meniscus tears as well as chondromalacia type 3 anterior medial compartment.  The other day could not get up from kneeling on the floor.  Her pain now is coming back as 6/10 with swelling and tightness in the knee.  Last visit given a steroid injection which helped for a  little bit but not too much.  She does take Celebrex 100 mg twice a day and a helps very little.  Last visit discussed injecting Synvisc-One into her knee and she wants to proceed.  I did tell her that Synvisc-One might take 6-8 weeks to see any effect.  In my not work and we had to resort to other treatments down the road.  Still feeling occasional catching.  Denies any fever chills shortness of breath difficulty chewing swallowing loss of bowel bladder control or loss of taste and smell    08/20/2020  Chondromalacia of the right knee anterior and medial compartment and status post partial medial lateral meniscectomy.  Synvisc-One given 06/25/2020 seems to have helped but still having quite excessive pain right now since her daughter was diagnosed with PE and had to run around with her.  But overall she feels Synvisc-One seems to have helped.  She is requesting a steroid injection today.  She does take Celebrex at night.  She is to have back injections she is holding off at this point.  Pain is 4/10.  Denies any fever or chills or shortness of breath or difficulty chewing or swallowing loss of bowel bladder control    11/19/2020  Right knee arthroscopy 03/10/2020 with medial and lateral partial meniscectomies and finding of chondromalacia type 3 of the anterior medial compartment.  She received Synvisc-One on 06/25/2020 with good relief.  She thought she is getting her Synvisc-One today and I refreshed her memory that it should be 6 months so she is not due until December 25th.  We need to get that approved.  She complains on occasional thigh pain occasional mid tibia pain.  She does have history of fibromyalgia and used to get hip injections by Dr. izquierdo last of which 10/08/2020 bilateral SI and bilateral greater trochanteric areas.  She does complain of both hips hurting over the greater trochanters.  Celebrex seems to help as well as the brace on her knee.  She asked she is going for dental work if she can take  ibuprofen I did tell her not with Celebrex she may take Tylenol.  Denies any fever or chills or shortness of breath or difficulty with chewing or swallowing loss of bowel bladder control or blurry vision double vision or loss of sense smell or taste    12/28/2020   New problem left shoulder pain  Right knee pain  Lately each patient is taking Celebrex 200 mg twice a day since she has been taking care of her daughter back in 4 to the hospital and doing a lot of walking.  She is running out of the Celebrex.  I did warn her about the side effects.  Her pain is 4/10.  Unable to sleep on the shoulder.  Lifting things seems to be very painful.  Previous right shoulder rotator cuff surgery.  She is having also worsening lower back and hip area and sacroiliac pain she sees pain management for that.    Denies any fever or chills or shortness of breath or difficulty with chewing or swallowing loss of bowel bladder control blurry vision double vision loss of sense of smell or taste  Past Medical History:   Diagnosis Date    Arthritis     Cataract     Diabetes mellitus     Diabetes mellitus, type 2     Fibromyalgia     General anesthetics causing adverse effect in therapeutic use     bradycardia     Hypertension     Migraines     Mitral valve prolapse     Osteoarthritis     Rotator cuff tear 4/1/2015     Past Surgical History:   Procedure Laterality Date    ARTHROSCOPY OF KNEE Right 3/10/2020    Procedure: ARTHROSCOPY, KNEE;  Surgeon: Les Schneider MD;  Location: [facility] OR;  Service: Orthopedics;  Laterality: Right;    CATARACT EXTRACTION W/  INTRAOCULAR LENS IMPLANT Left 07/19/2017    CATARACT EXTRACTION W/  INTRAOCULAR LENS IMPLANT Right 2017    CHOLECYSTECTOMY      CHONDROPLASTY OF KNEE Right 3/10/2020    Procedure: CHONDROPLASTY, KNEE;  Surgeon: Les Schneider MD;  Location: [facility] OR;  Service: Orthopedics;  Laterality: Right;  Anterior compartment     COLONOSCOPY N/A 9/25/2018    Procedure:  COLONOSCOPY;  Surgeon: Bethel Carmona MD;  Location: [facility] ENDO;  Service: Endoscopy;  Laterality: N/A;    EXCISION OF MEDIAL MENISCUS OF KNEE Right 3/10/2020    Procedure: MENISCECTOMY, KNEE, MEDIAL;  Surgeon: Les Schneider MD;  Location: [facility] OR;  Service: Orthopedics;  Laterality: Right;  Partial, Medial , Lateral     EYE SURGERY      INJECTION OF ANESTHETIC AGENT AROUND NERVE Right 8/8/2019    Procedure: Right Genicular nerve block with local;  Surgeon: Manpreet Dutta MD;  Location: [facility] PAIN MGT;  Service: Pain Management;  Laterality: Right;    INJECTION OF ANESTHETIC AGENT INTO SACROILIAC JOINT Bilateral 5/6/2020    Procedure: Bilateral Sacroiliac Joint Injection;  Surgeon: Manpreet Dutta MD;  Location: [facility] PAIN MGT;  Service: Pain Management;  Laterality: Bilateral;    INJECTION OF ANESTHETIC AGENT INTO SACROILIAC JOINT Bilateral 10/8/2020    Procedure: Bilateral SI and Bilateral GTB with RN IV sedation;  Surgeon: Manpreet Dutta MD;  Location: [facility] PAIN MGT;  Service: Pain Management;  Laterality: Bilateral;    INJECTION OF JOINT Bilateral 9/5/2019    Procedure: Bilateral GT bursa injection;  Surgeon: Manpreet Dutta MD;  Location: [facility] PAIN MGT;  Service: Pain Management;  Laterality: Bilateral;    INJECTION OF JOINT Bilateral 5/6/2020    Procedure: Bilateral GT bursa injection;  Surgeon: Manpreet Dutta MD;  Location: [facility] PAIN MGT;  Service: Pain Management;  Laterality: Bilateral;    KNEE ARTHROSCOPY Bilateral     PARS PLANA VITRECTOMY W/ REPAIR OF MACULAR HOLE Left 02/22/2017    RADIOFREQUENCY THERMOCOAGULATION Left 7/11/2019    Procedure: Right SIJ RFA;  Surgeon: Manpreet Dutta MD;  Location: [facility] PAIN MGT;  Service: Pain Management;  Laterality: Left;    RADIOFREQUENCY THERMOCOAGULATION Right 7/25/2019    Procedure: Right SIJ RFA;  Surgeon: Manpreet Dutta MD;  Location: [facility] PAIN MGT;  Service: Pain Management;  Laterality: Right;    SHOULDER ARTHROSCOPY Right 06/04/15     Family History    Problem Relation Age of Onset    Heart disease Mother     Glaucoma Mother     Cataracts Mother     Cancer Mother         colon    Kidney disease Daughter     Anesthesia problems Daughter         cardiac arrest during nephrectomy    Diabetes Maternal Grandmother     Heart disease Maternal Grandmother     Diabetes Maternal Grandfather     Cancer Maternal Grandfather     Breast cancer Paternal Aunt      Social History     Socioeconomic History    Marital status:      Spouse name: Not on file    Number of children: Not on file    Years of education: Not on file    Highest education level: Not on file   Occupational History     Employer: [employer]   Social Needs    Financial resource strain: Not hard at all    Food insecurity     Worry: Never true     Inability: Never true    Transportation needs     Medical: No     Non-medical: No   Tobacco Use    Smoking status: Never Smoker    Smokeless tobacco: Never Used   Substance and Sexual Activity    Alcohol use: Yes     Alcohol/week: 0.0 standard drinks     Frequency: Never     Drinks per session: Patient refused     Binge frequency: Never     Comment: Rarely  No alcohol 72h prior to sx    Drug use: No    Sexual activity: Not Currently   Lifestyle    Physical activity     Days per week: 0 days     Minutes per session: 0 min    Stress: Not at all   Relationships    Social connections     Talks on phone: More than three times a week     Gets together: More than three times a week     Attends [employer] service: Not on file     Active member of club or organization: No     Attends meetings of clubs or organizations: Never     Relationship status:    Other Topics Concern    Not on file   Social History Narrative    . 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     Medication List with Changes/Refills   New Medications    CELECOXIB (CELEBREX) 200 MG CAPSULE    Take 1 capsule (200 mg total) by mouth 2 (two) times daily. Take with food  "  Current Medications    BIOTIN 1 MG TABLET    Take 1,000 mcg by mouth every morning.     CANAGLIFLOZIN (INVOKANA) 100 MG TAB    Take 1 tablet (100 mg total) by mouth once daily.    CELECOXIB (CELEBREX) 200 MG CAPSULE    Take 1 capsule (200 mg total) by mouth once daily. Take with food    CELECOXIB (CELEBREX) 200 MG CAPSULE    Take 1 capsule (200 mg total) by mouth once daily. Take with food    DICLOFENAC SODIUM (VOLTAREN) 1 % GEL    Apply 4 g topically 3 (three) times daily as needed.    EZETIMIBE (ZETIA) 10 MG TABLET    Take 1 tablet (10 mg total) by mouth once daily.    FLUOXETINE 40 MG CAPSULE    TAKE 1 CAPSULE(40 MG) BY MOUTH EVERY DAY    FLUTICASONE (FLONASE) 50 MCG/ACTUATION NASAL SPRAY    2 sprays by Each Nare route once daily.    GABAPENTIN (NEURONTIN) 300 MG CAPSULE    TAKE 1 CAPSULE BY MOUTH TWICE DAILY    HYDROCODONE-ACETAMINOPHEN (NORCO)  MG PER TABLET    Take 1 tablet by mouth every 6 (six) hours as needed. HYDROCODONE-ACETAMINOPHEN (Norco)  MG ORAL TAB - 1 tab po q 6 hrs prn pain    LOSARTAN (COZAAR) 25 MG TABLET    TAKE 1 TABLET(25 MG) BY MOUTH EVERY DAY    METFORMIN (GLUCOPHAGE) 1000 MG TABLET    TAKE 1 TABLET BY MOUTH TWICE DAILY WITH MEALS    TRAMADOL (ULTRAM) 50 MG TABLET    Take 1 tablet (50 mg total) by mouth every 12 (twelve) hours as needed for Pain.    TRIAMCINOLONE ACETONIDE 0.025% (KENALOG) 0.025 % OINT    Apply topically 2 (two) times daily. for 10 days    VERAPAMIL (CALAN) 120 MG TABLET    Take 1 tablet (120 mg total) by mouth 2 (two) times daily.     Review of patient's allergies indicates:   Allergen Reactions    Demerol [meperidine] Other (See Comments)     Burning when adm IV  Able to tolerate IM, "Turned the veins in my hand purple."    Latex, natural rubber Other (See Comments)     "Burns my skin",  Symptoms get worse the longer she is exposed    Zocor [simvastatin] Other (See Comments)     Tightening of muscles    Statins-hmg-coa reductase inhibitors Other (See " Comments)     myopathy    Sulfa (sulfonamide antibiotics)      Blurred vision    Nickel sutures [surgical stainless steel] Rash     Any nickel     Review of Systems   Constitution: Negative for decreased appetite.   HENT: Negative for tinnitus.    Eyes: Negative for double vision.   Cardiovascular: Negative for chest pain.   Respiratory: Negative for wheezing.    Hematologic/Lymphatic: Negative for bleeding problem.   Skin: Negative for dry skin.   Musculoskeletal: Positive for arthritis, back pain, joint pain, neck pain and stiffness. Negative for gout.   Gastrointestinal: Negative for abdominal pain.   Genitourinary: Negative for bladder incontinence.   Neurological: Negative for numbness, paresthesias and sensory change.   Psychiatric/Behavioral: Negative for altered mental status.       Objective:   Body mass index is 31.57 kg/m².  Vitals:    12/28/20 1312   BP: 126/70   Pulse: 76          General    Constitutional: She is oriented to person, place, and time. She appears well-developed.   HENT:   Head: Atraumatic.   Eyes: EOM are normal.   Cardiovascular: Normal rate.    Pulmonary/Chest: Effort normal.   Abdominal: Soft.   Neurological: She is alert and oriented to person, place, and time.   Psychiatric: Judgment normal.            Cervical spine with slight limitation of rotation with clicking in the neck. Some mild discomfort to extreme rotation.  Bilateral upper extremity neurovascularly intact. Radial ulnar pulses 2+.  Sensory intact to touch.  Motor strength is 5/5 throughout.  Left shoulder flexion 160 abduction 90 with positive impingement sign.  Pain in the infraspinatus fossa posteriorly to palpation very mild anteriorly and laterally  A lumbar with tenderness around L4-5 paraspinal and mostly to words the right side.  Pelvis is level  Straight leg raising slightly positive on the left negative on the right  Passive hip motion within normal limits.  No pain in the groin .  Very painful pain over the  greater trochanters to touch bilaterally.  Hip flexors, abductors, adductors, quads, hamstrings, ankle extensors and flexors all 5/5 and even bilaterally.  And left knee full motion collaterals and cruciate stable negative Kaleigh's negative pain to palpation.  Surgical scar from knee scope done years ago.  Right knee with mild to moderate swelling.  Surgical scars healed well.  Crepitus with motion anteriorly.  Painful to any twisting motion.  Collaterals and cruciates are stable to valgus varus stressing as well as anterior posterior drawer. Range of motion 0-120 degrees.  Calves are soft nontender.  Multiple varicositiesNo pitting edema  EHL 5/5 plantar flexion 5/5.  DP 1+ PT 1+  Skin is warm to touch no obvious lesions    Relevant imaging results reviewed and interpreted by me, discussed with the patient and / or family today   X-ray from 2019 bilateral knees with mild medial joint narrowing and small osteophytic changes  MRI reviewed with the patient showed her the pictures as well as reviewed the report consistent with medial and lateral meniscus stairs, patellofemoral chondromalacia as well as medially  Assessment:     Encounter Diagnoses   Name Primary?    Chondromalacia, right knee Yes    S/P right knee arthroscopy     Acute medial meniscus tear of right knee, sequela     Acute lateral meniscus tear of right knee, sequela     Greater trochanteric bursitis of both hips     Left shoulder tendinitis         Plan:   Chondromalacia, right knee  -     Large Joint Aspiration/Injection: R knee  -     celecoxib (CELEBREX) 200 MG capsule; Take 1 capsule (200 mg total) by mouth 2 (two) times daily. Take with food  Dispense: 120 capsule; Refill: 1    S/P right knee arthroscopy    Acute medial meniscus tear of right knee, sequela    Acute lateral meniscus tear of right knee, sequela    Greater trochanteric bursitis of both hips    Left shoulder tendinitis  -     Large Joint Aspiration/Injection: L subacromial  bursa  -     celecoxib (CELEBREX) 200 MG capsule; Take 1 capsule (200 mg total) by mouth 2 (two) times daily. Take with food  Dispense: 120 capsule; Refill: 1         Patient Instructions   Right knee injection of Synvisc-One 12/28/2020  Ice to the next several days  Renew Celebrex 200 mg twice a day as needed  Take Tylenol 650 mg 3 times a day if needed   injection of left shoulder 12/28/2020 with 80 mg Depo-Medrol and 5 cc 1% lidocaine  X-ray next visit of the left shoulder  X-ray next visit of the right knee  Return in 8 weeks    All questions asked and answered   history of injection of Synvisc-One 06/25/2020, steroid injection 08/20/2020, takes Celebrex,  Kellgren Jaswinder scale 2.  Disclaimer: This note was prepared using a voice recognition system and is likely to have sound alike errors within the text.

## 2021-01-05 ENCOUNTER — HOSPITAL ENCOUNTER (OUTPATIENT)
Facility: HOSPITAL | Age: 76
Discharge: HOME | End: 2021-01-05
Attending: PHYSICAL MEDICINE & REHABILITATION | Admitting: PHYSICAL MEDICINE & REHABILITATION
Payer: MEDICARE

## 2021-01-05 VITALS
HEIGHT: 70 IN | HEART RATE: 71 BPM | WEIGHT: 215.63 LBS | TEMPERATURE: 98 F | DIASTOLIC BLOOD PRESSURE: 72 MMHG | OXYGEN SATURATION: 97 % | RESPIRATION RATE: 12 BRPM | SYSTOLIC BLOOD PRESSURE: 150 MMHG | BODY MASS INDEX: 30.87 KG/M2

## 2021-01-05 DIAGNOSIS — M46.1 SACROILIITIS: Primary | ICD-10-CM

## 2021-01-05 DIAGNOSIS — M79.7 FIBROMYALGIA: Chronic | ICD-10-CM

## 2021-01-05 PROCEDURE — 20610 DRAIN/INJ JOINT/BURSA W/O US: CPT | Mod: 50 | Performed by: PHYSICAL MEDICINE & REHABILITATION

## 2021-01-05 PROCEDURE — 25500020 PHARM REV CODE 255: Performed by: PHYSICAL MEDICINE & REHABILITATION

## 2021-01-05 PROCEDURE — 20610 PR DRAIN/INJECT LARGE JOINT/BURSA: ICD-10-PCS | Mod: 59,50,, | Performed by: PHYSICAL MEDICINE & REHABILITATION

## 2021-01-05 PROCEDURE — 20610 DRAIN/INJ JOINT/BURSA W/O US: CPT | Mod: 59,50,, | Performed by: PHYSICAL MEDICINE & REHABILITATION

## 2021-01-05 PROCEDURE — 63600175 PHARM REV CODE 636 W HCPCS: Performed by: PHYSICAL MEDICINE & REHABILITATION

## 2021-01-05 PROCEDURE — 27096 PR INJECTION,SACROILIAC JOINT: ICD-10-PCS | Mod: 50,,, | Performed by: PHYSICAL MEDICINE & REHABILITATION

## 2021-01-05 PROCEDURE — 27096 INJECT SACROILIAC JOINT: CPT | Mod: 50 | Performed by: PHYSICAL MEDICINE & REHABILITATION

## 2021-01-05 PROCEDURE — 99152 MOD SED SAME PHYS/QHP 5/>YRS: CPT | Performed by: PHYSICAL MEDICINE & REHABILITATION

## 2021-01-05 PROCEDURE — 25000003 PHARM REV CODE 250: Performed by: PHYSICAL MEDICINE & REHABILITATION

## 2021-01-05 PROCEDURE — 27096 INJECT SACROILIAC JOINT: CPT | Mod: 50,,, | Performed by: PHYSICAL MEDICINE & REHABILITATION

## 2021-01-05 RX ORDER — GABAPENTIN 300 MG/1
300 CAPSULE ORAL 2 TIMES DAILY
Qty: 60 CAPSULE | Refills: 2 | Status: SHIPPED | OUTPATIENT
Start: 2021-01-05 | End: 2021-01-26 | Stop reason: SDUPTHER

## 2021-01-05 RX ORDER — METHYLPREDNISOLONE ACETATE 40 MG/ML
INJECTION, SUSPENSION INTRA-ARTICULAR; INTRALESIONAL; INTRAMUSCULAR; SOFT TISSUE
Status: DISCONTINUED | OUTPATIENT
Start: 2021-01-05 | End: 2021-01-05 | Stop reason: HOSPADM

## 2021-01-05 RX ORDER — BUPIVACAINE HYDROCHLORIDE 2.5 MG/ML
INJECTION, SOLUTION EPIDURAL; INFILTRATION; INTRACAUDAL
Status: DISCONTINUED | OUTPATIENT
Start: 2021-01-05 | End: 2021-01-05 | Stop reason: HOSPADM

## 2021-01-05 RX ORDER — FENTANYL CITRATE 50 UG/ML
INJECTION, SOLUTION INTRAMUSCULAR; INTRAVENOUS
Status: DISCONTINUED | OUTPATIENT
Start: 2021-01-05 | End: 2021-01-05 | Stop reason: HOSPADM

## 2021-01-05 RX ORDER — MIDAZOLAM HYDROCHLORIDE 1 MG/ML
INJECTION, SOLUTION INTRAMUSCULAR; INTRAVENOUS
Status: DISCONTINUED | OUTPATIENT
Start: 2021-01-05 | End: 2021-01-05 | Stop reason: HOSPADM

## 2021-01-05 RX ORDER — ONDANSETRON 2 MG/ML
4 INJECTION INTRAMUSCULAR; INTRAVENOUS ONCE AS NEEDED
Status: DISCONTINUED | OUTPATIENT
Start: 2021-01-05 | End: 2021-01-05 | Stop reason: HOSPADM

## 2021-01-07 LAB — POCT GLUCOSE: 177 MG/DL (ref 70–110)

## 2021-01-15 DIAGNOSIS — E11.69 DIABETES MELLITUS TYPE 2 IN OBESE: ICD-10-CM

## 2021-01-15 DIAGNOSIS — E66.9 DIABETES MELLITUS TYPE 2 IN OBESE: ICD-10-CM

## 2021-01-15 RX ORDER — METFORMIN HYDROCHLORIDE 1000 MG/1
TABLET ORAL
Qty: 180 TABLET | Refills: 1 | Status: SHIPPED | OUTPATIENT
Start: 2021-01-15 | End: 2021-01-26 | Stop reason: SDUPTHER

## 2021-01-19 DIAGNOSIS — E11.59 HYPERTENSION ASSOCIATED WITH DIABETES: ICD-10-CM

## 2021-01-19 DIAGNOSIS — I15.2 HYPERTENSION ASSOCIATED WITH DIABETES: ICD-10-CM

## 2021-01-19 RX ORDER — LOSARTAN POTASSIUM 25 MG/1
25 TABLET ORAL DAILY
Qty: 90 TABLET | Refills: 0 | Status: SHIPPED | OUTPATIENT
Start: 2021-01-19 | End: 2021-01-26 | Stop reason: SDUPTHER

## 2021-01-21 ENCOUNTER — PATIENT MESSAGE (OUTPATIENT)
Dept: INTERNAL MEDICINE | Facility: CLINIC | Age: 76
End: 2021-01-21

## 2021-01-21 DIAGNOSIS — E11.69 HYPERLIPIDEMIA ASSOCIATED WITH TYPE 2 DIABETES MELLITUS: ICD-10-CM

## 2021-01-21 DIAGNOSIS — I15.2 HYPERTENSION ASSOCIATED WITH DIABETES: Chronic | ICD-10-CM

## 2021-01-21 DIAGNOSIS — E11.59 HYPERTENSION ASSOCIATED WITH DIABETES: Chronic | ICD-10-CM

## 2021-01-21 DIAGNOSIS — E66.9 DIABETES MELLITUS TYPE 2 IN OBESE: Primary | ICD-10-CM

## 2021-01-21 DIAGNOSIS — E11.69 DIABETES MELLITUS TYPE 2 IN OBESE: Primary | ICD-10-CM

## 2021-01-21 DIAGNOSIS — E78.5 HYPERLIPIDEMIA ASSOCIATED WITH TYPE 2 DIABETES MELLITUS: ICD-10-CM

## 2021-01-25 ENCOUNTER — OFFICE VISIT (OUTPATIENT)
Dept: INTERNAL MEDICINE | Facility: CLINIC | Age: 76
End: 2021-01-25
Payer: MEDICARE

## 2021-01-25 VITALS
SYSTOLIC BLOOD PRESSURE: 130 MMHG | HEIGHT: 70 IN | WEIGHT: 202.19 LBS | HEART RATE: 91 BPM | TEMPERATURE: 98 F | BODY MASS INDEX: 28.95 KG/M2 | DIASTOLIC BLOOD PRESSURE: 64 MMHG | OXYGEN SATURATION: 98 %

## 2021-01-25 DIAGNOSIS — E11.69 DIABETES MELLITUS TYPE 2 IN OBESE: Primary | ICD-10-CM

## 2021-01-25 DIAGNOSIS — F32.9 CHRONIC MAJOR DEPRESSIVE DISORDER: Chronic | ICD-10-CM

## 2021-01-25 DIAGNOSIS — E11.69 HYPERLIPIDEMIA ASSOCIATED WITH TYPE 2 DIABETES MELLITUS: ICD-10-CM

## 2021-01-25 DIAGNOSIS — E78.5 HYPERLIPIDEMIA ASSOCIATED WITH TYPE 2 DIABETES MELLITUS: ICD-10-CM

## 2021-01-25 DIAGNOSIS — J02.9 SORE THROAT: ICD-10-CM

## 2021-01-25 DIAGNOSIS — E11.59 HYPERTENSION ASSOCIATED WITH DIABETES: Chronic | ICD-10-CM

## 2021-01-25 DIAGNOSIS — I15.2 HYPERTENSION ASSOCIATED WITH DIABETES: Chronic | ICD-10-CM

## 2021-01-25 DIAGNOSIS — E66.9 DIABETES MELLITUS TYPE 2 IN OBESE: Primary | ICD-10-CM

## 2021-01-25 LAB — SARS-COV-2 RNA RESP QL NAA+PROBE: DETECTED

## 2021-01-25 PROCEDURE — 99214 PR OFFICE/OUTPT VISIT, EST, LEVL IV, 30-39 MIN: ICD-10-PCS | Mod: S$PBB,,, | Performed by: FAMILY MEDICINE

## 2021-01-25 PROCEDURE — 99215 OFFICE O/P EST HI 40 MIN: CPT | Mod: PBBFAC | Performed by: FAMILY MEDICINE

## 2021-01-25 PROCEDURE — 99999 PR PBB SHADOW E&M-EST. PATIENT-LVL V: ICD-10-PCS | Mod: PBBFAC,,, | Performed by: FAMILY MEDICINE

## 2021-01-25 PROCEDURE — 99214 OFFICE O/P EST MOD 30 MIN: CPT | Mod: S$PBB,,, | Performed by: FAMILY MEDICINE

## 2021-01-25 PROCEDURE — 99999 PR PBB SHADOW E&M-EST. PATIENT-LVL V: CPT | Mod: PBBFAC,,, | Performed by: FAMILY MEDICINE

## 2021-01-25 PROCEDURE — U0003 INFECTIOUS AGENT DETECTION BY NUCLEIC ACID (DNA OR RNA); SEVERE ACUTE RESPIRATORY SYNDROME CORONAVIRUS 2 (SARS-COV-2) (CORONAVIRUS DISEASE [COVID-19]), AMPLIFIED PROBE TECHNIQUE, MAKING USE OF HIGH THROUGHPUT TECHNOLOGIES AS DESCRIBED BY CMS-2020-01-R: HCPCS

## 2021-01-25 RX ORDER — LORAZEPAM 1 MG/1
TABLET ORAL
COMMUNITY
Start: 2020-10-20

## 2021-01-26 ENCOUNTER — TELEPHONE (OUTPATIENT)
Dept: INTERNAL MEDICINE | Facility: CLINIC | Age: 76
End: 2021-01-26

## 2021-01-26 ENCOUNTER — TELEPHONE (OUTPATIENT)
Dept: ADMINISTRATIVE | Facility: CLINIC | Age: 76
End: 2021-01-26

## 2021-01-26 DIAGNOSIS — I15.2 HYPERTENSION ASSOCIATED WITH DIABETES: ICD-10-CM

## 2021-01-26 DIAGNOSIS — E11.69 DIABETES MELLITUS TYPE 2 IN OBESE: ICD-10-CM

## 2021-01-26 DIAGNOSIS — E11.69 HYPERLIPIDEMIA ASSOCIATED WITH TYPE 2 DIABETES MELLITUS: ICD-10-CM

## 2021-01-26 DIAGNOSIS — U07.1 COVID-19 VIRUS DETECTED: Primary | ICD-10-CM

## 2021-01-26 DIAGNOSIS — E78.5 HYPERLIPIDEMIA ASSOCIATED WITH TYPE 2 DIABETES MELLITUS: ICD-10-CM

## 2021-01-26 DIAGNOSIS — E11.59 HYPERTENSION ASSOCIATED WITH DIABETES: ICD-10-CM

## 2021-01-26 DIAGNOSIS — E66.9 DIABETES MELLITUS TYPE 2 IN OBESE: ICD-10-CM

## 2021-01-27 ENCOUNTER — PATIENT MESSAGE (OUTPATIENT)
Dept: ADMINISTRATIVE | Facility: OTHER | Age: 76
End: 2021-01-27

## 2021-01-27 ENCOUNTER — TELEPHONE (OUTPATIENT)
Dept: ADMINISTRATIVE | Facility: CLINIC | Age: 76
End: 2021-01-27

## 2021-01-28 ENCOUNTER — PATIENT MESSAGE (OUTPATIENT)
Dept: ADMINISTRATIVE | Facility: OTHER | Age: 76
End: 2021-01-28

## 2021-01-28 ENCOUNTER — NURSE TRIAGE (OUTPATIENT)
Dept: ADMINISTRATIVE | Facility: CLINIC | Age: 76
End: 2021-01-28

## 2021-01-28 RX ORDER — LOSARTAN POTASSIUM 25 MG/1
25 TABLET ORAL DAILY
Qty: 90 TABLET | Refills: 3 | Status: SHIPPED | OUTPATIENT
Start: 2021-01-28 | End: 2021-02-25

## 2021-01-28 RX ORDER — EZETIMIBE 10 MG/1
10 TABLET ORAL DAILY
Qty: 90 TABLET | Refills: 3 | Status: SHIPPED | OUTPATIENT
Start: 2021-01-28 | End: 2022-02-19 | Stop reason: SDUPTHER

## 2021-01-28 RX ORDER — METFORMIN HYDROCHLORIDE 1000 MG/1
1000 TABLET ORAL 2 TIMES DAILY WITH MEALS
Qty: 180 TABLET | Refills: 3 | Status: SHIPPED | OUTPATIENT
Start: 2021-01-28 | End: 2022-02-19 | Stop reason: SDUPTHER

## 2021-01-29 ENCOUNTER — PATIENT MESSAGE (OUTPATIENT)
Dept: ADMINISTRATIVE | Facility: OTHER | Age: 76
End: 2021-01-29

## 2021-01-29 ENCOUNTER — INFUSION (OUTPATIENT)
Dept: INFECTIOUS DISEASES | Facility: HOSPITAL | Age: 76
End: 2021-01-29
Attending: FAMILY MEDICINE
Payer: MEDICARE

## 2021-01-29 ENCOUNTER — NURSE TRIAGE (OUTPATIENT)
Dept: ADMINISTRATIVE | Facility: CLINIC | Age: 76
End: 2021-01-29

## 2021-01-29 VITALS
RESPIRATION RATE: 18 BRPM | OXYGEN SATURATION: 99 % | DIASTOLIC BLOOD PRESSURE: 62 MMHG | SYSTOLIC BLOOD PRESSURE: 138 MMHG | TEMPERATURE: 98 F | HEART RATE: 77 BPM

## 2021-01-29 DIAGNOSIS — U07.1 COVID-19 VIRUS DETECTED: ICD-10-CM

## 2021-01-29 PROCEDURE — 63600175 PHARM REV CODE 636 W HCPCS: Performed by: INTERNAL MEDICINE

## 2021-01-29 PROCEDURE — M0239 BAMLANIVIMAB-XXXX INFUSION: HCPCS | Performed by: INTERNAL MEDICINE

## 2021-01-29 PROCEDURE — 25000003 PHARM REV CODE 250: Performed by: INTERNAL MEDICINE

## 2021-01-29 RX ORDER — EPINEPHRINE 0.1 MG/ML
0.3 INJECTION INTRAVENOUS
Status: DISCONTINUED | OUTPATIENT
Start: 2021-01-29 | End: 2021-08-10

## 2021-01-29 RX ORDER — ALBUTEROL SULFATE 90 UG/1
2 AEROSOL, METERED RESPIRATORY (INHALATION)
Status: DISCONTINUED | OUTPATIENT
Start: 2021-01-29 | End: 2021-08-10

## 2021-01-29 RX ORDER — SODIUM CHLORIDE 0.9 % (FLUSH) 0.9 %
10 SYRINGE (ML) INJECTION
Status: DISCONTINUED | OUTPATIENT
Start: 2021-01-29 | End: 2021-08-10

## 2021-01-29 RX ORDER — ONDANSETRON 4 MG/1
4 TABLET, ORALLY DISINTEGRATING ORAL ONCE AS NEEDED
Status: DISCONTINUED | OUTPATIENT
Start: 2021-01-29 | End: 2021-08-10

## 2021-01-29 RX ORDER — ACETAMINOPHEN 325 MG/1
650 TABLET ORAL ONCE AS NEEDED
Status: DISCONTINUED | OUTPATIENT
Start: 2021-01-29 | End: 2021-08-10

## 2021-01-29 RX ORDER — DIPHENHYDRAMINE HYDROCHLORIDE 50 MG/ML
25 INJECTION INTRAMUSCULAR; INTRAVENOUS ONCE AS NEEDED
Status: DISCONTINUED | OUTPATIENT
Start: 2021-01-29 | End: 2021-08-10

## 2021-01-29 RX ADMIN — SODIUM CHLORIDE 700 MG: 0.9 INJECTION, SOLUTION INTRAVENOUS at 11:01

## 2021-01-30 ENCOUNTER — NURSE TRIAGE (OUTPATIENT)
Dept: ADMINISTRATIVE | Facility: CLINIC | Age: 76
End: 2021-01-30

## 2021-01-30 ENCOUNTER — PATIENT MESSAGE (OUTPATIENT)
Dept: ADMINISTRATIVE | Facility: OTHER | Age: 76
End: 2021-01-30

## 2021-01-31 ENCOUNTER — PATIENT MESSAGE (OUTPATIENT)
Dept: ADMINISTRATIVE | Facility: OTHER | Age: 76
End: 2021-01-31

## 2021-02-01 ENCOUNTER — PATIENT MESSAGE (OUTPATIENT)
Dept: ADMINISTRATIVE | Facility: OTHER | Age: 76
End: 2021-02-01

## 2021-02-02 ENCOUNTER — PATIENT MESSAGE (OUTPATIENT)
Dept: ADMINISTRATIVE | Facility: OTHER | Age: 76
End: 2021-02-02

## 2021-02-03 ENCOUNTER — NURSE TRIAGE (OUTPATIENT)
Dept: ADMINISTRATIVE | Facility: CLINIC | Age: 76
End: 2021-02-03

## 2021-02-03 ENCOUNTER — PATIENT MESSAGE (OUTPATIENT)
Dept: ADMINISTRATIVE | Facility: OTHER | Age: 76
End: 2021-02-03

## 2021-02-04 ENCOUNTER — PATIENT MESSAGE (OUTPATIENT)
Dept: ADMINISTRATIVE | Facility: OTHER | Age: 76
End: 2021-02-04

## 2021-02-05 ENCOUNTER — PATIENT MESSAGE (OUTPATIENT)
Dept: ADMINISTRATIVE | Facility: OTHER | Age: 76
End: 2021-02-05

## 2021-02-05 ENCOUNTER — NURSE TRIAGE (OUTPATIENT)
Dept: ADMINISTRATIVE | Facility: CLINIC | Age: 76
End: 2021-02-05

## 2021-02-06 ENCOUNTER — PATIENT MESSAGE (OUTPATIENT)
Dept: ADMINISTRATIVE | Facility: OTHER | Age: 76
End: 2021-02-06

## 2021-02-07 ENCOUNTER — PATIENT MESSAGE (OUTPATIENT)
Dept: ADMINISTRATIVE | Facility: OTHER | Age: 76
End: 2021-02-07

## 2021-02-08 ENCOUNTER — PATIENT MESSAGE (OUTPATIENT)
Dept: ADMINISTRATIVE | Facility: OTHER | Age: 76
End: 2021-02-08

## 2021-02-09 ENCOUNTER — PATIENT MESSAGE (OUTPATIENT)
Dept: ADMINISTRATIVE | Facility: OTHER | Age: 76
End: 2021-02-09

## 2021-02-10 ENCOUNTER — PATIENT MESSAGE (OUTPATIENT)
Dept: INTERNAL MEDICINE | Facility: CLINIC | Age: 76
End: 2021-02-10

## 2021-02-12 ENCOUNTER — LAB VISIT (OUTPATIENT)
Dept: LAB | Facility: HOSPITAL | Age: 76
End: 2021-02-12
Attending: FAMILY MEDICINE
Payer: MEDICARE

## 2021-02-12 DIAGNOSIS — E11.69 DIABETES MELLITUS TYPE 2 IN OBESE: ICD-10-CM

## 2021-02-12 DIAGNOSIS — E78.5 HYPERLIPIDEMIA ASSOCIATED WITH TYPE 2 DIABETES MELLITUS: ICD-10-CM

## 2021-02-12 DIAGNOSIS — E11.59 HYPERTENSION ASSOCIATED WITH DIABETES: Chronic | ICD-10-CM

## 2021-02-12 DIAGNOSIS — E66.9 DIABETES MELLITUS TYPE 2 IN OBESE: ICD-10-CM

## 2021-02-12 DIAGNOSIS — E11.69 HYPERLIPIDEMIA ASSOCIATED WITH TYPE 2 DIABETES MELLITUS: ICD-10-CM

## 2021-02-12 DIAGNOSIS — I15.2 HYPERTENSION ASSOCIATED WITH DIABETES: Chronic | ICD-10-CM

## 2021-02-12 LAB
ALBUMIN SERPL BCP-MCNC: 3.4 G/DL (ref 3.5–5.2)
ALP SERPL-CCNC: 60 U/L (ref 55–135)
ALT SERPL W/O P-5'-P-CCNC: 24 U/L (ref 10–44)
ANION GAP SERPL CALC-SCNC: 12 MMOL/L (ref 8–16)
AST SERPL-CCNC: 23 U/L (ref 10–40)
BASOPHILS # BLD AUTO: 0.05 K/UL (ref 0–0.2)
BASOPHILS NFR BLD: 1.1 % (ref 0–1.9)
BILIRUB SERPL-MCNC: 0.4 MG/DL (ref 0.1–1)
BUN SERPL-MCNC: 15 MG/DL (ref 8–23)
CALCIUM SERPL-MCNC: 9.4 MG/DL (ref 8.7–10.5)
CHLORIDE SERPL-SCNC: 102 MMOL/L (ref 95–110)
CHOLEST SERPL-MCNC: 201 MG/DL (ref 120–199)
CHOLEST/HDLC SERPL: 3.6 {RATIO} (ref 2–5)
CO2 SERPL-SCNC: 28 MMOL/L (ref 23–29)
CREAT SERPL-MCNC: 0.8 MG/DL (ref 0.5–1.4)
DIFFERENTIAL METHOD: ABNORMAL
EOSINOPHIL # BLD AUTO: 0.1 K/UL (ref 0–0.5)
EOSINOPHIL NFR BLD: 2.7 % (ref 0–8)
ERYTHROCYTE [DISTWIDTH] IN BLOOD BY AUTOMATED COUNT: 13.5 % (ref 11.5–14.5)
EST. GFR  (AFRICAN AMERICAN): >60 ML/MIN/1.73 M^2
EST. GFR  (NON AFRICAN AMERICAN): >60 ML/MIN/1.73 M^2
ESTIMATED AVG GLUCOSE: 180 MG/DL (ref 68–131)
GLUCOSE SERPL-MCNC: 180 MG/DL (ref 70–110)
HBA1C MFR BLD: 7.9 % (ref 4–5.6)
HCT VFR BLD AUTO: 44.1 % (ref 37–48.5)
HDLC SERPL-MCNC: 56 MG/DL (ref 40–75)
HDLC SERPL: 27.9 % (ref 20–50)
HGB BLD-MCNC: 13.2 G/DL (ref 12–16)
IMM GRANULOCYTES # BLD AUTO: 0.08 K/UL (ref 0–0.04)
IMM GRANULOCYTES NFR BLD AUTO: 1.8 % (ref 0–0.5)
LDLC SERPL CALC-MCNC: 113.8 MG/DL (ref 63–159)
LYMPHOCYTES # BLD AUTO: 1.5 K/UL (ref 1–4.8)
LYMPHOCYTES NFR BLD: 32.9 % (ref 18–48)
MCH RBC QN AUTO: 29.5 PG (ref 27–31)
MCHC RBC AUTO-ENTMCNC: 29.9 G/DL (ref 32–36)
MCV RBC AUTO: 98 FL (ref 82–98)
MONOCYTES # BLD AUTO: 0.4 K/UL (ref 0.3–1)
MONOCYTES NFR BLD: 8.6 % (ref 4–15)
NEUTROPHILS # BLD AUTO: 2.4 K/UL (ref 1.8–7.7)
NEUTROPHILS NFR BLD: 52.9 % (ref 38–73)
NONHDLC SERPL-MCNC: 145 MG/DL
NRBC BLD-RTO: 0 /100 WBC
PLATELET # BLD AUTO: 311 K/UL (ref 150–350)
PMV BLD AUTO: 9.2 FL (ref 9.2–12.9)
POTASSIUM SERPL-SCNC: 4 MMOL/L (ref 3.5–5.1)
PROT SERPL-MCNC: 6.8 G/DL (ref 6–8.4)
RBC # BLD AUTO: 4.48 M/UL (ref 4–5.4)
SODIUM SERPL-SCNC: 142 MMOL/L (ref 136–145)
TRIGL SERPL-MCNC: 156 MG/DL (ref 30–150)
TSH SERPL DL<=0.005 MIU/L-ACNC: 0.97 UIU/ML (ref 0.4–4)
WBC # BLD AUTO: 4.44 K/UL (ref 3.9–12.7)

## 2021-02-12 PROCEDURE — 85025 COMPLETE CBC W/AUTO DIFF WBC: CPT

## 2021-02-12 PROCEDURE — 80061 LIPID PANEL: CPT

## 2021-02-12 PROCEDURE — 84443 ASSAY THYROID STIM HORMONE: CPT

## 2021-02-12 PROCEDURE — 83036 HEMOGLOBIN GLYCOSYLATED A1C: CPT

## 2021-02-12 PROCEDURE — 36415 COLL VENOUS BLD VENIPUNCTURE: CPT

## 2021-02-12 PROCEDURE — 80053 COMPREHEN METABOLIC PANEL: CPT

## 2021-02-19 ENCOUNTER — PATIENT OUTREACH (OUTPATIENT)
Dept: ADMINISTRATIVE | Facility: OTHER | Age: 76
End: 2021-02-19

## 2021-02-22 ENCOUNTER — HOSPITAL ENCOUNTER (OUTPATIENT)
Dept: RADIOLOGY | Facility: HOSPITAL | Age: 76
Discharge: HOME | End: 2021-02-22
Attending: ORTHOPAEDIC SURGERY
Payer: MEDICARE

## 2021-02-22 ENCOUNTER — OFFICE VISIT (OUTPATIENT)
Dept: ORTHOPEDICS | Facility: CLINIC | Age: 76
End: 2021-02-22
Payer: MEDICARE

## 2021-02-22 VITALS
BODY MASS INDEX: 28.92 KG/M2 | HEART RATE: 78 BPM | DIASTOLIC BLOOD PRESSURE: 59 MMHG | WEIGHT: 202 LBS | SYSTOLIC BLOOD PRESSURE: 113 MMHG | HEIGHT: 70 IN

## 2021-02-22 DIAGNOSIS — M70.62 GREATER TROCHANTERIC BURSITIS OF BOTH HIPS: ICD-10-CM

## 2021-02-22 DIAGNOSIS — M70.61 GREATER TROCHANTERIC BURSITIS OF BOTH HIPS: ICD-10-CM

## 2021-02-22 DIAGNOSIS — M25.561 PAIN IN BOTH KNEES, UNSPECIFIED CHRONICITY: ICD-10-CM

## 2021-02-22 DIAGNOSIS — M25.512 LEFT SHOULDER PAIN, UNSPECIFIED CHRONICITY: ICD-10-CM

## 2021-02-22 DIAGNOSIS — M77.8 LEFT SHOULDER TENDINITIS: ICD-10-CM

## 2021-02-22 DIAGNOSIS — M25.562 PAIN IN BOTH KNEES, UNSPECIFIED CHRONICITY: ICD-10-CM

## 2021-02-22 DIAGNOSIS — S83.281S ACUTE LATERAL MENISCUS TEAR OF RIGHT KNEE, SEQUELA: ICD-10-CM

## 2021-02-22 DIAGNOSIS — M77.8 LEFT SHOULDER TENDINITIS: Primary | ICD-10-CM

## 2021-02-22 DIAGNOSIS — M94.261 CHONDROMALACIA, RIGHT KNEE: Primary | ICD-10-CM

## 2021-02-22 DIAGNOSIS — Z98.890 S/P RIGHT KNEE ARTHROSCOPY: ICD-10-CM

## 2021-02-22 DIAGNOSIS — S83.241S ACUTE MEDIAL MENISCUS TEAR OF RIGHT KNEE, SEQUELA: ICD-10-CM

## 2021-02-22 PROCEDURE — 73030 X-RAY EXAM OF SHOULDER: CPT | Mod: TC,LT

## 2021-02-22 PROCEDURE — 99214 OFFICE O/P EST MOD 30 MIN: CPT | Mod: S$PBB,,, | Performed by: ORTHOPAEDIC SURGERY

## 2021-02-22 PROCEDURE — 73564 X-RAY EXAM KNEE 4 OR MORE: CPT | Mod: TC,50

## 2021-02-22 PROCEDURE — 73030 XR SHOULDER COMPLETE 2 OR MORE VIEWS LEFT: ICD-10-PCS | Mod: 26,LT,, | Performed by: RADIOLOGY

## 2021-02-22 PROCEDURE — 73564 XR KNEE ORTHO BILAT WITH FLEXION: ICD-10-PCS | Mod: 26,50,, | Performed by: RADIOLOGY

## 2021-02-22 PROCEDURE — 99999 PR PBB SHADOW E&M-EST. PATIENT-LVL V: CPT | Mod: PBBFAC,,, | Performed by: ORTHOPAEDIC SURGERY

## 2021-02-22 PROCEDURE — 99999 PR PBB SHADOW E&M-EST. PATIENT-LVL V: ICD-10-PCS | Mod: PBBFAC,,, | Performed by: ORTHOPAEDIC SURGERY

## 2021-02-22 PROCEDURE — 99214 PR OFFICE/OUTPT VISIT, EST, LEVL IV, 30-39 MIN: ICD-10-PCS | Mod: S$PBB,,, | Performed by: ORTHOPAEDIC SURGERY

## 2021-02-22 PROCEDURE — 99215 OFFICE O/P EST HI 40 MIN: CPT | Mod: PBBFAC,25 | Performed by: ORTHOPAEDIC SURGERY

## 2021-02-22 PROCEDURE — 73030 X-RAY EXAM OF SHOULDER: CPT | Mod: 26,LT,, | Performed by: RADIOLOGY

## 2021-02-22 PROCEDURE — 73564 X-RAY EXAM KNEE 4 OR MORE: CPT | Mod: 26,50,, | Performed by: RADIOLOGY

## 2021-02-24 ENCOUNTER — OFFICE VISIT (OUTPATIENT)
Dept: PAIN MEDICINE | Facility: CLINIC | Age: 76
End: 2021-02-24
Payer: MEDICARE

## 2021-02-24 VITALS
SYSTOLIC BLOOD PRESSURE: 139 MMHG | HEIGHT: 70 IN | RESPIRATION RATE: 18 BRPM | BODY MASS INDEX: 31.5 KG/M2 | WEIGHT: 220 LBS | HEART RATE: 84 BPM | DIASTOLIC BLOOD PRESSURE: 71 MMHG

## 2021-02-24 DIAGNOSIS — M70.62 GREATER TROCHANTERIC BURSITIS OF BOTH HIPS: ICD-10-CM

## 2021-02-24 DIAGNOSIS — Z86.79 H/O MITRAL VALVE PROLAPSE: ICD-10-CM

## 2021-02-24 DIAGNOSIS — Z82.49 FAMILY HISTORY OF ATRIAL FIBRILLATION: ICD-10-CM

## 2021-02-24 DIAGNOSIS — M70.61 GREATER TROCHANTERIC BURSITIS OF BOTH HIPS: ICD-10-CM

## 2021-02-24 DIAGNOSIS — M47.816 LUMBAR SPONDYLOSIS: ICD-10-CM

## 2021-02-24 DIAGNOSIS — M53.3 SACROILIAC JOINT PAIN: Primary | ICD-10-CM

## 2021-02-24 PROCEDURE — 99215 OFFICE O/P EST HI 40 MIN: CPT | Mod: PBBFAC | Performed by: PHYSICIAN ASSISTANT

## 2021-02-24 PROCEDURE — 99214 PR OFFICE/OUTPT VISIT, EST, LEVL IV, 30-39 MIN: ICD-10-PCS | Mod: S$PBB,,, | Performed by: PHYSICIAN ASSISTANT

## 2021-02-24 PROCEDURE — 99214 OFFICE O/P EST MOD 30 MIN: CPT | Mod: S$PBB,,, | Performed by: PHYSICIAN ASSISTANT

## 2021-02-24 PROCEDURE — 99999 PR PBB SHADOW E&M-EST. PATIENT-LVL V: CPT | Mod: PBBFAC,,, | Performed by: PHYSICIAN ASSISTANT

## 2021-02-24 PROCEDURE — 99999 PR PBB SHADOW E&M-EST. PATIENT-LVL V: ICD-10-PCS | Mod: PBBFAC,,, | Performed by: PHYSICIAN ASSISTANT

## 2021-02-25 ENCOUNTER — HOSPITAL ENCOUNTER (OUTPATIENT)
Dept: CARDIOLOGY | Facility: HOSPITAL | Age: 76
Discharge: HOME | End: 2021-02-25
Attending: INTERNAL MEDICINE
Payer: MEDICARE

## 2021-02-25 ENCOUNTER — PATIENT MESSAGE (OUTPATIENT)
Dept: INTERNAL MEDICINE | Facility: CLINIC | Age: 76
End: 2021-02-25

## 2021-02-25 ENCOUNTER — OFFICE VISIT (OUTPATIENT)
Dept: CARDIOLOGY | Facility: CLINIC | Age: 76
End: 2021-02-25
Payer: MEDICARE

## 2021-02-25 ENCOUNTER — PATIENT MESSAGE (OUTPATIENT)
Dept: PAIN MEDICINE | Facility: CLINIC | Age: 76
End: 2021-02-25

## 2021-02-25 ENCOUNTER — HOSPITAL ENCOUNTER (OUTPATIENT)
Dept: RADIOLOGY | Facility: HOSPITAL | Age: 76
Discharge: HOME | End: 2021-02-25
Attending: ORTHOPAEDIC SURGERY
Payer: MEDICARE

## 2021-02-25 VITALS
DIASTOLIC BLOOD PRESSURE: 62 MMHG | HEART RATE: 96 BPM | BODY MASS INDEX: 31.47 KG/M2 | SYSTOLIC BLOOD PRESSURE: 130 MMHG | WEIGHT: 219.81 LBS | OXYGEN SATURATION: 96 % | HEIGHT: 70 IN

## 2021-02-25 DIAGNOSIS — E11.59 HYPERTENSION ASSOCIATED WITH DIABETES: ICD-10-CM

## 2021-02-25 DIAGNOSIS — Z86.79 H/O MITRAL VALVE PROLAPSE: ICD-10-CM

## 2021-02-25 DIAGNOSIS — I15.2 HYPERTENSION ASSOCIATED WITH DIABETES: ICD-10-CM

## 2021-02-25 DIAGNOSIS — Z82.49 FAMILY HISTORY OF ATRIAL FIBRILLATION: ICD-10-CM

## 2021-02-25 DIAGNOSIS — R00.2 PALPITATION: Primary | ICD-10-CM

## 2021-02-25 DIAGNOSIS — M77.8 LEFT SHOULDER TENDINITIS: ICD-10-CM

## 2021-02-25 DIAGNOSIS — R94.31 EKG ABNORMALITIES: ICD-10-CM

## 2021-02-25 DIAGNOSIS — U07.1 COVID-19: ICD-10-CM

## 2021-02-25 DIAGNOSIS — R39.15 URINARY URGENCY: Primary | ICD-10-CM

## 2021-02-25 DIAGNOSIS — I34.1 MVP (MITRAL VALVE PROLAPSE): ICD-10-CM

## 2021-02-25 DIAGNOSIS — Z86.79 H/O MITRAL VALVE PROLAPSE: Primary | ICD-10-CM

## 2021-02-25 PROCEDURE — 99999 PR PBB SHADOW E&M-EST. PATIENT-LVL V: ICD-10-PCS | Mod: PBBFAC,CR,, | Performed by: INTERNAL MEDICINE

## 2021-02-25 PROCEDURE — 99999 PR PBB SHADOW E&M-EST. PATIENT-LVL V: CPT | Mod: PBBFAC,CR,, | Performed by: INTERNAL MEDICINE

## 2021-02-25 PROCEDURE — 99205 OFFICE O/P NEW HI 60 MIN: CPT | Mod: S$PBB,CR,25, | Performed by: INTERNAL MEDICINE

## 2021-02-25 PROCEDURE — 99205 PR OFFICE/OUTPT VISIT, NEW, LEVL V, 60-74 MIN: ICD-10-PCS | Mod: S$PBB,CR,25, | Performed by: INTERNAL MEDICINE

## 2021-02-25 PROCEDURE — 99215 OFFICE O/P EST HI 40 MIN: CPT | Mod: PBBFAC,25 | Performed by: INTERNAL MEDICINE

## 2021-02-25 PROCEDURE — 93005 ELECTROCARDIOGRAM TRACING: CPT

## 2021-02-25 PROCEDURE — 93010 EKG 12-LEAD: ICD-10-PCS | Mod: ,,, | Performed by: INTERNAL MEDICINE

## 2021-02-25 PROCEDURE — 93010 ELECTROCARDIOGRAM REPORT: CPT | Mod: ,,, | Performed by: INTERNAL MEDICINE

## 2021-02-25 PROCEDURE — 73221 MRI JOINT UPR EXTREM W/O DYE: CPT | Mod: TC,LT

## 2021-02-25 RX ORDER — OMEPRAZOLE 10 MG/1
10 CAPSULE, DELAYED RELEASE ORAL DAILY PRN
Qty: 30 CAPSULE | Refills: 0 | Status: SHIPPED | OUTPATIENT
Start: 2021-02-25 | End: 2021-10-12 | Stop reason: SDUPTHER

## 2021-02-25 RX ORDER — VERAPAMIL HYDROCHLORIDE 120 MG/1
120 TABLET, FILM COATED ORAL 3 TIMES DAILY
Qty: 180 TABLET | Refills: 5 | Status: SHIPPED | OUTPATIENT
Start: 2021-02-25 | End: 2022-02-21 | Stop reason: SDUPTHER

## 2021-02-26 DIAGNOSIS — R31.9 URINARY TRACT INFECTION WITH HEMATURIA, SITE UNSPECIFIED: Primary | ICD-10-CM

## 2021-02-26 DIAGNOSIS — N39.0 URINARY TRACT INFECTION WITH HEMATURIA, SITE UNSPECIFIED: Primary | ICD-10-CM

## 2021-02-26 RX ORDER — NITROFURANTOIN 25; 75 MG/1; MG/1
100 CAPSULE ORAL 2 TIMES DAILY
Qty: 14 CAPSULE | Refills: 0 | Status: SHIPPED | OUTPATIENT
Start: 2021-02-26 | End: 2021-03-05

## 2021-03-01 ENCOUNTER — OFFICE VISIT (OUTPATIENT)
Dept: ORTHOPEDICS | Facility: CLINIC | Age: 76
End: 2021-03-01
Payer: MEDICARE

## 2021-03-01 VITALS
BODY MASS INDEX: 31.35 KG/M2 | DIASTOLIC BLOOD PRESSURE: 65 MMHG | HEART RATE: 89 BPM | HEIGHT: 70 IN | SYSTOLIC BLOOD PRESSURE: 127 MMHG | WEIGHT: 219 LBS

## 2021-03-01 DIAGNOSIS — S83.241S ACUTE MEDIAL MENISCUS TEAR OF RIGHT KNEE, SEQUELA: ICD-10-CM

## 2021-03-01 DIAGNOSIS — M70.62 GREATER TROCHANTERIC BURSITIS OF BOTH HIPS: ICD-10-CM

## 2021-03-01 DIAGNOSIS — M75.32 CALCIFIC TENDINITIS OF LEFT SHOULDER: Primary | ICD-10-CM

## 2021-03-01 DIAGNOSIS — Z98.890 S/P RIGHT KNEE ARTHROSCOPY: ICD-10-CM

## 2021-03-01 DIAGNOSIS — M19.012 OSTEOARTHRITIS OF LEFT SHOULDER, UNSPECIFIED OSTEOARTHRITIS TYPE: ICD-10-CM

## 2021-03-01 DIAGNOSIS — M70.61 GREATER TROCHANTERIC BURSITIS OF BOTH HIPS: ICD-10-CM

## 2021-03-01 DIAGNOSIS — M94.261 CHONDROMALACIA, RIGHT KNEE: ICD-10-CM

## 2021-03-01 DIAGNOSIS — S83.281S ACUTE LATERAL MENISCUS TEAR OF RIGHT KNEE, SEQUELA: ICD-10-CM

## 2021-03-01 PROCEDURE — 99215 OFFICE O/P EST HI 40 MIN: CPT | Mod: PBBFAC | Performed by: ORTHOPAEDIC SURGERY

## 2021-03-01 PROCEDURE — 99212 PR OFFICE/OUTPT VISIT, EST, LEVL II, 10-19 MIN: ICD-10-PCS | Mod: S$PBB,,, | Performed by: ORTHOPAEDIC SURGERY

## 2021-03-01 PROCEDURE — 99999 PR PBB SHADOW E&M-EST. PATIENT-LVL V: CPT | Mod: PBBFAC,,, | Performed by: ORTHOPAEDIC SURGERY

## 2021-03-01 PROCEDURE — 99212 OFFICE O/P EST SF 10 MIN: CPT | Mod: S$PBB,,, | Performed by: ORTHOPAEDIC SURGERY

## 2021-03-01 PROCEDURE — 99999 PR PBB SHADOW E&M-EST. PATIENT-LVL V: ICD-10-PCS | Mod: PBBFAC,,, | Performed by: ORTHOPAEDIC SURGERY

## 2021-03-04 ENCOUNTER — HOSPITAL ENCOUNTER (OUTPATIENT)
Dept: CARDIOLOGY | Facility: HOSPITAL | Age: 76
Discharge: HOME | End: 2021-03-04
Attending: INTERNAL MEDICINE
Payer: MEDICARE

## 2021-03-04 VITALS
SYSTOLIC BLOOD PRESSURE: 132 MMHG | BODY MASS INDEX: 31.35 KG/M2 | DIASTOLIC BLOOD PRESSURE: 66 MMHG | HEIGHT: 70 IN | WEIGHT: 219 LBS

## 2021-03-04 DIAGNOSIS — U07.1 COVID-19: ICD-10-CM

## 2021-03-04 DIAGNOSIS — R94.31 EKG ABNORMALITIES: ICD-10-CM

## 2021-03-04 DIAGNOSIS — Z86.79 H/O MITRAL VALVE PROLAPSE: ICD-10-CM

## 2021-03-04 PROCEDURE — 93247 EXT ECG>7D<15D SCAN A/R: CPT

## 2021-03-04 PROCEDURE — 93351 STRESS TTE COMPLETE: CPT

## 2021-03-04 PROCEDURE — 93352 STRESS ECHO (CUPID ONLY): ICD-10-PCS | Mod: CR,,, | Performed by: INTERNAL MEDICINE

## 2021-03-04 PROCEDURE — 25500020 PHARM REV CODE 255: Performed by: INTERNAL MEDICINE

## 2021-03-04 PROCEDURE — 93351 STRESS TTE COMPLETE: CPT | Mod: 26,CR,, | Performed by: INTERNAL MEDICINE

## 2021-03-04 PROCEDURE — 93248 EXT ECG>7D<15D REV&INTERPJ: CPT | Mod: CR,,, | Performed by: INTERNAL MEDICINE

## 2021-03-04 PROCEDURE — 63600175 PHARM REV CODE 636 W HCPCS: Performed by: INTERNAL MEDICINE

## 2021-03-04 PROCEDURE — 93248 CV CARDIAC MONITOR - 3-14 DAY ADULT (CUPID ONLY): ICD-10-PCS | Mod: CR,,, | Performed by: INTERNAL MEDICINE

## 2021-03-04 PROCEDURE — 93351 STRESS ECHO (CUPID ONLY): ICD-10-PCS | Mod: 26,CR,, | Performed by: INTERNAL MEDICINE

## 2021-03-04 PROCEDURE — 93352 ADMIN ECG CONTRAST AGENT: CPT | Mod: CR,,, | Performed by: INTERNAL MEDICINE

## 2021-03-04 PROCEDURE — 93246 EXT ECG>7D<15D RECORDING: CPT

## 2021-03-04 RX ORDER — ATROPINE SULFATE 0.1 MG/ML
0.25 INJECTION INTRAVENOUS ONCE
Status: COMPLETED | OUTPATIENT
Start: 2021-03-04 | End: 2021-03-04

## 2021-03-04 RX ORDER — DOBUTAMINE HYDROCHLORIDE 400 MG/100ML
10 INJECTION INTRAVENOUS CONTINUOUS
Status: DISCONTINUED | OUTPATIENT
Start: 2021-03-04 | End: 2021-08-10

## 2021-03-04 RX ADMIN — HUMAN ALBUMIN MICROSPHERES AND PERFLUTREN 0.66 MG: 10; .22 INJECTION, SOLUTION INTRAVENOUS at 10:03

## 2021-03-04 RX ADMIN — ATROPINE SULFATE 0.25 MG: 0.1 INJECTION PARENTERAL at 10:03

## 2021-03-04 RX ADMIN — DOBUTAMINE HYDROCHLORIDE 10 MCG/KG/MIN: 400 INJECTION INTRAVENOUS at 10:03

## 2021-03-05 LAB
AORTIC ROOT ANNULUS: 2.95 CM
AV INDEX (PROSTH): 0.89
AV MEAN GRADIENT: 2 MMHG
AV PEAK GRADIENT: 4 MMHG
AV VALVE AREA: 2.68 CM2
AV VELOCITY RATIO: 0.84
BSA FOR ECHO PROCEDURE: 2.21 M2
CV ECHO LV RWT: 0.54 CM
CV STRESS BASE HR: 75 BPM
DIASTOLIC BLOOD PRESSURE: 66 MMHG
DOP CALC AO PEAK VEL: 0.98 M/S
DOP CALC AO VTI: 20.95 CM
DOP CALC LVOT AREA: 3 CM2
DOP CALC LVOT DIAMETER: 1.96 CM
DOP CALC LVOT PEAK VEL: 0.82 M/S
DOP CALC LVOT STROKE VOLUME: 56.24 CM3
DOP CALC RVOT PEAK VEL: 0.66 M/S
DOP CALC RVOT VTI: 15.59 CM
DOP CALCLVOT PEAK VEL VTI: 18.65 CM
E WAVE DECELERATION TIME: 168.01 MSEC
E/A RATIO: 1.33
E/E' RATIO: 8 M/S
ECHO LV POSTERIOR WALL: 1.15 CM (ref 0.6–1.1)
FRACTIONAL SHORTENING: 33 % (ref 28–44)
INTERVENTRICULAR SEPTUM: 1.03 CM (ref 0.6–1.1)
IVRT: 122.74 MSEC
LA MAJOR: 3.97 CM
LA MINOR: 4.63 CM
LA WIDTH: 3.3 CM
LEFT ATRIUM SIZE: 4.06 CM
LEFT ATRIUM VOLUME INDEX: 22.4 ML/M2
LEFT ATRIUM VOLUME: 48.68 CM3
LEFT INTERNAL DIMENSION IN SYSTOLE: 2.86 CM (ref 2.1–4)
LEFT VENTRICLE DIASTOLIC VOLUME INDEX: 37.83 ML/M2
LEFT VENTRICLE DIASTOLIC VOLUME: 82.1 ML
LEFT VENTRICLE MASS INDEX: 74 G/M2
LEFT VENTRICLE SYSTOLIC VOLUME INDEX: 14.3 ML/M2
LEFT VENTRICLE SYSTOLIC VOLUME: 31.06 ML
LEFT VENTRICULAR INTERNAL DIMENSION IN DIASTOLE: 4.28 CM (ref 3.5–6)
LEFT VENTRICULAR MASS: 159.66 G
LV LATERAL E/E' RATIO: 8 M/S
LV SEPTAL E/E' RATIO: 8 M/S
MV PEAK A VEL: 0.72 M/S
MV PEAK E VEL: 0.96 M/S
MV STENOSIS PRESSURE HALF TIME: 48.72 MS
MV VALVE AREA P 1/2 METHOD: 4.52 CM2
OHS CV CPX 1 MINUTE RECOVERY HEART RATE: 130 BPM
OHS CV CPX 85 PERCENT MAX PREDICTED HEART RATE MALE: 120
OHS CV CPX MAX PREDICTED HEART RATE: 141
OHS CV CPX PATIENT IS FEMALE: 1
OHS CV CPX PATIENT IS MALE: 0
OHS CV CPX PEAK DIASTOLIC BLOOD PRESSURE: 64 MMHG
OHS CV CPX PEAK HEAR RATE: 134 BPM
OHS CV CPX PEAK RATE PRESSURE PRODUCT: ABNORMAL
OHS CV CPX PEAK SYSTOLIC BLOOD PRESSURE: 179 MMHG
OHS CV CPX PERCENT MAX PREDICTED HEART RATE ACHIEVED: 95
OHS CV CPX RATE PRESSURE PRODUCT PRESENTING: 9900
PISA TR MAX VEL: 2.82 M/S
PULM VEIN S/D RATIO: 1.74
PV MEAN GRADIENT: 1 MMHG
PV PEAK D VEL: 0.35 M/S
PV PEAK S VEL: 0.61 M/S
PV PEAK VELOCITY: 0.79 CM/S
RA MAJOR: 3.73 CM
RA PRESSURE: 3 MMHG
RA WIDTH: 2.84 CM
RIGHT VENTRICULAR END-DIASTOLIC DIMENSION: 3.32 CM
SINUS: 3.49 CM
STJ: 3.51 CM
SYSTOLIC BLOOD PRESSURE: 132 MMHG
TDI LATERAL: 0.12 M/S
TDI SEPTAL: 0.12 M/S
TDI: 0.12 M/S
TR MAX PG: 32 MMHG
TRICUSPID ANNULAR PLANE SYSTOLIC EXCURSION: 1.99 CM
TV REST PULMONARY ARTERY PRESSURE: 35 MMHG

## 2021-03-08 ENCOUNTER — TELEPHONE (OUTPATIENT)
Dept: CARDIOLOGY | Facility: CLINIC | Age: 76
End: 2021-03-08

## 2021-03-18 ENCOUNTER — TELEPHONE (OUTPATIENT)
Dept: CARDIOLOGY | Facility: CLINIC | Age: 76
End: 2021-03-18

## 2021-03-22 DIAGNOSIS — E66.9 DIABETES MELLITUS TYPE 2 IN OBESE: ICD-10-CM

## 2021-03-22 DIAGNOSIS — E11.69 DIABETES MELLITUS TYPE 2 IN OBESE: ICD-10-CM

## 2021-03-26 RX ORDER — CANAGLIFLOZIN 100 MG/1
TABLET, FILM COATED ORAL
Qty: 90 TABLET | Refills: 1 | Status: SHIPPED | OUTPATIENT
Start: 2021-03-26 | End: 2021-04-19 | Stop reason: SDUPTHER

## 2021-04-19 DIAGNOSIS — E66.9 DIABETES MELLITUS TYPE 2 IN OBESE: ICD-10-CM

## 2021-04-19 DIAGNOSIS — E11.69 DIABETES MELLITUS TYPE 2 IN OBESE: ICD-10-CM

## 2021-04-21 ENCOUNTER — PATIENT MESSAGE (OUTPATIENT)
Dept: INTERNAL MEDICINE | Facility: CLINIC | Age: 76
End: 2021-04-21

## 2021-04-22 RX ORDER — CANAGLIFLOZIN 100 MG/1
100 TABLET, FILM COATED ORAL DAILY
Qty: 90 TABLET | Refills: 0 | Status: SHIPPED | OUTPATIENT
Start: 2021-04-22 | End: 2021-08-10 | Stop reason: SDUPTHER

## 2021-04-26 ENCOUNTER — PATIENT MESSAGE (OUTPATIENT)
Dept: INTERNAL MEDICINE | Facility: CLINIC | Age: 76
End: 2021-04-26

## 2021-04-26 DIAGNOSIS — F32.9 CHRONIC MAJOR DEPRESSIVE DISORDER: Chronic | ICD-10-CM

## 2021-04-26 DIAGNOSIS — M79.7 FIBROMYALGIA: Chronic | ICD-10-CM

## 2021-04-28 RX ORDER — FLUOXETINE HYDROCHLORIDE 40 MG/1
CAPSULE ORAL
Qty: 90 CAPSULE | Refills: 2 | Status: SHIPPED | OUTPATIENT
Start: 2021-04-28 | End: 2022-06-01

## 2021-05-10 PROBLEM — E11.65 UNCONTROLLED TYPE 2 DIABETES MELLITUS WITH HYPERGLYCEMIA: Status: ACTIVE | Noted: 2021-05-10

## 2021-05-11 ENCOUNTER — TELEPHONE (OUTPATIENT)
Dept: INTERNAL MEDICINE | Facility: CLINIC | Age: 76
End: 2021-05-11

## 2021-05-27 ENCOUNTER — PATIENT OUTREACH (OUTPATIENT)
Dept: ADMINISTRATIVE | Facility: OTHER | Age: 76
End: 2021-05-27

## 2021-05-28 ENCOUNTER — OFFICE VISIT (OUTPATIENT)
Dept: CARDIOLOGY | Facility: CLINIC | Age: 76
End: 2021-05-28
Payer: MEDICARE

## 2021-05-28 VITALS
OXYGEN SATURATION: 95 % | WEIGHT: 223.75 LBS | BODY MASS INDEX: 32.11 KG/M2 | SYSTOLIC BLOOD PRESSURE: 114 MMHG | DIASTOLIC BLOOD PRESSURE: 62 MMHG | HEART RATE: 82 BPM

## 2021-05-28 DIAGNOSIS — E11.59 HYPERTENSION ASSOCIATED WITH DIABETES: Chronic | ICD-10-CM

## 2021-05-28 DIAGNOSIS — I15.2 HYPERTENSION ASSOCIATED WITH DIABETES: Chronic | ICD-10-CM

## 2021-05-28 DIAGNOSIS — Z78.9 STATIN INTOLERANCE: Primary | ICD-10-CM

## 2021-05-28 DIAGNOSIS — E11.69 HYPERLIPIDEMIA ASSOCIATED WITH TYPE 2 DIABETES MELLITUS: ICD-10-CM

## 2021-05-28 DIAGNOSIS — E78.5 HYPERLIPIDEMIA ASSOCIATED WITH TYPE 2 DIABETES MELLITUS: ICD-10-CM

## 2021-05-28 DIAGNOSIS — I73.9 PVD (PERIPHERAL VASCULAR DISEASE): ICD-10-CM

## 2021-05-28 DIAGNOSIS — I34.1 MVP (MITRAL VALVE PROLAPSE): ICD-10-CM

## 2021-05-28 PROCEDURE — 99214 OFFICE O/P EST MOD 30 MIN: CPT | Mod: S$PBB,,, | Performed by: INTERNAL MEDICINE

## 2021-05-28 PROCEDURE — 99214 PR OFFICE/OUTPT VISIT, EST, LEVL IV, 30-39 MIN: ICD-10-PCS | Mod: S$PBB,,, | Performed by: INTERNAL MEDICINE

## 2021-05-28 PROCEDURE — 99215 OFFICE O/P EST HI 40 MIN: CPT | Mod: PBBFAC | Performed by: INTERNAL MEDICINE

## 2021-05-28 PROCEDURE — 99999 PR PBB SHADOW E&M-EST. PATIENT-LVL V: CPT | Mod: PBBFAC,,, | Performed by: INTERNAL MEDICINE

## 2021-05-28 PROCEDURE — 99999 PR PBB SHADOW E&M-EST. PATIENT-LVL V: ICD-10-PCS | Mod: PBBFAC,,, | Performed by: INTERNAL MEDICINE

## 2021-05-28 RX ORDER — FUROSEMIDE 20 MG/1
20 TABLET ORAL
Qty: 25 TABLET | Refills: 2 | Status: SHIPPED | OUTPATIENT
Start: 2021-05-28 | End: 2021-10-12 | Stop reason: SDUPTHER

## 2021-05-28 RX ORDER — LOSARTAN POTASSIUM 25 MG/1
25 TABLET ORAL DAILY
Qty: 90 TABLET | Refills: 2 | Status: SHIPPED | OUTPATIENT
Start: 2021-05-28 | End: 2022-06-01

## 2021-06-18 ENCOUNTER — TELEPHONE (OUTPATIENT)
Dept: PAIN MEDICINE | Facility: CLINIC | Age: 76
End: 2021-06-18

## 2021-07-06 ENCOUNTER — PATIENT OUTREACH (OUTPATIENT)
Dept: ADMINISTRATIVE | Facility: OTHER | Age: 76
End: 2021-07-06

## 2021-07-07 ENCOUNTER — OFFICE VISIT (OUTPATIENT)
Dept: PAIN MEDICINE | Facility: CLINIC | Age: 76
End: 2021-07-07
Payer: MEDICARE

## 2021-07-07 VITALS
HEIGHT: 70 IN | HEART RATE: 80 BPM | WEIGHT: 220.88 LBS | DIASTOLIC BLOOD PRESSURE: 69 MMHG | BODY MASS INDEX: 31.62 KG/M2 | SYSTOLIC BLOOD PRESSURE: 134 MMHG

## 2021-07-07 DIAGNOSIS — M53.3 SACROILIAC JOINT PAIN: ICD-10-CM

## 2021-07-07 DIAGNOSIS — M70.61 GREATER TROCHANTERIC BURSITIS OF BOTH HIPS: Primary | ICD-10-CM

## 2021-07-07 DIAGNOSIS — M70.62 GREATER TROCHANTERIC BURSITIS OF BOTH HIPS: Primary | ICD-10-CM

## 2021-07-07 DIAGNOSIS — M47.816 LUMBAR SPONDYLOSIS: ICD-10-CM

## 2021-07-07 DIAGNOSIS — M47.816 LUMBAR FACET ARTHROPATHY: ICD-10-CM

## 2021-07-07 PROCEDURE — 99999 PR PBB SHADOW E&M-EST. PATIENT-LVL V: ICD-10-PCS | Mod: PBBFAC,,, | Performed by: ANESTHESIOLOGY

## 2021-07-07 PROCEDURE — 99214 OFFICE O/P EST MOD 30 MIN: CPT | Mod: S$PBB,,, | Performed by: ANESTHESIOLOGY

## 2021-07-07 PROCEDURE — 99215 OFFICE O/P EST HI 40 MIN: CPT | Mod: PBBFAC | Performed by: ANESTHESIOLOGY

## 2021-07-07 PROCEDURE — 99214 PR OFFICE/OUTPT VISIT, EST, LEVL IV, 30-39 MIN: ICD-10-PCS | Mod: S$PBB,,, | Performed by: ANESTHESIOLOGY

## 2021-07-07 PROCEDURE — 99999 PR PBB SHADOW E&M-EST. PATIENT-LVL V: CPT | Mod: PBBFAC,,, | Performed by: ANESTHESIOLOGY

## 2021-07-08 ENCOUNTER — HOSPITAL ENCOUNTER (OUTPATIENT)
Facility: HOSPITAL | Age: 76
Discharge: HOME | End: 2021-07-08
Attending: ANESTHESIOLOGY | Admitting: ANESTHESIOLOGY
Payer: MEDICARE

## 2021-07-08 DIAGNOSIS — M53.3 SACROILIAC JOINT DYSFUNCTION: Primary | ICD-10-CM

## 2021-07-08 LAB — POCT GLUCOSE: 159 MG/DL (ref 70–110)

## 2021-07-08 PROCEDURE — 25000003 PHARM REV CODE 250: Performed by: ANESTHESIOLOGY

## 2021-07-08 PROCEDURE — 20610 DRAIN/INJ JOINT/BURSA W/O US: CPT | Mod: 50 | Performed by: ANESTHESIOLOGY

## 2021-07-08 PROCEDURE — 27096 PR INJECTION,SACROILIAC JOINT: ICD-10-PCS | Mod: 50,,, | Performed by: ANESTHESIOLOGY

## 2021-07-08 PROCEDURE — 63600175 PHARM REV CODE 636 W HCPCS: Performed by: ANESTHESIOLOGY

## 2021-07-08 PROCEDURE — 20610 DRAIN/INJ JOINT/BURSA W/O US: CPT | Mod: 50,59,, | Performed by: ANESTHESIOLOGY

## 2021-07-08 PROCEDURE — 27096 INJECT SACROILIAC JOINT: CPT | Mod: 50,,, | Performed by: ANESTHESIOLOGY

## 2021-07-08 PROCEDURE — 25500020 PHARM REV CODE 255: Performed by: ANESTHESIOLOGY

## 2021-07-08 PROCEDURE — 82962 GLUCOSE BLOOD TEST: CPT | Performed by: ANESTHESIOLOGY

## 2021-07-08 PROCEDURE — 20610 PR DRAIN/INJECT LARGE JOINT/BURSA: ICD-10-PCS | Mod: 50,59,, | Performed by: ANESTHESIOLOGY

## 2021-07-08 PROCEDURE — 27096 INJECT SACROILIAC JOINT: CPT | Mod: 50 | Performed by: ANESTHESIOLOGY

## 2021-07-08 RX ORDER — LIDOCAINE HYDROCHLORIDE 20 MG/ML
INJECTION, SOLUTION EPIDURAL; INFILTRATION; INTRACAUDAL; PERINEURAL
Status: DISCONTINUED | OUTPATIENT
Start: 2021-07-08 | End: 2021-07-08 | Stop reason: HOSPADM

## 2021-07-08 RX ORDER — FENTANYL CITRATE 50 UG/ML
INJECTION, SOLUTION INTRAMUSCULAR; INTRAVENOUS
Status: DISCONTINUED | OUTPATIENT
Start: 2021-07-08 | End: 2021-07-08 | Stop reason: HOSPADM

## 2021-07-08 RX ORDER — MIDAZOLAM HYDROCHLORIDE 1 MG/ML
INJECTION, SOLUTION INTRAMUSCULAR; INTRAVENOUS
Status: DISCONTINUED | OUTPATIENT
Start: 2021-07-08 | End: 2021-07-08 | Stop reason: HOSPADM

## 2021-07-08 RX ORDER — INDOMETHACIN 25 MG/1
CAPSULE ORAL
Status: DISCONTINUED | OUTPATIENT
Start: 2021-07-08 | End: 2021-07-08 | Stop reason: HOSPADM

## 2021-07-08 RX ORDER — METHYLPREDNISOLONE ACETATE 40 MG/ML
INJECTION, SUSPENSION INTRA-ARTICULAR; INTRALESIONAL; INTRAMUSCULAR; SOFT TISSUE
Status: DISCONTINUED | OUTPATIENT
Start: 2021-07-08 | End: 2021-07-08 | Stop reason: HOSPADM

## 2021-07-12 ENCOUNTER — PATIENT MESSAGE (OUTPATIENT)
Dept: ADMINISTRATIVE | Facility: HOSPITAL | Age: 76
End: 2021-07-12

## 2021-07-12 VITALS
WEIGHT: 221 LBS | DIASTOLIC BLOOD PRESSURE: 68 MMHG | SYSTOLIC BLOOD PRESSURE: 141 MMHG | TEMPERATURE: 97 F | OXYGEN SATURATION: 97 % | BODY MASS INDEX: 31.64 KG/M2 | HEIGHT: 70 IN | RESPIRATION RATE: 16 BRPM | HEART RATE: 67 BPM

## 2021-07-13 ENCOUNTER — PATIENT MESSAGE (OUTPATIENT)
Dept: ORTHOPEDICS | Facility: CLINIC | Age: 76
End: 2021-07-13

## 2021-07-13 ENCOUNTER — PATIENT OUTREACH (OUTPATIENT)
Dept: ADMINISTRATIVE | Facility: HOSPITAL | Age: 76
End: 2021-07-13

## 2021-07-13 DIAGNOSIS — E78.5 HYPERLIPIDEMIA ASSOCIATED WITH TYPE 2 DIABETES MELLITUS: ICD-10-CM

## 2021-07-13 DIAGNOSIS — E11.69 HYPERLIPIDEMIA ASSOCIATED WITH TYPE 2 DIABETES MELLITUS: ICD-10-CM

## 2021-07-13 DIAGNOSIS — I15.2 HYPERTENSION ASSOCIATED WITH DIABETES: Primary | ICD-10-CM

## 2021-07-13 DIAGNOSIS — E66.9 DIABETES MELLITUS TYPE 2 IN OBESE: ICD-10-CM

## 2021-07-13 DIAGNOSIS — E11.59 HYPERTENSION ASSOCIATED WITH DIABETES: Primary | ICD-10-CM

## 2021-07-13 DIAGNOSIS — E11.69 DIABETES MELLITUS TYPE 2 IN OBESE: ICD-10-CM

## 2021-07-14 ENCOUNTER — LAB VISIT (OUTPATIENT)
Dept: LAB | Facility: HOSPITAL | Age: 76
End: 2021-07-14
Attending: FAMILY MEDICINE
Payer: MEDICARE

## 2021-07-14 DIAGNOSIS — E11.59 HYPERTENSION ASSOCIATED WITH DIABETES: ICD-10-CM

## 2021-07-14 DIAGNOSIS — E11.69 HYPERLIPIDEMIA ASSOCIATED WITH TYPE 2 DIABETES MELLITUS: ICD-10-CM

## 2021-07-14 DIAGNOSIS — E66.9 DIABETES MELLITUS TYPE 2 IN OBESE: ICD-10-CM

## 2021-07-14 DIAGNOSIS — I15.2 HYPERTENSION ASSOCIATED WITH DIABETES: ICD-10-CM

## 2021-07-14 DIAGNOSIS — E11.69 DIABETES MELLITUS TYPE 2 IN OBESE: ICD-10-CM

## 2021-07-14 DIAGNOSIS — E78.5 HYPERLIPIDEMIA ASSOCIATED WITH TYPE 2 DIABETES MELLITUS: ICD-10-CM

## 2021-07-14 LAB
ALBUMIN SERPL BCP-MCNC: 3.8 G/DL (ref 3.5–5.2)
ALP SERPL-CCNC: 70 U/L (ref 55–135)
ALT SERPL W/O P-5'-P-CCNC: 16 U/L (ref 10–44)
ANION GAP SERPL CALC-SCNC: 11 MMOL/L (ref 8–16)
AST SERPL-CCNC: 16 U/L (ref 10–40)
BILIRUB SERPL-MCNC: 0.6 MG/DL (ref 0.1–1)
BUN SERPL-MCNC: 20 MG/DL (ref 8–23)
CALCIUM SERPL-MCNC: 9.9 MG/DL (ref 8.7–10.5)
CHLORIDE SERPL-SCNC: 105 MMOL/L (ref 95–110)
CHOLEST SERPL-MCNC: 211 MG/DL (ref 120–199)
CHOLEST/HDLC SERPL: 3.2 {RATIO} (ref 2–5)
CO2 SERPL-SCNC: 26 MMOL/L (ref 23–29)
CREAT SERPL-MCNC: 1 MG/DL (ref 0.5–1.4)
EST. GFR  (AFRICAN AMERICAN): >60 ML/MIN/1.73 M^2
EST. GFR  (NON AFRICAN AMERICAN): 55.6 ML/MIN/1.73 M^2
ESTIMATED AVG GLUCOSE: 171 MG/DL (ref 68–131)
GLUCOSE SERPL-MCNC: 170 MG/DL (ref 70–110)
HBA1C MFR BLD: 7.6 % (ref 4–5.6)
HDLC SERPL-MCNC: 65 MG/DL (ref 40–75)
HDLC SERPL: 30.8 % (ref 20–50)
LDLC SERPL CALC-MCNC: 123 MG/DL (ref 63–159)
NONHDLC SERPL-MCNC: 146 MG/DL
POTASSIUM SERPL-SCNC: 4.3 MMOL/L (ref 3.5–5.1)
PROT SERPL-MCNC: 7.1 G/DL (ref 6–8.4)
SODIUM SERPL-SCNC: 142 MMOL/L (ref 136–145)
TRIGL SERPL-MCNC: 115 MG/DL (ref 30–150)

## 2021-07-14 PROCEDURE — 80061 LIPID PANEL: CPT | Performed by: FAMILY MEDICINE

## 2021-07-14 PROCEDURE — 83036 HEMOGLOBIN GLYCOSYLATED A1C: CPT | Performed by: FAMILY MEDICINE

## 2021-07-14 PROCEDURE — 80053 COMPREHEN METABOLIC PANEL: CPT | Performed by: FAMILY MEDICINE

## 2021-07-14 PROCEDURE — 36415 COLL VENOUS BLD VENIPUNCTURE: CPT | Performed by: FAMILY MEDICINE

## 2021-07-25 ENCOUNTER — PATIENT MESSAGE (OUTPATIENT)
Dept: INTERNAL MEDICINE | Facility: CLINIC | Age: 76
End: 2021-07-25

## 2021-07-30 ENCOUNTER — IMMUNIZATION (OUTPATIENT)
Dept: INTERNAL MEDICINE | Facility: CLINIC | Age: 76
End: 2021-07-30
Payer: MEDICARE

## 2021-07-30 DIAGNOSIS — Z23 NEED FOR VACCINATION: Primary | ICD-10-CM

## 2021-07-30 PROCEDURE — 91300 COVID-19, MRNA, LNP-S, PF, 30 MCG/0.3 ML DOSE VACCINE: CPT | Mod: PBBFAC

## 2021-08-05 ENCOUNTER — TELEPHONE (OUTPATIENT)
Dept: PAIN MEDICINE | Facility: CLINIC | Age: 76
End: 2021-08-05

## 2021-08-05 ENCOUNTER — PATIENT MESSAGE (OUTPATIENT)
Dept: PAIN MEDICINE | Facility: CLINIC | Age: 76
End: 2021-08-05

## 2021-08-09 ENCOUNTER — TELEPHONE (OUTPATIENT)
Dept: PAIN MEDICINE | Facility: CLINIC | Age: 76
End: 2021-08-09

## 2021-08-09 ENCOUNTER — TELEPHONE (OUTPATIENT)
Dept: INTERNAL MEDICINE | Facility: CLINIC | Age: 76
End: 2021-08-09

## 2021-08-10 ENCOUNTER — OFFICE VISIT (OUTPATIENT)
Dept: INTERNAL MEDICINE | Facility: CLINIC | Age: 76
End: 2021-08-10
Payer: MEDICARE

## 2021-08-10 VITALS
WEIGHT: 216.5 LBS | RESPIRATION RATE: 16 BRPM | HEART RATE: 97 BPM | SYSTOLIC BLOOD PRESSURE: 128 MMHG | DIASTOLIC BLOOD PRESSURE: 68 MMHG | BODY MASS INDEX: 30.99 KG/M2 | HEIGHT: 70 IN | TEMPERATURE: 98 F | OXYGEN SATURATION: 95 %

## 2021-08-10 DIAGNOSIS — E11.59 HYPERTENSION ASSOCIATED WITH DIABETES: ICD-10-CM

## 2021-08-10 DIAGNOSIS — E66.9 DIABETES MELLITUS TYPE 2 IN OBESE: ICD-10-CM

## 2021-08-10 DIAGNOSIS — E11.69 DIABETES MELLITUS TYPE 2 IN OBESE: ICD-10-CM

## 2021-08-10 DIAGNOSIS — I15.2 HYPERTENSION ASSOCIATED WITH DIABETES: ICD-10-CM

## 2021-08-10 DIAGNOSIS — Z12.31 ENCOUNTER FOR SCREENING MAMMOGRAM FOR BREAST CANCER: Primary | ICD-10-CM

## 2021-08-10 DIAGNOSIS — E11.69 HYPERLIPIDEMIA ASSOCIATED WITH TYPE 2 DIABETES MELLITUS: ICD-10-CM

## 2021-08-10 DIAGNOSIS — E78.5 HYPERLIPIDEMIA ASSOCIATED WITH TYPE 2 DIABETES MELLITUS: ICD-10-CM

## 2021-08-10 PROCEDURE — 99215 OFFICE O/P EST HI 40 MIN: CPT | Mod: PBBFAC | Performed by: FAMILY MEDICINE

## 2021-08-10 PROCEDURE — 99214 PR OFFICE/OUTPT VISIT, EST, LEVL IV, 30-39 MIN: ICD-10-PCS | Mod: S$PBB,,, | Performed by: FAMILY MEDICINE

## 2021-08-10 PROCEDURE — 99999 PR PBB SHADOW E&M-EST. PATIENT-LVL V: CPT | Mod: PBBFAC,,, | Performed by: FAMILY MEDICINE

## 2021-08-10 PROCEDURE — 99214 OFFICE O/P EST MOD 30 MIN: CPT | Mod: S$PBB,,, | Performed by: FAMILY MEDICINE

## 2021-08-10 PROCEDURE — 99999 PR PBB SHADOW E&M-EST. PATIENT-LVL V: ICD-10-PCS | Mod: PBBFAC,,, | Performed by: FAMILY MEDICINE

## 2021-08-10 RX ORDER — CANAGLIFLOZIN 100 MG/1
100 TABLET, FILM COATED ORAL DAILY
Qty: 90 TABLET | Refills: 3 | Status: SHIPPED | OUTPATIENT
Start: 2021-08-10 | End: 2021-08-31

## 2021-08-10 RX ORDER — DICLOFENAC SODIUM 10 MG/G
4 GEL TOPICAL 3 TIMES DAILY PRN
Qty: 2 TUBE | Refills: 0 | Status: CANCELLED | OUTPATIENT
Start: 2021-08-10 | End: 2021-09-09

## 2021-08-10 RX ORDER — GABAPENTIN 300 MG/1
300 CAPSULE ORAL 2 TIMES DAILY
Qty: 90 CAPSULE | Refills: 1 | Status: CANCELLED | OUTPATIENT
Start: 2021-08-10

## 2021-08-10 RX ORDER — TRIAMCINOLONE ACETONIDE 0.25 MG/G
OINTMENT TOPICAL 2 TIMES DAILY
Qty: 15 G | Refills: 2 | Status: CANCELLED | OUTPATIENT
Start: 2021-08-10 | End: 2021-08-20

## 2021-08-20 ENCOUNTER — IMMUNIZATION (OUTPATIENT)
Dept: PRIMARY CARE CLINIC | Facility: CLINIC | Age: 76
End: 2021-08-20
Payer: MEDICARE

## 2021-08-20 DIAGNOSIS — Z23 NEED FOR VACCINATION: Primary | ICD-10-CM

## 2021-08-20 PROCEDURE — 0002A COVID-19, MRNA, LNP-S, PF, 30 MCG/0.3 ML DOSE VACCINE: ICD-10-PCS | Mod: CV19,S$GLB,, | Performed by: FAMILY MEDICINE

## 2021-08-20 PROCEDURE — 0002A COVID-19, MRNA, LNP-S, PF, 30 MCG/0.3 ML DOSE VACCINE: CPT | Mod: CV19,S$GLB,, | Performed by: FAMILY MEDICINE

## 2021-08-20 PROCEDURE — 91300 COVID-19, MRNA, LNP-S, PF, 30 MCG/0.3 ML DOSE VACCINE: CPT | Mod: S$GLB,,, | Performed by: FAMILY MEDICINE

## 2021-08-20 PROCEDURE — 91300 COVID-19, MRNA, LNP-S, PF, 30 MCG/0.3 ML DOSE VACCINE: ICD-10-PCS | Mod: S$GLB,,, | Performed by: FAMILY MEDICINE

## 2021-08-25 ENCOUNTER — PATIENT MESSAGE (OUTPATIENT)
Dept: ORTHOPEDICS | Facility: CLINIC | Age: 76
End: 2021-08-25

## 2021-08-28 ENCOUNTER — PATIENT MESSAGE (OUTPATIENT)
Dept: PAIN MEDICINE | Facility: CLINIC | Age: 76
End: 2021-08-28

## 2021-08-31 DIAGNOSIS — M79.7 FIBROMYALGIA: Chronic | ICD-10-CM

## 2021-08-31 RX ORDER — GABAPENTIN 300 MG/1
300 CAPSULE ORAL 3 TIMES DAILY
Qty: 270 CAPSULE | Refills: 2 | Status: SHIPPED | OUTPATIENT
Start: 2021-08-31 | End: 2021-09-28

## 2021-09-27 ENCOUNTER — PATIENT OUTREACH (OUTPATIENT)
Dept: ADMINISTRATIVE | Facility: OTHER | Age: 76
End: 2021-09-27

## 2021-09-28 ENCOUNTER — IMMUNIZATION (OUTPATIENT)
Dept: INTERNAL MEDICINE | Facility: CLINIC | Age: 76
End: 2021-09-28
Payer: MEDICARE

## 2021-09-28 ENCOUNTER — OFFICE VISIT (OUTPATIENT)
Dept: PAIN MEDICINE | Facility: CLINIC | Age: 76
End: 2021-09-28
Payer: MEDICARE

## 2021-09-28 VITALS
HEART RATE: 77 BPM | HEIGHT: 70 IN | WEIGHT: 222.69 LBS | SYSTOLIC BLOOD PRESSURE: 138 MMHG | DIASTOLIC BLOOD PRESSURE: 73 MMHG | BODY MASS INDEX: 31.88 KG/M2

## 2021-09-28 DIAGNOSIS — M53.3 SACROILIAC JOINT DYSFUNCTION: Primary | ICD-10-CM

## 2021-09-28 DIAGNOSIS — M70.62 GREATER TROCHANTERIC BURSITIS OF BOTH HIPS: ICD-10-CM

## 2021-09-28 DIAGNOSIS — M79.7 FIBROMYALGIA: ICD-10-CM

## 2021-09-28 DIAGNOSIS — M47.816 LUMBAR SPONDYLOSIS: ICD-10-CM

## 2021-09-28 DIAGNOSIS — M70.61 GREATER TROCHANTERIC BURSITIS OF BOTH HIPS: ICD-10-CM

## 2021-09-28 PROCEDURE — G0008 ADMIN INFLUENZA VIRUS VAC: HCPCS | Mod: PBBFAC

## 2021-09-28 PROCEDURE — 99999 PR PBB SHADOW E&M-EST. PATIENT-LVL IV: ICD-10-PCS | Mod: PBBFAC,,, | Performed by: PHYSICIAN ASSISTANT

## 2021-09-28 PROCEDURE — 99214 OFFICE O/P EST MOD 30 MIN: CPT | Mod: PBBFAC,25 | Performed by: PHYSICIAN ASSISTANT

## 2021-09-28 PROCEDURE — 99214 OFFICE O/P EST MOD 30 MIN: CPT | Mod: S$PBB,,, | Performed by: PHYSICIAN ASSISTANT

## 2021-09-28 PROCEDURE — 99214 PR OFFICE/OUTPT VISIT, EST, LEVL IV, 30-39 MIN: ICD-10-PCS | Mod: S$PBB,,, | Performed by: PHYSICIAN ASSISTANT

## 2021-09-28 PROCEDURE — 90694 VACC AIIV4 NO PRSRV 0.5ML IM: CPT | Mod: PBBFAC

## 2021-09-28 PROCEDURE — 99999 PR PBB SHADOW E&M-EST. PATIENT-LVL IV: CPT | Mod: PBBFAC,,, | Performed by: PHYSICIAN ASSISTANT

## 2021-09-28 RX ORDER — METHYLPREDNISOLONE 4 MG/1
TABLET ORAL
Qty: 1 PACKAGE | Refills: 0 | Status: ON HOLD | OUTPATIENT
Start: 2021-09-28 | End: 2022-04-28 | Stop reason: HOSPADM

## 2021-09-28 RX ORDER — GABAPENTIN 300 MG/1
300 CAPSULE ORAL 3 TIMES DAILY
Qty: 270 CAPSULE | Refills: 0 | Status: SHIPPED | OUTPATIENT
Start: 2021-09-28 | End: 2022-02-19 | Stop reason: SDUPTHER

## 2021-10-04 ENCOUNTER — PATIENT MESSAGE (OUTPATIENT)
Dept: ADMINISTRATIVE | Facility: HOSPITAL | Age: 76
End: 2021-10-04

## 2021-10-06 ENCOUNTER — PATIENT OUTREACH (OUTPATIENT)
Dept: ADMINISTRATIVE | Facility: HOSPITAL | Age: 76
End: 2021-10-06

## 2021-10-07 DIAGNOSIS — I73.9 PVD (PERIPHERAL VASCULAR DISEASE): Primary | ICD-10-CM

## 2021-10-08 ENCOUNTER — HOSPITAL ENCOUNTER (OUTPATIENT)
Dept: CARDIOLOGY | Facility: HOSPITAL | Age: 76
Discharge: HOME | End: 2021-10-08
Attending: INTERNAL MEDICINE
Payer: MEDICARE

## 2021-10-08 ENCOUNTER — OFFICE VISIT (OUTPATIENT)
Dept: CARDIOLOGY | Facility: CLINIC | Age: 76
End: 2021-10-08
Payer: MEDICARE

## 2021-10-08 VITALS
HEART RATE: 93 BPM | BODY MASS INDEX: 31.28 KG/M2 | OXYGEN SATURATION: 95 % | SYSTOLIC BLOOD PRESSURE: 120 MMHG | DIASTOLIC BLOOD PRESSURE: 66 MMHG | WEIGHT: 218.06 LBS

## 2021-10-08 DIAGNOSIS — E11.69 HYPERLIPIDEMIA ASSOCIATED WITH TYPE 2 DIABETES MELLITUS: ICD-10-CM

## 2021-10-08 DIAGNOSIS — E66.9 DIABETES MELLITUS TYPE 2 IN OBESE: ICD-10-CM

## 2021-10-08 DIAGNOSIS — E11.59 HYPERTENSION ASSOCIATED WITH DIABETES: Chronic | ICD-10-CM

## 2021-10-08 DIAGNOSIS — Z78.9 STATIN INTOLERANCE: ICD-10-CM

## 2021-10-08 DIAGNOSIS — I15.2 HYPERTENSION ASSOCIATED WITH DIABETES: Chronic | ICD-10-CM

## 2021-10-08 DIAGNOSIS — I73.9 PVD (PERIPHERAL VASCULAR DISEASE): ICD-10-CM

## 2021-10-08 DIAGNOSIS — E78.5 HYPERLIPIDEMIA ASSOCIATED WITH TYPE 2 DIABETES MELLITUS: ICD-10-CM

## 2021-10-08 DIAGNOSIS — I73.9 PVD (PERIPHERAL VASCULAR DISEASE): Primary | ICD-10-CM

## 2021-10-08 DIAGNOSIS — E11.69 DIABETES MELLITUS TYPE 2 IN OBESE: ICD-10-CM

## 2021-10-08 PROCEDURE — 99214 OFFICE O/P EST MOD 30 MIN: CPT | Mod: PBBFAC | Performed by: INTERNAL MEDICINE

## 2021-10-08 PROCEDURE — 93005 ELECTROCARDIOGRAM TRACING: CPT

## 2021-10-08 PROCEDURE — 99999 PR PBB SHADOW E&M-EST. PATIENT-LVL IV: CPT | Mod: PBBFAC,,, | Performed by: INTERNAL MEDICINE

## 2021-10-08 PROCEDURE — 99214 PR OFFICE/OUTPT VISIT, EST, LEVL IV, 30-39 MIN: ICD-10-PCS | Mod: S$PBB,,, | Performed by: INTERNAL MEDICINE

## 2021-10-08 PROCEDURE — 99999 PR PBB SHADOW E&M-EST. PATIENT-LVL IV: ICD-10-PCS | Mod: PBBFAC,,, | Performed by: INTERNAL MEDICINE

## 2021-10-08 PROCEDURE — 93010 ELECTROCARDIOGRAM REPORT: CPT | Mod: ,,, | Performed by: INTERNAL MEDICINE

## 2021-10-08 PROCEDURE — 93010 EKG 12-LEAD: ICD-10-PCS | Mod: ,,, | Performed by: INTERNAL MEDICINE

## 2021-10-08 PROCEDURE — 99214 OFFICE O/P EST MOD 30 MIN: CPT | Mod: S$PBB,,, | Performed by: INTERNAL MEDICINE

## 2021-10-08 RX ORDER — POTASSIUM CHLORIDE 750 MG/1
10 TABLET, EXTENDED RELEASE ORAL DAILY
Qty: 20 TABLET | Refills: 5 | Status: SHIPPED | OUTPATIENT
Start: 2021-10-08 | End: 2022-11-17

## 2021-10-12 RX ORDER — FUROSEMIDE 20 MG/1
20 TABLET ORAL
Qty: 25 TABLET | Refills: 2 | Status: SHIPPED | OUTPATIENT
Start: 2021-10-13 | End: 2022-11-17

## 2021-10-13 ENCOUNTER — TELEPHONE (OUTPATIENT)
Dept: ORTHOPEDICS | Facility: CLINIC | Age: 76
End: 2021-10-13

## 2021-11-03 ENCOUNTER — HOSPITAL ENCOUNTER (OUTPATIENT)
Dept: RADIOLOGY | Facility: HOSPITAL | Age: 76
Discharge: HOME | End: 2021-11-03
Attending: FAMILY MEDICINE
Payer: MEDICARE

## 2021-11-03 VITALS — BODY MASS INDEX: 31.22 KG/M2 | WEIGHT: 218.06 LBS | HEIGHT: 70 IN

## 2021-11-03 DIAGNOSIS — Z12.31 ENCOUNTER FOR SCREENING MAMMOGRAM FOR BREAST CANCER: ICD-10-CM

## 2021-11-03 PROCEDURE — 77063 BREAST TOMOSYNTHESIS BI: CPT | Mod: 26,,, | Performed by: RADIOLOGY

## 2021-11-03 PROCEDURE — 77067 SCR MAMMO BI INCL CAD: CPT | Mod: TC

## 2021-11-03 PROCEDURE — 77067 SCR MAMMO BI INCL CAD: CPT | Mod: 26,,, | Performed by: RADIOLOGY

## 2021-11-03 PROCEDURE — 77063 MAMMO DIGITAL SCREENING BILAT WITH TOMO: ICD-10-PCS | Mod: 26,,, | Performed by: RADIOLOGY

## 2021-11-03 PROCEDURE — 77067 MAMMO DIGITAL SCREENING BILAT WITH TOMO: ICD-10-PCS | Mod: 26,,, | Performed by: RADIOLOGY

## 2021-11-12 ENCOUNTER — PATIENT OUTREACH (OUTPATIENT)
Dept: ADMINISTRATIVE | Facility: OTHER | Age: 76
End: 2021-11-12
Payer: MEDICARE

## 2021-12-15 ENCOUNTER — TELEPHONE (OUTPATIENT)
Dept: PAIN MEDICINE | Facility: CLINIC | Age: 76
End: 2021-12-15
Payer: MEDICARE

## 2021-12-21 ENCOUNTER — TELEPHONE (OUTPATIENT)
Dept: PAIN MEDICINE | Facility: CLINIC | Age: 76
End: 2021-12-21
Payer: MEDICARE

## 2022-01-31 ENCOUNTER — PATIENT MESSAGE (OUTPATIENT)
Dept: PAIN MEDICINE | Facility: CLINIC | Age: 77
End: 2022-01-31
Payer: MEDICARE

## 2022-02-02 ENCOUNTER — PATIENT MESSAGE (OUTPATIENT)
Dept: PAIN MEDICINE | Facility: CLINIC | Age: 77
End: 2022-02-02

## 2022-02-02 ENCOUNTER — OFFICE VISIT (OUTPATIENT)
Dept: PAIN MEDICINE | Facility: CLINIC | Age: 77
End: 2022-02-02
Payer: MEDICARE

## 2022-02-02 DIAGNOSIS — M70.62 GREATER TROCHANTERIC BURSITIS OF BOTH HIPS: Primary | ICD-10-CM

## 2022-02-02 DIAGNOSIS — M70.62 GREATER TROCHANTERIC BURSITIS OF BOTH HIPS: ICD-10-CM

## 2022-02-02 DIAGNOSIS — M70.61 GREATER TROCHANTERIC BURSITIS OF BOTH HIPS: Primary | ICD-10-CM

## 2022-02-02 DIAGNOSIS — M53.3 SACROILIAC JOINT DYSFUNCTION: ICD-10-CM

## 2022-02-02 DIAGNOSIS — M70.61 GREATER TROCHANTERIC BURSITIS OF BOTH HIPS: ICD-10-CM

## 2022-02-02 DIAGNOSIS — M53.3 SACROILIAC JOINT DYSFUNCTION: Primary | ICD-10-CM

## 2022-02-02 PROCEDURE — 99214 OFFICE O/P EST MOD 30 MIN: CPT | Mod: 95,,, | Performed by: ANESTHESIOLOGY

## 2022-02-02 PROCEDURE — 99214 PR OFFICE/OUTPT VISIT, EST, LEVL IV, 30-39 MIN: ICD-10-PCS | Mod: 95,,, | Performed by: ANESTHESIOLOGY

## 2022-02-02 NOTE — PROGRESS NOTES
The patient location is: Home  The chief complaint leading to consultation is: Buttock Pain    Visit type: audio only    Face to Face time with patient: 10  15 minutes of total time spent on the encounter, which includes face to face time and non-face to face time preparing to see the patient (eg, review of tests), Obtaining and/or reviewing separately obtained history, Documenting clinical information in the electronic or other health record, Independently interpreting results (not separately reported) and communicating results to the patient/family/caregiver, or Care coordination (not separately reported).         Each patient to whom he or she provides medical services by telemedicine is:  (1) informed of the relationship between the physician and patient and the respective role of any other health care provider with respect to management of the patient; and (2) notified that he or she may decline to receive medical services by telemedicine and may withdraw from such care at any time.    Notes:       Chief Pain Complaint:  Back pain  Right knee pain       History of Present Illness:   Kaylin Mccollum is a 77 y.o. female  who is presenting with a chief complaint of Low-back Pain. The patient began experiencing this problem insidiously, and the pain has been gradually worsening over time. The pain is described as throbbing, cramping, aching and heavy and is located in the bilateral buttocks. Pain is intermittent and lasts hours. The pain is nonradiating. The patient rates her pain a 3 out of ten and interferes with activities of daily living a 3 out of ten. Pain is exacerbated by getting up from a seated position, and is improved by rest. Patient reports no prior trauma, no prior spinal surgery     Kaylin Mccollum is a 77 y.o. female  who is presenting with a chief complaint of lumbar back pain. The patient began experiencing this problem insidiously, and the pain has been gradually worsening. The pain is  described as throbbing, shooting, burning and electrical and is located in the bilateral lumbar spine. Pain is intermittent and lasts hours. The pain radiates to bilateral lower extremities L4 distribudution. The patient rates her pain a 3 out of ten and interferes with activities of daily living a 3 out of ten. Pain is exacerbated by flexion of the lumbar spine, ambulation, and is improved by rest.     She also complains of right knee pain. The patient began experiencing this problem insidiously. The pain is described as cramping, aching and is located in the right knee. Pain is intermittent and lasts hours. The pain is nonradiating. The patient rates her pain a 8 out of ten and interferes with activities of daily living a 7 out of ten. Pain is exacerbated by getting up from a seated position and standing, and is improved by rest. Patient reports no prior trauma, prior arthroscopy bilaterally in 1990s.      - pertinent negatives: No fever, No chills, No weight loss, No bladder dysfunction, No bowel dysfunction, No saddle anesthesia  - pertinent positives: none    - medications, other therapies tried (physical therapy, injections):     >> NSAIDs, Tylenol, gabapentin and medrol dose pack    >> Has previously undergone Physical Therapy    >> Has previously undergone spinal injection/s   - Lumbar JAMIN with good relief in the past   - Bilateral SI Joint + GTB injection on 4/10/19 with great relief for about 4 weeks   - left SIJ RFA on 7/11/19 and right SIJ RFA on 7-25-19 with 80% pain relief   - right genicular nerve block on 8/8/19 with 90% pain relief   - bilateral GT bursa injections on 9/5/19 with 90% pain relief   - Bilateral SI and GTB 5/6/2020 with 60% pain relief.     - Bilateral SI and GTB 10/08/2020 with 60% pain relief.     - Left Trapezius TPI in clinic on 12/15/20 with limited pain relief   - 12/28/20 left shoulder injection in clinic with Dr. Schneider with good pain relief    - Bilateral SI + bilateral GTB  on 1/5/21 with 95% pain relief   - bilateral SIJ + bilateral GT bursa injection on 7/8/21 with about 65% pain relief       Imaging / Labs / Studies (reviewed on 2/2/2022):    1/27/2020 MRI Lumbar Spine Without Contrast  COMPARISON:  11/23/2015  FINDINGS:  Alignment: There is minimal grade 1 anterolisthesis of L4 on L5.  Vertebrae: Multilevel small Schmorl's nodes noted.  Vertebral body heights are otherwise within normal limits.  No evidence of fracture or marrow replacement process.  Discs: There is disc desiccation and mild disc height loss at L1-2, L2-3, and L5-S1.  Disc desiccation noted at L4-5 with preserved disc height.  Cord: Normal.  Conus terminates at L1  Degenerative findings:  T12-L1:  No spinal canal or neuroforaminal stenosis.  L1-L2: Mild broad-based disc bulge.  Mild bilateral facet arthropathy. No spinal canal or neuroforaminal stenosis.  L2-L3: Mild broad-based disc bulge.  Mild bilateral facet arthropathy. No spinal canal or neuroforaminal stenosis.  L3-L4: Mild broad-based disc bulge.  Mild bilateral facet arthropathy. No spinal canal or neuroforaminal stenosis.  L4-L5: Severe bilateral facet arthropathy with ligamentum flavum thickening.  Mild uncovering of the intervertebral disc.  Mild spinal canal stenosis and mild bilateral neural foraminal narrowing.  L5-S1: Moderate bilateral facet arthropathy and mild broad-based disc bulge.  Mild bilateral neural foraminal narrowing.  No spinal canal stenosis.  No significant change from prior.  Paraspinal muscles & soft tissues: Multiple probable bilateral renal cyst appear grossly similar to prior.  Impression  Multilevel degenerative disc disease and facet arthropathy as detailed above.  Findings remain most severe at L4-5 and L5-S1.     Results for orders placed during the hospital encounter of 11/23/15   MRI Lumbar Spine Without Contrast    Narrative Technique: Standard noncontrast multiplanar multisequence imaging of the lumbar spine was  performed.  Findings: There is anatomic spinal alignment.  Very low degree of degenerative disk base narrowing and disk desiccation observed at L1-2, L2-3, L4-5, and L5-S1.  Vertebral marrow signal pattern and architecture are normal.  Distal cord is unremarkable with conus medullaris terminating at L1.  Small nerve root sleeve cysts incidentally noted in the sacral canal.  There are bilateral renal cysts.  L1-2: Small annular bulge and endplate lipping.  Bilateral facet arthropathy.  No significant foraminal narrowing or canal stenosis.  L2-3: Small annular bulge and endplate lipping.  Bilateral facet arthropathy.  No significant foraminal narrowing or canal stenosis.  L3-4: Posterior disk bulge with small bilateral foraminal disk protrusions.  Bilateral facet arthropathy and ligament hypertrophy.  Mild inferior foraminal narrowing.  No canal stenosis.  L4-5: Posterior disk bulge, hypertrophic facet arthropathy, and ligament thickening resulting in moderate bilateral foraminal narrowing and moderate central canal stenosis.  AP canal diameter measures 8.4 mm.   L5-S1: Broad based posterior disk bulge and endplate lipping.  Hypertrophic facet arthropathy and ligament thickening.  Moderate bilateral foraminal narrowing.  No significant canal stenosis.    Impression  Multilevel lumbar spondylosis and degenerative disk disease as detailed.     Results for orders placed in visit on 05/31/12   X-Ray Lumbar Spine Ap And Lateral    Narrative Findings: Generalized osteopenia.  Multilevel degenerative vertebral end plate spurring and facet arthropathy.  Moderate to severe disk space narrowing and associated vacuum disk phenomenon at L5-S1.  Suggestion of slight diane-listhesis of L4 on L5.  Intact pedicles.  No compression fracture.         Review of Systems:  CONSTITUTIONAL: patient denies any fever, chills, or weight loss  SKIN: patient denies any rash or itching  RESPIRATORY: patient denies having any shortness of  breath  GASTROINTESTINAL: patient denies having any diarrhea, constipation, or bowel incontinence  GENITOURINARY: patient denies having any abnormal bladder function    MUSCULOSKELETAL:  - patient complains of the above noted pain/s (see chief pain complaint)    NEUROLOGICAL:   - pain as above  - strength in Lower extremities is intact, BILATERALLY  - sensation in Lower extremities is intact, BILATERALLY  - patient denies any loss of bowel or bladder control      PSYCHIATRIC: patient denies any change in mood    Other:  All other systems reviewed and are negative      Physical Exam:  There were no vitals filed for this visit. There is no height or weight on file to calculate BMI.   (reviewed on 2/2/2022)    General: alert and oriented, in no apparent distress.  Gait: normal gait.  Skin: no rashes, no discoloration, no obvious lesions  HEENT: normocephalic, atraumatic. Pupils equal and round.  Cardiovascular: no significant peripheral edema present.  Respiratory: without use of accessory muscles of respiration.    Musculoskeletal - Lumbar Spine:  - ROM fairly preserved   - Pain on flexion of lumbar spine: Absent   - Pain on extension of lumbar spine: Absent         - Lumbar facet loading: Absent   - TTP over the lumbar facet joints: Absent  - TTP over the lumbar paraspinals: Present   - TTP over the SI joints: Present  - TTP over GT bursa: Present, minimal   - Straight Leg Raise: Negative  - CHERYLE: Present    Right Knee:  - TTP: Present over medial/ lateral joint line  - Pain with extension: Present  - Pain with flexion: Present  - Crepitus: Present     Neuro - Lower Extremities:  - BLE Strength: R/L: HF: 5/5, HE: 5/5, KF: 5/5; KE: 5/5; FE: 5/5; FF: 5/5  - Extremity Reflexes: Brisk and symmetric throughout  - Sensory: Sensation to light touch intact bilaterally      Psych:  Mood and affect is appropriate        Assessment:  Kaylin Mccollum is a 77 y.o. year old female who is presenting with       ICD-10-CM ICD-9-CM     1. Sacroiliac joint dysfunction  M53.3 724.6    2. Greater trochanteric bursitis of both hips  M70.61 726.5     M70.62         Plan:  1. Interventional: Schedule patient for bilateral SI and GTB with NR IV sedation.   - S/p bilateral SIJ + bilateral GT bursa injection on 7/8/21 with about 65% pain relief.    S/p Bilateral SI and GTB on 1/5/21 with 95% pain relief.   S/p Bilateral SI and GTB 10/08/2020 with 60% pain relief.    S/p Bilateral SI and GTB 5/6/2020 with 60% pain relief.    S/p bilateral GT bursa injections on 9/5/19 with 90% pain relief.    2. Pharmacologic:    Continue Celebrex 100mg - takes 200mg QHS PRN - from Rheumatology.    Continue Gabapentin 300 TID x 90 day supply.     3. Rehabilitative: Encouraged regular exercise. Continue exercises and activities as tolerated. Physical therapy has helped in the past.    4. Diagnostic: None for now.    5. Consult: Continue follow-up with Dr. Schneider for knee and shoulder pain.     6. Follow up: Post injection.     Disclaimer:  This note was prepared using voice recognition system and is likely to have sound alike errors that may have been overlooked even after proof reading.  Please call me with any questions.

## 2022-02-09 ENCOUNTER — LAB VISIT (OUTPATIENT)
Dept: LAB | Facility: HOSPITAL | Age: 77
End: 2022-02-09
Attending: FAMILY MEDICINE
Payer: MEDICARE

## 2022-02-09 ENCOUNTER — IMMUNIZATION (OUTPATIENT)
Dept: PRIMARY CARE CLINIC | Facility: CLINIC | Age: 77
End: 2022-02-09
Payer: MEDICARE

## 2022-02-09 DIAGNOSIS — I15.2 HYPERTENSION ASSOCIATED WITH DIABETES: ICD-10-CM

## 2022-02-09 DIAGNOSIS — E11.69 DIABETES MELLITUS TYPE 2 IN OBESE: ICD-10-CM

## 2022-02-09 DIAGNOSIS — E11.69 HYPERLIPIDEMIA ASSOCIATED WITH TYPE 2 DIABETES MELLITUS: ICD-10-CM

## 2022-02-09 DIAGNOSIS — E78.5 HYPERLIPIDEMIA ASSOCIATED WITH TYPE 2 DIABETES MELLITUS: ICD-10-CM

## 2022-02-09 DIAGNOSIS — E66.9 DIABETES MELLITUS TYPE 2 IN OBESE: ICD-10-CM

## 2022-02-09 DIAGNOSIS — Z23 NEED FOR VACCINATION: Primary | ICD-10-CM

## 2022-02-09 DIAGNOSIS — E11.59 HYPERTENSION ASSOCIATED WITH DIABETES: ICD-10-CM

## 2022-02-09 LAB
ALBUMIN SERPL BCP-MCNC: 3.6 G/DL (ref 3.5–5.2)
ALP SERPL-CCNC: 64 U/L (ref 55–135)
ALT SERPL W/O P-5'-P-CCNC: 14 U/L (ref 10–44)
ANION GAP SERPL CALC-SCNC: 8 MMOL/L (ref 8–16)
AST SERPL-CCNC: 20 U/L (ref 10–40)
BASOPHILS # BLD AUTO: 0.05 K/UL (ref 0–0.2)
BASOPHILS NFR BLD: 1.1 % (ref 0–1.9)
BILIRUB SERPL-MCNC: 0.4 MG/DL (ref 0.1–1)
BUN SERPL-MCNC: 13 MG/DL (ref 8–23)
CALCIUM SERPL-MCNC: 9.6 MG/DL (ref 8.7–10.5)
CHLORIDE SERPL-SCNC: 103 MMOL/L (ref 95–110)
CHOLEST SERPL-MCNC: 222 MG/DL (ref 120–199)
CHOLEST/HDLC SERPL: 3.4 {RATIO} (ref 2–5)
CO2 SERPL-SCNC: 29 MMOL/L (ref 23–29)
CREAT SERPL-MCNC: 0.8 MG/DL (ref 0.5–1.4)
DIFFERENTIAL METHOD: ABNORMAL
EOSINOPHIL # BLD AUTO: 0.2 K/UL (ref 0–0.5)
EOSINOPHIL NFR BLD: 4.5 % (ref 0–8)
ERYTHROCYTE [DISTWIDTH] IN BLOOD BY AUTOMATED COUNT: 13.8 % (ref 11.5–14.5)
EST. GFR  (AFRICAN AMERICAN): >60 ML/MIN/1.73 M^2
EST. GFR  (NON AFRICAN AMERICAN): >60 ML/MIN/1.73 M^2
ESTIMATED AVG GLUCOSE: 160 MG/DL (ref 68–131)
GLUCOSE SERPL-MCNC: 120 MG/DL (ref 70–110)
HBA1C MFR BLD: 7.2 % (ref 4–5.6)
HCT VFR BLD AUTO: 43.8 % (ref 37–48.5)
HDLC SERPL-MCNC: 65 MG/DL (ref 40–75)
HDLC SERPL: 29.3 % (ref 20–50)
HGB BLD-MCNC: 13.4 G/DL (ref 12–16)
IMM GRANULOCYTES # BLD AUTO: 0.01 K/UL (ref 0–0.04)
IMM GRANULOCYTES NFR BLD AUTO: 0.2 % (ref 0–0.5)
LDLC SERPL CALC-MCNC: 141.6 MG/DL (ref 63–159)
LYMPHOCYTES # BLD AUTO: 1.6 K/UL (ref 1–4.8)
LYMPHOCYTES NFR BLD: 34.8 % (ref 18–48)
MCH RBC QN AUTO: 29.4 PG (ref 27–31)
MCHC RBC AUTO-ENTMCNC: 30.6 G/DL (ref 32–36)
MCV RBC AUTO: 96 FL (ref 82–98)
MONOCYTES # BLD AUTO: 0.4 K/UL (ref 0.3–1)
MONOCYTES NFR BLD: 7.5 % (ref 4–15)
NEUTROPHILS # BLD AUTO: 2.4 K/UL (ref 1.8–7.7)
NEUTROPHILS NFR BLD: 51.9 % (ref 38–73)
NONHDLC SERPL-MCNC: 157 MG/DL
NRBC BLD-RTO: 0 /100 WBC
PLATELET # BLD AUTO: 249 K/UL (ref 150–450)
PMV BLD AUTO: 9.6 FL (ref 9.2–12.9)
POTASSIUM SERPL-SCNC: 4.1 MMOL/L (ref 3.5–5.1)
PROT SERPL-MCNC: 6.3 G/DL (ref 6–8.4)
RBC # BLD AUTO: 4.56 M/UL (ref 4–5.4)
SODIUM SERPL-SCNC: 140 MMOL/L (ref 136–145)
TRIGL SERPL-MCNC: 77 MG/DL (ref 30–150)
TSH SERPL DL<=0.005 MIU/L-ACNC: 1.2 UIU/ML (ref 0.4–4)
WBC # BLD AUTO: 4.66 K/UL (ref 3.9–12.7)

## 2022-02-09 PROCEDURE — 36415 COLL VENOUS BLD VENIPUNCTURE: CPT | Performed by: FAMILY MEDICINE

## 2022-02-09 PROCEDURE — 91300 COVID-19, MRNA, LNP-S, PF, 30 MCG/0.3 ML DOSE VACCINE: CPT | Mod: PBBFAC | Performed by: FAMILY MEDICINE

## 2022-02-09 PROCEDURE — 83036 HEMOGLOBIN GLYCOSYLATED A1C: CPT | Performed by: FAMILY MEDICINE

## 2022-02-09 PROCEDURE — 84443 ASSAY THYROID STIM HORMONE: CPT | Performed by: FAMILY MEDICINE

## 2022-02-09 PROCEDURE — 80061 LIPID PANEL: CPT | Performed by: FAMILY MEDICINE

## 2022-02-09 PROCEDURE — 85025 COMPLETE CBC W/AUTO DIFF WBC: CPT | Performed by: FAMILY MEDICINE

## 2022-02-09 PROCEDURE — 80053 COMPREHEN METABOLIC PANEL: CPT | Performed by: FAMILY MEDICINE

## 2022-02-15 ENCOUNTER — OFFICE VISIT (OUTPATIENT)
Dept: INTERNAL MEDICINE | Facility: CLINIC | Age: 77
End: 2022-02-15
Payer: MEDICARE

## 2022-02-15 VITALS
BODY MASS INDEX: 31.95 KG/M2 | HEIGHT: 70 IN | WEIGHT: 223.19 LBS | OXYGEN SATURATION: 95 % | HEART RATE: 78 BPM | SYSTOLIC BLOOD PRESSURE: 122 MMHG | DIASTOLIC BLOOD PRESSURE: 66 MMHG | TEMPERATURE: 98 F

## 2022-02-15 DIAGNOSIS — E11.69 HYPERLIPIDEMIA ASSOCIATED WITH TYPE 2 DIABETES MELLITUS: ICD-10-CM

## 2022-02-15 DIAGNOSIS — R42 DIZZINESS AND GIDDINESS: ICD-10-CM

## 2022-02-15 DIAGNOSIS — E66.9 DIABETES MELLITUS TYPE 2 IN OBESE: Primary | ICD-10-CM

## 2022-02-15 DIAGNOSIS — E11.59 HYPERTENSION ASSOCIATED WITH DIABETES: ICD-10-CM

## 2022-02-15 DIAGNOSIS — F32.9 CHRONIC MAJOR DEPRESSIVE DISORDER: Chronic | ICD-10-CM

## 2022-02-15 DIAGNOSIS — E11.69 DIABETES MELLITUS TYPE 2 IN OBESE: Primary | ICD-10-CM

## 2022-02-15 DIAGNOSIS — I15.2 HYPERTENSION ASSOCIATED WITH DIABETES: ICD-10-CM

## 2022-02-15 DIAGNOSIS — E78.5 HYPERLIPIDEMIA ASSOCIATED WITH TYPE 2 DIABETES MELLITUS: ICD-10-CM

## 2022-02-15 PROCEDURE — 99215 OFFICE O/P EST HI 40 MIN: CPT | Mod: PBBFAC | Performed by: FAMILY MEDICINE

## 2022-02-15 PROCEDURE — 99999 PR PBB SHADOW E&M-EST. PATIENT-LVL V: ICD-10-PCS | Mod: PBBFAC,,, | Performed by: FAMILY MEDICINE

## 2022-02-15 PROCEDURE — 99214 OFFICE O/P EST MOD 30 MIN: CPT | Mod: S$PBB,,, | Performed by: FAMILY MEDICINE

## 2022-02-15 PROCEDURE — 99999 PR PBB SHADOW E&M-EST. PATIENT-LVL V: CPT | Mod: PBBFAC,,, | Performed by: FAMILY MEDICINE

## 2022-02-15 PROCEDURE — 99214 PR OFFICE/OUTPT VISIT, EST, LEVL IV, 30-39 MIN: ICD-10-PCS | Mod: S$PBB,,, | Performed by: FAMILY MEDICINE

## 2022-02-16 ENCOUNTER — HOSPITAL ENCOUNTER (OUTPATIENT)
Dept: RADIOLOGY | Facility: HOSPITAL | Age: 77
Discharge: HOME | End: 2022-02-16
Attending: FAMILY MEDICINE
Payer: MEDICARE

## 2022-02-16 DIAGNOSIS — R42 DIZZINESS AND GIDDINESS: ICD-10-CM

## 2022-02-16 PROCEDURE — 70450 CT HEAD/BRAIN W/O DYE: CPT | Mod: TC

## 2022-02-19 DIAGNOSIS — M79.7 FIBROMYALGIA: ICD-10-CM

## 2022-02-21 DIAGNOSIS — E11.59 HYPERTENSION ASSOCIATED WITH DIABETES: ICD-10-CM

## 2022-02-21 DIAGNOSIS — I15.2 HYPERTENSION ASSOCIATED WITH DIABETES: ICD-10-CM

## 2022-02-21 DIAGNOSIS — I34.1 MVP (MITRAL VALVE PROLAPSE): ICD-10-CM

## 2022-02-21 RX ORDER — GABAPENTIN 300 MG/1
300 CAPSULE ORAL 3 TIMES DAILY
Qty: 270 CAPSULE | Refills: 0 | Status: SHIPPED | OUTPATIENT
Start: 2022-02-21 | End: 2022-03-14 | Stop reason: SDUPTHER

## 2022-02-21 RX ORDER — DICLOFENAC SODIUM 10 MG/G
4 GEL TOPICAL 3 TIMES DAILY PRN
Qty: 2 EACH | Refills: 0 | Status: SHIPPED | OUTPATIENT
Start: 2022-02-21 | End: 2022-06-20

## 2022-02-21 NOTE — PRE-PROCEDURE INSTRUCTIONS
Spoke with patient regarding procedure scheduled on 3.1    Arrival time 0600    Has patient been sick with fever or on antibiotics within the last 7 days? No    Does the patient have any open wounds, sores or rashes? No    Does the patient have any recent fractures? no    Has patient received a vaccination within the last 7 days? No    Received the COVID vaccination? yes    Has the patient stopped all medications as directed? Hold dm meds am of procedure    Does patient have a pacemaker and or defibrillator? no    Does the patient have a ride to and from procedure and someone reliable to remain with patient? Tom bañuelos     Is the patient diabetic? yes    Does the patient have sleep apnea? Or use O2 at home? No and no     Is the patient receiving sedation? yes    Is the patient instructed to remain NPO beginning at midnight the night before their procedure? yes    Procedure location confirmed with patient? Yes    Covid- Denies signs/symptoms. Instructed to notify PAT/MD if any changes.

## 2022-02-22 RX ORDER — VERAPAMIL HYDROCHLORIDE 120 MG/1
120 TABLET, FILM COATED ORAL 3 TIMES DAILY
Qty: 180 TABLET | Refills: 5 | Status: SHIPPED | OUTPATIENT
Start: 2022-02-22 | End: 2023-04-21 | Stop reason: SDUPTHER

## 2022-02-25 NOTE — ASSESSMENT & PLAN NOTE
, continue Zetia, discuss additional treatment options with Dr. Dumont at follow-up in April, patient expressed understanding.

## 2022-02-25 NOTE — PROGRESS NOTES
Subjective:       Patient ID: Kaylin Mccollum is a 77 y.o. female.    Chief Complaint: Annual Exam    Patient presents to clinic today for annual physical exam.  Patient reports a fall in late January she tripped and fell onto her knees.  Denies head injury.  She reports since that time she has had intermittent dizziness that is associated with changes in position.  She saw her orthopedist to thought this might be a whiplash injury.  Patient also reports increase in migraine headaches since that time.    Review of Systems   Constitutional: Positive for fatigue (chronic). Negative for chills, fever and unexpected weight change.   HENT: Negative for congestion, dental problem, ear pain, hearing loss, rhinorrhea and trouble swallowing.    Eyes: Negative for pain and visual disturbance.   Respiratory: Negative for cough and shortness of breath.    Cardiovascular: Negative for chest pain, palpitations and leg swelling.   Gastrointestinal: Negative for abdominal distention, abdominal pain, blood in stool, constipation, diarrhea, nausea and vomiting.   Genitourinary: Negative for difficulty urinating and vaginal discharge.   Musculoskeletal: Positive for arthralgias (chronic) and myalgias (chronic).   Skin: Negative for rash.   Neurological: Positive for dizziness (new) and headaches (chronic). Negative for weakness and numbness.   Hematological: Negative for adenopathy. Does not bruise/bleed easily.   Psychiatric/Behavioral: Negative for dysphoric mood and sleep disturbance. The patient is not nervous/anxious.          Objective:      Physical Exam  Vitals reviewed.   Constitutional:       General: She is not in acute distress.     Appearance: Normal appearance. She is well-developed.   HENT:      Head: Normocephalic and atraumatic.      Right Ear: Tympanic membrane, ear canal and external ear normal.      Left Ear: Tympanic membrane, ear canal and external ear normal.      Nose: Nose normal. No mucosal edema or  rhinorrhea.      Mouth/Throat:      Pharynx: Uvula midline.   Eyes:      General: Lids are normal. No scleral icterus.     Extraocular Movements: Extraocular movements intact.      Conjunctiva/sclera: Conjunctivae normal.      Pupils: Pupils are equal, round, and reactive to light.   Neck:      Thyroid: No thyromegaly.   Cardiovascular:      Rate and Rhythm: Normal rate and regular rhythm.      Heart sounds: No murmur heard.    No friction rub. No gallop.   Pulmonary:      Effort: Pulmonary effort is normal.      Breath sounds: Normal breath sounds. No wheezing, rhonchi or rales.   Abdominal:      General: Bowel sounds are normal. There is no distension.      Palpations: Abdomen is soft. There is no mass.      Tenderness: There is no abdominal tenderness.   Musculoskeletal:         General: Normal range of motion.      Cervical back: Normal range of motion and neck supple.   Lymphadenopathy:      Cervical: No cervical adenopathy.   Skin:     General: Skin is warm and dry.      Findings: No lesion or rash.      Nails: There is no clubbing.   Neurological:      Mental Status: She is alert and oriented to person, place, and time.      Cranial Nerves: No cranial nerve deficit.      Sensory: No sensory deficit.      Gait: Gait normal.   Psychiatric:         Mood and Affect: Mood and affect normal.         Assessment:       1. Diabetes mellitus type 2 in obese    2. Hyperlipidemia associated with type 2 diabetes mellitus    3. Hypertension associated with diabetes    4. Dizziness and giddiness    5. Chronic major depressive disorder        Plan:     Problem List Items Addressed This Visit     Chronic major depressive disorder (Chronic)    Current Assessment & Plan     Stable on Prozac           Hypertension associated with diabetes (Chronic)    Current Assessment & Plan     Controlled, continue losartan and verapamil           Diabetes mellitus type 2 in obese - Primary    Current Assessment & Plan     A1c 7.2, continue  follow-up with Dr. Valero in diabetes           Relevant Orders    Ambulatory referral/consult to Optometry    Hemoglobin A1C    Hyperlipidemia associated with type 2 diabetes mellitus    Current Assessment & Plan     , continue Zetia, discuss additional treatment options with Dr. Dumont at follow-up in April, patient expressed understanding.           Relevant Orders    Comprehensive Metabolic Panel    Lipid Panel      Other Visit Diagnoses     Dizziness and giddiness        Relevant Orders    CT Head Without Contrast (Completed)          Health Maintenance reviewed/updated.

## 2022-03-01 ENCOUNTER — HOSPITAL ENCOUNTER (OUTPATIENT)
Facility: HOSPITAL | Age: 77
Discharge: HOME | End: 2022-03-01
Attending: ANESTHESIOLOGY | Admitting: ANESTHESIOLOGY
Payer: MEDICARE

## 2022-03-01 VITALS
WEIGHT: 223.44 LBS | BODY MASS INDEX: 31.99 KG/M2 | HEIGHT: 70 IN | HEART RATE: 80 BPM | RESPIRATION RATE: 14 BRPM | DIASTOLIC BLOOD PRESSURE: 61 MMHG | OXYGEN SATURATION: 95 % | SYSTOLIC BLOOD PRESSURE: 136 MMHG | TEMPERATURE: 98 F

## 2022-03-01 DIAGNOSIS — M53.3 SACROILIAC JOINT DYSFUNCTION: Primary | ICD-10-CM

## 2022-03-01 LAB — POCT GLUCOSE: 103 MG/DL (ref 70–110)

## 2022-03-01 PROCEDURE — 27096 PR INJECTION,SACROILIAC JOINT: ICD-10-PCS | Mod: 50,59,, | Performed by: ANESTHESIOLOGY

## 2022-03-01 PROCEDURE — 27096 INJECT SACROILIAC JOINT: CPT | Mod: RT

## 2022-03-01 PROCEDURE — 25500020 PHARM REV CODE 255: Performed by: ANESTHESIOLOGY

## 2022-03-01 PROCEDURE — 20610 DRAIN/INJ JOINT/BURSA W/O US: CPT | Mod: 50,,, | Performed by: ANESTHESIOLOGY

## 2022-03-01 PROCEDURE — 25000003 PHARM REV CODE 250: Performed by: ANESTHESIOLOGY

## 2022-03-01 PROCEDURE — 20610 DRAIN/INJ JOINT/BURSA W/O US: CPT | Mod: 50 | Performed by: ANESTHESIOLOGY

## 2022-03-01 PROCEDURE — G0260 INJ FOR SACROILIAC JT ANESTH: HCPCS | Mod: 50 | Performed by: ANESTHESIOLOGY

## 2022-03-01 PROCEDURE — 27096 INJECT SACROILIAC JOINT: CPT | Mod: LT | Performed by: ANESTHESIOLOGY

## 2022-03-01 PROCEDURE — 63600175 PHARM REV CODE 636 W HCPCS: Performed by: ANESTHESIOLOGY

## 2022-03-01 PROCEDURE — 20610 PR DRAIN/INJECT LARGE JOINT/BURSA: ICD-10-PCS | Mod: 50,,, | Performed by: ANESTHESIOLOGY

## 2022-03-01 PROCEDURE — 27096 INJECT SACROILIAC JOINT: CPT | Mod: 50,59,, | Performed by: ANESTHESIOLOGY

## 2022-03-01 RX ORDER — METHYLPREDNISOLONE ACETATE 40 MG/ML
INJECTION, SUSPENSION INTRA-ARTICULAR; INTRALESIONAL; INTRAMUSCULAR; SOFT TISSUE
Status: DISCONTINUED | OUTPATIENT
Start: 2022-03-01 | End: 2022-03-01 | Stop reason: HOSPADM

## 2022-03-01 RX ORDER — FENTANYL CITRATE 50 UG/ML
INJECTION, SOLUTION INTRAMUSCULAR; INTRAVENOUS
Status: DISCONTINUED | OUTPATIENT
Start: 2022-03-01 | End: 2022-03-01 | Stop reason: HOSPADM

## 2022-03-01 RX ORDER — INDOMETHACIN 25 MG/1
CAPSULE ORAL
Status: DISCONTINUED | OUTPATIENT
Start: 2022-03-01 | End: 2022-03-01 | Stop reason: HOSPADM

## 2022-03-01 RX ORDER — MIDAZOLAM HYDROCHLORIDE 1 MG/ML
INJECTION, SOLUTION INTRAMUSCULAR; INTRAVENOUS
Status: DISCONTINUED | OUTPATIENT
Start: 2022-03-01 | End: 2022-03-01 | Stop reason: HOSPADM

## 2022-03-01 RX ORDER — LIDOCAINE HYDROCHLORIDE 20 MG/ML
INJECTION, SOLUTION EPIDURAL; INFILTRATION; INTRACAUDAL; PERINEURAL
Status: DISCONTINUED | OUTPATIENT
Start: 2022-03-01 | End: 2022-03-01 | Stop reason: HOSPADM

## 2022-03-01 NOTE — OP NOTE
PROCEDURE: Sacroiliac joint and Greater trochanteric bursa injection under fluoroscopic guidance       SIDE: bilateral Sacroiliac injection and bilateral greater trochanteric bursa injection      PROCEDURE DATE: 3/1/2022    PREOPERATIVE DIAGNOSIS: Sacroiliitis, greater trochanteric bursitis  POSTOPERATIVE DIAGNOSIS: Sacroiliitis, greater trochanteric bursitis    PROVIDER: Manpreet Dutta MD  Assistant(s): none    Anesthesia: Local, IV Sedation     >> 2 mg of VERSED    >> 100 mcg of FENTANYL     INDICATION: The patient has a history of pain due to sacroiliitis unresponsive to conservative treatments. Fluoroscopy was used to optimize visualization of needle placement and to maximize safety.       PROCEDURE DESCRIPTION: The patient was seen and identified in the preoperative area. Risks, benefits, complications, and alternatives were discussed with the patient. The patient agreed to proceed with the procedure and signed the consent. The site and side of the procedure was identified and marked.     The patient was taken to the procedural suite. The patient was positioned in prone orientation on procedure table. A time out was performed prior to any intervention. The procedure, site, side, and allergies were stated and agreed to by all present. The lumbosacral area extending to the skin overlying the femoral greater trochanter was widely prepped with ChloraPrep. The procedural site was draped in usual sterile fashion. Vital signs were closely monitored throughout this procedure.     The fluoroscopic camera was placed in contralateral oblique view and was adjusted until the anterior and posterior joint margins of the targeted sacroiliac joint aligned in linear array. The lower pole of the joint was identified, marked, and localized with 1% Lidocaine. A 25 gauge 3.5 inch spinal needle was introduced and advanced to the joint under fluoroscopic guidance. The joint space was entered and after negative aspiration, 2 mL of  injectate was posited into the joint space. The needle was then withdrawn outside of the joint space and after negative aspiration 1 mL of solution was injected outside of the joint space. The injectant solution used was comprised of 7 mL of 1% PF Lidocaine and 3 mL of Methylprednisolone (40 mg/mL). This techniques was performed for the above noted joint/s.    Following Sacroiliac Joint injection, the stylet was replaced and the needle was withdrawn intact. The RIGHT and LEFT greater trochanter was then identified with fluoroscopy. The targeted site of entry was localized with 1% PF Lidocaine. The above noted spinal needle was advanced to the lateral border of the greater trochanter until the osseus interface was met.  After negative aspiration, 2 mL of the above noted solution was injected. No pain or paresthesia was noted on injection. Following injection, the stylet was replaced and the needle was withdrawn intact. This technique was used for the above noted greater trochanteric bursa / bursae. The skin was cleaned and bandages were applied.    Description of Findings: Not applicable    Prosthetic devices, grafts, tissues, or devices implanted: None    Specimen Removed: No    Estimated Blood Loss: minimal    COMPLICATIONS: None    DISPOSITION / PLANS: The patient was transferred to the recovery area in a stable condition for observation. The patient was reexamined prior to discharge. There was no evidence of acute neurologic injury following the procedure.  Patient was discharged from the recovery room after meeting discharge criteria. Home discharge instructions were given to the patient by the staff.

## 2022-03-01 NOTE — DISCHARGE SUMMARY
[facility]  Discharge Note       Description of Procedure: Sacroiliac Joint and Greater Trochanteric Bursa Injection under Fluoroscopic Guidance    Procedure Date: 3/1/2022    Admit Date: 3/1/2022  Discharge Date: 3/1/2022     Attending Physician: Manpreet Dutta   Discharge Provider: Manpreet Dutta    Preoperative Diagnosis: Sacroiliitis and Greater Trochanteric Bursitis     Postoperative Diagnosis: as above, same as preoperative diagnosis    Discharged Condition: Stable    Hospital Course: Patient was admitted for an outpatient procedure. The procedure was tolerated well with no complications.    Final Diagnoses: Same as principal problem.    Disposition: Home, self-care.    Follow up/Patient Instructions:  Follow-up in clinic in 2-3 weeks.    Medications: No medications were prescribed today. The patient was advised to resume normal medication regimen without change.  Specific information was provided regarding restarting any anticoagulant/s.    Discharge Procedure Orders (must include Diet, Follow-up, Activity):  Light activity for the remainder of the day, resume normal activity tomorrow. Resume normal diet. Follow-up in clinic in 2-3 weeks.

## 2022-03-01 NOTE — DISCHARGE INSTRUCTIONS

## 2022-03-01 NOTE — H&P
HPI  Patient presenting for Procedure(s) (LRB):  Bilateral BLOCK, SACROILIAC JOINT and Bilateral GTB RN IV sedation (Bilateral)     Patient on Anti-coagulation No    No health changes since previous encounter    Past Medical History:   Diagnosis Date    Arthritis     Cataract     Diabetes mellitus     Diabetes mellitus, type 2     Fibromyalgia     General anesthetics causing adverse effect in therapeutic use     bradycardia     Hypertension     Migraines     Mitral valve prolapse     Osteoarthritis     Rotator cuff tear 4/1/2015     Past Surgical History:   Procedure Laterality Date    ARTHROSCOPY OF KNEE Right 3/10/2020    Procedure: ARTHROSCOPY, KNEE;  Surgeon: Les Schneider MD;  Location: [facility] OR;  Service: Orthopedics;  Laterality: Right;    CATARACT EXTRACTION W/  INTRAOCULAR LENS IMPLANT Left 07/19/2017    CATARACT EXTRACTION W/  INTRAOCULAR LENS IMPLANT Right 2017    CHOLECYSTECTOMY      CHONDROPLASTY OF KNEE Right 3/10/2020    Procedure: CHONDROPLASTY, KNEE;  Surgeon: Les Schneider MD;  Location: [facility] OR;  Service: Orthopedics;  Laterality: Right;  Anterior compartment     COLONOSCOPY N/A 9/25/2018    Procedure: COLONOSCOPY;  Surgeon: Bethel Carmona MD;  Location: [facility] ENDO;  Service: Endoscopy;  Laterality: N/A;    EXCISION OF MEDIAL MENISCUS OF KNEE Right 3/10/2020    Procedure: MENISCECTOMY, KNEE, MEDIAL;  Surgeon: Les Schneider MD;  Location: [facility] OR;  Service: Orthopedics;  Laterality: Right;  Partial, Medial , Lateral     EYE SURGERY      INJECTION OF ANESTHETIC AGENT AROUND NERVE Right 8/8/2019    Procedure: Right Genicular nerve block with local;  Surgeon: Manpreet Dutta MD;  Location: [facility] PAIN MGT;  Service: Pain Management;  Laterality: Right;    INJECTION OF ANESTHETIC AGENT INTO SACROILIAC JOINT Bilateral 5/6/2020    Procedure: Bilateral Sacroiliac Joint Injection;  Surgeon: Manpreet Dutta MD;  Location: [facility] PAIN MGT;  Service: Pain Management;   "Laterality: Bilateral;    INJECTION OF ANESTHETIC AGENT INTO SACROILIAC JOINT Bilateral 10/8/2020    Procedure: Bilateral SI and Bilateral GTB with RN IV sedation;  Surgeon: Manpreet Dutta MD;  Location: [facility] PAIN MGT;  Service: Pain Management;  Laterality: Bilateral;    INJECTION OF ANESTHETIC AGENT INTO SACROILIAC JOINT Bilateral 1/5/2021    Procedure: Bilateral BLOCK, SACROILIAC JOINT and Bilateral GTB witn RN IV sedation;  Surgeon: Daniel Burns MD;  Location: [facility] PAIN MGT;  Service: Pain Management;  Laterality: Bilateral;    INJECTION OF ANESTHETIC AGENT INTO SACROILIAC JOINT Bilateral 7/8/2021    Procedure: Bilateral BLOCK, SACROILIAC JOINT bilateral GTB RN IV sedation;  Surgeon: Manpreet Dutta MD;  Location: [facility] PAIN [facility]GT;  Service: Pain Management;  Laterality: Bilateral;    INJECTION OF JOINT Bilateral 9/5/2019    Procedure: Bilateral GT bursa injection;  Surgeon: Manpreet Dutta MD;  Location: [facility] PAIN [facility]GT;  Service: Pain Management;  Laterality: Bilateral;    INJECTION OF JOINT Bilateral 5/6/2020    Procedure: Bilateral GT bursa injection;  Surgeon: Manpreet Dutta MD;  Location: [facility] PAIN [facility]GT;  Service: Pain Management;  Laterality: Bilateral;    KNEE ARTHROSCOPY Bilateral     PARS PLANA VITRECTOMY W/ REPAIR OF MACULAR HOLE Left 02/22/2017    RADIOFREQUENCY THERMOCOAGULATION Left 7/11/2019    Procedure: Right SIJ RFA;  Surgeon: Manpreet Dutta MD;  Location: [facility]IN [facility];  Service: Pain Management;  Laterality: Left;    RADIOFREQUENCY THERMOCOAGULATION Right 7/25/2019    Procedure: Right SIJ RFA;  Surgeon: Manpreet Dutta MD;  Location: [facility]IN [facility]GT;  Service: Pain Management;  Laterality: Right;    SHOULDER ARTHROSCOPY Right 06/04/15     Review of patient's allergies indicates:   Allergen Reactions    Demerol [meperidine] Other (See Comments)     Burning when adm IV  Able to tolerate IM, "Turned the veins in my hand purple."    Latex, natural rubber Other (See Comments)     "Burns my skin",  " Symptoms get worse the longer she is exposed    Zocor [simvastatin] Other (See Comments)     Tightening of muscles    Statins-hmg-coa reductase inhibitors Other (See Comments)     myopathy    Sulfa (sulfonamide antibiotics)      Blurred vision        No current facility-administered medications on file prior to encounter.     Current Outpatient Medications on File Prior to Encounter   Medication Sig Dispense Refill    losartan (COZAAR) 25 MG tablet Take 1 tablet (25 mg total) by mouth once daily. 90 tablet 2    biotin 1 mg tablet Take 1,000 mcg by mouth every morning.       celecoxib (CELEBREX) 100 MG capsule TAKE 1 CAPSULE BY MOUTH TWICE DAILY 180 capsule 3    FLUoxetine 40 MG capsule TAKE 1 CAPSULE(40 MG) BY MOUTH EVERY DAY 90 capsule 2    fluticasone (FLONASE) 50 mcg/actuation nasal spray 2 sprays by Each Nare route once daily. (Patient taking differently: 2 sprays by Each Nostril route nightly as needed.) 1 Bottle 0    furosemide (LASIX) 20 MG tablet Take 1 tablet (20 mg total) by mouth every Mon, Wed, Fri. 25 tablet 2    HYDROcodone-acetaminophen (NORCO)  mg per tablet Take 1 tablet by mouth every 6 (six) hours as needed. HYDROCODONE-ACETAMINOPHEN (Norco)  MG ORAL TAB - 1 tab po q 6 hrs prn pain 10 tablet 0    INVOKANA 100 mg Tab tablet TAKE 1 TABLET(100 MG) BY MOUTH EVERY DAY 90 tablet 3    LORazepam (ATIVAN) 1 MG tablet TK 3 TS PO 1 HOUR PRIOR TO APPOINTMENT      methylPREDNISolone (MEDROL DOSEPACK) 4 mg tablet use as directed 1 Package 0    omeprazole (PRILOSEC) 10 MG capsule Take 1 capsule (10 mg total) by mouth daily as needed (w/ NSAID). 30 capsule 0    potassium chloride SA (K-DUR,KLOR-CON) 10 MEQ tablet Take 1 tablet (10 mEq total) by mouth once daily. 20 tablet 5    pulse oximeter (PULSE OXIMETER) device by Apply Externally route 2 (two) times a day. Use twice daily at 8 AM and 3 PM and record the value in Radiation Watcht as directed. 1 each 0    traMADoL (ULTRAM) 50 mg tablet Take  "1 tablet (50 mg total) by mouth every 12 (twelve) hours as needed for Pain. 40 tablet 0    triamcinolone acetonide 0.025% (KENALOG) 0.025 % Oint Apply topically 2 (two) times daily. for 10 days (Patient taking differently: Apply topically 2 (two) times daily as needed. ) 15 g 2        PMHx, PSHx, Allergies, Medications reviewed in epic    ROS negative except pain complaints in HPI    OBJECTIVE:    BP (!) 146/64 (BP Location: Right arm, Patient Position: Sitting)   Pulse 83   Temp 97.5 °F (36.4 °C) (Temporal)   Resp 16   Ht 5' 10" (1.778 m)   Wt 101.4 kg (223 lb 7 oz)   SpO2 95%   Breastfeeding No   BMI 32.06 kg/m²     PHYSICAL EXAMINATION:    GENERAL: Well appearing, in no acute distress, alert and oriented x3.  PSYCH:  Mood and affect appropriate.  SKIN: Skin color, texture, turgor normal, no rashes or lesions which will impact the procedure.  CV: RRR with palpation of the radial artery.  PULM: No evidence of respiratory difficulty, symmetric chest rise. Clear to auscultation.  NEURO: Cranial nerves grossly intact.    Plan:    Proceed with procedure as planned Procedure(s) (LRB):  Bilateral BLOCK, SACROILIAC JOINT and Bilateral GTB RN IV sedation (Bilateral)    Manpreet Dutta MD  03/01/2022            "

## 2022-03-02 ENCOUNTER — PATIENT MESSAGE (OUTPATIENT)
Dept: RESEARCH | Facility: HOSPITAL | Age: 77
End: 2022-03-02
Payer: MEDICARE

## 2022-03-09 ENCOUNTER — PATIENT OUTREACH (OUTPATIENT)
Dept: ADMINISTRATIVE | Facility: OTHER | Age: 77
End: 2022-03-09
Payer: MEDICARE

## 2022-03-09 NOTE — PROGRESS NOTES
Health Maintenance Due   Topic Date Due    Eye Exam  07/21/2021    Foot Exam  07/22/2021     Updates were requested from care everywhere.  Chart was reviewed for overdue Proactive [facility] Encounters ([facility]ATHERINE) topics (CRS, Breast Cancer Screening, Eye exam)  Health Maintenance has been updated.  [place] immunization registry triggered.  Immunizations were reconciled.

## 2022-03-10 ENCOUNTER — OFFICE VISIT (OUTPATIENT)
Dept: ORTHOPEDICS | Facility: CLINIC | Age: 77
End: 2022-03-10
Payer: MEDICARE

## 2022-03-10 VITALS — HEIGHT: 70 IN | WEIGHT: 223 LBS | BODY MASS INDEX: 31.92 KG/M2

## 2022-03-10 DIAGNOSIS — Z98.890 S/P RIGHT KNEE ARTHROSCOPY: ICD-10-CM

## 2022-03-10 DIAGNOSIS — S83.241S ACUTE MEDIAL MENISCUS TEAR OF RIGHT KNEE, SEQUELA: ICD-10-CM

## 2022-03-10 DIAGNOSIS — M70.62 GREATER TROCHANTERIC BURSITIS OF BOTH HIPS: ICD-10-CM

## 2022-03-10 DIAGNOSIS — M94.261 CHONDROMALACIA, RIGHT KNEE: ICD-10-CM

## 2022-03-10 DIAGNOSIS — M65.311 TRIGGER THUMB OF RIGHT HAND: Primary | ICD-10-CM

## 2022-03-10 DIAGNOSIS — M19.012 OSTEOARTHRITIS OF LEFT SHOULDER, UNSPECIFIED OSTEOARTHRITIS TYPE: ICD-10-CM

## 2022-03-10 DIAGNOSIS — S83.281S ACUTE LATERAL MENISCUS TEAR OF RIGHT KNEE, SEQUELA: ICD-10-CM

## 2022-03-10 DIAGNOSIS — M70.61 GREATER TROCHANTERIC BURSITIS OF BOTH HIPS: ICD-10-CM

## 2022-03-10 DIAGNOSIS — M75.32 CALCIFIC TENDINITIS OF LEFT SHOULDER: ICD-10-CM

## 2022-03-10 PROCEDURE — 99214 OFFICE O/P EST MOD 30 MIN: CPT | Mod: 25,S$PBB,, | Performed by: ORTHOPAEDIC SURGERY

## 2022-03-10 PROCEDURE — 99999 PR PBB SHADOW E&M-EST. PATIENT-LVL IV: CPT | Mod: PBBFAC,,, | Performed by: ORTHOPAEDIC SURGERY

## 2022-03-10 PROCEDURE — 20550 TENDON SHEATH: ICD-10-PCS | Mod: S$PBB,RT,, | Performed by: ORTHOPAEDIC SURGERY

## 2022-03-10 PROCEDURE — 99214 PR OFFICE/OUTPT VISIT, EST, LEVL IV, 30-39 MIN: ICD-10-PCS | Mod: 25,S$PBB,, | Performed by: ORTHOPAEDIC SURGERY

## 2022-03-10 PROCEDURE — 99214 OFFICE O/P EST MOD 30 MIN: CPT | Mod: PBBFAC | Performed by: ORTHOPAEDIC SURGERY

## 2022-03-10 PROCEDURE — 99999 PR PBB SHADOW E&M-EST. PATIENT-LVL IV: ICD-10-PCS | Mod: PBBFAC,,, | Performed by: ORTHOPAEDIC SURGERY

## 2022-03-10 PROCEDURE — 20550 NJX 1 TENDON SHEATH/LIGAMENT: CPT | Mod: PBBFAC,RT | Performed by: ORTHOPAEDIC SURGERY

## 2022-03-10 RX ADMIN — TRIAMCINOLONE ACETONIDE 20 MG: 200 INJECTION, SUSPENSION INTRA-ARTICULAR; INTRAMUSCULAR at 10:03

## 2022-03-10 NOTE — PATIENT INSTRUCTIONS
Right trigger thumb.  Injection performed over the volar aspect at the metacarpophalangeal joint of the tendon sheath with 20 mg of Kenalog mixed with 1 cc 1% lidocaine 03/10/2022  Ice the finger and move it so wound it stuck.  The injection might give you numbness into the thumb and that should go away within several hours  I cannot give you injections today to the shoulders because you already received on 03/01/2022 total of 180 mg of methylprednisolone by Dr. Dutta where he injected both her sacroiliac joints and greater trochanteric areas of both hips  Watch what she eat and avoid carbohydrates because steroid it will increase her sugar level as well increase her heart rate blood pressure  You already received injections into both of her knees of stem cell by Dr. Haas twice on the right knee once on the left knee with some limited relief  Your x-rays reviewed in showing the right almost complete loss of lateral joint space consistent with arthritis.  We did go over your arthroscopic surgery findings of arthritis throughout the knee and meniscal tears  In the future we could use rooster comb gel injections/hyaluronic acid injections into the knee as well as long-acting steroid in the name of Yamil Richardson in 8 weeks

## 2022-03-10 NOTE — PROGRESS NOTES
Subjective:     Patient ID: Kaylin Mccollum is a 77 y.o. female.    Chief Complaint: Pain of the Left Shoulder    HPI:  75-year-old retired from  who had been falling down stairs numerous occasions she has been having pain and catching and locking since several months now.  She gets up the knee catches she has to wiggle it some how to unlock it before she can walk. She does have quite a bit of lumbar pain that radiates with burning sensation down to the right anterior tibia and dorsal of the foot.  She is under the care of pain management for that.  Pain in the knee is around 4/10 with catching locking feeling.  Able to ambulate once the catching locking goes away for her minimum 200-300 yd without discomfort.  Unable to squat unable to do stairs due to the catching locking feeling.  She does ambulate without any assistive devices.  She does have an MRI in the electronic records.  At 1 point she had falling down the stairs and sustained right shoulder rotator cuff tear requiring repair.  Denies any numbness tingling in the hands.  She does have some cervical lumbar pain    05/21/2020  Status post her right knee arthroscopy 03/10/2020.  Findings of complex medial and lateral meniscus tears as well chondromalacia type 3 anterior and medial.  She was doing great postop for 5 weeks another side in twisted her knee and starting some pain.  Any twisting maneuver she hurts quite a bit.  Pain is 4/10.  She did her independent exercise program.  Denies any fever or chills or shortness of breath or difficulty chewing or swallowing.  Complains of some stiffness and swelling    06/25/2020  Right knee arthroscopy 03/10/2020 with complex medial and lateral meniscus tears as well as chondromalacia type 3 anterior medial compartment.  The other day could not get up from kneeling on the floor.  Her pain now is coming back as 6/10 with swelling and tightness in the knee.  Last visit given a steroid injection which helped for  a little bit but not too much.  She does take Celebrex 100 mg twice a day and a helps very little.  Last visit discussed injecting Synvisc-One into her knee and she wants to proceed.  I did tell her that Synvisc-One might take 6-8 weeks to see any effect.  In my not work and we had to resort to other treatments down the road.  Still feeling occasional catching.  Denies any fever chills shortness of breath difficulty chewing swallowing loss of bowel bladder control or loss of taste and smell    08/20/2020  Chondromalacia of the right knee anterior and medial compartment and status post partial medial lateral meniscectomy.  Synvisc-One given 06/25/2020 seems to have helped but still having quite excessive pain right now since her daughter was diagnosed with PE and had to run around with her.  But overall she feels Synvisc-One seems to have helped.  She is requesting a steroid injection today.  She does take Celebrex at night.  She is to have back injections she is holding off at this point.  Pain is 4/10.  Denies any fever or chills or shortness of breath or difficulty chewing or swallowing loss of bowel bladder control    11/19/2020  Right knee arthroscopy 03/10/2020 with medial and lateral partial meniscectomies and finding of chondromalacia type 3 of the anterior medial compartment.  She received Synvisc-One on 06/25/2020 with good relief.  She thought she is getting her Synvisc-One today and I refreshed her memory that it should be 6 months so she is not due until December 25th.  We need to get that approved.  She complains on occasional thigh pain occasional mid tibia pain.  She does have history of fibromyalgia and used to get hip injections by Dr. izquierdo last of which 10/08/2020 bilateral SI and bilateral greater trochanteric areas.  She does complain of both hips hurting over the greater trochanters.  Celebrex seems to help as well as the brace on her knee.  She asked she is going for dental work if she can  take ibuprofen I did tell her not with Celebrex she may take Tylenol.  Denies any fever or chills or shortness of breath or difficulty with chewing or swallowing loss of bowel bladder control or blurry vision double vision or loss of sense smell or taste    12/28/2020   New problem left shoulder pain  Right knee pain  Lately each patient is taking Celebrex 200 mg twice a day since she has been taking care of her daughter back in 4 to the hospital and doing a lot of walking.  She is running out of the Celebrex.  I did warn her about the side effects.  Her pain is 4/10.  Unable to sleep on the shoulder.  Lifting things seems to be very painful.  Previous right shoulder rotator cuff surgery.  She is having also worsening lower back and hip area and sacroiliac pain she sees pain management for that.    Denies any fever or chills or shortness of breath or difficulty with chewing or swallowing loss of bowel bladder control blurry vision double vision loss of sense of smell or taste    02/22/2021  Last visit patient received injection to the left shoulder subacromial space on 12/28/2020 as well into the right knee with grade 3 is a loose sugar for pain.  Her pain went down 2/10 in both the shoulder and the knee.  But she still feels some weakness in the shoulder and certain motions seems to be very weak and painful.  Previous history of injury to that area.  She did see pain management Dr. Burns who give her injections in her hips and that is doing really well.  Concerned about having rotator cuff injury to the left shoulder because certain motions and activities of daily living are limited .  Her hips are doing well her knee is doing well  Denies any fever or chills or shortness of breath or difficulty with chewing or swallowing or loss of bowel bladder control or blurry vision or double vision or loss sense smell or taste or numbness or tingling in her hands.  She does have some occasional neck pain    03/01/2021  Left  shoulder tenderness.  Pain today 0/10.  She is taking Celebrex and received a steroid injection 12/28/2020.  She is here for MRI results.  We spent time going over the report with her and copy of it given.  We discussed small slap lesion, mild arthritic changes and small loose body and calcific tendinitis.  As far as the knee is concerned she is doing okay at this point in time  Denies any fever or chills or shortness of breath or difficulty with chewing or swallowing loss of bowel bladder control or loss of sense smell or taste    03/10/2022  Bilateral shoulder pain left and right, right thumb locking and radiation down to the right upper extremity, right knee arthritis  Patient since last seen had stem cell injections x2 into the right knee and x1 into the left knee by Dr. Diamond Chaparro.  She is not too sure if they really helped her.  On 3/1/22 patient received bilateral sacroiliac joint injections and bilateral greater trochanteric injections by Dr. Dutta.  Each injection containing 40 mg of Depo-Medrol for a total of 160 mg. I did tell her it is too soon to receive any injections in the shoulder because too much steroid will increase her heart rate and messes up her sugars which could cause problems over the next few days.  She expressed understanding.  We did go over MRI performed on the left shoulder a year or so ago.  As well as we went over her knee x-rays and operative report from arthroscopic surgery  Past Medical History:   Diagnosis Date    Arthritis     Cataract     Diabetes mellitus     Diabetes mellitus, type 2     Fibromyalgia     General anesthetics causing adverse effect in therapeutic use     bradycardia     Hypertension     Migraines     Mitral valve prolapse     Osteoarthritis     Rotator cuff tear 4/1/2015     Past Surgical History:   Procedure Laterality Date    ARTHROSCOPY OF KNEE Right 3/10/2020    Procedure: ARTHROSCOPY, KNEE;  Surgeon: Les Schneider MD;  Location:  [facility] OR;  Service: Orthopedics;  Laterality: Right;    CATARACT EXTRACTION W/  INTRAOCULAR LENS IMPLANT Left 07/19/2017    CATARACT EXTRACTION W/  INTRAOCULAR LENS IMPLANT Right 2017    CHOLECYSTECTOMY      CHONDROPLASTY OF KNEE Right 3/10/2020    Procedure: CHONDROPLASTY, KNEE;  Surgeon: Les Schneider MD;  Location: [facility] OR;  Service: Orthopedics;  Laterality: Right;  Anterior compartment     COLONOSCOPY N/A 9/25/2018    Procedure: COLONOSCOPY;  Surgeon: Bethel Carmona MD;  Location: [facility];  Service: Endoscopy;  Laterality: N/A;    EXCISION OF MEDIAL MENISCUS OF KNEE Right 3/10/2020    Procedure: MENISCECTOMY, KNEE, MEDIAL;  Surgeon: Les Schneider MD;  Location: [facility] OR;  Service: Orthopedics;  Laterality: Right;  Partial, Medial , Lateral     EYE SURGERY      INJECTION OF ANESTHETIC AGENT AROUND NERVE Right 8/8/2019    Procedure: Right Genicular nerve block with local;  Surgeon: Manpreet Dutta MD;  Location: [facility];  Service: Pain Management;  Laterality: Right;    INJECTION OF ANESTHETIC AGENT INTO SACROILIAC JOINT Bilateral 5/6/2020    Procedure: Bilateral Sacroiliac Joint Injection;  Surgeon: Manpreet Dutta MD;  Location: [facility];  Service: Pain Management;  Laterality: Bilateral;    INJECTION OF ANESTHETIC AGENT INTO SACROILIAC JOINT Bilateral 10/8/2020    Procedure: Bilateral SI and Bilateral GTB with RN IV sedation;  Surgeon: Manpreet Dutta MD;  Location: [facility];  Service: Pain Management;  Laterality: Bilateral;    INJECTION OF ANESTHETIC AGENT INTO SACROILIAC JOINT Bilateral 1/5/2021    Procedure: Bilateral BLOCK, SACROILIAC JOINT and Bilateral GTB witn RN IV sedation;  Surgeon: Daniel Burns MD;  Location: [facility];  Service: Pain Management;  Laterality: Bilateral;    INJECTION OF ANESTHETIC AGENT INTO SACROILIAC JOINT Bilateral 7/8/2021    Procedure: Bilateral BLOCK, SACROILIAC JOINT bilateral GTB RN IV sedation;  Surgeon: Manpreet Dutta MD;   Location: [facility];  Service: Pain Management;  Laterality: Bilateral;    INJECTION OF ANESTHETIC AGENT INTO SACROILIAC JOINT Bilateral 3/1/2022    Procedure: Bilateral BLOCK, SACROILIAC JOINT and Bilateral GTB RN IV sedation;  Surgeon: Manpreet Dutta MD;  Location: [facility];  Service: Pain Management;  Laterality: Bilateral;    INJECTION OF JOINT Bilateral 2019    Procedure: Bilateral GT bursa injection;  Surgeon: Manpreet Dutta MD;  Location: [facility];  Service: Pain Management;  Laterality: Bilateral;    INJECTION OF JOINT Bilateral 2020    Procedure: Bilateral GT bursa injection;  Surgeon: Manpreet Dutta MD;  Location: [facility];  Service: Pain Management;  Laterality: Bilateral;    KNEE ARTHROSCOPY Bilateral     PARS PLANA VITRECTOMY W/ REPAIR OF MACULAR HOLE Left 2017    RADIOFREQUENCY THERMOCOAGULATION Left 2019    Procedure: Right SIJ RFA;  Surgeon: Manpreet Dutta MD;  Location: [facility];  Service: Pain Management;  Laterality: Left;    RADIOFREQUENCY THERMOCOAGULATION Right 2019    Procedure: Right SIJ RFA;  Surgeon: Manpreet Dutta MD;  Location: [facility];  Service: Pain Management;  Laterality: Right;    SHOULDER ARTHROSCOPY Right 06/04/15     Family History   Problem Relation Age of Onset    Heart disease Mother         A-Fib    Glaucoma Mother     Cataracts Mother     Cancer Mother         colon    Arthritis Mother         : 17    Depression Mother     Kidney disease Daughter         ESRD    Anesthesia problems Daughter         cardiac arrest during nephrectomy    Birth defects Daughter         Renal    Depression Daughter     Learning disabilities Daughter         Dyslexia, ADD    Diabetes Maternal Grandmother     Heart disease Maternal Grandmother         Congestive heart failure    Alcohol abuse Maternal Grandmother         Death: 84    Depression Maternal Grandmother     Hypertension Maternal Grandmother     Diabetes  Maternal Grandfather     Cancer Maternal Grandfather     Alcohol abuse Maternal Grandfather          1964    Breast cancer Paternal Aunt     Depression Brother     Depression Son     Learning disabilities Son         ADD & ADHD    Diabetes Maternal Aunt     Diabetes Maternal Uncle         . 19     Social History     Socioeconomic History    Marital status:    Occupational History     Employer: [employer]   Tobacco Use    Smoking status: Never Smoker    Smokeless tobacco: Never Used    Tobacco comment: Never smoked   Substance and Sexual Activity    Alcohol use: Not Currently     Alcohol/week: 0.0 standard drinks     Comment: Couple times per year    Drug use: No    Sexual activity: Not Currently   Social History Narrative    . 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     Medication List with Changes/Refills   Current Medications    BIOTIN 1 MG TABLET    Take 1,000 mcg by mouth every morning.     CELECOXIB (CELEBREX) 100 MG CAPSULE    TAKE 1 CAPSULE BY MOUTH TWICE DAILY    DICLOFENAC SODIUM (VOLTAREN) 1 % GEL    Apply 4 g topically 3 (three) times daily as needed (PAIN).    EZETIMIBE (ZETIA) 10 MG TABLET    Take 1 tablet (10 mg total) by mouth once daily.    FLUOXETINE 40 MG CAPSULE    TAKE 1 CAPSULE(40 MG) BY MOUTH EVERY DAY    FLUTICASONE (FLONASE) 50 MCG/ACTUATION NASAL SPRAY    2 sprays by Each Nare route once daily.    FUROSEMIDE (LASIX) 20 MG TABLET    Take 1 tablet (20 mg total) by mouth every Mon, Wed, Fri.    GABAPENTIN (NEURONTIN) 300 MG CAPSULE    Take 1 capsule (300 mg total) by mouth 3 (three) times daily.    HYDROCODONE-ACETAMINOPHEN (NORCO)  MG PER TABLET    Take 1 tablet by mouth every 6 (six) hours as needed. HYDROCODONE-ACETAMINOPHEN (Norco)  MG ORAL TAB - 1 tab po q 6 hrs prn pain    INVOKANA 100 MG TAB TABLET    TAKE 1 TABLET(100 MG) BY MOUTH EVERY DAY    LORAZEPAM (ATIVAN) 1 MG TABLET    TK 3 TS PO 1 HOUR PRIOR TO APPOINTMENT     "LOSARTAN (COZAAR) 25 MG TABLET    Take 1 tablet (25 mg total) by mouth once daily.    METFORMIN (GLUCOPHAGE) 1000 MG TABLET    Take 1 tablet (1,000 mg total) by mouth 2 (two) times daily with meals.    METHYLPREDNISOLONE (MEDROL DOSEPACK) 4 MG TABLET    use as directed    OMEPRAZOLE (PRILOSEC) 10 MG CAPSULE    Take 1 capsule (10 mg total) by mouth daily as needed (w/ NSAID).    POTASSIUM CHLORIDE SA (K-DUR,KLOR-CON) 10 MEQ TABLET    Take 1 tablet (10 mEq total) by mouth once daily.    PULSE OXIMETER (PULSE OXIMETER) DEVICE    by Apply Externally route 2 (two) times a day. Use twice daily at 8 AM and 3 PM and record the value in MyChart as directed.    TRIAMCINOLONE ACETONIDE 0.025% (KENALOG) 0.025 % OINT    Apply topically 2 (two) times daily. for 10 days    VERAPAMIL (CALAN) 120 MG TABLET    Take 1 tablet (120 mg total) by mouth 3 (three) times daily.     Review of patient's allergies indicates:   Allergen Reactions    Demerol [meperidine] Other (See Comments)     Burning when adm IV  Able to tolerate IM, "Turned the veins in my hand purple."    Latex, natural rubber Other (See Comments)     "Burns my skin",  Symptoms get worse the longer she is exposed    Zocor [simvastatin] Other (See Comments)     Tightening of muscles    Statins-hmg-coa reductase inhibitors Other (See Comments)     myopathy    Sulfa (sulfonamide antibiotics)      Blurred vision     Review of Systems   Constitutional: Negative for decreased appetite.   HENT: Negative for tinnitus.    Eyes: Negative for double vision.   Cardiovascular: Negative for chest pain.   Respiratory: Negative for wheezing.    Hematologic/Lymphatic: Negative for bleeding problem.   Skin: Negative for dry skin.   Musculoskeletal: Positive for arthritis, back pain, joint pain, neck pain and stiffness. Negative for gout.   Gastrointestinal: Negative for abdominal pain.   Genitourinary: Negative for bladder incontinence.   Neurological: Negative for numbness, paresthesias " and sensory change.   Psychiatric/Behavioral: Negative for altered mental status.       Objective:   Body mass index is 32 kg/m².  There were no vitals filed for this visit.       General    Constitutional: She is oriented to person, place, and time. She appears well-developed.   HENT:   Head: Atraumatic.   Eyes: EOM are normal.   Cardiovascular: Normal rate.    Pulmonary/Chest: Effort normal.   Abdominal: Soft.   Neurological: She is alert and oriented to person, place, and time.   Psychiatric: Judgment normal.            Cervical spine with slight limitation of rotation with clicking in the neck. Some mild discomfort to extreme rotation.  Bilateral upper extremity neurovascularly intact. Radial ulnar pulses 2+.  Sensory intact to touch.  Motor strength is 5/5 throughout.  Right thumb with triggering and pain over the volar aspect at the metacarpophalangeal joint.  Left shoulder flexion 140 abduction 120 with mild positive impingement sign.  Pain in the infraspinatus fossa posteriorly to palpation very mild anteriorly and laterally slight weakness to resistive abduction.  Tenderness over the coracoid process anteriorly.  Right shoulder flexion 160 abduction 120 with mild positive impingement sign.  There is some tenderness over the lateral deltoid to palpation.  External rotation and internal rotation is within normal limits  A lumbar with tenderness around L4-5 paraspinal and mostly to words the right side.  Pelvis is level  Straight leg raising slightly positive on the left negative on the right  Passive hip motion within normal limits.  No pain in the groin .  Mild pain over the greater trochanters to touch bilaterally.  Hip flexors, abductors, adductors, quads, hamstrings, ankle extensors and flexors all 5/5 and even bilaterally.  And left knee full motion collaterals and cruciate stable negative Kaleigh's negative pain to palpation.  Surgical scar from knee scope done years ago.  Right knee with mild to  moderate swelling.  Surgical scars healed well.  Crepitus with motion anteriorly.  Painful to any twisting motion.  Collaterals and cruciates are stable to valgus varus stressing as well as anterior posterior drawer. Range of motion 0-120 degrees.  Calves are soft nontender.  Multiple varicositiesNo pitting edema  EHL 5/5 plantar flexion 5/5.  DP 1+ PT 1+  Skin is warm to touch no obvious lesions    Relevant imaging results reviewed and interpreted by me, discussed with the patient and / or family today     X-ray 02/22/2021 left shoulder with type 2 acromion and very mild degenerative changes in the glenohumeral joint.  X-ray 02/22/2021 knees bilaterally showing left knee patellofemoral osteophyte however joint space very well maintained, right knee with patellofemoral osteophytes and lateral joint almost complete loss of space with small osteophytic changes  X-ray from 2019 bilateral knees with mild medial joint narrowing and small osteophytic changes  MRI reviewed with the patient showed her the pictures as well as reviewed the report consistent with medial and lateral meniscus stairs, patellofemoral chondromalacia as well as medially    MRI left shoulder in the system showing loose body in the inferior recess, acromioclavicular joint arthritis and calcific tendinitis  Assessment:     Encounter Diagnoses   Name Primary?    Calcific tendinitis of left shoulder     Osteoarthritis of left shoulder, unspecified osteoarthritis type     Chondromalacia, right knee     S/P right knee arthroscopy     Acute medial meniscus tear of right knee, sequela     Acute lateral meniscus tear of right knee, sequela     Greater trochanteric bursitis of both hips     Trigger thumb of right hand Yes        Plan:   Trigger thumb of right hand  -     Tendon Sheath    Calcific tendinitis of left shoulder    Osteoarthritis of left shoulder, unspecified osteoarthritis type    Chondromalacia, right knee    S/P right knee  arthroscopy    Acute medial meniscus tear of right knee, sequela    Acute lateral meniscus tear of right knee, sequela    Greater trochanteric bursitis of both hips         Patient Instructions   Right trigger thumb.  Injection performed over the volar aspect at the metacarpophalangeal joint of the tendon sheath with 20 mg of Kenalog mixed with 1 cc 1% lidocaine 03/10/2022  Ice the finger and move it so wound it stuck.  The injection might give you numbness into the thumb and that should go away within several hours  I cannot give you injections today to the shoulders because you already received on 03/01/2022 total of 180 mg of methylprednisolone by Dr. Dutta where he injected both her sacroiliac joints and greater trochanteric areas of both hips  Watch what she eat and avoid carbohydrates because steroid it will increase her sugar level as well increase her heart rate blood pressure  You already received injections into both of her knees of stem cell by Dr. Haas twice on the right knee once on the left knee with some limited relief  Your x-rays reviewed in showing the right almost complete loss of lateral joint space consistent with arthritis.  We did go over your arthroscopic surgery findings of arthritis throughout the knee and meniscal tears  In the future we could use rooster comb gel injections/hyaluronic acid injections into the knee as well as long-acting steroid in the name of Yamil    Return in 8 weeks    All questions asked and answered   history of injection of Synvisc-One 06/25/2020, steroid injection 08/20/2020, and takes Celebrex,  Kellgren Jaswinder scale 2.  Disclaimer: This note was prepared using a voice recognition system and is likely to have sound alike errors within the text.

## 2022-03-11 ENCOUNTER — PATIENT MESSAGE (OUTPATIENT)
Dept: ORTHOPEDICS | Facility: CLINIC | Age: 77
End: 2022-03-11
Payer: MEDICARE

## 2022-03-11 DIAGNOSIS — M79.7 FIBROMYALGIA: ICD-10-CM

## 2022-03-11 RX ORDER — GABAPENTIN 300 MG/1
300 CAPSULE ORAL 3 TIMES DAILY
Qty: 270 CAPSULE | Refills: 0 | Status: CANCELLED | OUTPATIENT
Start: 2022-03-11

## 2022-03-14 DIAGNOSIS — M77.8 LEFT SHOULDER TENDINITIS: ICD-10-CM

## 2022-03-14 DIAGNOSIS — M94.261 CHONDROMALACIA, RIGHT KNEE: ICD-10-CM

## 2022-03-14 DIAGNOSIS — M79.7 FIBROMYALGIA: ICD-10-CM

## 2022-03-14 RX ORDER — CELECOXIB 200 MG/1
CAPSULE ORAL
Qty: 120 CAPSULE | Refills: 1 | Status: SHIPPED | OUTPATIENT
Start: 2022-03-14 | End: 2022-11-01 | Stop reason: SDUPTHER

## 2022-03-14 RX ORDER — TRIAMCINOLONE ACETONIDE 40 MG/ML
20 INJECTION, SUSPENSION INTRA-ARTICULAR; INTRAMUSCULAR
Status: DISCONTINUED | OUTPATIENT
Start: 2022-03-10 | End: 2022-03-14 | Stop reason: HOSPADM

## 2022-03-14 RX ORDER — GABAPENTIN 300 MG/1
300 CAPSULE ORAL 3 TIMES DAILY
Qty: 270 CAPSULE | Refills: 0 | Status: SHIPPED | OUTPATIENT
Start: 2022-03-14 | End: 2022-08-02

## 2022-03-17 NOTE — TELEPHONE ENCOUNTER
Contacted pharmacy notified medication was picked up and cannot be refilled not an issue with our RX. Pt notified to go to pharmacy and speak with manage QUEI. All questions answered.

## 2022-04-01 DIAGNOSIS — I73.9 PVD (PERIPHERAL VASCULAR DISEASE): Primary | ICD-10-CM

## 2022-04-06 ENCOUNTER — TELEPHONE (OUTPATIENT)
Dept: PAIN MEDICINE | Facility: CLINIC | Age: 77
End: 2022-04-06
Payer: MEDICARE

## 2022-04-07 ENCOUNTER — TELEPHONE (OUTPATIENT)
Dept: PAIN MEDICINE | Facility: CLINIC | Age: 77
End: 2022-04-07

## 2022-04-07 ENCOUNTER — OFFICE VISIT (OUTPATIENT)
Dept: PAIN MEDICINE | Facility: CLINIC | Age: 77
End: 2022-04-07
Payer: MEDICARE

## 2022-04-07 VITALS
HEART RATE: 82 BPM | RESPIRATION RATE: 17 BRPM | DIASTOLIC BLOOD PRESSURE: 66 MMHG | HEIGHT: 70 IN | WEIGHT: 218.69 LBS | SYSTOLIC BLOOD PRESSURE: 121 MMHG | BODY MASS INDEX: 31.31 KG/M2

## 2022-04-07 DIAGNOSIS — M70.60 GREATER TROCHANTERIC BURSITIS, UNSPECIFIED LATERALITY: ICD-10-CM

## 2022-04-07 DIAGNOSIS — M25.559 HIP PAIN: Primary | ICD-10-CM

## 2022-04-07 DIAGNOSIS — M47.816 LUMBAR SPONDYLOSIS: ICD-10-CM

## 2022-04-07 DIAGNOSIS — M53.3 SACROILIAC JOINT PAIN: ICD-10-CM

## 2022-04-07 PROCEDURE — 99215 OFFICE O/P EST HI 40 MIN: CPT | Mod: PBBFAC | Performed by: ANESTHESIOLOGY

## 2022-04-07 PROCEDURE — 99999 PR PBB SHADOW E&M-EST. PATIENT-LVL V: CPT | Mod: PBBFAC,,, | Performed by: ANESTHESIOLOGY

## 2022-04-07 PROCEDURE — 99999 PR PBB SHADOW E&M-EST. PATIENT-LVL V: ICD-10-PCS | Mod: PBBFAC,,, | Performed by: ANESTHESIOLOGY

## 2022-04-07 PROCEDURE — 99214 OFFICE O/P EST MOD 30 MIN: CPT | Mod: S$PBB,,, | Performed by: ANESTHESIOLOGY

## 2022-04-07 PROCEDURE — 99214 PR OFFICE/OUTPT VISIT, EST, LEVL IV, 30-39 MIN: ICD-10-PCS | Mod: S$PBB,,, | Performed by: ANESTHESIOLOGY

## 2022-04-07 NOTE — PROGRESS NOTES
Chief Pain Complaint:  Back pain  Right knee pain       History of Present Illness:   Kaylin Mccollum is a 77 y.o. female  who is presenting with a chief complaint of Low-back Pain. The patient began experiencing this problem insidiously, and the pain has been gradually worsening over time. The pain is described as throbbing, cramping, aching and heavy and is located in the bilateral buttocks. Pain is intermittent and lasts hours. The pain is nonradiating. The patient rates her pain a 3 out of ten and interferes with activities of daily living a 3 out of ten. Pain is exacerbated by getting up from a seated position, and is improved by rest. Patient reports no prior trauma, no prior spinal surgery     Kaylin Mccollum is a 77 y.o. female  who is presenting with a chief complaint of lumbar back pain. The patient began experiencing this problem insidiously, and the pain has been gradually worsening. The pain is described as throbbing, shooting, burning and electrical and is located in the bilateral lumbar spine. Pain is intermittent and lasts hours. The pain radiates to bilateral lower extremities L4 distribudution. The patient rates her pain a 3 out of ten and interferes with activities of daily living a 3 out of ten. Pain is exacerbated by flexion of the lumbar spine, ambulation, and is improved by rest.     She also complains of right knee pain. The patient began experiencing this problem insidiously. The pain is described as cramping, aching and is located in the right knee. Pain is intermittent and lasts hours. The pain is nonradiating. The patient rates her pain a 8 out of ten and interferes with activities of daily living a 7 out of ten. Pain is exacerbated by getting up from a seated position and standing, and is improved by rest. Patient reports no prior trauma, prior arthroscopy bilaterally in 1990s.      - pertinent negatives: No fever, No chills, No weight loss, No bladder dysfunction, No bowel  dysfunction, No saddle anesthesia  - pertinent positives: none    - medications, other therapies tried (physical therapy, injections):     >> NSAIDs, Tylenol, gabapentin and medrol dose pack    >> Has previously undergone Physical Therapy    >> Has previously undergone spinal injection/s   - Lumbar JAMIN with good relief in the past   - Bilateral SI Joint + GTB injection on 4/10/19 with great relief for about 4 weeks   - left SIJ RFA on 7/11/19 and right SIJ RFA on 7-25-19 with 80% pain relief   - right genicular nerve block on 8/8/19 with 90% pain relief   - bilateral GT bursa injections on 9/5/19 with 90% pain relief   - Bilateral SI and GTB 5/6/2020 with 60% pain relief.     - Bilateral SI and GTB 10/08/2020 with 60% pain relief.     - Left Trapezius TPI in clinic on 12/15/20 with limited pain relief   - 12/28/20 left shoulder injection in clinic with Dr. Schneider with good pain relief    - Bilateral SI + bilateral GTB on 1/5/21 with 95% pain relief   - bilateral SIJ + bilateral GT bursa injection on 7/8/21 with about 65% pain relief    - bilateral SI and GTB on 3/1/22 with 80% relief    Imaging / Labs / Studies (reviewed on 4/7/2022):    1/27/2020 MRI Lumbar Spine Without Contrast  COMPARISON:  11/23/2015  FINDINGS:  Alignment: There is minimal grade 1 anterolisthesis of L4 on L5.  Vertebrae: Multilevel small Schmorl's nodes noted.  Vertebral body heights are otherwise within normal limits.  No evidence of fracture or marrow replacement process.  Discs: There is disc desiccation and mild disc height loss at L1-2, L2-3, and L5-S1.  Disc desiccation noted at L4-5 with preserved disc height.  Cord: Normal.  Conus terminates at L1  Degenerative findings:  T12-L1:  No spinal canal or neuroforaminal stenosis.  L1-L2: Mild broad-based disc bulge.  Mild bilateral facet arthropathy. No spinal canal or neuroforaminal stenosis.  L2-L3: Mild broad-based disc bulge.  Mild bilateral facet arthropathy. No spinal canal or  neuroforaminal stenosis.  L3-L4: Mild broad-based disc bulge.  Mild bilateral facet arthropathy. No spinal canal or neuroforaminal stenosis.  L4-L5: Severe bilateral facet arthropathy with ligamentum flavum thickening.  Mild uncovering of the intervertebral disc.  Mild spinal canal stenosis and mild bilateral neural foraminal narrowing.  L5-S1: Moderate bilateral facet arthropathy and mild broad-based disc bulge.  Mild bilateral neural foraminal narrowing.  No spinal canal stenosis.  No significant change from prior.  Paraspinal muscles & soft tissues: Multiple probable bilateral renal cyst appear grossly similar to prior.  Impression  Multilevel degenerative disc disease and facet arthropathy as detailed above.  Findings remain most severe at L4-5 and L5-S1.     Results for orders placed during the hospital encounter of 11/23/15   MRI Lumbar Spine Without Contrast    Narrative Technique: Standard noncontrast multiplanar multisequence imaging of the lumbar spine was performed.  Findings: There is anatomic spinal alignment.  Very low degree of degenerative disk base narrowing and disk desiccation observed at L1-2, L2-3, L4-5, and L5-S1.  Vertebral marrow signal pattern and architecture are normal.  Distal cord is unremarkable with conus medullaris terminating at L1.  Small nerve root sleeve cysts incidentally noted in the sacral canal.  There are bilateral renal cysts.  L1-2: Small annular bulge and endplate lipping.  Bilateral facet arthropathy.  No significant foraminal narrowing or canal stenosis.  L2-3: Small annular bulge and endplate lipping.  Bilateral facet arthropathy.  No significant foraminal narrowing or canal stenosis.  L3-4: Posterior disk bulge with small bilateral foraminal disk protrusions.  Bilateral facet arthropathy and ligament hypertrophy.  Mild inferior foraminal narrowing.  No canal stenosis.  L4-5: Posterior disk bulge, hypertrophic facet arthropathy, and ligament thickening resulting in  "moderate bilateral foraminal narrowing and moderate central canal stenosis.  AP canal diameter measures 8.4 mm.   L5-S1: Broad based posterior disk bulge and endplate lipping.  Hypertrophic facet arthropathy and ligament thickening.  Moderate bilateral foraminal narrowing.  No significant canal stenosis.    Impression  Multilevel lumbar spondylosis and degenerative disk disease as detailed.     Results for orders placed in visit on 05/31/12   X-Ray Lumbar Spine Ap And Lateral    Narrative Findings: Generalized osteopenia.  Multilevel degenerative vertebral end plate spurring and facet arthropathy.  Moderate to severe disk space narrowing and associated vacuum disk phenomenon at L5-S1.  Suggestion of slight diane-listhesis of L4 on L5.  Intact pedicles.  No compression fracture.         Review of Systems:  CONSTITUTIONAL: patient denies any fever, chills, or weight loss  SKIN: patient denies any rash or itching  RESPIRATORY: patient denies having any shortness of breath  GASTROINTESTINAL: patient denies having any diarrhea, constipation, or bowel incontinence  GENITOURINARY: patient denies having any abnormal bladder function    MUSCULOSKELETAL:  - patient complains of the above noted pain/s (see chief pain complaint)    NEUROLOGICAL:   - pain as above  - strength in Lower extremities is intact, BILATERALLY  - sensation in Lower extremities is intact, BILATERALLY  - patient denies any loss of bowel or bladder control      PSYCHIATRIC: patient denies any change in mood    Other:  All other systems reviewed and are negative      Physical Exam:  Vitals:    04/07/22 1028   BP: 121/66   Pulse: 82   Resp: 17   Weight: 99.2 kg (218 lb 11.1 oz)   Height: 5' 10" (1.778 m)   PainSc:   5    Body mass index is 31.38 kg/m².   (reviewed on 4/7/2022)    General: alert and oriented, in no apparent distress.  Gait: normal gait.  Skin: no rashes, no discoloration, no obvious lesions  HEENT: normocephalic, atraumatic. Pupils equal and " round.  Cardiovascular: no significant peripheral edema present.  Respiratory: without use of accessory muscles of respiration.    Musculoskeletal - Lumbar Spine:  - ROM fairly preserved   - Pain on flexion of lumbar spine: Absent   - Pain on extension of lumbar spine: Absent         - Lumbar facet loading: Absent   - TTP over the lumbar facet joints: Absent  - TTP over the lumbar paraspinals: Present   - TTP over the SI joints: Present  - TTP over GT bursa: Present, minimal   - Straight Leg Raise: Negative  - CHERYLE: Present    Right Knee:  - TTP: Present over medial/ lateral joint line  - Pain with extension: Present  - Pain with flexion: Present  - Crepitus: Present     Neuro - Lower Extremities:  - BLE Strength: R/L: HF: 5/5, HE: 5/5, KF: 5/5; KE: 5/5; FE: 5/5; FF: 5/5  - Extremity Reflexes: Brisk and symmetric throughout  - Sensory: Sensation to light touch intact bilaterally      Psych:  Mood and affect is appropriate        Assessment:  Kaylin Mccollum is a 77 y.o. year old female who is presenting with       ICD-10-CM ICD-9-CM    1. Hip pain  M25.559 719.45 Case Request-RAD/Other Procedure Area: Injection, Joint Right Hip Injection RN IV sedation      IR Aspiration Injection Large Joint W FL   2. Lumbar spondylosis  M47.816 721.3    3. Greater trochanteric bursitis, unspecified laterality  M70.60 726.5    4. Sacroiliac joint pain  M53.3 724.6        Plan:  1. Interventional: Schedule patient for Right Hip Injection.  S/p bilateral SI and GTB on 3/1/22 with 80% relief.   S/p bilateral SIJ + bilateral GT bursa injection on 7/8/21 with about 65% pain relief.    S/p Bilateral SI and GTB on 1/5/21 with 95% pain relief.   S/p Bilateral SI and GTB 10/08/2020 with 60% pain relief.    S/p Bilateral SI and GTB 5/6/2020 with 60% pain relief.    S/p bilateral GT bursa injections on 9/5/19 with 90% pain relief.    2. Pharmacologic:    Continue Celebrex 100mg - takes 200mg QHS PRN - from Rheumatology.    Continue  Gabapentin 300 TID x 90 day supply.     3. Rehabilitative: Encouraged regular exercise. Continue exercises and activities as tolerated. Physical therapy has helped in the past.    4. Diagnostic: None for now.    5. Consult: Continue follow-up with Dr. Schneider for knee and shoulder pain.     6. Follow up: Post injection.

## 2022-04-07 NOTE — TELEPHONE ENCOUNTER
Contacted Pt and scheduled procedure with Dr. Dutta on 4/28/2022, verbally reviewed pre-procedure instructions as well as sent via [employer]. All questions answered at this time.

## 2022-04-08 ENCOUNTER — HOSPITAL ENCOUNTER (OUTPATIENT)
Dept: CARDIOLOGY | Facility: HOSPITAL | Age: 77
Discharge: HOME | End: 2022-04-08
Attending: INTERNAL MEDICINE
Payer: MEDICARE

## 2022-04-08 ENCOUNTER — LAB VISIT (OUTPATIENT)
Dept: PRIMARY CARE CLINIC | Facility: OTHER | Age: 77
End: 2022-04-08
Attending: INTERNAL MEDICINE
Payer: MEDICARE

## 2022-04-08 ENCOUNTER — OFFICE VISIT (OUTPATIENT)
Dept: CARDIOLOGY | Facility: CLINIC | Age: 77
End: 2022-04-08
Payer: MEDICARE

## 2022-04-08 VITALS
DIASTOLIC BLOOD PRESSURE: 64 MMHG | OXYGEN SATURATION: 97 % | BODY MASS INDEX: 31.41 KG/M2 | HEART RATE: 80 BPM | WEIGHT: 218.94 LBS | SYSTOLIC BLOOD PRESSURE: 120 MMHG

## 2022-04-08 DIAGNOSIS — E11.69 HYPERLIPIDEMIA ASSOCIATED WITH TYPE 2 DIABETES MELLITUS: ICD-10-CM

## 2022-04-08 DIAGNOSIS — E11.59 HYPERTENSION ASSOCIATED WITH DIABETES: Primary | Chronic | ICD-10-CM

## 2022-04-08 DIAGNOSIS — I34.1 MVP (MITRAL VALVE PROLAPSE): ICD-10-CM

## 2022-04-08 DIAGNOSIS — R07.89 OTHER CHEST PAIN: ICD-10-CM

## 2022-04-08 DIAGNOSIS — I73.9 PVD (PERIPHERAL VASCULAR DISEASE): ICD-10-CM

## 2022-04-08 DIAGNOSIS — I15.2 HYPERTENSION ASSOCIATED WITH DIABETES: Primary | Chronic | ICD-10-CM

## 2022-04-08 DIAGNOSIS — Z78.9 STATIN INTOLERANCE: ICD-10-CM

## 2022-04-08 DIAGNOSIS — E78.5 HYPERLIPIDEMIA ASSOCIATED WITH TYPE 2 DIABETES MELLITUS: ICD-10-CM

## 2022-04-08 DIAGNOSIS — Z20.822 ENCOUNTER FOR LABORATORY TESTING FOR COVID-19 VIRUS: ICD-10-CM

## 2022-04-08 LAB
SARS-COV-2 RNA RESP QL NAA+PROBE: NOT DETECTED
SARS-COV-2- CYCLE NUMBER: NORMAL

## 2022-04-08 PROCEDURE — 99214 OFFICE O/P EST MOD 30 MIN: CPT | Mod: PBBFAC | Performed by: INTERNAL MEDICINE

## 2022-04-08 PROCEDURE — 99214 OFFICE O/P EST MOD 30 MIN: CPT | Mod: S$PBB,,, | Performed by: INTERNAL MEDICINE

## 2022-04-08 PROCEDURE — 93010 EKG 12-LEAD: ICD-10-PCS | Mod: ,,, | Performed by: INTERNAL MEDICINE

## 2022-04-08 PROCEDURE — 99214 PR OFFICE/OUTPT VISIT, EST, LEVL IV, 30-39 MIN: ICD-10-PCS | Mod: S$PBB,,, | Performed by: INTERNAL MEDICINE

## 2022-04-08 PROCEDURE — 99999 PR PBB SHADOW E&M-EST. PATIENT-LVL IV: CPT | Mod: PBBFAC,,, | Performed by: INTERNAL MEDICINE

## 2022-04-08 PROCEDURE — 93010 ELECTROCARDIOGRAM REPORT: CPT | Mod: ,,, | Performed by: INTERNAL MEDICINE

## 2022-04-08 PROCEDURE — 93005 ELECTROCARDIOGRAM TRACING: CPT

## 2022-04-08 PROCEDURE — U0003 INFECTIOUS AGENT DETECTION BY NUCLEIC ACID (DNA OR RNA); SEVERE ACUTE RESPIRATORY SYNDROME CORONAVIRUS 2 (SARS-COV-2) (CORONAVIRUS DISEASE [COVID-19]), AMPLIFIED PROBE TECHNIQUE, MAKING USE OF HIGH THROUGHPUT TECHNOLOGIES AS DESCRIBED BY CMS-2020-01-R: HCPCS | Performed by: INTERNAL MEDICINE

## 2022-04-08 PROCEDURE — 99999 PR PBB SHADOW E&M-EST. PATIENT-LVL IV: ICD-10-PCS | Mod: PBBFAC,,, | Performed by: INTERNAL MEDICINE

## 2022-04-08 NOTE — PROGRESS NOTES
Subjective:   Patient ID:  Kaylin Mccollum is a 77 y.o. female who presents for follow up of Dizziness and Breathing Problem      78 yo female, 6 months f/u  PMH h/o MVP, HTN DM 20 yrs. H/o PVCs. Fibromyalgia H/o COVID 19 in  quarantine at home. (sinus headache)  On verapamil since she was 54.   Palpitation and dizziness controlled by Verapamil  Occasional chest pain, with sharp pain.   C/o dry mouth  Chronic fatigue from fibra myalgia. Some shoulder pain from the fall  Mother had afib. Father healthy. Young brother had heart procedure at age of 6.  Not on regular exercise. No smoking/drinking  ekg today NSR and poor R progression on repcoridal leads    05/2021 visit   ECHO mild MR and normal EF. Stress test no ischemia; ZIOPATCH showed rare AT longest 13 beats.  Still dry mouth and occasional pinching chest pain  Headache chronic due to migraine  Chronic fatigue, mild exercise endurance  BP and LDL controlled. A1C 6.2 at OSH in      visit  PVD swelling of the leg controlled by lasix.   BP controlled  Still fatigue and weakness   ekg NSR. A1C 7.6 BP controled    Interval history  C/o chest tightness when walked from the parking to the office today. Resolved after rest.  Walking and shopping could cause the muscle pain and fatigue. '  Chronic lower back injection for the pain. occasionally faint and clammy  No palpitation and leg swelling         Past Medical History:   Diagnosis Date    Arthritis     Cataract     Diabetes mellitus     Diabetes mellitus, type 2     Fibromyalgia     General anesthetics causing adverse effect in therapeutic use     bradycardia     Hypertension     Migraines     Mitral valve prolapse     Osteoarthritis     Rotator cuff tear 4/1/2015       Past Surgical History:   Procedure Laterality Date    ARTHROSCOPY OF KNEE Right 3/10/2020    Procedure: ARTHROSCOPY, KNEE;  Surgeon: Les Schneider MD;  Location: [facility] OR;  Service: Orthopedics;   Laterality: Right;    CATARACT EXTRACTION W/  INTRAOCULAR LENS IMPLANT Left 07/19/2017    CATARACT EXTRACTION W/  INTRAOCULAR LENS IMPLANT Right 2017    CHOLECYSTECTOMY      CHONDROPLASTY OF KNEE Right 3/10/2020    Procedure: CHONDROPLASTY, KNEE;  Surgeon: Les Schneider MD;  Location: [facility] OR;  Service: Orthopedics;  Laterality: Right;  Anterior compartment     COLONOSCOPY N/A 9/25/2018    Procedure: COLONOSCOPY;  Surgeon: Bethel Carmona MD;  Location: [facility] ENDO;  Service: Endoscopy;  Laterality: N/A;    EXCISION OF MEDIAL MENISCUS OF KNEE Right 3/10/2020    Procedure: MENISCECTOMY, KNEE, MEDIAL;  Surgeon: Les Schneider MD;  Location: [facility] OR;  Service: Orthopedics;  Laterality: Right;  Partial, Medial , Lateral     EYE SURGERY      INJECTION OF ANESTHETIC AGENT AROUND NERVE Right 8/8/2019    Procedure: Right Genicular nerve block with local;  Surgeon: Manpreet Dutta MD;  Location: [facility] PAIN MGT;  Service: Pain Management;  Laterality: Right;    INJECTION OF ANESTHETIC AGENT INTO SACROILIAC JOINT Bilateral 5/6/2020    Procedure: Bilateral Sacroiliac Joint Injection;  Surgeon: Manpreet Dutta MD;  Location: [facility] PAIN MGT;  Service: Pain Management;  Laterality: Bilateral;    INJECTION OF ANESTHETIC AGENT INTO SACROILIAC JOINT Bilateral 10/8/2020    Procedure: Bilateral SI and Bilateral GTB with RN IV sedation;  Surgeon: Manpreet Dutta MD;  Location: [facility] PAIN MGT;  Service: Pain Management;  Laterality: Bilateral;    INJECTION OF ANESTHETIC AGENT INTO SACROILIAC JOINT Bilateral 1/5/2021    Procedure: Bilateral BLOCK, SACROILIAC JOINT and Bilateral GTB witn RN IV sedation;  Surgeon: Daniel Burns MD;  Location: [facility] PAIN MGT;  Service: Pain Management;  Laterality: Bilateral;    INJECTION OF ANESTHETIC AGENT INTO SACROILIAC JOINT Bilateral 7/8/2021    Procedure: Bilateral BLOCK, SACROILIAC JOINT bilateral GTB RN IV sedation;  Surgeon: Manpreet Dutta MD;  Location: [facility] PAIN MGT;  Service: Pain  Management;  Laterality: Bilateral;    INJECTION OF ANESTHETIC AGENT INTO SACROILIAC JOINT Bilateral 3/1/2022    Procedure: Bilateral BLOCK, SACROILIAC JOINT and Bilateral GTB RN IV sedation;  Surgeon: Manpreet Dutta MD;  Location: [facility] PAIN MGT;  Service: Pain Management;  Laterality: Bilateral;    INJECTION OF JOINT Bilateral 2019    Procedure: Bilateral GT bursa injection;  Surgeon: Manpreet Dutta MD;  Location: [facility] PAIN MGT;  Service: Pain Management;  Laterality: Bilateral;    INJECTION OF JOINT Bilateral 2020    Procedure: Bilateral GT bursa injection;  Surgeon: Manpreet Dutta MD;  Location: [facility] PAIN MGT;  Service: Pain Management;  Laterality: Bilateral;    KNEE ARTHROSCOPY Bilateral     PARS PLANA VITRECTOMY W/ REPAIR OF MACULAR HOLE Left 2017    RADIOFREQUENCY THERMOCOAGULATION Left 2019    Procedure: Right SIJ RFA;  Surgeon: Manpreet Dutta MD;  Location: [facility] PAIN MGT;  Service: Pain Management;  Laterality: Left;    RADIOFREQUENCY THERMOCOAGULATION Right 2019    Procedure: Right SIJ RFA;  Surgeon: Manpreet Dutta MD;  Location: [facility] PAIN MGT;  Service: Pain Management;  Laterality: Right;    SHOULDER ARTHROSCOPY Right 06/04/15       Social History     Tobacco Use    Smoking status: Never Smoker    Smokeless tobacco: Never Used    Tobacco comment: Never smoked   Substance Use Topics    Alcohol use: Not Currently     Alcohol/week: 0.0 standard drinks     Comment: Couple times per year    Drug use: No       Family History   Problem Relation Age of Onset    Heart disease Mother         A-Fib    Glaucoma Mother     Cataracts Mother     Cancer Mother         colon    Arthritis Mother         : 17    Depression Mother     Kidney disease Daughter         ESRD    Anesthesia problems Daughter         cardiac arrest during nephrectomy    Birth defects Daughter         Renal    Depression Daughter     Learning disabilities Daughter         Dyslexia, ADD    Diabetes  Maternal Grandmother     Heart disease Maternal Grandmother         Congestive heart failure    Alcohol abuse Maternal Grandmother         Death: 84    Depression Maternal Grandmother     Hypertension Maternal Grandmother     Diabetes Maternal Grandfather     Cancer Maternal Grandfather     Alcohol abuse Maternal Grandfather          1964    Breast cancer Paternal Aunt     Depression Brother     Depression Son     Learning disabilities Son         ADD & ADHD    Diabetes Maternal Aunt     Diabetes Maternal Uncle         . 19         Review of Systems   Constitutional: Negative for decreased appetite, diaphoresis, fever, malaise/fatigue and night sweats.   HENT: Negative for nosebleeds.    Eyes: Negative for blurred vision and double vision.   Cardiovascular: Positive for chest pain. Negative for claudication, dyspnea on exertion, irregular heartbeat, leg swelling, near-syncope, orthopnea, palpitations, paroxysmal nocturnal dyspnea and syncope.   Respiratory: Negative for cough, shortness of breath, sleep disturbances due to breathing, snoring, sputum production and wheezing.    Endocrine: Negative for cold intolerance and polyuria.   Hematologic/Lymphatic: Does not bruise/bleed easily.   Skin: Negative for rash.   Musculoskeletal: Positive for arthritis, back pain and joint pain. Negative for falls, joint swelling and neck pain.   Gastrointestinal: Negative for abdominal pain, heartburn, nausea and vomiting.   Genitourinary: Negative for dysuria, frequency and hematuria.   Neurological: Negative for difficulty with concentration, dizziness, focal weakness, headaches, light-headedness, numbness, seizures and weakness.   Psychiatric/Behavioral: Negative for depression, memory loss and substance abuse. The patient does not have insomnia.    Allergic/Immunologic: Negative for HIV exposure and hives.       Objective:   Physical Exam  HENT:      Head: Normocephalic.   Eyes:       Pupils: Pupils are equal, round, and reactive to light.   Neck:      Thyroid: No thyromegaly.      Vascular: Normal carotid pulses. No carotid bruit or JVD.   Cardiovascular:      Rate and Rhythm: Normal rate and regular rhythm.  No extrasystoles are present.     Chest Wall: PMI is not displaced.      Pulses: Normal pulses.      Heart sounds: Normal heart sounds. No murmur heard.    No gallop. No S3 sounds.   Pulmonary:      Effort: No respiratory distress.      Breath sounds: Normal breath sounds. No stridor.   Abdominal:      General: Bowel sounds are normal.      Palpations: Abdomen is soft.      Tenderness: There is no abdominal tenderness. There is no rebound.   Skin:     Findings: No rash.   Neurological:      Mental Status: She is alert and oriented to person, place, and time.   Psychiatric:         Behavior: Behavior normal.         Lab Results   Component Value Date    CHOL 222 (H) 02/09/2022    CHOL 211 (H) 07/14/2021    CHOL 201 (H) 02/12/2021     Lab Results   Component Value Date    HDL 65 02/09/2022    HDL 65 07/14/2021    HDL 56 02/12/2021     Lab Results   Component Value Date    LDLCALC 141.6 02/09/2022    LDLCALC 123.0 07/14/2021    LDLCALC 113.8 02/12/2021     Lab Results   Component Value Date    TRIG 77 02/09/2022    TRIG 115 07/14/2021    TRIG 156 (H) 02/12/2021     Lab Results   Component Value Date    CHOLHDL 29.3 02/09/2022    CHOLHDL 30.8 07/14/2021    CHOLHDL 27.9 02/12/2021       Chemistry        Component Value Date/Time     02/09/2022 1308    K 4.1 02/09/2022 1308     02/09/2022 1308    CO2 29 02/09/2022 1308    BUN 13 02/09/2022 1308    CREATININE 0.8 02/09/2022 1308     (H) 02/09/2022 1308        Component Value Date/Time    CALCIUM 9.6 02/09/2022 1308    ALKPHOS 64 02/09/2022 1308    AST 20 02/09/2022 1308    ALT 14 02/09/2022 1308    BILITOT 0.4 02/09/2022 1308    ESTGFRAFRICA >60.0 02/09/2022 1308    EGFRNONAA >60.0 02/09/2022 1308          Lab Results    Component Value Date    HGBA1C 7.2 (H) 02/09/2022     Lab Results   Component Value Date    TSH 1.198 02/09/2022     No results found for: INR, PROTIME  Lab Results   Component Value Date    WBC 4.66 02/09/2022    HGB 13.4 02/09/2022    HCT 43.8 02/09/2022    MCV 96 02/09/2022     02/09/2022     BMP  Sodium   Date Value Ref Range Status   02/09/2022 140 136 - 145 mmol/L Final     Potassium   Date Value Ref Range Status   02/09/2022 4.1 3.5 - 5.1 mmol/L Final     Chloride   Date Value Ref Range Status   02/09/2022 103 95 - 110 mmol/L Final     CO2   Date Value Ref Range Status   02/09/2022 29 23 - 29 mmol/L Final     BUN   Date Value Ref Range Status   02/09/2022 13 8 - 23 mg/dL Final     Creatinine   Date Value Ref Range Status   02/09/2022 0.8 0.5 - 1.4 mg/dL Final     Calcium   Date Value Ref Range Status   02/09/2022 9.6 8.7 - 10.5 mg/dL Final     Anion Gap   Date Value Ref Range Status   02/09/2022 8 8 - 16 mmol/L Final     eGFR if    Date Value Ref Range Status   02/09/2022 >60.0 >60 mL/min/1.73 m^2 Final     eGFR if non    Date Value Ref Range Status   02/09/2022 >60.0 >60 mL/min/1.73 m^2 Final     Comment:     Calculation used to obtain the estimated glomerular filtration  rate (eGFR) is the CKD-EPI equation.        BNP  @LABRCNTIP(BNP,BNPTRIAGEBLO)@  @LABRCNTIP(troponini)@  CrCl cannot be calculated (Patient's most recent lab result is older than the maximum 7 days allowed.).  No results found in the last 24 hours.  No results found in the last 24 hours.  No results found in the last 24 hours.    Assessment:      1. Hypertension associated with diabetes    2. Statin intolerance    3. Hyperlipidemia associated with type 2 diabetes mellitus    4. PVD (peripheral vascular disease)    5. MVP (mitral valve prolapse)    6. Other chest pain      One episode of exertional chest tightness tonight  ekg today NSR and no acute stt change    Plan:   Continue verapamil losartan  Lasix as needed and Zetia  DM Rx per PCP  Counseled DASH  Check Lipid profile in 6 months  Recommend heart-healthy diet, weight control and regular exercise.  Frank. Risk modification.   I have reviewed all pertinent labs and cardiac studies independently. Plans and recommendations have been formulated under my direct supervision. All questions answered and patient voiced understanding.   If symptoms persist go to the ED  RTC in 6 months

## 2022-04-13 ENCOUNTER — LAB VISIT (OUTPATIENT)
Dept: PRIMARY CARE CLINIC | Facility: OTHER | Age: 77
End: 2022-04-13
Attending: INTERNAL MEDICINE
Payer: MEDICARE

## 2022-04-13 DIAGNOSIS — Z11.52 ENCOUNTER FOR SCREENING FOR COVID-19: Primary | ICD-10-CM

## 2022-04-13 DIAGNOSIS — R43.9 PROBLEMS WITH SMELL AND TASTE: ICD-10-CM

## 2022-04-13 LAB
SARS-COV-2 RNA RESP QL NAA+PROBE: NOT DETECTED
SARS-COV-2- CYCLE NUMBER: NORMAL

## 2022-04-13 PROCEDURE — U0003 INFECTIOUS AGENT DETECTION BY NUCLEIC ACID (DNA OR RNA); SEVERE ACUTE RESPIRATORY SYNDROME CORONAVIRUS 2 (SARS-COV-2) (CORONAVIRUS DISEASE [COVID-19]), AMPLIFIED PROBE TECHNIQUE, MAKING USE OF HIGH THROUGHPUT TECHNOLOGIES AS DESCRIBED BY CMS-2020-01-R: HCPCS | Performed by: INTERNAL MEDICINE

## 2022-04-25 ENCOUNTER — PATIENT MESSAGE (OUTPATIENT)
Dept: PAIN MEDICINE | Facility: HOSPITAL | Age: 77
End: 2022-04-25
Payer: COMMERCIAL

## 2022-04-25 NOTE — PRE-PROCEDURE INSTRUCTIONS
Attempted to PAT patient for procedure on 4.28. with Dr. izquierdo. No answer. no vm. Message sent via portal. No return call at this time.

## 2022-04-26 ENCOUNTER — PATIENT MESSAGE (OUTPATIENT)
Dept: ADMINISTRATIVE | Facility: HOSPITAL | Age: 77
End: 2022-04-26
Payer: COMMERCIAL

## 2022-04-27 ENCOUNTER — PATIENT MESSAGE (OUTPATIENT)
Dept: PAIN MEDICINE | Facility: HOSPITAL | Age: 77
End: 2022-04-27
Payer: COMMERCIAL

## 2022-04-27 NOTE — PRE-PROCEDURE INSTRUCTIONS
Attempted to PAT patient for procedure on 4.28. with Dr. izquierdo. No answer. no vm. Message sent via portal. No return call at this time. message sent  Via portal

## 2022-04-27 NOTE — PRE-PROCEDURE INSTRUCTIONS
Spoke with patient regarding procedure scheduled on 4.28    Arrival time 1300    Has patient been sick with fever or on antibiotics within the last 7 days? No    Does the patient have any open wounds, sores or rashes? No    Does the patient have any recent fractures? no    Has patient received a vaccination within the last 7 days? No    Received the COVID vaccination? yes    Has the patient stopped all medications as directed? Hold dm meds am of procedure    Does patient have a pacemaker and or defibrillator? no    Does the patient have a ride to and from procedure and someone reliable to remain with patient? Daughter eloina    Is the patient diabetic? yes    Does the patient have sleep apnea? Or use O2 at home? No and no     Is the patient receiving sedation? yes    Is the patient instructed to remain NPO beginning at midnight the night before their procedure? yes    Procedure location confirmed with patient? Yes    Covid- Denies signs/symptoms. Instructed to notify PAT/MD if any changes.

## 2022-04-28 ENCOUNTER — HOSPITAL ENCOUNTER (OUTPATIENT)
Facility: HOSPITAL | Age: 77
Discharge: HOME | End: 2022-04-28
Attending: ANESTHESIOLOGY | Admitting: ANESTHESIOLOGY
Payer: MEDICARE

## 2022-04-28 ENCOUNTER — PATIENT MESSAGE (OUTPATIENT)
Dept: INTERNAL MEDICINE | Facility: CLINIC | Age: 77
End: 2022-04-28
Payer: COMMERCIAL

## 2022-04-28 VITALS
HEART RATE: 70 BPM | DIASTOLIC BLOOD PRESSURE: 75 MMHG | OXYGEN SATURATION: 98 % | BODY MASS INDEX: 31.7 KG/M2 | HEIGHT: 70 IN | WEIGHT: 221.44 LBS | TEMPERATURE: 98 F | SYSTOLIC BLOOD PRESSURE: 154 MMHG | RESPIRATION RATE: 17 BRPM

## 2022-04-28 DIAGNOSIS — M25.559 HIP PAIN: Primary | ICD-10-CM

## 2022-04-28 LAB — POCT GLUCOSE: 91 MG/DL (ref 70–110)

## 2022-04-28 PROCEDURE — 20610 PR DRAIN/INJECT LARGE JOINT/BURSA: ICD-10-PCS | Mod: RT,,, | Performed by: ANESTHESIOLOGY

## 2022-04-28 PROCEDURE — 25000003 PHARM REV CODE 250: Performed by: ANESTHESIOLOGY

## 2022-04-28 PROCEDURE — 82962 GLUCOSE BLOOD TEST: CPT | Performed by: ANESTHESIOLOGY

## 2022-04-28 PROCEDURE — 20610 DRAIN/INJ JOINT/BURSA W/O US: CPT | Performed by: ANESTHESIOLOGY

## 2022-04-28 PROCEDURE — 63600175 PHARM REV CODE 636 W HCPCS: Performed by: ANESTHESIOLOGY

## 2022-04-28 PROCEDURE — 77002 PR FLUOROSCOPIC GUIDANCE NEEDLE PLACEMENT: ICD-10-PCS | Mod: 26,,, | Performed by: ANESTHESIOLOGY

## 2022-04-28 PROCEDURE — 77002 NEEDLE LOCALIZATION BY XRAY: CPT | Mod: 26,,, | Performed by: ANESTHESIOLOGY

## 2022-04-28 PROCEDURE — 20610 DRAIN/INJ JOINT/BURSA W/O US: CPT | Mod: RT,,, | Performed by: ANESTHESIOLOGY

## 2022-04-28 PROCEDURE — 25500020 PHARM REV CODE 255: Performed by: ANESTHESIOLOGY

## 2022-04-28 RX ORDER — FENTANYL CITRATE 50 UG/ML
INJECTION, SOLUTION INTRAMUSCULAR; INTRAVENOUS
Status: DISCONTINUED | OUTPATIENT
Start: 2022-04-28 | End: 2022-04-28 | Stop reason: HOSPADM

## 2022-04-28 RX ORDER — METHYLPREDNISOLONE ACETATE 40 MG/ML
INJECTION, SUSPENSION INTRA-ARTICULAR; INTRALESIONAL; INTRAMUSCULAR; SOFT TISSUE
Status: DISCONTINUED | OUTPATIENT
Start: 2022-04-28 | End: 2022-04-28 | Stop reason: HOSPADM

## 2022-04-28 RX ORDER — LIDOCAINE HYDROCHLORIDE 20 MG/ML
INJECTION, SOLUTION EPIDURAL; INFILTRATION; INTRACAUDAL; PERINEURAL
Status: DISCONTINUED | OUTPATIENT
Start: 2022-04-28 | End: 2022-04-28 | Stop reason: HOSPADM

## 2022-04-28 RX ORDER — INDOMETHACIN 25 MG/1
CAPSULE ORAL
Status: DISCONTINUED | OUTPATIENT
Start: 2022-04-28 | End: 2022-04-28 | Stop reason: HOSPADM

## 2022-04-28 RX ORDER — MIDAZOLAM HYDROCHLORIDE 1 MG/ML
INJECTION, SOLUTION INTRAMUSCULAR; INTRAVENOUS
Status: DISCONTINUED | OUTPATIENT
Start: 2022-04-28 | End: 2022-04-28 | Stop reason: HOSPADM

## 2022-04-28 NOTE — DISCHARGE SUMMARY
[facility]  Discharge Note       Description of Procedure: Right Acetabulofemoral Joint Injection under Fluoroscopic Guidance    Procedure Date: 4/28/2022    Admit Date: 4/28/2022  Discharge Date: 4/28/2022     Attending Physician: Manpreet Dutta   Discharge Provider: Manpreet Dutta    Preoperative Diagnosis: Acetabulofemoral Osteoarthritis     Postoperative Diagnosis: as above, same as preoperative diagnosis    Discharged Condition: Stable    Hospital Course: Patient was admitted for an outpatient procedure. The procedure was tolerated well with no complications.    Final Diagnoses: Same as principal problem.    Disposition: Home, self-care.    Follow up/Patient Instructions:  Follow-up in clinic in 2-3 weeks.    Medications: No medications were prescribed today. The patient was advised to resume normal medication regimen without change.  Specific information was provided regarding restarting any anticoagulant/s.    Discharge Procedure Orders (must include Diet, Follow-up, Activity):  Light activity for the remainder of the day, resume normal activity tomorrow. Resume normal diet. Follow-up in clinic in 2-3 weeks.

## 2022-04-28 NOTE — H&P
HPI  Patient presenting for Procedure(s) (LRB):  Injection, Joint Right Hip Injection RN IV sedation (Right)     Patient on Anti-coagulation No    No health changes since previous encounter    Past Medical History:   Diagnosis Date    Arthritis     Cataract     Diabetes mellitus     Diabetes mellitus, type 2     Fibromyalgia     General anesthetics causing adverse effect in therapeutic use     bradycardia     Hypertension     Migraines     Mitral valve prolapse     Osteoarthritis     Rotator cuff tear 4/1/2015     Past Surgical History:   Procedure Laterality Date    ARTHROSCOPY OF KNEE Right 3/10/2020    Procedure: ARTHROSCOPY, KNEE;  Surgeon: Les Schneider MD;  Location: [facility] OR;  Service: Orthopedics;  Laterality: Right;    CATARACT EXTRACTION W/  INTRAOCULAR LENS IMPLANT Left 07/19/2017    CATARACT EXTRACTION W/  INTRAOCULAR LENS IMPLANT Right 2017    CHOLECYSTECTOMY      CHONDROPLASTY OF KNEE Right 3/10/2020    Procedure: CHONDROPLASTY, KNEE;  Surgeon: Les Schneider MD;  Location: [facility] OR;  Service: Orthopedics;  Laterality: Right;  Anterior compartment     COLONOSCOPY N/A 9/25/2018    Procedure: COLONOSCOPY;  Surgeon: Bethel Carmona MD;  Location: [facility] ENDO;  Service: Endoscopy;  Laterality: N/A;    EXCISION OF MEDIAL MENISCUS OF KNEE Right 3/10/2020    Procedure: MENISCECTOMY, KNEE, MEDIAL;  Surgeon: Les Schneider MD;  Location: [facility] OR;  Service: Orthopedics;  Laterality: Right;  Partial, Medial , Lateral     EYE SURGERY      INJECTION OF ANESTHETIC AGENT AROUND NERVE Right 8/8/2019    Procedure: Right Genicular nerve block with local;  Surgeon: Manpreet Dutta MD;  Location: [facility] PAIN MGT;  Service: Pain Management;  Laterality: Right;    INJECTION OF ANESTHETIC AGENT INTO SACROILIAC JOINT Bilateral 5/6/2020    Procedure: Bilateral Sacroiliac Joint Injection;  Surgeon: Manpreet Dutta MD;  Location: [facility] PAIN MGT;  Service: Pain Management;  Laterality: Bilateral;     INJECTION OF ANESTHETIC AGENT INTO SACROILIAC JOINT Bilateral 10/8/2020    Procedure: Bilateral SI and Bilateral GTB with RN IV sedation;  Surgeon: Manpreet Dutta MD;  Location: [facility] PAIN MGT;  Service: Pain Management;  Laterality: Bilateral;    INJECTION OF ANESTHETIC AGENT INTO SACROILIAC JOINT Bilateral 1/5/2021    Procedure: Bilateral BLOCK, SACROILIAC JOINT and Bilateral GTB witn RN IV sedation;  Surgeon: Daniel Burns MD;  Location: [facility] PAIN MGT;  Service: Pain Management;  Laterality: Bilateral;    INJECTION OF ANESTHETIC AGENT INTO SACROILIAC JOINT Bilateral 7/8/2021    Procedure: Bilateral BLOCK, SACROILIAC JOINT bilateral GTB RN IV sedation;  Surgeon: Manpreet Dutta MD;  Location: [facility] PAIN MGT;  Service: Pain Management;  Laterality: Bilateral;    INJECTION OF ANESTHETIC AGENT INTO SACROILIAC JOINT Bilateral 3/1/2022    Procedure: Bilateral BLOCK, SACROILIAC JOINT and Bilateral GTB RN IV sedation;  Surgeon: Manpreet Dutta MD;  Location: [facility] PAIN [facility]GT;  Service: Pain Management;  Laterality: Bilateral;    INJECTION OF JOINT Bilateral 9/5/2019    Procedure: Bilateral GT bursa injection;  Surgeon: Manpreet Dutta MD;  [facility];  Service: Pain Management;  Laterality: Bilateral;    INJECTION OF JOINT Bilateral 5/6/2020    Procedure: Bilateral GT bursa injection;  Surgeon: Manpreet Dutta MD;  Location: [facility] PAIN [facility]GT;  Service: Pain Management;  Laterality: Bilateral;    KNEE ARTHROSCOPY Bilateral     PARS PLANA VITRECTOMY W/ REPAIR OF MACULAR HOLE Left 02/22/2017    RADIOFREQUENCY THERMOCOAGULATION Left 7/11/2019    Procedure: Right SIJ RFA;  Surgeon: Manpreet Dutta MD;  Location: [facility] PAIN [facility]GT;  Service: Pain Management;  Laterality: Left;    RADIOFREQUENCY THERMOCOAGULATION Right 7/25/2019    Procedure: Right SIJ RFA;  Surgeon: Manpreet Dutta MD;  Location: [facility] PAIN [facility]GT;  Service: Pain Management;  Laterality: Right;    SHOULDER ARTHROSCOPY Right 06/04/15     Review of patient's allergies  "indicates:   Allergen Reactions    Demerol [meperidine] Other (See Comments)     Burning when adm IV  Able to tolerate IM, "Turned the veins in my hand purple."    Latex, natural rubber Other (See Comments)     "Burns my skin",  Symptoms get worse the longer she is exposed    Zocor [simvastatin] Other (See Comments)     Tightening of muscles    Statins-hmg-coa reductase inhibitors Other (See Comments)     myopathy    Sulfa (sulfonamide antibiotics)      Blurred vision        No current facility-administered medications on file prior to encounter.     Current Outpatient Medications on File Prior to Encounter   Medication Sig Dispense Refill    celecoxib (CELEBREX) 200 MG capsule TAKE 1 CAPSULE(200 MG) BY MOUTH TWICE DAILY WITH FOOD 120 capsule 1    ezetimibe (ZETIA) 10 mg tablet Take 1 tablet (10 mg total) by mouth once daily. 90 tablet 3    FLUoxetine 40 MG capsule TAKE 1 CAPSULE(40 MG) BY MOUTH EVERY DAY 90 capsule 2    gabapentin (NEURONTIN) 300 MG capsule Take 1 capsule (300 mg total) by mouth 3 (three) times daily. 270 capsule 0    INVOKANA 100 mg Tab tablet TAKE 1 TABLET(100 MG) BY MOUTH EVERY DAY 90 tablet 3    losartan (COZAAR) 25 MG tablet Take 1 tablet (25 mg total) by mouth once daily. 90 tablet 2    metFORMIN (GLUCOPHAGE) 1000 MG tablet Take 1 tablet (1,000 mg total) by mouth 2 (two) times daily with meals. 180 tablet 3    verapamiL (CALAN) 120 MG tablet Take 1 tablet (120 mg total) by mouth 3 (three) times daily. 180 tablet 5    biotin 1 mg tablet Take 1,000 mcg by mouth every morning.       diclofenac sodium (VOLTAREN) 1 % Gel Apply 4 g topically 3 (three) times daily as needed (PAIN). 2 each 0    fluticasone (FLONASE) 50 mcg/actuation nasal spray 2 sprays by Each Nare route once daily. (Patient taking differently: 2 sprays by Each Nostril route nightly as needed.) 1 Bottle 0    furosemide (LASIX) 20 MG tablet Take 1 tablet (20 mg total) by mouth every Mon, Wed, Fri. 25 tablet 2    " "gabapentin (NEURONTIN) 300 MG capsule TAKE 1 CAPSULE(300 MG) BY MOUTH THREE TIMES DAILY 270 capsule 0    HYDROcodone-acetaminophen (NORCO)  mg per tablet Take 1 tablet by mouth every 6 (six) hours as needed. HYDROCODONE-ACETAMINOPHEN (Norco)  MG ORAL TAB - 1 tab po q 6 hrs prn pain 10 tablet 0    LORazepam (ATIVAN) 1 MG tablet TK 3 TS PO 1 HOUR PRIOR TO APPOINTMENT      methylPREDNISolone (MEDROL DOSEPACK) 4 mg tablet use as directed 1 Package 0    omeprazole (PRILOSEC) 10 MG capsule Take 1 capsule (10 mg total) by mouth daily as needed (w/ NSAID). 30 capsule 0    potassium chloride SA (K-DUR,KLOR-CON) 10 MEQ tablet Take 1 tablet (10 mEq total) by mouth once daily. 20 tablet 5    pulse oximeter (PULSE OXIMETER) device by Apply Externally route 2 (two) times a day. Use twice daily at 8 AM and 3 PM and [facility] the [facility] as directed. 1 each 0    triamcinolone acetonide 0.025% (KENALOG) 0.025 % Oint Apply topically 2 (two) times daily. for 10 days (Patient taking differently: Apply topically 2 (two) times daily as needed. ) 15 g 2        PMHx, PSHx, Allergies, Medications reviewed in epic    ROS negative except pain complaints in HPI    OBJECTIVE:    BP (!) 146/63 (BP Location: Right arm, Patient Position: Sitting)   Pulse 73   Temp 97.3 °F (36.3 °C)   Resp 18   Ht 5' 10" (1.778 m)   Wt 100.4 kg (221 lb 7.2 oz)   SpO2 96%   Breastfeeding No   BMI 31.78 kg/m²     PHYSICAL EXAMINATION:    GENERAL: Well appearing, in no acute distress, alert and oriented x3.  PSYCH:  Mood and affect appropriate.  SKIN: Skin color, texture, turgor normal, no rashes or lesions which will impact the procedure.  CV: RRR with palpation of the radial artery.  PULM: No evidence of respiratory difficulty, symmetric chest rise. Clear to auscultation.  NEURO: Cranial nerves grossly intact.    Plan:    Proceed with procedure as planned Procedure(s) (LRB):  Injection, Joint Right Hip Injection RN IV sedation " (Right)    Manpreet Dutta MD  04/28/2022

## 2022-04-28 NOTE — DISCHARGE INSTRUCTIONS

## 2022-04-28 NOTE — OP NOTE
Procedure: Hip (acetabulofemoral) joint injection under fluoroscopic guidance    Side: Right    Pre-procedure diagnosis: osteoarthritis of the hip (of the above noted laterality)  Post-procedure diagnosis: same    PROVIDER: Manpreet Dutta MD  Assistant(s): None    ANESTHESIA: Local, IV Sedation    >> 2 mg of VERSED    >> 50 mcg of FENTANYL     INDICATION: The patient has hip pain unresponsive to conservative treatments. Fluoroscopy was used to optimize visualization of needle placement and to maximize safety.     Description of Procedure:  Prior to starting this procedure, risks, benefits, complications, and alternatives were discussed with the patient. The patient agreed to proceed with the procedure and signed a consent. The site and side of the procedure was identified and marked. The patient was taken to the procedural suite and positioned on the table in prone orientation. A time out was performed. All in attendance were in agreement with the time out. The area over the above noted joint/s was widely prepped with ChloraPrep and drapped in usual sterile fashion.    The above noted joint/s was identified on fluoroscopy. A 27-gauge 1.5 inch needle was used to localize the site of entry with 3 mL of 1% PF Lidocaine. A 25 gauge 3.5 inch spinal needle was then introduced and advanced towards the neck of the femur. The target site was approximately 0.5 to 1.0 cm distal to the lateral border of the femoral head and 0.5 to 1.0 cm below the superior aspect of the femoral neck. The needle was advanced until osseus interface was met. Following negative aspiration, a solution containing 2 mL of 1% PF Lidocaine and 1 mL of Methylprednisolone (40 mg/mL) was then injected. There was minimal resistance encountered throughout injection. No paresthesias were noted on injection. The needle stylet was replaced and the needle was removed intact. The patient tolerated the procedure well. A bandage was applied to the site of  injection.    Description of Findings: Not applicable    Prosthetic devices, grafts, tissues, or devices implanted: None    Specimen Removed: No    Estimated Blood Loss: minimal    COMPLICATIONS: None    DISPOSITION / PLANS: The patient was transferred to the recovery area in a stable condition for observation. The patient was reexamined prior to discharge. There was no evidence of acute neurologic injury following the procedure.  Patient was discharged from the recovery room after meeting discharge criteria. Home discharge instructions were given to the patient by the staff.

## 2022-05-05 ENCOUNTER — PATIENT MESSAGE (OUTPATIENT)
Dept: INTERNAL MEDICINE | Facility: CLINIC | Age: 77
End: 2022-05-05
Payer: COMMERCIAL

## 2022-05-05 DIAGNOSIS — E66.9 DIABETES MELLITUS TYPE 2 IN OBESE: Primary | ICD-10-CM

## 2022-05-05 DIAGNOSIS — E11.69 DIABETES MELLITUS TYPE 2 IN OBESE: Primary | ICD-10-CM

## 2022-05-06 NOTE — TELEPHONE ENCOUNTER
I placed order for digital medicine per request. She can check with the [facility] regarding how to use it. She should be aware that her chart only shows she is eligible for digital hypertension program, so she may have to pay out of pocket for this program. I'm not sure what cost would be, I imagine someone at the [facility] can assist her with that information. Thanks

## 2022-05-31 DIAGNOSIS — M79.7 FIBROMYALGIA: Chronic | ICD-10-CM

## 2022-05-31 DIAGNOSIS — F32.9 CHRONIC MAJOR DEPRESSIVE DISORDER: Chronic | ICD-10-CM

## 2022-06-01 RX ORDER — FLUOXETINE HYDROCHLORIDE 40 MG/1
CAPSULE ORAL
Qty: 90 CAPSULE | Refills: 3 | Status: SHIPPED | OUTPATIENT
Start: 2022-06-01 | End: 2022-08-02 | Stop reason: SDUPTHER

## 2022-06-20 ENCOUNTER — OFFICE VISIT (OUTPATIENT)
Dept: ORTHOPEDICS | Facility: CLINIC | Age: 77
End: 2022-06-20
Payer: MEDICARE

## 2022-06-20 VITALS — HEIGHT: 70 IN | BODY MASS INDEX: 31.68 KG/M2 | WEIGHT: 221.31 LBS

## 2022-06-20 DIAGNOSIS — M70.62 GREATER TROCHANTERIC BURSITIS OF BOTH HIPS: ICD-10-CM

## 2022-06-20 DIAGNOSIS — M94.261 CHONDROMALACIA OF RIGHT KNEE: ICD-10-CM

## 2022-06-20 DIAGNOSIS — M70.61 GREATER TROCHANTERIC BURSITIS OF BOTH HIPS: ICD-10-CM

## 2022-06-20 DIAGNOSIS — S83.241D ACUTE MEDIAL MENISCUS TEAR OF RIGHT KNEE, SUBSEQUENT ENCOUNTER: ICD-10-CM

## 2022-06-20 DIAGNOSIS — M19.012 ARTHRITIS OF LEFT SHOULDER REGION: ICD-10-CM

## 2022-06-20 DIAGNOSIS — S83.281D ACUTE LATERAL MENISCUS TEAR OF RIGHT KNEE, SUBSEQUENT ENCOUNTER: ICD-10-CM

## 2022-06-20 DIAGNOSIS — M75.32 CALCIFIC TENDINITIS OF LEFT SHOULDER: Primary | ICD-10-CM

## 2022-06-20 DIAGNOSIS — M65.311 TRIGGER THUMB OF RIGHT HAND: ICD-10-CM

## 2022-06-20 PROCEDURE — 99214 OFFICE O/P EST MOD 30 MIN: CPT | Mod: PBBFAC,25 | Performed by: ORTHOPAEDIC SURGERY

## 2022-06-20 PROCEDURE — 99214 PR OFFICE/OUTPT VISIT, EST, LEVL IV, 30-39 MIN: ICD-10-PCS | Mod: 25,S$PBB,, | Performed by: ORTHOPAEDIC SURGERY

## 2022-06-20 PROCEDURE — 20610 DRAIN/INJ JOINT/BURSA W/O US: CPT | Mod: PBBFAC | Performed by: ORTHOPAEDIC SURGERY

## 2022-06-20 PROCEDURE — 20610 LARGE JOINT ASPIRATION/INJECTION: R KNEE: ICD-10-PCS | Mod: S$PBB,LT,, | Performed by: ORTHOPAEDIC SURGERY

## 2022-06-20 PROCEDURE — 20610 DRAIN/INJ JOINT/BURSA W/O US: CPT | Mod: PBBFAC,RT,51,59 | Performed by: ORTHOPAEDIC SURGERY

## 2022-06-20 PROCEDURE — 99999 PR PBB SHADOW E&M-EST. PATIENT-LVL IV: CPT | Mod: PBBFAC,,, | Performed by: ORTHOPAEDIC SURGERY

## 2022-06-20 PROCEDURE — 99999 PR PBB SHADOW E&M-EST. PATIENT-LVL IV: ICD-10-PCS | Mod: PBBFAC,,, | Performed by: ORTHOPAEDIC SURGERY

## 2022-06-20 PROCEDURE — 99214 OFFICE O/P EST MOD 30 MIN: CPT | Mod: 25,S$PBB,, | Performed by: ORTHOPAEDIC SURGERY

## 2022-06-20 RX ORDER — METHYLPREDNISOLONE ACETATE 80 MG/ML
80 INJECTION, SUSPENSION INTRA-ARTICULAR; INTRALESIONAL; INTRAMUSCULAR; SOFT TISSUE
Status: DISCONTINUED | OUTPATIENT
Start: 2022-06-20 | End: 2022-06-20 | Stop reason: HOSPADM

## 2022-06-20 RX ORDER — DICLOFENAC SODIUM 10 MG/G
2 GEL TOPICAL 4 TIMES DAILY
Qty: 1 EACH | Refills: 6 | Status: SHIPPED | OUTPATIENT
Start: 2022-06-20 | End: 2023-05-26 | Stop reason: SDUPTHER

## 2022-06-20 RX ORDER — GABAPENTIN 300 MG/1
300 CAPSULE ORAL 3 TIMES DAILY
Qty: 90 CAPSULE | Refills: 11 | Status: SHIPPED | OUTPATIENT
Start: 2022-06-20 | End: 2022-08-02 | Stop reason: SDUPTHER

## 2022-06-20 RX ADMIN — METHYLPREDNISOLONE ACETATE 80 MG: 80 INJECTION, SUSPENSION INTRALESIONAL; INTRAMUSCULAR; INTRASYNOVIAL; SOFT TISSUE at 10:06

## 2022-06-20 NOTE — PROCEDURES
Large Joint Aspiration/Injection: R knee    Date/Time: 6/20/2022 10:40 AM  Performed by: Les Schneider MD  Authorized by: Les Schneider MD     Consent Done?:  Yes (Verbal)  Site marked: the procedure site was marked    Timeout: prior to procedure the correct patient, procedure, and site was verified      Local anesthesia used?: Yes    Local anesthetic:  Lidocaine 1% without epinephrine    Details:  Needle Size:  22 G  Ultrasonic Guidance for needle placement?: No    Approach:  Anterolateral  Location:  Knee  Site:  R knee  Medications:  80 mg methylPREDNISolone acetate 80 mg/mL  Patient tolerance:  Patient tolerated the procedure well with no immediate complications

## 2022-06-20 NOTE — PROGRESS NOTES
Subjective:     Patient ID: Kaylin Mccollum is a 77 y.o. female.    Chief Complaint: Pain of the Left Shoulder, Pain of the Right Shoulder, and Pain of the Right Knee    HPI:  75-year-old retired from  who had been falling down stairs numerous occasions she has been having pain and catching and locking since several months now.  She gets up the knee catches she has to wiggle it some how to unlock it before she can walk. She does have quite a bit of lumbar pain that radiates with burning sensation down to the right anterior tibia and dorsal of the foot.  She is under the care of pain management for that.  Pain in the knee is around 4/10 with catching locking feeling.  Able to ambulate once the catching locking goes away for her minimum 200-300 yd without discomfort.  Unable to squat unable to do stairs due to the catching locking feeling.  She does ambulate without any assistive devices.  She does have an MRI in the electronic records.  At 1 point she had falling down the stairs and sustained right shoulder rotator cuff tear requiring repair.  Denies any numbness tingling in the hands.  She does have some cervical lumbar pain    05/21/2020  Status post her right knee arthroscopy 03/10/2020.  Findings of complex medial and lateral meniscus tears as well chondromalacia type 3 anterior and medial.  She was doing great postop for 5 weeks another side in twisted her knee and starting some pain.  Any twisting maneuver she hurts quite a bit.  Pain is 4/10.  She did her independent exercise program.  Denies any fever or chills or shortness of breath or difficulty chewing or swallowing.  Complains of some stiffness and swelling    06/25/2020  Right knee arthroscopy 03/10/2020 with complex medial and lateral meniscus tears as well as chondromalacia type 3 anterior medial compartment.  The other day could not get up from kneeling on the floor.  Her pain now is coming back as 6/10 with swelling and tightness in the  knee.  Last visit given a steroid injection which helped for a little bit but not too much.  She does take Celebrex 100 mg twice a day and a helps very little.  Last visit discussed injecting Synvisc-One into her knee and she wants to proceed.  I did tell her that Synvisc-One might take 6-8 weeks to see any effect.  In my not work and we had to resort to other treatments down the road.  Still feeling occasional catching.  Denies any fever chills shortness of breath difficulty chewing swallowing loss of bowel bladder control or loss of taste and smell    08/20/2020  Chondromalacia of the right knee anterior and medial compartment and status post partial medial lateral meniscectomy.  Synvisc-One given 06/25/2020 seems to have helped but still having quite excessive pain right now since her daughter was diagnosed with PE and had to run around with her.  But overall she feels Synvisc-One seems to have helped.  She is requesting a steroid injection today.  She does take Celebrex at night.  She is to have back injections she is holding off at this point.  Pain is 4/10.  Denies any fever or chills or shortness of breath or difficulty chewing or swallowing loss of bowel bladder control    11/19/2020  Right knee arthroscopy 03/10/2020 with medial and lateral partial meniscectomies and finding of chondromalacia type 3 of the anterior medial compartment.  She received Synvisc-One on 06/25/2020 with good relief.  She thought she is getting her Synvisc-One today and I refreshed her memory that it should be 6 months so she is not due until December 25th.  We need to get that approved.  She complains on occasional thigh pain occasional mid tibia pain.  She does have history of fibromyalgia and used to get hip injections by Dr. izquierdo last of which 10/08/2020 bilateral SI and bilateral greater trochanteric areas.  She does complain of both hips hurting over the greater trochanters.  Celebrex seems to help as well as the brace on her  knee.  She asked she is going for dental work if she can take ibuprofen I did tell her not with Celebrex she may take Tylenol.  Denies any fever or chills or shortness of breath or difficulty with chewing or swallowing loss of bowel bladder control or blurry vision double vision or loss of sense smell or taste    12/28/2020   New problem left shoulder pain  Right knee pain  Lately each patient is taking Celebrex 200 mg twice a day since she has been taking care of her daughter back in 4 to the hospital and doing a lot of walking.  She is running out of the Celebrex.  I did warn her about the side effects.  Her pain is 4/10.  Unable to sleep on the shoulder.  Lifting things seems to be very painful.  Previous right shoulder rotator cuff surgery.  She is having also worsening lower back and hip area and sacroiliac pain she sees pain management for that.    Denies any fever or chills or shortness of breath or difficulty with chewing or swallowing loss of bowel bladder control blurry vision double vision loss of sense of smell or taste    02/22/2021  Last visit patient received injection to the left shoulder subacromial space on 12/28/2020 as well into the right knee with grade 3 is a loose sugar for pain.  Her pain went down 2/10 in both the shoulder and the knee.  But she still feels some weakness in the shoulder and certain motions seems to be very weak and painful.  Previous history of injury to that area.  She did see pain management Dr. Burns who give her injections in her hips and that is doing really well.  Concerned about having rotator cuff injury to the left shoulder because certain motions and activities of daily living are limited .  Her hips are doing well her knee is doing well  Denies any fever or chills or shortness of breath or difficulty with chewing or swallowing or loss of bowel bladder control or blurry vision or double vision or loss sense smell or taste or numbness or tingling in her hands.  She  does have some occasional neck pain    03/01/2021  Left shoulder tenderness.  Pain today 0/10.  She is taking Celebrex and received a steroid injection 12/28/2020.  She is here for MRI results.  We spent time going over the report with her and copy of it given.  We discussed small slap lesion, mild arthritic changes and small loose body and calcific tendinitis.  As far as the knee is concerned she is doing okay at this point in time  Denies any fever or chills or shortness of breath or difficulty with chewing or swallowing loss of bowel bladder control or loss of sense smell or taste    03/10/2022  Bilateral shoulder pain left and right, right thumb locking and radiation down to the right upper extremity, right knee arthritis  Patient since last seen had stem cell injections x2 into the right knee and x1 into the left knee by Dr. Diamond Chaparro.  She is not too sure if they really helped her.  On 3/1/22 patient received bilateral sacroiliac joint injections and bilateral greater trochanteric injections by Dr. Dutta.  Each injection containing 40 mg of Depo-Medrol for a total of 160 mg. I did tell her it is too soon to receive any injections in the shoulder because too much steroid will increase her heart rate and messes up her sugars which could cause problems over the next few days.  She expressed understanding.  We did go over MRI performed on the left shoulder a year or so ago.  As well as we went over her knee x-rays and operative report from arthroscopic surgery    06/20/2022  Left shoulder pain, right knee pain  Previous MRI reviewed with the patient showing generalized arthritis as well as calcific tendinitis in the shoulder.  She did receive an injection in 2020 with good relief.  She would like another repeat injection.  She does take Celebrex and occasional Tylenol.  She does have fibromyalgia and she paces herself.  As far as the left knee is concerned she is tender over the pes anserine on the medial  tibial flare in the Voltaren gel that she applies helps that.  She also has pain inside the knee joint and underneath the kneecap.  As far as the right thumb is doing much better after we injected at last visit.  She did receive stem cell injections in the right knee by Dr. Chaparro and not too sure if it anything came out of it.  As far as the back she was seeing pain management where they gave her bilateral SI joint and bilateral trochanteric bursitis injections.  No fever no chills no shortness of breath difficulty with chewing or swallowing  Pain in the right knee 3/10 in the left shoulder around 4/10  Past Medical History:   Diagnosis Date    Arthritis     Cataract     Diabetes mellitus     Diabetes mellitus, type 2     Fibromyalgia     General anesthetics causing adverse effect in therapeutic use     bradycardia     Hypertension     Migraines     Mitral valve prolapse     Osteoarthritis     Rotator cuff tear 4/1/2015     Past Surgical History:   Procedure Laterality Date    ARTHROSCOPY OF KNEE Right 3/10/2020    Procedure: ARTHROSCOPY, KNEE;  Surgeon: Les Schneider MD;  Location: [facility] OR;  Service: Orthopedics;  Laterality: Right;    CATARACT EXTRACTION W/  INTRAOCULAR LENS IMPLANT Left 07/19/2017    CATARACT EXTRACTION W/  INTRAOCULAR LENS IMPLANT Right 2017    CHOLECYSTECTOMY      CHONDROPLASTY OF KNEE Right 3/10/2020    Procedure: CHONDROPLASTY, KNEE;  Surgeon: Les Schneider MD;  Location: [facility] OR;  Service: Orthopedics;  Laterality: Right;  Anterior compartment     COLONOSCOPY N/A 9/25/2018    Procedure: COLONOSCOPY;  Surgeon: Bethel Carmona MD;  Location: [facility] ENDO;  Service: Endoscopy;  Laterality: N/A;    EXCISION OF MEDIAL MENISCUS OF KNEE Right 3/10/2020    Procedure: MENISCECTOMY, KNEE, MEDIAL;  Surgeon: Les Schneider MD;  Location: [facility] OR;  Service: Orthopedics;  Laterality: Right;  Partial, Medial , Lateral     EYE SURGERY      INJECTION OF ANESTHETIC AGENT  AROUND NERVE Right 8/8/2019    Procedure: Right Genicular nerve block with local;  Surgeon: Manpreet Dutta MD;  Location: [facility] PAIN MGT;  Service: Pain Management;  Laterality: Right;    INJECTION OF ANESTHETIC AGENT INTO SACROILIAC JOINT Bilateral 5/6/2020    Procedure: Bilateral Sacroiliac Joint Injection;  Surgeon: Manpreet Dutta MD;  Location: [facility] PAIN MGT;  Service: Pain Management;  Laterality: Bilateral;    INJECTION OF ANESTHETIC AGENT INTO SACROILIAC JOINT Bilateral 10/8/2020    Procedure: Bilateral SI and Bilateral GTB with RN IV sedation;  Surgeon: Manpreet Dutta MD;  Location: [facility] PAIN MGT;  Service: Pain Management;  Laterality: Bilateral;    INJECTION OF ANESTHETIC AGENT INTO SACROILIAC JOINT Bilateral 1/5/2021    Procedure: Bilateral BLOCK, SACROILIAC JOINT and Bilateral GTB witn RN IV sedation;  Surgeon: Daniel Burns MD;  Location: [facility] PAIN MGT;  Service: Pain Management;  Laterality: Bilateral;    INJECTION OF ANESTHETIC AGENT INTO SACROILIAC JOINT Bilateral 7/8/2021    Procedure: Bilateral BLOCK, SACROILIAC JOINT bilateral GTB RN IV sedation;  Surgeon: Manpreet Dutta MD;  Location: [facility] PAIN MGT;  Service: Pain Management;  Laterality: Bilateral;    INJECTION OF ANESTHETIC AGENT INTO SACROILIAC JOINT Bilateral 3/1/2022    Procedure: Bilateral BLOCK, SACROILIAC JOINT and Bilateral GTB RN IV sedation;  Surgeon: Manpreet Dutta MD;  Location: [facility] PAIN MGT;  Service: Pain Management;  Laterality: Bilateral;    INJECTION OF JOINT Bilateral 9/5/2019    Procedure: Bilateral GT bursa injection;  Surgeon: Manpreet Dutta MD;  Location: [facility] PAIN MGT;  Service: Pain Management;  Laterality: Bilateral;    INJECTION OF JOINT Bilateral 5/6/2020    Procedure: Bilateral GT bursa injection;  Surgeon: Manpreet Dutta MD;  Location: [facility] PAIN MGT;  Service: Pain Management;  Laterality: Bilateral;    INJECTION OF JOINT Right 4/28/2022    Procedure: Injection, Joint Right Hip Injection RN IV sedation;  Surgeon: Manpreet  MD Luzmaria;  Location: [facility] PAIN MGT;  Service: Pain Management;  Laterality: Right;    KNEE ARTHROSCOPY Bilateral     PARS PLANA VITRECTOMY W/ REPAIR OF MACULAR HOLE Left 2017    RADIOFREQUENCY THERMOCOAGULATION Left 2019    Procedure: Right SIJ RFA;  Surgeon: Manpreet Dutta MD;  Location: [facility] PAIN MGT;  Service: Pain Management;  Laterality: Left;    RADIOFREQUENCY THERMOCOAGULATION Right 2019    Procedure: Right SIJ RFA;  Surgeon: Manpreet Dutta MD;  Location: [facility] PAIN MGT;  Service: Pain Management;  Laterality: Right;    SHOULDER ARTHROSCOPY Right 06/04/15     Family History   Problem Relation Age of Onset    Heart disease Mother         A-Fib    Glaucoma Mother     Cataracts Mother     Cancer Mother         colon    Arthritis Mother         : 17    Depression Mother     Kidney disease Daughter         ESRD    Anesthesia problems Daughter         cardiac arrest during nephrectomy    Birth defects Daughter         Renal    Depression Daughter     Learning disabilities Daughter         Dyslexia, ADD    Diabetes Maternal Grandmother     Heart disease Maternal Grandmother         Congestive heart failure    Alcohol abuse Maternal Grandmother         Death: 84    Depression Maternal Grandmother     Hypertension Maternal Grandmother     Diabetes Maternal Grandfather     Cancer Maternal Grandfather     Alcohol abuse Maternal Grandfather          1964    Breast cancer Paternal Aunt     Depression Brother     Depression Son     Learning disabilities Son         ADD & ADHD    Diabetes Maternal Aunt     Diabetes Maternal Uncle         . 19     Social History     Socioeconomic History    Marital status:    Occupational History     Employer: [employer]   Tobacco Use    Smoking status: Never Smoker    Smokeless tobacco: Never Used    Tobacco comment: Never smoked   Substance and Sexual Activity    Alcohol use: Not Currently      Alcohol/week: 0.0 standard drinks     Comment: Couple times per year    Drug use: No    Sexual activity: Not Currently   Social History Narrative    . 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     Medication List with Changes/Refills   New Medications    DICLOFENAC SODIUM (VOLTAREN) 1 % GEL    Apply 2 g topically 4 (four) times daily.    GABAPENTIN (NEURONTIN) 300 MG CAPSULE    Take 1 capsule (300 mg total) by mouth 3 (three) times daily.   Current Medications    BIOTIN 1 MG TABLET    Take 1,000 mcg by mouth every morning.     CELECOXIB (CELEBREX) 200 MG CAPSULE    TAKE 1 CAPSULE(200 MG) BY MOUTH TWICE DAILY WITH FOOD    DICLOFENAC SODIUM (VOLTAREN) 1 % GEL    Apply 4 g topically 3 (three) times daily as needed (PAIN).    EZETIMIBE (ZETIA) 10 MG TABLET    Take 1 tablet (10 mg total) by mouth once daily.    FLUOXETINE 40 MG CAPSULE    TAKE 1 CAPSULE(40 MG) BY MOUTH EVERY DAY    FLUTICASONE (FLONASE) 50 MCG/ACTUATION NASAL SPRAY    2 sprays by Each Nare route once daily.    FUROSEMIDE (LASIX) 20 MG TABLET    Take 1 tablet (20 mg total) by mouth every Mon, Wed, Fri.    GABAPENTIN (NEURONTIN) 300 MG CAPSULE    Take 1 capsule (300 mg total) by mouth 3 (three) times daily.    GABAPENTIN (NEURONTIN) 300 MG CAPSULE    TAKE 1 CAPSULE(300 MG) BY MOUTH THREE TIMES DAILY    INVOKANA 100 MG TAB TABLET    TAKE 1 TABLET(100 MG) BY MOUTH EVERY DAY    LORAZEPAM (ATIVAN) 1 MG TABLET    TK 3 TS PO 1 HOUR PRIOR TO APPOINTMENT    LOSARTAN (COZAAR) 25 MG TABLET    TAKE 1 TABLET(25 MG) BY MOUTH EVERY DAY    METFORMIN (GLUCOPHAGE) 1000 MG TABLET    Take 1 tablet (1,000 mg total) by mouth 2 (two) times daily with meals.    OMEPRAZOLE (PRILOSEC) 10 MG CAPSULE    Take 1 capsule (10 mg total) by mouth daily as needed (w/ NSAID).    POTASSIUM CHLORIDE SA (K-DUR,KLOR-CON) 10 MEQ TABLET    Take 1 tablet (10 mEq total) by mouth once daily.    PULSE OXIMETER (PULSE OXIMETER) DEVICE    by Apply Externally route 2 (two) times a day. Use twice  "daily at 8 AM and 3 PM and record the value in MyChart as directed.    TRIAMCINOLONE ACETONIDE 0.025% (KENALOG) 0.025 % OINT    Apply topically 2 (two) times daily. for 10 days    VERAPAMIL (CALAN) 120 MG TABLET    Take 1 tablet (120 mg total) by mouth 3 (three) times daily.     Review of patient's allergies indicates:   Allergen Reactions    Demerol [meperidine] Other (See Comments)     Burning when adm IV  Able to tolerate IM, "Turned the veins in my hand purple."    Latex, natural rubber Other (See Comments)     "Burns my skin",  Symptoms get worse the longer she is exposed    Zocor [simvastatin] Other (See Comments)     Tightening of muscles    Statins-hmg-coa reductase inhibitors Other (See Comments)     myopathy    Sulfa (sulfonamide antibiotics)      Blurred vision     Review of Systems   Constitutional: Negative for decreased appetite.   HENT: Negative for tinnitus.    Eyes: Negative for double vision.   Cardiovascular: Negative for chest pain.   Respiratory: Negative for wheezing.    Hematologic/Lymphatic: Negative for bleeding problem.   Skin: Negative for dry skin.   Musculoskeletal: Positive for arthritis, back pain, joint pain, neck pain and stiffness. Negative for gout.   Gastrointestinal: Negative for abdominal pain.   Genitourinary: Negative for bladder incontinence.   Neurological: Negative for numbness, paresthesias and sensory change.   Psychiatric/Behavioral: Negative for altered mental status.       Objective:   Body mass index is 31.76 kg/m².  There were no vitals filed for this visit.       General    Constitutional: She is oriented to person, place, and time. She appears well-developed.   HENT:   Head: Atraumatic.   Eyes: EOM are normal.   Cardiovascular: Normal rate.    Pulmonary/Chest: Effort normal.   Abdominal: Soft.   Neurological: She is alert and oriented to person, place, and time.   Psychiatric: Judgment normal.            Cervical spine with slight limitation of rotation with " clicking in the neck. Some mild discomfort to extreme rotation.  Bilateral upper extremity neurovascularly intact. Radial ulnar pulses 2+.  Sensory intact to touch.  Motor strength is 5/5 throughout.  Right thumb with triggering and pain over the volar aspect at the metacarpophalangeal joint.  Left shoulder flexion 140 abduction 100 with positive impingement sign.  Pain in the infraspinatus fossa posteriorly to palpation very mild anteriorly and laterally slight weakness to resistive abduction.  Tenderness over the coracoid process anteriorly.  Right shoulder flexion 160 abduction 120 with mild positive impingement sign.  There is some tenderness over the lateral deltoid to palpation.  External rotation and internal rotation is within normal limits  A lumbar with tenderness around L4-5 paraspinal and mostly to words the right side.  Pelvis is level  Straight leg raising slightly positive on the left negative on the right  Passive hip motion within normal limits.  No pain in the groin .  Mild pain over the greater trochanters to touch bilaterally.  Hip flexors, abductors, adductors, quads, hamstrings, ankle extensors and flexors all 5/5 and even bilaterally.  And left knee full motion collaterals and cruciate stable negative Kaleigh's negative pain to palpation.  Surgical scar from knee scope done years ago.  Right knee with mild to moderate swelling.  Surgical scars healed well.  Crepitus with motion anteriorly.  Painful to any twisting motion.  Collaterals and cruciates are stable to valgus varus stressing as well as anterior posterior drawer. Range of motion 0-120 degrees.  Calves are soft nontender.  Multiple varicositiesNo pitting edema  EHL 5/5 plantar flexion 5/5.  DP 1+ PT 1+  Skin is warm to touch no obvious lesions    Relevant imaging results reviewed and interpreted by me, discussed with the patient and / or family today     X-ray 02/22/2021 left shoulder with type 2 acromion and very mild degenerative  changes in the glenohumeral joint.  X-ray 02/22/2021 knees bilaterally showing left knee patellofemoral osteophyte however joint space very well maintained, right knee with patellofemoral osteophytes and lateral joint almost complete loss of space with small osteophytic changes  X-ray from 2019 bilateral knees with mild medial joint narrowing and small osteophytic changes  MRI reviewed with the patient showed her the pictures as well as reviewed the report consistent with medial and lateral meniscus stairs, patellofemoral chondromalacia as well as medially    MRI left shoulder in the system showing loose body in the inferior recess, acromioclavicular joint arthritis and calcific tendinitis  Assessment:     Encounter Diagnoses   Name Primary?    Calcific tendinitis of left shoulder Yes    Trigger thumb of right hand     Chondromalacia of right knee     Acute medial meniscus tear of right knee, subsequent encounter     Acute lateral meniscus tear of right knee, subsequent encounter     Greater trochanteric bursitis of both hips     Arthritis of left shoulder region         Plan:   Calcific tendinitis of left shoulder  -     Large Joint Aspiration/Injection: L subacromial bursa    Trigger thumb of right hand    Chondromalacia of right knee  -     Large Joint Aspiration/Injection: R knee    Acute medial meniscus tear of right knee, subsequent encounter    Acute lateral meniscus tear of right knee, subsequent encounter    Greater trochanteric bursitis of both hips    Arthritis of left shoulder region    Other orders  -     diclofenac sodium (VOLTAREN) 1 % Gel; Apply 2 g topically 4 (four) times daily.  Dispense: 1 each; Refill: 6  -     gabapentin (NEURONTIN) 300 MG capsule; Take 1 capsule (300 mg total) by mouth 3 (three) times daily.  Dispense: 90 capsule; Refill: 11         Patient Instructions   Right knee injected with 80 mg Depo-Medrol mixed with 5 cc 1% lidocaine 06/20/2022  Injected the left shoulder  subacromial space with 80 mg Depo-Medrol mixed with 5 cc 1% lidocaine 06/20/2022  Ice the shoulder in the knee if the bothers her the next few days in might burn  In might make you a little hungry the next few days because you received 2 injections and try to stay away from carbohydrates for a week or so so your sugars wall not go up  Continue with the Voltaren gel  You may take Tylenol 325 mg 2 tablets not to exceed twice a day as needed  Continue with the Celebrex  I will see you back in 6 months    All questions asked and answered     Kellgren Jaswinder scale 2.  Disclaimer: This note was prepared using a voice recognition system and is likely to have sound alike errors within the text.

## 2022-06-20 NOTE — PATIENT INSTRUCTIONS
Right knee injected with 80 mg Depo-Medrol mixed with 5 cc 1% lidocaine 06/20/2022  Injected the left shoulder subacromial space with 80 mg Depo-Medrol mixed with 5 cc 1% lidocaine 06/20/2022  Ice the shoulder in the knee if the bothers her the next few days in might burn  In might make you a little hungry the next few days because you received 2 injections and try to stay away from carbohydrates for a week or so so your sugars wall not go up  Continue with the Voltaren gel  You may take Tylenol 325 mg 2 tablets not to exceed twice a day as needed  Continue with the Celebrex  I will see you back in 6 months

## 2022-06-20 NOTE — PROCEDURES
Large Joint Aspiration/Injection: L subacromial bursa    Date/Time: 6/20/2022 10:40 AM  Performed by: Les Schneider MD  Authorized by: Les Schneider MD     Consent Done?:  Yes (Verbal)  Site marked: the procedure site was marked    Timeout: prior to procedure the correct patient, procedure, and site was verified      Local anesthesia used?: Yes    Local anesthetic:  Lidocaine 1% without epinephrine  Approach:  Posterior  Location:  Shoulder  Site:  L subacromial bursa  Medications:  80 mg methylPREDNISolone acetate 80 mg/mL  Patient tolerance:  Patient tolerated the procedure well with no immediate complications

## 2022-07-26 ENCOUNTER — PES CALL (OUTPATIENT)
Dept: ADMINISTRATIVE | Facility: CLINIC | Age: 77
End: 2022-07-26
Payer: COMMERCIAL

## 2022-08-02 ENCOUNTER — PATIENT MESSAGE (OUTPATIENT)
Dept: INTERNAL MEDICINE | Facility: CLINIC | Age: 77
End: 2022-08-02
Payer: COMMERCIAL

## 2022-08-02 DIAGNOSIS — F32.9 CHRONIC MAJOR DEPRESSIVE DISORDER: Chronic | ICD-10-CM

## 2022-08-02 DIAGNOSIS — E78.5 HYPERLIPIDEMIA ASSOCIATED WITH TYPE 2 DIABETES MELLITUS: ICD-10-CM

## 2022-08-02 DIAGNOSIS — E11.69 HYPERLIPIDEMIA ASSOCIATED WITH TYPE 2 DIABETES MELLITUS: ICD-10-CM

## 2022-08-02 DIAGNOSIS — M79.7 FIBROMYALGIA: Chronic | ICD-10-CM

## 2022-08-02 RX ORDER — EZETIMIBE 10 MG/1
10 TABLET ORAL DAILY
Qty: 90 TABLET | Refills: 1 | Status: SHIPPED | OUTPATIENT
Start: 2022-08-02 | End: 2023-05-18

## 2022-08-02 RX ORDER — FLUOXETINE HYDROCHLORIDE 40 MG/1
40 CAPSULE ORAL DAILY
Qty: 90 CAPSULE | Refills: 1 | Status: SHIPPED | OUTPATIENT
Start: 2022-08-02 | End: 2023-04-05 | Stop reason: SDUPTHER

## 2022-08-04 ENCOUNTER — PATIENT MESSAGE (OUTPATIENT)
Dept: INTERNAL MEDICINE | Facility: CLINIC | Age: 77
End: 2022-08-04
Payer: COMMERCIAL

## 2022-08-08 ENCOUNTER — PATIENT OUTREACH (OUTPATIENT)
Dept: ADMINISTRATIVE | Facility: HOSPITAL | Age: 77
End: 2022-08-08
Payer: COMMERCIAL

## 2022-08-08 DIAGNOSIS — E66.9 DIABETES MELLITUS TYPE 2 IN OBESE: Primary | ICD-10-CM

## 2022-08-08 DIAGNOSIS — E11.69 DIABETES MELLITUS TYPE 2 IN OBESE: Primary | ICD-10-CM

## 2022-08-08 NOTE — PROGRESS NOTES
Working Diabetes.    Pt has lab appt scheduled, 8/11/22 with follow up on 8/18/22.  Micro albumin urine ordered and linked to appt.

## 2022-08-11 ENCOUNTER — LAB VISIT (OUTPATIENT)
Dept: LAB | Facility: HOSPITAL | Age: 77
End: 2022-08-11
Attending: FAMILY MEDICINE
Payer: MEDICARE

## 2022-08-11 DIAGNOSIS — E78.5 HYPERLIPIDEMIA ASSOCIATED WITH TYPE 2 DIABETES MELLITUS: ICD-10-CM

## 2022-08-11 DIAGNOSIS — E11.69 HYPERLIPIDEMIA ASSOCIATED WITH TYPE 2 DIABETES MELLITUS: ICD-10-CM

## 2022-08-11 DIAGNOSIS — E11.69 DIABETES MELLITUS TYPE 2 IN OBESE: ICD-10-CM

## 2022-08-11 DIAGNOSIS — E66.9 DIABETES MELLITUS TYPE 2 IN OBESE: ICD-10-CM

## 2022-08-11 LAB
ALBUMIN SERPL BCP-MCNC: 3.4 G/DL (ref 3.5–5.2)
ALBUMIN/CREAT UR: 61.8 UG/MG (ref 0–30)
ALP SERPL-CCNC: 51 U/L (ref 55–135)
ALT SERPL W/O P-5'-P-CCNC: 15 U/L (ref 10–44)
ANION GAP SERPL CALC-SCNC: 10 MMOL/L (ref 8–16)
AST SERPL-CCNC: 19 U/L (ref 10–40)
BILIRUB SERPL-MCNC: 0.4 MG/DL (ref 0.1–1)
BUN SERPL-MCNC: 16 MG/DL (ref 8–23)
CALCIUM SERPL-MCNC: 9.9 MG/DL (ref 8.7–10.5)
CHLORIDE SERPL-SCNC: 106 MMOL/L (ref 95–110)
CHOLEST SERPL-MCNC: 195 MG/DL (ref 120–199)
CHOLEST/HDLC SERPL: 3.2 {RATIO} (ref 2–5)
CO2 SERPL-SCNC: 28 MMOL/L (ref 23–29)
CREAT SERPL-MCNC: 0.8 MG/DL (ref 0.5–1.4)
CREAT UR-MCNC: 152 MG/DL (ref 15–325)
EST. GFR  (NO RACE VARIABLE): >60 ML/MIN/1.73 M^2
ESTIMATED AVG GLUCOSE: 151 MG/DL (ref 68–131)
GLUCOSE SERPL-MCNC: 128 MG/DL (ref 70–110)
HBA1C MFR BLD: 6.9 % (ref 4–5.6)
HDLC SERPL-MCNC: 61 MG/DL (ref 40–75)
HDLC SERPL: 31.3 % (ref 20–50)
LDLC SERPL CALC-MCNC: 112.8 MG/DL (ref 63–159)
MICROALBUMIN UR DL<=1MG/L-MCNC: 94 UG/ML
NONHDLC SERPL-MCNC: 134 MG/DL
POTASSIUM SERPL-SCNC: 4 MMOL/L (ref 3.5–5.1)
PROT SERPL-MCNC: 6.6 G/DL (ref 6–8.4)
SODIUM SERPL-SCNC: 144 MMOL/L (ref 136–145)
TRIGL SERPL-MCNC: 106 MG/DL (ref 30–150)

## 2022-08-11 PROCEDURE — 36415 COLL VENOUS BLD VENIPUNCTURE: CPT | Performed by: FAMILY MEDICINE

## 2022-08-11 PROCEDURE — 82570 ASSAY OF URINE CREATININE: CPT | Performed by: FAMILY MEDICINE

## 2022-08-11 PROCEDURE — 80053 COMPREHEN METABOLIC PANEL: CPT | Performed by: FAMILY MEDICINE

## 2022-08-11 PROCEDURE — 82043 UR ALBUMIN QUANTITATIVE: CPT | Performed by: FAMILY MEDICINE

## 2022-08-11 PROCEDURE — 83036 HEMOGLOBIN GLYCOSYLATED A1C: CPT | Performed by: FAMILY MEDICINE

## 2022-08-11 PROCEDURE — 80061 LIPID PANEL: CPT | Performed by: FAMILY MEDICINE

## 2022-08-18 ENCOUNTER — IMMUNIZATION (OUTPATIENT)
Dept: PRIMARY CARE CLINIC | Facility: CLINIC | Age: 77
End: 2022-08-18
Payer: MEDICARE

## 2022-08-18 ENCOUNTER — OFFICE VISIT (OUTPATIENT)
Dept: INTERNAL MEDICINE | Facility: CLINIC | Age: 77
End: 2022-08-18
Payer: MEDICARE

## 2022-08-18 ENCOUNTER — TELEPHONE (OUTPATIENT)
Dept: INTERNAL MEDICINE | Facility: CLINIC | Age: 77
End: 2022-08-18

## 2022-08-18 VITALS
DIASTOLIC BLOOD PRESSURE: 60 MMHG | HEART RATE: 96 BPM | BODY MASS INDEX: 31.49 KG/M2 | WEIGHT: 219.94 LBS | OXYGEN SATURATION: 94 % | TEMPERATURE: 96 F | HEIGHT: 70 IN | SYSTOLIC BLOOD PRESSURE: 126 MMHG

## 2022-08-18 DIAGNOSIS — E11.59 HYPERTENSION ASSOCIATED WITH DIABETES: Primary | Chronic | ICD-10-CM

## 2022-08-18 DIAGNOSIS — M79.7 FIBROMYALGIA: Chronic | ICD-10-CM

## 2022-08-18 DIAGNOSIS — Z23 NEED FOR VACCINATION: Primary | ICD-10-CM

## 2022-08-18 DIAGNOSIS — Z12.31 ENCOUNTER FOR SCREENING MAMMOGRAM FOR BREAST CANCER: ICD-10-CM

## 2022-08-18 DIAGNOSIS — E78.5 HYPERLIPIDEMIA ASSOCIATED WITH TYPE 2 DIABETES MELLITUS: ICD-10-CM

## 2022-08-18 DIAGNOSIS — M60.9 STATIN-INDUCED MYOSITIS: ICD-10-CM

## 2022-08-18 DIAGNOSIS — E66.9 DIABETES MELLITUS TYPE 2 IN OBESE: ICD-10-CM

## 2022-08-18 DIAGNOSIS — K21.9 GASTROESOPHAGEAL REFLUX DISEASE WITHOUT ESOPHAGITIS: ICD-10-CM

## 2022-08-18 DIAGNOSIS — E11.69 DIABETES MELLITUS TYPE 2 IN OBESE: ICD-10-CM

## 2022-08-18 DIAGNOSIS — I15.2 HYPERTENSION ASSOCIATED WITH DIABETES: Primary | Chronic | ICD-10-CM

## 2022-08-18 DIAGNOSIS — F32.9 CHRONIC MAJOR DEPRESSIVE DISORDER: Chronic | ICD-10-CM

## 2022-08-18 DIAGNOSIS — T46.6X5A STATIN-INDUCED MYOSITIS: ICD-10-CM

## 2022-08-18 DIAGNOSIS — M53.3 SACROILIAC JOINT DYSFUNCTION: ICD-10-CM

## 2022-08-18 DIAGNOSIS — E11.69 HYPERLIPIDEMIA ASSOCIATED WITH TYPE 2 DIABETES MELLITUS: ICD-10-CM

## 2022-08-18 PROBLEM — E11.65 UNCONTROLLED TYPE 2 DIABETES MELLITUS WITH HYPERGLYCEMIA: Status: RESOLVED | Noted: 2021-05-10 | Resolved: 2022-08-18

## 2022-08-18 PROBLEM — Z12.11 SPECIAL SCREENING FOR MALIGNANT NEOPLASMS, COLON: Status: RESOLVED | Noted: 2018-09-25 | Resolved: 2022-08-18

## 2022-08-18 PROBLEM — M46.1 SACROILIITIS: Status: RESOLVED | Noted: 2021-01-05 | Resolved: 2022-08-18

## 2022-08-18 PROBLEM — Z12.11 COLON CANCER SCREENING: Status: RESOLVED | Noted: 2018-09-24 | Resolved: 2022-08-18

## 2022-08-18 PROCEDURE — 0054A COVID-19, MRNA, LNP-S, PF, 30 MCG/0.3 ML DOSE VACCINE (PFIZER): CPT | Mod: CV19,PBBFAC | Performed by: FAMILY MEDICINE

## 2022-08-18 PROCEDURE — 99214 PR OFFICE/OUTPT VISIT, EST, LEVL IV, 30-39 MIN: ICD-10-PCS | Mod: S$PBB,,, | Performed by: PHYSICIAN ASSISTANT

## 2022-08-18 PROCEDURE — 99214 OFFICE O/P EST MOD 30 MIN: CPT | Mod: S$PBB,,, | Performed by: PHYSICIAN ASSISTANT

## 2022-08-18 PROCEDURE — 99215 OFFICE O/P EST HI 40 MIN: CPT | Mod: PBBFAC | Performed by: PHYSICIAN ASSISTANT

## 2022-08-18 PROCEDURE — 99999 PR PBB SHADOW E&M-EST. PATIENT-LVL V: CPT | Mod: PBBFAC,,, | Performed by: PHYSICIAN ASSISTANT

## 2022-08-18 PROCEDURE — 99999 PR PBB SHADOW E&M-EST. PATIENT-LVL V: ICD-10-PCS | Mod: PBBFAC,,, | Performed by: PHYSICIAN ASSISTANT

## 2022-08-18 RX ORDER — OMEPRAZOLE 20 MG/1
20 CAPSULE, DELAYED RELEASE ORAL DAILY PRN
Qty: 30 CAPSULE | Refills: 5 | Status: SHIPPED | OUTPATIENT
Start: 2022-08-18 | End: 2023-10-30

## 2022-08-18 NOTE — PROGRESS NOTES
Subjective:       Patient ID: Kaylin Mccollum is a 77 y.o. female.    Chief Complaint: Follow-up      Patient Care Team:  Lisette Olvera MD as PCP - General (Family Medicine)  Yesenia Moore LPN as Care Coordinator (Internal Medicine)  Bhavesh Mccurdy OD as Consulting Physician (Optometry)  Paola Honeycutt LPN as Care Coordinator  Alea Quezada PA-C as Physician Assistant (Internal Medicine)    Patient presents to clinic today for followup of chronic conditions.      Review of Systems   Constitutional: Negative for chills, fatigue, fever and unexpected weight change.   Eyes: Negative for visual disturbance.   Respiratory: Negative for shortness of breath.    Cardiovascular: Negative for chest pain.   Musculoskeletal: Negative for myalgias.   Neurological: Negative for headaches.       Objective:      Physical Exam  Vitals and nursing note reviewed.   Constitutional:       General: She is not in acute distress.     Appearance: She is well-developed.   HENT:      Head: Normocephalic and atraumatic.   Eyes:      General: Lids are normal. No scleral icterus.     Extraocular Movements: Extraocular movements intact.      Conjunctiva/sclera: Conjunctivae normal.   Cardiovascular:      Rate and Rhythm: Normal rate and regular rhythm.   Pulmonary:      Effort: Pulmonary effort is normal.      Breath sounds: Normal breath sounds. No decreased breath sounds, wheezing, rhonchi or rales.   Neurological:      Mental Status: She is alert.      Cranial Nerves: No cranial nerve deficit.   Psychiatric:         Mood and Affect: Mood and affect normal.         Protective Sensation (w/ 10 gram monofilament):  Right: Intact  Left: Intact    Visual Inspection:  Dry Skin -  Bilateral and Onychomycosis -  Bilateral    Pedal Pulses:   Right: Present  Left: Present    Posterior tibialis:   Right:Present  Left: Present      Assessment:       1. Hypertension associated with diabetes    2. Hyperlipidemia  associated with type 2 diabetes mellitus    3. Diabetes mellitus type 2 in obese    4. Chronic major depressive disorder    5. Gastroesophageal reflux disease without esophagitis    6. Sacroiliac joint dysfunction    7. Fibromyalgia    8. Statin-induced myositis    9. Encounter for screening mammogram for breast cancer        Plan:   1. Hypertension associated with diabetes  Assessment & Plan:  /60, controlled, continue losartan, Lasix and verapamil  Lab Results   Component Value Date     08/11/2022    K 4.0 08/11/2022    BUN 16 08/11/2022    CREATININE 0.8 08/11/2022    ESTGFRAFRICA >60.0 02/09/2022    EGFRNONAA >60.0 02/09/2022    EGFRNORACEVR >60.0 08/11/2022       Orders:  -     TSH  -     CBC Auto Differential    2. Hyperlipidemia associated with type 2 diabetes mellitus  Assessment & Plan:  Controlled on Zetia, statin induced myositis tones statin intolerant  Lab Results   Component Value Date    CHOL 195 08/11/2022    CHOL 222 (H) 02/09/2022    LDLCALC 112.8 08/11/2022    LDLCALC 141.6 02/09/2022    TRIG 106 08/11/2022    HDL 61 08/11/2022    ALT 15 08/11/2022    AST 19 08/11/2022    ALKPHOS 51 (L) 08/11/2022       Orders:  -     Comprehensive Metabolic Panel  -     Lipid Panel    3. Diabetes mellitus type 2 in obese  Assessment & Plan:  Controlled, continue Invokana, metformin  Lab Results   Component Value Date    HGBA1C 6.9 (H) 08/11/2022    HGBA1C 7.2 (H) 02/09/2022    LDLCALC 112.8 08/11/2022    LABMICR 94.0 08/11/2022    CREATRANDUR 152.0 08/11/2022    MICALBCREAT 61.8 (H) 08/11/2022       Orders:  -     Ambulatory referral/consult to Optometry  -     Hemoglobin A1C    4. Chronic major depressive disorder  Assessment & Plan:  Stable on Prozac      5. Gastroesophageal reflux disease without esophagitis  -     omeprazole (PRILOSEC) 20 MG capsule    6. Sacroiliac joint dysfunction  Overview:  Followed by Pain Management, continue current treatment plan      7. Fibromyalgia  Assessment &  Plan:  Followed by Pain Management and Rheumatology, continue current treatment plan      8. Statin-induced myositis    9. Encounter for screening mammogram for breast cancer  -     Mammo Digital Screening Bilat w/ Evan      Recent labs reviewed with patient.    Discussed Covid booster, scheduling information given.  Annual with Dr. Olvera scheduled with fasting labs PTA  MMG scheduled  Eye appt scheduled  Health Maintenance reviewed/updated.

## 2022-08-18 NOTE — PATIENT INSTRUCTIONS
The FDA determined that the known and potential benefits of a second COVID-19 booster outweigh any known or potential risks. During the recent Omicron surge, those who were boosted were 21 times less likely to die from COVID-19 compared to those who were unvaccinated, and seven times less likely to be hospitalized.    Eligibility criteria for a second booster dose include:  Individuals 50 years of age and older at least four months after receipt of a first booster dose of any approved COVID-19 vaccine  Immunocompromised individuals 12 years of age and older at least four months after receipt of a first booster dose of any approved COVID-19    These individuals are eligible for a second booster dose regardless of which COVID-19 vaccine they received for their initial series. While all of the available COVID-19 vaccines were approved for the first booster dose, only Pfizer and Moderna have been approved as options for the second booster dose.  Pfizer is authorized for individuals 12 years of age and older  Moderna is authorized for individuals 18 years of age and older    Second booster shot appointments can be scheduled via [facility] or by calling [phone]. Walk-ins are also accepted.

## 2022-08-19 NOTE — ASSESSMENT & PLAN NOTE
Controlled on Zetia, statin induced myositis tones statin intolerant  Lab Results   Component Value Date    CHOL 195 08/11/2022    CHOL 222 (H) 02/09/2022    LDLCALC 112.8 08/11/2022    LDLCALC 141.6 02/09/2022    TRIG 106 08/11/2022    HDL 61 08/11/2022    ALT 15 08/11/2022    AST 19 08/11/2022    ALKPHOS 51 (L) 08/11/2022

## 2022-08-19 NOTE — ASSESSMENT & PLAN NOTE
Controlled, continue Invokana, metformin  Lab Results   Component Value Date    HGBA1C 6.9 (H) 08/11/2022    HGBA1C 7.2 (H) 02/09/2022    LDLCALC 112.8 08/11/2022    LABMICR 94.0 08/11/2022    CREATRANDUR 152.0 08/11/2022    MICALBCREAT 61.8 (H) 08/11/2022

## 2022-08-19 NOTE — ASSESSMENT & PLAN NOTE
/60, controlled, continue losartan, Lasix and verapamil  Lab Results   Component Value Date     08/11/2022    K 4.0 08/11/2022    BUN 16 08/11/2022    CREATININE 0.8 08/11/2022    ESTGFRAFRICA >60.0 02/09/2022    EGFRNONAA >60.0 02/09/2022    EGFRNORACEVR >60.0 08/11/2022

## 2022-09-13 ENCOUNTER — PATIENT MESSAGE (OUTPATIENT)
Dept: OPHTHALMOLOGY | Facility: CLINIC | Age: 77
End: 2022-09-13

## 2022-09-13 ENCOUNTER — OFFICE VISIT (OUTPATIENT)
Dept: OPHTHALMOLOGY | Facility: CLINIC | Age: 77
End: 2022-09-13
Payer: MEDICARE

## 2022-09-13 DIAGNOSIS — H52.7 REFRACTIVE ERRORS: ICD-10-CM

## 2022-09-13 DIAGNOSIS — E11.9 DIABETES MELLITUS TYPE 2 WITHOUT RETINOPATHY: Primary | ICD-10-CM

## 2022-09-13 DIAGNOSIS — E66.9 DIABETES MELLITUS TYPE 2 IN OBESE: ICD-10-CM

## 2022-09-13 DIAGNOSIS — Z96.1 PSEUDOPHAKIA OF BOTH EYES: ICD-10-CM

## 2022-09-13 DIAGNOSIS — H35.342 MACULAR HOLE, LEFT: ICD-10-CM

## 2022-09-13 DIAGNOSIS — E11.69 DIABETES MELLITUS TYPE 2 IN OBESE: ICD-10-CM

## 2022-09-13 PROCEDURE — 92015 PR REFRACTION: ICD-10-PCS | Mod: ,,, | Performed by: OPTOMETRIST

## 2022-09-13 PROCEDURE — 99213 OFFICE O/P EST LOW 20 MIN: CPT | Mod: PBBFAC | Performed by: OPTOMETRIST

## 2022-09-13 PROCEDURE — 99999 PR PBB SHADOW E&M-EST. PATIENT-LVL III: CPT | Mod: PBBFAC,,, | Performed by: OPTOMETRIST

## 2022-09-13 PROCEDURE — 92014 PR EYE EXAM, EST PATIENT,COMPREHESV: ICD-10-PCS | Mod: S$PBB,,, | Performed by: OPTOMETRIST

## 2022-09-13 PROCEDURE — 92014 COMPRE OPH EXAM EST PT 1/>: CPT | Mod: S$PBB,,, | Performed by: OPTOMETRIST

## 2022-09-13 PROCEDURE — 92015 DETERMINE REFRACTIVE STATE: CPT | Mod: ,,, | Performed by: OPTOMETRIST

## 2022-09-13 PROCEDURE — 99999 PR PBB SHADOW E&M-EST. PATIENT-LVL III: ICD-10-PCS | Mod: PBBFAC,,, | Performed by: OPTOMETRIST

## 2022-09-13 NOTE — PROGRESS NOTES
HPI    NIDDM exam  Hard to focus from near to distance sometimes  New patient to [facility].  Last eye exam 07/21/2020 DNL.  Update glasses RX.  Lab Results       Component                Value               Date                       HGBA1C                   6.9 (H)             08/11/2022              Last edited by Kavitha Lopez MA on 9/13/2022  1:21 PM.            Assessment /Plan     For exam results, see Encounter Report.    Diabetes mellitus type 2 without retinopathy    Diabetes mellitus type 2 in obese  -     Ambulatory referral/consult to Optometry    Pseudophakia of both eyes    Macular hole, left    Refractive errors      No Background Diabetic Retinopathy    Stable IOL OU.    Unchanged macular hole OS    Dispense Final Rx for glasses.  RTC 1 year  Discussed above and answered questions.

## 2022-10-03 ENCOUNTER — TELEPHONE (OUTPATIENT)
Dept: PAIN MEDICINE | Facility: CLINIC | Age: 77
End: 2022-10-03
Payer: MEDICARE

## 2022-10-03 NOTE — TELEPHONE ENCOUNTER
SUBJECTIVE:   CC: Pradeep Reich is an 65 [place], Samir R,  ----- Message from Rossana Das sent at 10/3/2022  3:10 PM CDT -----  Contact: Self/[phone]  Pt is calling in regards to getting an appointment today or tomorrow for an injection, she states that she is in pain in her lower back , right leg, and hip. Please give her a call back at [phone]. Thank you s/g      "Evens Cummings MD  [facility]  "

## 2022-10-03 NOTE — TELEPHONE ENCOUNTER
Pt was scheduled for 10/4 for virtual visit that Dr Dumont. Pt was notified.  .Josette Velasquez [facility] Center[facility]RA

## 2022-10-04 ENCOUNTER — PATIENT MESSAGE (OUTPATIENT)
Dept: PAIN MEDICINE | Facility: CLINIC | Age: 77
End: 2022-10-04

## 2022-10-04 ENCOUNTER — OFFICE VISIT (OUTPATIENT)
Dept: PAIN MEDICINE | Facility: CLINIC | Age: 77
End: 2022-10-04
Payer: MEDICARE

## 2022-10-04 DIAGNOSIS — M47.816 LUMBAR FACET ARTHROPATHY: ICD-10-CM

## 2022-10-04 DIAGNOSIS — M53.3 SACROILIAC JOINT PAIN: Primary | ICD-10-CM

## 2022-10-04 DIAGNOSIS — M54.16 LUMBAR RADICULOPATHY, CHRONIC: ICD-10-CM

## 2022-10-04 DIAGNOSIS — M70.60 GREATER TROCHANTERIC BURSITIS, UNSPECIFIED LATERALITY: ICD-10-CM

## 2022-10-04 PROCEDURE — 99214 OFFICE O/P EST MOD 30 MIN: CPT | Mod: 95,,, | Performed by: ANESTHESIOLOGY

## 2022-10-04 PROCEDURE — 99214 PR OFFICE/OUTPT VISIT, EST, LEVL IV, 30-39 MIN: ICD-10-PCS | Mod: 95,,, | Performed by: ANESTHESIOLOGY

## 2022-10-04 RX ORDER — GABAPENTIN 300 MG/1
300 CAPSULE ORAL 4 TIMES DAILY
Qty: 360 CAPSULE | Refills: 0 | Status: SHIPPED | OUTPATIENT
Start: 2022-10-04 | End: 2022-11-01 | Stop reason: DRUGHIGH

## 2022-10-04 RX ORDER — GABAPENTIN 300 MG/1
300 CAPSULE ORAL 3 TIMES DAILY
Qty: 90 CAPSULE | Refills: 11 | Status: SHIPPED | OUTPATIENT
Start: 2022-10-04 | End: 2022-10-04

## 2022-10-04 NOTE — PROGRESS NOTES
Established Patient Chronic Pain Clinic Visit    The patient location is:  Home  The chief complaint leading to consultation is:  Back and leg pain  Visit type: Virtual visit with synchronous audio and video  Total time spent with patient:  20 minutes  Each patient to whom he or she provides medical services by telemedicine is: (1) informed of the relationship between the physician and patient and the respective role of any other health care provider with respect to management of the patient; and (2) notified that he or she may decline to receive medical services by telemedicine and may withdraw from such care at any time.    Chief Pain Complaint:  Back pain  Right knee pain       Interval history 10/04/2022  Ms. Mccollum is a 77-year-old female with past medical history significant for depression, hyperlipidemia, hypertension, mitral valve prolapse, peripheral vascular disease, history of COVID-19, type 2 diabetes, multi joint arthritis, fibromyalgia who presents to [facility] care, previous Dr. Dutta patient.  Today patient reports return of pain in the lower back which radiates into bilateral hips and down the posterior aspect of the right lower extremity in L4-5 distribution to the dorsum of the foot.  Patient reports pain is intermittent but has increased in intensity.  Pain is described as shocking in nature and today is rated a 6/10.  Patient reports she feels as if her skin is crawling.  patient is currently taking gabapentin 300 mg up to 3 times daily when pain is severe.  Pain interferes with the patient's sleep.  Patient also reports history of fibromyalgia which limits her mobility and duration of standing and ambulation.  Patient does endorse associated weakness in the lower extremities associated with her pain.  Patient is interested in pursuing repeat intervention as she is obtained several months of significant relief with prior sacroiliac joint and greater trochanteric bursa injections.  Patient has  continued physician directed physical therapy exercises at home daily.      History of Present Illness 01/16/2020: Dr. Dutta:   Kaylin Mccollum is a 74 y.o. female  who is presenting with a chief complaint of lumbar back pain. The patient began experiencing this problem insidiously, and the pain has been gradually worsening over the past 5 month(s). The pain is described as throbbing, shooting, burning and electrical and is located in the bilateral lumbar spine. Pain is intermittent and lasts hours. The pain radiates to bilateral lower extremities L4 distribudution. The patient rates her pain a 8 out of ten and interferes with activities of daily living a 7 out of ten. Pain is exacerbated by flexion of the lumbar spine, ambulation, and is improved by rest.      She also complains of right knee pain. The patient began experiencing this problem insidiously. The pain is described as cramping, aching and is located in the right knee. Pain is intermittent and lasts hours. The pain is nonradiating. The patient rates her pain a 8 out of ten and interferes with activities of daily living a 7 out of ten. Pain is exacerbated by getting up from a seated position and standing, and is improved by rest. Patient reports no prior trauma, prior arthroscopy bilaterally in 1990s.       - pertinent negatives: No fever, No chills, No weight loss, No bladder dysfunction, No bowel dysfunction, No saddle anesthesia  - pertinent positives: none        Pain Disability Index Review:   @REVFS(4749:3)@    Non-Pharmacologic Treatments:  Physical Therapy/Home Exercise: yes  Ice/Heat:yes  TENS: no  Acupuncture: no  Massage: no  Chiropractic: no    Other: no      Pain Medications:  - Adjuvant Medications: Lorazepam (Ativan), Neurontin (Gabapentin), and Topical Ointment (Voltaren Gel, Steroid cream, Anti-Inflammatory Cream, Compound cream)      Pain injections:  -04/20/2022: Right-sided acetabular femoral injection; Dr. Dutta  -03/01/2022:  "Bilateral sacroiliac joint and greater trochanteric bursa injection; Dr. Dutta  -07/08/2021: Bilateral sacroiliac joint and greater trochanteric bursa injection; Dr. Dutta  -01/05/2021: Bilateral sacroiliac joint and greater trochanteric bursa injection; Dr. Burns  -10/08/2020: Bilateral sacroiliac joint and bilateral greater trochanteric bursa injection; Dr. Dutta  -05/06/2020: Bilateral sacroiliac joint and greater trochanteric bursa injection; Dr. Dutta    Past Medical History:   Diagnosis Date    Arthritis     Cataract     Diabetes mellitus 1995    BS didn't check 09/13/2022    Diabetes mellitus, type 2     Fibromyalgia     General anesthetics causing adverse effect in therapeutic use     bradycardia     Hypertension     Migraines     Mitral valve prolapse     Osteoarthritis     Rotator cuff tear 04/01/2015       Review of patient's allergies indicates:   Allergen Reactions    Demerol [meperidine] Other (See Comments)     Burning when adm IV  Able to tolerate IM, "Turned the veins in my hand purple."    Latex, natural rubber Other (See Comments)     "Burns my skin",  Symptoms get worse the longer she is exposed    Zocor [simvastatin] Other (See Comments)     Tightening of muscles    Statins-hmg-coa reductase inhibitors Other (See Comments)     myopathy    Sulfa (sulfonamide antibiotics)      Blurred vision       Past Surgical History:   Procedure Laterality Date    ARTHROSCOPY OF KNEE Right 3/10/2020    Procedure: ARTHROSCOPY, KNEE;  Surgeon: Les Schneider MD;  Location: [facility] OR;  Service: Orthopedics;  Laterality: Right;    CATARACT EXTRACTION W/  INTRAOCULAR LENS IMPLANT Left 07/19/2017    CATARACT EXTRACTION W/  INTRAOCULAR LENS IMPLANT Right 2017    CHOLECYSTECTOMY      CHONDROPLASTY OF KNEE Right 3/10/2020    Procedure: CHONDROPLASTY, KNEE;  Surgeon: Les Schneider MD;  Location: [facility] OR;  Service: Orthopedics;  Laterality: Right;  Anterior compartment     COLONOSCOPY N/A 9/25/2018    " Procedure: COLONOSCOPY;  Surgeon: Bethel Carmona MD;  Location: [facility] ENDO;  Service: Endoscopy;  Laterality: N/A;    EXCISION OF MEDIAL MENISCUS OF KNEE Right 3/10/2020    Procedure: MENISCECTOMY, KNEE, MEDIAL;  Surgeon: Les Schneider MD;  Location: [facility] OR;  Service: Orthopedics;  Laterality: Right;  Partial, Medial , Lateral     EYE SURGERY      INJECTION OF ANESTHETIC AGENT AROUND NERVE Right 8/8/2019    Procedure: Right Genicular nerve block with local;  Surgeon: Manpreet Dutta MD;  Location: [facility] PAIN MGT;  Service: Pain Management;  Laterality: Right;    INJECTION OF ANESTHETIC AGENT INTO SACROILIAC JOINT Bilateral 5/6/2020    Procedure: Bilateral Sacroiliac Joint Injection;  Surgeon: Manpreet Dutta MD;  Location: [facility] PAIN MGT;  Service: Pain Management;  Laterality: Bilateral;    INJECTION OF ANESTHETIC AGENT INTO SACROILIAC JOINT Bilateral 10/8/2020    Procedure: Bilateral SI and Bilateral GTB with RN IV sedation;  Surgeon: Manpreet Dutta MD;  Location: [facility] PAIN MGT;  Service: Pain Management;  Laterality: Bilateral;    INJECTION OF ANESTHETIC AGENT INTO SACROILIAC JOINT Bilateral 1/5/2021    Procedure: Bilateral BLOCK, SACROILIAC JOINT and Bilateral GTB witn RN IV sedation;  Surgeon: Daniel Burns MD;  Location: [facility] PAIN MGT;  Service: Pain Management;  Laterality: Bilateral;    INJECTION OF ANESTHETIC AGENT INTO SACROILIAC JOINT Bilateral 7/8/2021    Procedure: Bilateral BLOCK, SACROILIAC JOINT bilateral GTB RN IV sedation;  Surgeon: Manpreet Dutta MD;  Location: [facility] PAIN MGT;  Service: Pain Management;  Laterality: Bilateral;    INJECTION OF ANESTHETIC AGENT INTO SACROILIAC JOINT Bilateral 3/1/2022    Procedure: Bilateral BLOCK, SACROILIAC JOINT and Bilateral GTB RN IV sedation;  Surgeon: Manpreet Dutta MD;  Location: [facility] PAIN MGT;  Service: Pain Management;  Laterality: Bilateral;    INJECTION OF JOINT Bilateral 9/5/2019    Procedure: Bilateral GT bursa injection;  Surgeon: Manpreet Dutta MD;   Location: [facility] PAIN MGT;  Service: Pain Management;  Laterality: Bilateral;    INJECTION OF JOINT Bilateral 5/6/2020    Procedure: Bilateral GT bursa injection;  Surgeon: Manpreet Dutta MD;  Location: [facility] PAIN MGT;  Service: Pain Management;  Laterality: Bilateral;    INJECTION OF JOINT Right 4/28/2022    Procedure: Injection, Joint Right Hip Injection RN IV sedation;  Surgeon: Manpreet Dutta MD;  Location: [facility] PAIN MGT;  Service: Pain Management;  Laterality: Right;    KNEE ARTHROSCOPY Bilateral     PARS PLANA VITRECTOMY W/ REPAIR OF MACULAR HOLE Left 02/22/2017    RADIOFREQUENCY THERMOCOAGULATION Left 7/11/2019    Procedure: Right SIJ RFA;  Surgeon: Manpreet Dutta MD;  Location: [facility] PAIN MGT;  Service: Pain Management;  Laterality: Left;    RADIOFREQUENCY THERMOCOAGULATION Right 7/25/2019    Procedure: Right SIJ RFA;  Surgeon: Manpreet Dutta MD;  Location: [facility] PAIN MGT;  Service: Pain Management;  Laterality: Right;    SHOULDER ARTHROSCOPY Right 06/04/15     Current Outpatient Medications on File Prior to Visit   Medication Sig Dispense Refill    biotin 1 mg tablet Take 1,000 mcg by mouth every morning.       celecoxib (CELEBREX) 200 MG capsule TAKE 1 CAPSULE(200 MG) BY MOUTH TWICE DAILY WITH FOOD 120 capsule 1    diclofenac sodium (VOLTAREN) 1 % Gel Apply 2 g topically 4 (four) times daily. 1 each 6    ezetimibe (ZETIA) 10 mg tablet Take 1 tablet (10 mg total) by mouth once daily. 90 tablet 1    FLUoxetine 40 MG capsule Take 1 capsule (40 mg total) by mouth once daily. 90 capsule 1    fluticasone (FLONASE) 50 mcg/actuation nasal spray 2 sprays by Each Nare route once daily. (Patient taking differently: 2 sprays by Each Nostril route nightly as needed.) 1 Bottle 0    furosemide (LASIX) 20 MG tablet Take 1 tablet (20 mg total) by mouth every Mon, Wed, Fri. 25 tablet 2    INVOKANA 100 mg Tab tablet TAKE 1 TABLET(100 MG) BY MOUTH EVERY DAY 90 tablet 3    LORazepam (ATIVAN) 1 MG tablet TK 3 TS PO 1 HOUR PRIOR TO  APPOINTMENT      losartan (COZAAR) 25 MG tablet TAKE 1 TABLET(25 MG) BY MOUTH EVERY DAY 90 tablet 2    metFORMIN (GLUCOPHAGE) 1000 MG tablet Take 1 tablet (1,000 mg total) by mouth 2 (two) times daily with meals. 180 tablet 3    omeprazole (PRILOSEC) 20 MG capsule Take 1 capsule (20 mg total) by mouth daily as needed (w/ NSAID). 30 capsule 5    potassium chloride SA (K-DUR,KLOR-CON) 10 MEQ tablet Take 1 tablet (10 mEq total) by mouth once daily. 20 tablet 5    pulse oximeter (PULSE OXIMETER) device by Apply Externally route 2 (two) times a day. Use twice daily at 8 AM and 3 PM and record the value in [employer] as directed. (Patient not taking: Reported on 8/18/2022) 1 each 0    triamcinolone acetonide 0.025% (KENALOG) 0.025 % Oint Apply topically 2 (two) times daily. for 10 days (Patient taking differently: Apply topically 2 (two) times daily as needed. ) 15 g 2    verapamiL (CALAN) 120 MG tablet Take 1 tablet (120 mg total) by mouth 3 (three) times daily. 180 tablet 5    [DISCONTINUED] gabapentin (NEURONTIN) 300 MG capsule Take 1 capsule (300 mg total) by mouth 3 (three) times daily. 90 capsule 11     No current facility-administered medications on file prior to visit.               GENERAL:  No weight loss, malaise or fevers.  HEENT:   No recent changes in vision or hearing  NECK:  Negative for lumps, no difficulty with swallowing.  RESPIRATORY:  Negative for cough, wheezing or shortness of breath, patient denies any recent URI.  CARDIOVASCULAR:  Negative for chest pain or palpitations.  GI:  Negative for abdominal discomfort, blood in stools or black stools or change in bowel habits.  MUSCULOSKELETAL:  See HPI.  SKIN:  Negative for lesions, rash, and itching.  PSYCH:  No mood disorder or recent psychosocial stressors.   HEMATOLOGY/LYMPHOLOGY:  Negative for prolonged bleeding, bruising easily or swollen nodes.    NEURO:   No history of syncope, paralysis, seizures or tremors.  All other reviewed and negative other  than [facility].    Imaging / Labs / Studies (reviewed on 10/4/2022):    1/27/2020 MRI Lumbar Spine Without Contrast  COMPARISON:  11/23/2015  FINDINGS:  Alignment: There is minimal grade 1 anterolisthesis of L4 on L5.  Vertebrae: Multilevel small Schmorl's nodes noted.  Vertebral body heights are otherwise within normal limits.  No evidence of fracture or marrow replacement process.  Discs: There is disc desiccation and mild disc height loss at L1-2, L2-3, and L5-S1.  Disc desiccation noted at L4-5 with preserved disc height.  Cord: Normal.  Conus terminates at L1  Degenerative findings:  T12-L1:  No spinal canal or neuroforaminal stenosis.  L1-L2: Mild broad-based disc bulge.  Mild bilateral facet arthropathy. No spinal canal or neuroforaminal stenosis.  L2-L3: Mild broad-based disc bulge.  Mild bilateral facet arthropathy. No spinal canal or neuroforaminal stenosis.  L3-L4: Mild broad-based disc bulge.  Mild bilateral facet arthropathy. No spinal canal or neuroforaminal stenosis.  L4-L5: Severe bilateral facet arthropathy with ligamentum flavum thickening.  Mild uncovering of the intervertebral disc.  Mild spinal canal stenosis and mild bilateral neural foraminal narrowing.  L5-S1: Moderate bilateral facet arthropathy and mild broad-based disc bulge.  Mild bilateral neural foraminal narrowing.  No spinal canal stenosis.  No significant change from prior.  Paraspinal muscles & soft tissues: Multiple probable bilateral renal cyst appear grossly similar to prior.      Physical Exam:  Last clinic visit:  There were no vitals filed for this visit.   There is no height or weight on file to calculate BMI.   (reviewed on 10/4/2022)    General: alert and oriented, in no apparent distress.  Gait: normal gait.  Skin: no rashes, no discoloration, no obvious lesions  HEENT: normocephalic, atraumatic. Pupils equal and round.  Cardiovascular: no significant peripheral edema present.  Respiratory: without use of accessory muscles of  respiration.    Musculoskeletal - Lumbar Spine:  - ROM fairly preserved   - Pain on flexion of lumbar spine: Absent   - Pain on extension of lumbar spine: Absent         - Lumbar facet loading: Absent   - TTP over the lumbar facet joints: Absent  - TTP over the lumbar paraspinals: Present   - TTP over the SI joints: Present  - TTP over GT bursa: Present, minimal   - Straight Leg Raise: Negative  - CHERYLE: Present    Right Knee:  - TTP: Present over medial/ lateral joint line  - Pain with extension: Present  - Pain with flexion: Present  - Crepitus: Present     Neuro - Lower Extremities:  - BLE Strength: R/L: HF: 5/5, HE: 5/5, KF: 5/5; KE: 5/5; FE: 5/5; FF: 5/5  - Extremity Reflexes: Brisk and symmetric throughout  - Sensory: Sensation to light touch intact bilaterally      Psych:  Mood and affect is appropriate    Assessment:  Kaylin Mccollum is a 77 y.o. year old female who is presenting with       ICD-10-CM ICD-9-CM    1. Sacroiliac joint pain  M53.3 724.6 IR SI Joint Injection Bilat w/Image      Case Request-RAD/Other Procedure Area: Bilateral GT bursa injection with RN IV sedation, Bilateral Sacroiliac Joint Injection      2. Greater trochanteric bursitis, unspecified laterality  M70.60 726.5 Case Request-RAD/Other Procedure Area: Bilateral GT bursa injection with RN IV sedation, Bilateral Sacroiliac Joint Injection      IR Aspiration Injection Large Joint W FL      IR Aspiration Injection Large Joint W FL      3. Lumbar radiculopathy, chronic  M54.16 724.4       4. Lumbar facet arthropathy  M47.816 721.3             Plan:  1. Interventional:  Schedule for bilateral sacroiliac joint and greater trochanteric bursa injection as this has significantly helped sacroiliitis and bursitis for 6+ months in the past.  We have discussed the procedure, benefits and potential risk in detail.  Patient has elected to pursue this procedure.    Anticoagulation:  None, no anticoagulation    2. Pharmacologic:     -  We have  discussed increasing the patient's gabapentin.  We have reviewed potential side effects of this medication including daytime somnolence, weight gain and peripheral edema  Gabapentin 300 mg in the morning, 300 mg in the afternoon and 600 mg in the evening  Ninety day supply given      3. Rehabilitative:   -We discussed continuing physical therapy to help manage the patient/s painful condition. The patient was counseled that muscle strengthening will improve the long term prognosis in regards to pain and may also help increase range of motion and mobility. They were told that one of the goals of physical therapy is that they learn how to do the exercises so that they can do them independently at home daily upon completion.     4. Diagnostic:  Reviewed relevant imaging and answered patient's questions.    5. Consult:   -Dr. Schneider for knee and shoulder pain PRN  -Rheumatology: Fibromyalgia    6. Follow up: Post 4-6 weeks injection.          The above plan and management options were discussed at length with patient. Patient is in agreement with the above and verbalized understanding.    - I discussed the goals of interventional chronic pain management with the patient on today's visit. We discussed a multimodal and systematic approach to pain.  This includes diagnostic and therapeutic injections, adjuvant pharmacologic treatment, physical therapy, and at times psychiatry.  I emphasized the importance of regular exercise, core strengthening and stretching, diet and weight loss as a cornerstone of long-term pain management.    - This condition does not require this patient to take time off of work, and the primary goal of our Pain Management services is to improve the patient's functional capacity.  - Patient Questions: Answered all of the patient's questions regarding diagnoses, therapy, treatment and next steps    Humberto Dumont MD

## 2022-10-04 NOTE — H&P (VIEW-ONLY)
Established Patient Chronic Pain Clinic Visit    The patient location is:  Home  The chief complaint leading to consultation is:  Back and leg pain  Visit type: Virtual visit with synchronous audio and video  Total time spent with patient:  20 minutes  Each patient to whom he or she provides medical services by telemedicine is: (1) informed of the relationship between the physician and patient and the respective role of any other health care provider with respect to management of the patient; and (2) notified that he or she may decline to receive medical services by telemedicine and may withdraw from such care at any time.    Chief Pain Complaint:  Back pain  Right knee pain       Interval history 10/04/2022  Ms. Mccollum is a 77-year-old female with past medical history significant for depression, hyperlipidemia, hypertension, mitral valve prolapse, peripheral vascular disease, history of COVID-19, type 2 diabetes, multi joint arthritis, fibromyalgia who presents to [facility] care, previous Dr. Dutta patient.  Today patient reports return of pain in the lower back which radiates into bilateral hips and down the posterior aspect of the right lower extremity in L4-5 distribution to the dorsum of the foot.  Patient reports pain is intermittent but has increased in intensity.  Pain is described as shocking in nature and today is rated a 6/10.  Patient reports she feels as if her skin is crawling.  patient is currently taking gabapentin 300 mg up to 3 times daily when pain is severe.  Pain interferes with the patient's sleep.  Patient also reports history of fibromyalgia which limits her mobility and duration of standing and ambulation.  Patient does endorse associated weakness in the lower extremities associated with her pain.  Patient is interested in pursuing repeat intervention as she is obtained several months of significant relief with prior sacroiliac joint and greater trochanteric bursa injections.  Patient has  continued physician directed physical therapy exercises at home daily.      History of Present Illness 01/16/2020: Dr. Dutta:   Kaylin Mccollum is a 74 y.o. female  who is presenting with a chief complaint of lumbar back pain. The patient began experiencing this problem insidiously, and the pain has been gradually worsening over the past 5 month(s). The pain is described as throbbing, shooting, burning and electrical and is located in the bilateral lumbar spine. Pain is intermittent and lasts hours. The pain radiates to bilateral lower extremities L4 distribudution. The patient rates her pain a 8 out of ten and interferes with activities of daily living a 7 out of ten. Pain is exacerbated by flexion of the lumbar spine, ambulation, and is improved by rest.      She also complains of right knee pain. The patient began experiencing this problem insidiously. The pain is described as cramping, aching and is located in the right knee. Pain is intermittent and lasts hours. The pain is nonradiating. The patient rates her pain a 8 out of ten and interferes with activities of daily living a 7 out of ten. Pain is exacerbated by getting up from a seated position and standing, and is improved by rest. Patient reports no prior trauma, prior arthroscopy bilaterally in 1990s.       - pertinent negatives: No fever, No chills, No weight loss, No bladder dysfunction, No bowel dysfunction, No saddle anesthesia  - pertinent positives: none        Pain Disability Index Review:   @REVFS(4749:3)@    Non-Pharmacologic Treatments:  Physical Therapy/Home Exercise: yes  Ice/Heat:yes  TENS: no  Acupuncture: no  Massage: no  Chiropractic: no    Other: no      Pain Medications:  - Adjuvant Medications: Lorazepam (Ativan), Neurontin (Gabapentin), and Topical Ointment (Voltaren Gel, Steroid cream, Anti-Inflammatory Cream, Compound cream)      Pain injections:  -04/20/2022: Right-sided acetabular femoral injection; Dr. Dutta  -03/01/2022:  "Bilateral sacroiliac joint and greater trochanteric bursa injection; Dr. Dutta  -07/08/2021: Bilateral sacroiliac joint and greater trochanteric bursa injection; Dr. Dutta  -01/05/2021: Bilateral sacroiliac joint and greater trochanteric bursa injection; Dr. Burns  -10/08/2020: Bilateral sacroiliac joint and bilateral greater trochanteric bursa injection; Dr. Dutta  -05/06/2020: Bilateral sacroiliac joint and greater trochanteric bursa injection; Dr. Dutta    Past Medical History:   Diagnosis Date    Arthritis     Cataract     Diabetes mellitus 1995    BS didn't check 09/13/2022    Diabetes mellitus, type 2     Fibromyalgia     General anesthetics causing adverse effect in therapeutic use     bradycardia     Hypertension     Migraines     Mitral valve prolapse     Osteoarthritis     Rotator cuff tear 04/01/2015       Review of patient's allergies indicates:   Allergen Reactions    Demerol [meperidine] Other (See Comments)     Burning when adm IV  Able to tolerate IM, "Turned the veins in my hand purple."    Latex, natural rubber Other (See Comments)     "Burns my skin",  Symptoms get worse the longer she is exposed    Zocor [simvastatin] Other (See Comments)     Tightening of muscles    Statins-hmg-coa reductase inhibitors Other (See Comments)     myopathy    Sulfa (sulfonamide antibiotics)      Blurred vision       Past Surgical History:   Procedure Laterality Date    ARTHROSCOPY OF KNEE Right 3/10/2020    Procedure: ARTHROSCOPY, KNEE;  Surgeon: Les cShneider MD;  Location: [facility] OR;  Service: Orthopedics;  Laterality: Right;    CATARACT EXTRACTION W/  INTRAOCULAR LENS IMPLANT Left 07/19/2017    CATARACT EXTRACTION W/  INTRAOCULAR LENS IMPLANT Right 2017    CHOLECYSTECTOMY      CHONDROPLASTY OF KNEE Right 3/10/2020    Procedure: CHONDROPLASTY, KNEE;  Surgeon: Les Schneider MD;  Location: [facility] OR;  Service: Orthopedics;  Laterality: Right;  Anterior compartment     COLONOSCOPY N/A 9/25/2018    " Procedure: COLONOSCOPY;  Surgeon: Bethel Carmona MD;  Location: [facility] ENDO;  Service: Endoscopy;  Laterality: N/A;    EXCISION OF MEDIAL MENISCUS OF KNEE Right 3/10/2020    Procedure: MENISCECTOMY, KNEE, MEDIAL;  Surgeon: Les Schneider MD;  Location: [facility] OR;  Service: Orthopedics;  Laterality: Right;  Partial, Medial , Lateral     EYE SURGERY      INJECTION OF ANESTHETIC AGENT AROUND NERVE Right 8/8/2019    Procedure: Right Genicular nerve block with local;  Surgeon: Manpreet Dutta MD;  Location: [facility] PAIN MGT;  Service: Pain Management;  Laterality: Right;    INJECTION OF ANESTHETIC AGENT INTO SACROILIAC JOINT Bilateral 5/6/2020    Procedure: Bilateral Sacroiliac Joint Injection;  Surgeon: Manpreet Dutta MD;  Location: [facility] PAIN MGT;  Service: Pain Management;  Laterality: Bilateral;    INJECTION OF ANESTHETIC AGENT INTO SACROILIAC JOINT Bilateral 10/8/2020    Procedure: Bilateral SI and Bilateral GTB with RN IV sedation;  Surgeon: Manpreet Dutta MD;  Location: [facility] PAIN MGT;  Service: Pain Management;  Laterality: Bilateral;    INJECTION OF ANESTHETIC AGENT INTO SACROILIAC JOINT Bilateral 1/5/2021    Procedure: Bilateral BLOCK, SACROILIAC JOINT and Bilateral GTB witn RN IV sedation;  Surgeon: Daniel Burns MD;  Location: [facility] PAIN MGT;  Service: Pain Management;  Laterality: Bilateral;    INJECTION OF ANESTHETIC AGENT INTO SACROILIAC JOINT Bilateral 7/8/2021    Procedure: Bilateral BLOCK, SACROILIAC JOINT bilateral GTB RN IV sedation;  Surgeon: Manpreet Dutta MD;  Location: [facility] PAIN MGT;  Service: Pain Management;  Laterality: Bilateral;    INJECTION OF ANESTHETIC AGENT INTO SACROILIAC JOINT Bilateral 3/1/2022    Procedure: Bilateral BLOCK, SACROILIAC JOINT and Bilateral GTB RN IV sedation;  Surgeon: Manpreet Dutta MD;  Location: [facility] PAIN MGT;  Service: Pain Management;  Laterality: Bilateral;    INJECTION OF JOINT Bilateral 9/5/2019    Procedure: Bilateral GT bursa injection;  Surgeon: Manpreet Dutta MD;   Location: [facility] PAIN MGT;  Service: Pain Management;  Laterality: Bilateral;    INJECTION OF JOINT Bilateral 5/6/2020    Procedure: Bilateral GT bursa injection;  Surgeon: Manpreet Dutta MD;  Location: [facility] PAIN MGT;  Service: Pain Management;  Laterality: Bilateral;    INJECTION OF JOINT Right 4/28/2022    Procedure: Injection, Joint Right Hip Injection RN IV sedation;  Surgeon: Manpreet Dutta MD;  Location: [facility] PAIN MGT;  Service: Pain Management;  Laterality: Right;    KNEE ARTHROSCOPY Bilateral     PARS PLANA VITRECTOMY W/ REPAIR OF MACULAR HOLE Left 02/22/2017    RADIOFREQUENCY THERMOCOAGULATION Left 7/11/2019    Procedure: Right SIJ RFA;  Surgeon: Manpreet Dutta MD;  Location: [facility] PAIN MGT;  Service: Pain Management;  Laterality: Left;    RADIOFREQUENCY THERMOCOAGULATION Right 7/25/2019    Procedure: Right SIJ RFA;  Surgeon: Manpreet Dutta MD;  Location: [facility] PAIN MGT;  Service: Pain Management;  Laterality: Right;    SHOULDER ARTHROSCOPY Right 06/04/15     Current Outpatient Medications on File Prior to Visit   Medication Sig Dispense Refill    biotin 1 mg tablet Take 1,000 mcg by mouth every morning.       celecoxib (CELEBREX) 200 MG capsule TAKE 1 CAPSULE(200 MG) BY MOUTH TWICE DAILY WITH FOOD 120 capsule 1    diclofenac sodium (VOLTAREN) 1 % Gel Apply 2 g topically 4 (four) times daily. 1 each 6    ezetimibe (ZETIA) 10 mg tablet Take 1 tablet (10 mg total) by mouth once daily. 90 tablet 1    FLUoxetine 40 MG capsule Take 1 capsule (40 mg total) by mouth once daily. 90 capsule 1    fluticasone (FLONASE) 50 mcg/actuation nasal spray 2 sprays by Each Nare route once daily. (Patient taking differently: 2 sprays by Each Nostril route nightly as needed.) 1 Bottle 0    furosemide (LASIX) 20 MG tablet Take 1 tablet (20 mg total) by mouth every Mon, Wed, Fri. 25 tablet 2    INVOKANA 100 mg Tab tablet TAKE 1 TABLET(100 MG) BY MOUTH EVERY DAY 90 tablet 3    LORazepam (ATIVAN) 1 MG tablet TK 3 TS PO 1 HOUR PRIOR TO  APPOINTMENT      losartan (COZAAR) 25 MG tablet TAKE 1 TABLET(25 MG) BY MOUTH EVERY DAY 90 tablet 2    metFORMIN (GLUCOPHAGE) 1000 MG tablet Take 1 tablet (1,000 mg total) by mouth 2 (two) times daily with meals. 180 tablet 3    omeprazole (PRILOSEC) 20 MG capsule Take 1 capsule (20 mg total) by mouth daily as needed (w/ NSAID). 30 capsule 5    potassium chloride SA (K-DUR,KLOR-CON) 10 MEQ tablet Take 1 tablet (10 mEq total) by mouth once daily. 20 tablet 5    pulse oximeter (PULSE OXIMETER) device by Apply Externally route 2 (two) times a day. Use twice daily at 8 AM and 3 PM and record the value in [employer] as directed. (Patient not taking: Reported on 8/18/2022) 1 each 0    triamcinolone acetonide 0.025% (KENALOG) 0.025 % Oint Apply topically 2 (two) times daily. for 10 days (Patient taking differently: Apply topically 2 (two) times daily as needed. ) 15 g 2    verapamiL (CALAN) 120 MG tablet Take 1 tablet (120 mg total) by mouth 3 (three) times daily. 180 tablet 5    [DISCONTINUED] gabapentin (NEURONTIN) 300 MG capsule Take 1 capsule (300 mg total) by mouth 3 (three) times daily. 90 capsule 11     No current facility-administered medications on file prior to visit.               GENERAL:  No weight loss, malaise or fevers.  HEENT:   No recent changes in vision or hearing  NECK:  Negative for lumps, no difficulty with swallowing.  RESPIRATORY:  Negative for cough, wheezing or shortness of breath, patient denies any recent URI.  CARDIOVASCULAR:  Negative for chest pain or palpitations.  GI:  Negative for abdominal discomfort, blood in stools or black stools or change in bowel habits.  MUSCULOSKELETAL:  See HPI.  SKIN:  Negative for lesions, rash, and itching.  PSYCH:  No mood disorder or recent psychosocial stressors.   HEMATOLOGY/LYMPHOLOGY:  Negative for prolonged bleeding, bruising easily or swollen nodes.    NEURO:   No history of syncope, paralysis, seizures or tremors.  All other reviewed and negative other  than [facility].    Imaging / Labs / Studies (reviewed on 10/4/2022):    1/27/2020 MRI Lumbar Spine Without Contrast  COMPARISON:  11/23/2015  FINDINGS:  Alignment: There is minimal grade 1 anterolisthesis of L4 on L5.  Vertebrae: Multilevel small Schmorl's nodes noted.  Vertebral body heights are otherwise within normal limits.  No evidence of fracture or marrow replacement process.  Discs: There is disc desiccation and mild disc height loss at L1-2, L2-3, and L5-S1.  Disc desiccation noted at L4-5 with preserved disc height.  Cord: Normal.  Conus terminates at L1  Degenerative findings:  T12-L1:  No spinal canal or neuroforaminal stenosis.  L1-L2: Mild broad-based disc bulge.  Mild bilateral facet arthropathy. No spinal canal or neuroforaminal stenosis.  L2-L3: Mild broad-based disc bulge.  Mild bilateral facet arthropathy. No spinal canal or neuroforaminal stenosis.  L3-L4: Mild broad-based disc bulge.  Mild bilateral facet arthropathy. No spinal canal or neuroforaminal stenosis.  L4-L5: Severe bilateral facet arthropathy with ligamentum flavum thickening.  Mild uncovering of the intervertebral disc.  Mild spinal canal stenosis and mild bilateral neural foraminal narrowing.  L5-S1: Moderate bilateral facet arthropathy and mild broad-based disc bulge.  Mild bilateral neural foraminal narrowing.  No spinal canal stenosis.  No significant change from prior.  Paraspinal muscles & soft tissues: Multiple probable bilateral renal cyst appear grossly similar to prior.      Physical Exam:  Last clinic visit:  There were no vitals filed for this visit.   There is no height or weight on file to calculate BMI.   (reviewed on 10/4/2022)    General: alert and oriented, in no apparent distress.  Gait: normal gait.  Skin: no rashes, no discoloration, no obvious lesions  HEENT: normocephalic, atraumatic. Pupils equal and round.  Cardiovascular: no significant peripheral edema present.  Respiratory: without use of accessory muscles of  respiration.    Musculoskeletal - Lumbar Spine:  - ROM fairly preserved   - Pain on flexion of lumbar spine: Absent   - Pain on extension of lumbar spine: Absent         - Lumbar facet loading: Absent   - TTP over the lumbar facet joints: Absent  - TTP over the lumbar paraspinals: Present   - TTP over the SI joints: Present  - TTP over GT bursa: Present, minimal   - Straight Leg Raise: Negative  - CHERYLE: Present    Right Knee:  - TTP: Present over medial/ lateral joint line  - Pain with extension: Present  - Pain with flexion: Present  - Crepitus: Present     Neuro - Lower Extremities:  - BLE Strength: R/L: HF: 5/5, HE: 5/5, KF: 5/5; KE: 5/5; FE: 5/5; FF: 5/5  - Extremity Reflexes: Brisk and symmetric throughout  - Sensory: Sensation to light touch intact bilaterally      Psych:  Mood and affect is appropriate    Assessment:  Kaylin Mccollum is a 77 y.o. year old female who is presenting with       ICD-10-CM ICD-9-CM    1. Sacroiliac joint pain  M53.3 724.6 IR SI Joint Injection Bilat w/Image      Case Request-RAD/Other Procedure Area: Bilateral GT bursa injection with RN IV sedation, Bilateral Sacroiliac Joint Injection      2. Greater trochanteric bursitis, unspecified laterality  M70.60 726.5 Case Request-RAD/Other Procedure Area: Bilateral GT bursa injection with RN IV sedation, Bilateral Sacroiliac Joint Injection      IR Aspiration Injection Large Joint W FL      IR Aspiration Injection Large Joint W FL      3. Lumbar radiculopathy, chronic  M54.16 724.4       4. Lumbar facet arthropathy  M47.816 721.3             Plan:  1. Interventional:  Schedule for bilateral sacroiliac joint and greater trochanteric bursa injection as this has significantly helped sacroiliitis and bursitis for 6+ months in the past.  We have discussed the procedure, benefits and potential risk in detail.  Patient has elected to pursue this procedure.    Anticoagulation:  None, no anticoagulation    2. Pharmacologic:     -  We have  discussed increasing the patient's gabapentin.  We have reviewed potential side effects of this medication including daytime somnolence, weight gain and peripheral edema  Gabapentin 300 mg in the morning, 300 mg in the afternoon and 600 mg in the evening  Ninety day supply given      3. Rehabilitative:   -We discussed continuing physical therapy to help manage the patient/s painful condition. The patient was counseled that muscle strengthening will improve the long term prognosis in regards to pain and may also help increase range of motion and mobility. They were told that one of the goals of physical therapy is that they learn how to do the exercises so that they can do them independently at home daily upon completion.     4. Diagnostic:  Reviewed relevant imaging and answered patient's questions.    5. Consult:   -Dr. Schneider for knee and shoulder pain PRN  -Rheumatology: Fibromyalgia    6. Follow up: Post 4-6 weeks injection.          The above plan and management options were discussed at length with patient. Patient is in agreement with the above and verbalized understanding.    - I discussed the goals of interventional chronic pain management with the patient on today's visit. We discussed a multimodal and systematic approach to pain.  This includes diagnostic and therapeutic injections, adjuvant pharmacologic treatment, physical therapy, and at times psychiatry.  I emphasized the importance of regular exercise, core strengthening and stretching, diet and weight loss as a cornerstone of long-term pain management.    - This condition does not require this patient to take time off of work, and the primary goal of our Pain Management services is to improve the patient's functional capacity.  - Patient Questions: Answered all of the patient's questions regarding diagnoses, therapy, treatment and next steps    Humberto Dumont MD

## 2022-10-06 NOTE — PRE-PROCEDURE INSTRUCTIONS
Spoke with patient regarding procedure scheduled on 10.10    Arrival time 0620    Has patient been sick with fever or on antibiotics within the last 7 days? No    Does the patient have any open wounds, sores or rashes? No    Does the patient have any recent fractures? no    Has patient received a vaccination within the last 7 days? No    Received the COVID vaccination? yes    Has the patient stopped all medications as directed? Hold dm meds am of procedure    Does patient have a pacemaker and or defibrillator? no    Does the patient have a ride to and from procedure and someone reliable to remain with patient? tricia    Is the patient diabetic? yes    Does the patient have sleep apnea? Or use O2 at home? No and no     Is the patient receiving sedation? yes    Is the patient instructed to remain NPO beginning at midnight the night before their procedure? yes    Procedure location confirmed with patient? Yes    Covid- Denies signs/symptoms. Instructed to notify PAT/MD if any changes.

## 2022-10-10 ENCOUNTER — OFFICE VISIT (OUTPATIENT)
Dept: CARDIOLOGY | Facility: CLINIC | Age: 77
End: 2022-10-10
Payer: MEDICARE

## 2022-10-10 ENCOUNTER — HOSPITAL ENCOUNTER (OUTPATIENT)
Facility: HOSPITAL | Age: 77
Discharge: HOME | End: 2022-10-10
Attending: ANESTHESIOLOGY | Admitting: ANESTHESIOLOGY
Payer: MEDICARE

## 2022-10-10 VITALS
WEIGHT: 229.06 LBS | DIASTOLIC BLOOD PRESSURE: 72 MMHG | BODY MASS INDEX: 32.79 KG/M2 | HEART RATE: 99 BPM | OXYGEN SATURATION: 95 % | HEIGHT: 70 IN | SYSTOLIC BLOOD PRESSURE: 130 MMHG

## 2022-10-10 VITALS
TEMPERATURE: 98 F | RESPIRATION RATE: 16 BRPM | OXYGEN SATURATION: 97 % | SYSTOLIC BLOOD PRESSURE: 148 MMHG | WEIGHT: 227.63 LBS | HEIGHT: 70 IN | HEART RATE: 72 BPM | BODY MASS INDEX: 32.59 KG/M2 | DIASTOLIC BLOOD PRESSURE: 58 MMHG

## 2022-10-10 DIAGNOSIS — Z78.9 STATIN INTOLERANCE: ICD-10-CM

## 2022-10-10 DIAGNOSIS — E11.59 HYPERTENSION ASSOCIATED WITH DIABETES: Primary | Chronic | ICD-10-CM

## 2022-10-10 DIAGNOSIS — I49.3 PVC (PREMATURE VENTRICULAR CONTRACTION): ICD-10-CM

## 2022-10-10 DIAGNOSIS — I34.1 MVP (MITRAL VALVE PROLAPSE): ICD-10-CM

## 2022-10-10 DIAGNOSIS — E78.5 HYPERLIPIDEMIA ASSOCIATED WITH TYPE 2 DIABETES MELLITUS: ICD-10-CM

## 2022-10-10 DIAGNOSIS — I15.2 HYPERTENSION ASSOCIATED WITH DIABETES: Primary | Chronic | ICD-10-CM

## 2022-10-10 DIAGNOSIS — M70.60 GREATER TROCHANTERIC BURSITIS, UNSPECIFIED LATERALITY: ICD-10-CM

## 2022-10-10 DIAGNOSIS — M53.3 SACROILIAC JOINT PAIN: ICD-10-CM

## 2022-10-10 DIAGNOSIS — I73.9 PVD (PERIPHERAL VASCULAR DISEASE): ICD-10-CM

## 2022-10-10 DIAGNOSIS — M70.60 GREATER TROCHANTERIC BURSITIS: ICD-10-CM

## 2022-10-10 DIAGNOSIS — E11.69 HYPERLIPIDEMIA ASSOCIATED WITH TYPE 2 DIABETES MELLITUS: ICD-10-CM

## 2022-10-10 LAB — POCT GLUCOSE: 125 MG/DL (ref 70–110)

## 2022-10-10 PROCEDURE — 63600175 PHARM REV CODE 636 W HCPCS: Performed by: ANESTHESIOLOGY

## 2022-10-10 PROCEDURE — 20610 PR DRAIN/INJECT LARGE JOINT/BURSA: ICD-10-PCS | Mod: 50,,, | Performed by: ANESTHESIOLOGY

## 2022-10-10 PROCEDURE — 99999 PR PBB SHADOW E&M-EST. PATIENT-LVL V: CPT | Mod: PBBFAC,,, | Performed by: INTERNAL MEDICINE

## 2022-10-10 PROCEDURE — 25000003 PHARM REV CODE 250: Performed by: ANESTHESIOLOGY

## 2022-10-10 PROCEDURE — 27096 INJECT SACROILIAC JOINT: CPT | Mod: 50 | Performed by: ANESTHESIOLOGY

## 2022-10-10 PROCEDURE — 99214 OFFICE O/P EST MOD 30 MIN: CPT | Mod: S$PBB,,, | Performed by: INTERNAL MEDICINE

## 2022-10-10 PROCEDURE — 99999 PR PBB SHADOW E&M-EST. PATIENT-LVL V: ICD-10-PCS | Mod: PBBFAC,,, | Performed by: INTERNAL MEDICINE

## 2022-10-10 PROCEDURE — 25500020 PHARM REV CODE 255: Performed by: ANESTHESIOLOGY

## 2022-10-10 PROCEDURE — 27096 INJECT SACROILIAC JOINT: CPT | Mod: 50,59,, | Performed by: ANESTHESIOLOGY

## 2022-10-10 PROCEDURE — 99214 PR OFFICE/OUTPT VISIT, EST, LEVL IV, 30-39 MIN: ICD-10-PCS | Mod: S$PBB,,, | Performed by: INTERNAL MEDICINE

## 2022-10-10 PROCEDURE — A9585 GADOBUTROL INJECTION: HCPCS | Performed by: ANESTHESIOLOGY

## 2022-10-10 PROCEDURE — 99215 OFFICE O/P EST HI 40 MIN: CPT | Mod: PBBFAC,25 | Performed by: INTERNAL MEDICINE

## 2022-10-10 PROCEDURE — 27096 PR INJECTION,SACROILIAC JOINT: ICD-10-PCS | Mod: 50,59,, | Performed by: ANESTHESIOLOGY

## 2022-10-10 PROCEDURE — 20610 DRAIN/INJ JOINT/BURSA W/O US: CPT | Mod: 50 | Performed by: ANESTHESIOLOGY

## 2022-10-10 PROCEDURE — 20610 DRAIN/INJ JOINT/BURSA W/O US: CPT | Mod: 50,,, | Performed by: ANESTHESIOLOGY

## 2022-10-10 RX ORDER — GADOBUTROL 604.72 MG/ML
INJECTION INTRAVENOUS
Status: DISCONTINUED | OUTPATIENT
Start: 2022-10-10 | End: 2022-10-10 | Stop reason: HOSPADM

## 2022-10-10 RX ORDER — MIDAZOLAM HYDROCHLORIDE 1 MG/ML
INJECTION, SOLUTION INTRAMUSCULAR; INTRAVENOUS
Status: DISCONTINUED | OUTPATIENT
Start: 2022-10-10 | End: 2022-10-10 | Stop reason: HOSPADM

## 2022-10-10 RX ORDER — INDOMETHACIN 25 MG/1
CAPSULE ORAL
Status: DISCONTINUED | OUTPATIENT
Start: 2022-10-10 | End: 2022-10-10 | Stop reason: HOSPADM

## 2022-10-10 RX ORDER — FENTANYL CITRATE 50 UG/ML
INJECTION, SOLUTION INTRAMUSCULAR; INTRAVENOUS
Status: DISCONTINUED | OUTPATIENT
Start: 2022-10-10 | End: 2022-10-10 | Stop reason: HOSPADM

## 2022-10-10 RX ORDER — TRIAMCINOLONE ACETONIDE 40 MG/ML
INJECTION, SUSPENSION INTRA-ARTICULAR; INTRAMUSCULAR
Status: DISCONTINUED | OUTPATIENT
Start: 2022-10-10 | End: 2022-10-10 | Stop reason: HOSPADM

## 2022-10-10 RX ORDER — BUPIVACAINE HYDROCHLORIDE 2.5 MG/ML
INJECTION, SOLUTION EPIDURAL; INFILTRATION; INTRACAUDAL
Status: DISCONTINUED | OUTPATIENT
Start: 2022-10-10 | End: 2022-10-10 | Stop reason: HOSPADM

## 2022-10-10 NOTE — DISCHARGE INSTRUCTIONS

## 2022-10-10 NOTE — OP NOTE
Kaylin Mccollum  77 y.o. female      Vitals:    10/10/22 0732   BP: (!) 152/71   Pulse: 67   Resp: 14   Temp:        Procedure Date: 10/10/22      INFORMED CONSENT: The procedure, risks, benefits and options were discussed with patient. There are no contraindications to the procedure. The patient expressed understanding and agreed to proceed. The personnel performing the procedure was discussed. I verify that I personally obtained consent prior to the start of the procedure and the signed consent can be found on the patient's chart.       Anesthesia:   Conscious sedation provided by M.D    The patient was monitored with continuous pulse oximetry, EKG, and intermittent blood pressure monitors.  The patient was hemodynamically stable throughout the entire process was responsive to voice, and breathing spontaneously.  Supplemental O2 was provided at 2L/min via nasal cannula.  Patient was comfortable for the duration of the procedure. (See nurse documentation and case log for sedation time)    There was a total of 2mg IV Midazolam and 75mcg Fentanyl titrated for the procedure    Pre Procedure diagnosis: Sacroiliac joint pain [M53.3]  Greater trochanteric bursitis, unspecified laterality [M70.60]  Post-Procedure diagnosis: SAME      PROCEDURE:  1) Bilateral greater trochanteric bursa injection    2) Bilateral sacroiliac joint injection                            REASON FOR PROCEDURE:   Sacroiliitis [M46.1]  Greater trochanteric bursitis[M70.61]      MEDICATIONS INJECTED: 1mL 40mg/ml Kenalog and 4mL Bupivacaine 0.25% into each site    LOCAL ANESTHETIC USED: Xylocaine 1% 6ml     ESTIMATED BLOOD LOSS: None.   COMPLICATIONS: None.     TECHNIQUE:   Greater trochanteric bursa injection:  The area overlying the greater trochanteric bursa was identified using fluoroscopy, and the area overlying the skin was prepped and draped in usual sterile fashion. Local Xylocaine was injected by raising a wheel and going down to the  periosteum using a 27-gauge hypodermic needle. A 5 inch 22-gauge spinal needle was introduce into the Bilateral greater trochanteric bursa. Negative pressure applied to confirm no intravascular placement. Omnipaque was injected to confirm placement and to confirm that there was no vascular runoff. The medication was then injected slowly.  Displacement of the contrast after injection of the medication confirmed that the medication went into the area of the greater trochanteric bursa    Sacroiliac joint injection:   Laying in the prone position, the patient was prepped and draped in the usual sterile fashion using ChloraPrep and fenestrated drape.  The area was determined under fluoroscopy.  Local Xylocaine was injected by raising a wheel and going down to the periosteum using a 27-gauge hypodermic needle.  The 3.5 inch 22-gauge spinal needle was introduce into the Bilateral sacroiliac joint.  Negative pressure applied to confirm no intravascular placement.  Omnipaque was injected to confirm placement and to confirm that there was no vascular runoff.  The medication was then injected slowly.  The patient tolerated the procedure well.                       The patient was monitored for approximately 30 minutes after the procedure. Patient was given post procedure and discharge instructions to follow at home. We will see the patient back in two weeks or the patient may call to inform of status. The patient was discharged in a stable condition

## 2022-10-10 NOTE — PROGRESS NOTES
Subjective:   Patient ID:  Kaylin Mccollum is a 77 y.o. female who presents for follow up of Follow-up      78 yo female, 6 months f/u  PMH h/o MVP, HTN DM 20 yrs. H/o PVCs. Fibromyalgia H/o COVID 19 in  quarantine at home. (sinus headache)  On verapamil since she was 54.   Palpitation and dizziness controlled by Verapamil  Occasional chest pain, with sharp pain.   C/o dry mouth  Chronic fatigue from fibra myalgia. Some shoulder pain from the fall  Mother had afib. Father healthy. Young brother had heart procedure at age of 6.  Not on regular exercise. No smoking/drinking  ekg today NSR and poor R progression on repcoridal leads    05/2021 visit   ECHO mild MR and normal EF. Stress test no ischemia; ZIOPATCH showed rare AT longest 13 beats.  Still dry mouth and occasional pinching chest pain  Headache chronic due to migraine  Chronic fatigue, mild exercise endurance  BP and LDL controlled. A1C 6.2 at OSH in      visit  PVD swelling of the leg controlled by lasix.   BP controlled  Still fatigue and weakness   ekg NSR. A1C 7.6 BP controled     visit  C/o chest tightness when walked from the parking to the office today. Resolved after rest.  Walking and shopping could cause the muscle pain and fatigue. '  Chronic lower back injection for the pain. occasionally faint and clammy  No palpitation and leg swelling     Interval history  Had steroid injection of the hips. Palpitation and controlled by verapamil. BRUNO with climbing the stairs. No dizziness and faint.          Past Medical History:   Diagnosis Date    Arthritis     Cataract     Diabetes mellitus 1995    BS didn't check 09/13/2022    Diabetes mellitus, type 2     Fibromyalgia     General anesthetics causing adverse effect in therapeutic use     bradycardia     Hypertension     Migraines     Mitral valve prolapse     Osteoarthritis     Rotator cuff tear 04/01/2015       Past Surgical History:   Procedure Laterality Date     ARTHROSCOPY OF KNEE Right 3/10/2020    Procedure: ARTHROSCOPY, KNEE;  Surgeon: Les Schneider MD;  Location: [facility] OR;  Service: Orthopedics;  Laterality: Right;    CATARACT EXTRACTION W/  INTRAOCULAR LENS IMPLANT Left 07/19/2017    CATARACT EXTRACTION W/  INTRAOCULAR LENS IMPLANT Right 2017    CHOLECYSTECTOMY      CHONDROPLASTY OF KNEE Right 3/10/2020    Procedure: CHONDROPLASTY, KNEE;  Surgeon: Les Schneider MD;  Location: [facility] OR;  Service: Orthopedics;  Laterality: Right;  Anterior compartment     COLONOSCOPY N/A 9/25/2018    Procedure: COLONOSCOPY;  Surgeon: Bethel Carmona MD;  Location: [facility] ENDO;  Service: Endoscopy;  Laterality: N/A;    EXCISION OF MEDIAL MENISCUS OF KNEE Right 3/10/2020    Procedure: MENISCECTOMY, KNEE, MEDIAL;  Surgeon: Les Schneider MD;  Location: [facility] OR;  Service: Orthopedics;  Laterality: Right;  Partial, Medial , Lateral     EYE SURGERY      INJECTION OF ANESTHETIC AGENT AROUND NERVE Right 8/8/2019    Procedure: Right Genicular nerve block with local;  Surgeon: Manpreet Dutta MD;  Location: [facility] PAIN MGT;  Service: Pain Management;  Laterality: Right;    INJECTION OF ANESTHETIC AGENT INTO SACROILIAC JOINT Bilateral 5/6/2020    Procedure: Bilateral Sacroiliac Joint Injection;  Surgeon: Manpreet Dutta MD;  Location: [facility] PAIN MGT;  Service: Pain Management;  Laterality: Bilateral;    INJECTION OF ANESTHETIC AGENT INTO SACROILIAC JOINT Bilateral 10/8/2020    Procedure: Bilateral SI and Bilateral GTB with RN IV sedation;  Surgeon: Manpreet Dutta MD;  Location: [facility] PAIN [facility]GT;  Service: Pain Management;  Laterality: Bilateral;    INJECTION OF ANESTHETIC AGENT INTO SACROILIAC JOINT Bilateral 1/5/2021    Procedure: Bilateral BLOCK, SACROILIAC JOINT and Bilateral GTB witn RN IV sedation;  Surgeon: Daniel Burns MD;  Location: [facility] PAIN MGT;  Service: Pain Management;  Laterality: Bilateral;    INJECTION OF ANESTHETIC AGENT INTO SACROILIAC JOINT Bilateral 7/8/2021     Procedure: Bilateral BLOCK, SACROILIAC JOINT bilateral GTB RN IV sedation;  Surgeon: Manpreet Dutta MD;  Location: [facility] PAIN [facility]GT;  Service: Pain Management;  Laterality: Bilateral;    INJECTION OF ANESTHETIC AGENT INTO SACROILIAC JOINT Bilateral 3/1/2022    Procedure: Bilateral BLOCK, SACROILIAC JOINT and Bilateral GTB RN IV sedation;  Surgeon: Manpreet Dutta MD;  Location: [facility]IN [facility]GT;  Service: Pain Management;  Laterality: Bilateral;    INJECTION OF JOINT Bilateral 9/5/2019    Procedure: Bilateral GT bursa injection;  Surgeon: Manpreet Dutta MD;  Location: [facility] PAIN [facility]GT;  Service: Pain Management;  Laterality: Bilateral;    INJECTION OF JOINT Bilateral 5/6/2020    Procedure: Bilateral GT bursa injection;  Surgeon: Manpreet Dutta MD;  Location: [facility]IN [facility];  Service: Pain Management;  Laterality: Bilateral;    INJECTION OF JOINT Right 4/28/2022    Procedure: Injection, Joint Right Hip Injection RN IV sedation;  Surgeon: Manpreet Dutta MD;  Location: [facility] PAIN [facility]GT;  Service: Pain Management;  Laterality: Right;    KNEE ARTHROSCOPY Bilateral     PARS PLANA VITRECTOMY W/ REPAIR OF MACULAR HOLE Left 02/22/2017    RADIOFREQUENCY THERMOCOAGULATION Left 7/11/2019    Procedure: Right SIJ RFA;  Surgeon: Manpreet Dutta MD;  Location: [facility] PAIN [facility]GT;  Service: Pain Management;  Laterality: Left;    RADIOFREQUENCY THERMOCOAGULATION Right 7/25/2019    Procedure: Right SIJ RFA;  Surgeon: Manpreet Dutta MD;  Location: [facility] PAIN [facility]GT;  Service: Pain Management;  Laterality: Right;    SHOULDER ARTHROSCOPY Right 06/04/15       Social History     Tobacco Use    Smoking status: Never    Smokeless tobacco: Never    Tobacco comments:     Never smoked   Substance Use Topics    Alcohol use: Not Currently     Alcohol/week: 0.0 standard drinks     Comment: Couple times per year    Drug use: No       Family History   Problem Relation Age of Onset    Heart disease Mother         A-Fib    Glaucoma Mother     Cataracts Mother     Cancer Mother          colon    Arthritis Mother         : 17    Depression Mother     Kidney disease Daughter         ESRD    Anesthesia problems Daughter         cardiac arrest during nephrectomy    Birth defects Daughter         Renal    Depression Daughter     Learning disabilities Daughter         Dyslexia, ADD    Diabetes Maternal Grandmother     Heart disease Maternal Grandmother         Congestive heart failure    Alcohol abuse Maternal Grandmother         Death: 84    Depression Maternal Grandmother     Hypertension Maternal Grandmother     Diabetes Maternal Grandfather     Cancer Maternal Grandfather     Alcohol abuse Maternal Grandfather          1964    Breast cancer Paternal Aunt     Depression Brother     Depression Son     Learning disabilities Son         ADD & ADHD    Diabetes Maternal Aunt     Diabetes Maternal Uncle         . 19         Review of Systems   Constitutional: Negative for decreased appetite, diaphoresis, fever, malaise/fatigue and night sweats.   HENT:  Negative for nosebleeds.    Eyes:  Negative for blurred vision and double vision.   Cardiovascular:  Positive for chest pain. Negative for claudication, dyspnea on exertion, irregular heartbeat, leg swelling, near-syncope, orthopnea, palpitations, paroxysmal nocturnal dyspnea and syncope.   Respiratory:  Negative for cough, shortness of breath, sleep disturbances due to breathing, snoring, sputum production and wheezing.    Endocrine: Negative for cold intolerance and polyuria.   Hematologic/Lymphatic: Does not bruise/bleed easily.   Skin:  Negative for rash.   Musculoskeletal:  Positive for arthritis, back pain and joint pain. Negative for falls, joint swelling and neck pain.   Gastrointestinal:  Negative for abdominal pain, heartburn, nausea and vomiting.   Genitourinary:  Negative for dysuria, frequency and hematuria.   Neurological:  Negative for difficulty with concentration, dizziness, focal weakness, headaches,  light-headedness, numbness, seizures and weakness.   Psychiatric/Behavioral:  Negative for depression, memory loss and substance abuse. The patient does not have insomnia.    Allergic/Immunologic: Negative for HIV exposure and hives.     Objective:   Physical Exam  HENT:      Head: Normocephalic.   Eyes:      Pupils: Pupils are equal, round, and reactive to light.   Neck:      Thyroid: No thyromegaly.      Vascular: Normal carotid pulses. No carotid bruit or JVD.   Cardiovascular:      Rate and Rhythm: Normal rate and regular rhythm. No extrasystoles are present.     Chest Wall: PMI is not displaced.      Pulses: Normal pulses.      Heart sounds: Normal heart sounds. No murmur heard.    No gallop. No S3 sounds.   Pulmonary:      Effort: No respiratory distress.      Breath sounds: Normal breath sounds. No stridor.   Abdominal:      General: Bowel sounds are normal.      Palpations: Abdomen is soft.      Tenderness: There is no abdominal tenderness. There is no rebound.   Skin:     Findings: No rash.   Neurological:      Mental Status: She is alert and oriented to person, place, and time.   Psychiatric:         Behavior: Behavior normal.       Lab Results   Component Value Date    CHOL 195 08/11/2022    CHOL 222 (H) 02/09/2022    CHOL 211 (H) 07/14/2021     Lab Results   Component Value Date    HDL 61 08/11/2022    HDL 65 02/09/2022    HDL 65 07/14/2021     Lab Results   Component Value Date    LDLCALC 112.8 08/11/2022    LDLCALC 141.6 02/09/2022    LDLCALC 123.0 07/14/2021     Lab Results   Component Value Date    TRIG 106 08/11/2022    TRIG 77 02/09/2022    TRIG 115 07/14/2021     Lab Results   Component Value Date    CHOLHDL 31.3 08/11/2022    CHOLHDL 29.3 02/09/2022    CHOLHDL 30.8 07/14/2021       Chemistry        Component Value Date/Time     08/11/2022 0822    K 4.0 08/11/2022 0822     08/11/2022 0822    CO2 28 08/11/2022 0822    BUN 16 08/11/2022 0822    CREATININE 0.8 08/11/2022 0822      (H) 08/11/2022 0822        Component Value Date/Time    CALCIUM 9.9 08/11/2022 0822    ALKPHOS 51 (L) 08/11/2022 0822    AST 19 08/11/2022 0822    ALT 15 08/11/2022 0822    BILITOT 0.4 08/11/2022 0822    ESTGFRAFRICA >60.0 02/09/2022 1308    EGFRNONAA >60.0 02/09/2022 1308          Lab Results   Component Value Date    HGBA1C 6.9 (H) 08/11/2022     Lab Results   Component Value Date    TSH 1.198 02/09/2022     No results found for: INR, PROTIME  Lab Results   Component Value Date    WBC 4.66 02/09/2022    HGB 13.4 02/09/2022    HCT 43.8 02/09/2022    MCV 96 02/09/2022     02/09/2022     BMP  Sodium   Date Value Ref Range Status   08/11/2022 144 136 - 145 mmol/L Final     Potassium   Date Value Ref Range Status   08/11/2022 4.0 3.5 - 5.1 mmol/L Final     Chloride   Date Value Ref Range Status   08/11/2022 106 95 - 110 mmol/L Final     CO2   Date Value Ref Range Status   08/11/2022 28 23 - 29 mmol/L Final     BUN   Date Value Ref Range Status   08/11/2022 16 8 - 23 mg/dL Final     Creatinine   Date Value Ref Range Status   08/11/2022 0.8 0.5 - 1.4 mg/dL Final     Calcium   Date Value Ref Range Status   08/11/2022 9.9 8.7 - 10.5 mg/dL Final     Anion Gap   Date Value Ref Range Status   08/11/2022 10 8 - 16 mmol/L Final     eGFR if    Date Value Ref Range Status   02/09/2022 >60.0 >60 mL/min/1.73 m^2 Final     eGFR if non    Date Value Ref Range Status   02/09/2022 >60.0 >60 mL/min/1.73 m^2 Final     Comment:     Calculation used to obtain the estimated glomerular filtration  rate (eGFR) is the CKD-EPI equation.        BNP  @LABRCNTIP(BNP,BNPTRIAGEBLO)@  @LABRCNTIP(troponini)@  CrCl cannot be calculated (Patient's most recent lab result is older than the maximum 7 days allowed.).  No results found in the last 24 hours.  No results found in the last 24 hours.  No results found in the last 24 hours.    Assessment:      1. Hypertension associated with diabetes    2. MVP (mitral valve  prolapse)    3. PVD (peripheral vascular disease)    4. Hyperlipidemia associated with type 2 diabetes mellitus    5. PVC (premature ventricular contraction)    6. Statin intolerance        Plan:   Continue verapamil 120 mg tid losartan Lasix as needed and Zetia for HTN PVC PVD and HLD    Counseled DASH  Check Lipid profile in 6 months  Recommend heart-healthy diet, weight control and regular exercise.  Frank. Risk modification.   I have reviewed all pertinent labs and cardiac studies independently. Plans and recommendations have been formulated under my direct supervision. All questions answered and patient voiced understanding.   If symptoms persist go to the ED  RTC in 6 months

## 2022-10-10 NOTE — DISCHARGE SUMMARY
Discharge Note  Short Stay      SUMMARY     Admit Date: 10/10/2022    Attending Physician: Humberto Dumont MD        Discharge Physician: Humberto Dumont MD        Discharge Date: 10/10/2022 7:33 AM    Procedure(s) (LRB):  Bilateral GT bursa injection with RN IV sedation (Bilateral)  Bilateral Sacroiliac Joint Injection (Bilateral)    Final Diagnosis: Sacroiliac joint pain [M53.3]  Greater trochanteric bursitis, unspecified laterality [M70.60]    Disposition: Home or self care    Patient Instructions:   Current Discharge Medication List        CONTINUE these medications which have NOT CHANGED    Details   biotin 1 mg tablet Take 1,000 mcg by mouth every morning.       celecoxib (CELEBREX) 200 MG capsule TAKE 1 CAPSULE(200 MG) BY MOUTH TWICE DAILY WITH FOOD  Qty: 120 capsule, Refills: 1    Associated Diagnoses: Chondromalacia, right knee; Left shoulder tendinitis      diclofenac sodium (VOLTAREN) 1 % Gel Apply 2 g topically 4 (four) times daily.  Qty: 1 each, Refills: 6      ezetimibe (ZETIA) 10 mg tablet Take 1 tablet (10 mg total) by mouth once daily.  Qty: 90 tablet, Refills: 1    Associated Diagnoses: Hyperlipidemia associated with type 2 diabetes mellitus      FLUoxetine 40 MG capsule Take 1 capsule (40 mg total) by mouth once daily.  Qty: 90 capsule, Refills: 1    Associated Diagnoses: Fibromyalgia; Chronic major depressive disorder      fluticasone (FLONASE) 50 mcg/actuation nasal spray 2 sprays by Each Nare route once daily.  Qty: 1 Bottle, Refills: 0    Associated Diagnoses: Sinusitis      furosemide (LASIX) 20 MG tablet Take 1 tablet (20 mg total) by mouth every Mon, Wed, Fri.  Qty: 25 tablet, Refills: 2    Comments: Taking on MWF as needed for leg swelling      gabapentin (NEURONTIN) 300 MG capsule Take 1 capsule (300 mg total) by mouth 4 (four) times daily. Take 1 capsule, 300 mg in the morning, 300 mg in the afternoon and 2 capsules, 600 mg in the evening  Qty: 360 capsule, Refills: 0      INVOKANA 100 mg  Tab tablet TAKE 1 TABLET(100 MG) BY MOUTH EVERY DAY  Qty: 90 tablet, Refills: 3    Associated Diagnoses: Diabetes mellitus type 2 in obese      LORazepam (ATIVAN) 1 MG tablet TK 3 TS PO 1 HOUR PRIOR TO APPOINTMENT      losartan (COZAAR) 25 MG tablet TAKE 1 TABLET(25 MG) BY MOUTH EVERY DAY  Qty: 90 tablet, Refills: 2      metFORMIN (GLUCOPHAGE) 1000 MG tablet Take 1 tablet (1,000 mg total) by mouth 2 (two) times daily with meals.  Qty: 180 tablet, Refills: 3    Associated Diagnoses: Diabetes mellitus type 2 in obese      omeprazole (PRILOSEC) 20 MG capsule Take 1 capsule (20 mg total) by mouth daily as needed (w/ NSAID).  Qty: 30 capsule, Refills: 5    Associated Diagnoses: Gastroesophageal reflux disease without esophagitis      potassium chloride SA (K-DUR,KLOR-CON) 10 MEQ tablet Take 1 tablet (10 mEq total) by mouth once daily.  Qty: 20 tablet, Refills: 5      pulse oximeter (PULSE OXIMETER) device by Apply Externally route 2 (two) times a day. Use twice daily at 8 AM and 3 PM and record the value in [employer] as directed.  Qty: 1 each, Refills: 0    Comments: This is a NO CHARGE item.  Please override price to zero.  DO NOT PRINT.  NORMAL MODE e-PRESCRIBE ONLY.  Associated Diagnoses: COVID-19 virus detected      triamcinolone acetonide 0.025% (KENALOG) 0.025 % Oint Apply topically 2 (two) times daily. for 10 days  Qty: 15 g, Refills: 2    Associated Diagnoses: Tinea corporis      verapamiL (CALAN) 120 MG tablet Take 1 tablet (120 mg total) by mouth 3 (three) times daily.  Qty: 180 tablet, Refills: 5    Associated Diagnoses: MVP (mitral valve prolapse); Hypertension associated with diabetes                 Discharge Diagnosis: Sacroiliac joint pain [M53.3]  Greater trochanteric bursitis, unspecified laterality [M70.60]  Condition on Discharge: Stable with no complications to procedure   Diet on Discharge: Same as before.  Activity: as per instruction sheet.  Discharge to: Home with a responsible adult.  Follow up:  2-4 weeks       Please call the office at [phone] if you experience any weakness or loss of sensation, fever > 101.5, pain uncontrolled with oral medications, persistent nausea/vomiting/or diarrhea, redness or drainage from the incisions, or any other worrisome concerns. If physician on call was not reached or could not communicate with our office for any reason please go to the nearest emergency department

## 2022-10-11 ENCOUNTER — PATIENT MESSAGE (OUTPATIENT)
Dept: INTERNAL MEDICINE | Facility: CLINIC | Age: 77
End: 2022-10-11
Payer: MEDICARE

## 2022-11-10 DIAGNOSIS — E66.9 DIABETES MELLITUS TYPE 2 IN OBESE: ICD-10-CM

## 2022-11-10 DIAGNOSIS — E11.69 DIABETES MELLITUS TYPE 2 IN OBESE: ICD-10-CM

## 2022-11-11 RX ORDER — CANAGLIFLOZIN 100 MG/1
100 TABLET, FILM COATED ORAL DAILY
Qty: 90 TABLET | Refills: 0 | Status: SHIPPED | OUTPATIENT
Start: 2022-11-11 | End: 2023-01-23

## 2022-11-11 NOTE — TELEPHONE ENCOUNTER
Refill Routing Note   Medication(s) are not appropriate for processing by [facility] for the following reason(s):      - Non-participating provider    [facility] action(s):  Route          Medication reconciliation completed: No     Appointments  past 12m or future 3m with PCP    Date Provider   Last Visit   2/15/2022 Lisette Olvera MD   Next Visit   2/23/2023 Lisette Olvera MD   ED visits in past 90 days: 0        Note composed:10:02 AM 11/11/2022

## 2022-11-18 ENCOUNTER — PATIENT MESSAGE (OUTPATIENT)
Dept: INTERNAL MEDICINE | Facility: CLINIC | Age: 77
End: 2022-11-18
Payer: MEDICARE

## 2022-11-28 NOTE — PROGRESS NOTES
Established Patient Chronic Pain Clinic Visit    The patient location is:  Home  The chief complaint leading to consultation is:  Back and leg pain  Visit type: Virtual visit with synchronous audio and video  Total time spent with patient:  20 minutes  Each patient to whom he or she provides medical services by telemedicine is: (1) informed of the relationship between the physician and patient and the respective role of any other health care provider with respect to management of the patient; and (2) notified that he or she may decline to receive medical services by telemedicine and may withdraw from such care at any time.    Chief Pain Complaint:  Back pain  Right knee pain    Interval history 11/29/2022    Patient presents status post bilateral sacroiliac joint greater trochanteric bursa 10/10/2022.  Patient reports 90% relief overlying bilateral sacroiliac joints and greater trochanteric bursa following her injection.  Today she reports primarily lumbar axial back pain as well as significant neck pain.  Lower back pain is exacerbated with prolonged standing such as when she is washing dishes.  She denies significant distal radiculopathy into the right lower extremities as described at our last clinic visit.  Neck pain is elicited with cervical flexion, extension and lateral flexion and she does report reduced mobility.  She reports her pain began following a mechanical fall down the stairs at [facility] in September 1986.  Patient has continued gabapentin 300 mg in the morning, 300 mg in the afternoon and 600 mg in the evening.    Interval history 10/04/2022  Ms. Mccollum is a 77-year-old female with past medical history significant for depression, hyperlipidemia, hypertension, mitral valve prolapse, peripheral vascular disease, history of COVID-19, type 2 diabetes, multi joint arthritis, fibromyalgia who presents to establish care, previous Dr. Dutta patient.  Today patient reports return of pain in the lower back which  radiates into bilateral hips and down the posterior aspect of the right lower extremity in L4-5 distribution to the dorsum of the foot.  Patient reports pain is intermittent but has increased in intensity.  Pain is described as shocking in nature and today is rated a 6/10.  Patient reports she feels as if her skin is crawling.  patient is currently taking gabapentin 300 mg up to 3 times daily when pain is severe.  Pain interferes with the patient's sleep.  Patient also reports history of fibromyalgia which limits her mobility and duration of standing and ambulation.  Patient does endorse associated weakness in the lower extremities associated with her pain.  Patient is interested in pursuing repeat intervention as she is obtained several months of significant relief with prior sacroiliac joint and greater trochanteric bursa injections.  Patient has continued physician directed physical therapy exercises at home daily.      History of Present Illness 01/16/2020: Dr. Dutta:   Kaylin Mccollum is a 74 y.o. female  who is presenting with a chief complaint of lumbar back pain. The patient began experiencing this problem insidiously, and the pain has been gradually worsening over the past 5 month(s). The pain is described as throbbing, shooting, burning and electrical and is located in the bilateral lumbar spine. Pain is intermittent and lasts hours. The pain radiates to bilateral lower extremities L4 distribudution. The patient rates her pain a 8 out of ten and interferes with activities of daily living a 7 out of ten. Pain is exacerbated by flexion of the lumbar spine, ambulation, and is improved by rest.      She also complains of right knee pain. The patient began experiencing this problem insidiously. The pain is described as cramping, aching and is located in the right knee. Pain is intermittent and lasts hours. The pain is nonradiating. The patient rates her pain a 8 out of ten and interferes with activities of  "daily living a 7 out of ten. Pain is exacerbated by getting up from a seated position and standing, and is improved by rest. Patient reports no prior trauma, prior arthroscopy bilaterally in 1990s.       - pertinent negatives: No fever, No chills, No weight loss, No bladder dysfunction, No bowel dysfunction, No saddle anesthesia  - pertinent positives: none        Pain Disability Index Review:   @[facility](4749:[id number])@    Non-Pharmacologic Treatments:  Physical Therapy/Home Exercise: yes  Ice/Heat:yes  TENS: no  Acupuncture: no  Massage: no  Chiropractic: no    Other: no      Pain Medications:  - Adjuvant Medications: Lorazepam (Ativan), Neurontin (Gabapentin), and Topical Ointment (Voltaren Gel, Steroid cream, Anti-Inflammatory Cream, Compound cream)      Pain injections:  Dr. Dumont:  - 10/10/2022: Bilateral greater trochanteric bursa and sacroiliac joint injection      -04/20/2022: Right-sided acetabular femoral injection; Dr. Dutta  -03/01/2022: Bilateral sacroiliac joint and greater trochanteric bursa injection; Dr. Dutta  -07/08/2021: Bilateral sacroiliac joint and greater trochanteric bursa injection; Dr. Dutta  -01/05/2021: Bilateral sacroiliac joint and greater trochanteric bursa injection; Dr. Burns  -10/08/2020: Bilateral sacroiliac joint and bilateral greater trochanteric bursa injection; Dr. Dutta  -05/06/2020: Bilateral sacroiliac joint and greater trochanteric bursa injection; Dr. Dutta    Past Medical History:   Diagnosis Date    Arthritis     Cataract     Diabetes mellitus 1995    BS didn't check 09/13/2022    Diabetes mellitus, type 2     Fibromyalgia     General anesthetics causing adverse effect in therapeutic use     bradycardia     Hypertension     Migraines     Mitral valve prolapse     Osteoarthritis     Rotator cuff tear 04/01/2015       Review of patient's allergies indicates:   Allergen Reactions    Demerol [meperidine] Other (See Comments)     Burning when adm IV  Able to tolerate IM, "Turned the " "veins in my hand purple."    Latex, natural rubber Other (See Comments)     "Burns my skin",  Symptoms get worse the longer she is exposed    Zocor [simvastatin] Other (See Comments)     Tightening of muscles    Statins-hmg-coa reductase inhibitors Other (See Comments)     myopathy    Sulfa (sulfonamide antibiotics)      Blurred vision       Past Surgical History:   Procedure Laterality Date    ARTHROSCOPY OF KNEE Right 3/10/2020    Procedure: ARTHROSCOPY, KNEE;  Surgeon: Les Schneider MD;  Location: [facility] OR;  Service: Orthopedics;  Laterality: Right;    CATARACT EXTRACTION W/  INTRAOCULAR LENS IMPLANT Left 07/19/2017    CATARACT EXTRACTION W/  INTRAOCULAR LENS IMPLANT Right 2017    CHOLECYSTECTOMY      CHONDROPLASTY OF KNEE Right 3/10/2020    Procedure: CHONDROPLASTY, KNEE;  Surgeon: Les Schneider MD;  Location: [facility] OR;  Service: Orthopedics;  Laterality: Right;  Anterior compartment     COLONOSCOPY N/A 9/25/2018    Procedure: COLONOSCOPY;  Surgeon: Bethel Carmona MD;  Location: [facility] ENDO;  Service: Endoscopy;  Laterality: N/A;    EXCISION OF MEDIAL MENISCUS OF KNEE Right 3/10/2020    Procedure: MENISCECTOMY, KNEE, MEDIAL;  Surgeon: Les Schneider MD;  Location: [facility] OR;  Service: Orthopedics;  Laterality: Right;  Partial, Medial , Lateral     EYE SURGERY      INJECTION OF ANESTHETIC AGENT AROUND NERVE Right 8/8/2019    Procedure: Right Genicular nerve block with local;  Surgeon: Manpreet Dutta MD;  Location: [facility] PAIN [facility]GT;  Service: Pain Management;  Laterality: Right;    INJECTION OF ANESTHETIC AGENT INTO SACROILIAC JOINT Bilateral 5/6/2020    Procedure: Bilateral Sacroiliac Joint Injection;  Surgeon: Manpreet Dutta MD;  Location: [facility] PAIN [facility]GT;  Service: Pain Management;  Laterality: Bilateral;    INJECTION OF ANESTHETIC AGENT INTO SACROILIAC JOINT Bilateral 10/8/2020    Procedure: Bilateral SI and Bilateral GTB with RN IV sedation;  Surgeon: Manpreet Dutta MD;  Location: [facility] PAIN [facility]GT;  " Service: Pain Management;  Laterality: Bilateral;    INJECTION OF ANESTHETIC AGENT INTO SACROILIAC JOINT Bilateral 1/5/2021    Procedure: Bilateral BLOCK, SACROILIAC JOINT and Bilateral GTB witn RN IV sedation;  Surgeon: Daniel Burns MD;  Location: [facility]IN [facility]GT;  Service: Pain Management;  Laterality: Bilateral;    INJECTION OF ANESTHETIC AGENT INTO SACROILIAC JOINT Bilateral 7/8/2021    Procedure: Bilateral BLOCK, SACROILIAC JOINT bilateral GTB RN IV sedation;  Surgeon: Manpreet Dutta MD;  Location: [facility];  Service: Pain Management;  Laterality: Bilateral;    INJECTION OF ANESTHETIC AGENT INTO SACROILIAC JOINT Bilateral 3/1/2022    Procedure: Bilateral BLOCK, SACROILIAC JOINT and Bilateral GTB RN IV sedation;  Surgeon: Manpreet Dutta MD;  Location: [facility] PAIN [facility]GT;  Service: Pain Management;  Laterality: Bilateral;    INJECTION OF ANESTHETIC AGENT INTO SACROILIAC JOINT Bilateral 10/10/2022    Procedure: Bilateral Sacroiliac Joint Injection;  Surgeon: Humberto Dumont MD;  [facility];  Service: Pain Management;  Laterality: Bilateral;    INJECTION OF JOINT Bilateral 9/5/2019    Procedure: Bilateral GT bursa injection;  Surgeon: Manpreet Dutta MD;  Location: [facility] PAIN [facility]GT;  Service: Pain Management;  Laterality: Bilateral;    INJECTION OF JOINT Bilateral 5/6/2020    Procedure: Bilateral GT bursa injection;  Surgeon: Manpreet Dutta MD;  [facility];  Service: Pain Management;  Laterality: Bilateral;    INJECTION OF JOINT Right 4/28/2022    Procedure: Injection, Joint Right Hip Injection RN IV sedation;  Surgeon: Manpreet Dutta MD;  Location: [facility];  Service: Pain Management;  Laterality: Right;    INJECTION OF JOINT Bilateral 10/10/2022    Procedure: Bilateral GT bursa injection with RN IV sedation;  Surgeon: Humberto Dumont MD;  Location: [facility];  Service: Pain Management;  Laterality: Bilateral;    KNEE ARTHROSCOPY Bilateral     PARS PLANA VITRECTOMY W/ REPAIR OF MACULAR HOLE  Left 02/22/2017    RADIOFREQUENCY THERMOCOAGULATION Left 7/11/2019    Procedure: Right SIJ RFA;  Surgeon: Manpreet Dutta MD;  Location: [facility]IN [facility];  Service: Pain Management;  Laterality: Left;    RADIOFREQUENCY THERMOCOAGULATION Right 7/25/2019    Procedure: Right SIJ RFA;  Surgeon: Manpreet Dutta MD;  Location: [facility] PAIN [facility];  Service: Pain Management;  Laterality: Right;    SHOULDER ARTHROSCOPY Right 06/04/15     Current Outpatient Medications on File Prior to Visit   Medication Sig Dispense Refill    biotin 1 mg tablet Take 1,000 mcg by mouth every morning.       canagliflozin (INVOKANA) 100 mg Tab tablet Take 1 tablet (100 mg total) by mouth once daily. 90 tablet 0    celecoxib (CELEBREX) 200 MG capsule TAKE 1 CAPSULE(200 MG) BY MOUTH TWICE DAILY WITH FOOD 120 capsule 1    diclofenac sodium (VOLTAREN) 1 % Gel Apply 2 g topically 4 (four) times daily. 1 each 6    ezetimibe (ZETIA) 10 mg tablet Take 1 tablet (10 mg total) by mouth once daily. 90 tablet 1    FLUoxetine 40 MG capsule Take 1 capsule (40 mg total) by mouth once daily. 90 capsule 1    fluticasone (FLONASE) 50 mcg/actuation nasal spray 2 sprays by Each Nare route once daily. (Patient taking differently: 2 sprays by Each Nostril route nightly as needed.) 1 Bottle 0    furosemide (LASIX) 20 MG tablet TAKE 1 TABLET BY MOUTH EVERY MONDAY WEDNESDAY AND FRIDAY 25 tablet 2    gabapentin (NEURONTIN) 300 MG capsule Take 1 capsule (300 mg total) by mouth 4 (four) times daily. 120 capsule 1    LORazepam (ATIVAN) 1 MG tablet TK 3 TS PO 1 HOUR PRIOR TO APPOINTMENT      losartan (COZAAR) 25 MG tablet TAKE 1 TABLET(25 MG) BY MOUTH EVERY DAY 90 tablet 2    metFORMIN (GLUCOPHAGE) 1000 MG tablet Take 1 tablet (1,000 mg total) by mouth 2 (two) times daily with meals. 180 tablet 3    omeprazole (PRILOSEC) 20 MG capsule Take 1 capsule (20 mg total) by mouth daily as needed (w/ NSAID). 30 capsule 5    potassium chloride SA (K-DUR,KLOR-CON M) 10 MEQ tablet TAKE 1 TABLET(10  MEQ) BY MOUTH EVERY DAY 20 tablet 5    pulse oximeter (PULSE OXIMETER) device by Apply Externally route 2 (two) times a day. Use twice daily at 8 AM and 3 PM and record the value in MyChart as directed. 1 each 0    triamcinolone acetonide 0.025% (KENALOG) 0.025 % Oint Apply topically 2 (two) times daily. for 10 days (Patient taking differently: Apply topically 2 (two) times daily as needed. ) 15 g 2    verapamiL (CALAN) 120 MG tablet Take 1 tablet (120 mg total) by mouth 3 (three) times daily. 180 tablet 5     No current facility-administered medications on file prior to visit.               GENERAL:  No weight loss, malaise or fevers.  HEENT:   No recent changes in vision or hearing  NECK:  Negative for lumps, no difficulty with swallowing.  RESPIRATORY:  Negative for cough, wheezing or shortness of breath, patient denies any recent URI.  CARDIOVASCULAR:  Negative for chest pain or palpitations.  GI:  Negative for abdominal discomfort, blood in stools or black stools or change in bowel habits.  MUSCULOSKELETAL:  See HPI.  SKIN:  Negative for lesions, rash, and itching.  PSYCH:  No mood disorder or recent psychosocial stressors.   HEMATOLOGY/LYMPHOLOGY:  Negative for prolonged bleeding, bruising easily or swollen nodes.    NEURO:   No history of syncope, paralysis, seizures or tremors.  All other reviewed and negative other than HPI.    Imaging / Labs / Studies (reviewed on 11/29/2022):    1/27/2020 MRI Lumbar Spine Without Contrast  COMPARISON:  11/23/2015  FINDINGS:  Alignment: There is minimal grade 1 anterolisthesis of L4 on L5.  Vertebrae: Multilevel small Schmorl's nodes noted.  Vertebral body heights are otherwise within normal limits.  No evidence of fracture or marrow replacement process.  Discs: There is disc desiccation and mild disc height loss at L1-2, L2-3, and L5-S1.  Disc desiccation noted at L4-5 with preserved disc height.  Cord: Normal.  Conus terminates at L1  Degenerative findings:  T12-L1:  No  spinal canal or neuroforaminal stenosis.  L1-L2: Mild broad-based disc bulge.  Mild bilateral facet arthropathy. No spinal canal or neuroforaminal stenosis.  L2-L3: Mild broad-based disc bulge.  Mild bilateral facet arthropathy. No spinal canal or neuroforaminal stenosis.  L3-L4: Mild broad-based disc bulge.  Mild bilateral facet arthropathy. No spinal canal or neuroforaminal stenosis.  L4-L5: Severe bilateral facet arthropathy with ligamentum flavum thickening.  Mild uncovering of the intervertebral disc.  Mild spinal canal stenosis and mild bilateral neural foraminal narrowing.  L5-S1: Moderate bilateral facet arthropathy and mild broad-based disc bulge.  Mild bilateral neural foraminal narrowing.  No spinal canal stenosis.  No significant change from prior.  Paraspinal muscles & soft tissues: Multiple probable bilateral renal cyst appear grossly similar to prior.      Physical Exam:  Last clinic visit:  There were no vitals filed for this visit.   There is no height or weight on file to calculate BMI.   (reviewed on 11/29/2022)    General: alert and oriented, in no apparent distress.  Gait: normal gait.  Skin: no rashes, no discoloration, no obvious lesions  HEENT: normocephalic, atraumatic. Pupils equal and round.  Cardiovascular: no significant peripheral edema present.  Respiratory: without use of accessory muscles of respiration.    Musculoskeletal - Lumbar Spine:  - ROM fairly preserved   - Pain on flexion of lumbar spine: Absent   - Pain on extension of lumbar spine: Absent         - Lumbar facet loading: Absent   - TTP over the lumbar facet joints: Absent  - TTP over the lumbar paraspinals: Present   - TTP over the SI joints: Present  - TTP over GT bursa: Present, minimal   - Straight Leg Raise: Negative  - CHERYLE: Present    Right Knee:  - TTP: Present over medial/ lateral joint line  - Pain with extension: Present  - Pain with flexion: Present  - Crepitus: Present     Neuro - Lower Extremities:  - BLE  Strength: R/L: HF: 5/5, HE: 5/5, KF: 5/5; KE: 5/5; FE: 5/5; FF: 5/5  - Extremity Reflexes: Brisk and symmetric throughout  - Sensory: Sensation to light touch intact bilaterally      Psych:  Mood and affect is appropriate    Assessment:  Kaylin Mccollum is a 77 y.o. year old female who is presenting with       ICD-10-CM ICD-9-CM    1. Cervical spondylosis  M47.812 721.0 IR Facet Inj Cerv Thoracic 2nd Vert Bi      IR Facet Inj Cervical Thoracic 1st Vert Bi      Case Request-RAD/Other Procedure Area: Bilateral C4-6 MBB with RN IV sedation      2. DDD (degenerative disc disease), cervical  M50.30 722.4       3. Greater trochanteric bursitis, unspecified laterality  M70.60 726.5       4. Sacroiliac joint pain  M53.3 724.6               Plan:  1. Interventional:  Schedule for bilateral L3-5 medial branch block to see if this helps with axial back pain.  We have discussed the procedure, benefits and potential risk in detail.  Patient has elected to pursue this procedure.    Anticoagulation:  None, no anticoagulation    -we have discussed repeating a bilateral sacroiliac joint and greater trochanteric bursa injection in the future should sacroiliitis and bursitis exacerbate.    2. Pharmacologic:     -  We have discussed continuing gabapentin.  We have reviewed potential side effects of this medication including daytime somnolence, weight gain and peripheral edema  Gabapentin 300 mg in the morning, 300 mg in the afternoon and 600 mg in the evening    3. Rehabilitative:   -We discussed continuing physical therapy to help manage the patient/s painful condition. The patient was counseled that muscle strengthening will improve the long term prognosis in regards to pain and may also help increase range of motion and mobility. They were told that one of the goals of physical therapy is that they learn how to do the exercises so that they can do them independently at home daily upon completion.     4. Diagnostic:  Reviewed  relevant imaging and answered patient's questions.    5. Consult:   -Dr. Schneider for knee and shoulder pain PRN  -Rheumatology: Fibromyalgia    6. Follow up: Post 4-6 weeks injection.          The above plan and management options were discussed at length with patient. Patient is in agreement with the above and verbalized understanding.    - I discussed the goals of interventional chronic pain management with the patient on today's visit. We discussed a multimodal and systematic approach to pain.  This includes diagnostic and therapeutic injections, adjuvant pharmacologic treatment, physical therapy, and at times psychiatry.  I emphasized the importance of regular exercise, core strengthening and stretching, diet and weight loss as a cornerstone of long-term pain management.    - This condition does not require this patient to take time off of work, and the primary goal of our Pain Management services is to improve the patient's functional capacity.  - Patient Questions: Answered all of the patient's questions regarding diagnoses, therapy, treatment and next steps    Humberto Dumont MD

## 2022-11-28 NOTE — H&P (VIEW-ONLY)
Established Patient Chronic Pain Clinic Visit    The patient location is:  Home  The chief complaint leading to consultation is:  Back and leg pain  Visit type: Virtual visit with synchronous audio and video  Total time spent with patient:  20 minutes  Each patient to whom he or she provides medical services by telemedicine is: (1) informed of the relationship between the physician and patient and the respective role of any other health care provider with respect to management of the patient; and (2) notified that he or she may decline to receive medical services by telemedicine and may withdraw from such care at any time.    Chief Pain Complaint:  Back pain  Right knee pain    Interval history 11/29/2022    Patient presents status post bilateral sacroiliac joint greater trochanteric bursa 10/10/2022.  Patient reports 90% relief overlying bilateral sacroiliac joints and greater trochanteric bursa following her injection.  Today she reports primarily lumbar axial back pain as well as significant neck pain.  Lower back pain is exacerbated with prolonged standing such as when she is washing dishes.  She denies significant distal radiculopathy into the right lower extremities as described at our last clinic visit.  Neck pain is elicited with cervical flexion, extension and lateral flexion and she does report reduced mobility.  She reports her pain began following a mechanical fall down the stairs at [facility] in September 1986.  Patient has continued gabapentin 300 mg in the morning, 300 mg in the afternoon and 600 mg in the evening.    Interval history 10/04/2022  Ms. Mccollum is a 77-year-old female with past medical history significant for depression, hyperlipidemia, hypertension, mitral valve prolapse, peripheral vascular disease, history of COVID-19, type 2 diabetes, multi joint arthritis, fibromyalgia who presents to establish care, previous Dr. Dutta patient.  Today patient reports return of pain in the lower back which  radiates into bilateral hips and down the posterior aspect of the right lower extremity in L4-5 distribution to the dorsum of the foot.  Patient reports pain is intermittent but has increased in intensity.  Pain is described as shocking in nature and today is rated a 6/10.  Patient reports she feels as if her skin is crawling.  patient is currently taking gabapentin 300 mg up to 3 times daily when pain is severe.  Pain interferes with the patient's sleep.  Patient also reports history of fibromyalgia which limits her mobility and duration of standing and ambulation.  Patient does endorse associated weakness in the lower extremities associated with her pain.  Patient is interested in pursuing repeat intervention as she is obtained several months of significant relief with prior sacroiliac joint and greater trochanteric bursa injections.  Patient has continued physician directed physical therapy exercises at home daily.      History of Present Illness 01/16/2020: Dr. Dutta:   Kaylin Mccollum is a 74 y.o. female  who is presenting with a chief complaint of lumbar back pain. The patient began experiencing this problem insidiously, and the pain has been gradually worsening over the past 5 month(s). The pain is described as throbbing, shooting, burning and electrical and is located in the bilateral lumbar spine. Pain is intermittent and lasts hours. The pain radiates to bilateral lower extremities L4 distribudution. The patient rates her pain a 8 out of ten and interferes with activities of daily living a 7 out of ten. Pain is exacerbated by flexion of the lumbar spine, ambulation, and is improved by rest.      She also complains of right knee pain. The patient began experiencing this problem insidiously. The pain is described as cramping, aching and is located in the right knee. Pain is intermittent and lasts hours. The pain is nonradiating. The patient rates her pain a 8 out of ten and interferes with activities of  "daily living a 7 out of ten. Pain is exacerbated by getting up from a seated position and standing, and is improved by rest. Patient reports no prior trauma, prior arthroscopy bilaterally in 1990s.       - pertinent negatives: No fever, No chills, No weight loss, No bladder dysfunction, No bowel dysfunction, No saddle anesthesia  - pertinent positives: none        Pain Disability Index Review:   @[facility](4749:3)@    Non-Pharmacologic Treatments:  Physical Therapy/Home Exercise: yes  Ice/Heat:yes  TENS: no  Acupuncture: no  Massage: no  Chiropractic: no    Other: no      Pain Medications:  - Adjuvant Medications: Lorazepam (Ativan), Neurontin (Gabapentin), and Topical Ointment (Voltaren Gel, Steroid cream, Anti-Inflammatory Cream, Compound cream)      Pain injections:  Dr. Dumont:  - 10/10/2022: Bilateral greater trochanteric bursa and sacroiliac joint injection      -04/20/2022: Right-sided acetabular femoral injection; Dr. Dutta  -03/01/2022: Bilateral sacroiliac joint and greater trochanteric bursa injection; Dr. Dutta  -07/08/2021: Bilateral sacroiliac joint and greater trochanteric bursa injection; Dr. Dutta  -01/05/2021: Bilateral sacroiliac joint and greater trochanteric bursa injection; Dr. Burns  -10/08/2020: Bilateral sacroiliac joint and bilateral greater trochanteric bursa injection; Dr. Dutta  -05/06/2020: Bilateral sacroiliac joint and greater trochanteric bursa injection; Dr. Dutta    Past Medical History:   Diagnosis Date    Arthritis     Cataract     Diabetes mellitus 1995    BS didn't check 09/13/2022    Diabetes mellitus, type 2     Fibromyalgia     General anesthetics causing adverse effect in therapeutic use     bradycardia     Hypertension     Migraines     Mitral valve prolapse     Osteoarthritis     Rotator cuff tear 04/01/2015       Review of patient's allergies indicates:   Allergen Reactions    Demerol [meperidine] Other (See Comments)     Burning when adm IV  Able to tolerate IM, "Turned the " "veins in my hand purple."    Latex, natural rubber Other (See Comments)     "Burns my skin",  Symptoms get worse the longer she is exposed    Zocor [simvastatin] Other (See Comments)     Tightening of muscles    Statins-hmg-coa reductase inhibitors Other (See Comments)     myopathy    Sulfa (sulfonamide antibiotics)      Blurred vision       Past Surgical History:   Procedure Laterality Date    ARTHROSCOPY OF KNEE Right 3/10/2020    Procedure: ARTHROSCOPY, KNEE;  Surgeon: Les Schneider MD;  Location: [facility] OR;  Service: Orthopedics;  Laterality: Right;    CATARACT EXTRACTION W/  INTRAOCULAR LENS IMPLANT Left 07/19/2017    CATARACT EXTRACTION W/  INTRAOCULAR LENS IMPLANT Right 2017    CHOLECYSTECTOMY      CHONDROPLASTY OF KNEE Right 3/10/2020    Procedure: CHONDROPLASTY, KNEE;  Surgeon: Les Schneider MD;  Location: [facility] OR;  Service: Orthopedics;  Laterality: Right;  Anterior compartment     COLONOSCOPY N/A 9/25/2018    Procedure: COLONOSCOPY;  Surgeon: Bethel Carmona MD;  Location: [facility] ENDO;  Service: Endoscopy;  Laterality: N/A;    EXCISION OF MEDIAL MENISCUS OF KNEE Right 3/10/2020    Procedure: MENISCECTOMY, KNEE, MEDIAL;  Surgeon: Les Schneider MD;  Location: [facility] OR;  Service: Orthopedics;  Laterality: Right;  Partial, Medial , Lateral     EYE SURGERY      INJECTION OF ANESTHETIC AGENT AROUND NERVE Right 8/8/2019    Procedure: Right Genicular nerve block with local;  Surgeon: Manpreet Dutta MD;  Location: [facility] PAIN MGT;  Service: Pain Management;  Laterality: Right;    INJECTION OF ANESTHETIC AGENT INTO SACROILIAC JOINT Bilateral 5/6/2020    Procedure: Bilateral Sacroiliac Joint Injection;  Surgeon: Manpreet Dutta MD;  Location: [facility] PAIN MGT;  Service: Pain Management;  Laterality: Bilateral;    INJECTION OF ANESTHETIC AGENT INTO SACROILIAC JOINT Bilateral 10/8/2020    Procedure: Bilateral SI and Bilateral GTB with RN IV sedation;  Surgeon: Manpreet Dutta MD;  Location: [facility] PAIN MGT;  " Service: Pain Management;  Laterality: Bilateral;    INJECTION OF ANESTHETIC AGENT INTO SACROILIAC JOINT Bilateral 1/5/2021    Procedure: Bilateral BLOCK, SACROILIAC JOINT and Bilateral GTB witn RN IV sedation;  Surgeon: Daniel Burns MD;  Location: [facility] PAIN MGT;  Service: Pain Management;  Laterality: Bilateral;    INJECTION OF ANESTHETIC AGENT INTO SACROILIAC JOINT Bilateral 7/8/2021    Procedure: Bilateral BLOCK, SACROILIAC JOINT bilateral GTB RN IV sedation;  Surgeon: Manpreet Dutta MD;  Location: [facility] PAIN MGT;  Service: Pain Management;  Laterality: Bilateral;    INJECTION OF ANESTHETIC AGENT INTO SACROILIAC JOINT Bilateral 3/1/2022    Procedure: Bilateral BLOCK, SACROILIAC JOINT and Bilateral GTB RN IV sedation;  Surgeon: Manpreet Dutta MD;  Location: [facility] PAIN MGT;  Service: Pain Management;  Laterality: Bilateral;    INJECTION OF ANESTHETIC AGENT INTO SACROILIAC JOINT Bilateral 10/10/2022    Procedure: Bilateral Sacroiliac Joint Injection;  Surgeon: Humberto Dumont MD;  [facility];  Service: Pain Management;  Laterality: Bilateral;    INJECTION OF JOINT Bilateral 9/5/2019    Procedure: Bilateral GT bursa injection;  Surgeon: Manpreet Dutta MD;  Location: [facility] PAIN MGT;  Service: Pain Management;  Laterality: Bilateral;    INJECTION OF JOINT Bilateral 5/6/2020    Procedure: Bilateral GT bursa injection;  Surgeon: Manpreet Dutta MD;  [facility];  Service: Pain Management;  Laterality: Bilateral;    INJECTION OF JOINT Right 4/28/2022    Procedure: Injection, Joint Right Hip Injection RN IV sedation;  Surgeon: Manpreet Dutta MD;  Location: [facility] PAIN [facility]GT;  Service: Pain Management;  Laterality: Right;    INJECTION OF JOINT Bilateral 10/10/2022    Procedure: Bilateral GT bursa injection with RN IV sedation;  Surgeon: Humberto Dumont MD;  Location: [facility] PAIN MGT;  Service: Pain Management;  Laterality: Bilateral;    KNEE ARTHROSCOPY Bilateral     PARS PLANA VITRECTOMY W/ REPAIR OF MACULAR HOLE  Left 02/22/2017    RADIOFREQUENCY THERMOCOAGULATION Left 7/11/2019    Procedure: Right SIJ RFA;  Surgeon: Manpreet Dutta MD;  Location: [facility] PAIN MGT;  Service: Pain Management;  Laterality: Left;    RADIOFREQUENCY THERMOCOAGULATION Right 7/25/2019    Procedure: Right SIJ RFA;  Surgeon: Manpreet Dutta MD;  Location: [facility] PAIN MGT;  Service: Pain Management;  Laterality: Right;    SHOULDER ARTHROSCOPY Right 06/04/15     Current Outpatient Medications on File Prior to Visit   Medication Sig Dispense Refill    biotin 1 mg tablet Take 1,000 mcg by mouth every morning.       canagliflozin (INVOKANA) 100 mg Tab tablet Take 1 tablet (100 mg total) by mouth once daily. 90 tablet 0    celecoxib (CELEBREX) 200 MG capsule TAKE 1 CAPSULE(200 MG) BY MOUTH TWICE DAILY WITH FOOD 120 capsule 1    diclofenac sodium (VOLTAREN) 1 % Gel Apply 2 g topically 4 (four) times daily. 1 each 6    ezetimibe (ZETIA) 10 mg tablet Take 1 tablet (10 mg total) by mouth once daily. 90 tablet 1    FLUoxetine 40 MG capsule Take 1 capsule (40 mg total) by mouth once daily. 90 capsule 1    fluticasone (FLONASE) 50 mcg/actuation nasal spray 2 sprays by Each Nare route once daily. (Patient taking differently: 2 sprays by Each Nostril route nightly as needed.) 1 Bottle 0    furosemide (LASIX) 20 MG tablet TAKE 1 TABLET BY MOUTH EVERY MONDAY WEDNESDAY AND FRIDAY 25 tablet 2    gabapentin (NEURONTIN) 300 MG capsule Take 1 capsule (300 mg total) by mouth 4 (four) times daily. 120 capsule 1    LORazepam (ATIVAN) 1 MG tablet TK 3 TS PO 1 HOUR PRIOR TO APPOINTMENT      losartan (COZAAR) 25 MG tablet TAKE 1 TABLET(25 MG) BY MOUTH EVERY DAY 90 tablet 2    metFORMIN (GLUCOPHAGE) 1000 MG tablet Take 1 tablet (1,000 mg total) by mouth 2 (two) times daily with meals. 180 tablet 3    omeprazole (PRILOSEC) 20 MG capsule Take 1 capsule (20 mg total) by mouth daily as needed (w/ NSAID). 30 capsule 5    potassium chloride SA (K-DUR,KLOR-CON M) 10 MEQ tablet TAKE 1 TABLET(10  MEQ) BY MOUTH EVERY DAY 20 tablet 5    pulse oximeter (PULSE OXIMETER) device by Apply Externally route 2 (two) times a day. Use twice daily at 8 AM and 3 PM and record the value in MyChart as directed. 1 each 0    triamcinolone acetonide 0.025% (KENALOG) 0.025 % Oint Apply topically 2 (two) times daily. for 10 days (Patient taking differently: Apply topically 2 (two) times daily as needed. ) 15 g 2    verapamiL (CALAN) 120 MG tablet Take 1 tablet (120 mg total) by mouth 3 (three) times daily. 180 tablet 5     No current facility-administered medications on file prior to visit.               GENERAL:  No weight loss, malaise or fevers.  HEENT:   No recent changes in vision or hearing  NECK:  Negative for lumps, no difficulty with swallowing.  RESPIRATORY:  Negative for cough, wheezing or shortness of breath, patient denies any recent URI.  CARDIOVASCULAR:  Negative for chest pain or palpitations.  GI:  Negative for abdominal discomfort, blood in stools or black stools or change in bowel habits.  MUSCULOSKELETAL:  See HPI.  SKIN:  Negative for lesions, rash, and itching.  PSYCH:  No mood disorder or recent psychosocial stressors.   HEMATOLOGY/LYMPHOLOGY:  Negative for prolonged bleeding, bruising easily or swollen nodes.    NEURO:   No history of syncope, paralysis, seizures or tremors.  All other reviewed and negative other than HPI.    Imaging / Labs / Studies (reviewed on 11/29/2022):    1/27/2020 MRI Lumbar Spine Without Contrast  COMPARISON:  11/23/2015  FINDINGS:  Alignment: There is minimal grade 1 anterolisthesis of L4 on L5.  Vertebrae: Multilevel small Schmorl's nodes noted.  Vertebral body heights are otherwise within normal limits.  No evidence of fracture or marrow replacement process.  Discs: There is disc desiccation and mild disc height loss at L1-2, L2-3, and L5-S1.  Disc desiccation noted at L4-5 with preserved disc height.  Cord: Normal.  Conus terminates at L1  Degenerative findings:  T12-L1:  No  spinal canal or neuroforaminal stenosis.  L1-L2: Mild broad-based disc bulge.  Mild bilateral facet arthropathy. No spinal canal or neuroforaminal stenosis.  L2-L3: Mild broad-based disc bulge.  Mild bilateral facet arthropathy. No spinal canal or neuroforaminal stenosis.  L3-L4: Mild broad-based disc bulge.  Mild bilateral facet arthropathy. No spinal canal or neuroforaminal stenosis.  L4-L5: Severe bilateral facet arthropathy with ligamentum flavum thickening.  Mild uncovering of the intervertebral disc.  Mild spinal canal stenosis and mild bilateral neural foraminal narrowing.  L5-S1: Moderate bilateral facet arthropathy and mild broad-based disc bulge.  Mild bilateral neural foraminal narrowing.  No spinal canal stenosis.  No significant change from prior.  Paraspinal muscles & soft tissues: Multiple probable bilateral renal cyst appear grossly similar to prior.      Physical Exam:  Last clinic visit:  There were no vitals filed for this visit.   There is no height or weight on file to calculate BMI.   (reviewed on 11/29/2022)    General: alert and oriented, in no apparent distress.  Gait: normal gait.  Skin: no rashes, no discoloration, no obvious lesions  HEENT: normocephalic, atraumatic. Pupils equal and round.  Cardiovascular: no significant peripheral edema present.  Respiratory: without use of accessory muscles of respiration.    Musculoskeletal - Lumbar Spine:  - ROM fairly preserved   - Pain on flexion of lumbar spine: Absent   - Pain on extension of lumbar spine: Absent         - Lumbar facet loading: Absent   - TTP over the lumbar facet joints: Absent  - TTP over the lumbar paraspinals: Present   - TTP over the SI joints: Present  - TTP over GT bursa: Present, minimal   - Straight Leg Raise: Negative  - CHERYLE: Present    Right Knee:  - TTP: Present over medial/ lateral joint line  - Pain with extension: Present  - Pain with flexion: Present  - Crepitus: Present     Neuro - Lower Extremities:  - BLE  Strength: R/L: HF: 5/5, HE: 5/5, KF: 5/5; KE: 5/5; FE: 5/5; FF: 5/5  - Extremity Reflexes: Brisk and symmetric throughout  - Sensory: Sensation to light touch intact bilaterally      Psych:  Mood and affect is appropriate    Assessment:  Kaylin Mccollum is a 77 y.o. year old female who is presenting with       ICD-10-CM ICD-9-CM    1. Cervical spondylosis  M47.812 721.0 IR Facet Inj Cerv Thoracic 2nd Vert Bi      IR Facet Inj Cervical Thoracic 1st Vert Bi      Case Request-RAD/Other Procedure Area: Bilateral C4-6 MBB with RN IV sedation      2. DDD (degenerative disc disease), cervical  M50.30 722.4       3. Greater trochanteric bursitis, unspecified laterality  M70.60 726.5       4. Sacroiliac joint pain  M53.3 724.6               Plan:  1. Interventional:  Schedule for bilateral L3-5 medial branch block to see if this helps with axial back pain.  We have discussed the procedure, benefits and potential risk in detail.  Patient has elected to pursue this procedure.    Anticoagulation:  None, no anticoagulation    -we have discussed repeating a bilateral sacroiliac joint and greater trochanteric bursa injection in the future should sacroiliitis and bursitis exacerbate.    2. Pharmacologic:     -  We have discussed continuing gabapentin.  We have reviewed potential side effects of this medication including daytime somnolence, weight gain and peripheral edema  Gabapentin 300 mg in the morning, 300 mg in the afternoon and 600 mg in the evening    3. Rehabilitative:   -We discussed continuing physical therapy to help manage the patient/s painful condition. The patient was counseled that muscle strengthening will improve the long term prognosis in regards to pain and may also help increase range of motion and mobility. They were told that one of the goals of physical therapy is that they learn how to do the exercises so that they can do them independently at home daily upon completion.     4. Diagnostic:  Reviewed  relevant imaging and answered patient's questions.    5. Consult:   -Dr. Schneider for knee and shoulder pain PRN  -Rheumatology: Fibromyalgia    6. Follow up: Post 4-6 weeks injection.          The above plan and management options were discussed at length with patient. Patient is in agreement with the above and verbalized understanding.    - I discussed the goals of interventional chronic pain management with the patient on today's visit. We discussed a multimodal and systematic approach to pain.  This includes diagnostic and therapeutic injections, adjuvant pharmacologic treatment, physical therapy, and at times psychiatry.  I emphasized the importance of regular exercise, core strengthening and stretching, diet and weight loss as a cornerstone of long-term pain management.    - This condition does not require this patient to take time off of work, and the primary goal of our Pain Management services is to improve the patient's functional capacity.  - Patient Questions: Answered all of the patient's questions regarding diagnoses, therapy, treatment and next steps    Humberto Dumont MD

## 2022-11-29 ENCOUNTER — OFFICE VISIT (OUTPATIENT)
Dept: PAIN MEDICINE | Facility: CLINIC | Age: 77
End: 2022-11-29
Payer: MEDICARE

## 2022-11-29 ENCOUNTER — TELEPHONE (OUTPATIENT)
Dept: PAIN MEDICINE | Facility: CLINIC | Age: 77
End: 2022-11-29

## 2022-11-29 DIAGNOSIS — M50.30 DDD (DEGENERATIVE DISC DISEASE), CERVICAL: ICD-10-CM

## 2022-11-29 DIAGNOSIS — M47.816 LUMBAR SPONDYLOSIS: ICD-10-CM

## 2022-11-29 DIAGNOSIS — M70.60 GREATER TROCHANTERIC BURSITIS, UNSPECIFIED LATERALITY: ICD-10-CM

## 2022-11-29 DIAGNOSIS — M47.812 CERVICAL SPONDYLOSIS: Primary | ICD-10-CM

## 2022-11-29 DIAGNOSIS — M53.3 SACROILIAC JOINT PAIN: ICD-10-CM

## 2022-11-29 PROCEDURE — 3044F HG A1C LEVEL LT 7.0%: CPT | Mod: CPTII,95,, | Performed by: ANESTHESIOLOGY

## 2022-11-29 PROCEDURE — 1159F PR MEDICATION LIST DOCUMENTED IN MEDICAL RECORD: ICD-10-PCS | Mod: CPTII,95,, | Performed by: ANESTHESIOLOGY

## 2022-11-29 PROCEDURE — 1159F MED LIST DOCD IN RCRD: CPT | Mod: CPTII,95,, | Performed by: ANESTHESIOLOGY

## 2022-11-29 PROCEDURE — 4010F PR ACE/ARB THEARPY RXD/TAKEN: ICD-10-PCS | Mod: CPTII,95,, | Performed by: ANESTHESIOLOGY

## 2022-11-29 PROCEDURE — 1160F RVW MEDS BY RX/DR IN RCRD: CPT | Mod: CPTII,95,, | Performed by: ANESTHESIOLOGY

## 2022-11-29 PROCEDURE — 4010F ACE/ARB THERAPY RXD/TAKEN: CPT | Mod: CPTII,95,, | Performed by: ANESTHESIOLOGY

## 2022-11-29 PROCEDURE — 3044F PR MOST RECENT HEMOGLOBIN A1C LEVEL <7.0%: ICD-10-PCS | Mod: CPTII,95,, | Performed by: ANESTHESIOLOGY

## 2022-11-29 PROCEDURE — 3060F POS MICROALBUMINURIA REV: CPT | Mod: CPTII,95,, | Performed by: ANESTHESIOLOGY

## 2022-11-29 PROCEDURE — 99214 PR OFFICE/OUTPT VISIT, EST, LEVL IV, 30-39 MIN: ICD-10-PCS | Mod: 95,,, | Performed by: ANESTHESIOLOGY

## 2022-11-29 PROCEDURE — 1160F PR REVIEW ALL MEDS BY PRESCRIBER/CLIN PHARMACIST DOCUMENTED: ICD-10-PCS | Mod: CPTII,95,, | Performed by: ANESTHESIOLOGY

## 2022-11-29 PROCEDURE — 99214 OFFICE O/P EST MOD 30 MIN: CPT | Mod: 95,,, | Performed by: ANESTHESIOLOGY

## 2022-11-29 PROCEDURE — 3066F PR DOCUMENTATION OF TREATMENT FOR NEPHROPATHY: ICD-10-PCS | Mod: CPTII,95,, | Performed by: ANESTHESIOLOGY

## 2022-11-29 PROCEDURE — 3066F NEPHROPATHY DOC TX: CPT | Mod: CPTII,95,, | Performed by: ANESTHESIOLOGY

## 2022-11-29 PROCEDURE — 3060F PR POS MICROALBUMINURIA RESULT DOCUMENTED/REVIEW: ICD-10-PCS | Mod: CPTII,95,, | Performed by: ANESTHESIOLOGY

## 2022-11-29 NOTE — TELEPHONE ENCOUNTER
Called pt to set up their procedure. Pt answered and procedure has been made. Inform pt on the procedure instruction. Pt  does not take any blood thinners. Pt understood and all question answered.     Sylvain Srivastava   Medical Assistant

## 2022-12-05 NOTE — PRE-PROCEDURE INSTRUCTIONS
Spoke with patient regarding procedure scheduled on 12.13     Arrival time 0615     Has patient been sick with fever or on antibiotics within the last 7 days? no     Does the patient have any open wounds, sores or rashes? No     Does the patient have any recent fractures? no     Has patient received a vaccination within the last 7 days? No     Received the COVID vaccination?      Has the patient stopped all medications as directed? NA     Does patient have a pacemaker and or defibrillator? no     Does the patient have a ride to and from procedure and someone reliable to remain with patient? daughter     Is the patient diabetic? yes     Does the patient have sleep apnea? Or use O2 at home? no     Is the patient receiving sedation? yes     Is the patient instructed to remain NPO beginning at midnight the night before their procedure? yes     Procedure location confirmed with patient? Yes     Covid- Denies signs/symptoms. Instructed to notify PAT/MD if any changes.

## 2022-12-06 DIAGNOSIS — M25.512 LEFT SHOULDER PAIN, UNSPECIFIED CHRONICITY: Primary | ICD-10-CM

## 2022-12-06 DIAGNOSIS — M25.562 PAIN IN BOTH KNEES, UNSPECIFIED CHRONICITY: ICD-10-CM

## 2022-12-06 DIAGNOSIS — M25.561 PAIN IN BOTH KNEES, UNSPECIFIED CHRONICITY: ICD-10-CM

## 2022-12-12 ENCOUNTER — OFFICE VISIT (OUTPATIENT)
Dept: ORTHOPEDICS | Facility: CLINIC | Age: 77
End: 2022-12-12
Payer: MEDICARE

## 2022-12-12 ENCOUNTER — HOSPITAL ENCOUNTER (OUTPATIENT)
Dept: RADIOLOGY | Facility: HOSPITAL | Age: 77
Discharge: HOME | End: 2022-12-12
Attending: ORTHOPAEDIC SURGERY
Payer: MEDICARE

## 2022-12-12 VITALS — WEIGHT: 220.56 LBS | BODY MASS INDEX: 31.57 KG/M2 | HEIGHT: 70 IN

## 2022-12-12 DIAGNOSIS — M70.61 GREATER TROCHANTERIC BURSITIS OF BOTH HIPS: ICD-10-CM

## 2022-12-12 DIAGNOSIS — S83.241D ACUTE MEDIAL MENISCUS TEAR OF RIGHT KNEE, SUBSEQUENT ENCOUNTER: ICD-10-CM

## 2022-12-12 DIAGNOSIS — M75.32 CALCIFIC TENDINITIS OF LEFT SHOULDER: Primary | ICD-10-CM

## 2022-12-12 DIAGNOSIS — M25.512 LEFT SHOULDER PAIN, UNSPECIFIED CHRONICITY: ICD-10-CM

## 2022-12-12 DIAGNOSIS — M70.62 GREATER TROCHANTERIC BURSITIS OF BOTH HIPS: ICD-10-CM

## 2022-12-12 DIAGNOSIS — M25.562 PAIN IN BOTH KNEES, UNSPECIFIED CHRONICITY: ICD-10-CM

## 2022-12-12 DIAGNOSIS — M25.561 PAIN IN BOTH KNEES, UNSPECIFIED CHRONICITY: ICD-10-CM

## 2022-12-12 DIAGNOSIS — M65.311 TRIGGER THUMB OF RIGHT HAND: ICD-10-CM

## 2022-12-12 DIAGNOSIS — S83.281D ACUTE LATERAL MENISCUS TEAR OF RIGHT KNEE, SUBSEQUENT ENCOUNTER: ICD-10-CM

## 2022-12-12 DIAGNOSIS — M19.012 ARTHRITIS OF LEFT SHOULDER REGION: ICD-10-CM

## 2022-12-12 DIAGNOSIS — M94.261 CHONDROMALACIA OF RIGHT KNEE: ICD-10-CM

## 2022-12-12 PROCEDURE — 73562 X-RAY EXAM OF KNEE 3: CPT | Mod: 26,LT,, | Performed by: RADIOLOGY

## 2022-12-12 PROCEDURE — 99213 OFFICE O/P EST LOW 20 MIN: CPT | Mod: 25,S$PBB,, | Performed by: ORTHOPAEDIC SURGERY

## 2022-12-12 PROCEDURE — 73030 X-RAY EXAM OF SHOULDER: CPT | Mod: TC,LT

## 2022-12-12 PROCEDURE — 73030 XR SHOULDER COMPLETE 2 OR MORE VIEWS LEFT: ICD-10-PCS | Mod: 26,LT,, | Performed by: RADIOLOGY

## 2022-12-12 PROCEDURE — 20610 LARGE JOINT ASPIRATION/INJECTION: R KNEE: ICD-10-PCS | Mod: S$PBB,RT,, | Performed by: ORTHOPAEDIC SURGERY

## 2022-12-12 PROCEDURE — 99999 PR PBB SHADOW E&M-EST. PATIENT-LVL IV: CPT | Mod: PBBFAC,,, | Performed by: ORTHOPAEDIC SURGERY

## 2022-12-12 PROCEDURE — 73564 XR KNEE ORTHO RIGHT WITH FLEXION: ICD-10-PCS | Mod: 26,RT,, | Performed by: RADIOLOGY

## 2022-12-12 PROCEDURE — 73030 X-RAY EXAM OF SHOULDER: CPT | Mod: 26,LT,, | Performed by: RADIOLOGY

## 2022-12-12 PROCEDURE — 73564 X-RAY EXAM KNEE 4 OR MORE: CPT | Mod: TC,RT

## 2022-12-12 PROCEDURE — 73562 XR KNEE ORTHO RIGHT WITH FLEXION: ICD-10-PCS | Mod: 26,LT,, | Performed by: RADIOLOGY

## 2022-12-12 PROCEDURE — 99999 PR PBB SHADOW E&M-EST. PATIENT-LVL IV: ICD-10-PCS | Mod: PBBFAC,,, | Performed by: ORTHOPAEDIC SURGERY

## 2022-12-12 PROCEDURE — 99213 PR OFFICE/OUTPT VISIT, EST, LEVL III, 20-29 MIN: ICD-10-PCS | Mod: 25,S$PBB,, | Performed by: ORTHOPAEDIC SURGERY

## 2022-12-12 PROCEDURE — 99214 OFFICE O/P EST MOD 30 MIN: CPT | Mod: PBBFAC,25 | Performed by: ORTHOPAEDIC SURGERY

## 2022-12-12 PROCEDURE — 20610 DRAIN/INJ JOINT/BURSA W/O US: CPT | Mod: PBBFAC | Performed by: ORTHOPAEDIC SURGERY

## 2022-12-12 PROCEDURE — 73564 X-RAY EXAM KNEE 4 OR MORE: CPT | Mod: 26,RT,, | Performed by: RADIOLOGY

## 2022-12-12 RX ORDER — METHYLPREDNISOLONE ACETATE 40 MG/ML
40 INJECTION, SUSPENSION INTRA-ARTICULAR; INTRALESIONAL; INTRAMUSCULAR; SOFT TISSUE
Status: DISCONTINUED | OUTPATIENT
Start: 2022-12-12 | End: 2022-12-12 | Stop reason: HOSPADM

## 2022-12-12 RX ADMIN — METHYLPREDNISOLONE ACETATE 40 MG: 40 INJECTION, SUSPENSION INTRALESIONAL; INTRAMUSCULAR; INTRASYNOVIAL; SOFT TISSUE at 09:12

## 2022-12-12 NOTE — PROCEDURES
Large Joint Aspiration/Injection: R knee    Date/Time: 12/12/2022 9:20 AM  Performed by: Les Schneider MD  Authorized by: Les Schneider MD     Consent Done?:  Yes (Verbal)  Indications:  Arthritis  Site marked: the procedure site was marked    Timeout: prior to procedure the correct patient, procedure, and site was verified      Local anesthesia used?: Yes    Local anesthetic:  Lidocaine 1% without epinephrine    Details:  Needle Size:  22 G  Ultrasonic Guidance for needle placement?: No    Approach:  Anterolateral  Location:  Knee  Site:  R knee  Medications:  40 mg methylPREDNISolone acetate 40 mg/mL  Patient tolerance:  Patient tolerated the procedure well with no immediate complications

## 2022-12-12 NOTE — PATIENT INSTRUCTIONS
Injection performed of the right knee with 40 mg Depo-Medrol mixed with 5 cc 1% lidocaine because she is having injections in her lumbar spine by pain management to more 0 and I did not want to overload with steroid   Ice the knee next few days   Injection given 12/12/2022   Will avoid injection in the right shoulder since you getting steroid injections soon  I will see you back in 3 months

## 2022-12-12 NOTE — PROGRESS NOTES
Subjective:     Patient ID: Kaylin Mccollum is a 77 y.o. female.    Chief Complaint: Pain of the Right Knee and Pain of the Left Shoulder    HPI:  75-year-old retired from  who had been falling down stairs numerous occasions she has been having pain and catching and locking since several months now.  She gets up the knee catches she has to wiggle it some how to unlock it before she can walk. She does have quite a bit of lumbar pain that radiates with burning sensation down to the right anterior tibia and dorsal of the foot.  She is under the care of pain management for that.  Pain in the knee is around 4/10 with catching locking feeling.  Able to ambulate once the catching locking goes away for her minimum 200-300 yd without discomfort.  Unable to squat unable to do stairs due to the catching locking feeling.  She does ambulate without any assistive devices.  She does have an MRI in the electronic records.  At 1 point she had falling down the stairs and sustained right shoulder rotator cuff tear requiring repair.  Denies any numbness tingling in the hands.  She does have some cervical lumbar pain    05/21/2020  Status post her right knee arthroscopy 03/10/2020.  Findings of complex medial and lateral meniscus tears as well chondromalacia type 3 anterior and medial.  She was doing great postop for 5 weeks another side in twisted her knee and starting some pain.  Any twisting maneuver she hurts quite a bit.  Pain is 4/10.  She did her independent exercise program.  Denies any fever or chills or shortness of breath or difficulty chewing or swallowing.  Complains of some stiffness and swelling    06/25/2020  Right knee arthroscopy 03/10/2020 with complex medial and lateral meniscus tears as well as chondromalacia type 3 anterior medial compartment.  The other day could not get up from kneeling on the floor.  Her pain now is coming back as 6/10 with swelling and tightness in the knee.  Last visit given a steroid  injection which helped for a little bit but not too much.  She does take Celebrex 100 mg twice a day and a helps very little.  Last visit discussed injecting Synvisc-One into her knee and she wants to proceed.  I did tell her that Synvisc-One might take 6-8 weeks to see any effect.  In my not work and we had to resort to other treatments down the road.  Still feeling occasional catching.  Denies any fever chills shortness of breath difficulty chewing swallowing loss of bowel bladder control or loss of taste and smell    08/20/2020  Chondromalacia of the right knee anterior and medial compartment and status post partial medial lateral meniscectomy.  Synvisc-One given 06/25/2020 seems to have helped but still having quite excessive pain right now since her daughter was diagnosed with PE and had to run around with her.  But overall she feels Synvisc-One seems to have helped.  She is requesting a steroid injection today.  She does take Celebrex at night.  She is to have back injections she is holding off at this point.  Pain is 4/10.  Denies any fever or chills or shortness of breath or difficulty chewing or swallowing loss of bowel bladder control    11/19/2020  Right knee arthroscopy 03/10/2020 with medial and lateral partial meniscectomies and finding of chondromalacia type 3 of the anterior medial compartment.  She received Synvisc-One on 06/25/2020 with good relief.  She thought she is getting her Synvisc-One today and I refreshed her memory that it should be 6 months so she is not due until December 25th.  We need to get that approved.  She complains on occasional thigh pain occasional mid tibia pain.  She does have history of fibromyalgia and used to get hip injections by Dr. izquierdo last of which 10/08/2020 bilateral SI and bilateral greater trochanteric areas.  She does complain of both hips hurting over the greater trochanters.  Celebrex seems to help as well as the brace on her knee.  She asked she is going for  dental work if she can take ibuprofen I did tell her not with Celebrex she may take Tylenol.  Denies any fever or chills or shortness of breath or difficulty with chewing or swallowing loss of bowel bladder control or blurry vision double vision or loss of sense smell or taste    12/28/2020   New problem left shoulder pain  Right knee pain  Lately each patient is taking Celebrex 200 mg twice a day since she has been taking care of her daughter back in 4 to the hospital and doing a lot of walking.  She is running out of the Celebrex.  I did warn her about the side effects.  Her pain is 4/10.  Unable to sleep on the shoulder.  Lifting things seems to be very painful.  Previous right shoulder rotator cuff surgery.  She is having also worsening lower back and hip area and sacroiliac pain she sees pain management for that.    Denies any fever or chills or shortness of breath or difficulty with chewing or swallowing loss of bowel bladder control blurry vision double vision loss of sense of smell or taste    02/22/2021  Last visit patient received injection to the left shoulder subacromial space on 12/28/2020 as well into the right knee with grade 3 is a loose sugar for pain.  Her pain went down 2/10 in both the shoulder and the knee.  But she still feels some weakness in the shoulder and certain motions seems to be very weak and painful.  Previous history of injury to that area.  She did see pain management Dr. Burns who give her injections in her hips and that is doing really well.  Concerned about having rotator cuff injury to the left shoulder because certain motions and activities of daily living are limited .  Her hips are doing well her knee is doing well  Denies any fever or chills or shortness of breath or difficulty with chewing or swallowing or loss of bowel bladder control or blurry vision or double vision or loss sense smell or taste or numbness or tingling in her hands.  She does have some occasional neck  pain    03/01/2021  Left shoulder tenderness.  Pain today 0/10.  She is taking Celebrex and received a steroid injection 12/28/2020.  She is here for MRI results.  We spent time going over the report with her and copy of it given.  We discussed small slap lesion, mild arthritic changes and small loose body and calcific tendinitis.  As far as the knee is concerned she is doing okay at this point in time  Denies any fever or chills or shortness of breath or difficulty with chewing or swallowing loss of bowel bladder control or loss of sense smell or taste    03/10/2022  Bilateral shoulder pain left and right, right thumb locking and radiation down to the right upper extremity, right knee arthritis  Patient since last seen had stem cell injections x2 into the right knee and x1 into the left knee by Dr. Diamond Chaparro.  She is not too sure if they really helped her.  On 3/1/22 patient received bilateral sacroiliac joint injections and bilateral greater trochanteric injections by Dr. Dutta.  Each injection containing 40 mg of Depo-Medrol for a total of 160 mg. I did tell her it is too soon to receive any injections in the shoulder because too much steroid will increase her heart rate and messes up her sugars which could cause problems over the next few days.  She expressed understanding.  We did go over MRI performed on the left shoulder a year or so ago.  As well as we went over her knee x-rays and operative report from arthroscopic surgery    06/20/2022  Left shoulder pain, right knee pain  Previous MRI reviewed with the patient showing generalized arthritis as well as calcific tendinitis in the shoulder.  She did receive an injection in 2020 with good relief.  She would like another repeat injection.  She does take Celebrex and occasional Tylenol.  She does have fibromyalgia and she paces herself.  As far as the left knee is concerned she is tender over the pes anserine on the medial tibial flare in the Voltaren gel  that she applies helps that.  She also has pain inside the knee joint and underneath the kneecap.  As far as the right thumb is doing much better after we injected at last visit.  She did receive stem cell injections in the right knee by Dr. Chaparro and not too sure if it anything came out of it.  As far as the back she was seeing pain management where they gave her bilateral SI joint and bilateral trochanteric bursitis injections.  No fever no chills no shortness of breath difficulty with chewing or swallowing  Pain in the right knee 3/10 in the left shoulder around 4/10      12/12/2022   Bilateral shoulder pain the not hurting as much at this time.  She is having injections in her back to more 0 and I did tell her too much cortisone today they may cancel her back but she said she does not want to cancel her back injections because they help and make her better and they are at this time more important than her shoulders  In right knee chondromalacia and arthritis.  We give her steroid injection in June 2022 Depo-Medrol.  I did tell her at this time if she read insisted on having injection I will give her half of the does which is 40 mg Depo-Medrol.  Her pain level 6/10.  She still using Voltaren gel and gabapentin as needed.  She is seeing pain management for her back.    No fever no chills no shortness of breath or difficulty with chewing or swallowing loss of bowel bladder control blurry vision double vision loss sense smell or taste  Past Medical History:   Diagnosis Date    Arthritis     Cataract     Diabetes mellitus 1995    BS didn't check 09/13/2022    Diabetes mellitus, type 2     Fibromyalgia     General anesthetics causing adverse effect in therapeutic use     bradycardia     Hypertension     Migraines     Mitral valve prolapse     Osteoarthritis     Rotator cuff tear 04/01/2015     Past Surgical History:   Procedure Laterality Date    ARTHROSCOPY OF KNEE Right 3/10/2020    Procedure: ARTHROSCOPY, KNEE;   Surgeon: Les Schneider MD;  Location: [facility];  Service: Orthopedics;  Laterality: Right;    CATARACT EXTRACTION W/  INTRAOCULAR LENS IMPLANT Left 07/19/2017    CATARACT EXTRACTION W/  INTRAOCULAR LENS IMPLANT Right 2017    CHOLECYSTECTOMY      CHONDROPLASTY OF KNEE Right 3/10/2020    Procedure: CHONDROPLASTY, KNEE;  Surgeon: Les Schneider MD;  Location: [facility];  Service: Orthopedics;  Laterality: Right;  Anterior compartment     COLONOSCOPY N/A 9/25/2018    Procedure: COLONOSCOPY;  Surgeon: Bethel Carmona MD;  Location: [facility];  Service: Endoscopy;  Laterality: N/A;    EXCISION OF MEDIAL MENISCUS OF KNEE Right 3/10/2020    Procedure: MENISCECTOMY, KNEE, MEDIAL;  Surgeon: Les Schneider MD;  Location: [facility];  Service: Orthopedics;  Laterality: Right;  Partial, Medial , Lateral     EYE SURGERY      INJECTION OF ANESTHETIC AGENT AROUND NERVE Right 8/8/2019    Procedure: Right Genicular nerve block with local;  Surgeon: Manpreet Dutta MD;  Location: [facility];  Service: Pain Management;  Laterality: Right;    INJECTION OF ANESTHETIC AGENT INTO SACROILIAC JOINT Bilateral 5/6/2020    Procedure: Bilateral Sacroiliac Joint Injection;  Surgeon: Manpreet Dutta MD;  Location: [facility];  Service: Pain Management;  Laterality: Bilateral;    INJECTION OF ANESTHETIC AGENT INTO SACROILIAC JOINT Bilateral 10/8/2020    Procedure: Bilateral SI and Bilateral GTB with RN IV sedation;  Surgeon: Manpreet Dutta MD;  Location: [facility];  Service: Pain Management;  Laterality: Bilateral;    INJECTION OF ANESTHETIC AGENT INTO SACROILIAC JOINT Bilateral 1/5/2021    Procedure: Bilateral BLOCK, SACROILIAC JOINT and Bilateral GTB witn RN IV sedation;  Surgeon: Daniel Bunrs MD;  Location: [facility];  Service: Pain Management;  Laterality: Bilateral;    INJECTION OF ANESTHETIC AGENT INTO SACROILIAC JOINT Bilateral 7/8/2021    Procedure: Bilateral BLOCK, SACROILIAC JOINT bilateral GTB RN IV sedation;   Surgeon: Manpreet Dutta MD;  Location: [facility];  Service: Pain Management;  Laterality: Bilateral;    INJECTION OF ANESTHETIC AGENT INTO SACROILIAC JOINT Bilateral 3/1/2022    Procedure: Bilateral BLOCK, SACROILIAC JOINT and Bilateral GTB RN IV sedation;  Surgeon: Manpreet Dutta MD;  Location: [facility];  Service: Pain Management;  Laterality: Bilateral;    INJECTION OF ANESTHETIC AGENT INTO SACROILIAC JOINT Bilateral 10/10/2022    Procedure: Bilateral Sacroiliac Joint Injection;  Surgeon: Humberto Dumont MD;  Location: [facility];  Service: Pain Management;  Laterality: Bilateral;    INJECTION OF JOINT Bilateral 9/5/2019    Procedure: Bilateral GT bursa injection;  Surgeon: Manpreet Dutta MD;  Location: [facility];  Service: Pain Management;  Laterality: Bilateral;    INJECTION OF JOINT Bilateral 5/6/2020    Procedure: Bilateral GT bursa injection;  Surgeon: Manperet Dutta MD;  [facility];  Service: Pain Management;  Laterality: Bilateral;    INJECTION OF JOINT Right 4/28/2022    Procedure: Injection, Joint Right Hip Injection RN IV sedation;  Surgeon: Manpreet Dutta MD;  Location: [facility];  Service: Pain Management;  Laterality: Right;    INJECTION OF JOINT Bilateral 10/10/2022    Procedure: Bilateral GT bursa injection with RN IV sedation;  Surgeon: Humberto Dumont MD;  Location: [facility];  Service: Pain Management;  Laterality: Bilateral;    KNEE ARTHROSCOPY Bilateral     PARS PLANA VITRECTOMY W/ REPAIR OF MACULAR HOLE Left 02/22/2017    RADIOFREQUENCY THERMOCOAGULATION Left 7/11/2019    Procedure: Right SIJ RFA;  Surgeon: Manpreet Dutta MD;  Location: [facility];  Service: Pain Management;  Laterality: Left;    RADIOFREQUENCY THERMOCOAGULATION Right 7/25/2019    Procedure: Right SIJ RFA;  Surgeon: Manpreet Dutta MD;  Location: [facility];  Service: Pain Management;  Laterality: Right;    SHOULDER ARTHROSCOPY Right 06/04/15     Family History   Problem Relation Age of Onset    Heart  disease Mother         A-Fib    Glaucoma Mother     Cataracts Mother     Cancer Mother         colon    Arthritis Mother         : 17    Depression Mother     Kidney disease Daughter         ESRD    Anesthesia problems Daughter         cardiac arrest during nephrectomy    Birth defects Daughter         Renal    Depression Daughter     Learning disabilities Daughter         Dyslexia, ADD    Diabetes Maternal Grandmother     Heart disease Maternal Grandmother         Congestive heart failure    Alcohol abuse Maternal Grandmother         Death: 84    Depression Maternal Grandmother     Hypertension Maternal Grandmother     Diabetes Maternal Grandfather     Cancer Maternal Grandfather     Alcohol abuse Maternal Grandfather          1964    Breast cancer Paternal Aunt     Depression Brother     Depression Son     Learning disabilities Son         ADD & ADHD    Diabetes Maternal Aunt     Diabetes Maternal Uncle         . 19     Social History     Socioeconomic History    Marital status:    Occupational History     Employer: [employer]   Tobacco Use    Smoking status: Never    Smokeless tobacco: Never    Tobacco comments:     Never smoked   Substance and Sexual Activity    Alcohol use: Not Currently     Alcohol/week: 0.0 standard drinks     Comment: Couple times per year    Drug use: No    Sexual activity: Not Currently   Social History Narrative    . 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     Medication List with Changes/Refills   Current Medications    BIOTIN 1 MG TABLET    Take 1,000 mcg by mouth every morning.     CANAGLIFLOZIN (INVOKANA) 100 MG TAB TABLET    Take 1 tablet (100 mg total) by mouth once daily.    CELECOXIB (CELEBREX) 200 MG CAPSULE    TAKE 1 CAPSULE(200 MG) BY MOUTH TWICE DAILY WITH FOOD    DICLOFENAC SODIUM (VOLTAREN) 1 % GEL    Apply 2 g topically 4 (four) times daily.    EZETIMIBE (ZETIA) 10 MG TABLET    Take 1 tablet (10 mg total) by mouth once  "daily.    FLUOXETINE 40 MG CAPSULE    Take 1 capsule (40 mg total) by mouth once daily.    FLUTICASONE (FLONASE) 50 MCG/ACTUATION NASAL SPRAY    2 sprays by Each Nare route once daily.    FUROSEMIDE (LASIX) 20 MG TABLET    TAKE 1 TABLET BY MOUTH EVERY MONDAY WEDNESDAY AND FRIDAY    GABAPENTIN (NEURONTIN) 300 MG CAPSULE    Take 1 capsule (300 mg total) by mouth 4 (four) times daily.    LORAZEPAM (ATIVAN) 1 MG TABLET    TK 3 TS PO 1 HOUR PRIOR TO APPOINTMENT    LOSARTAN (COZAAR) 25 MG TABLET    TAKE 1 TABLET(25 MG) BY MOUTH EVERY DAY    METFORMIN (GLUCOPHAGE) 1000 MG TABLET    Take 1 tablet (1,000 mg total) by mouth 2 (two) times daily with meals.    OMEPRAZOLE (PRILOSEC) 20 MG CAPSULE    Take 1 capsule (20 mg total) by mouth daily as needed (w/ NSAID).    POTASSIUM CHLORIDE SA (K-DUR,KLOR-CON M) 10 MEQ TABLET    TAKE 1 TABLET(10 MEQ) BY MOUTH EVERY DAY    PULSE OXIMETER (PULSE OXIMETER) DEVICE    by Apply Externally route 2 (two) times a day. Use twice daily at 8 AM and 3 PM and record the value in MyChart as directed.    TRIAMCINOLONE ACETONIDE 0.025% (KENALOG) 0.025 % OINT    Apply topically 2 (two) times daily. for 10 days    VERAPAMIL (CALAN) 120 MG TABLET    Take 1 tablet (120 mg total) by mouth 3 (three) times daily.     Review of patient's allergies indicates:   Allergen Reactions    Demerol [meperidine] Other (See Comments)     Burning when adm IV  Able to tolerate IM, "Turned the veins in my hand purple."    Latex, natural rubber Other (See Comments)     "Burns my skin",  Symptoms get worse the longer she is exposed    Zocor [simvastatin] Other (See Comments)     Tightening of muscles    Statins-hmg-coa reductase inhibitors Other (See Comments)     myopathy    Sulfa (sulfonamide antibiotics)      Blurred vision     Review of Systems   Constitutional: Negative for decreased appetite.   HENT:  Negative for tinnitus.    Eyes:  Negative for double vision.   Cardiovascular:  Negative for chest pain. "   Respiratory:  Negative for wheezing.    Hematologic/Lymphatic: Negative for bleeding problem.   Skin:  Negative for dry skin.   Musculoskeletal:  Positive for arthritis, back pain, joint pain, neck pain and stiffness. Negative for gout.   Gastrointestinal:  Negative for abdominal pain.   Genitourinary:  Negative for bladder incontinence.   Neurological:  Negative for numbness, paresthesias and sensory change.   Psychiatric/Behavioral:  Negative for altered mental status.      Objective:   Body mass index is 31.65 kg/m².  There were no vitals filed for this visit.       General    Constitutional: She is oriented to person, place, and time. She appears well-developed.   HENT:   Head: Atraumatic.   Eyes: EOM are normal.   Cardiovascular:  Normal rate.            Pulmonary/Chest: Effort normal.   Abdominal: Soft.   Neurological: She is alert and oriented to person, place, and time.   Psychiatric: Judgment normal.          Cervical spine with slight limitation of rotation with clicking in the neck. Some mild discomfort to extreme rotation.  Bilateral upper extremity neurovascularly intact. Radial ulnar pulses 2+.  Sensory intact to touch.  Motor strength is 5/5 throughout.  Right thumb with triggering and pain over the volar aspect at the metacarpophalangeal joint.  Left shoulder flexion 140 abduction 100 with positive impingement sign.  Pain in the infraspinatus fossa posteriorly to palpation very mild anteriorly and laterally slight weakness to resistive abduction.  Tenderness over the coracoid process anteriorly.  Right shoulder flexion 160 abduction 120 with mild positive impingement sign.  There is some tenderness over the lateral deltoid to palpation.  External rotation and internal rotation is within normal limits  A lumbar with tenderness around L4-5 paraspinal and mostly to words the right side.  Pelvis is level  Straight leg raising slightly positive on the left negative on the right  Passive hip motion  within normal limits.  No pain in the groin .  Mild pain over the greater trochanters to touch bilaterally.  Hip flexors, abductors, adductors, quads, hamstrings, ankle extensors and flexors all 5/5 and even bilaterally.  And left knee full motion collaterals and cruciate stable negative Kaleigh's negative pain to palpation.  Surgical scar from knee scope done years ago.  Right knee with mild to moderate swelling.  Surgical scars healed well.  Crepitus with motion anteriorly.  Painful to any twisting motion.  Collaterals and cruciates are stable to valgus varus stressing as well as anterior posterior drawer. Range of motion 0-120 degrees.  Calves are soft nontender.  Multiple varicositiesNo pitting edema  EHL 5/5 plantar flexion 5/5.  DP 1+ PT 1+  Skin is warm to touch no obvious lesions    Relevant imaging results reviewed and interpreted by me, discussed with the patient and / or family today   X-ray 12/12/2022 left shoulder with still type 2 acromion very mild degeneration in the glenohumeral joint and very small calcium deposit in the rotator cuff   X-ray 12/12/2022 bilateral knees with the right knee showing almost complete loss of joint space in the lateral joint.  There is mild to moderate degeneration otherwise in both of her knees.  No fracture seen  X-ray 02/22/2021 left shoulder with type 2 acromion and very mild degenerative changes in the glenohumeral joint.  X-ray 02/22/2021 knees bilaterally showing left knee patellofemoral osteophyte however joint space very well maintained, right knee with patellofemoral osteophytes and lateral joint almost complete loss of space with small osteophytic changes  X-ray from 2019 bilateral knees with mild medial joint narrowing and small osteophytic changes  MRI reviewed with the patient showed her the pictures as well as reviewed the report consistent with medial and lateral meniscus stairs, patellofemoral chondromalacia as well as medially    MRI left shoulder in  the system showing loose body in the inferior recess, acromioclavicular joint arthritis and calcific tendinitis  Assessment:     Encounter Diagnoses   Name Primary?    Calcific tendinitis of left shoulder Yes    Trigger thumb of right hand     Chondromalacia of right knee     Acute medial meniscus tear of right knee, subsequent encounter     Acute lateral meniscus tear of right knee, subsequent encounter     Greater trochanteric bursitis of both hips     Arthritis of left shoulder region         Plan:   Calcific tendinitis of left shoulder    Trigger thumb of right hand    Chondromalacia of right knee  -     Large Joint Aspiration/Injection: R knee    Acute medial meniscus tear of right knee, subsequent encounter    Acute lateral meniscus tear of right knee, subsequent encounter    Greater trochanteric bursitis of both hips    Arthritis of left shoulder region       Patient Instructions   Injection performed of the right knee with 40 mg Depo-Medrol mixed with 5 cc 1% lidocaine because she is having injections in her lumbar spine by pain management to more 0 and I did not want to overload with steroid   Ice the knee next few days   Injection given 12/12/2022   Will avoid injection in the right shoulder since you getting steroid injections soon  I will see you back in 3 months  All questions asked and answered     Kellgren Jaswinder scale 2.  Disclaimer: This note was prepared using a voice recognition system and is likely to have sound alike errors within the text.

## 2022-12-13 ENCOUNTER — HOSPITAL ENCOUNTER (OUTPATIENT)
Facility: HOSPITAL | Age: 77
Discharge: HOME | End: 2022-12-13
Attending: ANESTHESIOLOGY | Admitting: ANESTHESIOLOGY
Payer: MEDICARE

## 2022-12-13 VITALS
WEIGHT: 218.94 LBS | TEMPERATURE: 98 F | SYSTOLIC BLOOD PRESSURE: 158 MMHG | BODY MASS INDEX: 31.34 KG/M2 | HEART RATE: 90 BPM | DIASTOLIC BLOOD PRESSURE: 74 MMHG | OXYGEN SATURATION: 90 % | RESPIRATION RATE: 18 BRPM | HEIGHT: 70 IN

## 2022-12-13 DIAGNOSIS — M54.16 LUMBAR RADICULOPATHY: ICD-10-CM

## 2022-12-13 LAB — POCT GLUCOSE: 169 MG/DL (ref 70–110)

## 2022-12-13 PROCEDURE — 25000003 PHARM REV CODE 250: Performed by: ANESTHESIOLOGY

## 2022-12-13 PROCEDURE — 64493 INJ PARAVERT F JNT L/S 1 LEV: CPT | Mod: 50,,, | Performed by: ANESTHESIOLOGY

## 2022-12-13 PROCEDURE — 64493 PR INJ DX/THER AGNT PARAVERT FACET JOINT,IMG GUIDE,LUMBAR/SAC,1ST LVL: ICD-10-PCS | Mod: 50,,, | Performed by: ANESTHESIOLOGY

## 2022-12-13 PROCEDURE — 82962 GLUCOSE BLOOD TEST: CPT | Performed by: ANESTHESIOLOGY

## 2022-12-13 PROCEDURE — 64494 INJ PARAVERT F JNT L/S 2 LEV: CPT | Mod: 50,,, | Performed by: ANESTHESIOLOGY

## 2022-12-13 PROCEDURE — 64493 INJ PARAVERT F JNT L/S 1 LEV: CPT | Mod: 50 | Performed by: ANESTHESIOLOGY

## 2022-12-13 PROCEDURE — 64494 INJ PARAVERT F JNT L/S 2 LEV: CPT | Mod: 50 | Performed by: ANESTHESIOLOGY

## 2022-12-13 PROCEDURE — 64494 PR INJ DX/THER AGNT PARAVERT FACET JOINT,IMG GUIDE,LUMBAR/SAC, 2ND LEVEL: ICD-10-PCS | Mod: 50,,, | Performed by: ANESTHESIOLOGY

## 2022-12-13 PROCEDURE — 63600175 PHARM REV CODE 636 W HCPCS: Performed by: ANESTHESIOLOGY

## 2022-12-13 RX ORDER — MIDAZOLAM HYDROCHLORIDE 1 MG/ML
INJECTION, SOLUTION INTRAMUSCULAR; INTRAVENOUS
Status: DISCONTINUED | OUTPATIENT
Start: 2022-12-13 | End: 2022-12-13 | Stop reason: HOSPADM

## 2022-12-13 RX ORDER — INDOMETHACIN 25 MG/1
CAPSULE ORAL
Status: DISCONTINUED | OUTPATIENT
Start: 2022-12-13 | End: 2022-12-13 | Stop reason: HOSPADM

## 2022-12-13 RX ORDER — BUPIVACAINE HYDROCHLORIDE 5 MG/ML
INJECTION, SOLUTION EPIDURAL; INTRACAUDAL
Status: DISCONTINUED | OUTPATIENT
Start: 2022-12-13 | End: 2022-12-13 | Stop reason: HOSPADM

## 2022-12-13 RX ORDER — METHYLPREDNISOLONE ACETATE 40 MG/ML
INJECTION, SUSPENSION INTRA-ARTICULAR; INTRALESIONAL; INTRAMUSCULAR; SOFT TISSUE
Status: DISCONTINUED | OUTPATIENT
Start: 2022-12-13 | End: 2022-12-13 | Stop reason: HOSPADM

## 2022-12-13 RX ORDER — FENTANYL CITRATE 50 UG/ML
INJECTION, SOLUTION INTRAMUSCULAR; INTRAVENOUS
Status: DISCONTINUED | OUTPATIENT
Start: 2022-12-13 | End: 2022-12-13 | Stop reason: HOSPADM

## 2022-12-13 NOTE — DISCHARGE INSTRUCTIONS

## 2022-12-13 NOTE — OP NOTE
Kaylin Mccollum  77 y.o. female      Vitals:    12/13/22 0720   BP: (!) 169/79   Pulse: 81   Resp: 14   Temp:        Procedure Date: 12/13/22      INFORMED CONSENT: The procedure, risks, benefits and options were discussed with patient. There are no contraindications to the procedure. The patient expressed understanding and agreed to proceed. The personnel performing the procedure was discussed. I verify that I personally obtained consent prior to the start of the procedure and the signed consent can be found on the patient's chart.       Anesthesia:   Conscious sedation provided by M.D    The patient was monitored with continuous pulse oximetry, EKG, and intermittent blood pressure monitors.  The patient was hemodynamically stable throughout the entire process was responsive to voice, and breathing spontaneously.  Supplemental O2 was provided at 2L/min via nasal cannula.  Patient was comfortable for the duration of the procedure. (See nurse documentation and case log for sedation time)    There was a total of 2mg IV Midazolam and 100mcg Fentanyl titrated for the procedure     Pre Procedure diagnosis: Lumbar spondylosis [M47.816]  Post-Procedure diagnosis: SAME     PROCEDURE: bilateral L3,4,5 LUMBAR FACET MEDIAL BRANCH NERVE BLOCK        DESCRIPTION OF PROCEDURE:The patient was brought to the procedure room. After performing time out. IV access was obtained prior to the procedure. The patient was positioned prone on the fluoroscopy table. Continuous hemodynamic monitoring was initiated including blood pressure, EKG, and pulse oximetry. The area of the lumbar spine was prepped chlorhexidine and draped into a sterile field. Fluoroscopy was used to identify the location of the bilateral side L3, L4, and L5 medial branch nerves at the junctions of the superior articular process and the transverse processes of L4, L5, and the sacral ala respectively. Skin anesthesia was achieved using 5 cc of Lidocaine 1% over the  "injection sites. A 22 gauge, 3 1/2" spinal needle was slowly inserted at each level using AP, lateral and oblique fluoroscopic imaging. Negative aspiration for blood or CSF was confirmed.  8 ml bupivacaine 0.25% with 1 mL Decadron was injected at all sites in divided doses. The needles were removed and bleeding was nil. A sterile dressing was applied. No specimens collected. Patient was taken back to the PACU for observation .       Blood Loss: Nill  Specimen: None    Humberto Dumont    "

## 2022-12-13 NOTE — BRIEF OP NOTE
Discharge Note  Short Stay      SUMMARY     Admit Date: 12/13/2022    Attending Physician: Humberto Dumont MD        Discharge Physician: Humberto Dumont MD        Discharge Date: 12/13/2022 7:22 AM    Procedure(s) (LRB):  Bilateral L3-5 MBB (Bilateral)    Final Diagnosis: Lumbar spondylosis [M47.816]    Disposition: Home or self care    Patient Instructions:   Current Discharge Medication List        CONTINUE these medications which have NOT CHANGED    Details   furosemide (LASIX) 20 MG tablet TAKE 1 TABLET BY MOUTH EVERY MONDAY WEDNESDAY AND FRIDAY  Qty: 25 tablet, Refills: 2      gabapentin (NEURONTIN) 300 MG capsule Take 1 capsule (300 mg total) by mouth 4 (four) times daily.  Qty: 120 capsule, Refills: 1      losartan (COZAAR) 25 MG tablet TAKE 1 TABLET(25 MG) BY MOUTH EVERY DAY  Qty: 90 tablet, Refills: 2      metFORMIN (GLUCOPHAGE) 1000 MG tablet Take 1 tablet (1,000 mg total) by mouth 2 (two) times daily with meals.  Qty: 180 tablet, Refills: 3    Associated Diagnoses: Diabetes mellitus type 2 in obese      verapamiL (CALAN) 120 MG tablet Take 1 tablet (120 mg total) by mouth 3 (three) times daily.  Qty: 180 tablet, Refills: 5    Associated Diagnoses: MVP (mitral valve prolapse); Hypertension associated with diabetes      biotin 1 mg tablet Take 1,000 mcg by mouth every morning.       canagliflozin (INVOKANA) 100 mg Tab tablet Take 1 tablet (100 mg total) by mouth once daily.  Qty: 90 tablet, Refills: 0    Associated Diagnoses: Diabetes mellitus type 2 in obese      celecoxib (CELEBREX) 200 MG capsule TAKE 1 CAPSULE(200 MG) BY MOUTH TWICE DAILY WITH FOOD  Qty: 120 capsule, Refills: 1    Associated Diagnoses: Chondromalacia, right knee; Left shoulder tendinitis      diclofenac sodium (VOLTAREN) 1 % Gel Apply 2 g topically 4 (four) times daily.  Qty: 1 each, Refills: 6      ezetimibe (ZETIA) 10 mg tablet Take 1 tablet (10 mg total) by mouth once daily.  Qty: 90 tablet, Refills: 1    Associated Diagnoses:  Hyperlipidemia associated with type 2 diabetes mellitus      FLUoxetine 40 MG capsule Take 1 capsule (40 mg total) by mouth once daily.  Qty: 90 capsule, Refills: 1    Associated Diagnoses: Fibromyalgia; Chronic major depressive disorder      fluticasone (FLONASE) 50 mcg/actuation nasal spray 2 sprays by Each Nare route once daily.  Qty: 1 Bottle, Refills: 0    Associated Diagnoses: Sinusitis      LORazepam (ATIVAN) 1 MG tablet TK 3 TS PO 1 HOUR PRIOR TO APPOINTMENT      omeprazole (PRILOSEC) 20 MG capsule Take 1 capsule (20 mg total) by mouth daily as needed (w/ NSAID).  Qty: 30 capsule, Refills: 5    Associated Diagnoses: Gastroesophageal reflux disease without esophagitis      potassium chloride SA (K-DUR,KLOR-CON M) 10 MEQ tablet TAKE 1 TABLET(10 MEQ) BY MOUTH EVERY DAY  Qty: 20 tablet, Refills: 5      pulse oximeter (PULSE OXIMETER) device by Apply Externally route 2 (two) times a day. Use twice daily at 8 AM and 3 PM and record the value in [facility] [employer] as directed.  Qty: 1 each, Refills: 0    Comments: This is a NO CHARGE item.  Please override price to zero.  DO NOT PRINT.  NORMAL MODE e-PRESCRIBE ONLY.  Associated Diagnoses: COVID-19 virus detected      triamcinolone acetonide 0.025% (KENALOG) 0.025 % Oint Apply topically 2 (two) times daily. for 10 days  Qty: 15 g, Refills: 2    Associated Diagnoses: Tinea corporis                 Discharge Diagnosis: Lumbar spondylosis [M47.816]  Condition on Discharge: Stable with no complications to procedure   Diet on Discharge: Same as before.  Activity: as per instruction sheet.  Discharge to: Home with a responsible adult.  Follow up: 2-4 weeks       Please call the office at [phone] if you experience any weakness or loss of sensation, fever > 101.5, pain uncontrolled with oral medications, persistent nausea/vomiting/or diarrhea, redness or drainage from the incisions, or any other worrisome concerns. If physician on call was not reached or could not communicate  with our office for any reason please go to the nearest emergency department

## 2022-12-13 NOTE — BRIEF OP NOTE
Discharge Note  Short Stay      SUMMARY     Admit Date: 12/13/2022    Attending Physician: Humberto Dumont MD        Discharge Physician: Humberto Dumont MD        Discharge Date: 12/13/2022 8:06 AM    Procedure(s) (LRB):  Bilateral L3-5 MBB (Bilateral)    Final Diagnosis: Lumbar spondylosis [M47.816]    Disposition: Home or self care    Patient Instructions:   Discharge Medication List as of 12/13/2022  6:43 AM        CONTINUE these medications which have NOT CHANGED    Details   biotin 1 mg tablet Take 1,000 mcg by mouth every morning. , Historical Med      canagliflozin (INVOKANA) 100 mg Tab tablet Take 1 tablet (100 mg total) by mouth once daily., Starting Fri 11/11/2022, Normal      celecoxib (CELEBREX) 200 MG capsule TAKE 1 CAPSULE(200 MG) BY MOUTH TWICE DAILY WITH FOOD, Normal      diclofenac sodium (VOLTAREN) 1 % Gel Apply 2 g topically 4 (four) times daily., Starting Mon 6/20/2022, Normal      ezetimibe (ZETIA) 10 mg tablet Take 1 tablet (10 mg total) by mouth once daily., Starting Tue 8/2/2022, Until Sun 1/29/2023, Normal      FLUoxetine 40 MG capsule Take 1 capsule (40 mg total) by mouth once daily., Starting Tue 8/2/2022, Normal      fluticasone (FLONASE) 50 mcg/actuation nasal spray 2 sprays by Each Nare route once daily., Starting 2/9/2015, Until Discontinued, Normal      furosemide (LASIX) 20 MG tablet TAKE 1 TABLET BY MOUTH EVERY MONDAY WEDNESDAY AND FRIDAY, Normal      gabapentin (NEURONTIN) 300 MG capsule Take 1 capsule (300 mg total) by mouth 4 (four) times daily., Starting Wed 11/2/2022, Normal      LORazepam (ATIVAN) 1 MG tablet TK 3 TS PO 1 HOUR PRIOR TO APPOINTMENT, Historical Med      losartan (COZAAR) 25 MG tablet TAKE 1 TABLET(25 MG) BY MOUTH EVERY DAY, Normal      metFORMIN (GLUCOPHAGE) 1000 MG tablet Take 1 tablet (1,000 mg total) by mouth 2 (two) times daily with meals., Starting Wed 11/2/2022, Normal      omeprazole (PRILOSEC) 20 MG capsule Take 1 capsule (20 mg total) by mouth daily as  needed (w/ NSAID)., Starting Thu 8/18/2022, Until Mon 12/12/2022 at 2359, Normal      potassium chloride SA (K-DUR,KLOR-CON M) 10 MEQ tablet TAKE 1 TABLET(10 MEQ) BY MOUTH EVERY DAY, Normal      pulse oximeter (PULSE OXIMETER) device by Apply Externally route 2 (two) times a day. Use twice daily at 8 AM and 3 PM and record the value in MyChart as directed., Starting Tue 1/26/2021, Normal      triamcinolone acetonide 0.025% (KENALOG) 0.025 % Oint Apply topically 2 (two) times daily. for 10 days, Starting Tue 8/28/2018, Until Tue 8/10/2021, Normal      verapamiL (CALAN) 120 MG tablet Take 1 tablet (120 mg total) by mouth 3 (three) times daily., Starting Tue 2/22/2022, Normal                 Discharge Diagnosis: Lumbar spondylosis [M47.816]  Condition on Discharge: Stable with no complications to procedure   Diet on Discharge: Same as before.  Activity: as per instruction sheet.  Discharge to: Home with a responsible adult.  Follow up: 2-4 weeks       Please call the office at [phone] if you experience any weakness or loss of sensation, fever > 101.5, pain uncontrolled with oral medications, persistent nausea/vomiting/or diarrhea, redness or drainage from the incisions, or any other worrisome concerns. If physician on call was not reached or could not communicate with our office for any reason please go to the nearest emergency department

## 2022-12-21 ENCOUNTER — PATIENT MESSAGE (OUTPATIENT)
Dept: PAIN MEDICINE | Facility: CLINIC | Age: 77
End: 2022-12-21
Payer: MEDICARE

## 2022-12-21 ENCOUNTER — TELEPHONE (OUTPATIENT)
Dept: PAIN MEDICINE | Facility: CLINIC | Age: 77
End: 2022-12-21
Payer: MEDICARE

## 2022-12-21 DIAGNOSIS — M70.60 GREATER TROCHANTERIC BURSITIS, UNSPECIFIED LATERALITY: ICD-10-CM

## 2022-12-21 DIAGNOSIS — M53.3 SACROILIAC JOINT PAIN: Primary | ICD-10-CM

## 2022-12-23 ENCOUNTER — PATIENT MESSAGE (OUTPATIENT)
Dept: PAIN MEDICINE | Facility: CLINIC | Age: 77
End: 2022-12-23
Payer: MEDICARE

## 2023-01-04 NOTE — PRE-PROCEDURE INSTRUCTIONS
Spoke with patient regarding procedure scheduled on 1.10    Arrival time 0715    Has patient been sick with fever or on antibiotics within the last 7 days? No    Does the patient have any open wounds, sores or rashes? No    Does the patient have any recent fractures? no    Has patient received a vaccination within the last 7 days? No    Received the COVID vaccination? yes    Has the patient stopped all medications as directed? HOLD DM MEDS AM OF PROCEDURE    Does patient have a pacemaker and or defibrillator? no    Does the patient have a ride to and from procedure and someone reliable to remain with patient? REKHA     Is the patient diabetic? YES    Does the patient have sleep apnea? Or use O2 at home? No and no     Is the patient receiving sedation? yes    Is the patient instructed to remain NPO beginning at midnight the night before their procedure? yes    Procedure location confirmed with patient? Yes    Covid- Denies signs/symptoms. Instructed to notify PAT/MD if any changes.

## 2023-01-18 NOTE — PRE-PROCEDURE INSTRUCTIONS
Spoke with patient regarding procedure scheduled on 1.24    Arrival time 0600    Has patient been sick with fever or on antibiotics within the last 7 days? No    Does the patient have any open wounds, sores or rashes? No    Does the patient have any recent fractures? no    Has patient received a vaccination within the last 7 days? No    Received the COVID vaccination? yes    Has the patient stopped all medications as directed? Hold dm meds am of procedure     Does patient have a pacemaker and or defibrillator? no    Does the patient have a ride to and from procedure and someone reliable to remain with patient? Yolette     Is the patient diabetic? yes    Does the patient have sleep apnea? Or use O2 at home? No and no     Is the patient receiving sedation? yes    Is the patient instructed to remain NPO beginning at midnight the night before their procedure? yes    Procedure location confirmed with patient? Yes    Covid- Denies signs/symptoms. Instructed to notify PAT/MD if any changes.

## 2023-01-24 ENCOUNTER — HOSPITAL ENCOUNTER (OUTPATIENT)
Facility: HOSPITAL | Age: 78
Discharge: HOME | End: 2023-01-24
Attending: ANESTHESIOLOGY | Admitting: ANESTHESIOLOGY
Payer: MEDICARE

## 2023-01-24 VITALS
DIASTOLIC BLOOD PRESSURE: 73 MMHG | RESPIRATION RATE: 18 BRPM | SYSTOLIC BLOOD PRESSURE: 152 MMHG | HEART RATE: 79 BPM | TEMPERATURE: 98 F | OXYGEN SATURATION: 98 % | BODY MASS INDEX: 31.47 KG/M2 | WEIGHT: 219.81 LBS | HEIGHT: 70 IN

## 2023-01-24 DIAGNOSIS — M70.60 GREATER TROCHANTERIC BURSITIS, UNSPECIFIED LATERALITY: ICD-10-CM

## 2023-01-24 DIAGNOSIS — M70.60 GREATER TROCHANTERIC BURSITIS: ICD-10-CM

## 2023-01-24 DIAGNOSIS — M53.3 SACROILIAC JOINT PAIN: ICD-10-CM

## 2023-01-24 LAB — POCT GLUCOSE: 130 MG/DL (ref 70–110)

## 2023-01-24 PROCEDURE — 20610 DRAIN/INJ JOINT/BURSA W/O US: CPT | Mod: 50,,, | Performed by: ANESTHESIOLOGY

## 2023-01-24 PROCEDURE — 20610 PR DRAIN/INJECT LARGE JOINT/BURSA: ICD-10-PCS | Mod: 50,,, | Performed by: ANESTHESIOLOGY

## 2023-01-24 PROCEDURE — 82962 GLUCOSE BLOOD TEST: CPT | Performed by: ANESTHESIOLOGY

## 2023-01-24 PROCEDURE — A9585 GADOBUTROL INJECTION: HCPCS | Performed by: ANESTHESIOLOGY

## 2023-01-24 PROCEDURE — 27096 INJECT SACROILIAC JOINT: CPT | Mod: RT | Performed by: ANESTHESIOLOGY

## 2023-01-24 PROCEDURE — 63600175 PHARM REV CODE 636 W HCPCS: Performed by: ANESTHESIOLOGY

## 2023-01-24 PROCEDURE — 20610 DRAIN/INJ JOINT/BURSA W/O US: CPT | Mod: 50,59 | Performed by: ANESTHESIOLOGY

## 2023-01-24 PROCEDURE — 25500020 PHARM REV CODE 255: Performed by: ANESTHESIOLOGY

## 2023-01-24 PROCEDURE — 27096 INJECT SACROILIAC JOINT: CPT | Mod: LT

## 2023-01-24 PROCEDURE — 27096 PR INJECTION,SACROILIAC JOINT: ICD-10-PCS | Mod: 50,59,, | Performed by: ANESTHESIOLOGY

## 2023-01-24 PROCEDURE — 25000003 PHARM REV CODE 250: Performed by: ANESTHESIOLOGY

## 2023-01-24 PROCEDURE — 27096 INJECT SACROILIAC JOINT: CPT | Mod: 50,59,, | Performed by: ANESTHESIOLOGY

## 2023-01-24 RX ORDER — FENTANYL CITRATE 50 UG/ML
INJECTION, SOLUTION INTRAMUSCULAR; INTRAVENOUS
Status: DISCONTINUED | OUTPATIENT
Start: 2023-01-24 | End: 2023-01-24 | Stop reason: HOSPADM

## 2023-01-24 RX ORDER — BUPIVACAINE HYDROCHLORIDE 2.5 MG/ML
INJECTION, SOLUTION EPIDURAL; INFILTRATION; INTRACAUDAL
Status: DISCONTINUED | OUTPATIENT
Start: 2023-01-24 | End: 2023-01-24 | Stop reason: HOSPADM

## 2023-01-24 RX ORDER — GADOBUTROL 604.72 MG/ML
INJECTION INTRAVENOUS
Status: DISCONTINUED | OUTPATIENT
Start: 2023-01-24 | End: 2023-01-24 | Stop reason: HOSPADM

## 2023-01-24 RX ORDER — SODIUM BICARBONATE 1 MEQ/ML
SYRINGE (ML) INTRAVENOUS
Status: DISCONTINUED | OUTPATIENT
Start: 2023-01-24 | End: 2023-01-24 | Stop reason: HOSPADM

## 2023-01-24 RX ORDER — TRIAMCINOLONE ACETONIDE 40 MG/ML
INJECTION, SUSPENSION INTRA-ARTICULAR; INTRAMUSCULAR
Status: DISCONTINUED | OUTPATIENT
Start: 2023-01-24 | End: 2023-01-24 | Stop reason: HOSPADM

## 2023-01-24 RX ORDER — MIDAZOLAM HYDROCHLORIDE 1 MG/ML
INJECTION, SOLUTION INTRAMUSCULAR; INTRAVENOUS
Status: DISCONTINUED | OUTPATIENT
Start: 2023-01-24 | End: 2023-01-24 | Stop reason: HOSPADM

## 2023-01-24 NOTE — DISCHARGE SUMMARY
Discharge Note  Short Stay      SUMMARY     Admit Date: 1/24/2023    Attending Physician: Humberto Dumont MD        Discharge Physician: Humberto Dumont MD        Discharge Date: 1/24/2023 7:16 AM    Procedure(s) (LRB):  Bilateral GT bursa injection (Bilateral)  Bilateral Sacroiliac Joint Injection (Bilateral)    Final Diagnosis: Sacroiliac joint pain [M53.3]  Greater trochanteric bursitis, unspecified laterality [M70.60]    Disposition: Home or self care    Patient Instructions:   Current Discharge Medication List        CONTINUE these medications which have NOT CHANGED    Details   INVOKANA 100 mg Tab tablet TAKE 1 TABLET(100 MG) BY MOUTH EVERY DAY  Qty: 90 tablet, Refills: 0    Associated Diagnoses: Diabetes mellitus type 2 in obese      metFORMIN (GLUCOPHAGE) 1000 MG tablet Take 1 tablet (1,000 mg total) by mouth 2 (two) times daily with meals.  Qty: 180 tablet, Refills: 3    Associated Diagnoses: Diabetes mellitus type 2 in obese      biotin 1 mg tablet Take 1,000 mcg by mouth every morning.       celecoxib (CELEBREX) 200 MG capsule TAKE 1 CAPSULE(200 MG) BY MOUTH TWICE DAILY WITH FOOD  Qty: 120 capsule, Refills: 1    Associated Diagnoses: Chondromalacia, right knee; Left shoulder tendinitis      diclofenac sodium (VOLTAREN) 1 % Gel Apply 2 g topically 4 (four) times daily.  Qty: 1 each, Refills: 6      ezetimibe (ZETIA) 10 mg tablet Take 1 tablet (10 mg total) by mouth once daily.  Qty: 90 tablet, Refills: 1    Associated Diagnoses: Hyperlipidemia associated with type 2 diabetes mellitus      FLUoxetine 40 MG capsule Take 1 capsule (40 mg total) by mouth once daily.  Qty: 90 capsule, Refills: 1    Associated Diagnoses: Fibromyalgia; Chronic major depressive disorder      fluticasone (FLONASE) 50 mcg/actuation nasal spray 2 sprays by Each Nare route once daily.  Qty: 1 Bottle, Refills: 0    Associated Diagnoses: Sinusitis      furosemide (LASIX) 20 MG tablet TAKE 1 TABLET BY MOUTH EVERY MONDAY WEDNESDAY AND  FRIDAY  Qty: 25 tablet, Refills: 2      gabapentin (NEURONTIN) 300 MG capsule Take 1 capsule (300 mg total) by mouth 4 (four) times daily.  Qty: 120 capsule, Refills: 1      LORazepam (ATIVAN) 1 MG tablet TK 3 TS PO 1 HOUR PRIOR TO APPOINTMENT      losartan (COZAAR) 25 MG tablet TAKE 1 TABLET(25 MG) BY MOUTH EVERY DAY  Qty: 90 tablet, Refills: 2      omeprazole (PRILOSEC) 20 MG capsule Take 1 capsule (20 mg total) by mouth daily as needed (w/ NSAID).  Qty: 30 capsule, Refills: 5    Associated Diagnoses: Gastroesophageal reflux disease without esophagitis      potassium chloride SA (K-DUR,KLOR-CON M) 10 MEQ tablet TAKE 1 TABLET(10 MEQ) BY MOUTH EVERY DAY  Qty: 20 tablet, Refills: 5      pulse oximeter (PULSE OXIMETER) device by Apply Externally route 2 (two) times a day. Use twice daily at 8 AM and 3 PM and record the value in [employer] as directed.  Qty: 1 each, Refills: 0    Comments: This is a NO CHARGE item.  Please override price to zero.  DO NOT PRINT.  NORMAL MODE e-PRESCRIBE ONLY.  Associated Diagnoses: COVID-19 virus detected      triamcinolone acetonide 0.025% (KENALOG) 0.025 % Oint Apply topically 2 (two) times daily. for 10 days  Qty: 15 g, Refills: 2    Associated Diagnoses: Tinea corporis      verapamiL (CALAN) 120 MG tablet Take 1 tablet (120 mg total) by mouth 3 (three) times daily.  Qty: 180 tablet, Refills: 5    Associated Diagnoses: MVP (mitral valve prolapse); Hypertension associated with diabetes                 Discharge Diagnosis: Sacroiliac joint pain [M53.3]  Greater trochanteric bursitis, unspecified laterality [M70.60]  Condition on Discharge: Stable with no complications to procedure   Diet on Discharge: Same as before.  Activity: as per instruction sheet.  Discharge to: Home with a responsible adult.  Follow up: 2-4 weeks       Please call the office at [phone] if you experience any weakness or loss of sensation, fever > 101.5, pain uncontrolled with oral medications, persistent  nausea/vomiting/or diarrhea, redness or drainage from the incisions, or any other worrisome concerns. If physician on call was not reached or could not communicate with our office for any reason please go to the nearest emergency department

## 2023-01-24 NOTE — DISCHARGE INSTRUCTIONS

## 2023-01-24 NOTE — OP NOTE
Kaylin Mccollum  78 y.o. female      Vitals:    01/24/23 0710   BP: (!) 162/71   Pulse: 71   Resp:    Temp:        Procedure Date: 1/24/23      INFORMED CONSENT: The procedure, risks, benefits and options were discussed with patient. There are no contraindications to the procedure. The patient expressed understanding and agreed to proceed. The personnel performing the procedure was discussed. I verify that I personally obtained consent prior to the start of the procedure and the signed consent can be found on the patient's chart.       Anesthesia:   Conscious sedation provided by M.D    The patient was monitored with continuous pulse oximetry, EKG, and intermittent blood pressure monitors.  The patient was hemodynamically stable throughout the entire process was responsive to voice, and breathing spontaneously.  Supplemental O2 was provided at 2L/min via nasal cannula.  Patient was comfortable for the duration of the procedure. (See nurse documentation and case log for sedation time)    There was a total of 2mg IV Midazolam and 50mcg Fentanyl titrated for the procedure    Pre Procedure diagnosis: Sacroiliac joint pain [M53.3]  Greater trochanteric bursitis, unspecified laterality [M70.60]  Post-Procedure diagnosis: SAME      PROCEDURE:  1) Bilateral greater trochanteric bursa injection    2) Bilateral sacroiliac joint injection                            REASON FOR PROCEDURE:   Sacroiliitis [M46.1]  Greater trochanteric bursitis[M70.61]      MEDICATIONS INJECTED: 1mL 40mg/ml Kenalog and 4mL Bupivacaine 0.25% into each site    LOCAL ANESTHETIC USED: Xylocaine 1% 6ml     ESTIMATED BLOOD LOSS: None.   COMPLICATIONS: None.     TECHNIQUE:   Greater trochanteric bursa injection:  The area overlying the greater trochanteric bursa was identified using fluoroscopy, and the area overlying the skin was prepped and draped in usual sterile fashion. Local Xylocaine was injected by raising a wheel and going down to the periosteum  using a 27-gauge hypodermic needle. A 5 inch 22-gauge spinal needle was introduce into the Bilateral greater trochanteric bursa. Negative pressure applied to confirm no intravascular placement. Omnipaque was injected to confirm placement and to confirm that there was no vascular runoff. The medication was then injected slowly.  Displacement of the contrast after injection of the medication confirmed that the medication went into the area of the greater trochanteric bursa    Sacroiliac joint injection:   Laying in the prone position, the patient was prepped and draped in the usual sterile fashion using ChloraPrep and fenestrated drape.  The area was determined under fluoroscopy.  Local Xylocaine was injected by raising a wheel and going down to the periosteum using a 27-gauge hypodermic needle.  The 3.5 inch 22-gauge spinal needle was introduce into the Bilateral sacroiliac joint.  Negative pressure applied to confirm no intravascular placement.  Omnipaque was injected to confirm placement and to confirm that there was no vascular runoff.  The medication was then injected slowly.  The patient tolerated the procedure well.                       The patient was monitored for approximately 30 minutes after the procedure. Patient was given post procedure and discharge instructions to follow at home. We will see the patient back in two weeks or the patient may call to inform of status. The patient was discharged in a stable condition

## 2023-01-24 NOTE — H&P
HPI  Patient presenting for Procedure(s) (LRB):  Bilateral GT bursa injection (Bilateral)  Bilateral Sacroiliac Joint Injection (Bilateral)     Patient on Anti-coagulation No    No health changes since previous encounter    Past Medical History:   Diagnosis Date    Arthritis     Cataract     Diabetes mellitus 1995    BS didn't check 09/13/2022    Diabetes mellitus, type 2     Fibromyalgia     General anesthetics causing adverse effect in therapeutic use     bradycardia     Hypertension     Migraines     Mitral valve prolapse     Osteoarthritis     Rotator cuff tear 04/01/2015     Past Surgical History:   Procedure Laterality Date    ARTHROSCOPY OF KNEE Right 3/10/2020    Procedure: ARTHROSCOPY, KNEE;  Surgeon: Les Schneider MD;  Location: [facility] OR;  Service: Orthopedics;  Laterality: Right;    CATARACT EXTRACTION W/  INTRAOCULAR LENS IMPLANT Left 07/19/2017    CATARACT EXTRACTION W/  INTRAOCULAR LENS IMPLANT Right 2017    CHOLECYSTECTOMY      CHONDROPLASTY OF KNEE Right 3/10/2020    Procedure: CHONDROPLASTY, KNEE;  Surgeon: Les Schneider MD;  Location: [facility] OR;  Service: Orthopedics;  Laterality: Right;  Anterior compartment     COLONOSCOPY N/A 9/25/2018    Procedure: COLONOSCOPY;  Surgeon: Bethel Carmona MD;  Location: [facility] ENDO;  Service: Endoscopy;  Laterality: N/A;    EXCISION OF MEDIAL MENISCUS OF KNEE Right 3/10/2020    Procedure: MENISCECTOMY, KNEE, MEDIAL;  Surgeon: Les Schneider MD;  Location: [facility] OR;  Service: Orthopedics;  Laterality: Right;  Partial, Medial , Lateral     EYE SURGERY      INJECTION OF ANESTHETIC AGENT AROUND MEDIAL BRANCH NERVES INNERVATING LUMBAR FACET JOINT Bilateral 12/13/2022    Procedure: Bilateral L3-5 MBB;  Surgeon: Humberto Dumont MD;  [facility];  Service: Pain Management;  Laterality: Bilateral;    INJECTION OF ANESTHETIC AGENT AROUND NERVE Right 8/8/2019    Procedure: Right Genicular nerve block with local;  Surgeon: Manpreet Dutta MD;   Location: [facility]IN [facility]GT;  Service: Pain Management;  Laterality: Right;    INJECTION OF ANESTHETIC AGENT INTO SACROILIAC JOINT Bilateral 5/6/2020    Procedure: Bilateral Sacroiliac Joint Injection;  Surgeon: Manpreet Dutta MD;  [facility];  Service: Pain Management;  Laterality: Bilateral;    INJECTION OF ANESTHETIC AGENT INTO SACROILIAC JOINT Bilateral 10/8/2020    Procedure: Bilateral SI and Bilateral GTB with RN IV sedation;  Surgeon: Manpreet Dutta MD;  [facility];  Service: Pain Management;  Laterality: Bilateral;    INJECTION OF ANESTHETIC AGENT INTO SACROILIAC JOINT Bilateral 1/5/2021    Procedure: Bilateral BLOCK, SACROILIAC JOINT and Bilateral GTB witn RN IV sedation;  Surgeon: Daniel Burns MD;  Location: [facility];  Service: Pain Management;  Laterality: Bilateral;    INJECTION OF ANESTHETIC AGENT INTO SACROILIAC JOINT Bilateral 7/8/2021    Procedure: Bilateral BLOCK, SACROILIAC JOINT bilateral GTB RN IV sedation;  Surgeon: Manpreet Dutta MD;  Location: [facility];  Service: Pain Management;  Laterality: Bilateral;    INJECTION OF ANESTHETIC AGENT INTO SACROILIAC JOINT Bilateral 3/1/2022    Procedure: Bilateral BLOCK, SACROILIAC JOINT and Bilateral GTB RN IV sedation;  Surgeon: Manpreet Dutta MD;  Location: [facility];  Service: Pain Management;  Laterality: Bilateral;    INJECTION OF ANESTHETIC AGENT INTO SACROILIAC JOINT Bilateral 10/10/2022    Procedure: Bilateral Sacroiliac Joint Injection;  Surgeon: Humberto Dumont MD;  Location: [facility];  Service: Pain Management;  Laterality: Bilateral;    INJECTION OF JOINT Bilateral 9/5/2019    Procedure: Bilateral GT bursa injection;  Surgeon: Manpreet Dutta MD;  Location: [facility];  Service: Pain Management;  Laterality: Bilateral;    INJECTION OF JOINT Bilateral 5/6/2020    Procedure: Bilateral GT bursa injection;  Surgeon: Manpreet Dutta MD;  Location: [facility];  Service: Pain Management;  Laterality: Bilateral;     "INJECTION OF JOINT Right 4/28/2022    Procedure: Injection, Joint Right Hip Injection RN IV sedation;  Surgeon: Manpreet Dutta MD;  Location: [facility];  Service: Pain Management;  Laterality: Right;    INJECTION OF JOINT Bilateral 10/10/2022    Procedure: Bilateral GT bursa injection with RN IV sedation;  Surgeon: Humberto Dumont MD;  Location: [facility];  Service: Pain Management;  Laterality: Bilateral;    KNEE ARTHROSCOPY Bilateral     PARS PLANA VITRECTOMY W/ REPAIR OF MACULAR HOLE Left 02/22/2017    RADIOFREQUENCY THERMOCOAGULATION Left 7/11/2019    Procedure: Right SIJ RFA;  Surgeon: Manpreet Dutta MD;  Location: [facility];  Service: Pain Management;  Laterality: Left;    RADIOFREQUENCY THERMOCOAGULATION Right 7/25/2019    Procedure: Right SIJ RFA;  Surgeon: Manpreet Dutta MD;  Location: [facility];  Service: Pain Management;  Laterality: Right;    SHOULDER ARTHROSCOPY Right 06/04/15     Review of patient's allergies indicates:   Allergen Reactions    Demerol [meperidine] Other (See Comments)     Burning when adm IV  Able to tolerate IM, "Turned the veins in my hand purple."    Latex, natural rubber Other (See Comments)     "Burns my skin",  Symptoms get worse the longer she is exposed    Zocor [simvastatin] Other (See Comments)     Tightening of muscles    Statins-hmg-coa reductase inhibitors Other (See Comments)     myopathy    Sulfa (sulfonamide antibiotics)      Blurred vision        No current facility-administered medications on file prior to encounter.     Current Outpatient Medications on File Prior to Encounter   Medication Sig Dispense Refill    metFORMIN (GLUCOPHAGE) 1000 MG tablet Take 1 tablet (1,000 mg total) by mouth 2 (two) times daily with meals. 180 tablet 3    biotin 1 mg tablet Take 1,000 mcg by mouth every morning.       celecoxib (CELEBREX) 200 MG capsule TAKE 1 CAPSULE(200 MG) BY MOUTH TWICE DAILY WITH FOOD 120 capsule 1    diclofenac sodium (VOLTAREN) 1 % Gel Apply 2 g " "topically 4 (four) times daily. 1 each 6    ezetimibe (ZETIA) 10 mg tablet Take 1 tablet (10 mg total) by mouth once daily. 90 tablet 1    FLUoxetine 40 MG capsule Take 1 capsule (40 mg total) by mouth once daily. 90 capsule 1    fluticasone (FLONASE) 50 mcg/actuation nasal spray 2 sprays by Each Nare route once daily. (Patient taking differently: 2 sprays by Each Nostril route nightly as needed.) 1 Bottle 0    furosemide (LASIX) 20 MG tablet TAKE 1 TABLET BY MOUTH EVERY MONDAY WEDNESDAY AND FRIDAY 25 tablet 2    gabapentin (NEURONTIN) 300 MG capsule Take 1 capsule (300 mg total) by mouth 4 (four) times daily. 120 capsule 1    LORazepam (ATIVAN) 1 MG tablet TK 3 TS PO 1 HOUR PRIOR TO APPOINTMENT      losartan (COZAAR) 25 MG tablet TAKE 1 TABLET(25 MG) BY MOUTH EVERY DAY 90 tablet 2    omeprazole (PRILOSEC) 20 MG capsule Take 1 capsule (20 mg total) by mouth daily as needed (w/ NSAID). 30 capsule 5    potassium chloride SA (K-DUR,KLOR-CON M) 10 MEQ tablet TAKE 1 TABLET(10 MEQ) BY MOUTH EVERY DAY 20 tablet 5    pulse oximeter (PULSE OXIMETER) device by Apply Externally route 2 (two) times a day. Use twice daily at 8 AM and 3 PM and record the value in [facility] as directed. 1 each 0    triamcinolone acetonide 0.025% (KENALOG) 0.025 % Oint Apply topically 2 (two) times daily. for 10 days (Patient taking differently: Apply topically 2 (two) times daily as needed. ) 15 g 2    verapamiL (CALAN) 120 MG tablet Take 1 tablet (120 mg total) by mouth 3 (three) times daily. 180 tablet 5        PMHx, PSHx, Allergies, Medications reviewed in epic    ROS negative except pain complaints in HPI    OBJECTIVE:    BP (!) 162/71 (BP Location: Right arm, Patient Position: Sitting)   Pulse 84   Temp 98 °F (36.7 °C) (Temporal)   Resp 16   Ht 5' 10" (1.778 m)   Wt 99.7 kg (219 lb 12.8 oz)   SpO2 98%   Breastfeeding No   BMI 31.54 kg/m²     PHYSICAL EXAMINATION:    GENERAL: Well appearing, in no acute distress, alert and oriented " x3.  PSYCH:  Mood and affect appropriate.  SKIN: Skin color, texture, turgor normal, no rashes or lesions which will impact the procedure.  CV: RRR with palpation of the radial artery.  PULM: No evidence of respiratory difficulty, symmetric chest rise. Clear to auscultation.  NEURO: Cranial nerves grossly intact.    Plan:    Proceed with procedure as planned Procedure(s) (LRB):  Bilateral GT bursa injection (Bilateral)  Bilateral Sacroiliac Joint Injection (Bilateral)    Humberto Dumont MD  01/24/2023

## 2023-01-27 ENCOUNTER — PATIENT OUTREACH (OUTPATIENT)
Dept: ADMINISTRATIVE | Facility: HOSPITAL | Age: 78
End: 2023-01-27
Payer: MEDICARE

## 2023-01-28 ENCOUNTER — PATIENT OUTREACH (OUTPATIENT)
Dept: ADMINISTRATIVE | Facility: HOSPITAL | Age: 78
End: 2023-01-28
Payer: MEDICARE

## 2023-02-06 NOTE — PROGRESS NOTES
Established Patient Chronic Pain Clinic Visit    The patient location is:  Home  The chief complaint leading to consultation is:  Back and leg pain  Visit type: Virtual visit with synchronous audio and video  Total time spent with patient:  20 minutes  Each patient to whom he or she provides medical services by telemedicine is: (1) informed of the relationship between the physician and patient and the respective role of any other health care provider with respect to management of the patient; and (2) notified that he or she may decline to receive medical services by telemedicine and may withdraw from such care at any time.    Chief Pain Complaint:  Back pain  Right knee pain    Interval history 02/07/2023  Patient presents status post bilateral sacroiliac joint and greater trochanteric bursa injection 01/24/2023 and bilateral L3-5 lumbar medial branch block 12/13/2022.  Patient had recent mechanical fall confounding benefit from recent injections.  Today she reports she was reaching over a mattress cover to reach a stack of bibles and slid into a slint.  Patient reports she was behind and tall wall in a took 20-30 minutes for her to stand.  Patient also reports exacerbation of fibromyalgia pain.  Since her fall patient reports pain which radiates into the buttock and down the posterior aspect of the right lower extremity to the dorsum of the foot in L4-S1 distribution.  Patient has continued gabapentin 300 mg twice daily, Celebrex in the evenings as well as Tylenol 1000 mg twice daily.    Interval history 11/29/2022    Patient presents status post bilateral sacroiliac joint greater trochanteric bursa 10/10/2022.  Patient reports 90% relief overlying bilateral sacroiliac joints and greater trochanteric bursa following her injection.  Today she reports primarily lumbar axial back pain as well as significant neck pain.  Lower back pain is exacerbated with prolonged standing such as when she is washing dishes.  She  denies significant distal radiculopathy into the right lower extremities as described at our last clinic visit.  Neck pain is elicited with cervical flexion, extension and lateral flexion and she does report reduced mobility.  She reports her pain began following a mechanical fall down the stairs at [facility] in September 1986.  Patient has continued gabapentin 300 mg in the morning, 300 mg in the afternoon and 600 mg in the evening.    Interval history 10/04/2022  Ms. Mccollum is a 77-year-old female with past medical history significant for depression, hyperlipidemia, hypertension, mitral valve prolapse, peripheral vascular disease, history of COVID-19, type 2 diabetes, multi joint arthritis, fibromyalgia who presents to [facility] care, previous Dr. Dutta patient.  Today patient reports return of pain in the lower back which radiates into bilateral hips and down the posterior aspect of the right lower extremity in L4-5 distribution to the dorsum of the foot.  Patient reports pain is intermittent but has increased in intensity.  Pain is described as shocking in nature and today is rated a 6/10.  Patient reports she feels as if her skin is crawling.  patient is currently taking gabapentin 300 mg up to 3 times daily when pain is severe.  Pain interferes with the patient's sleep.  Patient also reports history of fibromyalgia which limits her mobility and duration of standing and ambulation.  Patient does endorse associated weakness in the lower extremities associated with her pain.  Patient is interested in pursuing repeat intervention as she is obtained several months of significant relief with prior sacroiliac joint and greater trochanteric bursa injections.  Patient has continued physician directed physical therapy exercises at home daily.      History of Present Illness 01/16/2020: Dr. Dutta:   Kaylin Mccollum is a 74 y.o. female  who is presenting with a chief complaint of lumbar back pain. The patient began  experiencing this problem insidiously, and the pain has been gradually worsening over the past 5 month(s). The pain is described as throbbing, shooting, burning and electrical and is located in the bilateral lumbar spine. Pain is intermittent and lasts hours. The pain radiates to bilateral lower extremities L4 distribudution. The patient rates her pain a 8 out of ten and interferes with activities of daily living a 7 out of ten. Pain is exacerbated by flexion of the lumbar spine, ambulation, and is improved by rest.      She also complains of right knee pain. The patient began experiencing this problem insidiously. The pain is described as cramping, aching and is located in the right knee. Pain is intermittent and lasts hours. The pain is nonradiating. The patient rates her pain a 8 out of ten and interferes with activities of daily living a 7 out of ten. Pain is exacerbated by getting up from a seated position and standing, and is improved by rest. Patient reports no prior trauma, prior arthroscopy bilaterally in 1990s.       - pertinent negatives: No fever, No chills, No weight loss, No bladder dysfunction, No bowel dysfunction, No saddle anesthesia  - pertinent positives: none        Pain Disability Index Review:   @[facility]([id number])@    Non-Pharmacologic Treatments:  Physical Therapy/Home Exercise: yes  Ice/Heat:yes  TENS: no  Acupuncture: no  Massage: no  Chiropractic: no    Other: no      Pain Medications:  - Adjuvant Medications: Lorazepam (Ativan), Neurontin (Gabapentin), and Topical Ointment (Voltaren Gel, Steroid cream, Anti-Inflammatory Cream, Compound cream)      Pain injections:  Dr. Dumont:  -01/24/2023: Bilateral sacroiliac joint and greater trochanteric bursa injection  -12/13/2022: Bilateral L3-5 lumbar medial branch block  - 10/10/2022: Bilateral greater trochanteric bursa and sacroiliac joint injection      -04/20/2022: Right-sided acetabular femoral injection; Dr. Dutta  -03/01/2022: Bilateral  "sacroiliac joint and greater trochanteric bursa injection; Dr. Dutta  -07/08/2021: Bilateral sacroiliac joint and greater trochanteric bursa injection; Dr. Dutta  -01/05/2021: Bilateral sacroiliac joint and greater trochanteric bursa injection; Dr. Burns  -10/08/2020: Bilateral sacroiliac joint and bilateral greater trochanteric bursa injection; Dr. Dutta  -05/06/2020: Bilateral sacroiliac joint and greater trochanteric bursa injection; Dr. Dutta    Past Medical History:   Diagnosis Date    Arthritis     Cataract     Diabetes mellitus 1995    BS didn't check 09/13/2022    Diabetes mellitus, type 2     Fibromyalgia     General anesthetics causing adverse effect in therapeutic use     bradycardia     Hypertension     Migraines     Mitral valve prolapse     Osteoarthritis     Rotator cuff tear 04/01/2015       Review of patient's allergies indicates:   Allergen Reactions    Demerol [meperidine] Other (See Comments)     Burning when adm IV  Able to tolerate IM, "Turned the veins in my hand purple."    Latex, natural rubber Other (See Comments)     "Burns my skin",  Symptoms get worse the longer she is exposed    Zocor [simvastatin] Other (See Comments)     Tightening of muscles    Statins-hmg-coa reductase inhibitors Other (See Comments)     myopathy    Sulfa (sulfonamide antibiotics)      Blurred vision       Past Surgical History:   Procedure Laterality Date    ARTHROSCOPY OF KNEE Right 3/10/2020    Procedure: ARTHROSCOPY, KNEE;  Surgeon: Les Schneider MD;  Location: [facility] OR;  Service: Orthopedics;  Laterality: Right;    CATARACT EXTRACTION W/  INTRAOCULAR LENS IMPLANT Left 07/19/2017    CATARACT EXTRACTION W/  INTRAOCULAR LENS IMPLANT Right 2017    CHOLECYSTECTOMY      CHONDROPLASTY OF KNEE Right 3/10/2020    Procedure: CHONDROPLASTY, KNEE;  Surgeon: Les Schneider MD;  Location: [facility] OR;  Service: Orthopedics;  Laterality: Right;  Anterior compartment     COLONOSCOPY N/A 9/25/2018    Procedure: " COLONOSCOPY;  Surgeon: Bethel Carmona MD;  Location: [facility] ENDO;  Service: Endoscopy;  Laterality: N/A;    EXCISION OF MEDIAL MENISCUS OF KNEE Right 3/10/2020    Procedure: MENISCECTOMY, KNEE, MEDIAL;  Surgeon: Les Schneider MD;  Location: [facility] OR;  Service: Orthopedics;  Laterality: Right;  Partial, Medial , Lateral     EYE SURGERY      INJECTION OF ANESTHETIC AGENT AROUND MEDIAL BRANCH NERVES INNERVATING LUMBAR FACET JOINT Bilateral 12/13/2022    Procedure: Bilateral L3-5 MBB;  Surgeon: Humberto Dumont MD;  Location: [facility] PAIN MGT;  Service: Pain Management;  Laterality: Bilateral;    INJECTION OF ANESTHETIC AGENT AROUND NERVE Right 8/8/2019    Procedure: Right Genicular nerve block with local;  Surgeon: Manpreet Dutta MD;  Location: [facility] PAIN MGT;  Service: Pain Management;  Laterality: Right;    INJECTION OF ANESTHETIC AGENT INTO SACROILIAC JOINT Bilateral 5/6/2020    Procedure: Bilateral Sacroiliac Joint Injection;  Surgeon: Manpreet Dutta MD;  Location: [facility] PAIN MGT;  Service: Pain Management;  Laterality: Bilateral;    INJECTION OF ANESTHETIC AGENT INTO SACROILIAC JOINT Bilateral 10/8/2020    Procedure: Bilateral SI and Bilateral GTB with RN IV sedation;  Surgeon: Manpreet Dutta MD;  Location: [facility] PAIN MGT;  Service: Pain Management;  Laterality: Bilateral;    INJECTION OF ANESTHETIC AGENT INTO SACROILIAC JOINT Bilateral 1/5/2021    Procedure: Bilateral BLOCK, SACROILIAC JOINT and Bilateral GTB witn RN IV sedation;  Surgeon: Daniel Burns MD;  Location: [facility] PAIN MGT;  Service: Pain Management;  Laterality: Bilateral;    INJECTION OF ANESTHETIC AGENT INTO SACROILIAC JOINT Bilateral 7/8/2021    Procedure: Bilateral BLOCK, SACROILIAC JOINT bilateral GTB RN IV sedation;  Surgeon: Manpreet Dutta MD;  Location: [facility] PAIN MGT;  Service: Pain Management;  Laterality: Bilateral;    INJECTION OF ANESTHETIC AGENT INTO SACROILIAC JOINT Bilateral 3/1/2022    Procedure: Bilateral BLOCK, SACROILIAC JOINT and Bilateral  GTB RN IV sedation;  Surgeon: Manpreet Dutta MD;  Location: [facility] PAIN MGT;  Service: Pain Management;  Laterality: Bilateral;    INJECTION OF ANESTHETIC AGENT INTO SACROILIAC JOINT Bilateral 10/10/2022    Procedure: Bilateral Sacroiliac Joint Injection;  Surgeon: Humberto Dumont MD;  Location: [facility] PAIN MGT;  Service: Pain Management;  Laterality: Bilateral;    INJECTION OF ANESTHETIC AGENT INTO SACROILIAC JOINT Bilateral 1/24/2023    Procedure: Bilateral Sacroiliac Joint Injection;  Surgeon: Humberto Dumont MD;  Location: [facility] PAIN MGT;  Service: Pain Management;  Laterality: Bilateral;    INJECTION OF JOINT Bilateral 9/5/2019    Procedure: Bilateral GT bursa injection;  Surgeon: Manpreet Dutta MD;  Location: [facility] PAIN MGT;  Service: Pain Management;  Laterality: Bilateral;    INJECTION OF JOINT Bilateral 5/6/2020    Procedure: Bilateral GT bursa injection;  Surgeon: Manpreet Dutta MD;  Location: [facility] PAIN MGT;  Service: Pain Management;  Laterality: Bilateral;    INJECTION OF JOINT Right 4/28/2022    Procedure: Injection, Joint Right Hip Injection RN IV sedation;  Surgeon: Manpreet Dutta MD;  Location: [facility] PAIN MGT;  Service: Pain Management;  Laterality: Right;    INJECTION OF JOINT Bilateral 10/10/2022    Procedure: Bilateral GT bursa injection with RN IV sedation;  Surgeon: Humberto Dumont MD;  Location: [facility] PAIN MGT;  Service: Pain Management;  Laterality: Bilateral;    INJECTION OF JOINT Bilateral 1/24/2023    Procedure: Bilateral GT bursa injection;  Surgeon: Humberto Dumont MD;  Location: [facility] PAIN MGT;  Service: Pain Management;  Laterality: Bilateral;    KNEE ARTHROSCOPY Bilateral     PARS PLANA VITRECTOMY W/ REPAIR OF MACULAR HOLE Left 02/22/2017    RADIOFREQUENCY THERMOCOAGULATION Left 7/11/2019    Procedure: Right SIJ RFA;  Surgeon: Manpreet Dutta MD;  Location: [facility] PAIN MGT;  Service: Pain Management;  Laterality: Left;    RADIOFREQUENCY THERMOCOAGULATION Right 7/25/2019    Procedure: Right SIJ RFA;  Surgeon:  Manpreet Dutta MD;  Location: [facility] PAIN MGT;  Service: Pain Management;  Laterality: Right;    SHOULDER ARTHROSCOPY Right 06/04/15     Current Outpatient Medications on File Prior to Visit   Medication Sig Dispense Refill    biotin 1 mg tablet Take 1,000 mcg by mouth every morning.       celecoxib (CELEBREX) 200 MG capsule TAKE 1 CAPSULE(200 MG) BY MOUTH TWICE DAILY WITH FOOD 120 capsule 1    diclofenac sodium (VOLTAREN) 1 % Gel Apply 2 g topically 4 (four) times daily. 1 each 6    FLUoxetine 40 MG capsule Take 1 capsule (40 mg total) by mouth once daily. 90 capsule 1    fluticasone (FLONASE) 50 mcg/actuation nasal spray 2 sprays by Each Nare route once daily. (Patient taking differently: 2 sprays by Each Nostril route nightly as needed.) 1 Bottle 0    furosemide (LASIX) 20 MG tablet TAKE 1 TABLET BY MOUTH EVERY MONDAY WEDNESDAY AND FRIDAY 25 tablet 2    gabapentin (NEURONTIN) 300 MG capsule Take 1 capsule (300 mg total) by mouth 4 (four) times daily. 120 capsule 1    INVOKANA 100 mg Tab tablet TAKE 1 TABLET(100 MG) BY MOUTH EVERY DAY 90 tablet 0    LORazepam (ATIVAN) 1 MG tablet TK 3 TS PO 1 HOUR PRIOR TO APPOINTMENT      losartan (COZAAR) 25 MG tablet TAKE 1 TABLET(25 MG) BY MOUTH EVERY DAY 90 tablet 2    metFORMIN (GLUCOPHAGE) 1000 MG tablet Take 1 tablet (1,000 mg total) by mouth 2 (two) times daily with meals. 180 tablet 3    potassium chloride SA (K-DUR,KLOR-CON M) 10 MEQ tablet TAKE 1 TABLET(10 MEQ) BY MOUTH EVERY DAY 20 tablet 5    pulse oximeter (PULSE OXIMETER) device by Apply Externally route 2 (two) times a day. Use twice daily at 8 AM and 3 PM and [facility] as directed. 1 each 0    verapamiL (CALAN) 120 MG tablet Take 1 tablet (120 mg total) by mouth 3 (three) times daily. 180 tablet 5    ezetimibe (ZETIA) 10 mg tablet Take 1 tablet (10 mg total) by mouth once daily. 90 tablet 1    omeprazole (PRILOSEC) 20 MG capsule Take 1 capsule (20 mg total) by mouth daily as needed (w/ NSAID). 30  capsule 5    triamcinolone acetonide 0.025% (KENALOG) 0.025 % Oint Apply topically 2 (two) times daily. for 10 days (Patient taking differently: Apply topically 2 (two) times daily as needed. ) 15 g 2     No current facility-administered medications on file prior to visit.               GENERAL:  No weight loss, malaise or fevers.  HEENT:   No recent changes in vision or hearing  NECK:  Negative for lumps, no difficulty with swallowing.  RESPIRATORY:  Negative for cough, wheezing or shortness of breath, patient denies any recent URI.  CARDIOVASCULAR:  Negative for chest pain or palpitations.  GI:  Negative for abdominal discomfort, blood in stools or black stools or change in bowel habits.  MUSCULOSKELETAL:  See HPI.  SKIN:  Negative for lesions, rash, and itching.  PSYCH:  No mood disorder or recent psychosocial stressors.   HEMATOLOGY/LYMPHOLOGY:  Negative for prolonged bleeding, bruising easily or swollen nodes.    NEURO:   No history of syncope, paralysis, seizures or tremors.  All other reviewed and negative other than HPI.    Imaging / Labs / Studies (reviewed on 2/7/2023):    1/27/2020 MRI Lumbar Spine Without Contrast  COMPARISON:  11/23/2015  FINDINGS:  Alignment: There is minimal grade 1 anterolisthesis of L4 on L5.  Vertebrae: Multilevel small Schmorl's nodes noted.  Vertebral body heights are otherwise within normal limits.  No evidence of fracture or marrow replacement process.  Discs: There is disc desiccation and mild disc height loss at L1-2, L2-3, and L5-S1.  Disc desiccation noted at L4-5 with preserved disc height.  Cord: Normal.  Conus terminates at L1  Degenerative findings:  T12-L1:  No spinal canal or neuroforaminal stenosis.  L1-L2: Mild broad-based disc bulge.  Mild bilateral facet arthropathy. No spinal canal or neuroforaminal stenosis.  L2-L3: Mild broad-based disc bulge.  Mild bilateral facet arthropathy. No spinal canal or neuroforaminal stenosis.  L3-L4: Mild broad-based disc bulge.   "Mild bilateral facet arthropathy. No spinal canal or neuroforaminal stenosis.  L4-L5: Severe bilateral facet arthropathy with ligamentum flavum thickening.  Mild uncovering of the intervertebral disc.  Mild spinal canal stenosis and mild bilateral neural foraminal narrowing.  L5-S1: Moderate bilateral facet arthropathy and mild broad-based disc bulge.  Mild bilateral neural foraminal narrowing.  No spinal canal stenosis.  No significant change from prior.  Paraspinal muscles & soft tissues: Multiple probable bilateral renal cyst appear grossly similar to prior.      Physical Exam:  Last clinic visit:  Vitals:    02/07/23 1128   BP: 122/62   Pulse: 96   Weight: 98.2 kg (216 lb 7.9 oz)   Height: 5' 10" (1.778 m)   PainSc:   6   PainLoc: Back      Body mass index is 31.06 kg/m².   (reviewed on 2/7/2023)    General: alert and oriented, in no apparent distress.  Gait: normal gait.  Skin: no rashes, no discoloration, no obvious lesions  HEENT: normocephalic, atraumatic. Pupils equal and round.  Cardiovascular: no significant peripheral edema present.  Respiratory: without use of accessory muscles of respiration.    Musculoskeletal - Lumbar Spine:  - ROM fairly preserved   - Pain on flexion of lumbar spine: Absent   - Pain on extension of lumbar spine: Absent         - Lumbar facet loading: Absent   - TTP over the lumbar facet joints: Absent  - TTP over the lumbar paraspinals: Present   - TTP over the SI joints: Present  - TTP over GT bursa: Present, minimal   - Straight Leg Raise: Negative  - CHERYLE: Present    Right Knee:  - TTP: Present over medial/ lateral joint line  - Pain with extension: Present  - Pain with flexion: Present  - Crepitus: Present     Neuro - Lower Extremities:  - BLE Strength: R/L: HF: 5/5, HE: 5/5, KF: 5/5; KE: 5/5; FE: 5/5; FF: 5/5  - Extremity Reflexes: Brisk and symmetric throughout  - Sensory: Sensation to light touch intact bilaterally      Psych:  Mood and affect is " appropriate    Assessment:  Kaylin Mccollum is a 78 y.o. year old female who is presenting with       ICD-10-CM ICD-9-CM    1. Lumbar radiculopathy  M54.16 724.4 MRI Lumbar Spine Without Contrast      2. Dorsalgia, unspecified  M54.9 724.5       3. Sacroiliac joint pain  M53.3 724.6       4. Lumbar spondylosis  M47.816 721.3           Plan:  1. Interventional:  With history and physical exam, it appears patient has 75% relief from bilateral L3-5 lumbar medial branch block.  We have discussed repeating this injection in the future should lower back pain exacerbate.      -we will re-evaluate benefit from sacroiliac joint injection at our follow-up visit.  -we have discussed considering a lumbar right-sided transforaminal epidural steroid injection to address radicular pain.  We will 1st review lumbar MRI.    Anticoagulation:  None, no anticoagulation      2. Pharmacologic:     -  We have discussed continuing gabapentin.  We have reviewed potential side effects of this medication including daytime somnolence, weight gain and peripheral edema  Gabapentin 300 mg in the morning, 300 mg in the afternoon and 600 mg in the evening    -I will start the patient on Savella 12.5 mg twice a day to see if this helps with their symptoms of fibromyalgia.  We have discussed that Savella is FDA approved and has demonstrated improvement in multiple measures including pain, global impression of change in physical function.  We have discussed that the most frequent side effects of this medication can include nausea, constipation, vomiting, dry mouth, flushing or tachycardia.  Patient will increase their medication according to the following algorithm:    Day 1:12.5 mg once a day  Days 2-3:  12.5 mg twice daily  Days 4-7:  25 mg twice daily  Day 8+:  50 mg twice daily      3. Rehabilitative:   -We discussed continuing physical therapy to help manage the patient/s painful condition. The patient was counseled that muscle strengthening will  improve the long term prognosis in regards to pain and may also help increase range of motion and mobility. They were told that one of the goals of physical therapy is that they learn how to do the exercises so that they can do them independently at home daily upon completion.     4. Diagnostic:  Reviewed relevant imaging and answered patient's questions.  Lumbar MRI to better evaluate pain and weakness    5. Consult:   -Dr. Schneider for knee and shoulder pain PRN  -Rheumatology: Fibromyalgia    6. Follow up: Post 4 weeks     The above plan and management options were discussed at length with patient. Patient is in agreement with the above and verbalized understanding.    - I discussed the goals of interventional chronic pain management with the patient on today's visit. We discussed a multimodal and systematic approach to pain.  This includes diagnostic and therapeutic injections, adjuvant pharmacologic treatment, physical therapy, and at times psychiatry.  I emphasized the importance of regular exercise, core strengthening and stretching, diet and weight loss as a cornerstone of long-term pain management.    - This condition does not require this patient to take time off of work, and the primary goal of our Pain Management services is to improve the patient's functional capacity.  - Patient Questions: Answered all of the patient's questions regarding diagnoses, therapy, treatment and next steps    Humberto Dumont MD

## 2023-02-07 ENCOUNTER — OFFICE VISIT (OUTPATIENT)
Dept: PAIN MEDICINE | Facility: CLINIC | Age: 78
End: 2023-02-07
Payer: MEDICARE

## 2023-02-07 VITALS
WEIGHT: 216.5 LBS | HEIGHT: 70 IN | HEART RATE: 96 BPM | BODY MASS INDEX: 30.99 KG/M2 | SYSTOLIC BLOOD PRESSURE: 122 MMHG | DIASTOLIC BLOOD PRESSURE: 62 MMHG

## 2023-02-07 DIAGNOSIS — M54.9 DORSALGIA, UNSPECIFIED: ICD-10-CM

## 2023-02-07 DIAGNOSIS — M47.816 LUMBAR SPONDYLOSIS: ICD-10-CM

## 2023-02-07 DIAGNOSIS — M54.16 LUMBAR RADICULOPATHY: Primary | ICD-10-CM

## 2023-02-07 DIAGNOSIS — M53.3 SACROILIAC JOINT PAIN: ICD-10-CM

## 2023-02-07 PROCEDURE — 99999 PR PBB SHADOW E&M-EST. PATIENT-LVL V: ICD-10-PCS | Mod: PBBFAC,,, | Performed by: ANESTHESIOLOGY

## 2023-02-07 PROCEDURE — 99214 OFFICE O/P EST MOD 30 MIN: CPT | Mod: S$PBB,,, | Performed by: ANESTHESIOLOGY

## 2023-02-07 PROCEDURE — 99215 OFFICE O/P EST HI 40 MIN: CPT | Mod: PBBFAC | Performed by: ANESTHESIOLOGY

## 2023-02-07 PROCEDURE — 99214 PR OFFICE/OUTPT VISIT, EST, LEVL IV, 30-39 MIN: ICD-10-PCS | Mod: S$PBB,,, | Performed by: ANESTHESIOLOGY

## 2023-02-07 PROCEDURE — 99999 PR PBB SHADOW E&M-EST. PATIENT-LVL V: CPT | Mod: PBBFAC,,, | Performed by: ANESTHESIOLOGY

## 2023-02-08 ENCOUNTER — PATIENT MESSAGE (OUTPATIENT)
Dept: PAIN MEDICINE | Facility: CLINIC | Age: 78
End: 2023-02-08
Payer: MEDICARE

## 2023-02-16 ENCOUNTER — HOSPITAL ENCOUNTER (OUTPATIENT)
Dept: RADIOLOGY | Facility: HOSPITAL | Age: 78
Discharge: HOME | End: 2023-02-16
Attending: ANESTHESIOLOGY
Payer: MEDICARE

## 2023-02-16 DIAGNOSIS — M54.16 LUMBAR RADICULOPATHY: ICD-10-CM

## 2023-02-16 PROCEDURE — 72148 MRI LUMBAR SPINE WITHOUT CONTRAST: ICD-10-PCS | Mod: 26,,, | Performed by: RADIOLOGY

## 2023-02-16 PROCEDURE — 72148 MRI LUMBAR SPINE W/O DYE: CPT | Mod: 26,,, | Performed by: RADIOLOGY

## 2023-02-16 PROCEDURE — 72148 MRI LUMBAR SPINE W/O DYE: CPT | Mod: TC

## 2023-02-23 ENCOUNTER — PATIENT MESSAGE (OUTPATIENT)
Dept: PAIN MEDICINE | Facility: CLINIC | Age: 78
End: 2023-02-23
Payer: MEDICARE

## 2023-03-01 ENCOUNTER — PATIENT MESSAGE (OUTPATIENT)
Dept: ADMINISTRATIVE | Facility: OTHER | Age: 78
End: 2023-03-01
Payer: MEDICARE

## 2023-03-01 ENCOUNTER — OFFICE VISIT (OUTPATIENT)
Dept: INTERNAL MEDICINE | Facility: CLINIC | Age: 78
End: 2023-03-01
Payer: MEDICARE

## 2023-03-01 VITALS
HEIGHT: 70 IN | HEART RATE: 104 BPM | DIASTOLIC BLOOD PRESSURE: 64 MMHG | OXYGEN SATURATION: 97 % | WEIGHT: 214.5 LBS | BODY MASS INDEX: 30.71 KG/M2 | SYSTOLIC BLOOD PRESSURE: 116 MMHG | TEMPERATURE: 97 F | RESPIRATION RATE: 18 BRPM

## 2023-03-01 DIAGNOSIS — E11.69 HYPERLIPIDEMIA ASSOCIATED WITH TYPE 2 DIABETES MELLITUS: ICD-10-CM

## 2023-03-01 DIAGNOSIS — L30.9 DERMATITIS OF RIGHT FOOT: ICD-10-CM

## 2023-03-01 DIAGNOSIS — I15.2 HYPERTENSION ASSOCIATED WITH DIABETES: Chronic | ICD-10-CM

## 2023-03-01 DIAGNOSIS — E66.9 DIABETES MELLITUS TYPE 2 IN OBESE: Primary | ICD-10-CM

## 2023-03-01 DIAGNOSIS — M60.9 STATIN-INDUCED MYOSITIS: ICD-10-CM

## 2023-03-01 DIAGNOSIS — I73.9 PVD (PERIPHERAL VASCULAR DISEASE): ICD-10-CM

## 2023-03-01 DIAGNOSIS — E78.5 HYPERLIPIDEMIA ASSOCIATED WITH TYPE 2 DIABETES MELLITUS: ICD-10-CM

## 2023-03-01 DIAGNOSIS — T46.6X5A STATIN-INDUCED MYOSITIS: ICD-10-CM

## 2023-03-01 DIAGNOSIS — E11.69 DIABETES MELLITUS TYPE 2 IN OBESE: Primary | ICD-10-CM

## 2023-03-01 DIAGNOSIS — E11.59 HYPERTENSION ASSOCIATED WITH DIABETES: Chronic | ICD-10-CM

## 2023-03-01 DIAGNOSIS — F32.9 CHRONIC MAJOR DEPRESSIVE DISORDER: Chronic | ICD-10-CM

## 2023-03-01 PROBLEM — U07.1 COVID-19: Status: RESOLVED | Noted: 2021-02-25 | Resolved: 2023-03-01

## 2023-03-01 PROCEDURE — 99214 PR OFFICE/OUTPT VISIT, EST, LEVL IV, 30-39 MIN: ICD-10-PCS | Mod: S$PBB,,, | Performed by: FAMILY MEDICINE

## 2023-03-01 PROCEDURE — 99214 OFFICE O/P EST MOD 30 MIN: CPT | Mod: S$PBB,,, | Performed by: FAMILY MEDICINE

## 2023-03-01 PROCEDURE — 99999 PR PBB SHADOW E&M-EST. PATIENT-LVL V: ICD-10-PCS | Mod: PBBFAC,,, | Performed by: FAMILY MEDICINE

## 2023-03-01 PROCEDURE — 99999 PR PBB SHADOW E&M-EST. PATIENT-LVL V: CPT | Mod: PBBFAC,,, | Performed by: FAMILY MEDICINE

## 2023-03-01 PROCEDURE — 99215 OFFICE O/P EST HI 40 MIN: CPT | Mod: PBBFAC | Performed by: FAMILY MEDICINE

## 2023-03-01 NOTE — ASSESSMENT & PLAN NOTE
Borderline, continue current medications and patient will work on diet changes and lower the sugar in her diet    Lab Results   Component Value Date    HGBA1C 7.3 (H) 02/16/2023    HGBA1C 6.9 (H) 08/11/2022    LDLCALC 131.6 02/16/2023    LABMICR 94.0 08/11/2022    CREATRANDUR 152.0 08/11/2022    MICALBCREAT 61.8 (H) 08/11/2022

## 2023-03-01 NOTE — ASSESSMENT & PLAN NOTE
Above goal, statin intolerant, continue Zetia and work on diet; can consider discussing other cholesterol treatments with her cardiologist such as mary    Lab Results   Component Value Date    CHOL 211 (H) 02/16/2023    LDLCALC 131.6 02/16/2023    TRIG 72 02/16/2023    HDL 65 02/16/2023    ALT 17 02/16/2023    AST 18 02/16/2023    ALKPHOS 55 02/16/2023

## 2023-03-01 NOTE — PATIENT INSTRUCTIONS
"The bivalent covid booster is now available. You can schedule an appointment for the vaccine through [facility] or ask  to assist you with scheduling an appointment for the vaccine.    The bivalent vaccines, known as the "Omicron" vaccines, target both the original strain of COVID-19 as well as the Omicron variant, which is now the predominant strain in the [place].    The Omicron booster is authorized for individuals 12 years and older and is given two months after last dose of primary or booster shot.   "

## 2023-03-01 NOTE — PROGRESS NOTES
Subjective:       Patient ID: Kaylin Mccollum is a 78 y.o. female.    Chief Complaint: Annual Exam    Patient presents to clinic today for annual physical exam.      Review of Systems   Constitutional:  Negative for chills, fatigue, fever and unexpected weight change.   HENT:  Negative for congestion, dental problem, ear pain, hearing loss, rhinorrhea and trouble swallowing.    Eyes:  Negative for pain and visual disturbance.   Respiratory:  Negative for cough and shortness of breath.    Cardiovascular:  Negative for chest pain, palpitations and leg swelling.   Gastrointestinal:  Negative for abdominal distention, abdominal pain, blood in stool, constipation, diarrhea, nausea and vomiting.   Genitourinary:  Negative for difficulty urinating and vaginal discharge.   Musculoskeletal:  Negative for arthralgias and myalgias.   Skin:  Positive for rash (right foot).   Neurological:  Negative for dizziness, weakness, numbness and headaches.   Hematological:  Negative for adenopathy. Does not bruise/bleed easily.   Psychiatric/Behavioral:  Negative for dysphoric mood and sleep disturbance. The patient is not nervous/anxious.        Objective:      Physical Exam  Vitals reviewed.   Constitutional:       General: She is not in acute distress.     Appearance: Normal appearance. She is well-developed.   HENT:      Head: Normocephalic and atraumatic.      Right Ear: Tympanic membrane, ear canal and external ear normal.      Left Ear: Tympanic membrane, ear canal and external ear normal.      Nose: Nose normal. No mucosal edema or rhinorrhea.      Mouth/Throat:      Pharynx: Uvula midline.   Eyes:      General: Lids are normal. No scleral icterus.     Extraocular Movements: Extraocular movements intact.      Conjunctiva/sclera: Conjunctivae normal.      Pupils: Pupils are equal, round, and reactive to light.   Neck:      Thyroid: No thyromegaly.   Cardiovascular:      Rate and Rhythm: Normal rate and regular rhythm.      Heart  sounds: No murmur heard.    No friction rub. No gallop.   Pulmonary:      Effort: Pulmonary effort is normal.      Breath sounds: Normal breath sounds. No wheezing, rhonchi or rales.   Abdominal:      General: Bowel sounds are normal. There is no distension.      Palpations: Abdomen is soft. There is no mass.      Tenderness: There is no abdominal tenderness.   Musculoskeletal:         General: Normal range of motion.      Cervical back: Normal range of motion and neck supple.      Comments: Erythema and scaling to plantar aspect of right foot   Lymphadenopathy:      Cervical: No cervical adenopathy.   Skin:     General: Skin is warm and dry.      Findings: No lesion or rash.      Nails: There is no clubbing.   Neurological:      Mental Status: She is alert and oriented to person, place, and time.      Cranial Nerves: No cranial nerve deficit.      Sensory: No sensory deficit.      Gait: Gait normal.   Psychiatric:         Mood and Affect: Mood and affect normal.       Assessment:       1. Diabetes mellitus type 2 in obese    2. Hypertension associated with diabetes    3. Hyperlipidemia associated with type 2 diabetes mellitus    4. Statin-induced myositis    5. Chronic major depressive disorder    6. Dermatitis of right foot    7. PVD (peripheral vascular disease)          Plan:   1. Diabetes mellitus type 2 in obese  Assessment & Plan:  Borderline, continue current medications and patient will work on diet changes and lower the sugar in her diet    Lab Results   Component Value Date    HGBA1C 7.3 (H) 02/16/2023    HGBA1C 6.9 (H) 08/11/2022    LDLCALC 131.6 02/16/2023    LABMICR 94.0 08/11/2022    CREATRANDUR 152.0 08/11/2022    MICALBCREAT 61.8 (H) 08/11/2022         Orders:  -     Hemoglobin A1C; Future; Expected date: 09/01/2023    2. Hypertension associated with diabetes  Assessment & Plan:  Controlled, continue current medications      3. Hyperlipidemia associated with type 2 diabetes mellitus  Assessment &  Plan:  Above goal, statin intolerant, continue Zetia and work on diet; can consider discussing other cholesterol treatments with her cardiologist such as mary    Lab Results   Component Value Date    CHOL 211 (H) 02/16/2023    LDLCALC 131.6 02/16/2023    TRIG 72 02/16/2023    HDL 65 02/16/2023    ALT 17 02/16/2023    AST 18 02/16/2023    ALKPHOS 55 02/16/2023         Orders:  -     Comprehensive Metabolic Panel; Future; Expected date: 09/01/2023  -     Lipid Panel; Future; Expected date: 09/01/2023    4. Statin-induced myositis    5. Chronic major depressive disorder  Assessment & Plan:  Stable on Prozac      6. Dermatitis of right foot  -     Ambulatory referral/consult to Podiatry; Future; Expected date: 03/08/2023    7. PVD (peripheral vascular disease)  Overview:  Followed by Cardiology, continue current treatment plan           Health Maintenance reviewed/updated.

## 2023-03-02 ENCOUNTER — OFFICE VISIT (OUTPATIENT)
Dept: PODIATRY | Facility: CLINIC | Age: 78
End: 2023-03-02
Payer: MEDICARE

## 2023-03-02 DIAGNOSIS — B35.3 TINEA PEDIS OF RIGHT FOOT: Primary | ICD-10-CM

## 2023-03-02 DIAGNOSIS — L30.9 DERMATITIS OF RIGHT FOOT: ICD-10-CM

## 2023-03-02 PROCEDURE — 99203 PR OFFICE/OUTPT VISIT, NEW, LEVL III, 30-44 MIN: ICD-10-PCS | Mod: S$PBB,,, | Performed by: PODIATRIST

## 2023-03-02 PROCEDURE — 99203 OFFICE O/P NEW LOW 30 MIN: CPT | Mod: S$PBB,,, | Performed by: PODIATRIST

## 2023-03-02 PROCEDURE — 99212 OFFICE O/P EST SF 10 MIN: CPT | Mod: PBBFAC | Performed by: PODIATRIST

## 2023-03-02 PROCEDURE — 99999 PR PBB SHADOW E&M-EST. PATIENT-LVL II: ICD-10-PCS | Mod: PBBFAC,,, | Performed by: PODIATRIST

## 2023-03-02 PROCEDURE — 99999 PR PBB SHADOW E&M-EST. PATIENT-LVL II: CPT | Mod: PBBFAC,,, | Performed by: PODIATRIST

## 2023-03-02 RX ORDER — CLOTRIMAZOLE AND BETAMETHASONE DIPROPIONATE 10; .64 MG/G; MG/G
CREAM TOPICAL 2 TIMES DAILY
Qty: 45 G | Refills: 2 | Status: SHIPPED | OUTPATIENT
Start: 2023-03-02

## 2023-03-03 ENCOUNTER — PATIENT MESSAGE (OUTPATIENT)
Dept: INTERNAL MEDICINE | Facility: CLINIC | Age: 78
End: 2023-03-03
Payer: MEDICARE

## 2023-03-03 NOTE — PROGRESS NOTES
"  Subjective:       Patient ID: Kaylin Mccollum is a 78 y.o. female.    Chief Complaint: Foot Problem (Patient is a diabetic and reports rash to right lateral foot and heel present x 3 days. She denies pain at present. )    HPI: Kaylin Mccollum presents to the clinic today at the referral of Dr. Olvera with the chief complaint of persistent pruritus and burning to the right foot which has been ongoing for the last 3+ days.  Reports increase in the erythema around the sole of the foot progressing along the medial and lateral aspect below the sock line.  Denies any recent trauma or injuries.  Denies any outdoor exposure.    Review of patient's allergies indicates:   Allergen Reactions    Demerol [meperidine] Other (See Comments)     Burning when adm IV  Able to tolerate IM, "Turned the veins in my hand purple."    Latex, natural rubber Other (See Comments)     "Burns my skin",  Symptoms get worse the longer she is exposed    Zocor [simvastatin] Other (See Comments)     Tightening of muscles    Statins-hmg-coa reductase inhibitors Other (See Comments)     myopathy    Sulfa (sulfonamide antibiotics)      Blurred vision       Past Medical History:   Diagnosis Date    Arthritis     Cataract     COVID-19 2021    Diabetes mellitus     BS didn't check 2022    Diabetes mellitus, type 2     Fibromyalgia     General anesthetics causing adverse effect in therapeutic use     bradycardia     Hypertension     Migraines     Mitral valve prolapse     Osteoarthritis     Rotator cuff tear 2015       Family History   Problem Relation Age of Onset    Heart disease Mother         A-Fib    Glaucoma Mother     Cataracts Mother     Cancer Mother         colon    Arthritis Mother         : 17    Depression Mother     Kidney disease Daughter         ESRD    Anesthesia problems Daughter         cardiac arrest during nephrectomy    Birth defects Daughter         Renal    Depression Daughter     Learning " disabilities Daughter         Dyslexia, ADD    Diabetes Maternal Grandmother     Heart disease Maternal Grandmother         Congestive heart failure    Alcohol abuse Maternal Grandmother         Death: 84    Depression Maternal Grandmother     Hypertension Maternal Grandmother     Cancer Maternal Grandmother     Diabetes Maternal Grandfather     Cancer Maternal Grandfather     Alcohol abuse Maternal Grandfather          1964    Breast cancer Paternal Aunt     Depression Brother     Depression Son     Learning disabilities Son         ADD & ADHD    Diabetes Maternal Aunt          9/3/2023    Diabetes Maternal Uncle          2019    Birth defects Brother         Cardiac    Heart disease Brother         Heart Surgery  as 6 y.o.       Social History     Socioeconomic History    Marital status:    Occupational History     Employer: [employer]   Tobacco Use    Smoking status: Never    Smokeless tobacco: Never    Tobacco comments:     Never smoked   Substance and Sexual Activity    Alcohol use: Never     Comment: Couple times per year    Drug use: No    Sexual activity: Not Currently   Social History Narrative    . 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     Social Determinants of Health     Financial Resource Strain: Low Risk     Difficulty of Paying Living Expenses: Not hard at all   Food Insecurity: No Food Insecurity    Worried About Running Out of Food in the Last Year: Never true    Ran Out of Food in the Last Year: Never true   Transportation Needs: No Transportation Needs    Lack of Transportation (Medical): No    Lack of Transportation (Non-Medical): No   Physical Activity: Insufficiently Active    Days of Exercise per Week: 1 day    Minutes of Exercise per Session: 30 min   Stress: No Stress Concern Present    Feeling of Stress : Only a little   Social Connections: Unknown    Frequency of Communication with Friends and Family: More than three times a week     Frequency of Social Gatherings with Friends and Family: Once a week    Active Member of Clubs or Organizations: Yes    Attends Club or Organization Meetings: More than 4 times per year    Marital Status:    Housing Stability: Low Risk     Unable to Pay for Housing in the Last Year: No    Number of Places Lived in the Last Year: 1    Unstable Housing in the Last Year: No       Past Surgical History:   Procedure Laterality Date    ARTHROSCOPY OF KNEE Right 3/10/2020    Procedure: ARTHROSCOPY, KNEE;  Surgeon: Les Schneider MD;  Location: [facility] OR;  Service: Orthopedics;  Laterality: Right;    CATARACT EXTRACTION W/  INTRAOCULAR LENS IMPLANT Left 07/19/2017    CATARACT EXTRACTION W/  INTRAOCULAR LENS IMPLANT Right 2017    CHOLECYSTECTOMY      CHONDROPLASTY OF KNEE Right 3/10/2020    Procedure: CHONDROPLASTY, KNEE;  Surgeon: Les Schneider MD;  Location: [facility] OR;  Service: Orthopedics;  Laterality: Right;  Anterior compartment     COLONOSCOPY N/A 9/25/2018    Procedure: COLONOSCOPY;  Surgeon: Bethel Carmona MD;  Location: [facility] ENDO;  Service: Endoscopy;  Laterality: N/A;    EXCISION OF MEDIAL MENISCUS OF KNEE Right 3/10/2020    Procedure: MENISCECTOMY, KNEE, MEDIAL;  Surgeon: Les Schneider MD;  Location: [facility] OR;  Service: Orthopedics;  Laterality: Right;  Partial, Medial , Lateral     EYE SURGERY      INJECTION OF ANESTHETIC AGENT AROUND MEDIAL BRANCH NERVES INNERVATING LUMBAR FACET JOINT Bilateral 12/13/2022    Procedure: Bilateral L3-5 MBB;  Surgeon: Humberto Dumont MD;  Location: [facility] PAIN T;  Service: Pain Management;  Laterality: Bilateral;    INJECTION OF ANESTHETIC AGENT AROUND NERVE Right 8/8/2019    Procedure: Right Genicular nerve block with local;  Surgeon: Manpreet Dutta MD;  Location: [facility] PAIN MGT;  Service: Pain Management;  Laterality: Right;    INJECTION OF ANESTHETIC AGENT INTO SACROILIAC JOINT Bilateral 5/6/2020    Procedure: Bilateral Sacroiliac Joint Injection;  Surgeon:  Manpreet Dutta MD;  Location: [facility] PAIN MGT;  Service: Pain Management;  Laterality: Bilateral;    INJECTION OF ANESTHETIC AGENT INTO SACROILIAC JOINT Bilateral 10/8/2020    Procedure: Bilateral SI and Bilateral GTB with RN IV sedation;  Surgeon: Manpreet Dutta MD;  Location: [facility] PAIN MGT;  Service: Pain Management;  Laterality: Bilateral;    INJECTION OF ANESTHETIC AGENT INTO SACROILIAC JOINT Bilateral 1/5/2021    Procedure: Bilateral BLOCK, SACROILIAC JOINT and Bilateral GTB witn RN IV sedation;  Surgeon: Daniel Burns MD;  Location: [facility] PAIN MGT;  Service: Pain Management;  Laterality: Bilateral;    INJECTION OF ANESTHETIC AGENT INTO SACROILIAC JOINT Bilateral 7/8/2021    Procedure: Bilateral BLOCK, SACROILIAC JOINT bilateral GTB RN IV sedation;  Surgeon: Manpreet Dutta MD;  Location: [facility] PAIN MGT;  Service: Pain Management;  Laterality: Bilateral;    INJECTION OF ANESTHETIC AGENT INTO SACROILIAC JOINT Bilateral 3/1/2022    Procedure: Bilateral BLOCK, SACROILIAC JOINT and Bilateral GTB RN IV sedation;  Surgeon: Manpreet Dutta MD;  Location: [facility] PAIN MGT;  Service: Pain Management;  Laterality: Bilateral;    INJECTION OF ANESTHETIC AGENT INTO SACROILIAC JOINT Bilateral 10/10/2022    Procedure: Bilateral Sacroiliac Joint Injection;  Surgeon: Humberto Dumont MD;  Location: [facility] PAIN MGT;  Service: Pain Management;  Laterality: Bilateral;    INJECTION OF ANESTHETIC AGENT INTO SACROILIAC JOINT Bilateral 1/24/2023    Procedure: Bilateral Sacroiliac Joint Injection;  Surgeon: Humberto Dumont MD;  Location: [facility] PAIN MGT;  Service: Pain Management;  Laterality: Bilateral;    INJECTION OF JOINT Bilateral 9/5/2019    Procedure: Bilateral GT bursa injection;  Surgeon: Manpreet Dutta MD;  Location: [facility] PAIN MGT;  Service: Pain Management;  Laterality: Bilateral;    INJECTION OF JOINT Bilateral 5/6/2020    Procedure: Bilateral GT bursa injection;  Surgeon: Manpreet Dutta MD;  Location: [facility] PAIN MGT;  Service: Pain Management;   Laterality: Bilateral;    INJECTION OF JOINT Right 4/28/2022    Procedure: Injection, Joint Right Hip Injection RN IV sedation;  Surgeon: Manpreet Dutta MD;  Location: [facility] PAIN MGT;  Service: Pain Management;  Laterality: Right;    INJECTION OF JOINT Bilateral 10/10/2022    Procedure: Bilateral GT bursa injection with RN IV sedation;  Surgeon: Humberto Dumont MD;  Location: [facility] PAIN MGT;  Service: Pain Management;  Laterality: Bilateral;    INJECTION OF JOINT Bilateral 1/24/2023    Procedure: Bilateral GT bursa injection;  Surgeon: Humberto Dumont MD;  Location: [facility] PAIN MGT;  Service: Pain Management;  Laterality: Bilateral;    KNEE ARTHROSCOPY Bilateral     PARS PLANA VITRECTOMY W/ REPAIR OF MACULAR HOLE Left 02/22/2017    RADIOFREQUENCY THERMOCOAGULATION Left 7/11/2019    Procedure: Right SIJ RFA;  Surgeon: Manpreet Dutta MD;  Location: [facility] PAIN MGT;  Service: Pain Management;  Laterality: Left;    RADIOFREQUENCY THERMOCOAGULATION Right 7/25/2019    Procedure: Right SIJ RFA;  Surgeon: Manpreet Dutta MD;  [facility];  Service: Pain Management;  Laterality: Right;    SHOULDER ARTHROSCOPY Right 06/04/15       Review of Systems       Objective:   There were no vitals taken for this visit.    MRI Lumbar Spine Without Contrast  Narrative: EXAMINATION:  MRI LUMBAR SPINE WITHOUT CONTRAST    CLINICAL HISTORY:  Radiculopathy, lumbar regionLumbar radiculopathy, no red flags, no prior management;    TECHNIQUE:  MR Lumbar spine without contrast. Sagittal T1, T2, STIR. Axial T1, T2. Coronal T1.    COMPARISON:  Prior MRI of the lumbar spine from 01/24/2020.    FINDINGS:  Grade 1 degenerative spondylolisthesis at L4-L5.  Vertebral body height is normal.  Marrow signal is within normal limits. The conus medullaris terminates at the level of L1-L2.  No abnormal signal within the conus. Intervertebral disc levels are as follows:    T12-L1 disc: Normal disc height with anterior osteophytes and mild degenerative facet  hypertrophy.  No spinal or foraminal stenosis.  The dural canal measures 16 mm.    L1-L2 disc : Disc space height loss with chronic Schmorl's nodes.  Posterior disc bulge.  Anterior osteophytes.  Minor facet arthropathy bilaterally.  The dural canal measures 13 mm.  No foraminal stenosis.    L2-L3 disc: Circumferential disc bulge with tiny chronic Schmorl's nodes.  Anterior osteophytes.  Mild degenerative facet hypertrophy.  The dural canal measures 12 mm.  No foraminal stenosis.    L3-L4 disc: Disc space height loss with a circumferential disc bulge and anterior osteophytes.  Mild degenerative facet hypertrophy with moderate buckling of the ligamentum flavum.  The dural canal measures 11 mm.  No significant foraminal stenosis.    L4-L5 disc: Grade 1 spondylolisthesis with severe degenerative facet hypertrophy bilaterally.  Buckling of the ligamentum flavum.  The dural canal measures 7 mm AP.  Disc encroaches into the floors of the exit foramina, right greater than left.  There is mild right foraminal stenosis.    L5-S1 disc: Severe disc space height loss with osteophytes at the margins and encroach into the exit foramina.  Mild degenerative facet hypertrophy and buckling of the ligamentum flavum.  The dural canal measures 11 mm.  Mild foraminal stenosis.  Impression: 1. Grade 1 degenerative spondylolisthesis at L4-L5 with mild spinal stenosis and mild right foraminal stenosis.  These findings are similar to prior.  No angelica nerve compression is visible.  2. Mild bilateral foraminal stenosis at L5-S1 where there is severe disc height loss at osteophytes that encroach into the exit foramina floors.    Electronically signed by: Luan Díaz Jr., MD  Date:    02/16/2023  Time:    15:10       Physical Exam    LOWER EXTREMITY PHYSICAL EXAMINATION  DERMATOLOGY:  Substantial flaky, erythematous, xerotic like lesions are noted to the right foot with progression of erythema circumferentially around the foot in a moccasin  style appearance.  No signs of ulceration is present..  As per the patient, the aforementioned areas are moderately pruritic.     NEUROLOGY: Sensation to light touch is intact. Proprioception is intact.    ORTHOPEDIC: Manual Muscle Testing is 5/5 in all planes on the left and right, without pains, with and without resistance. Gait pattern is non-antalgic.    VASCULAR: Pulses are palpable to the B/L lower extremity. The right dorsalis pedis pulse is 2/4 and the posterior tibial pulse is 2/4. The left dorsalis pedis pulse is 2/4 and the posterior tibial pulse is 2/4. Hair growth is noted on the dorsal foot and digits. Proximal to distal, warm to warm. Capillary refill time is WNL at less than 3s.      Assessment:     1. Tinea pedis of right foot    2. Dermatitis of right foot        Plan:     Tinea pedis of right foot    Dermatitis of right foot  -     Ambulatory referral/consult to Podiatry    Other orders  -     clotrimazole-betamethasone 1-0.05% (LOTRISONE) cream; Apply topically 2 (two) times daily.  Dispense: 45 g; Refill: 2        Recommend moisture wicking socks to alleviate the hyperhidrosis which makes one prone to recurrent tinea pedis.  I also recommend to alternate shoe gear, meaning, Monday, Wednesday, Friday, Saturday/Sunday and Tuesday, Thursday, Saturday/Sunday.  I do also recommend Tinactin antifungal spray to shoes daily.  After the aforementioned, put the shoes aside and allow to dry overnight and/or one full day.  Patient may purchase OTC Gold Bond Powder and use to his/her shoes daily prior to putting them on. He/She may also sprinkle a slight amount of powder to the foot prior to putting on socks.    Discussed utilizing Lotrisone cream to help prevent worsening fungus while also providing a steroid to help decrease irritation, erythema and assist with healing of the skin.       Future Appointments   Date Time Provider Department Center   3/9/2023  1:15 PM Humberto Dumont MD ON IN [facility]    4/13/2023 10:00 AM Les Schneider MD [facility] ORTHO [facility]   4/18/2023  8:00 AM [facility] MAMMO2 ON[facility] M[facility]MO [place]   4/21/2023  4:20 PM Jesus Dumont MD [facility] CARDIO [facility]   8/29/2023  8:00 [facility] [place]   9/1/2023 10:40 AM Alea Quezada PA-C [facility] IM [facility]

## 2023-03-04 NOTE — TELEPHONE ENCOUNTER
We have had to change medications in the past due to insurance drug formulary. Patient is currently doing well on this medication. Thanks

## 2023-03-06 ENCOUNTER — PATIENT MESSAGE (OUTPATIENT)
Dept: PAIN MEDICINE | Facility: CLINIC | Age: 78
End: 2023-03-06
Payer: MEDICARE

## 2023-03-07 NOTE — PROGRESS NOTES
Established Patient Chronic Pain Clinic Visit    Chief Pain Complaint:  Back pain  Right knee pain    Interval Hx: 3/9/23  Patient presents for one-month follow-up.  Today she reports she is significantly better since our last clinic visit.  At that time patient had slipped in a low off and injured her lower back and right leg.  Today she reports this pain is significantly improved and pain is intermittent and today is rated a 3/10.  Today patient reports pain is isolated to the midline lower lumbar spine.  Pain is exacerbated with lumbar flexion such as when she is picking up close.  Fibromyalgia Pain has been significantly improved with the initiation of Savella medication.  Patient has reached a titration of 50 mg twice daily.  Patient recently refilled this medication.  She denies any side effects from this medication.  Today she denies any significant lower extremity weakness, bowel or bladder incontinence or saddle anesthesia      Interval history 02/07/2023  Patient presents status post bilateral sacroiliac joint and greater trochanteric bursa injection 01/24/2023 and bilateral L3-5 lumbar medial branch block 12/13/2022.  Patient had recent mechanical fall confounding benefit from recent injections.  Today she reports she was reaching over a mattress cover to reach a stack of bibles and slid into a slint.  Patient reports she was behind and tall wall in a took 20-30 minutes for her to stand.  Patient also reports exacerbation of fibromyalgia pain.  Since her fall patient reports pain which radiates into the buttock and down the posterior aspect of the right lower extremity to the dorsum of the foot in L4-S1 distribution.  Patient has continued gabapentin 300 mg twice daily, Celebrex in the evenings as well as Tylenol 1000 mg twice daily.    Interval history 11/29/2022    Patient presents status post bilateral sacroiliac joint greater trochanteric bursa 10/10/2022.  Patient reports 90% relief overlying  bilateral sacroiliac joints and greater trochanteric bursa following her injection.  Today she reports primarily lumbar axial back pain as well as significant neck pain.  Lower back pain is exacerbated with prolonged standing such as when she is washing dishes.  She denies significant distal radiculopathy into the right lower extremities as described at our last clinic visit.  Neck pain is elicited with cervical flexion, extension and lateral flexion and she does report reduced mobility.  She reports her pain began following a mechanical fall down the stairs at [facility] in September 1986.  Patient has continued gabapentin 300 mg in the morning, 300 mg in the afternoon and 600 mg in the evening.    Interval history 10/04/2022  Ms. Mccollum is a 77-year-old female with past medical history significant for depression, hyperlipidemia, hypertension, mitral valve prolapse, peripheral vascular disease, history of COVID-19, type 2 diabetes, multi joint arthritis, fibromyalgia who presents to [facility] care, previous Dr. Dutta patient.  Today patient reports return of pain in the lower back which radiates into bilateral hips and down the posterior aspect of the right lower extremity in L4-5 distribution to the dorsum of the foot.  Patient reports pain is intermittent but has increased in intensity.  Pain is described as shocking in nature and today is rated a 6/10.  Patient reports she feels as if her skin is crawling.  patient is currently taking gabapentin 300 mg up to 3 times daily when pain is severe.  Pain interferes with the patient's sleep.  Patient also reports history of fibromyalgia which limits her mobility and duration of standing and ambulation.  Patient does endorse associated weakness in the lower extremities associated with her pain.  Patient is interested in pursuing repeat intervention as she is obtained several months of significant relief with prior sacroiliac joint and greater trochanteric bursa injections.   Patient has continued physician directed physical therapy exercises at home daily.      History of Present Illness 01/16/2020: Dr. Dutta:   Kaylin Mccollum is a 74 y.o. female  who is presenting with a chief complaint of lumbar back pain. The patient began experiencing this problem insidiously, and the pain has been gradually worsening over the past 5 month(s). The pain is described as throbbing, shooting, burning and electrical and is located in the bilateral lumbar spine. Pain is intermittent and lasts hours. The pain radiates to bilateral lower extremities L4 distribudution. The patient rates her pain a 8 out of ten and interferes with activities of daily living a 7 out of ten. Pain is exacerbated by flexion of the lumbar spine, ambulation, and is improved by rest.      She also complains of right knee pain. The patient began experiencing this problem insidiously. The pain is described as cramping, aching and is located in the right knee. Pain is intermittent and lasts hours. The pain is nonradiating. The patient rates her pain a 8 out of ten and interferes with activities of daily living a 7 out of ten. Pain is exacerbated by getting up from a seated position and standing, and is improved by rest. Patient reports no prior trauma, prior arthroscopy bilaterally in 1990s.       - pertinent negatives: No fever, No chills, No weight loss, No bladder dysfunction, No bowel dysfunction, No saddle anesthesia  - pertinent positives: none        Pain Disability Index Review:   @[facility](4749:3)@    Non-Pharmacologic Treatments:  Physical Therapy/Home Exercise: yes  Ice/Heat:yes  TENS: no  Acupuncture: no  Massage: no  Chiropractic: no    Other: no      Pain Medications:  - Adjuvant Medications: Lorazepam (Ativan), Neurontin (Gabapentin), and Topical Ointment (Voltaren Gel, Steroid cream, Anti-Inflammatory Cream, Compound cream)      Pain injections:  Dr. Dumont:  -01/24/2023: Bilateral sacroiliac joint and greater trochanteric  "bursa injection  -12/13/2022: Bilateral L3-5 lumbar medial branch block  - 10/10/2022: Bilateral greater trochanteric bursa and sacroiliac joint injection      -04/20/2022: Right-sided acetabular femoral injection; Dr. Dutta  -03/01/2022: Bilateral sacroiliac joint and greater trochanteric bursa injection; Dr. Dutta  -07/08/2021: Bilateral sacroiliac joint and greater trochanteric bursa injection; Dr. Dutta  -01/05/2021: Bilateral sacroiliac joint and greater trochanteric bursa injection; Dr. Burns  -10/08/2020: Bilateral sacroiliac joint and bilateral greater trochanteric bursa injection; Dr. Dutta  -05/06/2020: Bilateral sacroiliac joint and greater trochanteric bursa injection; Dr. Dutta    Past Medical History:   Diagnosis Date    Arthritis     Cataract     COVID-19 2/25/2021    Diabetes mellitus 1995    BS didn't check 09/13/2022    Diabetes mellitus, type 2     Fibromyalgia     General anesthetics causing adverse effect in therapeutic use     bradycardia     Hypertension     Migraines     Mitral valve prolapse     Osteoarthritis     Rotator cuff tear 04/01/2015       Review of patient's allergies indicates:   Allergen Reactions    Demerol [meperidine] Other (See Comments)     Burning when adm IV  Able to tolerate IM, "Turned the veins in my hand purple."    Latex, natural rubber Other (See Comments)     "Burns my skin",  Symptoms get worse the longer she is exposed    Zocor [simvastatin] Other (See Comments)     Tightening of muscles    Statins-hmg-coa reductase inhibitors Other (See Comments)     myopathy    Sulfa (sulfonamide antibiotics)      Blurred vision       Past Surgical History:   Procedure Laterality Date    ARTHROSCOPY OF KNEE Right 3/10/2020    Procedure: ARTHROSCOPY, KNEE;  Surgeon: Les Schneider MD;  Location: [facility];  Service: Orthopedics;  Laterality: Right;    CATARACT EXTRACTION W/  INTRAOCULAR LENS IMPLANT Left 07/19/2017    CATARACT EXTRACTION W/  INTRAOCULAR LENS IMPLANT Right " 2017    CHOLECYSTECTOMY      CHONDROPLASTY OF KNEE Right 3/10/2020    Procedure: CHONDROPLASTY, KNEE;  Surgeon: Les Schneider MD;  Location: [facility] OR;  Service: Orthopedics;  Laterality: Right;  Anterior compartment     COLONOSCOPY N/A 9/25/2018    Procedure: COLONOSCOPY;  Surgeon: Bethel Carmona MD;  Location: [facility] ENDO;  Service: Endoscopy;  Laterality: N/A;    EXCISION OF MEDIAL MENISCUS OF KNEE Right 3/10/2020    Procedure: MENISCECTOMY, KNEE, MEDIAL;  Surgeon: Les Schneider MD;  Location: [facility] OR;  Service: Orthopedics;  Laterality: Right;  Partial, Medial , Lateral     EYE SURGERY      INJECTION OF ANESTHETIC AGENT AROUND MEDIAL BRANCH NERVES INNERVATING LUMBAR FACET JOINT Bilateral 12/13/2022    Procedure: Bilateral L3-5 MBB;  Surgeon: Humberto Dumont MD;  Location: [facility] PAIN MGT;  Service: Pain Management;  Laterality: Bilateral;    INJECTION OF ANESTHETIC AGENT AROUND NERVE Right 8/8/2019    Procedure: Right Genicular nerve block with local;  Surgeon: Manpreet Dutta MD;  Location: [facility] PAIN MGT;  Service: Pain Management;  Laterality: Right;    INJECTION OF ANESTHETIC AGENT INTO SACROILIAC JOINT Bilateral 5/6/2020    Procedure: Bilateral Sacroiliac Joint Injection;  Surgeon: Manpreet Dutta MD;  Location: [facility] PAIN MGT;  Service: Pain Management;  Laterality: Bilateral;    INJECTION OF ANESTHETIC AGENT INTO SACROILIAC JOINT Bilateral 10/8/2020    Procedure: Bilateral SI and Bilateral GTB with RN IV sedation;  Surgeon: Manpreet Dutta MD;  Location: [facility] PAIN MGT;  Service: Pain Management;  Laterality: Bilateral;    INJECTION OF ANESTHETIC AGENT INTO SACROILIAC JOINT Bilateral 1/5/2021    Procedure: Bilateral BLOCK, SACROILIAC JOINT and Bilateral GTB witn RN IV sedation;  Surgeon: Daniel Burns MD;  Location: [facility] PAIN MGT;  Service: Pain Management;  Laterality: Bilateral;    INJECTION OF ANESTHETIC AGENT INTO SACROILIAC JOINT Bilateral 7/8/2021    Procedure: Bilateral BLOCK, SACROILIAC JOINT  bilateral GTB RN IV sedation;  Surgeon: Manpreet Dutta MD;  Location: [facility] PAIN [facility]GT;  Service: Pain Management;  Laterality: Bilateral;    INJECTION OF ANESTHETIC AGENT INTO SACROILIAC JOINT Bilateral 3/1/2022    Procedure: Bilateral BLOCK, SACROILIAC JOINT and Bilateral GTB RN IV sedation;  Surgeon: Manpreet Dutta MD;  Location: [facility]IN [facility]GT;  Service: Pain Management;  Laterality: Bilateral;    INJECTION OF ANESTHETIC AGENT INTO SACROILIAC JOINT Bilateral 10/10/2022    Procedure: Bilateral Sacroiliac Joint Injection;  Surgeon: Humberto Dumont MD;  Location: [facility]IN [facility];  Service: Pain Management;  Laterality: Bilateral;    INJECTION OF ANESTHETIC AGENT INTO SACROILIAC JOINT Bilateral 1/24/2023    Procedure: Bilateral Sacroiliac Joint Injection;  Surgeon: Humberto Dumont MD;  Location: [facility];  Service: Pain Management;  Laterality: Bilateral;    INJECTION OF JOINT Bilateral 9/5/2019    Procedure: Bilateral GT bursa injection;  Surgeon: Manpreet Dutta MD;  Location: [facility];  Service: Pain Management;  Laterality: Bilateral;    INJECTION OF JOINT Bilateral 5/6/2020    Procedure: Bilateral GT bursa injection;  Surgeon: Manpreet Dutta MD;  Location: [facility];  Service: Pain Management;  Laterality: Bilateral;    INJECTION OF JOINT Right 4/28/2022    Procedure: Injection, Joint Right Hip Injection RN IV sedation;  Surgeon: Manpreet Dutta MD;  Location: [facility];  Service: Pain Management;  Laterality: Right;    INJECTION OF JOINT Bilateral 10/10/2022    Procedure: Bilateral GT bursa injection with RN IV sedation;  Surgeon: Humberto Dumont MD;  Location: [facility];  Service: Pain Management;  Laterality: Bilateral;    INJECTION OF JOINT Bilateral 1/24/2023    Procedure: Bilateral GT bursa injection;  Surgeon: Humberto Dumont MD;  Location: [facility]IN [facility];  Service: Pain Management;  Laterality: Bilateral;    KNEE ARTHROSCOPY Bilateral     PARS PLANA VITRECTOMY W/ REPAIR OF MACULAR HOLE Left 02/22/2017     RADIOFREQUENCY THERMOCOAGULATION Left 7/11/2019    Procedure: Right SIJ RFA;  Surgeon: Manpreet Dutta MD;  Location: [facility];  Service: Pain Management;  Laterality: Left;    RADIOFREQUENCY THERMOCOAGULATION Right 7/25/2019    Procedure: Right SIJ RFA;  Surgeon: Manpreet Dutta MD;  Location: [facility] PAIN [facility]GT;  Service: Pain Management;  Laterality: Right;    SHOULDER ARTHROSCOPY Right 06/04/15     Current Outpatient Medications on File Prior to Visit   Medication Sig Dispense Refill    biotin 1 mg tablet Take 1,000 mcg by mouth every morning.       celecoxib (CELEBREX) 200 MG capsule TAKE 1 CAPSULE(200 MG) BY MOUTH TWICE DAILY WITH FOOD 120 capsule 1    clotrimazole-betamethasone 1-0.05% (LOTRISONE) cream Apply topically 2 (two) times daily. 45 g 2    diclofenac sodium (VOLTAREN) 1 % Gel Apply 2 g topically 4 (four) times daily. 1 each 6    FLUoxetine 40 MG capsule Take 1 capsule (40 mg total) by mouth once daily. 90 capsule 1    fluticasone (FLONASE) 50 mcg/actuation nasal spray 2 sprays by Each Nare route once daily. (Patient taking differently: 2 sprays by Each Nostril route nightly as needed.) 1 Bottle 0    furosemide (LASIX) 20 MG tablet Take 1 tablet (20 mg total) by mouth 2 (two) times daily. 60 tablet 11    gabapentin (NEURONTIN) 300 MG capsule Take 1 capsule (300 mg total) by mouth 4 (four) times daily. 120 capsule 1    INVOKANA 100 mg Tab tablet TAKE 1 TABLET(100 MG) BY MOUTH EVERY DAY 90 tablet 0    losartan (COZAAR) 25 MG tablet TAKE 1 TABLET(25 MG) BY MOUTH EVERY DAY 90 tablet 2    metFORMIN (GLUCOPHAGE) 1000 MG tablet Take 1 tablet (1,000 mg total) by mouth 2 (two) times daily with meals. 180 tablet 3    milnacipran (SAVELLA) 50 mg Tab Take 1 tablet (50 mg total) by mouth 2 (two) times daily. 60 tablet 2    potassium chloride SA (K-DUR,KLOR-CON M) 10 MEQ tablet TAKE 1 TABLET(10 MEQ) BY MOUTH EVERY DAY 20 tablet 5    pulse oximeter (PULSE OXIMETER) device by Apply Externally route 2 (two) times a day.  Use twice daily at 8 AM and 3 PM and record the value in [employer] as directed. 1 each 0    verapamiL (CALAN) 120 MG tablet Take 1 tablet (120 mg total) by mouth 3 (three) times daily. 180 tablet 5    ezetimibe (ZETIA) 10 mg tablet Take 1 tablet (10 mg total) by mouth once daily. 90 tablet 1    LORazepam (ATIVAN) 1 MG tablet TK 3 TS PO 1 HOUR PRIOR TO APPOINTMENT      omeprazole (PRILOSEC) 20 MG capsule Take 1 capsule (20 mg total) by mouth daily as needed (w/ NSAID). 30 capsule 5    triamcinolone acetonide 0.025% (KENALOG) 0.025 % Oint Apply topically 2 (two) times daily. for 10 days (Patient taking differently: Apply topically 2 (two) times daily as needed. ) 15 g 2     No current facility-administered medications on file prior to visit.               GENERAL:  No weight loss, malaise or fevers.  HEENT:   No recent changes in vision or hearing  NECK:  Negative for lumps, no difficulty with swallowing.  RESPIRATORY:  Negative for cough, wheezing or shortness of breath, patient denies any recent URI.  CARDIOVASCULAR:  Negative for chest pain or palpitations.  GI:  Negative for abdominal discomfort, blood in stools or black stools or change in bowel habits.  MUSCULOSKELETAL:  See HPI.  SKIN:  Negative for lesions, rash, and itching.  PSYCH:  No mood disorder or recent psychosocial stressors.   HEMATOLOGY/LYMPHOLOGY:  Negative for prolonged bleeding, bruising easily or swollen nodes.    NEURO:   No history of syncope, paralysis, seizures or tremors.  All other reviewed and negative other than HPI.    Imaging / Labs / Studies (reviewed on 3/9/2023):    MRI Lumbar Spine 2/16/23  FINDINGS:  Grade 1 degenerative spondylolisthesis at L4-L5.  Vertebral body height is normal.  Marrow signal is within normal limits. The conus medullaris terminates at the level of L1-L2.  No abnormal signal within the conus. Intervertebral disc levels are as follows:     T12-L1 disc: Normal disc height with anterior osteophytes and mild  "degenerative facet hypertrophy.  No spinal or foraminal stenosis.  The dural canal measures 16 mm.     L1-L2 disc : Disc space height loss with chronic Schmorl's nodes.  Posterior disc bulge.  Anterior osteophytes.  Minor facet arthropathy bilaterally.  The dural canal measures 13 mm.  No foraminal stenosis.     L2-L3 disc: Circumferential disc bulge with tiny chronic Schmorl's nodes.  Anterior osteophytes.  Mild degenerative facet hypertrophy.  The dural canal measures 12 mm.  No foraminal stenosis.     L3-L4 disc: Disc space height loss with a circumferential disc bulge and anterior osteophytes.  Mild degenerative facet hypertrophy with moderate buckling of the ligamentum flavum.  The dural canal measures 11 mm.  No significant foraminal stenosis.     L4-L5 disc: Grade 1 spondylolisthesis with severe degenerative facet hypertrophy bilaterally.  Buckling of the ligamentum flavum.  The dural canal measures 7 mm AP.  Disc encroaches into the floors of the exit foramina, right greater than left.  There is mild right foraminal stenosis.     L5-S1 disc: Severe disc space height loss with osteophytes at the margins and encroach into the exit foramina.  Mild degenerative facet hypertrophy and buckling of the ligamentum flavum.  The dural canal measures 11 mm.  Mild foraminal stenosis.      Physical Exam:  Last clinic visit:  Vitals:    03/09/23 1308   BP: 114/75   Pulse: 93   Resp: 16   Weight: 97.7 kg (215 lb 6.2 oz)   Height: 5' 10" (1.778 m)   PainSc:   3        Body mass index is 30.91 kg/m².   (reviewed on 3/9/2023)    General: alert and oriented, in no apparent distress.  Gait: normal gait.  Skin: no rashes, no discoloration, no obvious lesions  HEENT: normocephalic, atraumatic. Pupils equal and round.  Cardiovascular: no significant peripheral edema present.  Respiratory: without use of accessory muscles of respiration.    Musculoskeletal - Lumbar Spine:  - ROM fairly preserved   - Pain on flexion of lumbar spine: " Absent   - Pain on extension of lumbar spine: Absent         - Lumbar facet loading: Absent   - TTP over the lumbar facet joints: Absent  - TTP over the lumbar paraspinals: Present   - TTP over the SI joints: Present  - TTP over GT bursa: Present, minimal   - Straight Leg Raise: Negative  - CHERYLE: Present    Right Knee:  - TTP: Present over medial/ lateral joint line  - Pain with extension: Present  - Pain with flexion: Present  - Crepitus: Present     Neuro - Lower Extremities:  - BLE Strength: R/L: HF: 5/5, HE: 5/5, KF: 5/5; KE: 5/5; FE: 5/5; FF: 5/5  - Extremity Reflexes: Brisk and symmetric throughout  - Sensory: Sensation to light touch intact bilaterally      Psych:  Mood and affect is appropriate    Assessment:  Kaylin Mccollum is a 78 y.o. year old female who is presenting with       ICD-10-CM ICD-9-CM    1. Lumbar facet arthropathy  M47.816 721.3       2. Degeneration of lumbar intervertebral disc  M51.36 722.52             Plan:  1. Interventional:  Today patient reports only discogenic back pain.  We have discussed considering via disc allograft supplementation for discogenic back pain in the future    Anticoagulation:  None, no anticoagulation      2. Pharmacologic:     -  We have discussed continuing gabapentin.  We have reviewed potential side effects of this medication including daytime somnolence, weight gain and peripheral edema  Gabapentin 300 mg in the morning, 300 mg in the afternoon and 600 mg in the evening    -Continue Savella 50 mg twice as this tremendously helps with symptoms of fibromyalgia.  We have discussed that Savella is FDA approved and has demonstrated improvement in multiple measures including pain, global impression of change in physical function.  We have discussed that the most frequent side effects of this medication can include nausea, constipation, vomiting, dry mouth, flushing or tachycardia.    Refill x 2    3. Rehabilitative:   -We discussed continuing physical therapy to  help manage the patient/s painful condition. The patient was counseled that muscle strengthening will improve the long term prognosis in regards to pain and may also help increase range of motion and mobility. They were told that one of the goals of physical therapy is that they learn how to do the exercises so that they can do them independently at home daily upon completion.     4. Diagnostic:  Reviewed relevant imaging and answered patient's questions.      5. Consult:   -Dr. Schneider for knee and shoulder pain PRN  -Rheumatology: Fibromyalgia    6. Follow up: 2mo     The above plan and management options were discussed at length with patient. Patient is in agreement with the above and verbalized understanding.    - I discussed the goals of interventional chronic pain management with the patient on today's visit. We discussed a multimodal and systematic approach to pain.  This includes diagnostic and therapeutic injections, adjuvant pharmacologic treatment, physical therapy, and at times psychiatry.  I emphasized the importance of regular exercise, core strengthening and stretching, diet and weight loss as a cornerstone of long-term pain management.    - This condition does not require this patient to take time off of work, and the primary goal of our Pain Management services is to improve the patient's functional capacity.  - Patient Questions: Answered all of the patient's questions regarding diagnoses, therapy, treatment and next steps    Humberto Dumont MD

## 2023-03-09 ENCOUNTER — OFFICE VISIT (OUTPATIENT)
Dept: PAIN MEDICINE | Facility: CLINIC | Age: 78
End: 2023-03-09
Payer: MEDICARE

## 2023-03-09 VITALS
BODY MASS INDEX: 30.83 KG/M2 | HEIGHT: 70 IN | DIASTOLIC BLOOD PRESSURE: 75 MMHG | WEIGHT: 215.38 LBS | HEART RATE: 93 BPM | SYSTOLIC BLOOD PRESSURE: 114 MMHG | RESPIRATION RATE: 16 BRPM

## 2023-03-09 DIAGNOSIS — M47.816 LUMBAR FACET ARTHROPATHY: Primary | ICD-10-CM

## 2023-03-09 DIAGNOSIS — M51.36 DEGENERATION OF LUMBAR INTERVERTEBRAL DISC: ICD-10-CM

## 2023-03-09 DIAGNOSIS — M54.16 LUMBAR RADICULOPATHY, CHRONIC: ICD-10-CM

## 2023-03-09 PROCEDURE — 99999 PR PBB SHADOW E&M-EST. PATIENT-LVL V: CPT | Mod: PBBFAC,,, | Performed by: ANESTHESIOLOGY

## 2023-03-09 PROCEDURE — 99214 OFFICE O/P EST MOD 30 MIN: CPT | Mod: S$PBB,,, | Performed by: ANESTHESIOLOGY

## 2023-03-09 PROCEDURE — 99215 OFFICE O/P EST HI 40 MIN: CPT | Mod: PBBFAC | Performed by: ANESTHESIOLOGY

## 2023-03-09 PROCEDURE — 99999 PR PBB SHADOW E&M-EST. PATIENT-LVL V: ICD-10-PCS | Mod: PBBFAC,,, | Performed by: ANESTHESIOLOGY

## 2023-03-09 PROCEDURE — 99214 PR OFFICE/OUTPT VISIT, EST, LEVL IV, 30-39 MIN: ICD-10-PCS | Mod: S$PBB,,, | Performed by: ANESTHESIOLOGY

## 2023-03-16 ENCOUNTER — TELEPHONE (OUTPATIENT)
Dept: ADMINISTRATIVE | Facility: HOSPITAL | Age: 78
End: 2023-03-16
Payer: MEDICARE

## 2023-03-16 NOTE — TELEPHONE ENCOUNTER
Pt states she saw Dr Dumont and was suppose to be scheduled for a 2 month follow up and she has not heard anything back and she would like someone to give her a call asap.

## 2023-03-16 NOTE — TELEPHONE ENCOUNTER
Attempt to call patient to confirm appointment. Patent did not answer,  voice mail is not set up.     Sylvain Srivastava  Medical Assistant

## 2023-03-20 ENCOUNTER — TELEPHONE (OUTPATIENT)
Dept: PAIN MEDICINE | Facility: CLINIC | Age: 78
End: 2023-03-20
Payer: MEDICARE

## 2023-03-20 RX ORDER — GABAPENTIN 300 MG/1
300 CAPSULE ORAL 4 TIMES DAILY
Qty: 360 CAPSULE | Refills: 0 | Status: SHIPPED | OUTPATIENT
Start: 2023-03-20 | End: 2023-09-26

## 2023-03-20 NOTE — TELEPHONE ENCOUNTER
Call to inform pt that 90 day supply of Gabapentin was sent to pharmacy per Dr Dumont.  .[facility]

## 2023-03-20 NOTE — TELEPHONE ENCOUNTER
Good Morning,     Pt is requesting for a refill of: Gabapentin 300 mg  Last filed:11/2/23  Last encounter:3/9/23  Up coming apt: 5/18/23  [facility]: [facility]    .Josette DOWNING

## 2023-04-06 ENCOUNTER — OFFICE VISIT (OUTPATIENT)
Dept: PAIN MEDICINE | Facility: CLINIC | Age: 78
End: 2023-04-06
Payer: MEDICARE

## 2023-04-06 VITALS
BODY MASS INDEX: 31.25 KG/M2 | WEIGHT: 218.25 LBS | RESPIRATION RATE: 17 BRPM | HEIGHT: 70 IN | SYSTOLIC BLOOD PRESSURE: 121 MMHG | HEART RATE: 108 BPM | DIASTOLIC BLOOD PRESSURE: 72 MMHG

## 2023-04-06 DIAGNOSIS — M51.36 LUMBAR DEGENERATIVE DISC DISEASE: ICD-10-CM

## 2023-04-06 DIAGNOSIS — M51.36 DEGENERATION OF LUMBAR INTERVERTEBRAL DISC: Primary | ICD-10-CM

## 2023-04-06 DIAGNOSIS — M79.7 FIBROMYALGIA: ICD-10-CM

## 2023-04-06 DIAGNOSIS — I49.3 PVC (PREMATURE VENTRICULAR CONTRACTION): Primary | ICD-10-CM

## 2023-04-06 DIAGNOSIS — I34.1 MVP (MITRAL VALVE PROLAPSE): ICD-10-CM

## 2023-04-06 PROCEDURE — 99214 OFFICE O/P EST MOD 30 MIN: CPT | Mod: S$PBB,,, | Performed by: ANESTHESIOLOGY

## 2023-04-06 PROCEDURE — 99999 PR PBB SHADOW E&M-EST. PATIENT-LVL IV: ICD-10-PCS | Mod: PBBFAC,,, | Performed by: ANESTHESIOLOGY

## 2023-04-06 PROCEDURE — 99999 PR PBB SHADOW E&M-EST. PATIENT-LVL IV: CPT | Mod: PBBFAC,,, | Performed by: ANESTHESIOLOGY

## 2023-04-06 PROCEDURE — 99214 OFFICE O/P EST MOD 30 MIN: CPT | Mod: PBBFAC | Performed by: ANESTHESIOLOGY

## 2023-04-06 PROCEDURE — 99214 PR OFFICE/OUTPT VISIT, EST, LEVL IV, 30-39 MIN: ICD-10-PCS | Mod: S$PBB,,, | Performed by: ANESTHESIOLOGY

## 2023-04-06 NOTE — H&P (VIEW-ONLY)
Established Patient Interventional Pain Clinic Visit    Chief Pain Complaint:  Back pain  Right knee pain      Interval history 04/06/2023  Patient presents for follow-up of lower back pain.  She continues to reports superior relief in fibromyalgia symptoms on Savella medication.  Patient has brought in denial paperwork from her insurance reporting limitations on dosage and quantity allowed.  Patient reports prior to starting this medication she felt that she did not have a life.  now she reports significant improvement in pain as well as chronic fatigue associated with fibromyalgia.  Today she again reports pain in the lower back which is worse with lumbar flexion.  Patient reports she is unable to perform activities of daily living such as grocery shopping or prolonged standing or ambulation secondary to her discogenic lower back pain.  Today patient denies more distal radiculopathy into the lower extremities or feet or lower extremity weakness.  Patient is interested in discussing intervention.  Of note patient has failed to have improvement in his lower back pain with prior lumbar medial branch block, sacroiliac joint injections.    Interval Hx: 3/9/23  Patient presents for one-month follow-up.  Today she reports she is significantly better since our last clinic visit.  At that time patient had slipped in a low off and injured her lower back and right leg.  Today she reports this pain is significantly improved and pain is intermittent and today is rated a 3/10.  Today patient reports pain is isolated to the midline lower lumbar spine.  Pain is exacerbated with lumbar flexion such as when she is picking up close.  Fibromyalgia Pain has been significantly improved with the initiation of Savella medication.  Patient has reached a titration of 50 mg twice daily.  Patient recently refilled this medication.  She denies any side effects from this medication.  Today she denies any significant lower extremity weakness,  bowel or bladder incontinence or saddle anesthesia      Interval history 02/07/2023  Patient presents status post bilateral sacroiliac joint and greater trochanteric bursa injection 01/24/2023 and bilateral L3-5 lumbar medial branch block 12/13/2022.  Patient had recent mechanical fall confounding benefit from recent injections.  Today she reports she was reaching over a mattress cover to reach a stack of bibles and slid into a slint.  Patient reports she was behind and tall wall in a took 20-30 minutes for her to stand.  Patient also reports exacerbation of fibromyalgia pain.  Since her fall patient reports pain which radiates into the buttock and down the posterior aspect of the right lower extremity to the dorsum of the foot in L4-S1 distribution.  Patient has continued gabapentin 300 mg twice daily, Celebrex in the evenings as well as Tylenol 1000 mg twice daily.    Interval history 11/29/2022    Patient presents status post bilateral sacroiliac joint greater trochanteric bursa 10/10/2022.  Patient reports 90% relief overlying bilateral sacroiliac joints and greater trochanteric bursa following her injection.  Today she reports primarily lumbar axial back pain as well as significant neck pain.  Lower back pain is exacerbated with prolonged standing such as when she is washing dishes.  She denies significant distal radiculopathy into the right lower extremities as described at our last clinic visit.  Neck pain is elicited with cervical flexion, extension and lateral flexion and she does report reduced mobility.  She reports her pain began following a mechanical fall down the stairs at [facility] in September 1986.  Patient has continued gabapentin 300 mg in the morning, 300 mg in the afternoon and 600 mg in the evening.    Interval history 10/04/2022  Ms. Mccollum is a 77-year-old female with past medical history significant for depression, hyperlipidemia, hypertension, mitral valve prolapse, peripheral vascular disease,  history of COVID-19, type 2 diabetes, multi joint arthritis, fibromyalgia who presents to [facility], previous Dr. Dutta patient.  Today patient reports return of pain in the lower back which radiates into bilateral hips and down the posterior aspect of the right lower extremity in L4-5 distribution to the dorsum of the foot.  Patient reports pain is intermittent but has increased in intensity.  Pain is described as shocking in nature and today is rated a 6/10.  Patient reports she feels as if her skin is crawling.  patient is currently taking gabapentin 300 mg up to 3 times daily when pain is severe.  Pain interferes with the patient's sleep.  Patient also reports history of fibromyalgia which limits her mobility and duration of standing and ambulation.  Patient does endorse associated weakness in the lower extremities associated with her pain.  Patient is interested in pursuing repeat intervention as she is obtained several months of significant relief with prior sacroiliac joint and greater trochanteric bursa injections.  Patient has continued physician directed physical therapy exercises at home daily.      History of Present Illness 01/16/2020: Dr. Dutta:   Kaylin Mccollum is a 74 y.o. female  who is presenting with a chief complaint of lumbar back pain. The patient began experiencing this problem insidiously, and the pain has been gradually worsening over the past 5 month(s). The pain is described as throbbing, shooting, burning and electrical and is located in the bilateral lumbar spine. Pain is intermittent and lasts hours. The pain radiates to bilateral lower extremities L4 distribudution. The patient rates her pain a 8 out of ten and interferes with activities of daily living a 7 out of ten. Pain is exacerbated by flexion of the lumbar spine, ambulation, and is improved by rest.      She also complains of right knee pain. The patient began experiencing this problem insidiously. The pain is described  as cramping, aching and is located in the right knee. Pain is intermittent and lasts hours. The pain is nonradiating. The patient rates her pain a 8 out of ten and interferes with activities of daily living a 7 out of ten. Pain is exacerbated by getting up from a seated position and standing, and is improved by rest. Patient reports no prior trauma, prior arthroscopy bilaterally in 1990s.       - pertinent negatives: No fever, No chills, No weight loss, No bladder dysfunction, No bowel dysfunction, No saddle anesthesia  - pertinent positives: none        Pain Disability Index Review:   @[facility](4749:3)@    Non-Pharmacologic Treatments:  Physical Therapy/Home Exercise: yes  Ice/Heat:yes  TENS: no  Acupuncture: no  Massage: no  Chiropractic: no    Other: no      Pain Medications:  - Adjuvant Medications: Lorazepam (Ativan), Neurontin (Gabapentin), and Topical Ointment (Voltaren Gel, Steroid cream, Anti-Inflammatory Cream, Compound cream)      Pain injections:  Dr. Dumont:  -01/24/2023: Bilateral sacroiliac joint and greater trochanteric bursa injection  -12/13/2022: Bilateral L3-5 lumbar medial branch block  - 10/10/2022: Bilateral greater trochanteric bursa and sacroiliac joint injection      -04/20/2022: Right-sided acetabular femoral injection; Dr. Dutta  -03/01/2022: Bilateral sacroiliac joint and greater trochanteric bursa injection; Dr. Dutta  -07/08/2021: Bilateral sacroiliac joint and greater trochanteric bursa injection; Dr. Dutta  -01/05/2021: Bilateral sacroiliac joint and greater trochanteric bursa injection; Dr. Burns  -10/08/2020: Bilateral sacroiliac joint and bilateral greater trochanteric bursa injection; Dr. Dutta  -05/06/2020: Bilateral sacroiliac joint and greater trochanteric bursa injection; Dr. Dutta    Past Medical History:   Diagnosis Date    Arthritis     Cataract     COVID-19 2/25/2021    Diabetes mellitus 1995    BS didn't check 09/13/2022    Diabetes mellitus, type 2     Fibromyalgia      "General anesthetics causing adverse effect in therapeutic use     bradycardia     Hypertension     Migraines     Mitral valve prolapse     Osteoarthritis     Rotator cuff tear 04/01/2015       Review of patient's allergies indicates:   Allergen Reactions    Demerol [meperidine] Other (See Comments)     Burning when adm IV  Able to tolerate IM, "Turned the veins in my hand purple."    Latex, natural rubber Other (See Comments)     "Burns my skin",  Symptoms get worse the longer she is exposed    Zocor [simvastatin] Other (See Comments)     Tightening of muscles    Statins-hmg-coa reductase inhibitors Other (See Comments)     myopathy    Sulfa (sulfonamide antibiotics)      Blurred vision       Past Surgical History:   Procedure Laterality Date    ARTHROSCOPY OF KNEE Right 3/10/2020    Procedure: ARTHROSCOPY, KNEE;  Surgeon: Les Schneider MD;  Location: [facility] [place] OR;  Service: Orthopedics;  Laterality: Right;    CATARACT EXTRACTION W/  INTRAOCULAR LENS IMPLANT Left 07/19/2017    CATARACT EXTRACTION W/  INTRAOCULAR LENS IMPLANT Right 2017    CHOLECYSTECTOMY      CHONDROPLASTY OF KNEE Right 3/10/2020    Procedure: CHONDROPLASTY, KNEE;  Surgeon: Les Schneider MD;  Location: [facility] [place] OR;  Service: Orthopedics;  Laterality: Right;  Anterior compartment     COLONOSCOPY N/A 9/25/2018    Procedure: COLONOSCOPY;  Surgeon: Bethel Carmona MD;  Location: [facility] [place] ENDO;  Service: Endoscopy;  Laterality: N/A;    EXCISION OF MEDIAL MENISCUS OF KNEE Right 3/10/2020    Procedure: MENISCECTOMY, KNEE, MEDIAL;  Surgeon: Les Schneider MD;  Location: [facility] [place] OR;  Service: Orthopedics;  Laterality: Right;  Partial, Medial , Lateral     EYE SURGERY      INJECTION OF ANESTHETIC AGENT AROUND MEDIAL BRANCH NERVES INNERVATING LUMBAR FACET JOINT Bilateral 12/13/2022    Procedure: Bilateral L3-5 MBB;  Surgeon: Humberto Dumont MD;  Location: [facility] PAIN MGT;  Service: Pain Management;  Laterality: Bilateral;    INJECTION OF ANESTHETIC AGENT " AROUND NERVE Right 8/8/2019    Procedure: Right Genicular nerve block with local;  Surgeon: Manpreet Dutta MD;  Location: [facility] PAIN MGT;  Service: Pain Management;  Laterality: Right;    INJECTION OF ANESTHETIC AGENT INTO SACROILIAC JOINT Bilateral 5/6/2020    Procedure: Bilateral Sacroiliac Joint Injection;  Surgeon: Manpreet Dutta MD;  Location: [facility] PAIN MGT;  Service: Pain Management;  Laterality: Bilateral;    INJECTION OF ANESTHETIC AGENT INTO SACROILIAC JOINT Bilateral 10/8/2020    Procedure: Bilateral SI and Bilateral GTB with RN IV sedation;  Surgeon: Manpreet Dutta MD;  Location: [facility] PAIN MGT;  Service: Pain Management;  Laterality: Bilateral;    INJECTION OF ANESTHETIC AGENT INTO SACROILIAC JOINT Bilateral 1/5/2021    Procedure: Bilateral BLOCK, SACROILIAC JOINT and Bilateral GTB witn RN IV sedation;  Surgeon: Daniel Burns MD;  Location: [facility] PAIN MGT;  Service: Pain Management;  Laterality: Bilateral;    INJECTION OF ANESTHETIC AGENT INTO SACROILIAC JOINT Bilateral 7/8/2021    Procedure: Bilateral BLOCK, SACROILIAC JOINT bilateral GTB RN IV sedation;  Surgeon: Manpreet Dutta MD;  Location: [facility] PAIN MGT;  Service: Pain Management;  Laterality: Bilateral;    INJECTION OF ANESTHETIC AGENT INTO SACROILIAC JOINT Bilateral 3/1/2022    Procedure: Bilateral BLOCK, SACROILIAC JOINT and Bilateral GTB RN IV sedation;  Surgeon: Manpreet Dutta MD;  Location: [facility] PAIN MGT;  Service: Pain Management;  Laterality: Bilateral;    INJECTION OF ANESTHETIC AGENT INTO SACROILIAC JOINT Bilateral 10/10/2022    Procedure: Bilateral Sacroiliac Joint Injection;  Surgeon: Humberto Dumont MD;  Location: [facility] PAIN MGT;  Service: Pain Management;  Laterality: Bilateral;    INJECTION OF ANESTHETIC AGENT INTO SACROILIAC JOINT Bilateral 1/24/2023    Procedure: Bilateral Sacroiliac Joint Injection;  Surgeon: Humberto Dumont MD;  Location: [facility] PAIN MGT;  Service: Pain Management;  Laterality: Bilateral;    INJECTION OF JOINT Bilateral  9/5/2019    Procedure: Bilateral GT bursa injection;  Surgeon: Manpreet Dutta MD;  Location: [facility] PAIN MGT;  Service: Pain Management;  Laterality: Bilateral;    INJECTION OF JOINT Bilateral 5/6/2020    Procedure: Bilateral GT bursa injection;  Surgeon: Manpreet Dutta MD;  Location: [facility] PAIN MGT;  Service: Pain Management;  Laterality: Bilateral;    INJECTION OF JOINT Right 4/28/2022    Procedure: Injection, Joint Right Hip Injection RN IV sedation;  Surgeon: Manpreet Dutta MD;  Location: [facility] PAIN MGT;  Service: Pain Management;  Laterality: Right;    INJECTION OF JOINT Bilateral 10/10/2022    Procedure: Bilateral GT bursa injection with RN IV sedation;  Surgeon: Humberto Dumont MD;  Location: [facility] PAIN MGT;  Service: Pain Management;  Laterality: Bilateral;    INJECTION OF JOINT Bilateral 1/24/2023    Procedure: Bilateral GT bursa injection;  Surgeon: Humberto Dumont MD;  Location: [facility] PAIN MGT;  Service: Pain Management;  Laterality: Bilateral;    KNEE ARTHROSCOPY Bilateral     PARS PLANA VITRECTOMY W/ REPAIR OF MACULAR HOLE Left 02/22/2017    RADIOFREQUENCY THERMOCOAGULATION Left 7/11/2019    Procedure: Right SIJ RFA;  Surgeon: Manpreet Dutta MD;  Location: [facility] PAIN MGT;  Service: Pain Management;  Laterality: Left;    RADIOFREQUENCY THERMOCOAGULATION Right 7/25/2019    Procedure: Right SIJ RFA;  Surgeon: Manpreet Dutta MD;  Location: [facility] PAIN MGT;  Service: Pain Management;  Laterality: Right;    SHOULDER ARTHROSCOPY Right 06/04/15     Current Outpatient Medications on File Prior to Visit   Medication Sig Dispense Refill    biotin 1 mg tablet Take 1,000 mcg by mouth every morning.       celecoxib (CELEBREX) 200 MG capsule TAKE 1 CAPSULE(200 MG) BY MOUTH TWICE DAILY WITH FOOD 120 capsule 1    clotrimazole-betamethasone 1-0.05% (LOTRISONE) cream Apply topically 2 (two) times daily. 45 g 2    diclofenac sodium (VOLTAREN) 1 % Gel Apply 2 g topically 4 (four) times daily. 1 each 6    FLUoxetine 40 MG capsule Take 1 capsule  (40 mg total) by mouth once daily. 90 capsule 1    fluticasone (FLONASE) 50 mcg/actuation nasal spray 2 sprays by Each Nare route once daily. (Patient taking differently: 2 sprays by Each Nostril route nightly as needed.) 1 Bottle 0    furosemide (LASIX) 20 MG tablet Take 1 tablet (20 mg total) by mouth 2 (two) times daily. 60 tablet 11    gabapentin (NEURONTIN) 300 MG capsule Take 1 capsule (300 mg total) by mouth 4 (four) times daily. 360 capsule 0    INVOKANA 100 mg Tab tablet TAKE 1 TABLET(100 MG) BY MOUTH EVERY DAY 90 tablet 0    LORazepam (ATIVAN) 1 MG tablet TK 3 TS PO 1 HOUR PRIOR TO APPOINTMENT      losartan (COZAAR) 25 MG tablet TAKE 1 TABLET(25 MG) BY MOUTH EVERY DAY 90 tablet 2    metFORMIN (GLUCOPHAGE) 1000 MG tablet Take 1 tablet (1,000 mg total) by mouth 2 (two) times daily with meals. 180 tablet 1    potassium chloride SA (K-DUR,KLOR-CON M) 10 MEQ tablet TAKE 1 TABLET(10 MEQ) BY MOUTH EVERY DAY 20 tablet 5    pulse oximeter (PULSE OXIMETER) device by Apply Externally route 2 (two) times a day. Use twice daily at 8 AM and 3 PM and record the value in [employer] as directed. 1 each 0    verapamiL (CALAN) 120 MG tablet Take 1 tablet (120 mg total) by mouth 3 (three) times daily. 180 tablet 5    [DISCONTINUED] milnacipran (SAVELLA) 50 mg Tab Take 1 tablet (50 mg total) by mouth 2 (two) times daily. 60 tablet 2    ezetimibe (ZETIA) 10 mg tablet Take 1 tablet (10 mg total) by mouth once daily. 90 tablet 1    omeprazole (PRILOSEC) 20 MG capsule Take 1 capsule (20 mg total) by mouth daily as needed (w/ NSAID). 30 capsule 5    triamcinolone acetonide 0.025% (KENALOG) 0.025 % Oint Apply topically 2 (two) times daily. for 10 days (Patient taking differently: Apply topically 2 (two) times daily as needed. ) 15 g 2     No current facility-administered medications on file prior to visit.               GENERAL:  No weight loss, malaise or fevers.  HEENT:   No recent changes in vision or hearing  NECK:  Negative for  lumps, no difficulty with swallowing.  RESPIRATORY:  Negative for cough, wheezing or shortness of breath, patient denies any recent URI.  CARDIOVASCULAR:  Negative for chest pain or palpitations.  GI:  Negative for abdominal discomfort, blood in stools or black stools or change in bowel habits.  MUSCULOSKELETAL:  See HPI.  SKIN:  Negative for lesions, rash, and itching.  PSYCH:  No mood disorder or recent psychosocial stressors.   HEMATOLOGY/LYMPHOLOGY:  Negative for prolonged bleeding, bruising easily or swollen nodes.    NEURO:   No history of syncope, paralysis, seizures or tremors.  All other reviewed and negative other than HPI.    Imaging / Labs / Studies (reviewed on 4/6/2023):    MRI Lumbar Spine 2/16/23  FINDINGS:  Grade 1 degenerative spondylolisthesis at L4-L5.  Vertebral body height is normal.  Marrow signal is within normal limits. The conus medullaris terminates at the level of L1-L2.  No abnormal signal within the conus. Intervertebral disc levels are as follows:     T12-L1 disc: Normal disc height with anterior osteophytes and mild degenerative facet hypertrophy.  No spinal or foraminal stenosis.  The dural canal measures 16 mm.     L1-L2 disc : Disc space height loss with chronic Schmorl's nodes.  Posterior disc bulge.  Anterior osteophytes.  Minor facet arthropathy bilaterally.  The dural canal measures 13 mm.  No foraminal stenosis.     L2-L3 disc: Circumferential disc bulge with tiny chronic Schmorl's nodes.  Anterior osteophytes.  Mild degenerative facet hypertrophy.  The dural canal measures 12 mm.  No foraminal stenosis.     L3-L4 disc: Disc space height loss with a circumferential disc bulge and anterior osteophytes.  Mild degenerative facet hypertrophy with moderate buckling of the ligamentum flavum.  The dural canal measures 11 mm.  No significant foraminal stenosis.     L4-L5 disc: Grade 1 spondylolisthesis with severe degenerative facet hypertrophy bilaterally.  Buckling of the ligamentum  "flavum.  The dural canal measures 7 mm AP.  Disc encroaches into the floors of the exit foramina, right greater than left.  There is mild right foraminal stenosis.     L5-S1 disc: Severe disc space height loss with osteophytes at the margins and encroach into the exit foramina.  Mild degenerative facet hypertrophy and buckling of the ligamentum flavum.  The dural canal measures 11 mm.  Mild foraminal stenosis.      Physical Exam:  Last clinic visit:  Vitals:    04/06/23 0950   BP: 121/72   Pulse: 108   Resp: 17   Weight: 99 kg (218 lb 4.1 oz)   Height: 5' 10" (1.778 m)   PainSc:   4          Body mass index is 31.32 kg/m².   (reviewed on 4/6/2023)    General: alert and oriented, in no apparent distress.  Gait: normal gait.  Skin: no rashes, no discoloration, no obvious lesions  HEENT: normocephalic, atraumatic. Pupils equal and round.  Cardiovascular: no significant peripheral edema present.  Respiratory: without use of accessory muscles of respiration.    Musculoskeletal - Lumbar Spine:  - ROM fairly preserved   - Pain on flexion of lumbar spine: Absent   - Pain on extension of lumbar spine: Absent         - Lumbar facet loading: Absent   - TTP over the lumbar facet joints: Absent  - TTP over the lumbar paraspinals: Present   - TTP over the SI joints: Present  - TTP over GT bursa: Present, minimal   - Straight Leg Raise: Negative  - CHERYLE: Present    Right Knee:  - TTP: Present over medial/ lateral joint line  - Pain with extension: Present  - Pain with flexion: Present  - Crepitus: Present     Neuro - Lower Extremities:  - BLE Strength: R/L: HF: 5/5, HE: 5/5, KF: 5/5; KE: 5/5; FE: 5/5; FF: 5/5  - Extremity Reflexes: Brisk and symmetric throughout  - Sensory: Sensation to light touch intact bilaterally      Psych:  Mood and affect is appropriate    Assessment:  Kaylin Mccollum is a 78 y.o. year old female who is presenting with       ICD-10-CM ICD-9-CM    1. Degeneration of lumbar intervertebral disc  M51.36 " 722.52 Case Request-RAD/Other Procedure Area: INJECTION,ALLOGRAFT,INTERVERTEBRAL DISC,1ST LEVEL: L5-S1      Case Request-RAD/Other Procedure Area: INJECTION,ALLOGRAFT,INTERVERTEBRAL DISC,2nd LEVEL: L3-4      2. Lumbar degenerative disc disease  M51.36 722.52 Case Request-RAD/Other Procedure Area: INJECTION,ALLOGRAFT,INTERVERTEBRAL DISC,1ST LEVEL: L5-S1      Case Request-RAD/Other Procedure Area: INJECTION,ALLOGRAFT,INTERVERTEBRAL DISC,2nd LEVEL: L3-4      3. Fibromyalgia  M79.7 729.1               Plan:  1. Interventional: - Patient is a candidate for Viadisc secondary to chronic discogenic low back pain secondary to multiple failed attempts of other interventions (8 weeks of physician directed physical therapy, lumbar MBB. SIJ injection) and medical management (anti-inflammatories, antispasmodics, membrane stabilizing agents). Explained the risks and benefits of the procedure in detail with the patient today in clinic along with alternative treatment options, and the patient elected to pursue the intervention at this time.    -Schedule for L5-S1 viable disc tissue allograft supplementation, and then L3-4l evel 1 week following.  LSO brace for 4 weeks post 1st injection.     Anticoagulation:  None, no anticoagulation      2. Pharmacologic:     -  We have discussed continuing gabapentin.  We have reviewed potential side effects of this medication including daytime somnolence, weight gain and peripheral edema  Gabapentin 300 mg in the morning, 300 mg in the afternoon and 600 mg in the evening    -Continue Savella 50 mg twice  daily as this tremendously helps with symptoms of fibromyalgia.  We have discussed that Savella is FDA approved and has demonstrated improvement in multiple measures including pain, global impression of change in physical function.  We have discussed that the most frequent side effects of this medication can include nausea, constipation, vomiting, dry mouth, flushing or tachycardia.    -30 day  supply given    3. Rehabilitative:   -We discussed continuing physical therapy to help manage the patient/s painful condition. The patient was counseled that muscle strengthening will improve the long term prognosis in regards to pain and may also help increase range of motion and mobility. They were told that one of the goals of physical therapy is that they learn how to do the exercises so that they can do them independently at home daily upon completion.     4. Diagnostic:  Reviewed relevant imaging and answered patient's questions.      5. Consult:   -Dr. Schneider for knee and shoulder pain PRN  -Rheumatology: Fibromyalgia    6. Follow up:  4-6 weeks post 2nd injection     The above plan and management options were discussed at length with patient. Patient is in agreement with the above and verbalized understanding.    - I discussed the goals of interventional chronic pain management with the patient on today's visit. We discussed a multimodal and systematic approach to pain.  This includes diagnostic and therapeutic injections, adjuvant pharmacologic treatment, physical therapy, and at times psychiatry.  I emphasized the importance of regular exercise, core strengthening and stretching, diet and weight loss as a cornerstone of long-term pain management.    - This condition does not require this patient to take time off of work, and the primary goal of our Pain Management services is to improve the patient's functional capacity.  - Patient Questions: Answered all of the patient's questions regarding diagnoses, therapy, treatment and next steps    Humberto Dumont MD

## 2023-04-06 NOTE — PROGRESS NOTES
Established Patient Interventional Pain Clinic Visit    Chief Pain Complaint:  Back pain  Right knee pain      Interval history 04/06/2023  Patient presents for follow-up of lower back pain.  She continues to reports superior relief in fibromyalgia symptoms on Savella medication.  Patient has brought in denial paperwork from her insurance reporting limitations on dosage and quantity allowed.  Patient reports prior to starting this medication she felt that she did not have a life.  now she reports significant improvement in pain as well as chronic fatigue associated with fibromyalgia.  Today she again reports pain in the lower back which is worse with lumbar flexion.  Patient reports she is unable to perform activities of daily living such as grocery shopping or prolonged standing or ambulation secondary to her discogenic lower back pain.  Today patient denies more distal radiculopathy into the lower extremities or feet or lower extremity weakness.  Patient is interested in discussing intervention.  Of note patient has failed to have improvement in his lower back pain with prior lumbar medial branch block, sacroiliac joint injections.    Interval Hx: 3/9/23  Patient presents for one-month follow-up.  Today she reports she is significantly better since our last clinic visit.  At that time patient had slipped in a low off and injured her lower back and right leg.  Today she reports this pain is significantly improved and pain is intermittent and today is rated a 3/10.  Today patient reports pain is isolated to the midline lower lumbar spine.  Pain is exacerbated with lumbar flexion such as when she is picking up close.  Fibromyalgia Pain has been significantly improved with the initiation of Savella medication.  Patient has reached a titration of 50 mg twice daily.  Patient recently refilled this medication.  She denies any side effects from this medication.  Today she denies any significant lower extremity weakness,  bowel or bladder incontinence or saddle anesthesia      Interval history 02/07/2023  Patient presents status post bilateral sacroiliac joint and greater trochanteric bursa injection 01/24/2023 and bilateral L3-5 lumbar medial branch block 12/13/2022.  Patient had recent mechanical fall confounding benefit from recent injections.  Today she reports she was reaching over a mattress cover to reach a stack of bibles and slid into a slint.  Patient reports she was behind and tall wall in a took 20-30 minutes for her to stand.  Patient also reports exacerbation of fibromyalgia pain.  Since her fall patient reports pain which radiates into the buttock and down the posterior aspect of the right lower extremity to the dorsum of the foot in L4-S1 distribution.  Patient has continued gabapentin 300 mg twice daily, Celebrex in the evenings as well as Tylenol 1000 mg twice daily.    Interval history 11/29/2022    Patient presents status post bilateral sacroiliac joint greater trochanteric bursa 10/10/2022.  Patient reports 90% relief overlying bilateral sacroiliac joints and greater trochanteric bursa following her injection.  Today she reports primarily lumbar axial back pain as well as significant neck pain.  Lower back pain is exacerbated with prolonged standing such as when she is washing dishes.  She denies significant distal radiculopathy into the right lower extremities as described at our last clinic visit.  Neck pain is elicited with cervical flexion, extension and lateral flexion and she does report reduced mobility.  She reports her pain began following a mechanical fall down the stairs at [facility] in September 1986.  Patient has continued gabapentin 300 mg in the morning, 300 mg in the afternoon and 600 mg in the evening.    Interval history 10/04/2022  Ms. Mccollum is a 77-year-old female with past medical history significant for depression, hyperlipidemia, hypertension, mitral valve prolapse, peripheral vascular disease,  history of COVID-19, type 2 diabetes, multi joint arthritis, fibromyalgia who presents to [facility], previous Dr. Dutta patient.  Today patient reports return of pain in the lower back which radiates into bilateral hips and down the posterior aspect of the right lower extremity in L4-5 distribution to the dorsum of the foot.  Patient reports pain is intermittent but has increased in intensity.  Pain is described as shocking in nature and today is rated a 6/10.  Patient reports she feels as if her skin is crawling.  patient is currently taking gabapentin 300 mg up to 3 times daily when pain is severe.  Pain interferes with the patient's sleep.  Patient also reports history of fibromyalgia which limits her mobility and duration of standing and ambulation.  Patient does endorse associated weakness in the lower extremities associated with her pain.  Patient is interested in pursuing repeat intervention as she is obtained several months of significant relief with prior sacroiliac joint and greater trochanteric bursa injections.  Patient has continued physician directed physical therapy exercises at home daily.      History of Present Illness 01/16/2020: Dr. Dutta:   Kaylin Mccollum is a 74 y.o. female  who is presenting with a chief complaint of lumbar back pain. The patient began experiencing this problem insidiously, and the pain has been gradually worsening over the past 5 month(s). The pain is described as throbbing, shooting, burning and electrical and is located in the bilateral lumbar spine. Pain is intermittent and lasts hours. The pain radiates to bilateral lower extremities L4 distribudution. The patient rates her pain a 8 out of ten and interferes with activities of daily living a 7 out of ten. Pain is exacerbated by flexion of the lumbar spine, ambulation, and is improved by rest.      She also complains of right knee pain. The patient began experiencing this problem insidiously. The pain is described  as cramping, aching and is located in the right knee. Pain is intermittent and lasts hours. The pain is nonradiating. The patient rates her pain a 8 out of ten and interferes with activities of daily living a 7 out of ten. Pain is exacerbated by getting up from a seated position and standing, and is improved by rest. Patient reports no prior trauma, prior arthroscopy bilaterally in 1990s.       - pertinent negatives: No fever, No chills, No weight loss, No bladder dysfunction, No bowel dysfunction, No saddle anesthesia  - pertinent positives: none        Pain Disability Index Review:   @[facility]([id number])@    Non-Pharmacologic Treatments:  Physical Therapy/Home Exercise: yes  Ice/Heat:yes  TENS: no  Acupuncture: no  Massage: no  Chiropractic: no    Other: no      Pain Medications:  - Adjuvant Medications: Lorazepam (Ativan), Neurontin (Gabapentin), and Topical Ointment (Voltaren Gel, Steroid cream, Anti-Inflammatory Cream, Compound cream)      Pain injections:  Dr. Dumont:  -01/24/2023: Bilateral sacroiliac joint and greater trochanteric bursa injection  -12/13/2022: Bilateral L3-5 lumbar medial branch block  - 10/10/2022: Bilateral greater trochanteric bursa and sacroiliac joint injection      -04/20/2022: Right-sided acetabular femoral injection; Dr. Dutta  -03/01/2022: Bilateral sacroiliac joint and greater trochanteric bursa injection; Dr. Dutta  -07/08/2021: Bilateral sacroiliac joint and greater trochanteric bursa injection; Dr. Dutta  -01/05/2021: Bilateral sacroiliac joint and greater trochanteric bursa injection; Dr. Burns  -10/08/2020: Bilateral sacroiliac joint and bilateral greater trochanteric bursa injection; Dr. Dutta  -05/06/2020: Bilateral sacroiliac joint and greater trochanteric bursa injection; Dr. Dutta    Past Medical History:   Diagnosis Date    Arthritis     Cataract     COVID-19 2/25/2021    Diabetes mellitus 1995    BS didn't check 09/13/2022    Diabetes mellitus, type 2     Fibromyalgia      "General anesthetics causing adverse effect in therapeutic use     bradycardia     Hypertension     Migraines     Mitral valve prolapse     Osteoarthritis     Rotator cuff tear 04/01/2015       Review of patient's allergies indicates:   Allergen Reactions    Demerol [meperidine] Other (See Comments)     Burning when adm IV  Able to tolerate IM, "Turned the veins in my hand purple."    Latex, natural rubber Other (See Comments)     "Burns my skin",  Symptoms get worse the longer she is exposed    Zocor [simvastatin] Other (See Comments)     Tightening of muscles    Statins-hmg-coa reductase inhibitors Other (See Comments)     myopathy    Sulfa (sulfonamide antibiotics)      Blurred vision       Past Surgical History:   Procedure Laterality Date    ARTHROSCOPY OF KNEE Right 3/10/2020    Procedure: ARTHROSCOPY, KNEE;  Surgeon: Les Schneider MD;  Location: [facility] OR;  Service: Orthopedics;  Laterality: Right;    CATARACT EXTRACTION W/  INTRAOCULAR LENS IMPLANT Left 07/19/2017    CATARACT EXTRACTION W/  INTRAOCULAR LENS IMPLANT Right 2017    CHOLECYSTECTOMY      CHONDROPLASTY OF KNEE Right 3/10/2020    Procedure: CHONDROPLASTY, KNEE;  Surgeon: Les Schneider MD;  Location: [facility] OR;  Service: Orthopedics;  Laterality: Right;  Anterior compartment     COLONOSCOPY N/A 9/25/2018    Procedure: COLONOSCOPY;  Surgeon: Bethel Carmona MD;  Location: [facility] ENDO;  Service: Endoscopy;  Laterality: N/A;    EXCISION OF MEDIAL MENISCUS OF KNEE Right 3/10/2020    Procedure: MENISCECTOMY, KNEE, MEDIAL;  Surgeon: Les Schneider MD;  Location: [facility] OR;  Service: Orthopedics;  Laterality: Right;  Partial, Medial , Lateral     EYE SURGERY      INJECTION OF ANESTHETIC AGENT AROUND MEDIAL BRANCH NERVES INNERVATING LUMBAR FACET JOINT Bilateral 12/13/2022    Procedure: Bilateral L3-5 MBB;  Surgeon: Humberto Dumont MD;  Location: [facility] PAIN MGT;  Service: Pain Management;  Laterality: Bilateral;    INJECTION OF ANESTHETIC AGENT " AROUND NERVE Right 8/8/2019    Procedure: Right Genicular nerve block with local;  Surgeon: Manpreet Dutta MD;  Location: [facility] PAIN MGT;  Service: Pain Management;  Laterality: Right;    INJECTION OF ANESTHETIC AGENT INTO SACROILIAC JOINT Bilateral 5/6/2020    Procedure: Bilateral Sacroiliac Joint Injection;  Surgeon: Manpreet Dutta MD;  Location: [facility] PAIN MGT;  Service: Pain Management;  Laterality: Bilateral;    INJECTION OF ANESTHETIC AGENT INTO SACROILIAC JOINT Bilateral 10/8/2020    Procedure: Bilateral SI and Bilateral GTB with RN IV sedation;  Surgeon: Manpreet Dutta MD;  Location: [facility] PAIN MGT;  Service: Pain Management;  Laterality: Bilateral;    INJECTION OF ANESTHETIC AGENT INTO SACROILIAC JOINT Bilateral 1/5/2021    Procedure: Bilateral BLOCK, SACROILIAC JOINT and Bilateral GTB witn RN IV sedation;  Surgeon: aDniel Burns MD;  Location: [facility] PAIN MGT;  Service: Pain Management;  Laterality: Bilateral;    INJECTION OF ANESTHETIC AGENT INTO SACROILIAC JOINT Bilateral 7/8/2021    Procedure: Bilateral BLOCK, SACROILIAC JOINT bilateral GTB RN IV sedation;  Surgeon: Manpreet Dutta MD;  Location: [facility] PAIN MGT;  Service: Pain Management;  Laterality: Bilateral;    INJECTION OF ANESTHETIC AGENT INTO SACROILIAC JOINT Bilateral 3/1/2022    Procedure: Bilateral BLOCK, SACROILIAC JOINT and Bilateral GTB RN IV sedation;  Surgeon: Manpreet Dutta MD;  Location: [facility] PAIN MGT;  Service: Pain Management;  Laterality: Bilateral;    INJECTION OF ANESTHETIC AGENT INTO SACROILIAC JOINT Bilateral 10/10/2022    Procedure: Bilateral Sacroiliac Joint Injection;  Surgeon: Humberto Dumont MD;  Location: [facility] PAIN MGT;  Service: Pain Management;  Laterality: Bilateral;    INJECTION OF ANESTHETIC AGENT INTO SACROILIAC JOINT Bilateral 1/24/2023    Procedure: Bilateral Sacroiliac Joint Injection;  Surgeon: Humberto Dumont MD;  Location: [facility] PAIN MGT;  Service: Pain Management;  Laterality: Bilateral;    INJECTION OF JOINT Bilateral  9/5/2019    Procedure: Bilateral GT bursa injection;  Surgeon: Manpreet Dutta MD;  Location: [facility] PAIN MGT;  Service: Pain Management;  Laterality: Bilateral;    INJECTION OF JOINT Bilateral 5/6/2020    Procedure: Bilateral GT bursa injection;  Surgeon: Manpreet Dutta MD;  Location: [facility] PAIN MGT;  Service: Pain Management;  Laterality: Bilateral;    INJECTION OF JOINT Right 4/28/2022    Procedure: Injection, Joint Right Hip Injection RN IV sedation;  Surgeon: Manpreet Dutta MD;  Location: [facility] PAIN MGT;  Service: Pain Management;  Laterality: Right;    INJECTION OF JOINT Bilateral 10/10/2022    Procedure: Bilateral GT bursa injection with RN IV sedation;  Surgeon: Humberto Dumont MD;  Location: [facility] PAIN MGT;  Service: Pain Management;  Laterality: Bilateral;    INJECTION OF JOINT Bilateral 1/24/2023    Procedure: Bilateral GT bursa injection;  Surgeon: Humberto Dumont MD;  Location: [facility] PAIN MGT;  Service: Pain Management;  Laterality: Bilateral;    KNEE ARTHROSCOPY Bilateral     PARS PLANA VITRECTOMY W/ REPAIR OF MACULAR HOLE Left 02/22/2017    RADIOFREQUENCY THERMOCOAGULATION Left 7/11/2019    Procedure: Right SIJ RFA;  Surgeon: Manpreet Dutta MD;  Location: [facility] PAIN MGT;  Service: Pain Management;  Laterality: Left;    RADIOFREQUENCY THERMOCOAGULATION Right 7/25/2019    Procedure: Right SIJ RFA;  Surgeon: Manpreet Dutta MD;  Location: [facility] PAIN MGT;  Service: Pain Management;  Laterality: Right;    SHOULDER ARTHROSCOPY Right 06/04/15     Current Outpatient Medications on File Prior to Visit   Medication Sig Dispense Refill    biotin 1 mg tablet Take 1,000 mcg by mouth every morning.       celecoxib (CELEBREX) 200 MG capsule TAKE 1 CAPSULE(200 MG) BY MOUTH TWICE DAILY WITH FOOD 120 capsule 1    clotrimazole-betamethasone 1-0.05% (LOTRISONE) cream Apply topically 2 (two) times daily. 45 g 2    diclofenac sodium (VOLTAREN) 1 % Gel Apply 2 g topically 4 (four) times daily. 1 each 6    FLUoxetine 40 MG capsule Take 1 capsule  (40 mg total) by mouth once daily. 90 capsule 1    fluticasone (FLONASE) 50 mcg/actuation nasal spray 2 sprays by Each Nare route once daily. (Patient taking differently: 2 sprays by Each Nostril route nightly as needed.) 1 Bottle 0    furosemide (LASIX) 20 MG tablet Take 1 tablet (20 mg total) by mouth 2 (two) times daily. 60 tablet 11    gabapentin (NEURONTIN) 300 MG capsule Take 1 capsule (300 mg total) by mouth 4 (four) times daily. 360 capsule 0    INVOKANA 100 mg Tab tablet TAKE 1 TABLET(100 MG) BY MOUTH EVERY DAY 90 tablet 0    LORazepam (ATIVAN) 1 MG tablet TK 3 TS PO 1 HOUR PRIOR TO APPOINTMENT      losartan (COZAAR) 25 MG tablet TAKE 1 TABLET(25 MG) BY MOUTH EVERY DAY 90 tablet 2    metFORMIN (GLUCOPHAGE) 1000 MG tablet Take 1 tablet (1,000 mg total) by mouth 2 (two) times daily with meals. 180 tablet 1    potassium chloride SA (K-DUR,KLOR-CON M) 10 MEQ tablet TAKE 1 TABLET(10 MEQ) BY MOUTH EVERY DAY 20 tablet 5    pulse oximeter (PULSE OXIMETER) device by Apply Externally route 2 (two) times a day. Use twice daily at 8 AM and 3 PM and record the value in [email or web address] as directed. 1 each 0    verapamiL (CALAN) 120 MG tablet Take 1 tablet (120 mg total) by mouth 3 (three) times daily. 180 tablet 5    [DISCONTINUED] milnacipran (SAVELLA) 50 mg Tab Take 1 tablet (50 mg total) by mouth 2 (two) times daily. 60 tablet 2    ezetimibe (ZETIA) 10 mg tablet Take 1 tablet (10 mg total) by mouth once daily. 90 tablet 1    omeprazole (PRILOSEC) 20 MG capsule Take 1 capsule (20 mg total) by mouth daily as needed (w/ NSAID). 30 capsule 5    triamcinolone acetonide 0.025% (KENALOG) 0.025 % Oint Apply topically 2 (two) times daily. for 10 days (Patient taking differently: Apply topically 2 (two) times daily as needed. ) 15 g 2     No current facility-administered medications on file prior to visit.               GENERAL:  No weight loss, malaise or fevers.  HEENT:   No recent changes in vision or hearing  NECK:  Negative for  lumps, no difficulty with swallowing.  RESPIRATORY:  Negative for cough, wheezing or shortness of breath, patient denies any recent URI.  CARDIOVASCULAR:  Negative for chest pain or palpitations.  GI:  Negative for abdominal discomfort, blood in stools or black stools or change in bowel habits.  MUSCULOSKELETAL:  See HPI.  SKIN:  Negative for lesions, rash, and itching.  PSYCH:  No mood disorder or recent psychosocial stressors.   HEMATOLOGY/LYMPHOLOGY:  Negative for prolonged bleeding, bruising easily or swollen nodes.    NEURO:   No history of syncope, paralysis, seizures or tremors.  All other reviewed and negative other than HPI.    Imaging / Labs / Studies (reviewed on 4/6/2023):    MRI Lumbar Spine 2/16/23  FINDINGS:  Grade 1 degenerative spondylolisthesis at L4-L5.  Vertebral body height is normal.  Marrow signal is within normal limits. The conus medullaris terminates at the level of L1-L2.  No abnormal signal within the conus. Intervertebral disc levels are as follows:     T12-L1 disc: Normal disc height with anterior osteophytes and mild degenerative facet hypertrophy.  No spinal or foraminal stenosis.  The dural canal measures 16 mm.     L1-L2 disc : Disc space height loss with chronic Schmorl's nodes.  Posterior disc bulge.  Anterior osteophytes.  Minor facet arthropathy bilaterally.  The dural canal measures 13 mm.  No foraminal stenosis.     L2-L3 disc: Circumferential disc bulge with tiny chronic Schmorl's nodes.  Anterior osteophytes.  Mild degenerative facet hypertrophy.  The dural canal measures 12 mm.  No foraminal stenosis.     L3-L4 disc: Disc space height loss with a circumferential disc bulge and anterior osteophytes.  Mild degenerative facet hypertrophy with moderate buckling of the ligamentum flavum.  The dural canal measures 11 mm.  No significant foraminal stenosis.     L4-L5 disc: Grade 1 spondylolisthesis with severe degenerative facet hypertrophy bilaterally.  Buckling of the ligamentum  "flavum.  The dural canal measures 7 mm AP.  Disc encroaches into the floors of the exit foramina, right greater than left.  There is mild right foraminal stenosis.     L5-S1 disc: Severe disc space height loss with osteophytes at the margins and encroach into the exit foramina.  Mild degenerative facet hypertrophy and buckling of the ligamentum flavum.  The dural canal measures 11 mm.  Mild foraminal stenosis.      Physical Exam:  Last clinic visit:  Vitals:    04/06/23 0950   BP: 121/72   Pulse: 108   Resp: 17   Weight: 99 kg (218 lb 4.1 oz)   Height: 5' 10" (1.778 m)   PainSc:   4          Body mass index is 31.32 kg/m².   (reviewed on 4/6/2023)    General: alert and oriented, in no apparent distress.  Gait: normal gait.  Skin: no rashes, no discoloration, no obvious lesions  HEENT: normocephalic, atraumatic. Pupils equal and round.  Cardiovascular: no significant peripheral edema present.  Respiratory: without use of accessory muscles of respiration.    Musculoskeletal - Lumbar Spine:  - ROM fairly preserved   - Pain on flexion of lumbar spine: Absent   - Pain on extension of lumbar spine: Absent         - Lumbar facet loading: Absent   - TTP over the lumbar facet joints: Absent  - TTP over the lumbar paraspinals: Present   - TTP over the SI joints: Present  - TTP over GT bursa: Present, minimal   - Straight Leg Raise: Negative  - CHERYLE: Present    Right Knee:  - TTP: Present over medial/ lateral joint line  - Pain with extension: Present  - Pain with flexion: Present  - Crepitus: Present     Neuro - Lower Extremities:  - BLE Strength: R/L: HF: 5/5, HE: 5/5, KF: 5/5; KE: 5/5; FE: 5/5; FF: 5/5  - Extremity Reflexes: Brisk and symmetric throughout  - Sensory: Sensation to light touch intact bilaterally      Psych:  Mood and affect is appropriate    Assessment:  Kaylin Mccollum is a 78 y.o. year old female who is presenting with       ICD-10-CM ICD-9-CM    1. Degeneration of lumbar intervertebral disc  M51.36 " 722.52 Case Request-RAD/Other Procedure Area: INJECTION,ALLOGRAFT,INTERVERTEBRAL DISC,1ST LEVEL: L5-S1      Case Request-RAD/Other Procedure Area: INJECTION,ALLOGRAFT,INTERVERTEBRAL DISC,2nd LEVEL: L3-4      2. Lumbar degenerative disc disease  M51.36 722.52 Case Request-RAD/Other Procedure Area: INJECTION,ALLOGRAFT,INTERVERTEBRAL DISC,1ST LEVEL: L5-S1      Case Request-RAD/Other Procedure Area: INJECTION,ALLOGRAFT,INTERVERTEBRAL DISC,2nd LEVEL: L3-4      3. Fibromyalgia  M79.7 729.1               Plan:  1. Interventional: - Patient is a candidate for Viadisc secondary to chronic discogenic low back pain secondary to multiple failed attempts of other interventions (8 weeks of physician directed physical therapy, lumbar MBB. SIJ injection) and medical management (anti-inflammatories, antispasmodics, membrane stabilizing agents). Explained the risks and benefits of the procedure in detail with the patient today in clinic along with alternative treatment options, and the patient elected to pursue the intervention at this time.    -Schedule for L5-S1 viable disc tissue allograft supplementation, and then L3-4l evel 1 week following.  LSO brace for 4 weeks post 1st injection.     Anticoagulation:  None, no anticoagulation      2. Pharmacologic:     -  We have discussed continuing gabapentin.  We have reviewed potential side effects of this medication including daytime somnolence, weight gain and peripheral edema  Gabapentin 300 mg in the morning, 300 mg in the afternoon and 600 mg in the evening    -Continue Savella 50 mg twice  daily as this tremendously helps with symptoms of fibromyalgia.  We have discussed that Savella is FDA approved and has demonstrated improvement in multiple measures including pain, global impression of change in physical function.  We have discussed that the most frequent side effects of this medication can include nausea, constipation, vomiting, dry mouth, flushing or tachycardia.    -30 day  supply given    3. Rehabilitative:   -We discussed continuing physical therapy to help manage the patient/s painful condition. The patient was counseled that muscle strengthening will improve the long term prognosis in regards to pain and may also help increase range of motion and mobility. They were told that one of the goals of physical therapy is that they learn how to do the exercises so that they can do them independently at home daily upon completion.     4. Diagnostic:  Reviewed relevant imaging and answered patient's questions.      5. Consult:   -Dr. Schneider for knee and shoulder pain PRN  -Rheumatology: Fibromyalgia    6. Follow up:  4-6 weeks post 2nd injection     The above plan and management options were discussed at length with patient. Patient is in agreement with the above and verbalized understanding.    - I discussed the goals of interventional chronic pain management with the patient on today's visit. We discussed a multimodal and systematic approach to pain.  This includes diagnostic and therapeutic injections, adjuvant pharmacologic treatment, physical therapy, and at times psychiatry.  I emphasized the importance of regular exercise, core strengthening and stretching, diet and weight loss as a cornerstone of long-term pain management.    - This condition does not require this patient to take time off of work, and the primary goal of our Pain Management services is to improve the patient's functional capacity.  - Patient Questions: Answered all of the patient's questions regarding diagnoses, therapy, treatment and next steps    Humberto Dumont MD

## 2023-04-10 ENCOUNTER — TELEPHONE (OUTPATIENT)
Dept: PAIN MEDICINE | Facility: CLINIC | Age: 78
End: 2023-04-10
Payer: MEDICARE

## 2023-04-10 NOTE — TELEPHONE ENCOUNTER
Returned pt call. Pt states she came in on 04/06 regarding milnacipran (SAVELLA) 50 mg Tab.  She stated she dropped off the form and it needed to be turned in but it wasn't turned in on time and it was denied. Informed pt that I will informed the provider and [facility]. [facility] states that the insurance needs an appeal from the provider.

## 2023-04-10 NOTE — TELEPHONE ENCOUNTER
----- Message from Victorina Thrasher sent at 4/10/2023  3:56 PM CDT -----  Contact: Kaylin Ewing is calling to speak to the nurse regarding the medication milnacipran (SAVELLA) 50 mg Tab, she stated she dropped off the form and it needed to be turned in but it wasn't turned in on time and it was denied. Please give her a call for further information at [phone]    Thanks  LJ

## 2023-04-11 ENCOUNTER — TELEPHONE (OUTPATIENT)
Dept: INTERNAL MEDICINE | Facility: CLINIC | Age: 78
End: 2023-04-11
Payer: MEDICARE

## 2023-04-12 NOTE — TELEPHONE ENCOUNTER
Called patient and advised her that letter received said that Invok[employer] has been approved until 4/11/24. Verbalized understanding. Will reach out to her pharmacy.

## 2023-04-13 ENCOUNTER — OFFICE VISIT (OUTPATIENT)
Dept: ORTHOPEDICS | Facility: CLINIC | Age: 78
End: 2023-04-13
Payer: MEDICARE

## 2023-04-13 VITALS — HEIGHT: 70 IN | BODY MASS INDEX: 31.25 KG/M2 | WEIGHT: 218.25 LBS

## 2023-04-13 DIAGNOSIS — M70.62 GREATER TROCHANTERIC BURSITIS OF BOTH HIPS: ICD-10-CM

## 2023-04-13 DIAGNOSIS — M17.11 ARTHRITIS OF KNEE, RIGHT: Primary | ICD-10-CM

## 2023-04-13 DIAGNOSIS — M94.261 CHONDROMALACIA OF RIGHT KNEE: ICD-10-CM

## 2023-04-13 DIAGNOSIS — M19.012 ARTHRITIS OF LEFT SHOULDER REGION: ICD-10-CM

## 2023-04-13 DIAGNOSIS — M75.32 CALCIFIC TENDINITIS OF LEFT SHOULDER: ICD-10-CM

## 2023-04-13 DIAGNOSIS — M65.311 TRIGGER THUMB OF RIGHT HAND: ICD-10-CM

## 2023-04-13 DIAGNOSIS — Z98.890 S/P RIGHT KNEE ARTHROSCOPY: ICD-10-CM

## 2023-04-13 DIAGNOSIS — M70.61 GREATER TROCHANTERIC BURSITIS OF BOTH HIPS: ICD-10-CM

## 2023-04-13 PROCEDURE — 99214 OFFICE O/P EST MOD 30 MIN: CPT | Mod: PBBFAC,25 | Performed by: ORTHOPAEDIC SURGERY

## 2023-04-13 PROCEDURE — 99999 PR PBB SHADOW E&M-EST. PATIENT-LVL IV: CPT | Mod: PBBFAC,,, | Performed by: ORTHOPAEDIC SURGERY

## 2023-04-13 PROCEDURE — 99999 PR PBB SHADOW E&M-EST. PATIENT-LVL IV: ICD-10-PCS | Mod: PBBFAC,,, | Performed by: ORTHOPAEDIC SURGERY

## 2023-04-13 PROCEDURE — 99213 PR OFFICE/OUTPT VISIT, EST, LEVL III, 20-29 MIN: ICD-10-PCS | Mod: 25,S$PBB,, | Performed by: ORTHOPAEDIC SURGERY

## 2023-04-13 PROCEDURE — 20610 PR DRAIN/INJECT LARGE JOINT/BURSA: ICD-10-PCS | Mod: S$PBB,RT,, | Performed by: ORTHOPAEDIC SURGERY

## 2023-04-13 PROCEDURE — 20610 DRAIN/INJ JOINT/BURSA W/O US: CPT | Mod: S$PBB,RT,, | Performed by: ORTHOPAEDIC SURGERY

## 2023-04-13 PROCEDURE — 99213 OFFICE O/P EST LOW 20 MIN: CPT | Mod: 25,S$PBB,, | Performed by: ORTHOPAEDIC SURGERY

## 2023-04-13 RX ORDER — METHYLPREDNISOLONE ACETATE 80 MG/ML
80 INJECTION, SUSPENSION INTRA-ARTICULAR; INTRALESIONAL; INTRAMUSCULAR; SOFT TISSUE
Status: DISCONTINUED | OUTPATIENT
Start: 2023-04-13 | End: 2023-04-13 | Stop reason: HOSPADM

## 2023-04-13 RX ADMIN — METHYLPREDNISOLONE ACETATE 80 MG: 80 INJECTION, SUSPENSION INTRALESIONAL; INTRAMUSCULAR; INTRASYNOVIAL; SOFT TISSUE at 10:04

## 2023-04-13 NOTE — PATIENT INSTRUCTIONS
You are going to receive injections into your lumbar spine with Dr. Dumont but it is not steroid  The right knee you have arthritis in you having some pain the last time we gave you an injection we gave you half of the does because you were receiving steroid into the spine   Will inject the right knee today with 80 mg Depo-Medrol mixed with 5 cc 1% lidocaine 04/13/2023   Will get her insurance to approve you for gel injection into the right knee called Synvisc-One or Monovisc or you flexor.  We prefer the single injection over the 3 injections  I will see you back in around 8-10 weeks.  The gel injection you can receive once every 6 months

## 2023-04-13 NOTE — PROCEDURES
Large Joint Aspiration/Injection    Date/Time: 4/13/2023 10:00 AM  Performed by: Les Schneider MD  Authorized by: Les Schneider MD     Consent Done?:  Yes (Verbal)  Indications:  Arthritis  Site marked: the procedure site was marked    Timeout: prior to procedure the correct patient, procedure, and site was verified      Local anesthesia used?: Yes    Local anesthetic:  Lidocaine 1% without epinephrine    Details:  Needle Size:  22 G  Ultrasonic Guidance for needle placement?: No    Approach:  Anterolateral  Location:  Knee  Medications:  80 mg methylPREDNISolone acetate 80 mg/mL  Patient tolerance:  Patient tolerated the procedure well with no immediate complications

## 2023-04-13 NOTE — PROGRESS NOTES
Subjective:     Patient ID: Kaylin Mccollum is a 78 y.o. female.    Chief Complaint: Pain of the Right Knee and Pain of the Left Shoulder    HPI:  75-year-old retired from  who had been falling down stairs numerous occasions she has been having pain and catching and locking since several months now.  She gets up the knee catches she has to wiggle it some how to unlock it before she can walk. She does have quite a bit of lumbar pain that radiates with burning sensation down to the right anterior tibia and dorsal of the foot.  She is under the care of pain management for that.  Pain in the knee is around 4/10 with catching locking feeling.  Able to ambulate once the catching locking goes away for her minimum 200-300 yd without discomfort.  Unable to squat unable to do stairs due to the catching locking feeling.  She does ambulate without any assistive devices.  She does have an MRI in the electronic records.  At 1 point she had falling down the stairs and sustained right shoulder rotator cuff tear requiring repair.  Denies any numbness tingling in the hands.  She does have some cervical lumbar pain    05/21/2020  Status post her right knee arthroscopy 03/10/2020.  Findings of complex medial and lateral meniscus tears as well chondromalacia type 3 anterior and medial.  She was doing great postop for 5 weeks another side in twisted her knee and starting some pain.  Any twisting maneuver she hurts quite a bit.  Pain is 4/10.  She did her independent exercise program.  Denies any fever or chills or shortness of breath or difficulty chewing or swallowing.  Complains of some stiffness and swelling    06/25/2020  Right knee arthroscopy 03/10/2020 with complex medial and lateral meniscus tears as well as chondromalacia type 3 anterior medial compartment.  The other day could not get up from kneeling on the floor.  Her pain now is coming back as 6/10 with swelling and tightness in the knee.  Last visit given a steroid  injection which helped for a little bit but not too much.  She does take Celebrex 100 mg twice a day and a helps very little.  Last visit discussed injecting Synvisc-One into her knee and she wants to proceed.  I did tell her that Synvisc-One might take 6-8 weeks to see any effect.  In my not work and we had to resort to other treatments down the road.  Still feeling occasional catching.  Denies any fever chills shortness of breath difficulty chewing swallowing loss of bowel bladder control or loss of taste and smell    08/20/2020  Chondromalacia of the right knee anterior and medial compartment and status post partial medial lateral meniscectomy.  Synvisc-One given 06/25/2020 seems to have helped but still having quite excessive pain right now since her daughter was diagnosed with PE and had to run around with her.  But overall she feels Synvisc-One seems to have helped.  She is requesting a steroid injection today.  She does take Celebrex at night.  She is to have back injections she is holding off at this point.  Pain is 4/10.  Denies any fever or chills or shortness of breath or difficulty chewing or swallowing loss of bowel bladder control    11/19/2020  Right knee arthroscopy 03/10/2020 with medial and lateral partial meniscectomies and finding of chondromalacia type 3 of the anterior medial compartment.  She received Synvisc-One on 06/25/2020 with good relief.  She thought she is getting her Synvisc-One today and I refreshed her memory that it should be 6 months so she is not due until December 25th.  We need to get that approved.  She complains on occasional thigh pain occasional mid tibia pain.  She does have history of fibromyalgia and used to get hip injections by Dr. izquierdo last of which 10/08/2020 bilateral SI and bilateral greater trochanteric areas.  She does complain of both hips hurting over the greater trochanters.  Celebrex seems to help as well as the brace on her knee.  She asked she is going for  dental work if she can take ibuprofen I did tell her not with Celebrex she may take Tylenol.  Denies any fever or chills or shortness of breath or difficulty with chewing or swallowing loss of bowel bladder control or blurry vision double vision or loss of sense smell or taste    12/28/2020   New problem left shoulder pain  Right knee pain  Lately each patient is taking Celebrex 200 mg twice a day since she has been taking care of her daughter back in 4 to the hospital and doing a lot of walking.  She is running out of the Celebrex.  I did warn her about the side effects.  Her pain is 4/10.  Unable to sleep on the shoulder.  Lifting things seems to be very painful.  Previous right shoulder rotator cuff surgery.  She is having also worsening lower back and hip area and sacroiliac pain she sees pain management for that.    Denies any fever or chills or shortness of breath or difficulty with chewing or swallowing loss of bowel bladder control blurry vision double vision loss of sense of smell or taste    02/22/2021  Last visit patient received injection to the left shoulder subacromial space on 12/28/2020 as well into the right knee with grade 3 is a loose sugar for pain.  Her pain went down 2/10 in both the shoulder and the knee.  But she still feels some weakness in the shoulder and certain motions seems to be very weak and painful.  Previous history of injury to that area.  She did see pain management Dr. Burns who give her injections in her hips and that is doing really well.  Concerned about having rotator cuff injury to the left shoulder because certain motions and activities of daily living are limited .  Her hips are doing well her knee is doing well  Denies any fever or chills or shortness of breath or difficulty with chewing or swallowing or loss of bowel bladder control or blurry vision or double vision or loss sense smell or taste or numbness or tingling in her hands.  She does have some occasional neck  pain    03/01/2021  Left shoulder tenderness.  Pain today 0/10.  She is taking Celebrex and received a steroid injection 12/28/2020.  She is here for MRI results.  We spent time going over the report with her and copy of it given.  We discussed small slap lesion, mild arthritic changes and small loose body and calcific tendinitis.  As far as the knee is concerned she is doing okay at this point in time  Denies any fever or chills or shortness of breath or difficulty with chewing or swallowing loss of bowel bladder control or loss of sense smell or taste    03/10/2022  Bilateral shoulder pain left and right, right thumb locking and radiation down to the right upper extremity, right knee arthritis  Patient since last seen had stem cell injections x2 into the right knee and x1 into the left knee by Dr. Diamond Chaparro.  She is not too sure if they really helped her.  On 3/1/22 patient received bilateral sacroiliac joint injections and bilateral greater trochanteric injections by Dr. Dutta.  Each injection containing 40 mg of Depo-Medrol for a total of 160 mg. I did tell her it is too soon to receive any injections in the shoulder because too much steroid will increase her heart rate and messes up her sugars which could cause problems over the next few days.  She expressed understanding.  We did go over MRI performed on the left shoulder a year or so ago.  As well as we went over her knee x-rays and operative report from arthroscopic surgery    06/20/2022  Left shoulder pain, right knee pain  Previous MRI reviewed with the patient showing generalized arthritis as well as calcific tendinitis in the shoulder.  She did receive an injection in 2020 with good relief.  She would like another repeat injection.  She does take Celebrex and occasional Tylenol.  She does have fibromyalgia and she paces herself.  As far as the left knee is concerned she is tender over the pes anserine on the medial tibial flare in the Voltaren gel  that she applies helps that.  She also has pain inside the knee joint and underneath the kneecap.  As far as the right thumb is doing much better after we injected at last visit.  She did receive stem cell injections in the right knee by Dr. Chaparro and not too sure if it anything came out of it.  As far as the back she was seeing pain management where they gave her bilateral SI joint and bilateral trochanteric bursitis injections.  No fever no chills no shortness of breath difficulty with chewing or swallowing  Pain in the right knee 3/10 in the left shoulder around 4/10      12/12/2022   Bilateral shoulder pain the not hurting as much at this time.  She is having injections in her back to more 0 and I did tell her too much cortisone today they may cancel her back but she said she does not want to cancel her back injections because they help and make her better and they are at this time more important than her shoulders  In right knee chondromalacia and arthritis.  We give her steroid injection in June 2022 Depo-Medrol.  I did tell her at this time if she read insisted on having injection I will give her half of the does which is 40 mg Depo-Medrol.  Her pain level 6/10.  She still using Voltaren gel and gabapentin as needed.  She is seeing pain management for her back.    No fever no chills no shortness of breath or difficulty with chewing or swallowing loss of bowel bladder control blurry vision double vision loss sense smell or taste    04/13/2023   Right knee severe pain on the lateral aspect P it last time she was here we injected 40 mg of Depo-Medrol because she was receiving injections in her spine.  She has an MRI due for lumbar spine ordered by pain management.  She did receive injections in the back but she has something new to be injected is called allograft scheduled by pain management end of April and 1st part of May at different levels.  She was told it is some sort of a gel.  If she stands more than 20  minutes she feels severe pain in the lumbar spine.  As far as the right knee is concerned her pain is 4/10.  She did discuss viscosupplementation with her.  I did discuss that the fact that if it helps you can have them repeated once every 6 months but we will get the insurance to approve.  She does ambulate without assistive devices.  We already scoped the knee before and did partial medial lateral meniscectomy with finding of the arthritis.  Back in December 2022 the x-ray showed almost complete loss of joint space in the lateral joint line with marginal osteophytes  No fever no chills no shortness of breath or difficulty with chewing swallowing loss of bowel bladder control    As far as the shoulder is concerned on the left is doing much better as well as both hips  Past Medical History:   Diagnosis Date    Arthritis     Cataract     COVID-19 2/25/2021    Diabetes mellitus 1995    BS didn't check 09/13/2022    Diabetes mellitus, type 2     Fibromyalgia     General anesthetics causing adverse effect in therapeutic use     bradycardia     Hypertension     Migraines     Mitral valve prolapse     Osteoarthritis     Rotator cuff tear 04/01/2015     Past Surgical History:   Procedure Laterality Date    ARTHROSCOPY OF KNEE Right 3/10/2020    Procedure: ARTHROSCOPY, KNEE;  Surgeon: Les Schneider MD;  Location: [facility] OR;  Service: Orthopedics;  Laterality: Right;    CATARACT EXTRACTION W/  INTRAOCULAR LENS IMPLANT Left 07/19/2017    CATARACT EXTRACTION W/  INTRAOCULAR LENS IMPLANT Right 2017    CHOLECYSTECTOMY      CHONDROPLASTY OF KNEE Right 3/10/2020    Procedure: CHONDROPLASTY, KNEE;  Surgeon: Les Schneider MD;  Location: [facility] OR;  Service: Orthopedics;  Laterality: Right;  Anterior compartment     COLONOSCOPY N/A 9/25/2018    Procedure: COLONOSCOPY;  Surgeon: Bethel Carmona MD;  Location: [facility] ENDO;  Service: Endoscopy;  Laterality: N/A;    EXCISION OF MEDIAL MENISCUS OF KNEE Right 3/10/2020     Procedure: MENISCECTOMY, KNEE, MEDIAL;  Surgeon: Les Schneider MD;  Location: [facility] OR;  Service: Orthopedics;  Laterality: Right;  Partial, Medial , Lateral     EYE SURGERY      INJECTION OF ANESTHETIC AGENT AROUND MEDIAL BRANCH NERVES INNERVATING LUMBAR FACET JOINT Bilateral 12/13/2022    Procedure: Bilateral L3-5 MBB;  Surgeon: Humberto Dumont MD;  Location: [facility] PAIN MGT;  Service: Pain Management;  Laterality: Bilateral;    INJECTION OF ANESTHETIC AGENT AROUND NERVE Right 8/8/2019    Procedure: Right Genicular nerve block with local;  Surgeon: Manpreet Dutta MD;  Location: [facility] PAIN MGT;  Service: Pain Management;  Laterality: Right;    INJECTION OF ANESTHETIC AGENT INTO SACROILIAC JOINT Bilateral 5/6/2020    Procedure: Bilateral Sacroiliac Joint Injection;  Surgeon: Manpreet Dutta MD;  Location: [facility] PAIN MGT;  Service: Pain Management;  Laterality: Bilateral;    INJECTION OF ANESTHETIC AGENT INTO SACROILIAC JOINT Bilateral 10/8/2020    Procedure: Bilateral SI and Bilateral GTB with RN IV sedation;  Surgeon: Manpreet Dutta MD;  Location: [facility] PAIN MGT;  Service: Pain Management;  Laterality: Bilateral;    INJECTION OF ANESTHETIC AGENT INTO SACROILIAC JOINT Bilateral 1/5/2021    Procedure: Bilateral BLOCK, SACROILIAC JOINT and Bilateral GTB witn RN IV sedation;  Surgeon: Daniel Burns MD;  Location: [facility] PAIN MGT;  Service: Pain Management;  Laterality: Bilateral;    INJECTION OF ANESTHETIC AGENT INTO SACROILIAC JOINT Bilateral 7/8/2021    Procedure: Bilateral BLOCK, SACROILIAC JOINT bilateral GTB RN IV sedation;  Surgeon: Manpreet Dutta MD;  Location: [facility] PAIN MGT;  Service: Pain Management;  Laterality: Bilateral;    INJECTION OF ANESTHETIC AGENT INTO SACROILIAC JOINT Bilateral 3/1/2022    Procedure: Bilateral BLOCK, SACROILIAC JOINT and Bilateral GTB RN IV sedation;  Surgeon: Manpreet Dutta MD;  Location: [facility] PAIN MGT;  Service: Pain Management;  Laterality: Bilateral;    INJECTION OF ANESTHETIC AGENT INTO  SACROILIAC JOINT Bilateral 10/10/2022    Procedure: Bilateral Sacroiliac Joint Injection;  Surgeon: Humberto Dumont MD;  Location: [facility] PAIN MGT;  Service: Pain Management;  Laterality: Bilateral;    INJECTION OF ANESTHETIC AGENT INTO SACROILIAC JOINT Bilateral 1/24/2023    Procedure: Bilateral Sacroiliac Joint Injection;  Surgeon: Humberto Dumont MD;  Location: [facility] PAIN MGT;  Service: Pain Management;  Laterality: Bilateral;    INJECTION OF JOINT Bilateral 9/5/2019    Procedure: Bilateral GT bursa injection;  Surgeon: Manpreet Dutta MD;  Location: [facility] PAIN MGT;  Service: Pain Management;  Laterality: Bilateral;    INJECTION OF JOINT Bilateral 5/6/2020    Procedure: Bilateral GT bursa injection;  Surgeon: Manpreet Dutta MD;  Location: [facility] PAIN MGT;  Service: Pain Management;  Laterality: Bilateral;    INJECTION OF JOINT Right 4/28/2022    Procedure: Injection, Joint Right Hip Injection RN IV sedation;  Surgeon: Manpreet Dutta MD;  Location: [facility] PAIN MGT;  Service: Pain Management;  Laterality: Right;    INJECTION OF JOINT Bilateral 10/10/2022    Procedure: Bilateral GT bursa injection with RN IV sedation;  Surgeon: Humberto Dumont MD;  Location: [facility] PAIN MGT;  Service: Pain Management;  Laterality: Bilateral;    INJECTION OF JOINT Bilateral 1/24/2023    Procedure: Bilateral GT bursa injection;  Surgeon: Humberto Dumont MD;  Location: [facility] PAIN MGT;  Service: Pain Management;  Laterality: Bilateral;    KNEE ARTHROSCOPY Bilateral     PARS PLANA VITRECTOMY W/ REPAIR OF MACULAR HOLE Left 02/22/2017    RADIOFREQUENCY THERMOCOAGULATION Left 7/11/2019    Procedure: Right SIJ RFA;  Surgeon: Manpreet Dutta MD;  Location: [facility] PAIN MGT;  Service: Pain Management;  Laterality: Left;    RADIOFREQUENCY THERMOCOAGULATION Right 7/25/2019    Procedure: Right SIJ RFA;  Surgeon: Manpreet Dutta MD;  Location: [facility] PAIN MGT;  Service: Pain Management;  Laterality: Right;    SHOULDER ARTHROSCOPY Right 06/04/15     Family History   Problem  Relation Age of Onset    Heart disease Mother         A-Fib    Glaucoma Mother     Cataracts Mother     Cancer Mother         colon    Arthritis Mother         : 17    Depression Mother     Kidney disease Daughter         ESRD    Anesthesia problems Daughter         cardiac arrest during nephrectomy    Birth defects Daughter         Renal    Depression Daughter     Learning disabilities Daughter         Dyslexia, ADD    Diabetes Maternal Grandmother     Heart disease Maternal Grandmother         Congestive heart failure    Alcohol abuse Maternal Grandmother         Death: 84    Depression Maternal Grandmother     Hypertension Maternal Grandmother     Cancer Maternal Grandmother     Diabetes Maternal Grandfather     Cancer Maternal Grandfather     Alcohol abuse Maternal Grandfather          1964    Breast cancer Paternal Aunt     Depression Brother     Depression Son     Learning disabilities Son         ADD & ADHD    Diabetes Maternal Aunt          9/3/2023    Diabetes Maternal Uncle          2019    Birth defects Brother         Cardiac    Heart disease Brother         Heart Surgery  as 6 y.o.     Social History     Socioeconomic History    Marital status:    Occupational History     Employer: [employer]   Tobacco Use    Smoking status: Never    Smokeless tobacco: Never    Tobacco comments:     Never smoked   Substance and Sexual Activity    Alcohol use: Never     Comment: Couple times per year    Drug use: No    Sexual activity: Not Currently   Social History Narrative    . 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     Social Determinants of Health     Financial Resource Strain: Low Risk     Difficulty of Paying Living Expenses: Not hard at all   Food Insecurity: No Food Insecurity    Worried About Running Out of Food in the Last Year: Never true    Ran Out of Food in the Last Year: Never true   Transportation Needs: No Transportation Needs    Lack of  Transportation (Medical): No    Lack of Transportation (Non-Medical): No   Physical Activity: Insufficiently Active    Days of Exercise per Week: 1 day    Minutes of Exercise per Session: 30 min   Stress: No Stress Concern Present    Feeling of Stress : Only a little   Social Connections: Unknown    Frequency of Communication with Friends and Family: More than three times a week    Frequency of Social Gatherings with Friends and Family: Once a week    Active Member of Clubs or Organizations: Yes    Attends Club or Organization Meetings: More than 4 times per year    Marital Status:    Housing Stability: Low Risk     Unable to Pay for Housing in the Last Year: No    Number of Places Lived in the Last Year: 1    Unstable Housing in the Last Year: No     Medication List with Changes/Refills   Current Medications    BIOTIN 1 MG TABLET    Take 1,000 mcg by mouth every morning.     CELECOXIB (CELEBREX) 200 MG CAPSULE    TAKE 1 CAPSULE(200 MG) BY MOUTH TWICE DAILY WITH FOOD    CLOTRIMAZOLE-BETAMETHASONE 1-0.05% (LOTRISONE) CREAM    Apply topically 2 (two) times daily.    DICLOFENAC SODIUM (VOLTAREN) 1 % GEL    Apply 2 g topically 4 (four) times daily.    EZETIMIBE (ZETIA) 10 MG TABLET    Take 1 tablet (10 mg total) by mouth once daily.    FLUOXETINE 40 MG CAPSULE    Take 1 capsule (40 mg total) by mouth once daily.    FLUTICASONE (FLONASE) 50 MCG/ACTUATION NASAL SPRAY    2 sprays by Each Nare route once daily.    FUROSEMIDE (LASIX) 20 MG TABLET    Take 1 tablet (20 mg total) by mouth 2 (two) times daily.    GABAPENTIN (NEURONTIN) 300 MG CAPSULE    Take 1 capsule (300 mg total) by mouth 4 (four) times daily.    INVOKANA 100 MG TAB TABLET    TAKE 1 TABLET(100 MG) BY MOUTH EVERY DAY    LORAZEPAM (ATIVAN) 1 MG TABLET    TK 3 TS PO 1 HOUR PRIOR TO APPOINTMENT    LOSARTAN (COZAAR) 25 MG TABLET    TAKE 1 TABLET(25 MG) BY MOUTH EVERY DAY    METFORMIN (GLUCOPHAGE) 1000 MG TABLET    Take 1 tablet (1,000 mg total) by mouth 2  "(two) times daily with meals.    MILNACIPRAN (SAVELLA) 50 MG TAB    Take 1 tablet (50 mg total) by mouth 2 (two) times daily.    OMEPRAZOLE (PRILOSEC) 20 MG CAPSULE    Take 1 capsule (20 mg total) by mouth daily as needed (w/ NSAID).    POTASSIUM CHLORIDE SA (K-DUR,KLOR-CON M) 10 MEQ TABLET    TAKE 1 TABLET(10 MEQ) BY MOUTH EVERY DAY    PULSE OXIMETER (PULSE OXIMETER) DEVICE    by Apply Externally route 2 (two) times a day. Use twice daily at 8 AM and 3 PM and record the value in MyChart as directed.    TRIAMCINOLONE ACETONIDE 0.025% (KENALOG) 0.025 % OINT    Apply topically 2 (two) times daily. for 10 days    VERAPAMIL (CALAN) 120 MG TABLET    Take 1 tablet (120 mg total) by mouth 3 (three) times daily.     Review of patient's allergies indicates:   Allergen Reactions    Demerol [meperidine] Other (See Comments)     Burning when adm IV  Able to tolerate IM, "Turned the veins in my hand purple."    Latex, natural rubber Other (See Comments)     "Burns my skin",  Symptoms get worse the longer she is exposed    Zocor [simvastatin] Other (See Comments)     Tightening of muscles    Statins-hmg-coa reductase inhibitors Other (See Comments)     myopathy    Sulfa (sulfonamide antibiotics)      Blurred vision     Review of Systems   Constitutional: Negative for decreased appetite.   HENT:  Negative for tinnitus.    Eyes:  Negative for double vision.   Cardiovascular:  Negative for chest pain.   Respiratory:  Negative for wheezing.    Hematologic/Lymphatic: Negative for bleeding problem.   Skin:  Negative for dry skin.   Musculoskeletal:  Positive for arthritis, back pain, joint pain, neck pain and stiffness. Negative for gout.   Gastrointestinal:  Negative for abdominal pain.   Genitourinary:  Negative for bladder incontinence.   Neurological:  Negative for numbness, paresthesias and sensory change.   Psychiatric/Behavioral:  Negative for altered mental status.      Objective:   Body mass index is 31.32 kg/m².  There were " no vitals filed for this visit.       General    Constitutional: She is oriented to person, place, and time. She appears well-developed.   HENT:   Head: Atraumatic.   Eyes: EOM are normal.   Cardiovascular:  Normal rate.            Pulmonary/Chest: Effort normal.   Abdominal: Soft.   Neurological: She is alert and oriented to person, place, and time.   Psychiatric: Judgment normal.          Bilateral upper extremity neurovascularly intact. Radial ulnar pulses 2+.  Sensory intact to touch.  Motor strength is 5/5 throughout.  Right thumb with triggering and pain over the volar aspect at the metacarpophalangeal joint.  Left shoulder flexion 140 abduction 100 with positive impingement sign.  Pain in the infraspinatus fossa posteriorly to palpation very mild anteriorly and laterally slight weakness to resistive abduction.  Tenderness over the coracoid process anteriorly.  Right shoulder flexion 160 abduction 120 with mild positive impingement sign.  There is some tenderness over the lateral deltoid to palpation.  External rotation and internal rotation is within normal limits  A lumbar with tenderness around L4-5 paraspinal and mostly to words the right side.  Pelvis is level  Straight leg raising slightly positive on the left negative on the right  Passive hip motion within normal limits.  No pain in the groin .  Mild pain over the greater trochanters to touch bilaterally.  Hip flexors, abductors, adductors, quads, hamstrings, ankle extensors and flexors all 5/5 and even bilaterally.  And left knee full motion collaterals and cruciate stable negative Kaleigh's negative pain to palpation.  Surgical scar from knee scope done years ago.  Right knee with mild to moderate swelling.  Surgical scars /arthroscopic healed well.  Crepitus with motion anteriorly.  Painful to any twisting motion.  Painful severe on the lateral joint line and posterolateral corner.  Collaterals and cruciates are stable to valgus varus stressing as  well as anterior posterior drawer. Range of motion 0-120 degrees.  Calves are soft nontender.  Multiple varicosities  No pitting edema  EHL 5/5 plantar flexion 5/5.  DP 1+ PT 1+  Skin is warm to touch no obvious lesions    Relevant imaging results reviewed and interpreted by me, discussed with the patient and / or family today   X-ray 12/12/2022 left shoulder with still type 2 acromion very mild degeneration in the glenohumeral joint and very small calcium deposit in the rotator cuff   X-ray 12/12/2022 bilateral knees with the right knee showing almost complete loss of joint space in the lateral joint.  There is mild to moderate degeneration otherwise in both of her knees.  No fracture seen  X-ray 02/22/2021 left shoulder with type 2 acromion and very mild degenerative changes in the glenohumeral joint.  X-ray 02/22/2021 knees bilaterally showing left knee patellofemoral osteophyte however joint space very well maintained, right knee with patellofemoral osteophytes and lateral joint almost complete loss of space with small osteophytic changes  X-ray from 2019 bilateral knees with mild medial joint narrowing and small osteophytic changes  MRI reviewed with the patient showed her the pictures as well as reviewed the report consistent with medial and lateral meniscus stairs, patellofemoral chondromalacia as well as medially    MRI left shoulder in the system showing loose body in the inferior recess, acromioclavicular joint arthritis and calcific tendinitis  Assessment:     Encounter Diagnoses   Name Primary?    Calcific tendinitis of left shoulder     Trigger thumb of right hand     Chondromalacia of right knee     Greater trochanteric bursitis of both hips     Arthritis of left shoulder region     S/P right knee arthroscopy     Arthritis of knee, right Yes        Plan:   Arthritis of knee, right    Calcific tendinitis of left shoulder    Trigger thumb of right hand    Chondromalacia of right knee  -     Large Joint  Aspiration/Injection    Greater trochanteric bursitis of both hips    Arthritis of left shoulder region    S/P right knee arthroscopy         Patient Instructions   You are going to receive injections into your lumbar spine with Dr. Dumont but it is not steroid  The right knee you have arthritis in you having some pain the last time we gave you an injection we gave you half of the does because you were receiving steroid into the spine   Will inject the right knee today with 80 mg Depo-Medrol mixed with 5 cc 1% lidocaine 04/13/2023   Will get her insurance to approve you for gel injection into the right knee called Synvisc-One or Monovisc or you flexor.  We prefer the single injection over the 3 injections  I will see you back in around 8-10 weeks.  The gel injection you can receive once every 6 months  All questions asked and answered     Kellgren Jaswinder scale 3.  Disclaimer: This note was prepared using a voice recognition system and is likely to have sound alike errors within the text.

## 2023-04-18 NOTE — PRE-PROCEDURE INSTRUCTIONS
Spoke with patient regarding procedure scheduled on 4.25     Arrival time 0900     Has patient been sick with fever or on antibiotics within the last 7 days? No     Does the patient have any open wounds, sores or rashes? No     Does the patient have any recent fractures? no     Has patient received a vaccination within the last 7 days? No     Received the COVID vaccination?      Has the patient stopped all medications as directed? na     Does patient have a pacemaker and or defibrillator? no     Does the patient have a ride to and from procedure and someone reliable to remain with patient? daughter     Is the patient diabetic? yes     Does the patient have sleep apnea? Or use O2 at home? no     Is the patient receiving sedation?      Is the patient instructed to remain NPO beginning at midnight the night before their procedure? yes     Procedure location confirmed with patient? Yes     Covid- Denies signs/symptoms. Instructed to notify PAT/MD if any changes.

## 2023-04-21 ENCOUNTER — HOSPITAL ENCOUNTER (OUTPATIENT)
Dept: CARDIOLOGY | Facility: HOSPITAL | Age: 78
Discharge: HOME | End: 2023-04-21
Attending: INTERNAL MEDICINE
Payer: MEDICARE

## 2023-04-21 ENCOUNTER — OFFICE VISIT (OUTPATIENT)
Dept: CARDIOLOGY | Facility: CLINIC | Age: 78
End: 2023-04-21
Payer: MEDICARE

## 2023-04-21 VITALS
WEIGHT: 220.69 LBS | HEART RATE: 92 BPM | DIASTOLIC BLOOD PRESSURE: 60 MMHG | OXYGEN SATURATION: 96 % | HEIGHT: 70 IN | SYSTOLIC BLOOD PRESSURE: 110 MMHG | BODY MASS INDEX: 31.59 KG/M2

## 2023-04-21 DIAGNOSIS — E78.5 HYPERLIPIDEMIA ASSOCIATED WITH TYPE 2 DIABETES MELLITUS: ICD-10-CM

## 2023-04-21 DIAGNOSIS — I73.9 PVD (PERIPHERAL VASCULAR DISEASE): Primary | ICD-10-CM

## 2023-04-21 DIAGNOSIS — I15.2 HYPERTENSION ASSOCIATED WITH DIABETES: Chronic | ICD-10-CM

## 2023-04-21 DIAGNOSIS — I34.1 MVP (MITRAL VALVE PROLAPSE): ICD-10-CM

## 2023-04-21 DIAGNOSIS — E11.69 HYPERLIPIDEMIA ASSOCIATED WITH TYPE 2 DIABETES MELLITUS: ICD-10-CM

## 2023-04-21 DIAGNOSIS — I49.3 PVC (PREMATURE VENTRICULAR CONTRACTION): ICD-10-CM

## 2023-04-21 DIAGNOSIS — E11.59 HYPERTENSION ASSOCIATED WITH DIABETES: Chronic | ICD-10-CM

## 2023-04-21 PROCEDURE — 93010 EKG 12-LEAD: ICD-10-PCS | Mod: ,,, | Performed by: INTERNAL MEDICINE

## 2023-04-21 PROCEDURE — 99214 PR OFFICE/OUTPT VISIT, EST, LEVL IV, 30-39 MIN: ICD-10-PCS | Mod: S$PBB,,, | Performed by: INTERNAL MEDICINE

## 2023-04-21 PROCEDURE — 99215 OFFICE O/P EST HI 40 MIN: CPT | Mod: PBBFAC | Performed by: INTERNAL MEDICINE

## 2023-04-21 PROCEDURE — 93005 ELECTROCARDIOGRAM TRACING: CPT

## 2023-04-21 PROCEDURE — 99999 PR PBB SHADOW E&M-EST. PATIENT-LVL V: CPT | Mod: PBBFAC,,, | Performed by: INTERNAL MEDICINE

## 2023-04-21 PROCEDURE — 99999 PR PBB SHADOW E&M-EST. PATIENT-LVL V: ICD-10-PCS | Mod: PBBFAC,,, | Performed by: INTERNAL MEDICINE

## 2023-04-21 PROCEDURE — 99214 OFFICE O/P EST MOD 30 MIN: CPT | Mod: S$PBB,,, | Performed by: INTERNAL MEDICINE

## 2023-04-21 PROCEDURE — 93010 ELECTROCARDIOGRAM REPORT: CPT | Mod: ,,, | Performed by: INTERNAL MEDICINE

## 2023-04-21 RX ORDER — METOPROLOL SUCCINATE 25 MG/1
25 TABLET, EXTENDED RELEASE ORAL DAILY
Qty: 30 TABLET | Refills: 5 | Status: SHIPPED | OUTPATIENT
Start: 2023-04-21 | End: 2023-10-10

## 2023-04-21 RX ORDER — VERAPAMIL HYDROCHLORIDE 80 MG/1
80 TABLET ORAL 2 TIMES DAILY
Qty: 180 TABLET | Refills: 2 | Status: SHIPPED | OUTPATIENT
Start: 2023-04-21 | End: 2024-01-25

## 2023-04-21 NOTE — PROGRESS NOTES
Subjective:   Patient ID:  Kaylin Mccollum is a 78 y.o. female who presents for follow up of Follow-up (6 month)      77 yo female, 6 months f/u  PMH h/o MVP, HTN DM 20 yrs. H/o PVCs. Fibromyalgia H/o COVID 19 in  quarantine at home. (sinus headache)  On verapamil since she was 54.   Palpitation and dizziness controlled by Verapamil  Occasional chest pain, with sharp pain.   C/o dry mouth  Chronic fatigue from fibra myalgia. Some shoulder pain from the fall  Mother had afib. Father healthy. Young brother had heart procedure at age of 6.  Not on regular exercise. No smoking/drinking  ekg today NSR and poor R progression on repcoridal leads    05/2021 visit   ECHO mild MR and normal EF. Stress test no ischemia; ZIOPATCH showed rare AT longest 13 beats.  Still dry mouth and occasional pinching chest pain  Headache chronic due to migraine  Chronic fatigue, mild exercise endurance  BP and LDL controlled. A1C 6.2 at OSH in      visit  PVD swelling of the leg controlled by lasix.   BP controlled  Still fatigue and weakness   ekg NSR. A1C 7.6 BP controled     visit  C/o chest tightness when walked from the parking to the office today. Resolved after rest.  Walking and shopping could cause the muscle pain and fatigue. '  Chronic lower back injection for the pain. occasionally faint and clammy  No palpitation and leg swelling      visit  Had steroid injection of the hips. Palpitation and controlled by verapamil. BRUNO with climbing the stairs. No dizziness and faint.    Interval history  Rx of fibromyalgia working but copay elevated and will wean off now  Occasional palpitation at night  No dizziness faint dyspnea. Chronic mild leg swelling  Today Ekg NSR     stress echo normal EF and mld MR. Normal stress test; BARDY unremarkable         Past Medical History:   Diagnosis Date    Arthritis     Cataract     COVID-19 2/25/2021    Diabetes mellitus 1995    BS didn't check  09/13/2022    Diabetes mellitus, type 2     Fibromyalgia     General anesthetics causing adverse effect in therapeutic use     bradycardia     Hypertension     Migraines     Mitral valve prolapse     Osteoarthritis     Rotator cuff tear 04/01/2015       Past Surgical History:   Procedure Laterality Date    ARTHROSCOPY OF KNEE Right 3/10/2020    Procedure: ARTHROSCOPY, KNEE;  Surgeon: Les Schneider MD;  Location: [facility] OR;  Service: Orthopedics;  Laterality: Right;    CATARACT EXTRACTION W/  INTRAOCULAR LENS IMPLANT Left 07/19/2017    CATARACT EXTRACTION W/  INTRAOCULAR LENS IMPLANT Right 2017    CHOLECYSTECTOMY      CHONDROPLASTY OF KNEE Right 3/10/2020    Procedure: CHONDROPLASTY, KNEE;  Surgeon: Les Schneider MD;  Location: [facility] OR;  Service: Orthopedics;  Laterality: Right;  Anterior compartment     COLONOSCOPY N/A 9/25/2018    Procedure: COLONOSCOPY;  Surgeon: Bethel Carmona MD;  Location: [facility] ENDO;  Service: Endoscopy;  Laterality: N/A;    EXCISION OF MEDIAL MENISCUS OF KNEE Right 3/10/2020    Procedure: MENISCECTOMY, KNEE, MEDIAL;  Surgeon: Les Schneider MD;  Location: [facility] OR;  Service: Orthopedics;  Laterality: Right;  Partial, Medial , Lateral     EYE SURGERY      INJECTION OF ANESTHETIC AGENT AROUND MEDIAL BRANCH NERVES INNERVATING LUMBAR FACET JOINT Bilateral 12/13/2022    Procedure: Bilateral L3-5 MBB;  Surgeon: Humberto Dumont MD;  Location: [facility] PAIN MGT;  Service: Pain Management;  Laterality: Bilateral;    INJECTION OF ANESTHETIC AGENT AROUND NERVE Right 8/8/2019    Procedure: Right Genicular nerve block with local;  Surgeon: Manpreet Dutta MD;  Location: [facility] PAIN MGT;  Service: Pain Management;  Laterality: Right;    INJECTION OF ANESTHETIC AGENT INTO SACROILIAC JOINT Bilateral 5/6/2020    Procedure: Bilateral Sacroiliac Joint Injection;  Surgeon: Manpreet Dutta MD;  Location: [facility] PAIN MGT;  Service: Pain Management;  Laterality: Bilateral;    INJECTION OF ANESTHETIC AGENT INTO  SACROILIAC JOINT Bilateral 10/8/2020    Procedure: Bilateral SI and Bilateral GTB with RN IV sedation;  Surgeon: Manpreet Dutta MD;  [facility];  Service: Pain Management;  Laterality: Bilateral;    INJECTION OF ANESTHETIC AGENT INTO SACROILIAC JOINT Bilateral 1/5/2021    Procedure: Bilateral BLOCK, SACROILIAC JOINT and Bilateral GTB witn RN IV sedation;  Surgeon: Daniel Burns MD;  Location: [facility] PAIN [facility]GT;  Service: Pain Management;  Laterality: Bilateral;    INJECTION OF ANESTHETIC AGENT INTO SACROILIAC JOINT Bilateral 7/8/2021    Procedure: Bilateral BLOCK, SACROILIAC JOINT bilateral GTB RN IV sedation;  Surgeon: Manpreet Dutta MD;  Location: [facility] PAIN MGT;  Service: Pain Management;  Laterality: Bilateral;    INJECTION OF ANESTHETIC AGENT INTO SACROILIAC JOINT Bilateral 3/1/2022    Procedure: Bilateral BLOCK, SACROILIAC JOINT and Bilateral GTB RN IV sedation;  Surgeon: Manprete Dutta MD;  Location: [facility] PAIN MGT;  Service: Pain Management;  Laterality: Bilateral;    INJECTION OF ANESTHETIC AGENT INTO SACROILIAC JOINT Bilateral 10/10/2022    Procedure: Bilateral Sacroiliac Joint Injection;  Surgeon: Humberto Dumont MD;  Location: [facility] PAIN MGT;  Service: Pain Management;  Laterality: Bilateral;    INJECTION OF ANESTHETIC AGENT INTO SACROILIAC JOINT Bilateral 1/24/2023    Procedure: Bilateral Sacroiliac Joint Injection;  Surgeon: Humberto Dumont MD;  Location: [facility]IN [facility]GT;  Service: Pain Management;  Laterality: Bilateral;    INJECTION OF JOINT Bilateral 9/5/2019    Procedure: Bilateral GT bursa injection;  Surgeon: Manpreet Dutta MD;  Location: [facility]IN [facility]GT;  Service: Pain Management;  Laterality: Bilateral;    INJECTION OF JOINT Bilateral 5/6/2020    Procedure: Bilateral GT bursa injection;  Surgeon: Manpreet Dutta MD;  Location: [facility] PAIN MGT;  Service: Pain Management;  Laterality: Bilateral;    INJECTION OF JOINT Right 4/28/2022    Procedure: Injection, Joint Right Hip Injection RN IV sedation;   Surgeon: Manpreet Dutta MD;  Location: [facility] PAIN MGT;  Service: Pain Management;  Laterality: Right;    INJECTION OF JOINT Bilateral 10/10/2022    Procedure: Bilateral GT bursa injection with RN IV sedation;  Surgeon: Humberto Dumont MD;  Location: [facility] PAIN MGT;  Service: Pain Management;  Laterality: Bilateral;    INJECTION OF JOINT Bilateral 2023    Procedure: Bilateral GT bursa injection;  Surgeon: Humberto Dumont MD;  Location: [facility] PAIN MGT;  Service: Pain Management;  Laterality: Bilateral;    KNEE ARTHROSCOPY Bilateral     PARS PLANA VITRECTOMY W/ REPAIR OF MACULAR HOLE Left 2017    RADIOFREQUENCY THERMOCOAGULATION Left 2019    Procedure: Right SIJ RFA;  Surgeon: Mnapreet Dutta MD;  Location: [facility]GT;  Service: Pain Management;  Laterality: Left;    RADIOFREQUENCY THERMOCOAGULATION Right 2019    Procedure: Right SIJ RFA;  Surgeon: Manpreet Dutta MD;  [facility];  Service: Pain Management;  Laterality: Right;    SHOULDER ARTHROSCOPY Right 06/04/15       Social History     Tobacco Use    Smoking status: Never    Smokeless tobacco: Never    Tobacco comments:     Never smoked   Substance Use Topics    Alcohol use: Never     Comment: Couple times per year    Drug use: No       Family History   Problem Relation Age of Onset    Heart disease Mother         A-Fib    Glaucoma Mother     Cataracts Mother     Cancer Mother         colon    Arthritis Mother         : 17    Depression Mother     Kidney disease Daughter         ESRD    Anesthesia problems Daughter         cardiac arrest during nephrectomy    Birth defects Daughter         Renal    Depression Daughter     Learning disabilities Daughter         Dyslexia, ADD    Diabetes Maternal Grandmother     Heart disease Maternal Grandmother         Congestive heart failure    Alcohol abuse Maternal Grandmother         Death: 84    Depression Maternal Grandmother     Hypertension Maternal Grandmother     Cancer Maternal  Grandmother     Diabetes Maternal Grandfather     Cancer Maternal Grandfather     Alcohol abuse Maternal Grandfather          1964    Breast cancer Paternal Aunt     Depression Brother     Depression Son     Learning disabilities Son         ADD & ADHD    Diabetes Maternal Aunt          9/3/2023    Diabetes Maternal Uncle          2019    Birth defects Brother         Cardiac    Heart disease Brother         Heart Surgery  as 6 y.o.         Review of Systems   Constitutional: Negative for decreased appetite, diaphoresis, fever, malaise/fatigue and night sweats.   HENT:  Negative for nosebleeds.    Eyes:  Negative for blurred vision and double vision.   Cardiovascular:  Positive for palpitations. Negative for claudication, dyspnea on exertion, irregular heartbeat, leg swelling, near-syncope, orthopnea, paroxysmal nocturnal dyspnea and syncope.   Respiratory:  Negative for cough, shortness of breath, sleep disturbances due to breathing, snoring, sputum production and wheezing.    Endocrine: Negative for cold intolerance and polyuria.   Hematologic/Lymphatic: Does not bruise/bleed easily.   Skin:  Negative for rash.   Musculoskeletal:  Positive for arthritis, back pain and joint pain. Negative for falls, joint swelling and neck pain.   Gastrointestinal:  Negative for abdominal pain, heartburn, nausea and vomiting.   Genitourinary:  Negative for dysuria, frequency and hematuria.   Neurological:  Positive for dizziness. Negative for difficulty with concentration, focal weakness, headaches, light-headedness, numbness, seizures and weakness.   Psychiatric/Behavioral:  Negative for depression, memory loss and substance abuse. The patient does not have insomnia.    Allergic/Immunologic: Negative for HIV exposure and hives.     Objective:   Physical Exam  HENT:      Head: Normocephalic.   Eyes:      Pupils: Pupils are equal, round, and reactive to light.   Neck:      Thyroid: No thyromegaly.       Vascular: Normal carotid pulses. No carotid bruit or JVD.   Cardiovascular:      Rate and Rhythm: Normal rate and regular rhythm. No extrasystoles are present.     Chest Wall: PMI is not displaced.      Pulses: Normal pulses.      Heart sounds: Normal heart sounds. No murmur heard.    No gallop. No S3 sounds.   Pulmonary:      Effort: No respiratory distress.      Breath sounds: Normal breath sounds. No stridor.   Abdominal:      General: Bowel sounds are normal.      Palpations: Abdomen is soft.      Tenderness: There is no abdominal tenderness. There is no rebound.   Musculoskeletal:         General: Swelling present.   Skin:     Findings: No rash.   Neurological:      Mental Status: She is alert and oriented to person, place, and time.   Psychiatric:         Behavior: Behavior normal.       Lab Results   Component Value Date    CHOL 211 (H) 02/16/2023    CHOL 195 08/11/2022    CHOL 222 (H) 02/09/2022     Lab Results   Component Value Date    HDL 65 02/16/2023    HDL 61 08/11/2022    HDL 65 02/09/2022     Lab Results   Component Value Date    LDLCALC 131.6 02/16/2023    LDLCALC 112.8 08/11/2022    LDLCALC 141.6 02/09/2022     Lab Results   Component Value Date    TRIG 72 02/16/2023    TRIG 106 08/11/2022    TRIG 77 02/09/2022     Lab Results   Component Value Date    CHOLHDL 30.8 02/16/2023    CHOLHDL 31.3 08/11/2022    CHOLHDL 29.3 02/09/2022       Chemistry        Component Value Date/Time     02/16/2023 0714    K 4.2 02/16/2023 0714     02/16/2023 0714    CO2 24 02/16/2023 0714    BUN 18 02/16/2023 0714    CREATININE 1.0 02/16/2023 0714     (H) 02/16/2023 0714        Component Value Date/Time    CALCIUM 9.7 02/16/2023 0714    ALKPHOS 55 02/16/2023 0714    AST 18 02/16/2023 0714    ALT 17 02/16/2023 0714    BILITOT 0.3 02/16/2023 0714    ESTGFRAFRICA >60.0 02/09/2022 1308    EGFRNONAA >60.0 02/09/2022 1308          Lab Results   Component Value Date    HGBA1C 7.3 (H) 02/16/2023     Lab Results    Component Value Date    TSH 1.574 02/16/2023     No results found for: INR, PROTIME  Lab Results   Component Value Date    WBC 5.68 02/16/2023    HGB 13.0 02/16/2023    HCT 41.1 02/16/2023    MCV 95 02/16/2023     02/16/2023     BMP  Sodium   Date Value Ref Range Status   02/16/2023 139 136 - 145 mmol/L Final     Potassium   Date Value Ref Range Status   02/16/2023 4.2 3.5 - 5.1 mmol/L Final     Chloride   Date Value Ref Range Status   02/16/2023 104 95 - 110 mmol/L Final     CO2   Date Value Ref Range Status   02/16/2023 24 23 - 29 mmol/L Final     BUN   Date Value Ref Range Status   02/16/2023 18 8 - 23 mg/dL Final     Creatinine   Date Value Ref Range Status   02/16/2023 1.0 0.5 - 1.4 mg/dL Final     Calcium   Date Value Ref Range Status   02/16/2023 9.7 8.7 - 10.5 mg/dL Final     Anion Gap   Date Value Ref Range Status   02/16/2023 11 8 - 16 mmol/L Final     eGFR if    Date Value Ref Range Status   02/09/2022 >60.0 >60 mL/min/1.73 m^2 Final     eGFR if non    Date Value Ref Range Status   02/09/2022 >60.0 >60 mL/min/1.73 m^2 Final     Comment:     Calculation used to obtain the estimated glomerular filtration  rate (eGFR) is the CKD-EPI equation.        BNP  @LABRCNTIP(BNP,BNPTRIAGEBLO)@  @LABRCNTIP(troponini)@  CrCl cannot be calculated (Patient's most recent lab result is older than the maximum 7 days allowed.).  No results found in the last 24 hours.  No results found in the last 24 hours.  No results found in the last 24 hours.    Assessment:      1. PVD (peripheral vascular disease)    2. Hypertension associated with diabetes    3. Hyperlipidemia associated with type 2 diabetes mellitus    4. MVP (mitral valve prolapse)    5. PVC (premature ventricular contraction)      Leg swelling   Palpitation    Plan:   D/c losartan and Add ToprolXL 25 mg daily for HTN and palpitation  Decreased verapamil from 120 mg bid to 80 mg bid to avoid hypotension and leg  swelling  Continue Lasix as needed for PVD  Advise Compression sock   DASH  RTC in 3 months

## 2023-04-25 ENCOUNTER — HOSPITAL ENCOUNTER (OUTPATIENT)
Facility: HOSPITAL | Age: 78
Discharge: HOME | End: 2023-04-25
Attending: ANESTHESIOLOGY | Admitting: ANESTHESIOLOGY
Payer: MEDICARE

## 2023-04-25 VITALS
DIASTOLIC BLOOD PRESSURE: 60 MMHG | HEART RATE: 82 BPM | SYSTOLIC BLOOD PRESSURE: 123 MMHG | HEIGHT: 70 IN | RESPIRATION RATE: 18 BRPM | BODY MASS INDEX: 31.28 KG/M2 | WEIGHT: 218.5 LBS | OXYGEN SATURATION: 97 % | TEMPERATURE: 98 F

## 2023-04-25 DIAGNOSIS — M54.59 DISCOGENIC LUMBAR PAIN: ICD-10-CM

## 2023-04-25 PROBLEM — M51.36 DEGENERATION OF LUMBAR INTERVERTEBRAL DISC: Status: ACTIVE | Noted: 2023-04-25

## 2023-04-25 PROBLEM — M51.369 DEGENERATION OF LUMBAR INTERVERTEBRAL DISC: Status: ACTIVE | Noted: 2023-04-25

## 2023-04-25 LAB — POCT GLUCOSE: 196 MG/DL (ref 70–110)

## 2023-04-25 PROCEDURE — 0627T PERQ NJX ALGC FLUOR LMBR 1ST: CPT | Performed by: ANESTHESIOLOGY

## 2023-04-25 PROCEDURE — 99152 MOD SED SAME PHYS/QHP 5/>YRS: CPT | Performed by: ANESTHESIOLOGY

## 2023-04-25 PROCEDURE — 63600175 PHARM REV CODE 636 W HCPCS: Performed by: ANESTHESIOLOGY

## 2023-04-25 PROCEDURE — C1889 IMPLANT/INSERT DEVICE, NOC: HCPCS

## 2023-04-25 PROCEDURE — 0627T PR PERC INJECT ALLOGENEIC CELL/TISSUE BASED PRDCT UNI/BI W/FLUORO, LUMBAR; 1ST LVL: ICD-10-PCS | Mod: ,,, | Performed by: ANESTHESIOLOGY

## 2023-04-25 PROCEDURE — 25000003 PHARM REV CODE 250: Performed by: ANESTHESIOLOGY

## 2023-04-25 PROCEDURE — 0627T PERQ NJX ALGC FLUOR LMBR 1ST: CPT | Mod: ,,, | Performed by: ANESTHESIOLOGY

## 2023-04-25 RX ORDER — LIDOCAINE HYDROCHLORIDE 10 MG/ML
INJECTION, SOLUTION EPIDURAL; INFILTRATION; INTRACAUDAL; PERINEURAL
Status: DISCONTINUED | OUTPATIENT
Start: 2023-04-25 | End: 2023-04-25 | Stop reason: HOSPADM

## 2023-04-25 RX ORDER — FENTANYL CITRATE 50 UG/ML
INJECTION, SOLUTION INTRAMUSCULAR; INTRAVENOUS
Status: DISCONTINUED | OUTPATIENT
Start: 2023-04-25 | End: 2023-04-25 | Stop reason: HOSPADM

## 2023-04-25 RX ORDER — SODIUM BICARBONATE 1 MEQ/ML
SYRINGE (ML) INTRAVENOUS
Status: DISCONTINUED | OUTPATIENT
Start: 2023-04-25 | End: 2023-04-25 | Stop reason: HOSPADM

## 2023-04-25 RX ORDER — ONDANSETRON 2 MG/ML
INJECTION INTRAMUSCULAR; INTRAVENOUS
Status: DISCONTINUED | OUTPATIENT
Start: 2023-04-25 | End: 2023-04-25 | Stop reason: HOSPADM

## 2023-04-25 RX ORDER — MIDAZOLAM HYDROCHLORIDE 1 MG/ML
INJECTION, SOLUTION INTRAMUSCULAR; INTRAVENOUS
Status: DISCONTINUED | OUTPATIENT
Start: 2023-04-25 | End: 2023-04-25 | Stop reason: HOSPADM

## 2023-04-25 RX ORDER — CEFAZOLIN SODIUM 2 G/50ML
2 SOLUTION INTRAVENOUS
Status: COMPLETED | OUTPATIENT
Start: 2023-04-25 | End: 2023-04-25

## 2023-04-25 RX ADMIN — CEFAZOLIN SODIUM 2 G: 2 SOLUTION INTRAVENOUS at 10:04

## 2023-04-25 NOTE — DISCHARGE SUMMARY
Discharge Note  Short Stay      SUMMARY     Admit Date: 4/25/2023    Attending Physician: Humberto Dumont MD        Discharge Physician: Humberto Dumont MD        Discharge Date: 4/25/2023 11:21 AM    Procedure(s) (LRB):  INJECTION,ALLOGRAFT,INTERVERTEBRAL DISC,1ST LEVEL: L5-S1 (N/A)    Final Diagnosis: Degeneration of lumbar intervertebral disc [M51.36]  Lumbar degenerative disc disease [M51.36]    Disposition: Home or self care    Patient Instructions:   Current Discharge Medication List        CONTINUE these medications which have NOT CHANGED    Details   celecoxib (CELEBREX) 200 MG capsule TAKE 1 CAPSULE(200 MG) BY MOUTH TWICE DAILY WITH FOOD  Qty: 120 capsule, Refills: 1    Associated Diagnoses: Chondromalacia, right knee; Left shoulder tendinitis      milnacipran (SAVELLA) 50 mg Tab Take 1 tablet (50 mg total) by mouth 2 (two) times daily.  Qty: 60 tablet, Refills: 0      verapamiL (CALAN) 80 MG tablet Take 1 tablet (80 mg total) by mouth 2 (two) times daily.  Qty: 180 tablet, Refills: 2    Associated Diagnoses: Hypertension associated with diabetes; MVP (mitral valve prolapse)      biotin 1 mg tablet Take 1,000 mcg by mouth every morning.       clotrimazole-betamethasone 1-0.05% (LOTRISONE) cream Apply topically 2 (two) times daily.  Qty: 45 g, Refills: 2      diclofenac sodium (VOLTAREN) 1 % Gel Apply 2 g topically 4 (four) times daily.  Qty: 1 each, Refills: 6      ezetimibe (ZETIA) 10 mg tablet Take 1 tablet (10 mg total) by mouth once daily.  Qty: 90 tablet, Refills: 1    Associated Diagnoses: Hyperlipidemia associated with type 2 diabetes mellitus      FLUoxetine 40 MG capsule Take 1 capsule (40 mg total) by mouth once daily.  Qty: 90 capsule, Refills: 1    Associated Diagnoses: Fibromyalgia; Chronic major depressive disorder      fluticasone (FLONASE) 50 mcg/actuation nasal spray 2 sprays by Each Nare route once daily.  Qty: 1 Bottle, Refills: 0    Associated Diagnoses: Sinusitis      furosemide (LASIX) 20  MG tablet Take 1 tablet (20 mg total) by mouth 2 (two) times daily.  Qty: 60 tablet, Refills: 11      gabapentin (NEURONTIN) 300 MG capsule Take 1 capsule (300 mg total) by mouth 4 (four) times daily.  Qty: 360 capsule, Refills: 0      INVOKANA 100 mg Tab tablet TAKE 1 TABLET(100 MG) BY MOUTH EVERY DAY  Qty: 90 tablet, Refills: 1    Associated Diagnoses: Diabetes mellitus type 2 in obese      LORazepam (ATIVAN) 1 MG tablet TK 3 TS PO 1 HOUR PRIOR TO APPOINTMENT      metFORMIN (GLUCOPHAGE) 1000 MG tablet Take 1 tablet (1,000 mg total) by mouth 2 (two) times daily with meals.  Qty: 180 tablet, Refills: 1    Associated Diagnoses: Diabetes mellitus type 2 in obese      metoprolol succinate (TOPROL-XL) 25 MG 24 hr tablet Take 1 tablet (25 mg total) by mouth once daily.  Qty: 30 tablet, Refills: 5    Comments: .      omeprazole (PRILOSEC) 20 MG capsule Take 1 capsule (20 mg total) by mouth daily as needed (w/ NSAID).  Qty: 30 capsule, Refills: 5    Associated Diagnoses: Gastroesophageal reflux disease without esophagitis      potassium chloride SA (K-DUR,KLOR-CON M) 10 MEQ tablet TAKE 1 TABLET(10 MEQ) BY MOUTH EVERY DAY  Qty: 20 tablet, Refills: 5      pulse oximeter (PULSE OXIMETER) device by Apply Externally route 2 (two) times a day. Use twice daily at 8 AM and 3 PM and record the value in MyChart as directed.  Qty: 1 each, Refills: 0    Comments: This is a NO CHARGE item.  Please override price to zero.  DO NOT PRINT.  NORMAL MODE e-PRESCRIBE ONLY.  Associated Diagnoses: COVID-19 virus detected      triamcinolone acetonide 0.025% (KENALOG) 0.025 % Oint Apply topically 2 (two) times daily. for 10 days  Qty: 15 g, Refills: 2    Associated Diagnoses: Tinea corporis                 Discharge Diagnosis: Degeneration of lumbar intervertebral disc [M51.36]  Lumbar degenerative disc disease [M51.36]  Condition on Discharge: Stable with no complications to procedure   Diet on Discharge: Same as before.  Activity: as per  instruction sheet.  Discharge to: Home with a responsible adult.  Follow up: 2-4 weeks       Please call the office at [phone] if you experience any weakness or loss of sensation, fever > 101.5, pain uncontrolled with oral medications, persistent nausea/vomiting/or diarrhea, redness or drainage from the incisions, or any other worrisome concerns. If physician on call was not reached or could not communicate with our office for any reason please go to the nearest emergency department

## 2023-04-25 NOTE — OP NOTE
Patient Name: Kaylin Mccollum      MRN: [id number]        INFORMED CONSENT: The procedure, risks, benefits and options were discussed with patient. There are no contraindications to the procedure. The patient expressed understanding and agreed to proceed. The personnel performing the procedure was discussed. I verify that I personally obtained Kaylin's consent prior to the start of the procedure and the signed consent can be found on the patient's chart.        Procedure Date:DATENOW@    Surgeon(s) and Role:  Humberto Dumont MD     Sedation: Conscious sedation provided by M.D     The patient was monitored with continuous pulse oximetry, EKG, and intermittent blood pressure monitors, immediately prior to administration of sedation.  The patient was hemodynamically stable throughout the entire process was responsive to voice, and breathing spontaneously.  Supplemental O2 was provided at 2L/min via nasal cannula.  Patient was comfortable for the duration of the procedure.      There was a total of 2mg IV Midazolam and 50 mcg Fentanyl titrated for the procedure     Total sedation time was >20minutes and <30minutes        Pre Procedure diagnosis: Other intervertebral disc degeneration, lumbar region [M51.36]  DDD (degenerative disc disease), lumbar [M51.36]     Post-Procedure diagnosis: Same     PROCEDURE:  Injection allograft intervertebral disc,L5-S1  DESCRIPTION OF PROCEDURE:Ancef 2 gm IV was administered for prophylaxis, 30 minutes prior to the procedure, see procedure record for time. The patient was brought to the fluoroscopy suite.  IV access was obtained prior to the procedure. The patient was positioned prone on the fluoroscopy table.  Continuous hemodynamic monitoring was initiated including blood pressure, EKG, and pulse oximetry.  IV sedation was administered incrementally to allow the patient to remain comfortable and conversant throughout the procedure. The area of the thoracolumbar spine was prepped with  "chlorhexidine and chlorhexidine three times and draped in a sterile fashion.     The targeted discs (L5-S1) were identified by fluoroscopy and the skin and subcutaneous tissues overlying the needle insertion points were anesthetized using 5 cc of lidocaine 1%.  Under fluoroscopic guidance, 20 gauge 7" spinal needles were slowly advanced into the L5-S1 disc from a R oblique approach. The position of the needles was confirmed using oblique, AP and lateral fluoroscopic imaging. Negative aspiration for blood was confirmed and following this, the allograft material was reconstituted with normal saline in the ViaDisc kit. 2 cc were ultimately yielded and then injected in the nucleus pulposus without any resistance. The needles were removed and bleeding was nil.  A sterile dressing was applied. No specimens collected. Kaylin Mccollum   was taken to the Post-block Recovery Area for further observation.        "

## 2023-04-25 NOTE — DISCHARGE INSTRUCTIONS

## 2023-04-26 DIAGNOSIS — E66.9 DIABETES MELLITUS TYPE 2 IN OBESE: ICD-10-CM

## 2023-04-26 DIAGNOSIS — E11.69 DIABETES MELLITUS TYPE 2 IN OBESE: ICD-10-CM

## 2023-04-26 RX ORDER — CANAGLIFLOZIN 100 MG/1
100 TABLET, FILM COATED ORAL DAILY
Qty: 90 TABLET | Refills: 1 | Status: CANCELLED | OUTPATIENT
Start: 2023-04-26

## 2023-04-26 NOTE — PRE-PROCEDURE INSTRUCTIONS
Spoke with patient regarding procedure scheduled on 5.9     Arrival time 0930     Has patient been sick with fever or on antibiotics within the last 7 days? No     Does the patient have any open wounds, sores or rashes? No     Does the patient have any recent fractures? no     Has patient received a vaccination within the last 7 days? No     Received the COVID vaccination? yes     Has the patient stopped all medications as directed? na     Does patient have a pacemaker and or defibrillator? no     Does the patient have a ride to and from procedure and someone reliable to remain with patient? DAUGHTER      Is the patient diabetic? YES     Does the patient have sleep apnea? Or use O2 at home? no     Is the patient receiving sedation? Yes     Is the patient instructed to remain NPO beginning at midnight the night before their procedure? yes     Procedure location confirmed with patient? Yes     Covid- Denies signs/symptoms. Instructed to notify PAT/MD if any changes.

## 2023-04-26 NOTE — TELEPHONE ENCOUNTER
No new care gaps identified.  [facility] Embedded Care Gaps. Reference number: [id number]. 4/26/2023   4:46:08 PM CDT

## 2023-04-27 ENCOUNTER — OFFICE VISIT (OUTPATIENT)
Dept: INTERNAL MEDICINE | Facility: CLINIC | Age: 78
End: 2023-04-27
Payer: MEDICARE

## 2023-04-27 VITALS
HEIGHT: 69 IN | BODY MASS INDEX: 32.52 KG/M2 | SYSTOLIC BLOOD PRESSURE: 120 MMHG | DIASTOLIC BLOOD PRESSURE: 74 MMHG | HEART RATE: 87 BPM | WEIGHT: 219.56 LBS | OXYGEN SATURATION: 96 %

## 2023-04-27 DIAGNOSIS — E11.69 HYPERLIPIDEMIA ASSOCIATED WITH TYPE 2 DIABETES MELLITUS: ICD-10-CM

## 2023-04-27 DIAGNOSIS — I10 HYPERTENSION, UNSPECIFIED TYPE: ICD-10-CM

## 2023-04-27 DIAGNOSIS — T46.6X5A STATIN-INDUCED MYOSITIS: ICD-10-CM

## 2023-04-27 DIAGNOSIS — E78.5 HYPERLIPIDEMIA ASSOCIATED WITH TYPE 2 DIABETES MELLITUS: ICD-10-CM

## 2023-04-27 DIAGNOSIS — M79.7 FIBROMYALGIA: Chronic | ICD-10-CM

## 2023-04-27 DIAGNOSIS — R32 URINARY INCONTINENCE, UNSPECIFIED TYPE: ICD-10-CM

## 2023-04-27 DIAGNOSIS — Z74.09 OTHER REDUCED MOBILITY: ICD-10-CM

## 2023-04-27 DIAGNOSIS — Z00.00 ENCOUNTER FOR PREVENTIVE HEALTH EXAMINATION: Primary | ICD-10-CM

## 2023-04-27 DIAGNOSIS — R26.9 ABNORMALITY OF GAIT AND MOBILITY: ICD-10-CM

## 2023-04-27 DIAGNOSIS — F32.5 MAJOR DEPRESSIVE DISORDER IN FULL REMISSION, UNSPECIFIED WHETHER RECURRENT: ICD-10-CM

## 2023-04-27 DIAGNOSIS — E66.9 OBESITY (BMI 30.0-34.9): ICD-10-CM

## 2023-04-27 DIAGNOSIS — I34.1 MVP (MITRAL VALVE PROLAPSE): ICD-10-CM

## 2023-04-27 DIAGNOSIS — M60.9 STATIN-INDUCED MYOSITIS: ICD-10-CM

## 2023-04-27 DIAGNOSIS — I73.9 PVD (PERIPHERAL VASCULAR DISEASE): ICD-10-CM

## 2023-04-27 PROCEDURE — G0439 PR MEDICARE ANNUAL WELLNESS SUBSEQUENT VISIT: ICD-10-PCS | Mod: ,,, | Performed by: NURSE PRACTITIONER

## 2023-04-27 PROCEDURE — G0439 PPPS, SUBSEQ VISIT: HCPCS | Mod: ,,, | Performed by: NURSE PRACTITIONER

## 2023-04-27 PROCEDURE — 99215 OFFICE O/P EST HI 40 MIN: CPT | Mod: PBBFAC | Performed by: NURSE PRACTITIONER

## 2023-04-27 PROCEDURE — 99999 PR PBB SHADOW E&M-EST. PATIENT-LVL V: CPT | Mod: PBBFAC,,, | Performed by: NURSE PRACTITIONER

## 2023-04-27 PROCEDURE — 99999 PR PBB SHADOW E&M-EST. PATIENT-LVL V: ICD-10-PCS | Mod: PBBFAC,,, | Performed by: NURSE PRACTITIONER

## 2023-04-27 NOTE — PROGRESS NOTES
"  Kaylin Mccollum presented for a  Medicare AWV and comprehensive Health Risk Assessment today. The following components were reviewed and updated:    Medical history  Family History  Social history  Allergies and Current Medications  Health Risk Assessment  Health Maintenance  Care Team         ** See Completed Assessments for Annual Wellness Visit within the encounter summary.**         The following assessments were completed:  Living Situation  CAGE  Depression Screening  Timed Get Up and Go  Whisper Test  Cognitive Function Screening  Nutrition Screening  ADL Screening  PAQ Screening        Vitals:    04/27/23 1437   BP: 120/74   Pulse: 87   SpO2: 96%   Weight: 99.6 kg (219 lb 9.3 oz)   Height: 5' 8.7" (1.745 m)     Body mass index is 32.71 kg/m².  Physical Exam  Vitals and nursing note reviewed.   Constitutional:       Appearance: She is well-developed.   HENT:      Head: Normocephalic.   Cardiovascular:      Rate and Rhythm: Normal rate and regular rhythm.      Heart sounds: Normal heart sounds.   Pulmonary:      Effort: Pulmonary effort is normal. No respiratory distress.      Breath sounds: Normal breath sounds.   Abdominal:      Palpations: Abdomen is soft. There is no mass.      Tenderness: There is no abdominal tenderness.   Musculoskeletal:         General: Normal range of motion.   Skin:     General: Skin is warm and dry.   Neurological:      Mental Status: She is alert and oriented to person, place, and time.      Motor: No abnormal muscle tone.   Psychiatric:         Speech: Speech normal.         Behavior: Behavior normal.             Diagnoses and health risks identified today and associated recommendations/orders:    1. Encounter for preventive health examination     Review for Opioid Screening: Pt does not have Rx for Opioids      Review for Substance Use Disorders: Patient does not use substance  per chart     Discussed locations to receive COVID booster     2. Major depressive disorder in full " remission, unspecified whether recurrent  PHQ 2-0  Stable and controlled. Continue current treatment plan as previously prescribed with your PCP.      3. PVD (peripheral vascular disease)  Stable. Continue current treatment plan as previously prescribed with your  cardiologist.     4. Hyperlipidemia associated with type 2 diabetes mellitus  A1c 7.3  Lipid-not at goal  Continue current treatment plan as previously prescribed with your  pcp and cardiologist.     5. Statin-induced myositis  Continue current treatment plan as previously prescribed with your  pcp     6. Hypertension, unspecified type  Continue current treatment plan as previously prescribed with your  pcp and cardiologist.     7. MVP (mitral valve prolapse)  Continue current treatment plan as previously prescribed with your  cardiologist.     8. Obesity (BMI 30.0-34.9)  Encouraged healthy diet and exercise as tolerated to help bring BMI into normal range.    Continue current treatment plan as previously prescribed with your  pcp     9. Fibromyalgia  Continue current treatment plan as previously prescribed with your pcp and  pain management provider.     10. Urinary incontinence, unspecified type  Chronic  Intermittent  Advised to follow up with PCP for further evaluation and recommendations. Patient expressed understanding.      11. Abnormality of gait and mobility  Abnormal timed get up and go test. Reports 1 fall in the last 12 months.     Fall precautions reviewed with patient. Advised to follow up with PCP for further recommendations. Patient expressed understanding.       12. Other reduced mobility  Advised to follow up with PCP for further recommendations. Patient expressed understanding.     Reports chronic n/t to BUE and BLE. Reports pain management is aware  Continue current treatment plan as previously prescribed with your  PM provider.       Provided Gerchuckene with a 5-10 year written screening schedule and personal prevention plan.  Recommendations were developed using the USPSTF age appropriate recommendations. Education, counseling, and referrals were provided as needed. After Visit Summary printed and given to patient which includes a list of additional screenings\tests needed.    Follow up in about 1 year (around 4/27/2024) for awv.    Yvonne Garduno, NP  I offered to discuss advanced care planning, including how to pick a person who would make decisions for you if you were unable to make them for yourself, called a health care power of , and what kind of decisions you might make such as use of life sustaining treatments such as ventilators and tube feeding when faced with a life limiting illness recorded on a living will that they will need to know. (How you want to be cared for as you near the end of your natural life)     X Patient is interested in learning more about how to make advanced directives.  I provided them paperwork and offered to discuss this with them.

## 2023-04-27 NOTE — PATIENT INSTRUCTIONS
Counseling and Referral of Other Preventative  (Italic type indicates deductible and co-insurance are waived)    Patient Name: Kaylin Mccollum  Today's Date: 4/27/2023    Health Maintenance       Date Due Completion Date    COVID-19 Vaccine (5 - Booster for Pfizer series) 10/13/2022 8/18/2022    Mammogram 11/03/2022 11/3/2021    Influenza Vaccine (1) 06/30/2023 (Originally 9/1/2022) 9/28/2021    Override on 11/21/2014: Done    Diabetes Urine Screening 08/11/2023 8/11/2022    Hemoglobin A1c 08/16/2023 2/16/2023    Eye Exam 09/13/2023 9/13/2022    Override on 8/10/2016: Done    Override on 7/7/2015: Done (Negative Retinopathy)    Lipid Panel 02/16/2024 2/16/2023    DEXA Scan 07/22/2024 7/22/2019    TETANUS VACCINE 04/24/2027 4/24/2017        No orders of the defined types were placed in this encounter.    The following information is provided to all patients.  This information is to help you find resources for any of the problems found today that may be affecting your health:                Living healthy guide: www.[facility].[place].gov      Understanding Diabetes: www.diabetes.org      Eating healthy: www.cdc.gov/healthyweight      CDC home safety checklist: www.cdc.gov/steadi/patient.html      Agency on Aging: www.goea.[place].[facility]      Alcoholics anonymous (AA): www.aa.org      Physical Activity: www.[place].nih.gov/fy2oduq      Tobacco use: www.quitwithusla.org

## 2023-05-08 ENCOUNTER — TELEPHONE (OUTPATIENT)
Dept: PAIN MEDICINE | Facility: CLINIC | Age: 78
End: 2023-05-08
Payer: COMMERCIAL

## 2023-05-08 NOTE — TELEPHONE ENCOUNTER
----- Message from Ester Tee sent at 5/8/2023  8:40 AM CDT -----  Regarding: Arrival time for 5/9  Contact: [phone]  Patient Kaylin is calling. Patient would like to confirm arrival time for tomorrow. Please call patient at [phone]    Thank You

## 2023-05-08 NOTE — TELEPHONE ENCOUNTER
Returned pt call. Informed her that her arrival time for her procedure tomorrow is 9:30am. Pt verbalized understanding. All questions answered.

## 2023-05-09 ENCOUNTER — HOSPITAL ENCOUNTER (OUTPATIENT)
Facility: HOSPITAL | Age: 78
Discharge: HOME | End: 2023-05-09
Attending: ANESTHESIOLOGY | Admitting: ANESTHESIOLOGY
Payer: MEDICARE

## 2023-05-09 VITALS
DIASTOLIC BLOOD PRESSURE: 73 MMHG | TEMPERATURE: 97 F | WEIGHT: 221.13 LBS | OXYGEN SATURATION: 97 % | SYSTOLIC BLOOD PRESSURE: 155 MMHG | HEART RATE: 79 BPM | BODY MASS INDEX: 31.66 KG/M2 | RESPIRATION RATE: 14 BRPM | HEIGHT: 70 IN

## 2023-05-09 DIAGNOSIS — M54.59 DISCOGENIC LUMBAR PAIN: ICD-10-CM

## 2023-05-09 DIAGNOSIS — M51.36 LUMBAR DEGENERATIVE DISC DISEASE: ICD-10-CM

## 2023-05-09 DIAGNOSIS — M51.36 LUMBAR DEGENERATIVE DISC DISEASE: Primary | ICD-10-CM

## 2023-05-09 LAB — POCT GLUCOSE: 162 MG/DL (ref 70–110)

## 2023-05-09 PROCEDURE — 63600175 PHARM REV CODE 636 W HCPCS: Performed by: ANESTHESIOLOGY

## 2023-05-09 PROCEDURE — 0627T PERQ NJX ALGC FLUOR LMBR 1ST: CPT | Performed by: ANESTHESIOLOGY

## 2023-05-09 PROCEDURE — 25000003 PHARM REV CODE 250: Performed by: ANESTHESIOLOGY

## 2023-05-09 PROCEDURE — 0627T PR PERC INJECT ALLOGENEIC CELL/TISSUE BASED PRDCT UNI/BI W/FLUORO, LUMBAR; 1ST LVL: ICD-10-PCS | Mod: ,,, | Performed by: ANESTHESIOLOGY

## 2023-05-09 PROCEDURE — C1889 IMPLANT/INSERT DEVICE, NOC: HCPCS

## 2023-05-09 PROCEDURE — 0627T PERQ NJX ALGC FLUOR LMBR 1ST: CPT | Mod: ,,, | Performed by: ANESTHESIOLOGY

## 2023-05-09 PROCEDURE — 99152 MOD SED SAME PHYS/QHP 5/>YRS: CPT | Performed by: ANESTHESIOLOGY

## 2023-05-09 RX ORDER — MIDAZOLAM HYDROCHLORIDE 1 MG/ML
INJECTION, SOLUTION INTRAMUSCULAR; INTRAVENOUS
Status: DISCONTINUED | OUTPATIENT
Start: 2023-05-09 | End: 2023-05-09 | Stop reason: HOSPADM

## 2023-05-09 RX ORDER — INDOMETHACIN 25 MG/1
CAPSULE ORAL
Status: DISCONTINUED | OUTPATIENT
Start: 2023-05-09 | End: 2023-05-09 | Stop reason: HOSPADM

## 2023-05-09 RX ORDER — CEFAZOLIN SODIUM 2 G/50ML
2 SOLUTION INTRAVENOUS
Status: COMPLETED | OUTPATIENT
Start: 2023-05-09 | End: 2023-05-09

## 2023-05-09 RX ORDER — LIDOCAINE HYDROCHLORIDE 10 MG/ML
INJECTION, SOLUTION EPIDURAL; INFILTRATION; INTRACAUDAL; PERINEURAL
Status: DISCONTINUED | OUTPATIENT
Start: 2023-05-09 | End: 2023-05-09 | Stop reason: HOSPADM

## 2023-05-09 RX ORDER — FENTANYL CITRATE 50 UG/ML
INJECTION, SOLUTION INTRAMUSCULAR; INTRAVENOUS
Status: DISCONTINUED | OUTPATIENT
Start: 2023-05-09 | End: 2023-05-09 | Stop reason: HOSPADM

## 2023-05-09 RX ADMIN — CEFAZOLIN SODIUM 2 G: 2 SOLUTION INTRAVENOUS at 10:05

## 2023-05-09 NOTE — H&P
HPI  Patient presenting for Procedure(s) (LRB):  INJECTION,ALLOGRAFT,INTERVERTEBRAL DISC,2nd LEVEL: L3-4 (N/A)     Patient on Anti-coagulation No    No health changes since previous encounter    Past Medical History:   Diagnosis Date    Arthritis     Cataract     COVID-19 2/25/2021    Diabetes mellitus 1995    BS didn't check 09/13/2022    Diabetes mellitus, type 2     Fibromyalgia     General anesthetics causing adverse effect in therapeutic use     bradycardia     Hypertension     Migraines     Mitral valve prolapse     Osteoarthritis     Rotator cuff tear 04/01/2015     Past Surgical History:   Procedure Laterality Date    ARTHROSCOPY OF KNEE Right 3/10/2020    Procedure: ARTHROSCOPY, KNEE;  Surgeon: Les Schneider MD;  Location: [facility] OR;  Service: Orthopedics;  Laterality: Right;    CATARACT EXTRACTION W/  INTRAOCULAR LENS IMPLANT Left 07/19/2017    CATARACT EXTRACTION W/  INTRAOCULAR LENS IMPLANT Right 2017    CHOLECYSTECTOMY      CHONDROPLASTY OF KNEE Right 3/10/2020    Procedure: CHONDROPLASTY, KNEE;  Surgeon: Les Schneider MD;  Location: [facility] OR;  Service: Orthopedics;  Laterality: Right;  Anterior compartment     COLONOSCOPY N/A 9/25/2018    Procedure: COLONOSCOPY;  Surgeon: Bethel Carmona MD;  Location: [facility] ENDO;  Service: Endoscopy;  Laterality: N/A;    EXCISION OF MEDIAL MENISCUS OF KNEE Right 3/10/2020    Procedure: MENISCECTOMY, KNEE, MEDIAL;  Surgeon: Les Schneider MD;  Location: [facility] OR;  Service: Orthopedics;  Laterality: Right;  Partial, Medial , Lateral     EYE SURGERY      INJECTION OF ANESTHETIC AGENT AROUND MEDIAL BRANCH NERVES INNERVATING LUMBAR FACET JOINT Bilateral 12/13/2022    Procedure: Bilateral L3-5 MBB;  Surgeon: Humberto Dumont MD;  Location: [facility] PAIN MGT;  Service: Pain Management;  Laterality: Bilateral;    INJECTION OF ANESTHETIC AGENT AROUND NERVE Right 8/8/2019    Procedure: Right Genicular nerve block with local;  Surgeon: Manpreet Dutta MD;  Location: [facility]  PAIN MGT;  Service: Pain Management;  Laterality: Right;    INJECTION OF ANESTHETIC AGENT INTO SACROILIAC JOINT Bilateral 5/6/2020    Procedure: Bilateral Sacroiliac Joint Injection;  Surgeon: Manpreet Dutta MD;  Location: [facility] PAIN MGT;  Service: Pain Management;  Laterality: Bilateral;    INJECTION OF ANESTHETIC AGENT INTO SACROILIAC JOINT Bilateral 10/8/2020    Procedure: Bilateral SI and Bilateral GTB with RN IV sedation;  Surgeon: Manpreet Dutta MD;  Location: [facility] PAIN MGT;  Service: Pain Management;  Laterality: Bilateral;    INJECTION OF ANESTHETIC AGENT INTO SACROILIAC JOINT Bilateral 1/5/2021    Procedure: Bilateral BLOCK, SACROILIAC JOINT and Bilateral GTB witn RN IV sedation;  Surgeon: Daniel Burns MD;  Location: [facility] PAIN MGT;  Service: Pain Management;  Laterality: Bilateral;    INJECTION OF ANESTHETIC AGENT INTO SACROILIAC JOINT Bilateral 7/8/2021    Procedure: Bilateral BLOCK, SACROILIAC JOINT bilateral GTB RN IV sedation;  Surgeon: Manpreet Dutta MD;  Location: [facility] PAIN MGT;  Service: Pain Management;  Laterality: Bilateral;    INJECTION OF ANESTHETIC AGENT INTO SACROILIAC JOINT Bilateral 3/1/2022    Procedure: Bilateral BLOCK, SACROILIAC JOINT and Bilateral GTB RN IV sedation;  Surgeon: Manpreet Dutta MD;  Location: [facility] PAIN MGT;  Service: Pain Management;  Laterality: Bilateral;    INJECTION OF ANESTHETIC AGENT INTO SACROILIAC JOINT Bilateral 10/10/2022    Procedure: Bilateral Sacroiliac Joint Injection;  Surgeon: Humberto Dumont MD;  Location: [facility] PAIN MGT;  Service: Pain Management;  Laterality: Bilateral;    INJECTION OF ANESTHETIC AGENT INTO SACROILIAC JOINT Bilateral 1/24/2023    Procedure: Bilateral Sacroiliac Joint Injection;  Surgeon: Humberto Dumont MD;  Location: [facility] PAIN MGT;  Service: Pain Management;  Laterality: Bilateral;    INJECTION OF JOINT Bilateral 9/5/2019    Procedure: Bilateral GT bursa injection;  Surgeon: Manpreet Dutta MD;  Location: [facility] PAIN MGT;  Service: Pain Management;  " Laterality: Bilateral;    INJECTION OF JOINT Bilateral 5/6/2020    Procedure: Bilateral GT bursa injection;  Surgeon: Manpreet Dutta MD;  Location: [facility] PAIN MGT;  Service: Pain Management;  Laterality: Bilateral;    INJECTION OF JOINT Right 4/28/2022    Procedure: Injection, Joint Right Hip Injection RN IV sedation;  Surgeon: Manpreet Dutta MD;  Location: [facility] PAIN MGT;  Service: Pain Management;  Laterality: Right;    INJECTION OF JOINT Bilateral 10/10/2022    Procedure: Bilateral GT bursa injection with RN IV sedation;  Surgeon: Humberto Dumont MD;  Location: [facility] PAIN MGT;  Service: Pain Management;  Laterality: Bilateral;    INJECTION OF JOINT Bilateral 1/24/2023    Procedure: Bilateral GT bursa injection;  Surgeon: Humberto Dumont MD;  Location: [facility] PAIN MGT;  Service: Pain Management;  Laterality: Bilateral;    INJECTION, ALLOGRAFT, INTERVERTEBRAL DISC N/A 4/25/2023    Procedure: INJECTION,ALLOGRAFT,INTERVERTEBRAL DISC,1ST LEVEL: L5-S1;  Surgeon: Humberto Dumont MD;  Location: [facility] PAIN MGT;  Service: Pain Management;  Laterality: N/A;    KNEE ARTHROSCOPY Bilateral     PARS PLANA VITRECTOMY W/ REPAIR OF MACULAR HOLE Left 02/22/2017    RADIOFREQUENCY THERMOCOAGULATION Left 7/11/2019    Procedure: Right SIJ RFA;  Surgeon: Manpreet Dutta MD;  Location: [facility] PAIN MGT;  Service: Pain Management;  Laterality: Left;    RADIOFREQUENCY THERMOCOAGULATION Right 7/25/2019    Procedure: Right SIJ RFA;  Surgeon: Manpreet Dutta MD;  Location: [facility] PAIN MGT;  Service: Pain Management;  Laterality: Right;    SHOULDER ARTHROSCOPY Right 06/04/15     Review of patient's allergies indicates:   Allergen Reactions    Demerol [meperidine] Other (See Comments)     Burning when adm IV  Able to tolerate IM, "Turned the veins in my hand purple."    Latex, natural rubber Other (See Comments)     "Burns my skin",  Symptoms get worse the longer she is exposed    Zocor [simvastatin] Other (See Comments)     Tightening of muscles    Statins-hmg-coa " reductase inhibitors Other (See Comments)     myopathy    Sulfa (sulfonamide antibiotics)      Blurred vision        No current facility-administered medications on file prior to encounter.     Current Outpatient Medications on File Prior to Encounter   Medication Sig Dispense Refill    biotin 1 mg tablet Take 1,000 mcg by mouth every morning.       celecoxib (CELEBREX) 200 MG capsule TAKE 1 CAPSULE(200 MG) BY MOUTH TWICE DAILY WITH FOOD 120 capsule 1    clotrimazole-betamethasone 1-0.05% (LOTRISONE) cream Apply topically 2 (two) times daily. 45 g 2    diclofenac sodium (VOLTAREN) 1 % Gel Apply 2 g topically 4 (four) times daily. 1 each 6    ezetimibe (ZETIA) 10 mg tablet Take 1 tablet (10 mg total) by mouth once daily. 90 tablet 1    FLUoxetine 40 MG capsule Take 1 capsule (40 mg total) by mouth once daily. 90 capsule 1    fluticasone (FLONASE) 50 mcg/actuation nasal spray 2 sprays by Each Nare route once daily. (Patient taking differently: 2 sprays by Each Nostril route nightly as needed.) 1 Bottle 0    furosemide (LASIX) 20 MG tablet Take 1 tablet (20 mg total) by mouth 2 (two) times daily. 60 tablet 11    gabapentin (NEURONTIN) 300 MG capsule Take 1 capsule (300 mg total) by mouth 4 (four) times daily. 360 capsule 0    LORazepam (ATIVAN) 1 MG tablet TK 3 TS PO 1 HOUR PRIOR TO APPOINTMENT      metFORMIN (GLUCOPHAGE) 1000 MG tablet Take 1 tablet (1,000 mg total) by mouth 2 (two) times daily with meals. 180 tablet 1    milnacipran (SAVELLA) 50 mg Tab Take 1 tablet (50 mg total) by mouth 2 (two) times daily. 60 tablet 0    omeprazole (PRILOSEC) 20 MG capsule Take 1 capsule (20 mg total) by mouth daily as needed (w/ NSAID). 30 capsule 5    potassium chloride SA (K-DUR,KLOR-CON M) 10 MEQ tablet TAKE 1 TABLET(10 MEQ) BY MOUTH EVERY DAY 20 tablet 5    pulse oximeter (PULSE OXIMETER) device by Apply Externally route 2 (two) times a day. Use twice daily at 8 AM and 3 PM and record the value in Frequent Browsert as directed. 1 each  0    triamcinolone acetonide 0.025% (KENALOG) 0.025 % Oint Apply topically 2 (two) times daily. for 10 days (Patient taking differently: Apply topically 2 (two) times daily as needed.) 15 g 2        PMHx, PSHx, Allergies, Medications reviewed in epic    ROS negative except pain complaints in HPI    OBJECTIVE:    There were no vitals taken for this visit.    PHYSICAL EXAMINATION:    GENERAL: Well appearing, in no acute distress, alert and oriented x3.  PSYCH:  Mood and affect appropriate.  SKIN: Skin color, texture, turgor normal, no rashes or lesions which will impact the procedure.  CV: RRR with palpation of the radial artery.  PULM: No evidence of respiratory difficulty, symmetric chest rise. Clear to auscultation.  NEURO: Cranial nerves grossly intact.    Plan:    Proceed with procedure as planned Procedure(s) (LRB):  INJECTION,ALLOGRAFT,INTERVERTEBRAL DISC,2nd LEVEL: L3-4 (N/A)    Humberto Dumont MD  05/09/2023

## 2023-05-09 NOTE — DISCHARGE INSTRUCTIONS

## 2023-05-09 NOTE — OP NOTE
Patient Name: Kaylin Mccollum    MRN: [id number]     INFORMED CONSENT: The procedure, risks, benefits and options were discussed with patient. There are no contraindications to the procedure. The patient expressed understanding and agreed to proceed. The personnel performing the procedure was discussed. I verify that I personally obtained Kaylin's consent prior to the start of the procedure and the signed consent can be found on the patient's chart.        Procedure Date: 05/09/2023      Surgeon(s) and Role:  Humberto Dumont MD     Sedation: Conscious sedation provided by M.D     The patient was monitored with continuous pulse oximetry, EKG, and intermittent blood pressure monitors, immediately prior to administration of sedation.  The patient was hemodynamically stable throughout the entire process was responsive to voice, and breathing spontaneously.  Supplemental O2 was provided at 2L/min via nasal cannula.  Patient was comfortable for the duration of the procedure.      There was a total of 2mg IV Midazolam and 50 mcg Fentanyl titrated for the procedure     Total sedation time was >20minutes and <30minutes        Pre Procedure diagnosis: Other intervertebral disc degeneration, lumbar region [M51.36]  DDD (degenerative disc disease), lumbar [M51.36]     Post-Procedure diagnosis: Same     PROCEDURE:  Injection allograft intervertebral disc,L3-4  DESCRIPTION OF PROCEDURE:Ancef 2 gm IV was administered for prophylaxis, 30 minutes prior to the procedure, see procedure record for time. The patient was brought to the fluoroscopy suite.  IV access was obtained prior to the procedure. The patient was positioned prone on the fluoroscopy table.  Continuous hemodynamic monitoring was initiated including blood pressure, EKG, and pulse oximetry.  IV sedation was administered incrementally to allow the patient to remain comfortable and conversant throughout the procedure. The area of the thoracolumbar spine was prepped with  "chlorhexidine and chlorhexidine three times and draped in a sterile fashion.     The targeted discs were identified by fluoroscopy and the skin and subcutaneous tissues overlying the needle insertion points were anesthetized using 5 cc of lidocaine 1%.  Under fluoroscopic guidance, 20 gauge 7" spinal needles were slowly advanced from a R oblique approach into the L3-4 disc. The position of the needles was confirmed using oblique, AP and lateral fluoroscopic imaging. Negative aspiration for blood was confirmed and following this, the allograft material was reconstituted with normal saline in the ViaDisc kit. 1 cc were ultimately yielded and then injected in the nucleus pulposus without any resistance. The needles were removed and bleeding was nil.  A sterile dressing was applied. No specimens collected. Kaylin Mccollum was taken to the Post-block Recovery Area for further observation.        "

## 2023-05-09 NOTE — DISCHARGE SUMMARY
Discharge Note  Short Stay      SUMMARY     Admit Date: 5/9/2023    Attending Physician: Humberto Dumont MD        Discharge Physician: Humberto Dumont MD        Discharge Date: 5/9/2023 10:46 AM    Procedure(s) (LRB):  INJECTION,ALLOGRAFT,INTERVERTEBRAL DISC,2nd LEVEL: L3-4 (N/A)    Final Diagnosis: Degeneration of lumbar intervertebral disc [M51.36]  Lumbar degenerative disc disease [M51.36]    Disposition: Home or self care    Patient Instructions:   Current Discharge Medication List        CONTINUE these medications which have NOT CHANGED    Details   celecoxib (CELEBREX) 200 MG capsule TAKE 1 CAPSULE(200 MG) BY MOUTH TWICE DAILY WITH FOOD  Qty: 120 capsule, Refills: 1    Associated Diagnoses: Chondromalacia, right knee; Left shoulder tendinitis      ezetimibe (ZETIA) 10 mg tablet Take 1 tablet (10 mg total) by mouth once daily.  Qty: 90 tablet, Refills: 1    Associated Diagnoses: Hyperlipidemia associated with type 2 diabetes mellitus      FLUoxetine 40 MG capsule Take 1 capsule (40 mg total) by mouth once daily.  Qty: 90 capsule, Refills: 1    Associated Diagnoses: Fibromyalgia; Chronic major depressive disorder      furosemide (LASIX) 20 MG tablet Take 1 tablet (20 mg total) by mouth 2 (two) times daily.  Qty: 60 tablet, Refills: 11      gabapentin (NEURONTIN) 300 MG capsule Take 1 capsule (300 mg total) by mouth 4 (four) times daily.  Qty: 360 capsule, Refills: 0      INVOKANA 100 mg Tab tablet TAKE 1 TABLET(100 MG) BY MOUTH EVERY DAY  Qty: 90 tablet, Refills: 1    Associated Diagnoses: Diabetes mellitus type 2 in obese      metFORMIN (GLUCOPHAGE) 1000 MG tablet Take 1 tablet (1,000 mg total) by mouth 2 (two) times daily with meals.  Qty: 180 tablet, Refills: 1    Associated Diagnoses: Diabetes mellitus type 2 in obese      metoprolol succinate (TOPROL-XL) 25 MG 24 hr tablet Take 1 tablet (25 mg total) by mouth once daily.  Qty: 30 tablet, Refills: 5    Comments: .      potassium chloride SA (K-DUR,KLOR-CON M)  10 MEQ tablet TAKE 1 TABLET(10 MEQ) BY MOUTH EVERY DAY  Qty: 20 tablet, Refills: 5      verapamiL (CALAN) 80 MG tablet Take 1 tablet (80 mg total) by mouth 2 (two) times daily.  Qty: 180 tablet, Refills: 2    Associated Diagnoses: Hypertension associated with diabetes; MVP (mitral valve prolapse)      biotin 1 mg tablet Take 1,000 mcg by mouth every morning.       clotrimazole-betamethasone 1-0.05% (LOTRISONE) cream Apply topically 2 (two) times daily.  Qty: 45 g, Refills: 2      diclofenac sodium (VOLTAREN) 1 % Gel Apply 2 g topically 4 (four) times daily.  Qty: 1 each, Refills: 6      fluticasone (FLONASE) 50 mcg/actuation nasal spray 2 sprays by Each Nare route once daily.  Qty: 1 Bottle, Refills: 0    Associated Diagnoses: Sinusitis      LORazepam (ATIVAN) 1 MG tablet TK 3 TS PO 1 HOUR PRIOR TO APPOINTMENT      milnacipran (SAVELLA) 50 mg Tab Take 1 tablet (50 mg total) by mouth 2 (two) times daily.  Qty: 60 tablet, Refills: 0      omeprazole (PRILOSEC) 20 MG capsule Take 1 capsule (20 mg total) by mouth daily as needed (w/ NSAID).  Qty: 30 capsule, Refills: 5    Associated Diagnoses: Gastroesophageal reflux disease without esophagitis      pulse oximeter (PULSE OXIMETER) device by Apply Externally route 2 (two) times a day. Use twice daily at 8 AM and 3 PM and record the value in [employer] as directed.  Qty: 1 each, Refills: 0    Comments: This is a NO CHARGE item.  Please override price to zero.  DO NOT PRINT.  NORMAL MODE e-PRESCRIBE ONLY.  Associated Diagnoses: COVID-19 virus detected      triamcinolone acetonide 0.025% (KENALOG) 0.025 % Oint Apply topically 2 (two) times daily. for 10 days  Qty: 15 g, Refills: 2    Associated Diagnoses: Tinea corporis                 Discharge Diagnosis: Degeneration of lumbar intervertebral disc [M51.36]  Lumbar degenerative disc disease [M51.36]  Condition on Discharge: Stable with no complications to procedure   Diet on Discharge: Same as before.  Activity: as per  instruction sheet.  Discharge to: Home with a responsible adult.  Follow up: 2-4 weeks       Please call the office at [phone] if you experience any weakness or loss of sensation, fever > 101.5, pain uncontrolled with oral medications, persistent nausea/vomiting/or diarrhea, redness or drainage from the incisions, or any other worrisome concerns. If physician on call was not reached or could not communicate with our office for any reason please go to the nearest emergency department

## 2023-05-13 ENCOUNTER — PATIENT MESSAGE (OUTPATIENT)
Dept: PAIN MEDICINE | Facility: CLINIC | Age: 78
End: 2023-05-13
Payer: COMMERCIAL

## 2023-05-17 DIAGNOSIS — E11.69 HYPERLIPIDEMIA ASSOCIATED WITH TYPE 2 DIABETES MELLITUS: ICD-10-CM

## 2023-05-17 DIAGNOSIS — E78.5 HYPERLIPIDEMIA ASSOCIATED WITH TYPE 2 DIABETES MELLITUS: ICD-10-CM

## 2023-05-17 RX ORDER — LOSARTAN POTASSIUM 25 MG/1
TABLET ORAL
Qty: 90 TABLET | Refills: 2 | Status: SHIPPED | OUTPATIENT
Start: 2023-05-17 | End: 2024-02-16

## 2023-05-17 NOTE — PROGRESS NOTES
Established Patient Interventional Pain Clinic Visit    The patient location is: home  The chief complaint leading to consultation is: knee pain  Visit type: Virtual visit with synchronous audio and video  Total time spent with patient: 11-15m  Each patient to whom he or she provides medical services by telemedicine is: (1) informed of the relationship between the physician and patient and the respective role of any other health care provider with respect to management of the patient; and (2) notified that he or she may decline to receive medical services by telemedicine and may withdraw from such care at any time.    Chief Pain Complaint:  Back pain  Right knee pain    Interval history 05/18/2023  Patient presents status post L5-S1 via disc allograft supplementation 04/25/2023 and via disc allograft supplementation L3-4 05/09/2023.  Patient reports noticeable improvement >80% in lower back pain following two-level  allograft supplementation.  Today her primary concern is bilateral knee pain.  Patient has questions regarding hyaluronic acid supplementation to be use.  Patient reports she has seen videos on Durolane and Euflexxa and is inquiring to the brand to be used on the upcoming Tuesday.  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  She is requesting up-to-date imaging to investigate these territories.  Today she denies significant weakness in the upper lower extremities, bowel or bladder incontinence or saddle anesthesia.      Interval history 04/06/2023  Patient presents for follow-up of lower back pain.  She continues to reports superior relief in fibromyalgia symptoms on Savella medication.  Patient has brought in denial paperwork from her insurance reporting limitations on dosage and quantity allowed.  Patient reports prior to starting this medication she felt that she did not have a life.  now she reports  significant improvement in pain as well as chronic fatigue associated with fibromyalgia.  Today she again reports pain in the lower back which is worse with lumbar flexion.  Patient reports she is unable to perform activities of daily living such as grocery shopping or prolonged standing or ambulation secondary to her discogenic lower back pain.  Today patient denies more distal radiculopathy into the lower extremities or feet or lower extremity weakness.  Patient is interested in discussing intervention.  Of note patient has failed to have improvement in his lower back pain with prior lumbar medial branch block, sacroiliac joint injections.    Interval Hx: 3/9/23  Patient presents for one-month follow-up.  Today she reports she is significantly better since our last clinic visit.  At that time patient had slipped in a low off and injured her lower back and right leg.  Today she reports this pain is significantly improved and pain is intermittent and today is rated a 3/10.  Today patient reports pain is isolated to the midline lower lumbar spine.  Pain is exacerbated with lumbar flexion such as when she is picking up close.  Fibromyalgia Pain has been significantly improved with the initiation of Savella medication.  Patient has reached a titration of 50 mg twice daily.  Patient recently refilled this medication.  She denies any side effects from this medication.  Today she denies any significant lower extremity weakness, bowel or bladder incontinence or saddle anesthesia      Interval history 02/07/2023  Patient presents status post bilateral sacroiliac joint and greater trochanteric bursa injection 01/24/2023 and bilateral L3-5 lumbar medial branch block 12/13/2022.  Patient had recent mechanical fall confounding benefit from recent injections.  Today she reports she was reaching over a mattress cover to reach a stack of bibles and slid into a slint.  Patient reports she was behind and tall wall in a took 20-30  minutes for her to stand.  Patient also reports exacerbation of fibromyalgia pain.  Since her fall patient reports pain which radiates into the buttock and down the posterior aspect of the right lower extremity to the dorsum of the foot in L4-S1 distribution.  Patient has continued gabapentin 300 mg twice daily, Celebrex in the evenings as well as Tylenol 1000 mg twice daily.    Interval history 11/29/2022    Patient presents status post bilateral sacroiliac joint greater trochanteric bursa 10/10/2022.  Patient reports 90% relief overlying bilateral sacroiliac joints and greater trochanteric bursa following her injection.  Today she reports primarily lumbar axial back pain as well as significant neck pain.  Lower back pain is exacerbated with prolonged standing such as when she is washing dishes.  She denies significant distal radiculopathy into the right lower extremities as described at our last clinic visit.  Neck pain is elicited with cervical flexion, extension and lateral flexion and she does report reduced mobility.  She reports her pain began following a mechanical fall down the stairs at [facility] in September 1986.  Patient has continued gabapentin 300 mg in the morning, 300 mg in the afternoon and 600 mg in the evening.    Interval history 10/04/2022  Ms. Mccollum is a 77-year-old female with past medical history significant for depression, hyperlipidemia, hypertension, mitral valve prolapse, peripheral vascular disease, history of COVID-19, type 2 diabetes, multi joint arthritis, fibromyalgia who presents to [facility] care, previous Dr. Dutta patient.  Today patient reports return of pain in the lower back which radiates into bilateral hips and down the posterior aspect of the right lower extremity in L4-5 distribution to the dorsum of the foot.  Patient reports pain is intermittent but has increased in intensity.  Pain is described as shocking in nature and today is rated a 6/10.  Patient reports she feels as  if her skin is crawling.  patient is currently taking gabapentin 300 mg up to 3 times daily when pain is severe.  Pain interferes with the patient's sleep.  Patient also reports history of fibromyalgia which limits her mobility and duration of standing and ambulation.  Patient does endorse associated weakness in the lower extremities associated with her pain.  Patient is interested in pursuing repeat intervention as she is obtained several months of significant relief with prior sacroiliac joint and greater trochanteric bursa injections.  Patient has continued physician directed physical therapy exercises at home daily.      History of Present Illness 01/16/2020: Dr. Dutta:   Kaylin Mccollum is a 74 y.o. female  who is presenting with a chief complaint of lumbar back pain. The patient began experiencing this problem insidiously, and the pain has been gradually worsening over the past 5 month(s). The pain is described as throbbing, shooting, burning and electrical and is located in the bilateral lumbar spine. Pain is intermittent and lasts hours. The pain radiates to bilateral lower extremities L4 distribudution. The patient rates her pain a 8 out of ten and interferes with activities of daily living a 7 out of ten. Pain is exacerbated by flexion of the lumbar spine, ambulation, and is improved by rest.      She also complains of right knee pain. The patient began experiencing this problem insidiously. The pain is described as cramping, aching and is located in the right knee. Pain is intermittent and lasts hours. The pain is nonradiating. The patient rates her pain a 8 out of ten and interferes with activities of daily living a 7 out of ten. Pain is exacerbated by getting up from a seated position and standing, and is improved by rest. Patient reports no prior trauma, prior arthroscopy bilaterally in 1990s.       - pertinent negatives: No fever, No chills, No weight loss, No bladder dysfunction, No bowel  "dysfunction, No saddle anesthesia  - pertinent positives: none        Pain Disability Index Review:   @[facility]([id number]:[id number])@    Non-Pharmacologic Treatments:  Physical Therapy/Home Exercise: yes  Ice/Heat:yes  TENS: no  Acupuncture: no  Massage: no  Chiropractic: no    Other: no      Pain Medications:  - Adjuvant Medications: Lorazepam (Ativan), Neurontin (Gabapentin), and Topical Ointment (Voltaren Gel, Steroid cream, Anti-Inflammatory Cream, Compound cream)      Pain injections:  Dr. Dumont:  -05/09/2023: L3-4 via disc allograft supplementation  -04/25/2023:  L5-S1 via disc allograft supplementation  -01/24/2023: Bilateral sacroiliac joint and greater trochanteric bursa injection  -12/13/2022: Bilateral L3-5 lumbar medial branch block  - 10/10/2022: Bilateral greater trochanteric bursa and sacroiliac joint injection      -04/20/2022: Right-sided acetabular femoral injection; Dr. Dutta  -03/01/2022: Bilateral sacroiliac joint and greater trochanteric bursa injection; Dr. Dutta  -07/08/2021: Bilateral sacroiliac joint and greater trochanteric bursa injection; Dr. Dutta  -01/05/2021: Bilateral sacroiliac joint and greater trochanteric bursa injection; Dr. Burns  -10/08/2020: Bilateral sacroiliac joint and bilateral greater trochanteric bursa injection; Dr. Dutta  -05/06/2020: Bilateral sacroiliac joint and greater trochanteric bursa injection; Dr. Dutta    Past Medical History:   Diagnosis Date    Arthritis     Cataract     COVID-19 2/25/2021    Diabetes mellitus 1995    BS didn't check 09/13/2022    Diabetes mellitus, type 2     Fibromyalgia     General anesthetics causing adverse effect in therapeutic use     bradycardia     Hypertension     Migraines     Mitral valve prolapse     Osteoarthritis     Rotator cuff tear 04/01/2015       Review of patient's allergies indicates:   Allergen Reactions    Demerol [meperidine] Other (See Comments)     Burning when adm IV  Able to tolerate IM, "Turned the veins in my hand purple."    " "Latex, natural rubber Other (See Comments)     "Burns my skin",  Symptoms get worse the longer she is exposed    Zocor [simvastatin] Other (See Comments)     Tightening of muscles    Statins-hmg-coa reductase inhibitors Other (See Comments)     myopathy    Sulfa (sulfonamide antibiotics)      Blurred vision       Past Surgical History:   Procedure Laterality Date    ARTHROSCOPY OF KNEE Right 3/10/2020    Procedure: ARTHROSCOPY, KNEE;  Surgeon: Les Schneider MD;  Location: [facility] OR;  Service: Orthopedics;  Laterality: Right;    CATARACT EXTRACTION W/  INTRAOCULAR LENS IMPLANT Left 07/19/2017    CATARACT EXTRACTION W/  INTRAOCULAR LENS IMPLANT Right 2017    CHOLECYSTECTOMY      CHONDROPLASTY OF KNEE Right 3/10/2020    Procedure: CHONDROPLASTY, KNEE;  Surgeon: Les Schneider MD;  Location: [facility] OR;  Service: Orthopedics;  Laterality: Right;  Anterior compartment     COLONOSCOPY N/A 9/25/2018    Procedure: COLONOSCOPY;  Surgeon: Bethel Carmona MD;  Location: [facility] ENDO;  Service: Endoscopy;  Laterality: N/A;    EXCISION OF MEDIAL MENISCUS OF KNEE Right 3/10/2020    Procedure: MENISCECTOMY, KNEE, MEDIAL;  Surgeon: Les Schneider MD;  Location: [facility] OR;  Service: Orthopedics;  Laterality: Right;  Partial, Medial , Lateral     EYE SURGERY      INJECTION OF ANESTHETIC AGENT AROUND MEDIAL BRANCH NERVES INNERVATING LUMBAR FACET JOINT Bilateral 12/13/2022    Procedure: Bilateral L3-5 MBB;  Surgeon: Humberto Dumont MD;  Location: [facility] PAIN MGT;  Service: Pain Management;  Laterality: Bilateral;    INJECTION OF ANESTHETIC AGENT AROUND NERVE Right 8/8/2019    Procedure: Right Genicular nerve block with local;  Surgeon: Manpreet Dutta MD;  Location: [facility] PAIN MGT;  Service: Pain Management;  Laterality: Right;    INJECTION OF ANESTHETIC AGENT INTO SACROILIAC JOINT Bilateral 5/6/2020    Procedure: Bilateral Sacroiliac Joint Injection;  Surgeon: Manpreet Dutta MD;  Location: [facility] PAIN MGT;  Service: Pain Management;  " Laterality: Bilateral;    INJECTION OF ANESTHETIC AGENT INTO SACROILIAC JOINT Bilateral 10/8/2020    Procedure: Bilateral SI and Bilateral GTB with RN IV sedation;  Surgeon: Manpreet Dutta MD;  Location: [facility] PAIN MGT;  Service: Pain Management;  Laterality: Bilateral;    INJECTION OF ANESTHETIC AGENT INTO SACROILIAC JOINT Bilateral 1/5/2021    Procedure: Bilateral BLOCK, SACROILIAC JOINT and Bilateral GTB witn RN IV sedation;  Surgeon: Daniel Burns MD;  Location: [facility] PAIN MGT;  Service: Pain Management;  Laterality: Bilateral;    INJECTION OF ANESTHETIC AGENT INTO SACROILIAC JOINT Bilateral 7/8/2021    Procedure: Bilateral BLOCK, SACROILIAC JOINT bilateral GTB RN IV sedation;  Surgeon: Manpreet Dutta MD;  Location: [facility] PAIN MGT;  Service: Pain Management;  Laterality: Bilateral;    INJECTION OF ANESTHETIC AGENT INTO SACROILIAC JOINT Bilateral 3/1/2022    Procedure: Bilateral BLOCK, SACROILIAC JOINT and Bilateral GTB RN IV sedation;  Surgeon: Manpreet Dutta MD;  Location: [facility] PAIN MGT;  Service: Pain Management;  Laterality: Bilateral;    INJECTION OF ANESTHETIC AGENT INTO SACROILIAC JOINT Bilateral 10/10/2022    Procedure: Bilateral Sacroiliac Joint Injection;  Surgeon: Humberto Dumont MD;  Location: [facility] PAIN MGT;  Service: Pain Management;  Laterality: Bilateral;    INJECTION OF ANESTHETIC AGENT INTO SACROILIAC JOINT Bilateral 1/24/2023    Procedure: Bilateral Sacroiliac Joint Injection;  Surgeon: Humberto Dumont MD;  Location: [facility] PAIN MGT;  Service: Pain Management;  Laterality: Bilateral;    INJECTION OF JOINT Bilateral 9/5/2019    Procedure: Bilateral GT bursa injection;  Surgeon: Manpreet Dutta MD;  Location: [facility] PAIN MGT;  Service: Pain Management;  Laterality: Bilateral;    INJECTION OF JOINT Bilateral 5/6/2020    Procedure: Bilateral GT bursa injection;  Surgeon: Manpreet Dutta MD;  Location: [facility] PAIN MGT;  Service: Pain Management;  Laterality: Bilateral;    INJECTION OF JOINT Right 4/28/2022     Procedure: Injection, Joint Right Hip Injection RN IV sedation;  Surgeon: Manpreet Dutta MD;  Location: [facility] PAIN MGT;  Service: Pain Management;  Laterality: Right;    INJECTION OF JOINT Bilateral 10/10/2022    Procedure: Bilateral GT bursa injection with RN IV sedation;  Surgeon: Humberto Dumont MD;  Location: [facility] PAIN MGT;  Service: Pain Management;  Laterality: Bilateral;    INJECTION OF JOINT Bilateral 1/24/2023    Procedure: Bilateral GT bursa injection;  Surgeon: Humberto Dumont MD;  Location: [facility] PAIN MGT;  Service: Pain Management;  Laterality: Bilateral;    INJECTION, ALLOGRAFT, INTERVERTEBRAL DISC N/A 4/25/2023    Procedure: INJECTION,ALLOGRAFT,INTERVERTEBRAL DISC,1ST LEVEL: L5-S1;  Surgeon: Humberto Dumont MD;  Location: [facility] PAIN MGT;  Service: Pain Management;  Laterality: N/A;    INJECTION, ALLOGRAFT, INTERVERTEBRAL DISC N/A 5/9/2023    Procedure: INJECTION,ALLOGRAFT,INTERVERTEBRAL DISC,2nd LEVEL: L3-4;  Surgeon: Humberto Dumont MD;  Location: [facility] PAIN MGT;  Service: Pain Management;  Laterality: N/A;    KNEE ARTHROSCOPY Bilateral     PARS PLANA VITRECTOMY W/ REPAIR OF MACULAR HOLE Left 02/22/2017    RADIOFREQUENCY THERMOCOAGULATION Left 7/11/2019    Procedure: Right SIJ RFA;  Surgeon: Manpreet Dutta MD;  Location: [facility] PAIN MGT;  Service: Pain Management;  Laterality: Left;    RADIOFREQUENCY THERMOCOAGULATION Right 7/25/2019    Procedure: Right SIJ RFA;  Surgeon: Manpreet Dutta MD;  Location: [facility] PAIN MGT;  Service: Pain Management;  Laterality: Right;    SHOULDER ARTHROSCOPY Right 06/04/15     Current Outpatient Medications on File Prior to Visit   Medication Sig Dispense Refill    biotin 1 mg tablet Take 1,000 mcg by mouth every morning.       celecoxib (CELEBREX) 200 MG capsule TAKE 1 CAPSULE(200 MG) BY MOUTH TWICE DAILY WITH FOOD 120 capsule 1    clotrimazole-betamethasone 1-0.05% (LOTRISONE) cream Apply topically 2 (two) times daily. 45 g 2    diclofenac sodium (VOLTAREN) 1 % Gel Apply 2 g topically 4  (four) times daily. 1 each 6    ezetimibe (ZETIA) 10 mg tablet Take 1 tablet (10 mg total) by mouth once daily. 90 tablet 1    FLUoxetine 40 MG capsule Take 1 capsule (40 mg total) by mouth once daily. 90 capsule 1    fluticasone (FLONASE) 50 mcg/actuation nasal spray 2 sprays by Each Nare route once daily. (Patient taking differently: 2 sprays by Each Nostril route nightly as needed.) 1 Bottle 0    furosemide (LASIX) 20 MG tablet Take 1 tablet (20 mg total) by mouth 2 (two) times daily. 60 tablet 11    gabapentin (NEURONTIN) 300 MG capsule Take 1 capsule (300 mg total) by mouth 4 (four) times daily. 360 capsule 0    INVOKANA 100 mg Tab tablet TAKE 1 TABLET(100 MG) BY MOUTH EVERY DAY 90 tablet 1    LORazepam (ATIVAN) 1 MG tablet TK 3 TS PO 1 HOUR PRIOR TO APPOINTMENT      losartan (COZAAR) 25 MG tablet TAKE 1 TABLET(25 MG) BY MOUTH EVERY DAY 90 tablet 2    metFORMIN (GLUCOPHAGE) 1000 MG tablet Take 1 tablet (1,000 mg total) by mouth 2 (two) times daily with meals. 180 tablet 1    metoprolol succinate (TOPROL-XL) 25 MG 24 hr tablet Take 1 tablet (25 mg total) by mouth once daily. 30 tablet 5    milnacipran (SAVELLA) 50 mg Tab Take 1 tablet (50 mg total) by mouth 2 (two) times daily. 60 tablet 0    omeprazole (PRILOSEC) 20 MG capsule Take 1 capsule (20 mg total) by mouth daily as needed (w/ NSAID). 30 capsule 5    potassium chloride SA (K-DUR,KLOR-CON M) 10 MEQ tablet TAKE 1 TABLET(10 MEQ) BY MOUTH EVERY DAY 20 tablet 5    pulse oximeter (PULSE OXIMETER) device by Apply Externally route 2 (two) times a day. Use twice daily at 8 AM and 3 PM and record the value in [facility] as directed. 1 each 0    triamcinolone acetonide 0.025% (KENALOG) 0.025 % Oint Apply topically 2 (two) times daily. for 10 days (Patient taking differently: Apply topically 2 (two) times daily as needed.) 15 g 2    verapamiL (CALAN) 80 MG tablet Take 1 tablet (80 mg total) by mouth 2 (two) times daily. 180 tablet 2     No current facility-administered  medications on file prior to visit.               GENERAL:  No weight loss, malaise or fevers.  HEENT:   No recent changes in vision or hearing  NECK:  Negative for lumps, no difficulty with swallowing.  RESPIRATORY:  Negative for cough, wheezing or shortness of breath, patient denies any recent URI.  CARDIOVASCULAR:  Negative for chest pain or palpitations.  GI:  Negative for abdominal discomfort, blood in stools or black stools or change in bowel habits.  MUSCULOSKELETAL:  See HPI.  SKIN:  Negative for lesions, rash, and itching.  PSYCH:  No mood disorder or recent psychosocial stressors.   HEMATOLOGY/LYMPHOLOGY:  Negative for prolonged bleeding, bruising easily or swollen nodes.    NEURO:   No history of syncope, paralysis, seizures or tremors.  All other reviewed and negative other than HPI.    Imaging / Labs / Studies (reviewed on 5/18/2023):    MRI Lumbar Spine 2/16/23  FINDINGS:  Grade 1 degenerative spondylolisthesis at L4-L5.  Vertebral body height is normal.  Marrow signal is within normal limits. The conus medullaris terminates at the level of L1-L2.  No abnormal signal within the conus. Intervertebral disc levels are as follows:     T12-L1 disc: Normal disc height with anterior osteophytes and mild degenerative facet hypertrophy.  No spinal or foraminal stenosis.  The dural canal measures 16 mm.     L1-L2 disc : Disc space height loss with chronic Schmorl's nodes.  Posterior disc bulge.  Anterior osteophytes.  Minor facet arthropathy bilaterally.  The dural canal measures 13 mm.  No foraminal stenosis.     L2-L3 disc: Circumferential disc bulge with tiny chronic Schmorl's nodes.  Anterior osteophytes.  Mild degenerative facet hypertrophy.  The dural canal measures 12 mm.  No foraminal stenosis.     L3-L4 disc: Disc space height loss with a circumferential disc bulge and anterior osteophytes.  Mild degenerative facet hypertrophy with moderate buckling of the ligamentum flavum.  The dural canal measures 11  mm.  No significant foraminal stenosis.     L4-L5 disc: Grade 1 spondylolisthesis with severe degenerative facet hypertrophy bilaterally.  Buckling of the ligamentum flavum.  The dural canal measures 7 mm AP.  Disc encroaches into the floors of the exit foramina, right greater than left.  There is mild right foraminal stenosis.     L5-S1 disc: Severe disc space height loss with osteophytes at the margins and encroach into the exit foramina.  Mild degenerative facet hypertrophy and buckling of the ligamentum flavum.  The dural canal measures 11 mm.  Mild foraminal stenosis.      Physical Exam:  Last clinic visit:  There were no vitals filed for this visit.         There is no height or weight on file to calculate BMI.   (reviewed on 5/18/2023)    General: alert and oriented, in no apparent distress.  Gait: normal gait.  Skin: no rashes, no discoloration, no obvious lesions  HEENT: normocephalic, atraumatic. Pupils equal and round.  Cardiovascular: no significant peripheral edema present.  Respiratory: without use of accessory muscles of respiration.    Musculoskeletal - Lumbar Spine:  - ROM fairly preserved   - Pain on flexion of lumbar spine: Absent   - Pain on extension of lumbar spine: Absent         - Lumbar facet loading: Absent   - TTP over the lumbar facet joints: Absent  - TTP over the lumbar paraspinals: Present   - TTP over the SI joints: Present  - TTP over GT bursa: Present, minimal   - Straight Leg Raise: Negative  - CHERYLE: Present    Right Knee:  - TTP: Present over medial/ lateral joint line  - Pain with extension: Present  - Pain with flexion: Present  - Crepitus: Present     Neuro - Lower Extremities:  - BLE Strength: R/L: HF: 5/5, HE: 5/5, KF: 5/5; KE: 5/5; FE: 5/5; FF: 5/5  - Extremity Reflexes: Brisk and symmetric throughout  - Sensory: Sensation to light touch intact bilaterally      Psych:  Mood and affect is appropriate    Assessment:  Kaylin Mccollum is a 78 y.o. year old female who is  presenting with       ICD-10-CM ICD-9-CM    1. Mid back pain  M54.9 724.5 X-Ray Cervical Spine 5 View W Flex Extxt      X-Ray Thoracic Spine 4 or more views      2. Lumbar degenerative disc disease  M51.36 722.52       3. Discogenic lumbar pain  M54.59 724.2                 Plan:  1. Interventional: - status post L3-4 via disc allograft supplementation 05/09/2023 and L5-S1 via disc allograft supplementation 04/25/2023 with greater than 80% improvement in discogenic lower back pain.    -Schedule for bilateral intra-articular knee injection with Synvisc-One to see if this helps with bilateral knee pain.  We discussed the procedure, benefits, potential risks and alternative options in detail.  Patient has elected to pursue this procedure    Anticoagulation:  None, no anticoagulation      2. Pharmacologic:     -  We have discussed continuing gabapentin.  We have reviewed potential side effects of this medication including daytime somnolence, weight gain and peripheral edema  Gabapentin 300 mg in the morning, 300 mg in the afternoon and 600 mg in the evening    -Continue Savella 50 mg twice  daily as this tremendously helps with symptoms of fibromyalgia.  We have discussed that Savella is FDA approved and has demonstrated improvement in multiple measures including pain, global impression of change in physical function.  We have discussed that the most frequent side effects of this medication can include nausea, constipation, vomiting, dry mouth, flushing or tachycardia.    -30 day supply given prior    3. Rehabilitative:   -We discussed continuing physical therapy to help manage the patient/s painful condition. The patient was counseled that muscle strengthening will improve the long term prognosis in regards to pain and may also help increase range of motion and mobility. They were told that one of the goals of physical therapy is that they learn how to do the exercises so that they can do them independently at home  daily upon completion.     4. Diagnostic:  Reviewed relevant imaging and answered patient's questions.  Thoracic and cervical x-ray to better evaluate persistent pain    5. Consult:   -Dr. Schneider for knee and shoulder pain PRN  -Rheumatology: Fibromyalgia    6. Follow up:  4-6 weeks post  injection     The above plan and management options were discussed at length with patient. Patient is in agreement with the above and verbalized understanding.    - I discussed the goals of interventional chronic pain management with the patient on today's visit. We discussed a multimodal and systematic approach to pain.  This includes diagnostic and therapeutic injections, adjuvant pharmacologic treatment, physical therapy, and at times psychiatry.  I emphasized the importance of regular exercise, core strengthening and stretching, diet and weight loss as a cornerstone of long-term pain management.    - This condition does not require this patient to take time off of work, and the primary goal of our Pain Management services is to improve the patient's functional capacity.  - Patient Questions: Answered all of the patient's questions regarding diagnoses, therapy, treatment and next steps    Humberto Dumont MD

## 2023-05-17 NOTE — H&P (VIEW-ONLY)
Established Patient Interventional Pain Clinic Visit    The patient location is: home  The chief complaint leading to consultation is: knee pain  Visit type: Virtual visit with synchronous audio and video  Total time spent with patient: 11-15m  Each patient to whom he or she provides medical services by telemedicine is: (1) informed of the relationship between the physician and patient and the respective role of any other health care provider with respect to management of the patient; and (2) notified that he or she may decline to receive medical services by telemedicine and may withdraw from such care at any time.    Chief Pain Complaint:  Back pain  Right knee pain    Interval history 05/18/2023  Patient presents status post L5-S1 via disc allograft supplementation 04/25/2023 and via disc allograft supplementation L3-4 05/09/2023.  Patient reports noticeable improvement >80% in lower back pain following two-level  allograft supplementation.  Today her primary concern is bilateral knee pain.  Patient has questions regarding hyaluronic acid supplementation to be use.  Patient reports she has seen videos on Durolane and Euflexxa and is inquiring to the brand to be used on the upcoming Tuesday.  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  She is requesting up-to-date imaging to investigate these territories.  Today she denies significant weakness in the upper lower extremities, bowel or bladder incontinence or saddle anesthesia.      Interval history 04/06/2023  Patient presents for follow-up of lower back pain.  She continues to reports superior relief in fibromyalgia symptoms on Savella medication.  Patient has brought in denial paperwork from her insurance reporting limitations on dosage and quantity allowed.  Patient reports prior to starting this medication she felt that she did not have a life.  now she reports  significant improvement in pain as well as chronic fatigue associated with fibromyalgia.  Today she again reports pain in the lower back which is worse with lumbar flexion.  Patient reports she is unable to perform activities of daily living such as grocery shopping or prolonged standing or ambulation secondary to her discogenic lower back pain.  Today patient denies more distal radiculopathy into the lower extremities or feet or lower extremity weakness.  Patient is interested in discussing intervention.  Of note patient has failed to have improvement in his lower back pain with prior lumbar medial branch block, sacroiliac joint injections.    Interval Hx: 3/9/23  Patient presents for one-month follow-up.  Today she reports she is significantly better since our last clinic visit.  At that time patient had slipped in a low off and injured her lower back and right leg.  Today she reports this pain is significantly improved and pain is intermittent and today is rated a 3/10.  Today patient reports pain is isolated to the midline lower lumbar spine.  Pain is exacerbated with lumbar flexion such as when she is picking up close.  Fibromyalgia Pain has been significantly improved with the initiation of Savella medication.  Patient has reached a titration of 50 mg twice daily.  Patient recently refilled this medication.  She denies any side effects from this medication.  Today she denies any significant lower extremity weakness, bowel or bladder incontinence or saddle anesthesia      Interval history 02/07/2023  Patient presents status post bilateral sacroiliac joint and greater trochanteric bursa injection 01/24/2023 and bilateral L3-5 lumbar medial branch block 12/13/2022.  Patient had recent mechanical fall confounding benefit from recent injections.  Today she reports she was reaching over a mattress cover to reach a stack of bibles and slid into a slint.  Patient reports she was behind and tall wall in a took 20-30  minutes for her to stand.  Patient also reports exacerbation of fibromyalgia pain.  Since her fall patient reports pain which radiates into the buttock and down the posterior aspect of the right lower extremity to the dorsum of the foot in L4-S1 distribution.  Patient has continued gabapentin 300 mg twice daily, Celebrex in the evenings as well as Tylenol 1000 mg twice daily.    Interval history 11/29/2022    Patient presents status post bilateral sacroiliac joint greater trochanteric bursa 10/10/2022.  Patient reports 90% relief overlying bilateral sacroiliac joints and greater trochanteric bursa following her injection.  Today she reports primarily lumbar axial back pain as well as significant neck pain.  Lower back pain is exacerbated with prolonged standing such as when she is washing dishes.  She denies significant distal radiculopathy into the right lower extremities as described at our last clinic visit.  Neck pain is elicited with cervical flexion, extension and lateral flexion and she does report reduced mobility.  She reports her pain began following a mechanical fall down the stairs at [facility] in September 1986.  Patient has continued gabapentin 300 mg in the morning, 300 mg in the afternoon and 600 mg in the evening.    Interval history 10/04/2022  Ms. Mccollum is a 77-year-old female with past medical history significant for depression, hyperlipidemia, hypertension, mitral valve prolapse, peripheral vascular disease, history of COVID-19, type 2 diabetes, multi joint arthritis, fibromyalgia who presents to [facility] care, previous Dr. Dutta patient.  Today patient reports return of pain in the lower back which radiates into bilateral hips and down the posterior aspect of the right lower extremity in L4-5 distribution to the dorsum of the foot.  Patient reports pain is intermittent but has increased in intensity.  Pain is described as shocking in nature and today is rated a 6/10.  Patient reports she feels as  if her skin is crawling.  patient is currently taking gabapentin 300 mg up to 3 times daily when pain is severe.  Pain interferes with the patient's sleep.  Patient also reports history of fibromyalgia which limits her mobility and duration of standing and ambulation.  Patient does endorse associated weakness in the lower extremities associated with her pain.  Patient is interested in pursuing repeat intervention as she is obtained several months of significant relief with prior sacroiliac joint and greater trochanteric bursa injections.  Patient has continued physician directed physical therapy exercises at home daily.      History of Present Illness 01/16/2020: Dr. Dutta:   Kaylin Mccollum is a 74 y.o. female  who is presenting with a chief complaint of lumbar back pain. The patient began experiencing this problem insidiously, and the pain has been gradually worsening over the past 5 month(s). The pain is described as throbbing, shooting, burning and electrical and is located in the bilateral lumbar spine. Pain is intermittent and lasts hours. The pain radiates to bilateral lower extremities L4 distribudution. The patient rates her pain a 8 out of ten and interferes with activities of daily living a 7 out of ten. Pain is exacerbated by flexion of the lumbar spine, ambulation, and is improved by rest.      She also complains of right knee pain. The patient began experiencing this problem insidiously. The pain is described as cramping, aching and is located in the right knee. Pain is intermittent and lasts hours. The pain is nonradiating. The patient rates her pain a 8 out of ten and interferes with activities of daily living a 7 out of ten. Pain is exacerbated by getting up from a seated position and standing, and is improved by rest. Patient reports no prior trauma, prior arthroscopy bilaterally in 1990s.       - pertinent negatives: No fever, No chills, No weight loss, No bladder dysfunction, No bowel  "dysfunction, No saddle anesthesia  - pertinent positives: none        Pain Disability Index Review:   @[facility](4749:[id number])@    Non-Pharmacologic Treatments:  Physical Therapy/Home Exercise: yes  Ice/Heat:yes  TENS: no  Acupuncture: no  Massage: no  Chiropractic: no    Other: no      Pain Medications:  - Adjuvant Medications: Lorazepam (Ativan), Neurontin (Gabapentin), and Topical Ointment (Voltaren Gel, Steroid cream, Anti-Inflammatory Cream, Compound cream)      Pain injections:  Dr. Dumont:  -05/09/2023: L3-4 via disc allograft supplementation  -04/25/2023:  L5-S1 via disc allograft supplementation  -01/24/2023: Bilateral sacroiliac joint and greater trochanteric bursa injection  -12/13/2022: Bilateral L3-5 lumbar medial branch block  - 10/10/2022: Bilateral greater trochanteric bursa and sacroiliac joint injection      -04/20/2022: Right-sided acetabular femoral injection; Dr. Dutta  -03/01/2022: Bilateral sacroiliac joint and greater trochanteric bursa injection; Dr. Dutta  -07/08/2021: Bilateral sacroiliac joint and greater trochanteric bursa injection; Dr. Dutta  -01/05/2021: Bilateral sacroiliac joint and greater trochanteric bursa injection; Dr. Burns  -10/08/2020: Bilateral sacroiliac joint and bilateral greater trochanteric bursa injection; Dr. Dutta  -05/06/2020: Bilateral sacroiliac joint and greater trochanteric bursa injection; Dr. Dutta    Past Medical History:   Diagnosis Date    Arthritis     Cataract     COVID-19 2/25/2021    Diabetes mellitus 1995    BS didn't check 09/13/2022    Diabetes mellitus, type 2     Fibromyalgia     General anesthetics causing adverse effect in therapeutic use     bradycardia     Hypertension     Migraines     Mitral valve prolapse     Osteoarthritis     Rotator cuff tear 04/01/2015       Review of patient's allergies indicates:   Allergen Reactions    Demerol [meperidine] Other (See Comments)     Burning when adm IV  Able to tolerate IM, "Turned the veins in my hand purple."    " "Latex, natural rubber Other (See Comments)     "Burns my skin",  Symptoms get worse the longer she is exposed    Zocor [simvastatin] Other (See Comments)     Tightening of muscles    Statins-hmg-coa reductase inhibitors Other (See Comments)     myopathy    Sulfa (sulfonamide antibiotics)      Blurred vision       Past Surgical History:   Procedure Laterality Date    ARTHROSCOPY OF KNEE Right 3/10/2020    Procedure: ARTHROSCOPY, KNEE;  Surgeon: Les Schneider MD;  Location: [facility] OR;  Service: Orthopedics;  Laterality: Right;    CATARACT EXTRACTION W/  INTRAOCULAR LENS IMPLANT Left 07/19/2017    CATARACT EXTRACTION W/  INTRAOCULAR LENS IMPLANT Right 2017    CHOLECYSTECTOMY      CHONDROPLASTY OF KNEE Right 3/10/2020    Procedure: CHONDROPLASTY, KNEE;  Surgeon: Les Schneider MD;  Location: [facility] OR;  Service: Orthopedics;  Laterality: Right;  Anterior compartment     COLONOSCOPY N/A 9/25/2018    Procedure: COLONOSCOPY;  Surgeon: Bethel Carmona MD;  Location: [facility] ENDO;  Service: Endoscopy;  Laterality: N/A;    EXCISION OF MEDIAL MENISCUS OF KNEE Right 3/10/2020    Procedure: MENISCECTOMY, KNEE, MEDIAL;  Surgeon: Les Schneider MD;  Location: [facility] OR;  Service: Orthopedics;  Laterality: Right;  Partial, Medial , Lateral     EYE SURGERY      INJECTION OF ANESTHETIC AGENT AROUND MEDIAL BRANCH NERVES INNERVATING LUMBAR FACET JOINT Bilateral 12/13/2022    Procedure: Bilateral L3-5 MBB;  Surgeon: Humberto Dumont MD;  Location: [facility] PAIN MGT;  Service: Pain Management;  Laterality: Bilateral;    INJECTION OF ANESTHETIC AGENT AROUND NERVE Right 8/8/2019    Procedure: Right Genicular nerve block with local;  Surgeon: Manpreet Dutta MD;  Location: [facility] PAIN MGT;  Service: Pain Management;  Laterality: Right;    INJECTION OF ANESTHETIC AGENT INTO SACROILIAC JOINT Bilateral 5/6/2020    Procedure: Bilateral Sacroiliac Joint Injection;  Surgeon: Manpreet Dutta MD;  Location: [facility] PAIN MGT;  Service: Pain Management;  " Laterality: Bilateral;    INJECTION OF ANESTHETIC AGENT INTO SACROILIAC JOINT Bilateral 10/8/2020    Procedure: Bilateral SI and Bilateral GTB with RN IV sedation;  Surgeon: Manpreet Dutta MD;  Location: [facility] PAIN MGT;  Service: Pain Management;  Laterality: Bilateral;    INJECTION OF ANESTHETIC AGENT INTO SACROILIAC JOINT Bilateral 1/5/2021    Procedure: Bilateral BLOCK, SACROILIAC JOINT and Bilateral GTB witn RN IV sedation;  Surgeon: Dnaiel Burns MD;  Location: [facility] PAIN MGT;  Service: Pain Management;  Laterality: Bilateral;    INJECTION OF ANESTHETIC AGENT INTO SACROILIAC JOINT Bilateral 7/8/2021    Procedure: Bilateral BLOCK, SACROILIAC JOINT bilateral GTB RN IV sedation;  Surgeon: Manpreet Dutta MD;  Location: [facility] PAIN MGT;  Service: Pain Management;  Laterality: Bilateral;    INJECTION OF ANESTHETIC AGENT INTO SACROILIAC JOINT Bilateral 3/1/2022    Procedure: Bilateral BLOCK, SACROILIAC JOINT and Bilateral GTB RN IV sedation;  Surgeon: Manpreet Dutta MD;  Location: [facility] PAIN MGT;  Service: Pain Management;  Laterality: Bilateral;    INJECTION OF ANESTHETIC AGENT INTO SACROILIAC JOINT Bilateral 10/10/2022    Procedure: Bilateral Sacroiliac Joint Injection;  Surgeon: Humberto Dumont MD;  Location: [facility] PAIN MGT;  Service: Pain Management;  Laterality: Bilateral;    INJECTION OF ANESTHETIC AGENT INTO SACROILIAC JOINT Bilateral 1/24/2023    Procedure: Bilateral Sacroiliac Joint Injection;  Surgeon: Humberto Dumont MD;  Location: [facility] PAIN MGT;  Service: Pain Management;  Laterality: Bilateral;    INJECTION OF JOINT Bilateral 9/5/2019    Procedure: Bilateral GT bursa injection;  Surgeon: Manpreet Dutta MD;  Location: [facility] PAIN MGT;  Service: Pain Management;  Laterality: Bilateral;    INJECTION OF JOINT Bilateral 5/6/2020    Procedure: Bilateral GT bursa injection;  Surgeon: Manpreet Dutta MD;  Location: [facility] PAIN MGT;  Service: Pain Management;  Laterality: Bilateral;    INJECTION OF JOINT Right 4/28/2022     Procedure: Injection, Joint Right Hip Injection RN IV sedation;  Surgeon: Manpreet Dutta MD;  Location: [facility] PAIN MGT;  Service: Pain Management;  Laterality: Right;    INJECTION OF JOINT Bilateral 10/10/2022    Procedure: Bilateral GT bursa injection with RN IV sedation;  Surgeon: Humberto Dumont MD;  Location: [facility] PAIN MGT;  Service: Pain Management;  Laterality: Bilateral;    INJECTION OF JOINT Bilateral 1/24/2023    Procedure: Bilateral GT bursa injection;  Surgeon: Humberto Dumont MD;  Location: [facility] PAIN MGT;  Service: Pain Management;  Laterality: Bilateral;    INJECTION, ALLOGRAFT, INTERVERTEBRAL DISC N/A 4/25/2023    Procedure: INJECTION,ALLOGRAFT,INTERVERTEBRAL DISC,1ST LEVEL: L5-S1;  Surgeon: Humberto Dumont MD;  Location: [facility] PAIN MGT;  Service: Pain Management;  Laterality: N/A;    INJECTION, ALLOGRAFT, INTERVERTEBRAL DISC N/A 5/9/2023    Procedure: INJECTION,ALLOGRAFT,INTERVERTEBRAL DISC,2nd LEVEL: L3-4;  Surgeon: Humberto Dumont MD;  Location: [facility] PAIN MGT;  Service: Pain Management;  Laterality: N/A;    KNEE ARTHROSCOPY Bilateral     PARS PLANA VITRECTOMY W/ REPAIR OF MACULAR HOLE Left 02/22/2017    RADIOFREQUENCY THERMOCOAGULATION Left 7/11/2019    Procedure: Right SIJ RFA;  Surgeon: Manpreet Dutta MD;  Location: [facility] PAIN [facility]GT;  Service: Pain Management;  Laterality: Left;    RADIOFREQUENCY THERMOCOAGULATION Right 7/25/2019    Procedure: Right SIJ RFA;  Surgeon: Manpreet Dutta MD;  Location: [facility] PAIN [facility]GT;  Service: Pain Management;  Laterality: Right;    SHOULDER ARTHROSCOPY Right 06/04/15     Current Outpatient Medications on File Prior to Visit   Medication Sig Dispense Refill    biotin 1 mg tablet Take 1,000 mcg by mouth every morning.       celecoxib (CELEBREX) 200 MG capsule TAKE 1 CAPSULE(200 MG) BY MOUTH TWICE DAILY WITH FOOD 120 capsule 1    clotrimazole-betamethasone 1-0.05% (LOTRISONE) cream Apply topically 2 (two) times daily. 45 g 2    diclofenac sodium (VOLTAREN) 1 % Gel Apply 2 g topically 4  (four) times daily. 1 each 6    ezetimibe (ZETIA) 10 mg tablet Take 1 tablet (10 mg total) by mouth once daily. 90 tablet 1    FLUoxetine 40 MG capsule Take 1 capsule (40 mg total) by mouth once daily. 90 capsule 1    fluticasone (FLONASE) 50 mcg/actuation nasal spray 2 sprays by Each Nare route once daily. (Patient taking differently: 2 sprays by Each Nostril route nightly as needed.) 1 Bottle 0    furosemide (LASIX) 20 MG tablet Take 1 tablet (20 mg total) by mouth 2 (two) times daily. 60 tablet 11    gabapentin (NEURONTIN) 300 MG capsule Take 1 capsule (300 mg total) by mouth 4 (four) times daily. 360 capsule 0    INVOKANA 100 mg Tab tablet TAKE 1 TABLET(100 MG) BY MOUTH EVERY DAY 90 tablet 1    LORazepam (ATIVAN) 1 MG tablet TK 3 TS PO 1 HOUR PRIOR TO APPOINTMENT      losartan (COZAAR) 25 MG tablet TAKE 1 TABLET(25 MG) BY MOUTH EVERY DAY 90 tablet 2    metFORMIN (GLUCOPHAGE) 1000 MG tablet Take 1 tablet (1,000 mg total) by mouth 2 (two) times daily with meals. 180 tablet 1    metoprolol succinate (TOPROL-XL) 25 MG 24 hr tablet Take 1 tablet (25 mg total) by mouth once daily. 30 tablet 5    milnacipran (SAVELLA) 50 mg Tab Take 1 tablet (50 mg total) by mouth 2 (two) times daily. 60 tablet 0    omeprazole (PRILOSEC) 20 MG capsule Take 1 capsule (20 mg total) by mouth daily as needed (w/ NSAID). 30 capsule 5    potassium chloride SA (K-DUR,KLOR-CON M) 10 MEQ tablet TAKE 1 TABLET(10 MEQ) BY MOUTH EVERY DAY 20 tablet 5    pulse oximeter (PULSE OXIMETER) device by Apply Externally route 2 (two) times a day. Use twice daily at 8 AM and 3 PM and record the value in [facility] as directed. 1 each 0    triamcinolone acetonide 0.025% (KENALOG) 0.025 % Oint Apply topically 2 (two) times daily. for 10 days (Patient taking differently: Apply topically 2 (two) times daily as needed.) 15 g 2    verapamiL (CALAN) 80 MG tablet Take 1 tablet (80 mg total) by mouth 2 (two) times daily. 180 tablet 2     No current facility-administered  medications on file prior to visit.               GENERAL:  No weight loss, malaise or fevers.  HEENT:   No recent changes in vision or hearing  NECK:  Negative for lumps, no difficulty with swallowing.  RESPIRATORY:  Negative for cough, wheezing or shortness of breath, patient denies any recent URI.  CARDIOVASCULAR:  Negative for chest pain or palpitations.  GI:  Negative for abdominal discomfort, blood in stools or black stools or change in bowel habits.  MUSCULOSKELETAL:  See HPI.  SKIN:  Negative for lesions, rash, and itching.  PSYCH:  No mood disorder or recent psychosocial stressors.   HEMATOLOGY/LYMPHOLOGY:  Negative for prolonged bleeding, bruising easily or swollen nodes.    NEURO:   No history of syncope, paralysis, seizures or tremors.  All other reviewed and negative other than HPI.    Imaging / Labs / Studies (reviewed on 5/18/2023):    MRI Lumbar Spine 2/16/23  FINDINGS:  Grade 1 degenerative spondylolisthesis at L4-L5.  Vertebral body height is normal.  Marrow signal is within normal limits. The conus medullaris terminates at the level of L1-L2.  No abnormal signal within the conus. Intervertebral disc levels are as follows:     T12-L1 disc: Normal disc height with anterior osteophytes and mild degenerative facet hypertrophy.  No spinal or foraminal stenosis.  The dural canal measures 16 mm.     L1-L2 disc : Disc space height loss with chronic Schmorl's nodes.  Posterior disc bulge.  Anterior osteophytes.  Minor facet arthropathy bilaterally.  The dural canal measures 13 mm.  No foraminal stenosis.     L2-L3 disc: Circumferential disc bulge with tiny chronic Schmorl's nodes.  Anterior osteophytes.  Mild degenerative facet hypertrophy.  The dural canal measures 12 mm.  No foraminal stenosis.     L3-L4 disc: Disc space height loss with a circumferential disc bulge and anterior osteophytes.  Mild degenerative facet hypertrophy with moderate buckling of the ligamentum flavum.  The dural canal measures 11  mm.  No significant foraminal stenosis.     L4-L5 disc: Grade 1 spondylolisthesis with severe degenerative facet hypertrophy bilaterally.  Buckling of the ligamentum flavum.  The dural canal measures 7 mm AP.  Disc encroaches into the floors of the exit foramina, right greater than left.  There is mild right foraminal stenosis.     L5-S1 disc: Severe disc space height loss with osteophytes at the margins and encroach into the exit foramina.  Mild degenerative facet hypertrophy and buckling of the ligamentum flavum.  The dural canal measures 11 mm.  Mild foraminal stenosis.      Physical Exam:  Last clinic visit:  There were no vitals filed for this visit.         There is no height or weight on file to calculate BMI.   (reviewed on 5/18/2023)    General: alert and oriented, in no apparent distress.  Gait: normal gait.  Skin: no rashes, no discoloration, no obvious lesions  HEENT: normocephalic, atraumatic. Pupils equal and round.  Cardiovascular: no significant peripheral edema present.  Respiratory: without use of accessory muscles of respiration.    Musculoskeletal - Lumbar Spine:  - ROM fairly preserved   - Pain on flexion of lumbar spine: Absent   - Pain on extension of lumbar spine: Absent         - Lumbar facet loading: Absent   - TTP over the lumbar facet joints: Absent  - TTP over the lumbar paraspinals: Present   - TTP over the SI joints: Present  - TTP over GT bursa: Present, minimal   - Straight Leg Raise: Negative  - CHERYLE: Present    Right Knee:  - TTP: Present over medial/ lateral joint line  - Pain with extension: Present  - Pain with flexion: Present  - Crepitus: Present     Neuro - Lower Extremities:  - BLE Strength: R/L: HF: 5/5, HE: 5/5, KF: 5/5; KE: 5/5; FE: 5/5; FF: 5/5  - Extremity Reflexes: Brisk and symmetric throughout  - Sensory: Sensation to light touch intact bilaterally      Psych:  Mood and affect is appropriate    Assessment:  Kaylin Mccollum is a 78 y.o. year old female who is  presenting with       ICD-10-CM ICD-9-CM    1. Mid back pain  M54.9 724.5 X-Ray Cervical Spine 5 View W Flex Extxt      X-Ray Thoracic Spine 4 or more views      2. Lumbar degenerative disc disease  M51.36 722.52       3. Discogenic lumbar pain  M54.59 724.2                 Plan:  1. Interventional: - status post L3-4 via disc allograft supplementation 05/09/2023 and L5-S1 via disc allograft supplementation 04/25/2023 with greater than 80% improvement in discogenic lower back pain.    -Schedule for bilateral intra-articular knee injection with Synvisc-One to see if this helps with bilateral knee pain.  We discussed the procedure, benefits, potential risks and alternative options in detail.  Patient has elected to pursue this procedure    Anticoagulation:  None, no anticoagulation      2. Pharmacologic:     -  We have discussed continuing gabapentin.  We have reviewed potential side effects of this medication including daytime somnolence, weight gain and peripheral edema  Gabapentin 300 mg in the morning, 300 mg in the afternoon and 600 mg in the evening    -Continue Savella 50 mg twice  daily as this tremendously helps with symptoms of fibromyalgia.  We have discussed that Savella is FDA approved and has demonstrated improvement in multiple measures including pain, global impression of change in physical function.  We have discussed that the most frequent side effects of this medication can include nausea, constipation, vomiting, dry mouth, flushing or tachycardia.    -30 day supply given prior    3. Rehabilitative:   -We discussed continuing physical therapy to help manage the patient/s painful condition. The patient was counseled that muscle strengthening will improve the long term prognosis in regards to pain and may also help increase range of motion and mobility. They were told that one of the goals of physical therapy is that they learn how to do the exercises so that they can do them independently at home  daily upon completion.     4. Diagnostic:  Reviewed relevant imaging and answered patient's questions.  Thoracic and cervical x-ray to better evaluate persistent pain    5. Consult:   -Dr. Schneider for knee and shoulder pain PRN  -Rheumatology: Fibromyalgia    6. Follow up:  4-6 weeks post  injection     The above plan and management options were discussed at length with patient. Patient is in agreement with the above and verbalized understanding.    - I discussed the goals of interventional chronic pain management with the patient on today's visit. We discussed a multimodal and systematic approach to pain.  This includes diagnostic and therapeutic injections, adjuvant pharmacologic treatment, physical therapy, and at times psychiatry.  I emphasized the importance of regular exercise, core strengthening and stretching, diet and weight loss as a cornerstone of long-term pain management.    - This condition does not require this patient to take time off of work, and the primary goal of our Pain Management services is to improve the patient's functional capacity.  - Patient Questions: Answered all of the patient's questions regarding diagnoses, therapy, treatment and next steps    Humberto Dumont MD

## 2023-05-18 ENCOUNTER — HOSPITAL ENCOUNTER (OUTPATIENT)
Dept: RADIOLOGY | Facility: HOSPITAL | Age: 78
Discharge: HOME | End: 2023-05-18
Attending: ANESTHESIOLOGY
Payer: MEDICARE

## 2023-05-18 ENCOUNTER — OFFICE VISIT (OUTPATIENT)
Dept: PAIN MEDICINE | Facility: CLINIC | Age: 78
End: 2023-05-18
Payer: MEDICARE

## 2023-05-18 DIAGNOSIS — M54.59 DISCOGENIC LUMBAR PAIN: ICD-10-CM

## 2023-05-18 DIAGNOSIS — M54.9 MID BACK PAIN: ICD-10-CM

## 2023-05-18 DIAGNOSIS — M51.36 LUMBAR DEGENERATIVE DISC DISEASE: ICD-10-CM

## 2023-05-18 DIAGNOSIS — M54.9 MID BACK PAIN: Primary | ICD-10-CM

## 2023-05-18 PROCEDURE — 99214 OFFICE O/P EST MOD 30 MIN: CPT | Mod: 95,,, | Performed by: ANESTHESIOLOGY

## 2023-05-18 PROCEDURE — 72074 X-RAY EXAM THORAC SPINE4/>VW: CPT | Mod: TC

## 2023-05-18 PROCEDURE — 72052 XR CERVICAL SPINE 5 VIEW WITH FLEX AND EXT: ICD-10-PCS | Mod: 26,,, | Performed by: RADIOLOGY

## 2023-05-18 PROCEDURE — 99214 PR OFFICE/OUTPT VISIT, EST, LEVL IV, 30-39 MIN: ICD-10-PCS | Mod: 95,,, | Performed by: ANESTHESIOLOGY

## 2023-05-18 PROCEDURE — 72074 XR THORACIC SPINE 4 OR MORE VIEWS: ICD-10-PCS | Mod: 26,,, | Performed by: RADIOLOGY

## 2023-05-18 PROCEDURE — 72052 X-RAY EXAM NECK SPINE 6/>VWS: CPT | Mod: TC

## 2023-05-18 PROCEDURE — 72074 X-RAY EXAM THORAC SPINE4/>VW: CPT | Mod: 26,,, | Performed by: RADIOLOGY

## 2023-05-18 PROCEDURE — 72052 X-RAY EXAM NECK SPINE 6/>VWS: CPT | Mod: 26,,, | Performed by: RADIOLOGY

## 2023-05-18 RX ORDER — EZETIMIBE 10 MG/1
TABLET ORAL
Qty: 90 TABLET | Refills: 3 | Status: SHIPPED | OUTPATIENT
Start: 2023-05-18 | End: 2023-09-26

## 2023-05-19 ENCOUNTER — PATIENT MESSAGE (OUTPATIENT)
Dept: PAIN MEDICINE | Facility: HOSPITAL | Age: 78
End: 2023-05-19
Payer: COMMERCIAL

## 2023-05-22 DIAGNOSIS — M94.261 CHONDROMALACIA, RIGHT KNEE: ICD-10-CM

## 2023-05-22 DIAGNOSIS — M77.8 LEFT SHOULDER TENDINITIS: ICD-10-CM

## 2023-05-22 NOTE — PRE-PROCEDURE INSTRUCTIONS
Spoke with patient regarding procedure scheduled on 5.23     Arrival time 0715     Has patient been sick with fever or on antibiotics within the last 7 days? No     Does the patient have any open wounds, sores or rashes? No     Does the patient have any recent fractures? no     Has patient received a vaccination within the last 7 days? No     Received the COVID vaccination? yes     Has the patient stopped all medications as directed? Hold dm meds am of procedure     Does patient have a pacemaker and or defibrillator? no     Does the patient have a ride to and from procedure and someone reliable to remain with patient? eloina     Is the patient diabetic? yes     Does the patient have sleep apnea? Or use O2 at home? no     Is the patient receiving sedation? Yes     Is the patient instructed to remain NPO beginning at midnight the night before their procedure? yes     Procedure location confirmed with patient? Yes     Covid- Denies signs/symptoms. Instructed to notify PAT/MD if any changes.

## 2023-05-23 ENCOUNTER — HOSPITAL ENCOUNTER (OUTPATIENT)
Facility: HOSPITAL | Age: 78
Discharge: HOME | End: 2023-05-23
Attending: ANESTHESIOLOGY | Admitting: ANESTHESIOLOGY
Payer: MEDICARE

## 2023-05-23 VITALS
BODY MASS INDEX: 31.72 KG/M2 | DIASTOLIC BLOOD PRESSURE: 66 MMHG | RESPIRATION RATE: 16 BRPM | TEMPERATURE: 98 F | HEIGHT: 70 IN | HEART RATE: 74 BPM | OXYGEN SATURATION: 96 % | WEIGHT: 221.56 LBS | SYSTOLIC BLOOD PRESSURE: 146 MMHG

## 2023-05-23 DIAGNOSIS — M17.9 KNEE OSTEOARTHRITIS: ICD-10-CM

## 2023-05-23 PROBLEM — M17.0 PRIMARY OSTEOARTHRITIS OF BOTH KNEES: Status: ACTIVE | Noted: 2023-05-23

## 2023-05-23 PROBLEM — M51.36 LUMBAR DEGENERATIVE DISC DISEASE: Status: ACTIVE | Noted: 2023-05-23

## 2023-05-23 PROBLEM — M51.369 LUMBAR DEGENERATIVE DISC DISEASE: Status: ACTIVE | Noted: 2023-05-23

## 2023-05-23 PROCEDURE — 77002 PR FLUOROSCOPIC GUIDANCE NEEDLE PLACEMENT: ICD-10-PCS | Mod: 26,,, | Performed by: ANESTHESIOLOGY

## 2023-05-23 PROCEDURE — 77002 NEEDLE LOCALIZATION BY XRAY: CPT | Performed by: ANESTHESIOLOGY

## 2023-05-23 PROCEDURE — 20610 DRAIN/INJ JOINT/BURSA W/O US: CPT | Mod: LT | Performed by: ANESTHESIOLOGY

## 2023-05-23 PROCEDURE — 20610 PR DRAIN/INJECT LARGE JOINT/BURSA: ICD-10-PCS | Mod: 50,,, | Performed by: ANESTHESIOLOGY

## 2023-05-23 PROCEDURE — 63600175 PHARM REV CODE 636 W HCPCS: Mod: JZ,JG | Performed by: ANESTHESIOLOGY

## 2023-05-23 PROCEDURE — 20610 DRAIN/INJ JOINT/BURSA W/O US: CPT | Mod: 50,,, | Performed by: ANESTHESIOLOGY

## 2023-05-23 PROCEDURE — 77002 NEEDLE LOCALIZATION BY XRAY: CPT | Mod: 26,,, | Performed by: ANESTHESIOLOGY

## 2023-05-23 PROCEDURE — 25000003 PHARM REV CODE 250: Performed by: ANESTHESIOLOGY

## 2023-05-23 PROCEDURE — 25500020 PHARM REV CODE 255: Performed by: ANESTHESIOLOGY

## 2023-05-23 RX ORDER — INDOMETHACIN 25 MG/1
CAPSULE ORAL
Status: DISCONTINUED | OUTPATIENT
Start: 2023-05-23 | End: 2023-05-23 | Stop reason: HOSPADM

## 2023-05-23 RX ORDER — MIDAZOLAM HYDROCHLORIDE 1 MG/ML
INJECTION, SOLUTION INTRAMUSCULAR; INTRAVENOUS
Status: DISCONTINUED | OUTPATIENT
Start: 2023-05-23 | End: 2023-05-23 | Stop reason: HOSPADM

## 2023-05-23 RX ORDER — CELECOXIB 200 MG/1
CAPSULE ORAL
Qty: 120 CAPSULE | Refills: 1 | Status: SHIPPED | OUTPATIENT
Start: 2023-05-23 | End: 2023-10-20

## 2023-05-23 RX ORDER — FENTANYL CITRATE 50 UG/ML
INJECTION, SOLUTION INTRAMUSCULAR; INTRAVENOUS
Status: DISCONTINUED | OUTPATIENT
Start: 2023-05-23 | End: 2023-05-23 | Stop reason: HOSPADM

## 2023-05-23 NOTE — DISCHARGE SUMMARY
Discharge Note  Short Stay      SUMMARY     Admit Date: 5/23/2023    Attending Physician: Humberto Dumont MD        Discharge Physician: Humberto Dumont MD        Discharge Date: 5/23/2023 8:11 AM    Procedure(s) (LRB):  Bilateral intraarticular knee injection with Synvisc-1;  (Bilateral)    Final Diagnosis: Knee Osteoarthritis M17.9    Disposition: Home or self care    Patient Instructions:   Current Discharge Medication List        CONTINUE these medications which have NOT CHANGED    Details   biotin 1 mg tablet Take 1,000 mcg by mouth every morning.       celecoxib (CELEBREX) 200 MG capsule TAKE 1 CAPSULE(200 MG) BY MOUTH TWICE DAILY WITH FOOD  Qty: 120 capsule, Refills: 1    Associated Diagnoses: Chondromalacia, right knee; Left shoulder tendinitis      clotrimazole-betamethasone 1-0.05% (LOTRISONE) cream Apply topically 2 (two) times daily.  Qty: 45 g, Refills: 2      diclofenac sodium (VOLTAREN) 1 % Gel Apply 2 g topically 4 (four) times daily.  Qty: 1 each, Refills: 6      ezetimibe (ZETIA) 10 mg tablet TAKE 1 TABLET(10 MG) BY MOUTH EVERY DAY  Qty: 90 tablet, Refills: 3    Associated Diagnoses: Hyperlipidemia associated with type 2 diabetes mellitus      FLUoxetine 40 MG capsule Take 1 capsule (40 mg total) by mouth once daily.  Qty: 90 capsule, Refills: 1    Associated Diagnoses: Fibromyalgia; Chronic major depressive disorder      fluticasone (FLONASE) 50 mcg/actuation nasal spray 2 sprays by Each Nare route once daily.  Qty: 1 Bottle, Refills: 0    Associated Diagnoses: Sinusitis      furosemide (LASIX) 20 MG tablet Take 1 tablet (20 mg total) by mouth 2 (two) times daily.  Qty: 60 tablet, Refills: 11      gabapentin (NEURONTIN) 300 MG capsule Take 1 capsule (300 mg total) by mouth 4 (four) times daily.  Qty: 360 capsule, Refills: 0      INVOKANA 100 mg Tab tablet TAKE 1 TABLET(100 MG) BY MOUTH EVERY DAY  Qty: 90 tablet, Refills: 1    Associated Diagnoses: Diabetes mellitus type 2 in obese      LORazepam  (ATIVAN) 1 MG tablet TK 3 TS PO 1 HOUR PRIOR TO APPOINTMENT      metFORMIN (GLUCOPHAGE) 1000 MG tablet Take 1 tablet (1,000 mg total) by mouth 2 (two) times daily with meals.  Qty: 180 tablet, Refills: 1    Associated Diagnoses: Diabetes mellitus type 2 in obese      metoprolol succinate (TOPROL-XL) 25 MG 24 hr tablet Take 1 tablet (25 mg total) by mouth once daily.  Qty: 30 tablet, Refills: 5    Comments: .      potassium chloride SA (K-DUR,KLOR-CON M) 10 MEQ tablet TAKE 1 TABLET(10 MEQ) BY MOUTH EVERY DAY  Qty: 20 tablet, Refills: 5      pulse oximeter (PULSE OXIMETER) device by Apply Externally route 2 (two) times a day. Use twice daily at 8 AM and 3 PM and record the value in MyChart as directed.  Qty: 1 each, Refills: 0    Comments: This is a NO CHARGE item.  Please override price to zero.  DO NOT PRINT.  NORMAL MODE e-PRESCRIBE ONLY.  Associated Diagnoses: COVID-19 virus detected      verapamiL (CALAN) 80 MG tablet Take 1 tablet (80 mg total) by mouth 2 (two) times daily.  Qty: 180 tablet, Refills: 2    Associated Diagnoses: Hypertension associated with diabetes; MVP (mitral valve prolapse)      losartan (COZAAR) 25 MG tablet TAKE 1 TABLET(25 MG) BY MOUTH EVERY DAY  Qty: 90 tablet, Refills: 2      milnacipran (SAVELLA) 50 mg Tab Take 1 tablet (50 mg total) by mouth 2 (two) times daily.  Qty: 60 tablet, Refills: 0      omeprazole (PRILOSEC) 20 MG capsule Take 1 capsule (20 mg total) by mouth daily as needed (w/ NSAID).  Qty: 30 capsule, Refills: 5    Associated Diagnoses: Gastroesophageal reflux disease without esophagitis      triamcinolone acetonide 0.025% (KENALOG) 0.025 % Oint Apply topically 2 (two) times daily. for 10 days  Qty: 15 g, Refills: 2    Associated Diagnoses: Tinea corporis                 Discharge Diagnosis: Lumbar degenerative disc disease [M51.36]  Discogenic lumbar pain [M54.59]  Condition on Discharge: Stable with no complications to procedure   Diet on Discharge: Same as  before.  Activity: as per instruction sheet.  Discharge to: Home with a responsible adult.  Follow up: 2-4 weeks       Please call the office at [phone] if you experience any weakness or loss of sensation, fever > 101.5, pain uncontrolled with oral medications, persistent nausea/vomiting/or diarrhea, redness or drainage from the incisions, or any other worrisome concerns. If physician on call was not reached or could not communicate with our office for any reason please go to the nearest emergency department

## 2023-05-23 NOTE — DISCHARGE INSTRUCTIONS

## 2023-05-23 NOTE — OP NOTE
Procedure Note:     bilateral Intra-articular Knee Synvisc 1 Injection Under Fluoroscopy    05/23/2023                                 Surgeon: Humberto Dumont MD       Assistant: None     Pre-Op Diagnosis: Knee Osteoarthritis M17.9    Post-Op Diagnosis:Knee Osteoarthritis M17.9     EBL: None     Complications: None     Specimens: None    Sedation:  Conscious sedation provided by M.D    The patient was monitored with continuous pulse oximetry, EKG, and intermittent blood pressure monitors.  The patient was hemodynamically stable throughout the entire process was responsive to voice, and breathing spontaneously.  Supplemental O2 was provided at 2L/min via nasal cannula.  Patient was comfortable for the duration of the procedure. (See nurse documentation and case log for sedation time)    There was a total of 2mg IV Midazolam and 100mcg Fentanyl titrated for the procedure      Description of procedure:     After written consent was obtained, patient placed in supine position.  The area over the  lateral aspect of the bilateral  knee(s) joint prepped with chlorhexidine.  The area was draped in the usual sterile fashion.  Approximately 5 mL 1% lidocaine was infiltrated into the skin overlying the predetermined entry point. A 22 gauge spinal needle was then advanced under fluoroscopy in the AP views into the knee joint. After negative aspiration there was injection of 1 mL radio opaque contrast dye to confirm needle location within the joint, and no evidence of intravascular spread. This was followed by injection of 6 mL of sodium hyaluronate into the joint space. Displacement of the contrast indicated that the medication went into the area of the joint space. Needle was withdrawn and a sterile band-aid applied to the skin.     Patient tolerated the procedure well, and was reporting improvement of pain symptoms after the injection. Kaylin Mccollum was discharged from the clinic in stable condition.

## 2023-05-24 LAB — POCT GLUCOSE: 144 MG/DL (ref 70–110)

## 2023-05-29 RX ORDER — DICLOFENAC SODIUM 10 MG/G
2 GEL TOPICAL 4 TIMES DAILY
Qty: 1 EACH | Refills: 6 | Status: SHIPPED | OUTPATIENT
Start: 2023-05-29 | End: 2024-01-19

## 2023-05-29 NOTE — TELEPHONE ENCOUNTER
Good Afternoon,     Pt is requesting for a refill of: milnacipran (SAVELLA) 50 mg Tab  Last filed:4/06/23  Last encounter: 5/18/23  Last proc: 5/23/23  Up coming apt: 6/15/23  Pharmacy:: [id number] - [place] - [id number] [facility]   Is this something you can do?

## 2023-06-14 NOTE — PROGRESS NOTES
Established Patient Interventional Pain Clinic Visit    Chief Pain Complaint:  Back pain  Right knee pain    Interval history 06/15/2023    Patient presents status post bilateral intra-articular knee injection 05/23/2023.  Patient reports 75% sustained improvement in bilateral knee pain following intra-articular knee injection.  Today her primary concern is bilateral hip pain.  Patient reports pain in the lower back which radiates into the groin and down the lateral aspect of bilateral lower extremities to mid thigh.  Patient reports pain is exacerbated 1st thing in the morning when she is attempting to get out of bed.  Patient denies more distal radiculopathy into the lower extremities or feet.  She denies lower extremity weakness, bowel or bladder incontinence or saddle anesthesia.  Patient continues to reports significant improvement with Savella medication which she is taking 50 mg twice daily with fibromyalgia pain.  She is requesting a refill of this medication.  Patient reports lower back pain continues to have greater than 80% sustained relief following 2 level via disc allograft supplementation.      Interval history 05/18/2023  Patient presents status post L5-S1 via disc allograft supplementation 04/25/2023 and via disc allograft supplementation L3-4 05/09/2023.  Patient reports noticeable improvement >80% in lower back pain following two-level  allograft supplementation.  Today her primary concern is bilateral knee pain.  Patient has questions regarding hyaluronic acid supplementation to be use.  Patient reports she has seen videos on Durolane and Euflexxa and is inquiring to the brand to be used on the upcoming Tuesday.  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  She is requesting up-to-date imaging to investigate these territories.  Today she denies significant weakness in the upper lower extremities,  bowel or bladder incontinence or saddle anesthesia.      Interval history 04/06/2023  Patient presents for follow-up of lower back pain.  She continues to reports superior relief in fibromyalgia symptoms on Savella medication.  Patient has brought in denial paperwork from her insurance reporting limitations on dosage and quantity allowed.  Patient reports prior to starting this medication she felt that she did not have a life.  now she reports significant improvement in pain as well as chronic fatigue associated with fibromyalgia.  Today she again reports pain in the lower back which is worse with lumbar flexion.  Patient reports she is unable to perform activities of daily living such as grocery shopping or prolonged standing or ambulation secondary to her discogenic lower back pain.  Today patient denies more distal radiculopathy into the lower extremities or feet or lower extremity weakness.  Patient is interested in discussing intervention.  Of note patient has failed to have improvement in his lower back pain with prior lumbar medial branch block, sacroiliac joint injections.    Interval Hx: 3/9/23  Patient presents for one-month follow-up.  Today she reports she is significantly better since our last clinic visit.  At that time patient had slipped in a low off and injured her lower back and right leg.  Today she reports this pain is significantly improved and pain is intermittent and today is rated a 3/10.  Today patient reports pain is isolated to the midline lower lumbar spine.  Pain is exacerbated with lumbar flexion such as when she is picking up close.  Fibromyalgia Pain has been significantly improved with the initiation of Savella medication.  Patient has reached a titration of 50 mg twice daily.  Patient recently refilled this medication.  She denies any side effects from this medication.  Today she denies any significant lower extremity weakness, bowel or bladder incontinence or saddle  anesthesia      Interval history 02/07/2023  Patient presents status post bilateral sacroiliac joint and greater trochanteric bursa injection 01/24/2023 and bilateral L3-5 lumbar medial branch block 12/13/2022.  Patient had recent mechanical fall confounding benefit from recent injections.  Today she reports she was reaching over a mattress cover to reach a stack of bibles and slid into a slint.  Patient reports she was behind and tall wall in a took 20-30 minutes for her to stand.  Patient also reports exacerbation of fibromyalgia pain.  Since her fall patient reports pain which radiates into the buttock and down the posterior aspect of the right lower extremity to the dorsum of the foot in L4-S1 distribution.  Patient has continued gabapentin 300 mg twice daily, Celebrex in the evenings as well as Tylenol 1000 mg twice daily.    Interval history 11/29/2022    Patient presents status post bilateral sacroiliac joint greater trochanteric bursa 10/10/2022.  Patient reports 90% relief overlying bilateral sacroiliac joints and greater trochanteric bursa following her injection.  Today she reports primarily lumbar axial back pain as well as significant neck pain.  Lower back pain is exacerbated with prolonged standing such as when she is washing dishes.  She denies significant distal radiculopathy into the right lower extremities as described at our last clinic visit.  Neck pain is elicited with cervical flexion, extension and lateral flexion and she does report reduced mobility.  She reports her pain began following a mechanical fall down the stairs at [facility] in September 1986.  Patient has continued gabapentin 300 mg in the morning, 300 mg in the afternoon and 600 mg in the evening.    Interval history 10/04/2022  Ms. Mccollum is a 77-year-old female with past medical history significant for depression, hyperlipidemia, hypertension, mitral valve prolapse, peripheral vascular disease, history of COVID-19, type 2 diabetes,  multi joint arthritis, fibromyalgia who presents to [facility] care, previous Dr. Dutta patient.  Today patient reports return of pain in the lower back which radiates into bilateral hips and down the posterior aspect of the right lower extremity in L4-5 distribution to the dorsum of the foot.  Patient reports pain is intermittent but has increased in intensity.  Pain is described as shocking in nature and today is rated a 6/10.  Patient reports she feels as if her skin is crawling.  patient is currently taking gabapentin 300 mg up to 3 times daily when pain is severe.  Pain interferes with the patient's sleep.  Patient also reports history of fibromyalgia which limits her mobility and duration of standing and ambulation.  Patient does endorse associated weakness in the lower extremities associated with her pain.  Patient is interested in pursuing repeat intervention as she is obtained several months of significant relief with prior sacroiliac joint and greater trochanteric bursa injections.  Patient has continued physician directed physical therapy exercises at home daily.      History of Present Illness 01/16/2020: Dr. Dutta:   Kaylin Mccollum is a 74 y.o. female  who is presenting with a chief complaint of lumbar back pain. The patient began experiencing this problem insidiously, and the pain has been gradually worsening over the past 5 month(s). The pain is described as throbbing, shooting, burning and electrical and is located in the bilateral lumbar spine. Pain is intermittent and lasts hours. The pain radiates to bilateral lower extremities L4 distribudution. The patient rates her pain a 8 out of ten and interferes with activities of daily living a 7 out of ten. Pain is exacerbated by flexion of the lumbar spine, ambulation, and is improved by rest.      She also complains of right knee pain. The patient began experiencing this problem insidiously. The pain is described as cramping, aching and is located in  the right knee. Pain is intermittent and lasts hours. The pain is nonradiating. The patient rates her pain a 8 out of ten and interferes with activities of daily living a 7 out of ten. Pain is exacerbated by getting up from a seated position and standing, and is improved by rest. Patient reports no prior trauma, prior arthroscopy bilaterally in 1990s.       - pertinent negatives: No fever, No chills, No weight loss, No bladder dysfunction, No bowel dysfunction, No saddle anesthesia  - pertinent positives: none        Pain Disability Index Review:   @[facility](4749:3)@    Non-Pharmacologic Treatments:  Physical Therapy/Home Exercise: yes  Ice/Heat:yes  TENS: no  Acupuncture: no  Massage: no  Chiropractic: no    Other: no      Pain Medications:  - Adjuvant Medications: Lorazepam (Ativan), Neurontin (Gabapentin), and Topical Ointment (Voltaren Gel, Steroid cream, Anti-Inflammatory Cream, Compound cream)      Pain injections:  Dr. Dumont:  -05/29/2023: Bilateral intra-articular knee injection  -05/09/2023: L3-4 via disc allograft supplementation  -04/25/2023:  L5-S1 via disc allograft supplementation  -01/24/2023: Bilateral sacroiliac joint and greater trochanteric bursa injection  -12/13/2022: Bilateral L3-5 lumbar medial branch block  - 10/10/2022: Bilateral greater trochanteric bursa and sacroiliac joint injection      -04/20/2022: Right-sided acetabular femoral injection; Dr. Dutta  -03/01/2022: Bilateral sacroiliac joint and greater trochanteric bursa injection; Dr. Dutta  -07/08/2021: Bilateral sacroiliac joint and greater trochanteric bursa injection; Dr. Dutta  -01/05/2021: Bilateral sacroiliac joint and greater trochanteric bursa injection; Dr. Burns  -10/08/2020: Bilateral sacroiliac joint and bilateral greater trochanteric bursa injection; Dr. Dutta  -05/06/2020: Bilateral sacroiliac joint and greater trochanteric bursa injection; Dr. Dutta    Past Medical History:   Diagnosis Date    Arthritis     Cataract      "COVID-19 2/25/2021    Diabetes mellitus 1995    BS didn't check 09/13/2022    Diabetes mellitus, type 2     Fibromyalgia     General anesthetics causing adverse effect in therapeutic use     bradycardia     Hypertension     Migraines     Mitral valve prolapse     Osteoarthritis     Rotator cuff tear 04/01/2015       Review of patient's allergies indicates:   Allergen Reactions    Demerol [meperidine] Other (See Comments)     Burning when adm IV  Able to tolerate IM, "Turned the veins in my hand purple."    Latex, natural rubber Other (See Comments)     "Burns my skin",  Symptoms get worse the longer she is exposed    Zocor [simvastatin] Other (See Comments)     Tightening of muscles    Statins-hmg-coa reductase inhibitors Other (See Comments)     myopathy    Sulfa (sulfonamide antibiotics)      Blurred vision       Past Surgical History:   Procedure Laterality Date    ARTHROSCOPY OF KNEE Right 3/10/2020    Procedure: ARTHROSCOPY, KNEE;  Surgeon: Les Schneider MD;  Location: [facility] OR;  Service: Orthopedics;  Laterality: Right;    CATARACT EXTRACTION W/  INTRAOCULAR LENS IMPLANT Left 07/19/2017    CATARACT EXTRACTION W/  INTRAOCULAR LENS IMPLANT Right 2017    CHOLECYSTECTOMY      CHONDROPLASTY OF KNEE Right 3/10/2020    Procedure: CHONDROPLASTY, KNEE;  Surgeon: Les Schneider MD;  Location: [facility] OR;  Service: Orthopedics;  Laterality: Right;  Anterior compartment     COLONOSCOPY N/A 9/25/2018    Procedure: COLONOSCOPY;  Surgeon: Bethel Carmona MD;  Location: [facility] ENDO;  Service: Endoscopy;  Laterality: N/A;    EXCISION OF MEDIAL MENISCUS OF KNEE Right 3/10/2020    Procedure: MENISCECTOMY, KNEE, MEDIAL;  Surgeon: Les Schneider MD;  Location: [facility] OR;  Service: Orthopedics;  Laterality: Right;  Partial, Medial , Lateral     EYE SURGERY      INJECTION OF ANESTHETIC AGENT AROUND MEDIAL BRANCH NERVES INNERVATING LUMBAR FACET JOINT Bilateral 12/13/2022    Procedure: Bilateral L3-5 MBB;  Surgeon: Humberto" CODY Dumont MD;  Location: [facility] PAIN MGT;  Service: Pain Management;  Laterality: Bilateral;    INJECTION OF ANESTHETIC AGENT AROUND NERVE Right 8/8/2019    Procedure: Right Genicular nerve block with local;  Surgeon: Manpreet Dutta MD;  Location: [facility] PAIN MGT;  Service: Pain Management;  Laterality: Right;    INJECTION OF ANESTHETIC AGENT INTO SACROILIAC JOINT Bilateral 5/6/2020    Procedure: Bilateral Sacroiliac Joint Injection;  Surgeon: Manpreet Dutta MD;  Location: [facility] PAIN MGT;  Service: Pain Management;  Laterality: Bilateral;    INJECTION OF ANESTHETIC AGENT INTO SACROILIAC JOINT Bilateral 10/8/2020    Procedure: Bilateral SI and Bilateral GTB with RN IV sedation;  Surgeon: Manpreet Dutta MD;  Location: [facility] PAIN MGT;  Service: Pain Management;  Laterality: Bilateral;    INJECTION OF ANESTHETIC AGENT INTO SACROILIAC JOINT Bilateral 1/5/2021    Procedure: Bilateral BLOCK, SACROILIAC JOINT and Bilateral GTB witn RN IV sedation;  Surgeon: Daniel Burns MD;  Location: [facility] PAIN MGT;  Service: Pain Management;  Laterality: Bilateral;    INJECTION OF ANESTHETIC AGENT INTO SACROILIAC JOINT Bilateral 7/8/2021    Procedure: Bilateral BLOCK, SACROILIAC JOINT bilateral GTB RN IV sedation;  Surgeon: Manpreet Dutta MD;  Location: [facility] PAIN MGT;  Service: Pain Management;  Laterality: Bilateral;    INJECTION OF ANESTHETIC AGENT INTO SACROILIAC JOINT Bilateral 3/1/2022    Procedure: Bilateral BLOCK, SACROILIAC JOINT and Bilateral GTB RN IV sedation;  Surgeon: Manpreet Dutta MD;  Location: [facility] PAIN MGT;  Service: Pain Management;  Laterality: Bilateral;    INJECTION OF ANESTHETIC AGENT INTO SACROILIAC JOINT Bilateral 10/10/2022    Procedure: Bilateral Sacroiliac Joint Injection;  Surgeon: Humberto Dumont MD;  Location: [facility] PAIN MGT;  Service: Pain Management;  Laterality: Bilateral;    INJECTION OF ANESTHETIC AGENT INTO SACROILIAC JOINT Bilateral 1/24/2023    Procedure: Bilateral Sacroiliac Joint Injection;  Surgeon: Humberto ROSS  MD Tim;  Location: [facility] PAIN MGT;  Service: Pain Management;  Laterality: Bilateral;    INJECTION OF JOINT Bilateral 9/5/2019    Procedure: Bilateral GT bursa injection;  Surgeon: Manpreet Dutta MD;  Location: [facility] PAIN MGT;  Service: Pain Management;  Laterality: Bilateral;    INJECTION OF JOINT Bilateral 5/6/2020    Procedure: Bilateral GT bursa injection;  Surgeon: Manpreet Dutta MD;  Location: [facility] PAIN MGT;  Service: Pain Management;  Laterality: Bilateral;    INJECTION OF JOINT Right 4/28/2022    Procedure: Injection, Joint Right Hip Injection RN IV sedation;  Surgeon: Manpreet Dutta MD;  Location: [facility] PAIN MGT;  Service: Pain Management;  Laterality: Right;    INJECTION OF JOINT Bilateral 10/10/2022    Procedure: Bilateral GT bursa injection with RN IV sedation;  Surgeon: Humberto Dumont MD;  Location: [facility] PAIN MGT;  Service: Pain Management;  Laterality: Bilateral;    INJECTION OF JOINT Bilateral 1/24/2023    Procedure: Bilateral GT bursa injection;  Surgeon: Humberto Dumont MD;  Location: [facility] PAIN MGT;  Service: Pain Management;  Laterality: Bilateral;    INJECTION OF JOINT Bilateral 5/23/2023    Procedure: Bilateral intraarticular knee injection with Synvisc-1; ;  Surgeon: Humberto Dumont MD;  Location: [facility] PAIN [facility]GT;  Service: Pain Management;  Laterality: Bilateral;    INJECTION, ALLOGRAFT, INTERVERTEBRAL DISC N/A 4/25/2023    Procedure: INJECTION,ALLOGRAFT,INTERVERTEBRAL DISC,1ST LEVEL: L5-S1;  Surgeon: Humberto Dumont MD;  Location: [facility] PAIN [facility]GT;  Service: Pain Management;  Laterality: N/A;    INJECTION, ALLOGRAFT, INTERVERTEBRAL DISC N/A 5/9/2023    Procedure: INJECTION,ALLOGRAFT,INTERVERTEBRAL DISC,2nd LEVEL: L3-4;  Surgeon: Humberto Dumont MD;  Location: [facility] PAIN MGT;  Service: Pain Management;  Laterality: N/A;    KNEE ARTHROSCOPY Bilateral     PARS PLANA VITRECTOMY W/ REPAIR OF MACULAR HOLE Left 02/22/2017    RADIOFREQUENCY THERMOCOAGULATION Left 7/11/2019    Procedure: Right SIJ RFA;  Surgeon:  Manpreet Dutta MD;  Location: [facility] PAIN MGT;  Service: Pain Management;  Laterality: Left;    RADIOFREQUENCY THERMOCOAGULATION Right 7/25/2019    Procedure: Right SIJ RFA;  Surgeon: Manpreet Dutta MD;  Location: [facility] PAIN MGT;  Service: Pain Management;  Laterality: Right;    SHOULDER ARTHROSCOPY Right 06/04/15     Current Outpatient Medications on File Prior to Visit   Medication Sig Dispense Refill    biotin 1 mg tablet Take 1,000 mcg by mouth every morning.       celecoxib (CELEBREX) 200 MG capsule TAKE 1 CAPSULE(200 MG) BY MOUTH TWICE DAILY WITH FOOD 120 capsule 1    clotrimazole-betamethasone 1-0.05% (LOTRISONE) cream Apply topically 2 (two) times daily. 45 g 2    diclofenac sodium (VOLTAREN) 1 % Gel Apply 2 g topically 4 (four) times daily. 1 each 6    ezetimibe (ZETIA) 10 mg tablet TAKE 1 TABLET(10 MG) BY MOUTH EVERY DAY 90 tablet 3    FLUoxetine 40 MG capsule Take 1 capsule (40 mg total) by mouth once daily. 90 capsule 1    fluticasone (FLONASE) 50 mcg/actuation nasal spray 2 sprays by Each Nare route once daily. (Patient taking differently: 2 sprays by Each Nostril route nightly as needed.) 1 Bottle 0    furosemide (LASIX) 20 MG tablet Take 1 tablet (20 mg total) by mouth 2 (two) times daily. 60 tablet 11    gabapentin (NEURONTIN) 300 MG capsule Take 1 capsule (300 mg total) by mouth 4 (four) times daily. 360 capsule 0    INVOKANA 100 mg Tab tablet TAKE 1 TABLET(100 MG) BY MOUTH EVERY DAY 90 tablet 1    LORazepam (ATIVAN) 1 MG tablet TK 3 TS PO 1 HOUR PRIOR TO APPOINTMENT      metFORMIN (GLUCOPHAGE) 1000 MG tablet Take 1 tablet (1,000 mg total) by mouth 2 (two) times daily with meals. 180 tablet 1    metoprolol succinate (TOPROL-XL) 25 MG 24 hr tablet Take 1 tablet (25 mg total) by mouth once daily. 30 tablet 5    potassium chloride SA (K-DUR,KLOR-CON M) 10 MEQ tablet TAKE 1 TABLET(10 MEQ) BY MOUTH EVERY DAY 20 tablet 5    pulse oximeter (PULSE OXIMETER) device by Apply Externally route 2 (two) times a day. Use  twice daily at 8 AM and 3 PM and record the value in [employer] as directed. 1 each 0    verapamiL (CALAN) 80 MG tablet Take 1 tablet (80 mg total) by mouth 2 (two) times daily. 180 tablet 2    [DISCONTINUED] milnacipran (SAVELLA) 50 mg Tab Take 1 tablet (50 mg total) by mouth 2 (two) times daily. 60 tablet 0    losartan (COZAAR) 25 MG tablet TAKE 1 TABLET(25 MG) BY MOUTH EVERY DAY 90 tablet 2    omeprazole (PRILOSEC) 20 MG capsule Take 1 capsule (20 mg total) by mouth daily as needed (w/ NSAID). 30 capsule 5    triamcinolone acetonide 0.025% (KENALOG) 0.025 % Oint Apply topically 2 (two) times daily. for 10 days (Patient taking differently: Apply topically 2 (two) times daily as needed.) 15 g 2     No current facility-administered medications on file prior to visit.               GENERAL:  No weight loss, malaise or fevers.  HEENT:   No recent changes in vision or hearing  NECK:  Negative for lumps, no difficulty with swallowing.  RESPIRATORY:  Negative for cough, wheezing or shortness of breath, patient denies any recent URI.  CARDIOVASCULAR:  Negative for chest pain or palpitations.  GI:  Negative for abdominal discomfort, blood in stools or black stools or change in bowel habits.  MUSCULOSKELETAL:  See HPI.  SKIN:  Negative for lesions, rash, and itching.  PSYCH:  No mood disorder or recent psychosocial stressors.   HEMATOLOGY/LYMPHOLOGY:  Negative for prolonged bleeding, bruising easily or swollen nodes.    NEURO:   No history of syncope, paralysis, seizures or tremors.  All other reviewed and negative other than HPI.    Imaging / Labs / Studies (reviewed on 6/15/2023):    Cervical x-ray 05/18/2023   FINDINGS:  Osteopenia.  Vertebral body heights maintained.  Mild anterolisthesis of C4 on C5 and C5 on C6.  Multilevel osteophyte changes with disc height loss most noted at C5-6 and C6-7.  Multilevel prominent facet arthropathy.  Multilevel left-sided neural foraminal narrowing.     Significant change in alignment on  flexion or extension.     Prevertebral soft tissues unremarkable.  Lung apices clear.    Thoracic x-ray 05/18/2023  FINDINGS:  Osteopenia.  Vertebral body heights maintained.  No spondylolisthesis.  Similar more multilevel bridging osteophyte changes.  No acute osseous abnormality.  Soft tissues unremarkable.      MRI Lumbar Spine 2/16/23  FINDINGS:  Grade 1 degenerative spondylolisthesis at L4-L5.  Vertebral body height is normal.  Marrow signal is within normal limits. The conus medullaris terminates at the level of L1-L2.  No abnormal signal within the conus. Intervertebral disc levels are as follows:     T12-L1 disc: Normal disc height with anterior osteophytes and mild degenerative facet hypertrophy.  No spinal or foraminal stenosis.  The dural canal measures 16 mm.     L1-L2 disc : Disc space height loss with chronic Schmorl's nodes.  Posterior disc bulge.  Anterior osteophytes.  Minor facet arthropathy bilaterally.  The dural canal measures 13 mm.  No foraminal stenosis.     L2-L3 disc: Circumferential disc bulge with tiny chronic Schmorl's nodes.  Anterior osteophytes.  Mild degenerative facet hypertrophy.  The dural canal measures 12 mm.  No foraminal stenosis.     L3-L4 disc: Disc space height loss with a circumferential disc bulge and anterior osteophytes.  Mild degenerative facet hypertrophy with moderate buckling of the ligamentum flavum.  The dural canal measures 11 mm.  No significant foraminal stenosis.     L4-L5 disc: Grade 1 spondylolisthesis with severe degenerative facet hypertrophy bilaterally.  Buckling of the ligamentum flavum.  The dural canal measures 7 mm AP.  Disc encroaches into the floors of the exit foramina, right greater than left.  There is mild right foraminal stenosis.     L5-S1 disc: Severe disc space height loss with osteophytes at the margins and encroach into the exit foramina.  Mild degenerative facet hypertrophy and buckling of the ligamentum flavum.  The dural canal measures  "11 mm.  Mild foraminal stenosis.      Physical Exam:  Last clinic visit:  Vitals:    06/15/23 1222   BP: (!) 128/58   Pulse: 84   Resp: 17   Weight: 99 kg (218 lb 4.1 oz)   Height: 5' 10" (1.778 m)   PainSc:   6            Body mass index is 31.32 kg/m².   (reviewed on 6/15/2023)    General: alert and oriented, in no apparent distress.  Gait: normal gait.  Skin: no rashes, no discoloration, no obvious lesions  HEENT: normocephalic, atraumatic. Pupils equal and round.  Cardiovascular: no significant peripheral edema present.  Respiratory: without use of accessory muscles of respiration.    Musculoskeletal - Lumbar Spine:  - ROM fairly preserved   - Pain on flexion of lumbar spine: Absent   - Pain on extension of lumbar spine: Absent         - Lumbar facet loading: Absent   - TTP over the lumbar facet joints: Absent  - TTP over the lumbar paraspinals: Present   - TTP over the SI joints: Present  - TTP over GT bursa: Present, minimal   - Straight Leg Raise: Negative  - CHERYLE: Present    Right Knee:  - TTP: Present over medial/ lateral joint line  - Pain with extension: Present  - Pain with flexion: Present  - Crepitus: Present     Neuro - Lower Extremities:  - BLE Strength: R/L: HF: 5/5, HE: 5/5, KF: 5/5; KE: 5/5; FE: 5/5; FF: 5/5  - Extremity Reflexes: Brisk and symmetric throughout  - Sensory: Sensation to light touch intact bilaterally      Psych:  Mood and affect is appropriate    Assessment:  Kaylin Mccollum is a 78 y.o. year old female who is presenting with       ICD-10-CM ICD-9-CM    1. Sacroiliac joint pain  M53.3 724.6 IR SI Joint Injection Bilat w/Image      Case Request-RAD/Other Procedure Area: Bilateral GT bursa injection, Bilateral Sacroiliac Joint Injection      milnacipran (SAVELLA) 50 mg Tab      2. Greater trochanteric bursitis, unspecified laterality  M70.60 726.5 Case Request-RAD/Other Procedure Area: Bilateral GT bursa injection, Bilateral Sacroiliac Joint Injection      IR Aspiration Injection " Large Joint W FL      IR Aspiration Injection Large Joint W FL      milnacipran (SAVELLA) 50 mg Tab      3. Discogenic lumbar pain  M54.59 724.2 milnacipran (SAVELLA) 50 mg Tab              Plan:  1. Interventional: - status post L3-4 via disc allograft supplementation 05/09/2023 and L5-S1 via disc allograft supplementation 04/25/2023 with greater than 80% improvement in discogenic lower back pain.    -Schedule for bilateral sacroiliac joint and greater trochanteric bursa injection to see if this helps with sacroiliitis and bursitis.  We discussed the procedure, benefits, potential risks and alternative options in detail.  Patient has elected to pursue this procedure    Anticoagulation:  None, no anticoagulation      2. Pharmacologic:     -  We have discussed continuing gabapentin.  We have reviewed potential side effects of this medication including daytime somnolence, weight gain and peripheral edema  Gabapentin 300 mg in the morning, 300 mg in the afternoon and 600 mg in the evening    -Continue Savella 50 mg twice  daily as this tremendously helps with symptoms of fibromyalgia.  We have discussed that Savella is FDA approved and has demonstrated improvement in multiple measures including pain, global impression of change in physical function.  We have discussed that the most frequent side effects of this medication can include nausea, constipation, vomiting, dry mouth, flushing or tachycardia.    -30 day supply given     3. Rehabilitative:   -We discussed continuing physical therapy to help manage the patient/s painful condition. The patient was counseled that muscle strengthening will improve the long term prognosis in regards to pain and may also help increase range of motion and mobility. They were told that one of the goals of physical therapy is that they learn how to do the exercises so that they can do them independently at home daily upon completion.     4. Diagnostic:  Reviewed relevant imaging and  answered patient's questions.      5. Consult:   -Dr. Schneider for knee and shoulder pain PRN  -Rheumatology: Fibromyalgia    6. Follow up:  4-6 weeks post  injection     The above plan and management options were discussed at length with patient. Patient is in agreement with the above and verbalized understanding.    - I discussed the goals of interventional chronic pain management with the patient on today's visit. We discussed a multimodal and systematic approach to pain.  This includes diagnostic and therapeutic injections, adjuvant pharmacologic treatment, physical therapy, and at times psychiatry.  I emphasized the importance of regular exercise, core strengthening and stretching, diet and weight loss as a cornerstone of long-term pain management.    - This condition does not require this patient to take time off of work, and the primary goal of our Pain Management services is to improve the patient's functional capacity.  - Patient Questions: Answered all of the patient's questions regarding diagnoses, therapy, treatment and next steps    Humberto Dumont MD

## 2023-06-14 NOTE — H&P (VIEW-ONLY)
Established Patient Interventional Pain Clinic Visit    Chief Pain Complaint:  Back pain  Right knee pain    Interval history 06/15/2023    Patient presents status post bilateral intra-articular knee injection 05/23/2023.  Patient reports 75% sustained improvement in bilateral knee pain following intra-articular knee injection.  Today her primary concern is bilateral hip pain.  Patient reports pain in the lower back which radiates into the groin and down the lateral aspect of bilateral lower extremities to mid thigh.  Patient reports pain is exacerbated 1st thing in the morning when she is attempting to get out of bed.  Patient denies more distal radiculopathy into the lower extremities or feet.  She denies lower extremity weakness, bowel or bladder incontinence or saddle anesthesia.  Patient continues to reports significant improvement with Savella medication which she is taking 50 mg twice daily with fibromyalgia pain.  She is requesting a refill of this medication.  Patient reports lower back pain continues to have greater than 80% sustained relief following 2 level via disc allograft supplementation.      Interval history 05/18/2023  Patient presents status post L5-S1 via disc allograft supplementation 04/25/2023 and via disc allograft supplementation L3-4 05/09/2023.  Patient reports noticeable improvement >80% in lower back pain following two-level  allograft supplementation.  Today her primary concern is bilateral knee pain.  Patient has questions regarding hyaluronic acid supplementation to be use.  Patient reports she has seen videos on Durolane and Euflexxa and is inquiring to the brand to be used on the upcoming Tuesday.  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  She is requesting up-to-date imaging to investigate these territories.  Today she denies significant weakness in the upper lower extremities,  bowel or bladder incontinence or saddle anesthesia.      Interval history 04/06/2023  Patient presents for follow-up of lower back pain.  She continues to reports superior relief in fibromyalgia symptoms on Savella medication.  Patient has brought in denial paperwork from her insurance reporting limitations on dosage and quantity allowed.  Patient reports prior to starting this medication she felt that she did not have a life.  now she reports significant improvement in pain as well as chronic fatigue associated with fibromyalgia.  Today she again reports pain in the lower back which is worse with lumbar flexion.  Patient reports she is unable to perform activities of daily living such as grocery shopping or prolonged standing or ambulation secondary to her discogenic lower back pain.  Today patient denies more distal radiculopathy into the lower extremities or feet or lower extremity weakness.  Patient is interested in discussing intervention.  Of note patient has failed to have improvement in his lower back pain with prior lumbar medial branch block, sacroiliac joint injections.    Interval Hx: 3/9/23  Patient presents for one-month follow-up.  Today she reports she is significantly better since our last clinic visit.  At that time patient had slipped in a low off and injured her lower back and right leg.  Today she reports this pain is significantly improved and pain is intermittent and today is rated a 3/10.  Today patient reports pain is isolated to the midline lower lumbar spine.  Pain is exacerbated with lumbar flexion such as when she is picking up close.  Fibromyalgia Pain has been significantly improved with the initiation of Savella medication.  Patient has reached a titration of 50 mg twice daily.  Patient recently refilled this medication.  She denies any side effects from this medication.  Today she denies any significant lower extremity weakness, bowel or bladder incontinence or saddle  anesthesia      Interval history 02/07/2023  Patient presents status post bilateral sacroiliac joint and greater trochanteric bursa injection 01/24/2023 and bilateral L3-5 lumbar medial branch block 12/13/2022.  Patient had recent mechanical fall confounding benefit from recent injections.  Today she reports she was reaching over a mattress cover to reach a stack of bibles and slid into a slint.  Patient reports she was behind and tall wall in a took 20-30 minutes for her to stand.  Patient also reports exacerbation of fibromyalgia pain.  Since her fall patient reports pain which radiates into the buttock and down the posterior aspect of the right lower extremity to the dorsum of the foot in L4-S1 distribution.  Patient has continued gabapentin 300 mg twice daily, Celebrex in the evenings as well as Tylenol 1000 mg twice daily.    Interval history 11/29/2022    Patient presents status post bilateral sacroiliac joint greater trochanteric bursa 10/10/2022.  Patient reports 90% relief overlying bilateral sacroiliac joints and greater trochanteric bursa following her injection.  Today she reports primarily lumbar axial back pain as well as significant neck pain.  Lower back pain is exacerbated with prolonged standing such as when she is washing dishes.  She denies significant distal radiculopathy into the right lower extremities as described at our last clinic visit.  Neck pain is elicited with cervical flexion, extension and lateral flexion and she does report reduced mobility.  She reports her pain began following a mechanical fall down the stairs at [facility] in September 1986.  Patient has continued gabapentin 300 mg in the morning, 300 mg in the afternoon and 600 mg in the evening.    Interval history 10/04/2022  Ms. Mccollum is a 77-year-old female with past medical history significant for depression, hyperlipidemia, hypertension, mitral valve prolapse, peripheral vascular disease, history of COVID-19, type 2 diabetes,  multi joint arthritis, fibromyalgia who presents to \[facility]\"" care, previous Dr. Dutta patient.  Today patient reports return of pain in the lower back which radiates into bilateral hips and down the posterior aspect of the right lower extremity in L4-5 distribution to the dorsum of the foot.  Patient reports pain is intermittent but has increased in intensity.  Pain is described as shocking in nature and today is rated a 6/10.  Patient reports she feels as if her skin is crawling.  patient is currently taking gabapentin 300 mg up to 3 times daily when pain is severe.  Pain interferes with the patient's sleep.  Patient also reports history of fibromyalgia which limits her mobility and duration of standing and ambulation.  Patient does endorse associated weakness in the lower extremities associated with her pain.  Patient is interested in pursuing repeat intervention as she is obtained several months of significant relief with prior sacroiliac joint and greater trochanteric bursa injections.  Patient has continued physician directed physical therapy exercises at home daily.      History of Present Illness 01/16/2020: Dr. Dutta:   Kaylin Mccollum is a 74 y.o. female  who is presenting with a chief complaint of lumbar back pain. The patient began experiencing this problem insidiously, and the pain has been gradually worsening over the past 5 month(s). The pain is described as throbbing, shooting, burning and electrical and is located in the bilateral lumbar spine. Pain is intermittent and lasts hours. The pain radiates to bilateral lower extremities L4 distribudution. The patient rates her pain a 8 out of ten and interferes with activities of daily living a 7 out of ten. Pain is exacerbated by flexion of the lumbar spine, ambulation, and is improved by rest.      She also complains of right knee pain. The patient began experiencing this problem insidiously. The pain is described as cramping, aching and is located in  the right knee. Pain is intermittent and lasts hours. The pain is nonradiating. The patient rates her pain a 8 out of ten and interferes with activities of daily living a 7 out of ten. Pain is exacerbated by getting up from a seated position and standing, and is improved by rest. Patient reports no prior trauma, prior arthroscopy bilaterally in 1990s.       - pertinent negatives: No fever, No chills, No weight loss, No bladder dysfunction, No bowel dysfunction, No saddle anesthesia  - pertinent positives: none        Pain Disability Index Review:   @[facility](4749:[id number])@    Non-Pharmacologic Treatments:  Physical Therapy/Home Exercise: yes  Ice/Heat:yes  TENS: no  Acupuncture: no  Massage: no  Chiropractic: no    Other: no      Pain Medications:  - Adjuvant Medications: Lorazepam (Ativan), Neurontin (Gabapentin), and Topical Ointment (Voltaren Gel, Steroid cream, Anti-Inflammatory Cream, Compound cream)      Pain injections:  Dr. Dumont:  -05/29/2023: Bilateral intra-articular knee injection  -05/09/2023: L3-4 via disc allograft supplementation  -04/25/2023:  L5-S1 via disc allograft supplementation  -01/24/2023: Bilateral sacroiliac joint and greater trochanteric bursa injection  -12/13/2022: Bilateral L3-5 lumbar medial branch block  - 10/10/2022: Bilateral greater trochanteric bursa and sacroiliac joint injection      -04/20/2022: Right-sided acetabular femoral injection; Dr. Dutta  -03/01/2022: Bilateral sacroiliac joint and greater trochanteric bursa injection; Dr. Dutta  -07/08/2021: Bilateral sacroiliac joint and greater trochanteric bursa injection; Dr. Dutta  -01/05/2021: Bilateral sacroiliac joint and greater trochanteric bursa injection; Dr. Burns  -10/08/2020: Bilateral sacroiliac joint and bilateral greater trochanteric bursa injection; Dr. Dutta  -05/06/2020: Bilateral sacroiliac joint and greater trochanteric bursa injection; Dr. Dutta    Past Medical History:   Diagnosis Date    Arthritis     Cataract      "COVID-19 2/25/2021    Diabetes mellitus 1995    BS didn't check 09/13/2022    Diabetes mellitus, type 2     Fibromyalgia     General anesthetics causing adverse effect in therapeutic use     bradycardia     Hypertension     Migraines     Mitral valve prolapse     Osteoarthritis     Rotator cuff tear 04/01/2015       Review of patient's allergies indicates:   Allergen Reactions    Demerol [meperidine] Other (See Comments)     Burning when adm IV  Able to tolerate IM, "Turned the veins in my hand purple."    Latex, natural rubber Other (See Comments)     "Burns my skin",  Symptoms get worse the longer she is exposed    Zocor [simvastatin] Other (See Comments)     Tightening of muscles    Statins-hmg-coa reductase inhibitors Other (See Comments)     myopathy    Sulfa (sulfonamide antibiotics)      Blurred vision       Past Surgical History:   Procedure Laterality Date    ARTHROSCOPY OF KNEE Right 3/10/2020    Procedure: ARTHROSCOPY, KNEE;  Surgeon: Les Schneider MD;  Location: [facility] OR;  Service: Orthopedics;  Laterality: Right;    CATARACT EXTRACTION W/  INTRAOCULAR LENS IMPLANT Left 07/19/2017    CATARACT EXTRACTION W/  INTRAOCULAR LENS IMPLANT Right 2017    CHOLECYSTECTOMY      CHONDROPLASTY OF KNEE Right 3/10/2020    Procedure: CHONDROPLASTY, KNEE;  Surgeon: Les Schneider MD;  Location: [facility] OR;  Service: Orthopedics;  Laterality: Right;  Anterior compartment     COLONOSCOPY N/A 9/25/2018    Procedure: COLONOSCOPY;  Surgeon: Bethel Carmona MD;  Location: [facility] ENDO;  Service: Endoscopy;  Laterality: N/A;    EXCISION OF MEDIAL MENISCUS OF KNEE Right 3/10/2020    Procedure: MENISCECTOMY, KNEE, MEDIAL;  Surgeon: Les Schneider MD;  Location: [facility] OR;  Service: Orthopedics;  Laterality: Right;  Partial, Medial , Lateral     EYE SURGERY      INJECTION OF ANESTHETIC AGENT AROUND MEDIAL BRANCH NERVES INNERVATING LUMBAR FACET JOINT Bilateral 12/13/2022    Procedure: Bilateral L3-5 MBB;  Surgeon: Humberto" CODY Dumont MD;  Location: [facility] PAIN MGT;  Service: Pain Management;  Laterality: Bilateral;    INJECTION OF ANESTHETIC AGENT AROUND NERVE Right 8/8/2019    Procedure: Right Genicular nerve block with local;  Surgeon: Manpreet Dutta MD;  Location: [facility]GT;  Service: Pain Management;  Laterality: Right;    INJECTION OF ANESTHETIC AGENT INTO SACROILIAC JOINT Bilateral 5/6/2020    Procedure: Bilateral Sacroiliac Joint Injection;  Surgeon: Manpreet Dutta MD;  Location: [facility];  Service: Pain Management;  Laterality: Bilateral;    INJECTION OF ANESTHETIC AGENT INTO SACROILIAC JOINT Bilateral 10/8/2020    Procedure: Bilateral SI and Bilateral GTB with RN IV sedation;  Surgeon: Manpreet Dutta MD;  Location: [facility]GT;  Service: Pain Management;  Laterality: Bilateral;    INJECTION OF ANESTHETIC AGENT INTO SACROILIAC JOINT Bilateral 1/5/2021    Procedure: Bilateral BLOCK, SACROILIAC JOINT and Bilateral GTB witn RN IV sedation;  Surgeon: Daniel Burns MD;  Location: [facility];  Service: Pain Management;  Laterality: Bilateral;    INJECTION OF ANESTHETIC AGENT INTO SACROILIAC JOINT Bilateral 7/8/2021    Procedure: Bilateral BLOCK, SACROILIAC JOINT bilateral GTB RN IV sedation;  Surgeon: Manpreet Dutta MD;  Location: [facility];  Service: Pain Management;  Laterality: Bilateral;    INJECTION OF ANESTHETIC AGENT INTO SACROILIAC JOINT Bilateral 3/1/2022    Procedure: Bilateral BLOCK, SACROILIAC JOINT and Bilateral GTB RN IV sedation;  Surgeon: Manpreet Dutta MD;  Location: [facility];  Service: Pain Management;  Laterality: Bilateral;    INJECTION OF ANESTHETIC AGENT INTO SACROILIAC JOINT Bilateral 10/10/2022    Procedure: Bilateral Sacroiliac Joint Injection;  Surgeon: Humberto Dumont MD;  Location: [facility];  Service: Pain Management;  Laterality: Bilateral;    INJECTION OF ANESTHETIC AGENT INTO SACROILIAC JOINT Bilateral 1/24/2023    Procedure: Bilateral Sacroiliac Joint Injection;  Surgeon: Humberto ROSS  MD Tim;  Location: [facility];  Service: Pain Management;  Laterality: Bilateral;    INJECTION OF JOINT Bilateral 9/5/2019    Procedure: Bilateral GT bursa injection;  Surgeon: Manpreet Dutta MD;  Location: [facility];  Service: Pain Management;  Laterality: Bilateral;    INJECTION OF JOINT Bilateral 5/6/2020    Procedure: Bilateral GT bursa injection;  Surgeon: Manpreet Dutta MD;  Location: [facility];  Service: Pain Management;  Laterality: Bilateral;    INJECTION OF JOINT Right 4/28/2022    Procedure: Injection, Joint Right Hip Injection RN IV sedation;  Surgeon: Manpreet Dutta MD;  Location: [facility];  Service: Pain Management;  Laterality: Right;    INJECTION OF JOINT Bilateral 10/10/2022    Procedure: Bilateral GT bursa injection with RN IV sedation;  Surgeon: Humberto Dumont MD;  Location: [facility];  Service: Pain Management;  Laterality: Bilateral;    INJECTION OF JOINT Bilateral 1/24/2023    Procedure: Bilateral GT bursa injection;  Surgeon: Humberto Dumont MD;  Location: [facility];  Service: Pain Management;  Laterality: Bilateral;    INJECTION OF JOINT Bilateral 5/23/2023    Procedure: Bilateral intraarticular knee injection with Synvisc-1; ;  Surgeon: Humberto Dumont MD;  Location: [facility];  Service: Pain Management;  Laterality: Bilateral;    INJECTION, ALLOGRAFT, INTERVERTEBRAL DISC N/A 4/25/2023    Procedure: INJECTION,ALLOGRAFT,INTERVERTEBRAL DISC,1ST LEVEL: L5-S1;  Surgeon: Humberto Dumont MD;  Location: [facility];  Service: Pain Management;  Laterality: N/A;    INJECTION, ALLOGRAFT, INTERVERTEBRAL DISC N/A 5/9/2023    Procedure: INJECTION,ALLOGRAFT,INTERVERTEBRAL DISC,2nd LEVEL: L3-4;  Surgeon: Humberto Dumont MD;  Location: [facility];  Service: Pain Management;  Laterality: N/A;    KNEE ARTHROSCOPY Bilateral     PARS PLANA VITRECTOMY W/ REPAIR OF MACULAR HOLE Left 02/22/2017    RADIOFREQUENCY THERMOCOAGULATION Left 7/11/2019    Procedure: Right SIJ RFA;  Surgeon:  Manpreet Dutta MD;  Location: [facility];  Service: Pain Management;  Laterality: Left;    RADIOFREQUENCY THERMOCOAGULATION Right 7/25/2019    Procedure: Right SIJ RFA;  Surgeon: Manpreet Dutta MD;  Location: [facility];  Service: Pain Management;  Laterality: Right;    SHOULDER ARTHROSCOPY Right 06/04/15     Current Outpatient Medications on File Prior to Visit   Medication Sig Dispense Refill    biotin 1 mg tablet Take 1,000 mcg by mouth every morning.       celecoxib (CELEBREX) 200 MG capsule TAKE 1 CAPSULE(200 MG) BY MOUTH TWICE DAILY WITH FOOD 120 capsule 1    clotrimazole-betamethasone 1-0.05% (LOTRISONE) cream Apply topically 2 (two) times daily. 45 g 2    diclofenac sodium (VOLTAREN) 1 % Gel Apply 2 g topically 4 (four) times daily. 1 each 6    ezetimibe (ZETIA) 10 mg tablet TAKE 1 TABLET(10 MG) BY MOUTH EVERY DAY 90 tablet 3    FLUoxetine 40 MG capsule Take 1 capsule (40 mg total) by mouth once daily. 90 capsule 1    fluticasone (FLONASE) 50 mcg/actuation nasal spray 2 sprays by Each Nare route once daily. (Patient taking differently: 2 sprays by Each Nostril route nightly as needed.) 1 Bottle 0    furosemide (LASIX) 20 MG tablet Take 1 tablet (20 mg total) by mouth 2 (two) times daily. 60 tablet 11    gabapentin (NEURONTIN) 300 MG capsule Take 1 capsule (300 mg total) by mouth 4 (four) times daily. 360 capsule 0    INVOKANA 100 mg Tab tablet TAKE 1 TABLET(100 MG) BY MOUTH EVERY DAY 90 tablet 1    LORazepam (ATIVAN) 1 MG tablet TK 3 TS PO 1 HOUR PRIOR TO APPOINTMENT      metFORMIN (GLUCOPHAGE) 1000 MG tablet Take 1 tablet (1,000 mg total) by mouth 2 (two) times daily with meals. 180 tablet 1    metoprolol succinate (TOPROL-XL) 25 MG 24 hr tablet Take 1 tablet (25 mg total) by mouth once daily. 30 tablet 5    potassium chloride SA (K-DUR,KLOR-CON M) 10 MEQ tablet TAKE 1 TABLET(10 MEQ) BY MOUTH EVERY DAY 20 tablet 5    pulse oximeter (PULSE OXIMETER) device by Apply Externally route 2 (two) times a day. Use  twice daily at 8 AM and 3 PM and record the value in [facility] as directed. 1 each 0    verapamiL (CALAN) 80 MG tablet Take 1 tablet (80 mg total) by mouth 2 (two) times daily. 180 tablet 2    [DISCONTINUED] milnacipran (SAVELLA) 50 mg Tab Take 1 tablet (50 mg total) by mouth 2 (two) times daily. 60 tablet 0    losartan (COZAAR) 25 MG tablet TAKE 1 TABLET(25 MG) BY MOUTH EVERY DAY 90 tablet 2    omeprazole (PRILOSEC) 20 MG capsule Take 1 capsule (20 mg total) by mouth daily as needed (w/ NSAID). 30 capsule 5    triamcinolone acetonide 0.025% (KENALOG) 0.025 % Oint Apply topically 2 (two) times daily. for 10 days (Patient taking differently: Apply topically 2 (two) times daily as needed.) 15 g 2     No current facility-administered medications on file prior to visit.               GENERAL:  No weight loss, malaise or fevers.  HEENT:   No recent changes in vision or hearing  NECK:  Negative for lumps, no difficulty with swallowing.  RESPIRATORY:  Negative for cough, wheezing or shortness of breath, patient denies any recent URI.  CARDIOVASCULAR:  Negative for chest pain or palpitations.  GI:  Negative for abdominal discomfort, blood in stools or black stools or change in bowel habits.  MUSCULOSKELETAL:  See HPI.  SKIN:  Negative for lesions, rash, and itching.  PSYCH:  No mood disorder or recent psychosocial stressors.   HEMATOLOGY/LYMPHOLOGY:  Negative for prolonged bleeding, bruising easily or swollen nodes.    NEURO:   No history of syncope, paralysis, seizures or tremors.  All other reviewed and negative other than HPI.    Imaging / Labs / Studies (reviewed on 6/15/2023):    Cervical x-ray 05/18/2023   FINDINGS:  Osteopenia.  Vertebral body heights maintained.  Mild anterolisthesis of C4 on C5 and C5 on C6.  Multilevel osteophyte changes with disc height loss most noted at C5-6 and C6-7.  Multilevel prominent facet arthropathy.  Multilevel left-sided neural foraminal narrowing.     Significant change in alignment on  flexion or extension.     Prevertebral soft tissues unremarkable.  Lung apices clear.    Thoracic x-ray 05/18/2023  FINDINGS:  Osteopenia.  Vertebral body heights maintained.  No spondylolisthesis.  Similar more multilevel bridging osteophyte changes.  No acute osseous abnormality.  Soft tissues unremarkable.      MRI Lumbar Spine 2/16/23  FINDINGS:  Grade 1 degenerative spondylolisthesis at L4-L5.  Vertebral body height is normal.  Marrow signal is within normal limits. The conus medullaris terminates at the level of L1-L2.  No abnormal signal within the conus. Intervertebral disc levels are as follows:     T12-L1 disc: Normal disc height with anterior osteophytes and mild degenerative facet hypertrophy.  No spinal or foraminal stenosis.  The dural canal measures 16 mm.     L1-L2 disc : Disc space height loss with chronic Schmorl's nodes.  Posterior disc bulge.  Anterior osteophytes.  Minor facet arthropathy bilaterally.  The dural canal measures 13 mm.  No foraminal stenosis.     L2-L3 disc: Circumferential disc bulge with tiny chronic Schmorl's nodes.  Anterior osteophytes.  Mild degenerative facet hypertrophy.  The dural canal measures 12 mm.  No foraminal stenosis.     L3-L4 disc: Disc space height loss with a circumferential disc bulge and anterior osteophytes.  Mild degenerative facet hypertrophy with moderate buckling of the ligamentum flavum.  The dural canal measures 11 mm.  No significant foraminal stenosis.     L4-L5 disc: Grade 1 spondylolisthesis with severe degenerative facet hypertrophy bilaterally.  Buckling of the ligamentum flavum.  The dural canal measures 7 mm AP.  Disc encroaches into the floors of the exit foramina, right greater than left.  There is mild right foraminal stenosis.     L5-S1 disc: Severe disc space height loss with osteophytes at the margins and encroach into the exit foramina.  Mild degenerative facet hypertrophy and buckling of the ligamentum flavum.  The dural canal measures  "11 mm.  Mild foraminal stenosis.      Physical Exam:  Last clinic visit:  Vitals:    06/15/23 1222   BP: (!) 128/58   Pulse: 84   Resp: 17   Weight: 99 kg (218 lb 4.1 oz)   Height: 5' 10" (1.778 m)   PainSc:   6            Body mass index is 31.32 kg/m².   (reviewed on 6/15/2023)    General: alert and oriented, in no apparent distress.  Gait: normal gait.  Skin: no rashes, no discoloration, no obvious lesions  HEENT: normocephalic, atraumatic. Pupils equal and round.  Cardiovascular: no significant peripheral edema present.  Respiratory: without use of accessory muscles of respiration.    Musculoskeletal - Lumbar Spine:  - ROM fairly preserved   - Pain on flexion of lumbar spine: Absent   - Pain on extension of lumbar spine: Absent         - Lumbar facet loading: Absent   - TTP over the lumbar facet joints: Absent  - TTP over the lumbar paraspinals: Present   - TTP over the SI joints: Present  - TTP over GT bursa: Present, minimal   - Straight Leg Raise: Negative  - CHERYLE: Present    Right Knee:  - TTP: Present over medial/ lateral joint line  - Pain with extension: Present  - Pain with flexion: Present  - Crepitus: Present     Neuro - Lower Extremities:  - BLE Strength: R/L: HF: 5/5, HE: 5/5, KF: 5/5; KE: 5/5; FE: 5/5; FF: 5/5  - Extremity Reflexes: Brisk and symmetric throughout  - Sensory: Sensation to light touch intact bilaterally      Psych:  Mood and affect is appropriate    Assessment:  Kaylin Mccollum is a 78 y.o. year old female who is presenting with       ICD-10-CM ICD-9-CM    1. Sacroiliac joint pain  M53.3 724.6 IR SI Joint Injection Bilat w/Image      Case Request-RAD/Other Procedure Area: Bilateral GT bursa injection, Bilateral Sacroiliac Joint Injection      milnacipran (SAVELLA) 50 mg Tab      2. Greater trochanteric bursitis, unspecified laterality  M70.60 726.5 Case Request-RAD/Other Procedure Area: Bilateral GT bursa injection, Bilateral Sacroiliac Joint Injection      IR Aspiration Injection " Large Joint W FL      IR Aspiration Injection Large Joint W FL      milnacipran (SAVELLA) 50 mg Tab      3. Discogenic lumbar pain  M54.59 724.2 milnacipran (SAVELLA) 50 mg Tab              Plan:  1. Interventional: - status post L3-4 via disc allograft supplementation 05/09/2023 and L5-S1 via disc allograft supplementation 04/25/2023 with greater than 80% improvement in discogenic lower back pain.    -Schedule for bilateral sacroiliac joint and greater trochanteric bursa injection to see if this helps with sacroiliitis and bursitis.  We discussed the procedure, benefits, potential risks and alternative options in detail.  Patient has elected to pursue this procedure    Anticoagulation:  None, no anticoagulation      2. Pharmacologic:     -  We have discussed continuing gabapentin.  We have reviewed potential side effects of this medication including daytime somnolence, weight gain and peripheral edema  Gabapentin 300 mg in the morning, 300 mg in the afternoon and 600 mg in the evening    -Continue Savella 50 mg twice  daily as this tremendously helps with symptoms of fibromyalgia.  We have discussed that Savella is FDA approved and has demonstrated improvement in multiple measures including pain, global impression of change in physical function.  We have discussed that the most frequent side effects of this medication can include nausea, constipation, vomiting, dry mouth, flushing or tachycardia.    -30 day supply given     3. Rehabilitative:   -We discussed continuing physical therapy to help manage the patient/s painful condition. The patient was counseled that muscle strengthening will improve the long term prognosis in regards to pain and may also help increase range of motion and mobility. They were told that one of the goals of physical therapy is that they learn how to do the exercises so that they can do them independently at home daily upon completion.     4. Diagnostic:  Reviewed relevant imaging and  answered patient's questions.      5. Consult:   -Dr. Schneider for knee and shoulder pain PRN  -Rheumatology: Fibromyalgia    6. Follow up:  4-6 weeks post  injection     The above plan and management options were discussed at length with patient. Patient is in agreement with the above and verbalized understanding.    - I discussed the goals of interventional chronic pain management with the patient on today's visit. We discussed a multimodal and systematic approach to pain.  This includes diagnostic and therapeutic injections, adjuvant pharmacologic treatment, physical therapy, and at times psychiatry.  I emphasized the importance of regular exercise, core strengthening and stretching, diet and weight loss as a cornerstone of long-term pain management.    - This condition does not require this patient to take time off of work, and the primary goal of our Pain Management services is to improve the patient's functional capacity.  - Patient Questions: Answered all of the patient's questions regarding diagnoses, therapy, treatment and next steps    Humberto Dumont MD

## 2023-06-15 ENCOUNTER — OFFICE VISIT (OUTPATIENT)
Dept: PAIN MEDICINE | Facility: CLINIC | Age: 78
End: 2023-06-15
Payer: MEDICARE

## 2023-06-15 VITALS
HEIGHT: 70 IN | HEART RATE: 84 BPM | BODY MASS INDEX: 31.25 KG/M2 | DIASTOLIC BLOOD PRESSURE: 58 MMHG | SYSTOLIC BLOOD PRESSURE: 128 MMHG | WEIGHT: 218.25 LBS | RESPIRATION RATE: 17 BRPM

## 2023-06-15 DIAGNOSIS — M53.3 SACROILIAC JOINT PAIN: Primary | ICD-10-CM

## 2023-06-15 DIAGNOSIS — M54.59 DISCOGENIC LUMBAR PAIN: ICD-10-CM

## 2023-06-15 DIAGNOSIS — M70.60 GREATER TROCHANTERIC BURSITIS, UNSPECIFIED LATERALITY: ICD-10-CM

## 2023-06-15 PROCEDURE — 99214 OFFICE O/P EST MOD 30 MIN: CPT | Mod: S$PBB,,, | Performed by: ANESTHESIOLOGY

## 2023-06-15 PROCEDURE — 99999 PR PBB SHADOW E&M-EST. PATIENT-LVL V: CPT | Mod: PBBFAC,,, | Performed by: ANESTHESIOLOGY

## 2023-06-15 PROCEDURE — 99999 PR PBB SHADOW E&M-EST. PATIENT-LVL V: ICD-10-PCS | Mod: PBBFAC,,, | Performed by: ANESTHESIOLOGY

## 2023-06-15 PROCEDURE — 99214 PR OFFICE/OUTPT VISIT, EST, LEVL IV, 30-39 MIN: ICD-10-PCS | Mod: S$PBB,,, | Performed by: ANESTHESIOLOGY

## 2023-06-15 PROCEDURE — 99215 OFFICE O/P EST HI 40 MIN: CPT | Mod: PBBFAC | Performed by: ANESTHESIOLOGY

## 2023-06-16 NOTE — PRE-PROCEDURE INSTRUCTIONS
Spoke with patient regarding procedure scheduled on 6.21     Arrival time 1115     Has patient been sick with fever or on antibiotics within the last 7 days? No     Does the patient have any open wounds, sores or rashes? No     Does the patient have any recent fractures? no     Has patient received a vaccination within the last 7 days? No     Received the COVID vaccination? yes     Has the patient stopped all medications as directed? Hold dm meds am of procedure     Does patient have a pacemaker and or defibrillator? no     Does the patient have a ride to and from procedure and someone reliable to remain with patient? Suyapa     Is the patient diabetic? yes     Does the patient have sleep apnea? Or use O2 at home? no     Is the patient receiving sedation? Yes     Is the patient instructed to remain NPO beginning at midnight the night before their procedure? yes     Procedure location confirmed with patient? Yes     Covid- Denies signs/symptoms. Instructed to notify PAT/MD if any changes.

## 2023-06-21 ENCOUNTER — HOSPITAL ENCOUNTER (OUTPATIENT)
Facility: HOSPITAL | Age: 78
Discharge: HOME | End: 2023-06-21
Attending: ANESTHESIOLOGY | Admitting: ANESTHESIOLOGY
Payer: MEDICARE

## 2023-06-21 VITALS
WEIGHT: 219.56 LBS | SYSTOLIC BLOOD PRESSURE: 149 MMHG | HEIGHT: 70 IN | DIASTOLIC BLOOD PRESSURE: 70 MMHG | RESPIRATION RATE: 17 BRPM | OXYGEN SATURATION: 94 % | TEMPERATURE: 97 F | HEART RATE: 75 BPM | BODY MASS INDEX: 31.43 KG/M2

## 2023-06-21 DIAGNOSIS — M53.3 SACROILIAC JOINT PAIN: ICD-10-CM

## 2023-06-21 DIAGNOSIS — M70.60 GREATER TROCHANTERIC BURSITIS, UNSPECIFIED LATERALITY: ICD-10-CM

## 2023-06-21 LAB — POCT GLUCOSE: 182 MG/DL (ref 70–110)

## 2023-06-21 PROCEDURE — 20610 PR DRAIN/INJECT LARGE JOINT/BURSA: ICD-10-PCS | Mod: 50,59,, | Performed by: ANESTHESIOLOGY

## 2023-06-21 PROCEDURE — 27096 INJECT SACROILIAC JOINT: CPT | Mod: RT | Performed by: ANESTHESIOLOGY

## 2023-06-21 PROCEDURE — 27096 PR INJECTION,SACROILIAC JOINT: ICD-10-PCS | Mod: 50,,, | Performed by: ANESTHESIOLOGY

## 2023-06-21 PROCEDURE — 20610 DRAIN/INJ JOINT/BURSA W/O US: CPT | Mod: 59,LT | Performed by: ANESTHESIOLOGY

## 2023-06-21 PROCEDURE — 20610 DRAIN/INJ JOINT/BURSA W/O US: CPT | Mod: 50,59,, | Performed by: ANESTHESIOLOGY

## 2023-06-21 PROCEDURE — 27096 INJECT SACROILIAC JOINT: CPT | Mod: 50,,, | Performed by: ANESTHESIOLOGY

## 2023-06-21 PROCEDURE — 25000003 PHARM REV CODE 250: Performed by: ANESTHESIOLOGY

## 2023-06-21 PROCEDURE — 63600175 PHARM REV CODE 636 W HCPCS: Performed by: ANESTHESIOLOGY

## 2023-06-21 PROCEDURE — 25500020 PHARM REV CODE 255: Performed by: ANESTHESIOLOGY

## 2023-06-21 RX ORDER — FENTANYL CITRATE 50 UG/ML
INJECTION, SOLUTION INTRAMUSCULAR; INTRAVENOUS
Status: DISCONTINUED | OUTPATIENT
Start: 2023-06-21 | End: 2023-06-21 | Stop reason: HOSPADM

## 2023-06-21 RX ORDER — MIDAZOLAM HYDROCHLORIDE 1 MG/ML
INJECTION, SOLUTION INTRAMUSCULAR; INTRAVENOUS
Status: DISCONTINUED | OUTPATIENT
Start: 2023-06-21 | End: 2023-06-21 | Stop reason: HOSPADM

## 2023-06-21 RX ORDER — TRIAMCINOLONE ACETONIDE 40 MG/ML
INJECTION, SUSPENSION INTRA-ARTICULAR; INTRAMUSCULAR
Status: DISCONTINUED | OUTPATIENT
Start: 2023-06-21 | End: 2023-06-21 | Stop reason: HOSPADM

## 2023-06-21 RX ORDER — INDOMETHACIN 25 MG/1
CAPSULE ORAL
Status: DISCONTINUED | OUTPATIENT
Start: 2023-06-21 | End: 2023-06-21 | Stop reason: HOSPADM

## 2023-06-21 RX ORDER — BUPIVACAINE HYDROCHLORIDE 2.5 MG/ML
INJECTION, SOLUTION EPIDURAL; INFILTRATION; INTRACAUDAL
Status: DISCONTINUED | OUTPATIENT
Start: 2023-06-21 | End: 2023-06-21 | Stop reason: HOSPADM

## 2023-06-21 NOTE — DISCHARGE SUMMARY
Discharge Note  Short Stay      SUMMARY     Admit Date: 6/21/2023    Attending Physician: Humberto Dumont MD        Discharge Physician: Humberto Dumont MD        Discharge Date: 6/21/2023 12:09 PM    Procedure(s) (LRB):  Bilateral GT bursa injection (Bilateral)  Bilateral Sacroiliac Joint Injection (Bilateral)    Final Diagnosis: Sacroiliac joint pain [M53.3]  Greater trochanteric bursitis, unspecified laterality [M70.60]    Disposition: Home or self care    Patient Instructions:   Current Discharge Medication List        CONTINUE these medications which have NOT CHANGED    Details   biotin 1 mg tablet Take 1,000 mcg by mouth every morning.       celecoxib (CELEBREX) 200 MG capsule TAKE 1 CAPSULE(200 MG) BY MOUTH TWICE DAILY WITH FOOD  Qty: 120 capsule, Refills: 1    Associated Diagnoses: Chondromalacia, right knee; Left shoulder tendinitis      clotrimazole-betamethasone 1-0.05% (LOTRISONE) cream Apply topically 2 (two) times daily.  Qty: 45 g, Refills: 2      diclofenac sodium (VOLTAREN) 1 % Gel Apply 2 g topically 4 (four) times daily.  Qty: 1 each, Refills: 6      ezetimibe (ZETIA) 10 mg tablet TAKE 1 TABLET(10 MG) BY MOUTH EVERY DAY  Qty: 90 tablet, Refills: 3    Associated Diagnoses: Hyperlipidemia associated with type 2 diabetes mellitus      FLUoxetine 40 MG capsule Take 1 capsule (40 mg total) by mouth once daily.  Qty: 90 capsule, Refills: 1    Associated Diagnoses: Fibromyalgia; Chronic major depressive disorder      fluticasone (FLONASE) 50 mcg/actuation nasal spray 2 sprays by Each Nare route once daily.  Qty: 1 Bottle, Refills: 0    Associated Diagnoses: Sinusitis      furosemide (LASIX) 20 MG tablet Take 1 tablet (20 mg total) by mouth 2 (two) times daily.  Qty: 60 tablet, Refills: 11      INVOKANA 100 mg Tab tablet TAKE 1 TABLET(100 MG) BY MOUTH EVERY DAY  Qty: 90 tablet, Refills: 1    Associated Diagnoses: Diabetes mellitus type 2 in obese      LORazepam (ATIVAN) 1 MG tablet TK 3 TS PO 1 HOUR PRIOR TO  APPOINTMENT      metFORMIN (GLUCOPHAGE) 1000 MG tablet Take 1 tablet (1,000 mg total) by mouth 2 (two) times daily with meals.  Qty: 180 tablet, Refills: 1    Associated Diagnoses: Diabetes mellitus type 2 in obese      metoprolol succinate (TOPROL-XL) 25 MG 24 hr tablet Take 1 tablet (25 mg total) by mouth once daily.  Qty: 30 tablet, Refills: 5    Comments: .      milnacipran (SAVELLA) 50 mg Tab Take 1 tablet (50 mg total) by mouth 2 (two) times daily.  Qty: 60 tablet, Refills: 0    Associated Diagnoses: Sacroiliac joint pain; Greater trochanteric bursitis, unspecified laterality; Discogenic lumbar pain      potassium chloride SA (K-DUR,KLOR-CON M) 10 MEQ tablet TAKE 1 TABLET(10 MEQ) BY MOUTH EVERY DAY  Qty: 20 tablet, Refills: 5      verapamiL (CALAN) 80 MG tablet Take 1 tablet (80 mg total) by mouth 2 (two) times daily.  Qty: 180 tablet, Refills: 2    Associated Diagnoses: Hypertension associated with diabetes; MVP (mitral valve prolapse)      gabapentin (NEURONTIN) 300 MG capsule Take 1 capsule (300 mg total) by mouth 4 (four) times daily.  Qty: 360 capsule, Refills: 0      losartan (COZAAR) 25 MG tablet TAKE 1 TABLET(25 MG) BY MOUTH EVERY DAY  Qty: 90 tablet, Refills: 2      omeprazole (PRILOSEC) 20 MG capsule Take 1 capsule (20 mg total) by mouth daily as needed (w/ NSAID).  Qty: 30 capsule, Refills: 5    Associated Diagnoses: Gastroesophageal reflux disease without esophagitis      pulse oximeter (PULSE OXIMETER) device by Apply Externally route 2 (two) times a day. Use twice daily at 8 AM and 3 PM and record the value in [employer] as directed.  Qty: 1 each, Refills: 0    Comments: This is a NO CHARGE item.  Please override price to zero.  DO NOT PRINT.  NORMAL MODE e-PRESCRIBE ONLY.  Associated Diagnoses: COVID-19 virus detected      triamcinolone acetonide 0.025% (KENALOG) 0.025 % Oint Apply topically 2 (two) times daily. for 10 days  Qty: 15 g, Refills: 2    Associated Diagnoses: Tinea corporis                  Discharge Diagnosis: Sacroiliac joint pain [M53.3]  Greater trochanteric bursitis, unspecified laterality [M70.60]  Condition on Discharge: Stable with no complications to procedure   Diet on Discharge: Same as before.  Activity: as per instruction sheet.  Discharge to: Home with a responsible adult.  Follow up: 2-4 weeks       Please call the office at [phone] if you experience any weakness or loss of sensation, fever > 101.5, pain uncontrolled with oral medications, persistent nausea/vomiting/or diarrhea, redness or drainage from the incisions, or any other worrisome concerns. If physician on call was not reached or could not communicate with our office for any reason please go to the nearest emergency department

## 2023-06-21 NOTE — DISCHARGE INSTRUCTIONS

## 2023-06-21 NOTE — OP NOTE
Kaylin Mccollum  78 y.o. female      Vitals:    06/21/23 1205   BP: (!) 162/68   Pulse: 72   Resp: 13   Temp:        Procedure Date 06/21/2023        INFORMED CONSENT: The procedure, risks, benefits and options were discussed with patient. There are no contraindications to the procedure. The patient expressed understanding and agreed to proceed. The personnel performing the procedure was discussed. I verify that I personally obtained consent prior to the start of the procedure and the signed consent can be found on the patient's chart.       Anesthesia:   Conscious sedation provided by M.D    The patient was monitored with continuous pulse oximetry, EKG, and intermittent blood pressure monitors.  The patient was hemodynamically stable throughout the entire process was responsive to voice, and breathing spontaneously.  Supplemental O2 was provided at 2L/min via nasal cannula.  Patient was comfortable for the duration of the procedure. (See nurse documentation and case log for sedation time)    There was a total of 2mg IV Midazolam and 50mcg Fentanyl titrated for the procedure    Pre Procedure diagnosis: Sacroiliac joint pain [M53.3]  Greater trochanteric bursitis, unspecified laterality [M70.60]  Post-Procedure diagnosis: SAME      PROCEDURE:  1) Bilateral greater trochanteric bursa injection    2) Bilateral sacroiliac joint injection                            REASON FOR PROCEDURE:   Sacroiliitis [M46.1]  Greater trochanteric bursitis[M70.61]      MEDICATIONS INJECTED: 1mL 40mg/ml Kenalog and 4mL Bupivacaine 0.25% into each site    LOCAL ANESTHETIC USED: Xylocaine 1% 6ml     ESTIMATED BLOOD LOSS: None.   COMPLICATIONS: None.     TECHNIQUE:   Greater trochanteric bursa injection:  The area overlying the greater trochanteric bursa was identified using fluoroscopy, and the area overlying the skin was prepped and draped in usual sterile fashion. Local Xylocaine was injected by raising a wheel and going down to the  periosteum using a 27-gauge hypodermic needle. A 5 inch 22-gauge spinal needle was introduce into the Bilateral greater trochanteric bursa. Negative pressure applied to confirm no intravascular placement. Omnipaque was injected to confirm placement and to confirm that there was no vascular runoff. The medication was then injected slowly.  Displacement of the contrast after injection of the medication confirmed that the medication went into the area of the greater trochanteric bursa    Sacroiliac joint injection:   Laying in the prone position, the patient was prepped and draped in the usual sterile fashion using ChloraPrep and fenestrated drape.  The area was determined under fluoroscopy.  Local Xylocaine was injected by raising a wheel and going down to the periosteum using a 27-gauge hypodermic needle.  The 3.5 inch 22-gauge spinal needle was introduce into the Bilateral sacroiliac joint.  Negative pressure applied to confirm no intravascular placement.  Omnipaque was injected to confirm placement and to confirm that there was no vascular runoff.  The medication was then injected slowly.  The patient tolerated the procedure well.                       The patient was monitored for approximately 30 minutes after the procedure. Patient was given post procedure and discharge instructions to follow at home. We will see the patient back in two weeks or the patient may call to inform of status. The patient was discharged in a stable condition

## 2023-07-13 ENCOUNTER — PATIENT MESSAGE (OUTPATIENT)
Dept: INTERNAL MEDICINE | Facility: CLINIC | Age: 78
End: 2023-07-13
Payer: COMMERCIAL

## 2023-07-28 ENCOUNTER — OFFICE VISIT (OUTPATIENT)
Dept: CARDIOLOGY | Facility: CLINIC | Age: 78
End: 2023-07-28
Payer: MEDICARE

## 2023-07-28 VITALS
HEART RATE: 89 BPM | BODY MASS INDEX: 31.12 KG/M2 | WEIGHT: 217.38 LBS | SYSTOLIC BLOOD PRESSURE: 116 MMHG | HEIGHT: 70 IN | DIASTOLIC BLOOD PRESSURE: 62 MMHG | OXYGEN SATURATION: 94 %

## 2023-07-28 DIAGNOSIS — E11.69 HYPERLIPIDEMIA ASSOCIATED WITH TYPE 2 DIABETES MELLITUS: ICD-10-CM

## 2023-07-28 DIAGNOSIS — I15.2 HYPERTENSION ASSOCIATED WITH DIABETES: Primary | Chronic | ICD-10-CM

## 2023-07-28 DIAGNOSIS — I49.3 PVC (PREMATURE VENTRICULAR CONTRACTION): ICD-10-CM

## 2023-07-28 DIAGNOSIS — E11.59 HYPERTENSION ASSOCIATED WITH DIABETES: Primary | Chronic | ICD-10-CM

## 2023-07-28 DIAGNOSIS — I34.1 MVP (MITRAL VALVE PROLAPSE): ICD-10-CM

## 2023-07-28 DIAGNOSIS — E78.5 HYPERLIPIDEMIA ASSOCIATED WITH TYPE 2 DIABETES MELLITUS: ICD-10-CM

## 2023-07-28 PROCEDURE — 99214 PR OFFICE/OUTPT VISIT, EST, LEVL IV, 30-39 MIN: ICD-10-PCS | Mod: S$PBB,,, | Performed by: INTERNAL MEDICINE

## 2023-07-28 PROCEDURE — 99999 PR PBB SHADOW E&M-EST. PATIENT-LVL V: CPT | Mod: PBBFAC,,, | Performed by: INTERNAL MEDICINE

## 2023-07-28 PROCEDURE — 99214 OFFICE O/P EST MOD 30 MIN: CPT | Mod: S$PBB,,, | Performed by: INTERNAL MEDICINE

## 2023-07-28 PROCEDURE — 99215 OFFICE O/P EST HI 40 MIN: CPT | Mod: PBBFAC | Performed by: INTERNAL MEDICINE

## 2023-07-28 PROCEDURE — 99999 PR PBB SHADOW E&M-EST. PATIENT-LVL V: ICD-10-PCS | Mod: PBBFAC,,, | Performed by: INTERNAL MEDICINE

## 2023-07-28 NOTE — PROGRESS NOTES
Subjective:   Patient ID:  Kaylin Mccollum is a 78 y.o. female who presents for follow up of No chief complaint on file.      79 yo female, 3 months f/u  PMH h/o MVP, HTN DM 20 yrs. H/o PVCs. Fibromyalgia H/o COVID 19 in  quarantine at home. (sinus headache)  On verapamil since she was 54.   Palpitation and dizziness controlled by Verapamil  Occasional chest pain, with sharp pain.   C/o dry mouth  Chronic fatigue from fibra myalgia. Some shoulder pain from the fall  Mother had afib. Father healthy. Young brother had heart procedure at age of 6.  Not on regular exercise. No smoking/drinking  ekg today NSR and poor R progression on repcoridal leads    05/2021 visit   ECHO mild MR and normal EF. Stress test no ischemia; ZIOPATCH showed rare AT longest 13 beats.  Still dry mouth and occasional pinching chest pain  Headache chronic due to migraine  Chronic fatigue, mild exercise endurance  BP and LDL controlled. A1C 6.2 at OSH in      visit  PVD swelling of the leg controlled by lasix.   BP controlled  Still fatigue and weakness   ekg NSR. A1C 7.6 BP controled     visit  C/o chest tightness when walked from the parking to the office today. Resolved after rest.  Walking and shopping could cause the muscle pain and fatigue. '  Chronic lower back injection for the pain. occasionally faint and clammy  No palpitation and leg swelling      visit  Had steroid injection of the hips. Palpitation and controlled by verapamil. BRUNO with climbing the stairs. No dizziness and faint.    04/23 visit  Rx of fibromyalgia working but copay elevated and will wean off now  Occasional palpitation at night  No dizziness faint dyspnea. Chronic mild leg swelling  Ekg NSR     stress echo normal EF and mld MR. Normal stress test; BARDY unremarkable     Interval history  Feet and leg swelling improved but 2+ some varicose on the feet skin. No palpitation dizziness faint  Reviewed digit HTN and BP  up to 140 to 150 mmHG      Past Medical History:   Diagnosis Date    Arthritis     Cataract     COVID-19 2/25/2021    Diabetes mellitus 1995    BS didn't check 09/13/2022    Diabetes mellitus, type 2     Fibromyalgia     General anesthetics causing adverse effect in therapeutic use     bradycardia     Hypertension     Migraines     Mitral valve prolapse     Osteoarthritis     Rotator cuff tear 04/01/2015       Past Surgical History:   Procedure Laterality Date    ARTHROSCOPY OF KNEE Right 3/10/2020    Procedure: ARTHROSCOPY, KNEE;  Surgeon: Les Schneider MD;  Location: [facility] OR;  Service: Orthopedics;  Laterality: Right;    CATARACT EXTRACTION W/  INTRAOCULAR LENS IMPLANT Left 07/19/2017    CATARACT EXTRACTION W/  INTRAOCULAR LENS IMPLANT Right 2017    CHOLECYSTECTOMY      CHONDROPLASTY OF KNEE Right 3/10/2020    Procedure: CHONDROPLASTY, KNEE;  Surgeon: Les Schneider MD;  Location: [facility] OR;  Service: Orthopedics;  Laterality: Right;  Anterior compartment     COLONOSCOPY N/A 9/25/2018    Procedure: COLONOSCOPY;  Surgeon: Bethel Carmona MD;  Location: [facility] ENDO;  Service: Endoscopy;  Laterality: N/A;    EXCISION OF MEDIAL MENISCUS OF KNEE Right 3/10/2020    Procedure: MENISCECTOMY, KNEE, MEDIAL;  Surgeon: Les Schneider MD;  Location: [facility] OR;  Service: Orthopedics;  Laterality: Right;  Partial, Medial , Lateral     EYE SURGERY      INJECTION OF ANESTHETIC AGENT AROUND MEDIAL BRANCH NERVES INNERVATING LUMBAR FACET JOINT Bilateral 12/13/2022    Procedure: Bilateral L3-5 MBB;  Surgeon: Humberto Dumont MD;  Location: [facility] PAIN MGT;  Service: Pain Management;  Laterality: Bilateral;    INJECTION OF ANESTHETIC AGENT AROUND NERVE Right 8/8/2019    Procedure: Right Genicular nerve block with local;  Surgeon: Manpreet Dutta MD;  Location: [facility] PAIN MGT;  Service: Pain Management;  Laterality: Right;    INJECTION OF ANESTHETIC AGENT INTO SACROILIAC JOINT Bilateral 5/6/2020    Procedure: Bilateral Sacroiliac  Joint Injection;  Surgeon: Manpreet Dutta MD;  Location: [facility] PAIN MGT;  Service: Pain Management;  Laterality: Bilateral;    INJECTION OF ANESTHETIC AGENT INTO SACROILIAC JOINT Bilateral 10/8/2020    Procedure: Bilateral SI and Bilateral GTB with RN IV sedation;  Surgeon: Manpreet Dutta MD;  Location: [facility] PAIN MGT;  Service: Pain Management;  Laterality: Bilateral;    INJECTION OF ANESTHETIC AGENT INTO SACROILIAC JOINT Bilateral 1/5/2021    Procedure: Bilateral BLOCK, SACROILIAC JOINT and Bilateral GTB witn RN IV sedation;  Surgeon: Daniel Burns MD;  Location: [facility] PAIN [facility]GT;  Service: Pain Management;  Laterality: Bilateral;    INJECTION OF ANESTHETIC AGENT INTO SACROILIAC JOINT Bilateral 7/8/2021    Procedure: Bilateral BLOCK, SACROILIAC JOINT bilateral GTB RN IV sedation;  Surgeon: Manpreet Dutta MD;  Location: [facility] PAIN [facility]GT;  Service: Pain Management;  Laterality: Bilateral;    INJECTION OF ANESTHETIC AGENT INTO SACROILIAC JOINT Bilateral 3/1/2022    Procedure: Bilateral BLOCK, SACROILIAC JOINT and Bilateral GTB RN IV sedation;  Surgeon: Manpreet Dutta MD;  Location: [facility]GT;  Service: Pain Management;  Laterality: Bilateral;    INJECTION OF ANESTHETIC AGENT INTO SACROILIAC JOINT Bilateral 10/10/2022    Procedure: Bilateral Sacroiliac Joint Injection;  Surgeon: Humberto Dumont MD;  Location: [facility]GT;  Service: Pain Management;  Laterality: Bilateral;    INJECTION OF ANESTHETIC AGENT INTO SACROILIAC JOINT Bilateral 1/24/2023    Procedure: Bilateral Sacroiliac Joint Injection;  Surgeon: Humberto Dumont MD;  [facility];  Service: Pain Management;  Laterality: Bilateral;    INJECTION OF ANESTHETIC AGENT INTO SACROILIAC JOINT Bilateral 6/21/2023    Procedure: Bilateral Sacroiliac Joint Injection;  Surgeon: Humberto Dumont MD;  Location: [facility];  Service: Pain Management;  Laterality: Bilateral;    INJECTION OF JOINT Bilateral 9/5/2019    Procedure: Bilateral GT bursa injection;  Surgeon:  Manpreet Dutta MD;  Location: [facility];  Service: Pain Management;  Laterality: Bilateral;    INJECTION OF JOINT Bilateral 5/6/2020    Procedure: Bilateral GT bursa injection;  Surgeon: Manpreet Dutta MD;  Location: [facility];  Service: Pain Management;  Laterality: Bilateral;    INJECTION OF JOINT Right 4/28/2022    Procedure: Injection, Joint Right Hip Injection RN IV sedation;  Surgeon: Manpreet Dutta MD;  Location: [facility] PAIN MGT;  Service: Pain Management;  Laterality: Right;    INJECTION OF JOINT Bilateral 10/10/2022    Procedure: Bilateral GT bursa injection with RN IV sedation;  Surgeon: Humberto Dumont MD;  Location: [facility];  Service: Pain Management;  Laterality: Bilateral;    INJECTION OF JOINT Bilateral 1/24/2023    Procedure: Bilateral GT bursa injection;  Surgeon: Humberto Dumont MD;  Location: [facility] PAIN [facility]GT;  Service: Pain Management;  Laterality: Bilateral;    INJECTION OF JOINT Bilateral 5/23/2023    Procedure: Bilateral intraarticular knee injection with Synvisc-1; ;  Surgeon: Humberto Dumont MD;  Location: [facility];  Service: Pain Management;  Laterality: Bilateral;    INJECTION OF JOINT Bilateral 6/21/2023    Procedure: Bilateral GT bursa injection;  Surgeon: Humberto Dumont MD;  Location: [facility];  Service: Pain Management;  Laterality: Bilateral;    INJECTION, ALLOGRAFT, INTERVERTEBRAL DISC N/A 4/25/2023    Procedure: INJECTION,ALLOGRAFT,INTERVERTEBRAL DISC,1ST LEVEL: L5-S1;  Surgeon: Humberto Dumont MD;  Location: [facility] PAIN [facility]GT;  Service: Pain Management;  Laterality: N/A;    INJECTION, ALLOGRAFT, INTERVERTEBRAL DISC N/A 5/9/2023    Procedure: INJECTION,ALLOGRAFT,INTERVERTEBRAL DISC,2nd LEVEL: L3-4;  Surgeon: Humberto Dumont MD;  Location: [facility] PAIN [facility]GT;  Service: Pain Management;  Laterality: N/A;    KNEE ARTHROSCOPY Bilateral     PARS PLANA VITRECTOMY W/ REPAIR OF MACULAR HOLE Left 02/22/2017    RADIOFREQUENCY THERMOCOAGULATION Left 7/11/2019    Procedure: Right SIJ RFA;   Surgeon: Manpreet Dutta MD;  Location: [facility] PAIN MGT;  Service: Pain Management;  Laterality: Left;    RADIOFREQUENCY THERMOCOAGULATION Right 2019    Procedure: Right SIJ RFA;  Surgeon: Manpreet Dutta MD;  Location: [facility] PAIN MGT;  Service: Pain Management;  Laterality: Right;    SHOULDER ARTHROSCOPY Right 06/04/15       Social History     Tobacco Use    Smoking status: Never    Smokeless tobacco: Never    Tobacco comments:     Never smoked   Substance Use Topics    Alcohol use: Not Currently    Drug use: No       Family History   Problem Relation Age of Onset    Heart disease Mother         A-Fib    Glaucoma Mother     Cataracts Mother     Cancer Mother         colon    Arthritis Mother         : 17    Depression Mother     Depression Brother     Birth defects Brother         Cardiac    Heart disease Brother         Heart Surgery  as 6 y.o.    Kidney disease Daughter         ESRD    Anesthesia problems Daughter         cardiac arrest during nephrectomy    Birth defects Daughter         Renal    Depression Daughter     Learning disabilities Daughter         Dyslexia, ADD    Depression Son     Learning disabilities Son         ADD & ADHD    Diabetes Maternal Aunt          9/3/2023    Diabetes Maternal Uncle          2019    Breast cancer Paternal Aunt     Diabetes Maternal Grandmother     Heart disease Maternal Grandmother         Congestive heart failure    Alcohol abuse Maternal Grandmother         Death: 84    Depression Maternal Grandmother     Hypertension Maternal Grandmother     Cancer Maternal Grandmother     Diabetes Maternal Grandfather     Cancer Maternal Grandfather     Alcohol abuse Maternal Grandfather          1964         ROS    Objective:   Physical Exam  HENT:      Head: Normocephalic.   Eyes:      Pupils: Pupils are equal, round, and reactive to light.   Neck:      Thyroid: No thyromegaly.      Vascular: Normal carotid pulses. No carotid bruit or JVD.    Cardiovascular:      Rate and Rhythm: Normal rate and regular rhythm. No extrasystoles are present.     Chest Wall: PMI is not displaced.      Pulses: Normal pulses.      Heart sounds: Normal heart sounds. No murmur heard.    No gallop. No S3 sounds.   Pulmonary:      Effort: No respiratory distress.      Breath sounds: Normal breath sounds. No stridor.   Abdominal:      General: Bowel sounds are normal.      Palpations: Abdomen is soft.      Tenderness: There is no abdominal tenderness. There is no rebound.   Musculoskeletal:         General: Swelling present.   Skin:     Findings: No rash.   Neurological:      Mental Status: She is alert and oriented to person, place, and time.   Psychiatric:         Behavior: Behavior normal.     Lab Results   Component Value Date    CHOL 211 (H) 02/16/2023    CHOL 195 08/11/2022    CHOL 222 (H) 02/09/2022     Lab Results   Component Value Date    HDL 65 02/16/2023    HDL 61 08/11/2022    HDL 65 02/09/2022     Lab Results   Component Value Date    LDLCALC 131.6 02/16/2023    LDLCALC 112.8 08/11/2022    LDLCALC 141.6 02/09/2022     Lab Results   Component Value Date    TRIG 72 02/16/2023    TRIG 106 08/11/2022    TRIG 77 02/09/2022     Lab Results   Component Value Date    CHOLHDL 30.8 02/16/2023    CHOLHDL 31.3 08/11/2022    CHOLHDL 29.3 02/09/2022       Chemistry        Component Value Date/Time     02/16/2023 0714    K 4.2 02/16/2023 0714     02/16/2023 0714    CO2 24 02/16/2023 0714    BUN 18 02/16/2023 0714    CREATININE 1.0 02/16/2023 0714     (H) 02/16/2023 0714        Component Value Date/Time    CALCIUM 9.7 02/16/2023 0714    ALKPHOS 55 02/16/2023 0714    AST 18 02/16/2023 0714    ALT 17 02/16/2023 0714    BILITOT 0.3 02/16/2023 0714    ESTGFRAFRICA >60.0 02/09/2022 1308    EGFRNONAA >60.0 02/09/2022 1308          Lab Results   Component Value Date    HGBA1C 7.3 (H) 02/16/2023     Lab Results   Component Value Date    TSH 1.574 02/16/2023     No  results found for: INR, PROTIME  Lab Results   Component Value Date    WBC 5.68 02/16/2023    HGB 13.0 02/16/2023    HCT 41.1 02/16/2023    MCV 95 02/16/2023     02/16/2023     BMP  Sodium   Date Value Ref Range Status   02/16/2023 139 136 - 145 mmol/L Final     Potassium   Date Value Ref Range Status   02/16/2023 4.2 3.5 - 5.1 mmol/L Final     Chloride   Date Value Ref Range Status   02/16/2023 104 95 - 110 mmol/L Final     CO2   Date Value Ref Range Status   02/16/2023 24 23 - 29 mmol/L Final     BUN   Date Value Ref Range Status   02/16/2023 18 8 - 23 mg/dL Final     Creatinine   Date Value Ref Range Status   02/16/2023 1.0 0.5 - 1.4 mg/dL Final     Calcium   Date Value Ref Range Status   02/16/2023 9.7 8.7 - 10.5 mg/dL Final     Anion Gap   Date Value Ref Range Status   02/16/2023 11 8 - 16 mmol/L Final     eGFR if    Date Value Ref Range Status   02/09/2022 >60.0 >60 mL/min/1.73 m^2 Final     eGFR if non    Date Value Ref Range Status   02/09/2022 >60.0 >60 mL/min/1.73 m^2 Final     Comment:     Calculation used to obtain the estimated glomerular filtration  rate (eGFR) is the CKD-EPI equation.        BNP  @LABRCNTIP(BNP,BNPTRIAGEBLO)@  @LABRCNTIP(troponini)@  CrCl cannot be calculated (Patient's most recent lab result is older than the maximum 7 days allowed.).  No results found in the last 24 hours.  No results found in the last 24 hours.  No results found in the last 24 hours.    Assessment:      1. Hypertension associated with diabetes    2. MVP (mitral valve prolapse)    3. Hyperlipidemia associated with type 2 diabetes mellitus    4. PVC (premature ventricular contraction)        Plan:   Add HCTZ 25 mg daily for PVD and HTN   Continue Losartan Metoprolol verapamil for PSVT HTN and DM  Continue Lasix as needed  DM Rx per PCP  Counseled DASH  Check Lipid profile with PCP in 6 months  Recommend heart-healthy diet, weight control and regular exercise.  Frank. Risk  modification.   I have reviewed all pertinent labs and cardiac studies independently. Plans and recommendations have been formulated under my direct supervision. All questions answered and patient voiced understanding.   If symptoms persist go to the ED  RTC in 3 months

## 2023-08-02 ENCOUNTER — PATIENT MESSAGE (OUTPATIENT)
Dept: CARDIOLOGY | Facility: CLINIC | Age: 78
End: 2023-08-02
Payer: COMMERCIAL

## 2023-08-03 RX ORDER — HYDROCHLOROTHIAZIDE 25 MG/1
25 TABLET ORAL DAILY
Qty: 30 TABLET | Refills: 3 | Status: SHIPPED | OUTPATIENT
Start: 2023-08-03 | End: 2023-11-27

## 2023-08-23 NOTE — PROGRESS NOTES
Established Patient Interventional Pain Clinic Visit    Chief Pain Complaint:  Chief Complaint   Patient presents with    Hip Pain         Interval history 08/24/2023  Patient presents status post bilateral sacroiliac joint and greater trochanteric bursa injection 06/21/2023.  Patient reports 95% sustained relief overlying bilateral sacroiliac joint and greater trochanteric bursa territories.  She reports altogether following 2 level via disc allograft supplementation and her most recent procedure, she is able to stand upright and ambulate further distances.  She reports these procedures have taken years off my life! .  Today pain is intermittent and rated a 2/10.  Patient has continued Savella 50 mg twice daily and reports this has significantly reduced the frequency and intensity of her fibromyalgia flares and debility associated with these flares.  She is requesting a refill of this medication.  Today she denies significant lower extremity weakness, bowel or bladder incontinence or saddle anesthesia.      Interval history 06/15/2023  Patient presents status post bilateral intra-articular knee injection 05/23/2023.  Patient reports 75% sustained improvement in bilateral knee pain following intra-articular knee injection.  Today her primary concern is bilateral hip pain.  Patient reports pain in the lower back which radiates into the groin and down the lateral aspect of bilateral lower extremities to mid thigh.  Patient reports pain is exacerbated 1st thing in the morning when she is attempting to get out of bed.  Patient denies more distal radiculopathy into the lower extremities or feet.  She denies lower extremity weakness, bowel or bladder incontinence or saddle anesthesia.  Patient continues to reports significant improvement with Savella medication which she is taking 50 mg twice daily with fibromyalgia pain.  She is requesting a refill of this medication.  Patient reports lower back pain continues to have  greater than 80% sustained relief following 2 level via disc allograft supplementation.      Interval history 05/18/2023  Patient presents status post L5-S1 via disc allograft supplementation 04/25/2023 and via disc allograft supplementation L3-4 05/09/2023.  Patient reports noticeable improvement >80% in lower back pain following two-level  allograft supplementation.  Today her primary concern is bilateral knee pain.  Patient has questions regarding hyaluronic acid supplementation to be use.  Patient reports she has seen videos on Durolane and Euflexxa and is inquiring to the brand to be used on the upcoming Tuesday.  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  She is requesting up-to-date imaging to investigate these territories.  Today she denies significant weakness in the upper lower extremities, bowel or bladder incontinence or saddle anesthesia.      Interval history 04/06/2023  Patient presents for follow-up of lower back pain.  She continues to reports superior relief in fibromyalgia symptoms on Savella medication.  Patient has brought in denial paperwork from her insurance reporting limitations on dosage and quantity allowed.  Patient reports prior to starting this medication she felt that she did not have a life.  now she reports significant improvement in pain as well as chronic fatigue associated with fibromyalgia.  Today she again reports pain in the lower back which is worse with lumbar flexion.  Patient reports she is unable to perform activities of daily living such as grocery shopping or prolonged standing or ambulation secondary to her discogenic lower back pain.  Today patient denies more distal radiculopathy into the lower extremities or feet or lower extremity weakness.  Patient is interested in discussing intervention.  Of note patient has failed to have improvement in his lower back pain with  prior lumbar medial branch block, sacroiliac joint injections.    Interval Hx: 3/9/23  Patient presents for one-month follow-up.  Today she reports she is significantly better since our last clinic visit.  At that time patient had slipped in a low off and injured her lower back and right leg.  Today she reports this pain is significantly improved and pain is intermittent and today is rated a 3/10.  Today patient reports pain is isolated to the midline lower lumbar spine.  Pain is exacerbated with lumbar flexion such as when she is picking up close.  Fibromyalgia Pain has been significantly improved with the initiation of Savella medication.  Patient has reached a titration of 50 mg twice daily.  Patient recently refilled this medication.  She denies any side effects from this medication.  Today she denies any significant lower extremity weakness, bowel or bladder incontinence or saddle anesthesia      Interval history 02/07/2023  Patient presents status post bilateral sacroiliac joint and greater trochanteric bursa injection 01/24/2023 and bilateral L3-5 lumbar medial branch block 12/13/2022.  Patient had recent mechanical fall confounding benefit from recent injections.  Today she reports she was reaching over a mattress cover to reach a stack of bibles and slid into a slint.  Patient reports she was behind and tall wall in a took 20-30 minutes for her to stand.  Patient also reports exacerbation of fibromyalgia pain.  Since her fall patient reports pain which radiates into the buttock and down the posterior aspect of the right lower extremity to the dorsum of the foot in L4-S1 distribution.  Patient has continued gabapentin 300 mg twice daily, Celebrex in the evenings as well as Tylenol 1000 mg twice daily.    Interval history 11/29/2022    Patient presents status post bilateral sacroiliac joint greater trochanteric bursa 10/10/2022.  Patient reports 90% relief overlying bilateral sacroiliac joints and greater  trochanteric bursa following her injection.  Today she reports primarily lumbar axial back pain as well as significant neck pain.  Lower back pain is exacerbated with prolonged standing such as when she is washing dishes.  She denies significant distal radiculopathy into the right lower extremities as described at our last clinic visit.  Neck pain is elicited with cervical flexion, extension and lateral flexion and she does report reduced mobility.  She reports her pain began following a mechanical fall down the stairs at [facility] in September 1986.  Patient has continued gabapentin 300 mg in the morning, 300 mg in the afternoon and 600 mg in the evening.    Interval history 10/04/2022  Ms. Mccollum is a 77-year-old female with past medical history significant for depression, hyperlipidemia, hypertension, mitral valve prolapse, peripheral vascular disease, history of COVID-19, type 2 diabetes, multi joint arthritis, fibromyalgia who presents to [facility] care, previous Dr. Dutta patient.  Today patient reports return of pain in the lower back which radiates into bilateral hips and down the posterior aspect of the right lower extremity in L4-5 distribution to the dorsum of the foot.  Patient reports pain is intermittent but has increased in intensity.  Pain is described as shocking in nature and today is rated a 6/10.  Patient reports she feels as if her skin is crawling.  patient is currently taking gabapentin 300 mg up to 3 times daily when pain is severe.  Pain interferes with the patient's sleep.  Patient also reports history of fibromyalgia which limits her mobility and duration of standing and ambulation.  Patient does endorse associated weakness in the lower extremities associated with her pain.  Patient is interested in pursuing repeat intervention as she is obtained several months of significant relief with prior sacroiliac joint and greater trochanteric bursa injections.  Patient has continued physician  directed physical therapy exercises at home daily.      History of Present Illness 01/16/2020: Dr. Dutta:   Kaylin Mccollum is a 74 y.o. female  who is presenting with a chief complaint of lumbar back pain. The patient began experiencing this problem insidiously, and the pain has been gradually worsening over the past 5 month(s). The pain is described as throbbing, shooting, burning and electrical and is located in the bilateral lumbar spine. Pain is intermittent and lasts hours. The pain radiates to bilateral lower extremities L4 distribudution. The patient rates her pain a 8 out of ten and interferes with activities of daily living a 7 out of ten. Pain is exacerbated by flexion of the lumbar spine, ambulation, and is improved by rest.      She also complains of right knee pain. The patient began experiencing this problem insidiously. The pain is described as cramping, aching and is located in the right knee. Pain is intermittent and lasts hours. The pain is nonradiating. The patient rates her pain a 8 out of ten and interferes with activities of daily living a 7 out of ten. Pain is exacerbated by getting up from a seated position and standing, and is improved by rest. Patient reports no prior trauma, prior arthroscopy bilaterally in 1990s.       - pertinent negatives: No fever, No chills, No weight loss, No bladder dysfunction, No bowel dysfunction, No saddle anesthesia  - pertinent positives: none        Pain Disability Index Review:   @REVFS(4749:3)@    Non-Pharmacologic Treatments:  Physical Therapy/Home Exercise: yes  Ice/Heat:yes  TENS: no  Acupuncture: no  Massage: no  Chiropractic: no    Other: no      Pain Medications:  - Adjuvant Medications: Lorazepam (Ativan), Neurontin (Gabapentin), and Topical Ointment (Voltaren Gel, Steroid cream, Anti-Inflammatory Cream, Compound cream)      Pain injections:  Dr. Dumont:  -06/21/2023: Bilateral sacroiliac joint and greater trochanteric bursa injection  -05/29/2023:  "Bilateral intra-articular knee injection  -05/09/2023: L3-4 via disc allograft supplementation  -04/25/2023:  L5-S1 via disc allograft supplementation  -01/24/2023: Bilateral sacroiliac joint and greater trochanteric bursa injection  -12/13/2022: Bilateral L3-5 lumbar medial branch block  - 10/10/2022: Bilateral greater trochanteric bursa and sacroiliac joint injection      -04/20/2022: Right-sided acetabular femoral injection; Dr. Dutta  -03/01/2022: Bilateral sacroiliac joint and greater trochanteric bursa injection; Dr. Dutta  -07/08/2021: Bilateral sacroiliac joint and greater trochanteric bursa injection; Dr. Dutta  -01/05/2021: Bilateral sacroiliac joint and greater trochanteric bursa injection; Dr. Burns  -10/08/2020: Bilateral sacroiliac joint and bilateral greater trochanteric bursa injection; Dr. Dutta  -05/06/2020: Bilateral sacroiliac joint and greater trochanteric bursa injection; Dr. Dutta    Past Medical History:   Diagnosis Date    Arthritis     Cataract     COVID-19 2/25/2021    Diabetes mellitus 1995    BS didn't check 09/13/2022    Diabetes mellitus, type 2     Fibromyalgia     General anesthetics causing adverse effect in therapeutic use     bradycardia     Hypertension     Migraines     Mitral valve prolapse     Osteoarthritis     Rotator cuff tear 04/01/2015       Review of patient's allergies indicates:   Allergen Reactions    Demerol [meperidine] Other (See Comments)     Burning when adm IV  Able to tolerate IM, "Turned the veins in my hand purple."    Latex, natural rubber Other (See Comments)     "Burns my skin",  Symptoms get worse the longer she is exposed    Zocor [simvastatin] Other (See Comments)     Tightening of muscles    Statins-hmg-coa reductase inhibitors Other (See Comments)     myopathy    Sulfa (sulfonamide antibiotics)      Blurred vision       Past Surgical History:   Procedure Laterality Date    ARTHROSCOPY OF KNEE Right 3/10/2020    Procedure: ARTHROSCOPY, KNEE;  Surgeon: " Les Schneider MD;  Location: [facility] OR;  Service: Orthopedics;  Laterality: Right;    CATARACT EXTRACTION W/  INTRAOCULAR LENS IMPLANT Left 07/19/2017    CATARACT EXTRACTION W/  INTRAOCULAR LENS IMPLANT Right 2017    CHOLECYSTECTOMY      CHONDROPLASTY OF KNEE Right 3/10/2020    Procedure: CHONDROPLASTY, KNEE;  Surgeon: Les Schneider MD;  Location: [facility] OR;  Service: Orthopedics;  Laterality: Right;  Anterior compartment     COLONOSCOPY N/A 9/25/2018    Procedure: COLONOSCOPY;  Surgeon: Bethel Carmona MD;  Location: [facility] ENDO;  Service: Endoscopy;  Laterality: N/A;    EXCISION OF MEDIAL MENISCUS OF KNEE Right 3/10/2020    Procedure: MENISCECTOMY, KNEE, MEDIAL;  Surgeon: Les Schneider MD;  Location: [facility] OR;  Service: Orthopedics;  Laterality: Right;  Partial, Medial , Lateral     EYE SURGERY      INJECTION OF ANESTHETIC AGENT AROUND MEDIAL BRANCH NERVES INNERVATING LUMBAR FACET JOINT Bilateral 12/13/2022    Procedure: Bilateral L3-5 MBB;  Surgeon: Humberto Dumont MD;  Location: [facility] PAIN MGT;  Service: Pain Management;  Laterality: Bilateral;    INJECTION OF ANESTHETIC AGENT AROUND NERVE Right 8/8/2019    Procedure: Right Genicular nerve block with local;  Surgeon: Manpreet Dutta MD;  Location: [facility] PAIN MGT;  Service: Pain Management;  Laterality: Right;    INJECTION OF ANESTHETIC AGENT INTO SACROILIAC JOINT Bilateral 5/6/2020    Procedure: Bilateral Sacroiliac Joint Injection;  Surgeon: Manpreet Dutta MD;  Location: [facility] PAIN MGT;  Service: Pain Management;  Laterality: Bilateral;    INJECTION OF ANESTHETIC AGENT INTO SACROILIAC JOINT Bilateral 10/8/2020    Procedure: Bilateral SI and Bilateral GTB with RN IV sedation;  Surgeon: Manpreet Dutta MD;  Location: [facility] PAIN MGT;  Service: Pain Management;  Laterality: Bilateral;    INJECTION OF ANESTHETIC AGENT INTO SACROILIAC JOINT Bilateral 1/5/2021    Procedure: Bilateral BLOCK, SACROILIAC JOINT and Bilateral GTB witn RN IV sedation;  Surgeon: Daniel DIANE  MD [place];  Location: [facility] PAIN MGT;  Service: Pain Management;  Laterality: Bilateral;    INJECTION OF ANESTHETIC AGENT INTO SACROILIAC JOINT Bilateral 7/8/2021    Procedure: Bilateral BLOCK, SACROILIAC JOINT bilateral GTB RN IV sedation;  Surgeon: Manpreet Dutta MD;  Location: [facility] PAIN MGT;  Service: Pain Management;  Laterality: Bilateral;    INJECTION OF ANESTHETIC AGENT INTO SACROILIAC JOINT Bilateral 3/1/2022    Procedure: Bilateral BLOCK, SACROILIAC JOINT and Bilateral GTB RN IV sedation;  Surgeon: Manpreet Dutta MD;  Location: [facility] PAIN MGT;  Service: Pain Management;  Laterality: Bilateral;    INJECTION OF ANESTHETIC AGENT INTO SACROILIAC JOINT Bilateral 10/10/2022    Procedure: Bilateral Sacroiliac Joint Injection;  Surgeon: Humberto Dumont MD;  Location: [facility] PAIN MGT;  Service: Pain Management;  Laterality: Bilateral;    INJECTION OF ANESTHETIC AGENT INTO SACROILIAC JOINT Bilateral 1/24/2023    Procedure: Bilateral Sacroiliac Joint Injection;  Surgeon: Humberto Dumont MD;  Location: [facility] PAIN MGT;  Service: Pain Management;  Laterality: Bilateral;    INJECTION OF ANESTHETIC AGENT INTO SACROILIAC JOINT Bilateral 6/21/2023    Procedure: Bilateral Sacroiliac Joint Injection;  Surgeon: Humberto Dumont MD;  Location: [facility] PAIN MGT;  Service: Pain Management;  Laterality: Bilateral;    INJECTION OF JOINT Bilateral 9/5/2019    Procedure: Bilateral GT bursa injection;  Surgeon: Manpreet Dutta MD;  Location: [facility] PAIN MGT;  Service: Pain Management;  Laterality: Bilateral;    INJECTION OF JOINT Bilateral 5/6/2020    Procedure: Bilateral GT bursa injection;  Surgeon: Manpreet Dutta MD;  Location: [facility] PAIN MGT;  Service: Pain Management;  Laterality: Bilateral;    INJECTION OF JOINT Right 4/28/2022    Procedure: Injection, Joint Right Hip Injection RN IV sedation;  Surgeon: Manpreet Dutta MD;  Location: [facility] PAIN MGT;  Service: Pain Management;  Laterality: Right;    INJECTION OF JOINT Bilateral 10/10/2022    Procedure:  Bilateral GT bursa injection with RN IV sedation;  Surgeon: Humberto Dumont MD;  Location: [facility] PAIN MGT;  Service: Pain Management;  Laterality: Bilateral;    INJECTION OF JOINT Bilateral 1/24/2023    Procedure: Bilateral GT bursa injection;  Surgeon: Humberto Dumont MD;  Location: [facility] PAIN MGT;  Service: Pain Management;  Laterality: Bilateral;    INJECTION OF JOINT Bilateral 5/23/2023    Procedure: Bilateral intraarticular knee injection with Synvisc-1; ;  Surgeon: Humberto Dumont MD;  Location: [facility] PAIN MGT;  Service: Pain Management;  Laterality: Bilateral;    INJECTION OF JOINT Bilateral 6/21/2023    Procedure: Bilateral GT bursa injection;  Surgeon: Humberto Dumont MD;  Location: [facility] PAIN MGT;  Service: Pain Management;  Laterality: Bilateral;    INJECTION, ALLOGRAFT, INTERVERTEBRAL DISC N/A 4/25/2023    Procedure: INJECTION,ALLOGRAFT,INTERVERTEBRAL DISC,1ST LEVEL: L5-S1;  Surgeon: Humberto Dumont MD;  Location: [facility] PAIN MGT;  Service: Pain Management;  Laterality: N/A;    INJECTION, ALLOGRAFT, INTERVERTEBRAL DISC N/A 5/9/2023    Procedure: INJECTION,ALLOGRAFT,INTERVERTEBRAL DISC,2nd LEVEL: L3-4;  Surgeon: Humberto Dumont MD;  Location: [facility] PAIN MGT;  Service: Pain Management;  Laterality: N/A;    KNEE ARTHROSCOPY Bilateral     PARS PLANA VITRECTOMY W/ REPAIR OF MACULAR HOLE Left 02/22/2017    RADIOFREQUENCY THERMOCOAGULATION Left 7/11/2019    Procedure: Right SIJ RFA;  Surgeon: Manpreet Dutta MD;  Location: [facility] PAIN MGT;  Service: Pain Management;  Laterality: Left;    RADIOFREQUENCY THERMOCOAGULATION Right 7/25/2019    Procedure: Right SIJ RFA;  Surgeon: Manpreet Dutta MD;  Location: [facility] PAIN MGT;  Service: Pain Management;  Laterality: Right;    SHOULDER ARTHROSCOPY Right 06/04/15     Current Outpatient Medications on File Prior to Visit   Medication Sig Dispense Refill    biotin 1 mg tablet Take 1,000 mcg by mouth every morning.       celecoxib (CELEBREX) 200 MG capsule TAKE 1 CAPSULE(200 MG) BY MOUTH  TWICE DAILY WITH FOOD 120 capsule 1    clotrimazole-betamethasone 1-0.05% (LOTRISONE) cream Apply topically 2 (two) times daily. 45 g 2    diclofenac sodium (VOLTAREN) 1 % Gel Apply 2 g topically 4 (four) times daily. 1 each 6    ezetimibe (ZETIA) 10 mg tablet TAKE 1 TABLET(10 MG) BY MOUTH EVERY DAY 90 tablet 3    FLUoxetine 40 MG capsule Take 1 capsule (40 mg total) by mouth once daily. 90 capsule 1    fluticasone (FLONASE) 50 mcg/actuation nasal spray 2 sprays by Each Nare route once daily. (Patient taking differently: 2 sprays by Each Nostril route nightly as needed.) 1 Bottle 0    furosemide (LASIX) 20 MG tablet Take 1 tablet (20 mg total) by mouth 2 (two) times daily. 60 tablet 11    hydroCHLOROthiazide (HYDRODIURIL) 25 MG tablet Take 1 tablet (25 mg total) by mouth once daily. 30 tablet 3    INVOKANA 100 mg Tab tablet TAKE 1 TABLET(100 MG) BY MOUTH EVERY DAY 90 tablet 1    LORazepam (ATIVAN) 1 MG tablet TK 3 TS PO 1 HOUR PRIOR TO APPOINTMENT      losartan (COZAAR) 25 MG tablet TAKE 1 TABLET(25 MG) BY MOUTH EVERY DAY 90 tablet 2    metFORMIN (GLUCOPHAGE) 1000 MG tablet Take 1 tablet (1,000 mg total) by mouth 2 (two) times daily with meals. 180 tablet 1    metoprolol succinate (TOPROL-XL) 25 MG 24 hr tablet Take 1 tablet (25 mg total) by mouth once daily. 30 tablet 5    potassium chloride SA (K-DUR,KLOR-CON M) 10 MEQ tablet TAKE 1 TABLET(10 MEQ) BY MOUTH EVERY DAY 20 tablet 5    pulse oximeter (PULSE OXIMETER) device by Apply Externally route 2 (two) times a day. Use twice daily at 8 AM and 3 PM and record the value in [place] as directed. 1 each 0    verapamiL (CALAN) 80 MG tablet Take 1 tablet (80 mg total) by mouth 2 (two) times daily. 180 tablet 2    gabapentin (NEURONTIN) 300 MG capsule Take 1 capsule (300 mg total) by mouth 4 (four) times daily. 360 capsule 0    omeprazole (PRILOSEC) 20 MG capsule Take 1 capsule (20 mg total) by mouth daily as needed (w/ NSAID). 30 capsule 5    triamcinolone acetonide  0.025% (KENALOG) 0.025 % Oint Apply topically 2 (two) times daily. for 10 days (Patient taking differently: Apply topically 2 (two) times daily as needed.) 15 g 2    [DISCONTINUED] milnacipran (SAVELLA) 50 mg Tab Take 1 tablet (50 mg total) by mouth 2 (two) times daily. 60 tablet 0     No current facility-administered medications on file prior to visit.               GENERAL:  No weight loss, malaise or fevers.  HEENT:   No recent changes in vision or hearing  NECK:  Negative for lumps, no difficulty with swallowing.  RESPIRATORY:  Negative for cough, wheezing or shortness of breath, patient denies any recent URI.  CARDIOVASCULAR:  Negative for chest pain or palpitations.  GI:  Negative for abdominal discomfort, blood in stools or black stools or change in bowel habits.  MUSCULOSKELETAL:  See HPI.  SKIN:  Negative for lesions, rash, and itching.  PSYCH:  No mood disorder or recent psychosocial stressors.   HEMATOLOGY/LYMPHOLOGY:  Negative for prolonged bleeding, bruising easily or swollen nodes.    NEURO:   No history of syncope, paralysis, seizures or tremors.  All other reviewed and negative other than HPI.    Imaging / Labs / Studies (reviewed on 8/24/2023):    Cervical x-ray 05/18/2023   FINDINGS:  Osteopenia.  Vertebral body heights maintained.  Mild anterolisthesis of C4 on C5 and C5 on C6.  Multilevel osteophyte changes with disc height loss most noted at C5-6 and C6-7.  Multilevel prominent facet arthropathy.  Multilevel left-sided neural foraminal narrowing.     Significant change in alignment on flexion or extension.     Prevertebral soft tissues unremarkable.  Lung apices clear.    Thoracic x-ray 05/18/2023  FINDINGS:  Osteopenia.  Vertebral body heights maintained.  No spondylolisthesis.  Similar more multilevel bridging osteophyte changes.  No acute osseous abnormality.  Soft tissues unremarkable.      MRI Lumbar Spine 2/16/23  FINDINGS:  Grade 1 degenerative spondylolisthesis at L4-L5.  Vertebral body  "height is normal.  Marrow signal is within normal limits. The conus medullaris terminates at the level of L1-L2.  No abnormal signal within the conus. Intervertebral disc levels are as follows:     T12-L1 disc: Normal disc height with anterior osteophytes and mild degenerative facet hypertrophy.  No spinal or foraminal stenosis.  The dural canal measures 16 mm.     L1-L2 disc : Disc space height loss with chronic Schmorl's nodes.  Posterior disc bulge.  Anterior osteophytes.  Minor facet arthropathy bilaterally.  The dural canal measures 13 mm.  No foraminal stenosis.     L2-L3 disc: Circumferential disc bulge with tiny chronic Schmorl's nodes.  Anterior osteophytes.  Mild degenerative facet hypertrophy.  The dural canal measures 12 mm.  No foraminal stenosis.     L3-L4 disc: Disc space height loss with a circumferential disc bulge and anterior osteophytes.  Mild degenerative facet hypertrophy with moderate buckling of the ligamentum flavum.  The dural canal measures 11 mm.  No significant foraminal stenosis.     L4-L5 disc: Grade 1 spondylolisthesis with severe degenerative facet hypertrophy bilaterally.  Buckling of the ligamentum flavum.  The dural canal measures 7 mm AP.  Disc encroaches into the floors of the exit foramina, right greater than left.  There is mild right foraminal stenosis.     L5-S1 disc: Severe disc space height loss with osteophytes at the margins and encroach into the exit foramina.  Mild degenerative facet hypertrophy and buckling of the ligamentum flavum.  The dural canal measures 11 mm.  Mild foraminal stenosis.      Physical Exam:  Last clinic visit:  Vitals:    08/24/23 1301   BP: 114/69   Pulse: 89   Resp: 17   Weight: 97 kg (213 lb 13.5 oz)   Height: 5' 10" (1.778 m)   PainSc:   2              Body mass index is 30.68 kg/m².   (reviewed on 8/24/2023)    General: alert and oriented, in no apparent distress.  Gait: normal gait.  Skin: no rashes, no discoloration, no obvious " lesions  HEENT: normocephalic, atraumatic. Pupils equal and round.  Cardiovascular: no significant peripheral edema present.  Respiratory: without use of accessory muscles of respiration.    Musculoskeletal - Lumbar Spine:  - ROM fairly preserved   - Pain on flexion of lumbar spine: Absent   - Pain on extension of lumbar spine: Absent         - Lumbar facet loading: Absent   - TTP over the lumbar facet joints: Absent  - TTP over the lumbar paraspinals: Present   - TTP over the SI joints: Present  - TTP over GT bursa: Present, minimal   - Straight Leg Raise: Negative  - CHERYLE: Present    Right Knee:  - TTP: Present over medial/ lateral joint line  - Pain with extension: Present  - Pain with flexion: Present  - Crepitus: Present     Neuro - Lower Extremities:  - BLE Strength: R/L: HF: 5/5, HE: 5/5, KF: 5/5; KE: 5/5; FE: 5/5; FF: 5/5  - Extremity Reflexes: Brisk and symmetric throughout  - Sensory: Sensation to light touch intact bilaterally      Psych:  Mood and affect is appropriate    Assessment:  Kaylin Mccollum is a 78 y.o. year old female who is presenting with       ICD-10-CM ICD-9-CM    1. Sacroiliac joint pain  M53.3 724.6 milnacipran (SAVELLA) 50 mg Tab      2. Greater trochanteric bursitis, unspecified laterality  M70.60 726.5 milnacipran (SAVELLA) 50 mg Tab      3. Discogenic lumbar pain  M54.59 724.2 milnacipran (SAVELLA) 50 mg Tab      4. Lumbar degenerative disc disease  M51.36 722.52                 Plan:  1. Interventional: - Status post L3-4 via disc allograft supplementation 05/09/2023 and L5-S1 via disc allograft supplementation 04/25/2023 with greater than 80% improvement in discogenic lower back pain.    -patient has sustained 95% relief for sacroiliitis and greater trochanteric bursitis.  We have discussed repeating a bilateral sacroiliac joint and greater trochanteric bursa injection in the future should symptoms exacerbate.  We discussed the procedure, benefits, potential risks and alternative  options in detail.      Anticoagulation:  None, no anticoagulation      2. Pharmacologic:     -  We have discussed continuing gabapentin.  We have reviewed potential side effects of this medication including daytime somnolence, weight gain and peripheral edema  --Gabapentin 300 mg in the morning, 300 mg in the afternoon and 600 mg in the evening    -Continue Savella 50 mg twice  daily as this tremendously helps with symptoms of fibromyalgia.  We have discussed that Savella is FDA approved and has demonstrated improvement in multiple measures including pain, global impression of change in physical function.  We have discussed that the most frequent side effects of this medication can include nausea, constipation, vomiting, dry mouth, flushing or tachycardia.    -30 day supply given, 2 refills    3. Rehabilitative:   -We discussed continuing physical therapy to help manage the patient/s painful condition. The patient was counseled that muscle strengthening will improve the long term prognosis in regards to pain and may also help increase range of motion and mobility.    4. Diagnostic:  Reviewed relevant imaging and answered patient's questions.      5. Consult:   -Dr. Schneider for knee and shoulder pain PRN  -Rheumatology: Fibromyalgia    6. Follow up:  3 months     The above plan and management options were discussed at length with patient. Patient is in agreement with the above and verbalized understanding.    - I discussed the goals of interventional chronic pain management with the patient on today's visit. We discussed a multimodal and systematic approach to pain.  This includes diagnostic and therapeutic injections, adjuvant pharmacologic treatment, physical therapy, and at times psychiatry.  I emphasized the importance of regular exercise, core strengthening and stretching, diet and weight loss as a cornerstone of long-term pain management.    - This condition does not require this patient to take time off of  work, and the primary goal of our Pain Management services is to improve the patient's functional capacity.  - Patient Questions: Answered all of the patient's questions regarding diagnoses, therapy, treatment and next steps    Humberto Dmuont MD

## 2023-08-24 ENCOUNTER — OFFICE VISIT (OUTPATIENT)
Dept: PAIN MEDICINE | Facility: CLINIC | Age: 78
End: 2023-08-24
Payer: MEDICARE

## 2023-08-24 VITALS
WEIGHT: 213.88 LBS | HEART RATE: 89 BPM | SYSTOLIC BLOOD PRESSURE: 114 MMHG | HEIGHT: 70 IN | BODY MASS INDEX: 30.62 KG/M2 | RESPIRATION RATE: 17 BRPM | DIASTOLIC BLOOD PRESSURE: 69 MMHG

## 2023-08-24 DIAGNOSIS — M54.59 DISCOGENIC LUMBAR PAIN: ICD-10-CM

## 2023-08-24 DIAGNOSIS — M70.60 GREATER TROCHANTERIC BURSITIS, UNSPECIFIED LATERALITY: ICD-10-CM

## 2023-08-24 DIAGNOSIS — M53.3 SACROILIAC JOINT PAIN: Primary | ICD-10-CM

## 2023-08-24 DIAGNOSIS — M51.36 LUMBAR DEGENERATIVE DISC DISEASE: ICD-10-CM

## 2023-08-24 PROCEDURE — 99214 PR OFFICE/OUTPT VISIT, EST, LEVL IV, 30-39 MIN: ICD-10-PCS | Mod: S$PBB,,, | Performed by: ANESTHESIOLOGY

## 2023-08-24 PROCEDURE — 99999 PR PBB SHADOW E&M-EST. PATIENT-LVL V: CPT | Mod: PBBFAC,,, | Performed by: ANESTHESIOLOGY

## 2023-08-24 PROCEDURE — 99999 PR PBB SHADOW E&M-EST. PATIENT-LVL V: ICD-10-PCS | Mod: PBBFAC,,, | Performed by: ANESTHESIOLOGY

## 2023-08-24 PROCEDURE — 99215 OFFICE O/P EST HI 40 MIN: CPT | Mod: PBBFAC | Performed by: ANESTHESIOLOGY

## 2023-08-24 PROCEDURE — 99214 OFFICE O/P EST MOD 30 MIN: CPT | Mod: S$PBB,,, | Performed by: ANESTHESIOLOGY

## 2023-08-30 ENCOUNTER — LAB VISIT (OUTPATIENT)
Dept: LAB | Facility: HOSPITAL | Age: 78
End: 2023-08-30
Attending: FAMILY MEDICINE
Payer: MEDICARE

## 2023-08-30 DIAGNOSIS — E78.5 HYPERLIPIDEMIA ASSOCIATED WITH TYPE 2 DIABETES MELLITUS: ICD-10-CM

## 2023-08-30 DIAGNOSIS — E11.69 HYPERLIPIDEMIA ASSOCIATED WITH TYPE 2 DIABETES MELLITUS: ICD-10-CM

## 2023-08-30 DIAGNOSIS — E66.9 DIABETES MELLITUS TYPE 2 IN OBESE: ICD-10-CM

## 2023-08-30 DIAGNOSIS — E11.69 DIABETES MELLITUS TYPE 2 IN OBESE: ICD-10-CM

## 2023-08-30 LAB
ALBUMIN SERPL BCP-MCNC: 3.5 G/DL (ref 3.5–5.2)
ALP SERPL-CCNC: 52 U/L (ref 55–135)
ALT SERPL W/O P-5'-P-CCNC: 19 U/L (ref 10–44)
ANION GAP SERPL CALC-SCNC: 13 MMOL/L (ref 8–16)
AST SERPL-CCNC: 27 U/L (ref 10–40)
BILIRUB SERPL-MCNC: 0.4 MG/DL (ref 0.1–1)
BUN SERPL-MCNC: 14 MG/DL (ref 8–23)
CALCIUM SERPL-MCNC: 9.8 MG/DL (ref 8.7–10.5)
CHLORIDE SERPL-SCNC: 101 MMOL/L (ref 95–110)
CHOLEST SERPL-MCNC: 216 MG/DL (ref 120–199)
CHOLEST/HDLC SERPL: 3.5 {RATIO} (ref 2–5)
CO2 SERPL-SCNC: 24 MMOL/L (ref 23–29)
CREAT SERPL-MCNC: 1 MG/DL (ref 0.5–1.4)
EST. GFR  (NO RACE VARIABLE): 58.4 ML/MIN/1.73 M^2
ESTIMATED AVG GLUCOSE: 186 MG/DL (ref 68–131)
GLUCOSE SERPL-MCNC: 210 MG/DL (ref 70–110)
HBA1C MFR BLD: 8.1 % (ref 4–5.6)
HDLC SERPL-MCNC: 61 MG/DL (ref 40–75)
HDLC SERPL: 28.2 % (ref 20–50)
LDLC SERPL CALC-MCNC: 127 MG/DL (ref 63–159)
NONHDLC SERPL-MCNC: 155 MG/DL
POTASSIUM SERPL-SCNC: 4.3 MMOL/L (ref 3.5–5.1)
PROT SERPL-MCNC: 6.7 G/DL (ref 6–8.4)
SODIUM SERPL-SCNC: 138 MMOL/L (ref 136–145)
TRIGL SERPL-MCNC: 140 MG/DL (ref 30–150)

## 2023-08-30 PROCEDURE — 80061 LIPID PANEL: CPT | Performed by: FAMILY MEDICINE

## 2023-08-30 PROCEDURE — 80053 COMPREHEN METABOLIC PANEL: CPT | Performed by: FAMILY MEDICINE

## 2023-08-30 PROCEDURE — 36415 COLL VENOUS BLD VENIPUNCTURE: CPT | Performed by: FAMILY MEDICINE

## 2023-08-30 PROCEDURE — 83036 HEMOGLOBIN GLYCOSYLATED A1C: CPT | Performed by: FAMILY MEDICINE

## 2023-09-04 ENCOUNTER — PATIENT MESSAGE (OUTPATIENT)
Dept: OTHER | Facility: OTHER | Age: 78
End: 2023-09-04
Payer: MEDICARE

## 2023-09-05 ENCOUNTER — OFFICE VISIT (OUTPATIENT)
Dept: INTERNAL MEDICINE | Facility: CLINIC | Age: 78
End: 2023-09-05
Payer: MEDICARE

## 2023-09-05 VITALS
BODY MASS INDEX: 30.75 KG/M2 | SYSTOLIC BLOOD PRESSURE: 114 MMHG | WEIGHT: 214.31 LBS | TEMPERATURE: 96 F | HEART RATE: 81 BPM | DIASTOLIC BLOOD PRESSURE: 62 MMHG

## 2023-09-05 DIAGNOSIS — F32.9 CHRONIC MAJOR DEPRESSIVE DISORDER: Chronic | ICD-10-CM

## 2023-09-05 DIAGNOSIS — E78.5 HYPERLIPIDEMIA ASSOCIATED WITH TYPE 2 DIABETES MELLITUS: ICD-10-CM

## 2023-09-05 DIAGNOSIS — M79.7 FIBROMYALGIA: ICD-10-CM

## 2023-09-05 DIAGNOSIS — T46.6X5A ADVERSE EFFECT OF STATIN: ICD-10-CM

## 2023-09-05 DIAGNOSIS — E11.69 HYPERLIPIDEMIA ASSOCIATED WITH TYPE 2 DIABETES MELLITUS: ICD-10-CM

## 2023-09-05 DIAGNOSIS — N18.31 CHRONIC KIDNEY DISEASE, STAGE 3A: ICD-10-CM

## 2023-09-05 DIAGNOSIS — E11.59 HYPERTENSION ASSOCIATED WITH DIABETES: ICD-10-CM

## 2023-09-05 DIAGNOSIS — M85.852 OSTEOPENIA OF LEFT HIP: ICD-10-CM

## 2023-09-05 DIAGNOSIS — I15.2 HYPERTENSION ASSOCIATED WITH DIABETES: ICD-10-CM

## 2023-09-05 DIAGNOSIS — F32.5 MAJOR DEPRESSIVE DISORDER IN FULL REMISSION, UNSPECIFIED WHETHER RECURRENT: Primary | ICD-10-CM

## 2023-09-05 DIAGNOSIS — K21.9 GASTROESOPHAGEAL REFLUX DISEASE WITHOUT ESOPHAGITIS: ICD-10-CM

## 2023-09-05 DIAGNOSIS — I34.1 MVP (MITRAL VALVE PROLAPSE): ICD-10-CM

## 2023-09-05 DIAGNOSIS — N18.31 TYPE 2 DIABETES MELLITUS WITH STAGE 3A CHRONIC KIDNEY DISEASE, WITHOUT LONG-TERM CURRENT USE OF INSULIN: ICD-10-CM

## 2023-09-05 DIAGNOSIS — I73.9 PVD (PERIPHERAL VASCULAR DISEASE): ICD-10-CM

## 2023-09-05 DIAGNOSIS — Z12.31 ENCOUNTER FOR SCREENING MAMMOGRAM FOR BREAST CANCER: ICD-10-CM

## 2023-09-05 DIAGNOSIS — E11.22 TYPE 2 DIABETES MELLITUS WITH STAGE 3A CHRONIC KIDNEY DISEASE, WITHOUT LONG-TERM CURRENT USE OF INSULIN: ICD-10-CM

## 2023-09-05 PROBLEM — M54.50 CHRONIC LOW BACK PAIN: Status: RESOLVED | Noted: 2019-04-04 | Resolved: 2023-09-05

## 2023-09-05 PROBLEM — Z78.9 STATIN INTOLERANCE: Status: RESOLVED | Noted: 2020-01-25 | Resolved: 2023-09-05

## 2023-09-05 PROBLEM — G89.29 CHRONIC THORACIC BACK PAIN: Status: RESOLVED | Noted: 2019-04-04 | Resolved: 2023-09-05

## 2023-09-05 PROBLEM — G89.29 CHRONIC LOW BACK PAIN: Status: RESOLVED | Noted: 2019-04-04 | Resolved: 2023-09-05

## 2023-09-05 PROBLEM — M53.86 DECREASED ROM OF LUMBAR SPINE: Status: RESOLVED | Noted: 2019-07-03 | Resolved: 2023-09-05

## 2023-09-05 PROBLEM — G89.29 CHRONIC PAIN OF BOTH KNEES: Status: RESOLVED | Noted: 2019-08-08 | Resolved: 2023-09-05

## 2023-09-05 PROBLEM — S83.241A ACUTE MEDIAL MENISCUS TEAR OF RIGHT KNEE: Status: RESOLVED | Noted: 2020-03-10 | Resolved: 2023-09-05

## 2023-09-05 PROBLEM — M47.816 LUMBAR SPONDYLOSIS: Status: RESOLVED | Noted: 2019-04-10 | Resolved: 2023-09-05

## 2023-09-05 PROBLEM — M51.369 LUMBAR DEGENERATIVE DISC DISEASE: Status: RESOLVED | Noted: 2023-05-23 | Resolved: 2023-09-05

## 2023-09-05 PROBLEM — J02.9 SORE THROAT: Status: RESOLVED | Noted: 2021-01-25 | Resolved: 2023-09-05

## 2023-09-05 PROBLEM — M54.6 CHRONIC THORACIC BACK PAIN: Status: RESOLVED | Noted: 2019-04-04 | Resolved: 2023-09-05

## 2023-09-05 PROBLEM — R29.898 WEAKNESS OF BOTH LOWER EXTREMITIES: Status: RESOLVED | Noted: 2019-07-03 | Resolved: 2023-09-05

## 2023-09-05 PROBLEM — M53.3 SACROILIAC JOINT DYSFUNCTION: Status: RESOLVED | Noted: 2019-07-25 | Resolved: 2023-09-05

## 2023-09-05 PROBLEM — M25.562 CHRONIC PAIN OF BOTH KNEES: Status: RESOLVED | Noted: 2019-08-08 | Resolved: 2023-09-05

## 2023-09-05 PROBLEM — M70.60 GREATER TROCHANTERIC BURSITIS: Status: RESOLVED | Noted: 2019-09-05 | Resolved: 2023-09-05

## 2023-09-05 PROBLEM — M51.36 LUMBAR DEGENERATIVE DISC DISEASE: Status: RESOLVED | Noted: 2023-05-23 | Resolved: 2023-09-05

## 2023-09-05 PROBLEM — M25.561 CHRONIC PAIN OF BOTH KNEES: Status: RESOLVED | Noted: 2019-08-08 | Resolved: 2023-09-05

## 2023-09-05 PROBLEM — M60.9 STATIN-INDUCED MYOSITIS: Status: RESOLVED | Noted: 2018-05-17 | Resolved: 2023-09-05

## 2023-09-05 PROCEDURE — 99214 OFFICE O/P EST MOD 30 MIN: CPT | Mod: S$PBB,,, | Performed by: PHYSICIAN ASSISTANT

## 2023-09-05 PROCEDURE — 99999 PR PBB SHADOW E&M-EST. PATIENT-LVL V: CPT | Mod: PBBFAC,,, | Performed by: PHYSICIAN ASSISTANT

## 2023-09-05 PROCEDURE — 99999 PR PBB SHADOW E&M-EST. PATIENT-LVL V: ICD-10-PCS | Mod: PBBFAC,,, | Performed by: PHYSICIAN ASSISTANT

## 2023-09-05 PROCEDURE — 99214 PR OFFICE/OUTPT VISIT, EST, LEVL IV, 30-39 MIN: ICD-10-PCS | Mod: S$PBB,,, | Performed by: PHYSICIAN ASSISTANT

## 2023-09-05 PROCEDURE — 99215 OFFICE O/P EST HI 40 MIN: CPT | Mod: PBBFAC | Performed by: PHYSICIAN ASSISTANT

## 2023-09-05 RX ORDER — ROSUVASTATIN CALCIUM 5 MG/1
5 TABLET, COATED ORAL DAILY
Qty: 30 TABLET | Refills: 2 | Status: SHIPPED | OUTPATIENT
Start: 2023-09-05 | End: 2023-10-30

## 2023-09-05 NOTE — PATIENT INSTRUCTIONS
"The bivalent covid booster is now available. You can visit [facility] in our primary care building by the front entrance to get this vaccine.     Pharmacy hours:  Monday-Friday 8am-5:30pm    The bivalent vaccines, known as the "Omicron" vaccines, target both the original strain of COVID-19 as well as the Omicron variant, which is now the predominant strain in the [place].    The Omicron booster is authorized for individuals 12 years and older and is given two months after last dose of primary or booster shot.     Please bring your medication or a written list to your next office visit.    If you check your blood pressure at home, please bring written your blood pressure log and your blood pressure machine to your next office visit.       Intermittent Fasting Guidelines:    Check out the book Fast, Feast, Repeat by Toshia Cornell for a thorough overview of how intermittent fasting is beneficial for optimal insulin release in the body.  Intermittent fasting is a great way to reverse insulin resistance, metabolic syndrome, prediabetes and possibly diabetes.     I recommend starting with a 12 hour eating time then a 12 hour fasting time (most of this will include your sleeping time). First, I recommend 3 meals per day, i.e. breakfast, lunch and dinner. Depending on your schedule and wake up time, eat breakfast first thing in the morning or as soon as you are able. Whatever time you eat, 12 hours later is the end of your eating time, i.e. if you eat at 7am, then you cannot eat past 7pm.     Each week you can shorten your eating time by 1 hour and increase your fasting time by 1 hour, or you can stick with the 12 hour fast/12 hour eat time. Ideally, a 16 hour fast and 8 hour eating time will be most beneficial for weight loss.     The key to this plan is what you cannot do between your 3 meals. The ONLY thing you can have between meals is WATER. Plain water, no fruit in your water, no sweeteners or " flavoring agents. Even though these things are zero sugar, zero calorie, they still trigger an insulin release in your body which is not what we want. Insulin forces sugar (glucose) into your fat cells, liver and muscle. This over time will create insulin resistance. Besides water, you can drink black coffee or black tea. NOTHING in these drinks, not honey, heavy cream, creamer or Splenda, etc. When can you have drinks that are not black tea/coffee or water?? YOU CAN HAVE THEM WITH YOUR MEALS.    For each meal, you want to drink and eat what you plan to consume within a 45 minute window. This is to prevent an additional insulin release.     My last bit of advice is WHAT you should be eating. Each meal should consist of 3 types of nutrients: complex carbohydrates, protein and a good fat (unsaturated fats). This is the only time I recommend consulting [employer]. Please check with this source for types foods that fit into those 3 categories. Start meal planning each week. Have your meals ready so when it's time to eat, you have things prepared.

## 2023-09-05 NOTE — PROGRESS NOTES
Subjective:       Patient ID: Kaylin Mccollum is a 78 y.o. female.    Chief Complaint: Follow-up      Patient presents to clinic today for followup of chronic conditions.    Patient also presents to review recent Annual Wellness Visit.        Review of Systems   Constitutional:  Negative for chills, fatigue, fever and unexpected weight change.   Eyes:  Negative for visual disturbance.   Respiratory:  Negative for shortness of breath.    Cardiovascular:  Negative for chest pain.   Musculoskeletal:  Negative for myalgias.   Neurological:  Negative for headaches.       Objective:      Physical Exam  Vitals and nursing note reviewed.   Constitutional:       General: She is not in acute distress.     Appearance: She is well-developed.   HENT:      Head: Normocephalic and atraumatic.   Eyes:      General: Lids are normal. No scleral icterus.     Extraocular Movements: Extraocular movements intact.      Conjunctiva/sclera: Conjunctivae normal.   Cardiovascular:      Rate and Rhythm: Normal rate and regular rhythm.   Pulmonary:      Effort: Pulmonary effort is normal.      Breath sounds: Normal breath sounds. No decreased breath sounds, wheezing, rhonchi or rales.   Neurological:      Mental Status: She is alert.      Cranial Nerves: No cranial nerve deficit.   Psychiatric:         Mood and Affect: Mood and affect normal.         Assessment:       1. Major depressive disorder in full remission, unspecified whether recurrent    2. PVD (peripheral vascular disease)    3. Type 2 diabetes mellitus with stage 3a chronic kidney disease, without long-term current use of insulin    4. Hyperlipidemia associated with type 2 diabetes mellitus    5. Hypertension associated with diabetes    6. Chronic major depressive disorder    7. Chronic kidney disease, stage 3a    8. Fibromyalgia    9. Gastroesophageal reflux disease without esophagitis    10. Osteopenia of left hip    11. MVP (mitral valve prolapse)    12. Adverse effect of statin     13. Encounter for screening mammogram for breast cancer        Plan:   1. Major depressive disorder in full remission, unspecified whether recurrent    2. PVD (peripheral vascular disease)  Overview:  Followed by Cardiology, continue current treatment plan       3. Type 2 diabetes mellitus with stage 3a chronic kidney disease, without long-term current use of insulin  Assessment & Plan:  Uncontrolled, continue metformin and Invokana, lifestyle and diet changes discussed.  Will recheck in 3 months.  If still elevated then we will need to add a 3rd agent  Lab Results   Component Value Date    HGBA1C 8.1 (H) 08/30/2023    HGBA1C 7.3 (H) 02/16/2023    LDLCALC 127.0 08/30/2023    LABMICR 94.0 08/11/2022    CREATRANDUR 152.0 08/11/2022    MICALBCREAT 61.8 (H) 08/11/2022        Orders:  -     Hemoglobin A1C; Future; Expected date: 03/05/2024  -     Microalbumin/Creatinine Ratio, Urine; Future; Expected date: 03/05/2024  -     Ambulatory referral/consult to Optometry; Future; Expected date: 09/12/2023  -     Hemoglobin A1C; Future; Expected date: 12/05/2023    4. Hyperlipidemia associated with type 2 diabetes mellitus  Assessment & Plan:  LDL above goal, continue Zetia, try rosuvastatin, has had myalgias with atorvastatin and pravastatin.  Will recheck labs in 3 months and patient will keep us posted in regards to any muscle pain.  Lifestyle and diet changes discussed.  Lab Results   Component Value Date    CHOL 216 (H) 08/30/2023    LDLCALC 127.0 08/30/2023    TRIG 140 08/30/2023    HDL 61 08/30/2023    ALT 19 08/30/2023    AST 27 08/30/2023    ALKPHOS 52 (L) 08/30/2023        Orders:  -     Comprehensive Metabolic Panel; Future; Expected date: 03/05/2024  -     Lipid Panel; Future; Expected date: 03/05/2024  -     rosuvastatin (CRESTOR) 5 MG tablet; Take 1 tablet (5 mg total) by mouth once daily.  Dispense: 30 tablet; Refill: 2  -     Comprehensive Metabolic Panel; Future; Expected date: 12/05/2023  -     Lipid Panel;  Future; Expected date: 12/05/2023    5. Hypertension associated with diabetes  Assessment & Plan:  /62, controlled, continue Lasix, hydrochlorothiazide, losartan, metoprolol, verapamil  Lab Results   Component Value Date     08/30/2023    K 4.3 08/30/2023    BUN 14 08/30/2023    CREATININE 1.0 08/30/2023    ESTGFRAFRICA >60.0 02/09/2022    EGFRNONAA >60.0 02/09/2022    EGFRNORACEVR 58.4 (A) 08/30/2023        Orders:  -     TSH; Future; Expected date: 03/05/2024  -     CBC Auto Differential; Future; Expected date: 03/05/2024    6. Chronic major depressive disorder  Assessment & Plan:  Stable on Prozac      7. Chronic kidney disease, stage 3a  Assessment & Plan:  Recheck in 3 months, increase water intake, better sugar control, no NSAIDs  Lab Results   Component Value Date    BUN 14 08/30/2023    CREATININE 1.0 08/30/2023    ESTGFRAFRICA >60.0 02/09/2022    EGFRNONAA >60.0 02/09/2022    EGFRNORACEVR 58.4 (A) 08/30/2023          8. Fibromyalgia    9. Gastroesophageal reflux disease without esophagitis    10. Osteopenia of left hip  Overview:  DEXA 7/2019      11. MVP (mitral valve prolapse)    12. Adverse effect of statin    13. Encounter for screening mammogram for breast cancer  -     Mammo Digital Screening Bilat w/ Evan; Future; Expected date: 09/05/2023        Recent labs reviewed with patient.    Schedule optometry appointment  Schedule mammogram  Discussed Covid booster, scheduling information given.  Annual with Dr. Olvera scheduled with fasting labs PTA   Health Maintenance reviewed/updated.

## 2023-09-06 NOTE — ASSESSMENT & PLAN NOTE
Recheck in 3 months, increase water intake, better sugar control, no NSAIDs  Lab Results   Component Value Date    BUN 14 08/30/2023    CREATININE 1.0 08/30/2023    ESTGFRAFRICA >60.0 02/09/2022    EGFRNONAA >60.0 02/09/2022    EGFRNORACEVR 58.4 (A) 08/30/2023

## 2023-09-06 NOTE — ASSESSMENT & PLAN NOTE
LDL above goal, continue Zetia, try rosuvastatin, has had myalgias with atorvastatin and pravastatin.  Will recheck labs in 3 months and patient will keep us posted in regards to any muscle pain.  Lifestyle and diet changes discussed.  Lab Results   Component Value Date    CHOL 216 (H) 08/30/2023    LDLCALC 127.0 08/30/2023    TRIG 140 08/30/2023    HDL 61 08/30/2023    ALT 19 08/30/2023    AST 27 08/30/2023    ALKPHOS 52 (L) 08/30/2023

## 2023-09-06 NOTE — ASSESSMENT & PLAN NOTE
Uncontrolled, continue metformin and Invokana, lifestyle and diet changes discussed.  Will recheck in 3 months.  If still elevated then we will need to add a 3rd agent  Lab Results   Component Value Date    HGBA1C 8.1 (H) 08/30/2023    HGBA1C 7.3 (H) 02/16/2023    LDLCALC 127.0 08/30/2023    LABMICR 94.0 08/11/2022    CREATRANDUR 152.0 08/11/2022    MICALBCREAT 61.8 (H) 08/11/2022

## 2023-09-06 NOTE — ASSESSMENT & PLAN NOTE
/62, controlled, continue Lasix, hydrochlorothiazide, losartan, metoprolol, verapamil  Lab Results   Component Value Date     08/30/2023    K 4.3 08/30/2023    BUN 14 08/30/2023    CREATININE 1.0 08/30/2023    ESTGFRAFRICA >60.0 02/09/2022    EGFRNONAA >60.0 02/09/2022    EGFRNORACEVR 58.4 (A) 08/30/2023

## 2023-09-20 ENCOUNTER — PATIENT MESSAGE (OUTPATIENT)
Dept: OTHER | Facility: OTHER | Age: 78
End: 2023-09-20
Payer: MEDICARE

## 2023-09-21 DIAGNOSIS — M79.7 FIBROMYALGIA: Chronic | ICD-10-CM

## 2023-09-21 DIAGNOSIS — F32.9 CHRONIC MAJOR DEPRESSIVE DISORDER: Chronic | ICD-10-CM

## 2023-09-21 RX ORDER — FLUOXETINE HYDROCHLORIDE 40 MG/1
40 CAPSULE ORAL
Qty: 90 CAPSULE | Refills: 1 | Status: SHIPPED | OUTPATIENT
Start: 2023-09-21 | End: 2024-03-19

## 2023-09-22 NOTE — TELEPHONE ENCOUNTER
No care due was identified.  [facility] Embedded Care Due Messages. Reference number: [id number].   9/21/2023 10:03:22 PM CDT

## 2023-09-22 NOTE — TELEPHONE ENCOUNTER
Refill Decision Note   Kaylin Mccollum  is requesting a refill authorization.  Brief Assessment and Rationale for Refill:  Approve     Medication Therapy Plan:  order matches    Medication Reconciliation Completed: No   Comments:     No Care Gaps recommended.     Note composed:10:12 PM 09/21/2023

## 2023-09-26 DIAGNOSIS — F32.9 CHRONIC MAJOR DEPRESSIVE DISORDER: Chronic | ICD-10-CM

## 2023-09-26 DIAGNOSIS — E11.69 DIABETES MELLITUS TYPE 2 IN OBESE: ICD-10-CM

## 2023-09-26 DIAGNOSIS — E78.5 HYPERLIPIDEMIA ASSOCIATED WITH TYPE 2 DIABETES MELLITUS: ICD-10-CM

## 2023-09-26 DIAGNOSIS — E66.9 DIABETES MELLITUS TYPE 2 IN OBESE: ICD-10-CM

## 2023-09-26 DIAGNOSIS — M79.7 FIBROMYALGIA: Chronic | ICD-10-CM

## 2023-09-26 DIAGNOSIS — E11.69 HYPERLIPIDEMIA ASSOCIATED WITH TYPE 2 DIABETES MELLITUS: ICD-10-CM

## 2023-09-26 RX ORDER — FLUOXETINE HYDROCHLORIDE 40 MG/1
40 CAPSULE ORAL
Qty: 90 CAPSULE | Refills: 1 | OUTPATIENT
Start: 2023-09-26

## 2023-09-26 RX ORDER — EZETIMIBE 10 MG/1
TABLET ORAL
Qty: 90 TABLET | Refills: 1 | Status: SHIPPED | OUTPATIENT
Start: 2023-09-26

## 2023-09-26 RX ORDER — GABAPENTIN 300 MG/1
300 CAPSULE ORAL 3 TIMES DAILY
Qty: 90 CAPSULE | Refills: 0 | Status: SHIPPED | OUTPATIENT
Start: 2023-09-26 | End: 2023-12-14

## 2023-09-26 RX ORDER — METFORMIN HYDROCHLORIDE 1000 MG/1
1000 TABLET ORAL 2 TIMES DAILY WITH MEALS
Qty: 180 TABLET | Refills: 3 | Status: SHIPPED | OUTPATIENT
Start: 2023-09-26

## 2023-09-26 NOTE — TELEPHONE ENCOUNTER
No care due was identified.  [facility] Embedded Care Due Messages. Reference number: [id number].   9/26/2023 4:28:38 AM CDT

## 2023-10-10 RX ORDER — METOPROLOL SUCCINATE 25 MG/1
25 TABLET, EXTENDED RELEASE ORAL
Qty: 30 TABLET | Refills: 5 | Status: SHIPPED | OUTPATIENT
Start: 2023-10-10

## 2023-10-20 DIAGNOSIS — M77.8 LEFT SHOULDER TENDINITIS: ICD-10-CM

## 2023-10-20 DIAGNOSIS — M94.261 CHONDROMALACIA, RIGHT KNEE: ICD-10-CM

## 2023-10-20 RX ORDER — CELECOXIB 200 MG/1
CAPSULE ORAL
Qty: 120 CAPSULE | Refills: 1 | Status: SHIPPED | OUTPATIENT
Start: 2023-10-20 | End: 2024-02-27

## 2023-10-23 DIAGNOSIS — I49.3 PVC (PREMATURE VENTRICULAR CONTRACTION): ICD-10-CM

## 2023-10-23 DIAGNOSIS — E11.59 HYPERTENSION ASSOCIATED WITH DIABETES: Primary | Chronic | ICD-10-CM

## 2023-10-23 DIAGNOSIS — I15.2 HYPERTENSION ASSOCIATED WITH DIABETES: Primary | Chronic | ICD-10-CM

## 2023-10-24 ENCOUNTER — HOSPITAL ENCOUNTER (OUTPATIENT)
Dept: RADIOLOGY | Facility: HOSPITAL | Age: 78
Discharge: HOME | End: 2023-10-24
Attending: PHYSICIAN ASSISTANT
Payer: MEDICARE

## 2023-10-24 DIAGNOSIS — Z12.31 ENCOUNTER FOR SCREENING MAMMOGRAM FOR BREAST CANCER: ICD-10-CM

## 2023-10-24 PROCEDURE — 77063 BREAST TOMOSYNTHESIS BI: CPT | Mod: 26,,, | Performed by: RADIOLOGY

## 2023-10-24 PROCEDURE — 77067 MAMMO DIGITAL SCREENING BILAT WITH TOMO: ICD-10-PCS | Mod: 26,,, | Performed by: RADIOLOGY

## 2023-10-24 PROCEDURE — 77067 SCR MAMMO BI INCL CAD: CPT | Mod: TC

## 2023-10-24 PROCEDURE — 77067 SCR MAMMO BI INCL CAD: CPT | Mod: 26,,, | Performed by: RADIOLOGY

## 2023-10-24 PROCEDURE — 77063 MAMMO DIGITAL SCREENING BILAT WITH TOMO: ICD-10-PCS | Mod: 26,,, | Performed by: RADIOLOGY

## 2023-10-28 DIAGNOSIS — K21.9 GASTROESOPHAGEAL REFLUX DISEASE WITHOUT ESOPHAGITIS: ICD-10-CM

## 2023-10-30 ENCOUNTER — OFFICE VISIT (OUTPATIENT)
Dept: CARDIOLOGY | Facility: CLINIC | Age: 78
End: 2023-10-30
Payer: MEDICARE

## 2023-10-30 ENCOUNTER — HOSPITAL ENCOUNTER (OUTPATIENT)
Dept: CARDIOLOGY | Facility: HOSPITAL | Age: 78
Discharge: HOME | End: 2023-10-30
Attending: INTERNAL MEDICINE
Payer: MEDICARE

## 2023-10-30 VITALS
BODY MASS INDEX: 30.17 KG/M2 | SYSTOLIC BLOOD PRESSURE: 112 MMHG | DIASTOLIC BLOOD PRESSURE: 66 MMHG | HEIGHT: 70 IN | OXYGEN SATURATION: 98 % | HEART RATE: 79 BPM | WEIGHT: 210.75 LBS

## 2023-10-30 DIAGNOSIS — E11.59 HYPERTENSION ASSOCIATED WITH DIABETES: Primary | Chronic | ICD-10-CM

## 2023-10-30 DIAGNOSIS — I15.2 HYPERTENSION ASSOCIATED WITH DIABETES: Primary | Chronic | ICD-10-CM

## 2023-10-30 DIAGNOSIS — E11.69 HYPERLIPIDEMIA ASSOCIATED WITH TYPE 2 DIABETES MELLITUS: ICD-10-CM

## 2023-10-30 DIAGNOSIS — N18.31 TYPE 2 DIABETES MELLITUS WITH STAGE 3A CHRONIC KIDNEY DISEASE, WITHOUT LONG-TERM CURRENT USE OF INSULIN: ICD-10-CM

## 2023-10-30 DIAGNOSIS — R07.89 OTHER CHEST PAIN: ICD-10-CM

## 2023-10-30 DIAGNOSIS — T46.6X5A ADVERSE EFFECT OF STATIN: ICD-10-CM

## 2023-10-30 DIAGNOSIS — I49.3 PVC (PREMATURE VENTRICULAR CONTRACTION): ICD-10-CM

## 2023-10-30 DIAGNOSIS — E11.59 HYPERTENSION ASSOCIATED WITH DIABETES: Chronic | ICD-10-CM

## 2023-10-30 DIAGNOSIS — I34.1 MVP (MITRAL VALVE PROLAPSE): ICD-10-CM

## 2023-10-30 DIAGNOSIS — E11.22 TYPE 2 DIABETES MELLITUS WITH STAGE 3A CHRONIC KIDNEY DISEASE, WITHOUT LONG-TERM CURRENT USE OF INSULIN: ICD-10-CM

## 2023-10-30 DIAGNOSIS — I73.9 PVD (PERIPHERAL VASCULAR DISEASE): ICD-10-CM

## 2023-10-30 DIAGNOSIS — I15.2 HYPERTENSION ASSOCIATED WITH DIABETES: Chronic | ICD-10-CM

## 2023-10-30 DIAGNOSIS — E78.5 HYPERLIPIDEMIA ASSOCIATED WITH TYPE 2 DIABETES MELLITUS: ICD-10-CM

## 2023-10-30 PROCEDURE — 99999 PR PBB SHADOW E&M-EST. PATIENT-LVL V: CPT | Mod: PBBFAC,,, | Performed by: INTERNAL MEDICINE

## 2023-10-30 PROCEDURE — 93010 EKG 12-LEAD: ICD-10-PCS | Mod: ,,, | Performed by: INTERNAL MEDICINE

## 2023-10-30 PROCEDURE — 99214 PR OFFICE/OUTPT VISIT, EST, LEVL IV, 30-39 MIN: ICD-10-PCS | Mod: S$PBB,,, | Performed by: INTERNAL MEDICINE

## 2023-10-30 PROCEDURE — 99999 PR PBB SHADOW E&M-EST. PATIENT-LVL V: ICD-10-PCS | Mod: PBBFAC,,, | Performed by: INTERNAL MEDICINE

## 2023-10-30 PROCEDURE — 93005 ELECTROCARDIOGRAM TRACING: CPT

## 2023-10-30 PROCEDURE — 99214 OFFICE O/P EST MOD 30 MIN: CPT | Mod: S$PBB,,, | Performed by: INTERNAL MEDICINE

## 2023-10-30 PROCEDURE — 93010 ELECTROCARDIOGRAM REPORT: CPT | Mod: ,,, | Performed by: INTERNAL MEDICINE

## 2023-10-30 PROCEDURE — 99215 OFFICE O/P EST HI 40 MIN: CPT | Mod: PBBFAC | Performed by: INTERNAL MEDICINE

## 2023-10-30 RX ORDER — OMEPRAZOLE 20 MG/1
CAPSULE, DELAYED RELEASE ORAL
Qty: 30 CAPSULE | Refills: 5 | Status: SHIPPED | OUTPATIENT
Start: 2023-10-30

## 2023-10-30 NOTE — PROGRESS NOTES
Subjective:   Patient ID:  Kaylin Mccollum is a 78 y.o. female who presents for follow up of Shortness of Breath      77 yo female, 3 months f/u  PMH h/o MVP, HTN DM 20 yrs. H/o PVCs. Statin intolerance, Fibromyalgia H/o COVID 19 in  quarantine at home. (sinus headache)  On verapamil since she was 54.   Palpitation and dizziness controlled by Verapamil  Occasional chest pain, with sharp pain.   C/o dry mouth  Chronic fatigue from fibra myalgia. Some shoulder pain from the fall  Mother had afib. Father healthy. Young brother had heart procedure at age of 6.  Not on regular exercise. No smoking/drinking  ekg today NSR and poor R progression on repcoridal leads    05/2021 visit   ECHO mild MR and normal EF. Stress test no ischemia; ZIOPATCH showed rare AT longest 13 beats.  Still dry mouth and occasional pinching chest pain  Headache chronic due to migraine  Chronic fatigue, mild exercise endurance  BP and LDL controlled. A1C 6.2 at OSH in      visit  PVD swelling of the leg controlled by lasix.   BP controlled  Still fatigue and weakness   ekg NSR. A1C 7.6 BP controled     visit  C/o chest tightness when walked from the parking to the office today. Resolved after rest.  Walking and shopping could cause the muscle pain and fatigue. '  Chronic lower back injection for the pain. occasionally faint and clammy  No palpitation and leg swelling      visit  Had steroid injection of the hips. Palpitation and controlled by verapamil. BRUNO with climbing the stairs. No dizziness and faint.    04/23 visit  Rx of fibromyalgia working but copay elevated and will wean off now  Occasional palpitation at night  No dizziness faint dyspnea. Chronic mild leg swelling  Ekg NSR     stress echo normal EF and mld MR. Normal stress test; BARDY unremarkable     07/23 visit  Feet and leg swelling improved but 2+ some varicose on the feet skin. No palpitation dizziness faint  Reviewed digit HTN  and BP up to 140 to 150 mmHG    Interval history  BP controlled at home per digit program   and taking zetia  Ekg NSR          Past Medical History:   Diagnosis Date    Arthritis     Cataract     COVID-19 02/25/2021    Diabetes mellitus 1995    BS didn't check 09/13/2022    Diabetes mellitus, type 2     Fibromyalgia     General anesthetics causing adverse effect in therapeutic use     bradycardia     Hypertension     Migraines     Mitral valve prolapse     Osteoarthritis     Rotator cuff tear 04/01/2015       Past Surgical History:   Procedure Laterality Date    ARTHROSCOPY OF KNEE Right 03/10/2020    Procedure: ARTHROSCOPY, KNEE;  Surgeon: Les Schneider MD;  Location: [facility] OR;  Service: Orthopedics;  Laterality: Right;    CATARACT EXTRACTION W/  INTRAOCULAR LENS IMPLANT Left 07/19/2017    CATARACT EXTRACTION W/  INTRAOCULAR LENS IMPLANT Right 2017    CHOLECYSTECTOMY      CHONDROPLASTY OF KNEE Right 03/10/2020    Procedure: CHONDROPLASTY, KNEE;  Surgeon: Les Schneider MD;  Location: [facility] OR;  Service: Orthopedics;  Laterality: Right;  Anterior compartment     COLONOSCOPY N/A 09/25/2018    Procedure: COLONOSCOPY;  Surgeon: Bethel Carmona MD;  Location: [facility] ENDO;  Service: Endoscopy;  Laterality: N/A;    EXCISION OF MEDIAL MENISCUS OF KNEE Right 03/10/2020    Procedure: MENISCECTOMY, KNEE, MEDIAL;  Surgeon: Les Schneider MD;  Location: [facility] OR;  Service: Orthopedics;  Laterality: Right;  Partial, Medial , Lateral     EYE SURGERY      INJECTION OF ANESTHETIC AGENT AROUND MEDIAL BRANCH NERVES INNERVATING LUMBAR FACET JOINT Bilateral 12/13/2022    Procedure: Bilateral L3-5 MBB;  Surgeon: Humberto Dumont MD;  Location: [facility] PAIN MGT;  Service: Pain Management;  Laterality: Bilateral;    INJECTION OF ANESTHETIC AGENT AROUND NERVE Right 08/08/2019    Procedure: Right Genicular nerve block with local;  Surgeon: Manpreet Dutta MD;  Location: [facility] PAIN MGT;  Service: Pain Management;  Laterality:  Right;    INJECTION OF ANESTHETIC AGENT INTO SACROILIAC JOINT Bilateral 05/06/2020    Procedure: Bilateral Sacroiliac Joint Injection;  Surgeon: Manpreet Dutta MD;  Location: [facility] PAIN MGT;  Service: Pain Management;  Laterality: Bilateral;    INJECTION OF ANESTHETIC AGENT INTO SACROILIAC JOINT Bilateral 10/08/2020    Procedure: Bilateral SI and Bilateral GTB with RN IV sedation;  Surgeon: Manpreet Dutta MD;  Location: [facility] PAIN MGT;  Service: Pain Management;  Laterality: Bilateral;    INJECTION OF ANESTHETIC AGENT INTO SACROILIAC JOINT Bilateral 01/05/2021    Procedure: Bilateral BLOCK, SACROILIAC JOINT and Bilateral GTB witn RN IV sedation;  Surgeon: Daniel Burns MD;  Location: [facility]IN [facility]GT;  Service: Pain Management;  Laterality: Bilateral;    INJECTION OF ANESTHETIC AGENT INTO SACROILIAC JOINT Bilateral 07/08/2021    Procedure: Bilateral BLOCK, SACROILIAC JOINT bilateral GTB RN IV sedation;  Surgeon: Manpreet Dutta MD;  Location: [facility]GT;  Service: Pain Management;  Laterality: Bilateral;    INJECTION OF ANESTHETIC AGENT INTO SACROILIAC JOINT Bilateral 03/01/2022    Procedure: Bilateral BLOCK, SACROILIAC JOINT and Bilateral GTB RN IV sedation;  Surgeon: Manpreet Dutta MD;  Location: [facility];  Service: Pain Management;  Laterality: Bilateral;    INJECTION OF ANESTHETIC AGENT INTO SACROILIAC JOINT Bilateral 10/10/2022    Procedure: Bilateral Sacroiliac Joint Injection;  Surgeon: Humberto Dumont MD;  Location: [facility];  Service: Pain Management;  Laterality: Bilateral;    INJECTION OF ANESTHETIC AGENT INTO SACROILIAC JOINT Bilateral 01/24/2023    Procedure: Bilateral Sacroiliac Joint Injection;  Surgeon: Humberto Dumont MD;  Location: [facility];  Service: Pain Management;  Laterality: Bilateral;    INJECTION OF ANESTHETIC AGENT INTO SACROILIAC JOINT Bilateral 06/21/2023    Procedure: Bilateral Sacroiliac Joint Injection;  Surgeon: Humberto Dumont MD;  Location: [facility];  Service: Pain  Management;  Laterality: Bilateral;    INJECTION OF JOINT Bilateral 09/05/2019    Procedure: Bilateral GT bursa injection;  Surgeon: Manpreet Dutta MD;  Location: [facility];  Service: Pain Management;  Laterality: Bilateral;    INJECTION OF JOINT Bilateral 05/06/2020    Procedure: Bilateral GT bursa injection;  Surgeon: Manpreet Dutta MD;  Location: [facility] PAIN [facility]GT;  Service: Pain Management;  Laterality: Bilateral;    INJECTION OF JOINT Right 04/28/2022    Procedure: Injection, Joint Right Hip Injection RN IV sedation;  Surgeon: Manpreet Dutta MD;  Location: [facility] PAIN [facility]GT;  Service: Pain Management;  Laterality: Right;    INJECTION OF JOINT Bilateral 10/10/2022    Procedure: Bilateral GT bursa injection with RN IV sedation;  Surgeon: Humberto Dumont MD;  Location: [facility];  Service: Pain Management;  Laterality: Bilateral;    INJECTION OF JOINT Bilateral 01/24/2023    Procedure: Bilateral GT bursa injection;  Surgeon: Humberto Dumont MD;  Location: [facility];  Service: Pain Management;  Laterality: Bilateral;    INJECTION OF JOINT Bilateral 05/23/2023    Procedure: Bilateral intraarticular knee injection with Synvisc-1; ;  Surgeon: Humberto Dumont MD;  Location: [facility];  Service: Pain Management;  Laterality: Bilateral;    INJECTION OF JOINT Bilateral 06/21/2023    Procedure: Bilateral GT bursa injection;  Surgeon: Humberto Dumont MD;  Location: [facility];  Service: Pain Management;  Laterality: Bilateral;    INJECTION, ALLOGRAFT, INTERVERTEBRAL DISC N/A 04/25/2023    Procedure: INJECTION,ALLOGRAFT,INTERVERTEBRAL DISC,1ST LEVEL: L5-S1;  Surgeon: Humberto Dumont MD;  Location: [facility]IN [facility];  Service: Pain Management;  Laterality: N/A;    INJECTION, ALLOGRAFT, INTERVERTEBRAL DISC N/A 05/09/2023    Procedure: INJECTION,ALLOGRAFT,INTERVERTEBRAL DISC,2nd LEVEL: L3-4;  Surgeon: Humberto Dumont MD;  Location: [facility]IN [facility];  Service: Pain Management;  Laterality: N/A;    KNEE ARTHROSCOPY Bilateral      PARS PLANA VITRECTOMY W/ REPAIR OF MACULAR HOLE Left 2017    RADIOFREQUENCY THERMOCOAGULATION Left 2019    Procedure: Right SIJ RFA;  Surgeon: Manpreet Dutta MD;  Location: [facility] PAIN MGT;  Service: Pain Management;  Laterality: Left;    RADIOFREQUENCY THERMOCOAGULATION Right 2019    Procedure: Right SIJ RFA;  Surgeon: Manpreet Dutta MD;  Location: [facility] PAIN MGT;  Service: Pain Management;  Laterality: Right;    SHOULDER ARTHROSCOPY Right 2015       Social History     Tobacco Use    Smoking status: Never    Smokeless tobacco: Never    Tobacco comments:     Never smoked   Substance Use Topics    Alcohol use: Not Currently    Drug use: No       Family History   Problem Relation Age of Onset    Heart disease Mother         A-Fib    Glaucoma Mother     Cataracts Mother     Cancer Mother         colon    Arthritis Mother         : 17    Depression Mother     Depression Brother     Birth defects Brother         Cardiac    Heart disease Brother         Heart Surgery  as 6 y.o.    Kidney disease Daughter         ESRD    Anesthesia problems Daughter         cardiac arrest during nephrectomy    Birth defects Daughter         Renal    Depression Daughter     Learning disabilities Daughter         Dyslexia, ADD    Depression Son     Learning disabilities Son         ADD & ADHD    Diabetes Maternal Aunt          9/3/2023    Diabetes Maternal Uncle          2019    Breast cancer Paternal Aunt     Diabetes Maternal Grandmother     Heart disease Maternal Grandmother         Congestive heart failure    Alcohol abuse Maternal Grandmother         Death: 84    Depression Maternal Grandmother     Hypertension Maternal Grandmother     Cancer Maternal Grandmother     Diabetes Maternal Grandfather     Cancer Maternal Grandfather     Alcohol abuse Maternal Grandfather          1964         ROS    Objective:   Physical Exam  HENT:      Head: Normocephalic.   Eyes:      Pupils:  Pupils are equal, round, and reactive to light.   Neck:      Thyroid: No thyromegaly.      Vascular: Normal carotid pulses. No carotid bruit or JVD.   Cardiovascular:      Rate and Rhythm: Normal rate and regular rhythm. No extrasystoles are present.     Chest Wall: PMI is not displaced.      Pulses: Normal pulses.      Heart sounds: Normal heart sounds. No murmur heard.     No gallop. No S3 sounds.   Pulmonary:      Effort: No respiratory distress.      Breath sounds: Normal breath sounds. No stridor.   Abdominal:      General: Bowel sounds are normal.      Palpations: Abdomen is soft.      Tenderness: There is no abdominal tenderness. There is no rebound.   Musculoskeletal:         General: Swelling present.   Skin:     Findings: No rash.   Neurological:      Mental Status: She is alert and oriented to person, place, and time.   Psychiatric:         Behavior: Behavior normal.         Lab Results   Component Value Date    CHOL 216 (H) 08/30/2023    CHOL 211 (H) 02/16/2023    CHOL 195 08/11/2022     Lab Results   Component Value Date    HDL 61 08/30/2023    HDL 65 02/16/2023    HDL 61 08/11/2022     Lab Results   Component Value Date    LDLCALC 127.0 08/30/2023    LDLCALC 131.6 02/16/2023    LDLCALC 112.8 08/11/2022     Lab Results   Component Value Date    TRIG 140 08/30/2023    TRIG 72 02/16/2023    TRIG 106 08/11/2022     Lab Results   Component Value Date    CHOLHDL 28.2 08/30/2023    CHOLHDL 30.8 02/16/2023    CHOLHDL 31.3 08/11/2022       Chemistry        Component Value Date/Time     08/30/2023 1130    K 4.3 08/30/2023 1130     08/30/2023 1130    CO2 24 08/30/2023 1130    BUN 14 08/30/2023 1130    CREATININE 1.0 08/30/2023 1130     (H) 08/30/2023 1130        Component Value Date/Time    CALCIUM 9.8 08/30/2023 1130    ALKPHOS 52 (L) 08/30/2023 1130    AST 27 08/30/2023 1130    ALT 19 08/30/2023 1130    BILITOT 0.4 08/30/2023 1130    ESTGFRAFRICA >60.0 02/09/2022 1308    EGFRNONAA >60.0  "02/09/2022 1308          Lab Results   Component Value Date    HGBA1C 8.1 (H) 08/30/2023     Lab Results   Component Value Date    TSH 1.574 02/16/2023     No results found for: "INR", "PROTIME"  Lab Results   Component Value Date    WBC 5.68 02/16/2023    HGB 13.0 02/16/2023    HCT 41.1 02/16/2023    MCV 95 02/16/2023     02/16/2023     BMP  Sodium   Date Value Ref Range Status   08/30/2023 138 136 - 145 mmol/L Final     Potassium   Date Value Ref Range Status   08/30/2023 4.3 3.5 - 5.1 mmol/L Final     Chloride   Date Value Ref Range Status   08/30/2023 101 95 - 110 mmol/L Final     CO2   Date Value Ref Range Status   08/30/2023 24 23 - 29 mmol/L Final     BUN   Date Value Ref Range Status   08/30/2023 14 8 - 23 mg/dL Final     Creatinine   Date Value Ref Range Status   08/30/2023 1.0 0.5 - 1.4 mg/dL Final     Calcium   Date Value Ref Range Status   08/30/2023 9.8 8.7 - 10.5 mg/dL Final     Anion Gap   Date Value Ref Range Status   08/30/2023 13 8 - 16 mmol/L Final     eGFR if    Date Value Ref Range Status   02/09/2022 >60.0 >60 mL/min/1.73 m^2 Final     eGFR if non    Date Value Ref Range Status   02/09/2022 >60.0 >60 mL/min/1.73 m^2 Final     Comment:     Calculation used to obtain the estimated glomerular filtration  rate (eGFR) is the CKD-EPI equation.        BNP  @LABRCNTIP(BNP,BNPTRIAGEBLO)@  @LABRCNTIP(troponini)@  CrCl cannot be calculated (Patient's most recent lab result is older than the maximum 7 days allowed.).  No results found in the last 24 hours.  No results found in the last 24 hours.  No results found in the last 24 hours.    Assessment:      1. Hypertension associated with diabetes    2. Hyperlipidemia associated with type 2 diabetes mellitus    3. MVP (mitral valve prolapse)    4. PVD (peripheral vascular disease)    5. Other chest pain    6. PVC (premature ventricular contraction)    7. Type 2 diabetes mellitus with stage 3a chronic kidney disease, " without long-term current use of insulin    8. Adverse effect of statin        Plan:   Continue HCTZ Losartan Metoprolol verapamil for PSVT HTN and DM  Continue Lasix as needed  Continue zetia for HLD  DM Rx per PCP  Counseled DASH  Check Lipid profile with PCP in 6 months  Recommend heart-healthy diet, weight control and regular exercise.  Frank. Risk modification.   I have reviewed all pertinent labs and cardiac studies independently. Plans and recommendations have been formulated under my direct supervision. All questions answered and patient voiced understanding.   If symptoms persist go to the ED  RTC in 6 months

## 2023-11-24 RX ORDER — POTASSIUM CHLORIDE 750 MG/1
TABLET, EXTENDED RELEASE ORAL
Qty: 90 TABLET | Refills: 1 | Status: SHIPPED | OUTPATIENT
Start: 2023-11-24

## 2023-11-27 RX ORDER — HYDROCHLOROTHIAZIDE 25 MG/1
25 TABLET ORAL
Qty: 90 TABLET | Refills: 1 | Status: SHIPPED | OUTPATIENT
Start: 2023-11-27

## 2023-12-04 ENCOUNTER — TELEPHONE (OUTPATIENT)
Dept: PAIN MEDICINE | Facility: CLINIC | Age: 78
End: 2023-12-04
Payer: MEDICARE

## 2023-12-04 NOTE — TELEPHONE ENCOUNTER
----- Message from Meng Carty sent at 12/4/2023  2:22 PM CST -----  Contact: Pt  Type:  Sooner Apoointment Request    Caller is requesting a sooner appointment.  Caller declined first available appointment listed below.  Caller will not accept being placed on the waitlist and is requesting a message be sent to doctor.  Name of Caller: pt   When is the first available appointment?   Symptoms: 3 nth follow up   Would the patient rather a call back or a response via [facility]? phone  Best Call Back Number: [phone]  Additional Information: appt is being booked out and wants to be worked in

## 2023-12-04 NOTE — TELEPHONE ENCOUNTER
Returned pt call. Pt notified that Dr. Dumont will be out of clinic on 12/14. Appointment rescheduled for 12/13. Pt verbalized understanding. All questions answered.

## 2023-12-12 NOTE — PROGRESS NOTES
Established Patient Interventional Pain Clinic Visit    Chief Pain Complaint:  Chief Complaint   Patient presents with    Low-back Pain     Patient has pain in there lower back that radiates into both hips and behind both thighs.  Pain scale 4/10     Interval Hx: 12/13/2023  Patient presents for four-month follow-up.  Today she reports that her lower back has been feeling excellent since via disc supplementation and that the procedure has made all the difference! Particularly with standing and ambulation.  Patient reports the day following Thanksgiving, rolling off of her bed and falling onto her side with significant difficulty arising to a standing position and pain with standing and ambulation following this trauma.  Today she reports pain over bilateral sacroiliac territory which radiates into the hips as well as pain in bilateral knees.  Pain is rated a 4/10.  Pain is exacerbated with positional changes, standing and with ambulation.  She is continued Savella 50 mg twice daily which she reports has significantly reduced the severity and duration of fibromyalgia flares.  She has requesting a refill or increase in this medication.  She is continued physician directed physical therapy exercises over the last 8 weeks from 10/13/2023 through 12/13/2023 for lower back, sacroiliac joint and bilateral knee pain.      Interval history 08/24/2023  Patient presents status post bilateral sacroiliac joint and greater trochanteric bursa injection 06/21/2023.  Patient reports 95% sustained relief overlying bilateral sacroiliac joint and greater trochanteric bursa territories.  She reports altogether following 2 level via disc allograft supplementation and her most recent procedure, she is able to stand upright and ambulate further distances.  She reports these procedures have taken years off my life! .  Today pain is intermittent and rated a 2/10.  Patient has continued Savella 50 mg twice daily and reports this has  significantly reduced the frequency and intensity of her fibromyalgia flares and debility associated with these flares.  She is requesting a refill of this medication.  Today she denies significant lower extremity weakness, bowel or bladder incontinence or saddle anesthesia.      Interval history 06/15/2023  Patient presents status post bilateral intra-articular knee injection 05/23/2023.  Patient reports 75% sustained improvement in bilateral knee pain following intra-articular knee injection.  Today her primary concern is bilateral hip pain.  Patient reports pain in the lower back which radiates into the groin and down the lateral aspect of bilateral lower extremities to mid thigh.  Patient reports pain is exacerbated 1st thing in the morning when she is attempting to get out of bed.  Patient denies more distal radiculopathy into the lower extremities or feet.  She denies lower extremity weakness, bowel or bladder incontinence or saddle anesthesia.  Patient continues to reports significant improvement with Savella medication which she is taking 50 mg twice daily with fibromyalgia pain.  She is requesting a refill of this medication.  Patient reports lower back pain continues to have greater than 80% sustained relief following 2 level via disc allograft supplementation.      Interval history 05/18/2023  Patient presents status post L5-S1 via disc allograft supplementation 04/25/2023 and via disc allograft supplementation L3-4 05/09/2023.  Patient reports noticeable improvement >80% in lower back pain following two-level  allograft supplementation.  Today her primary concern is bilateral knee pain.  Patient has questions regarding hyaluronic acid supplementation to be use.  Patient reports she has seen videos on Durolane and Euflexxa and is inquiring to the brand to be used on the upcoming Tuesday.  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  She is requesting up-to-date imaging to investigate these territories.  Today she denies significant weakness in the upper lower extremities, bowel or bladder incontinence or saddle anesthesia.      Interval history 04/06/2023  Patient presents for follow-up of lower back pain.  She continues to reports superior relief in fibromyalgia symptoms on Savella medication.  Patient has brought in denial paperwork from her insurance reporting limitations on dosage and quantity allowed.  Patient reports prior to starting this medication she felt that she did not have a life.  now she reports significant improvement in pain as well as chronic fatigue associated with fibromyalgia.  Today she again reports pain in the lower back which is worse with lumbar flexion.  Patient reports she is unable to perform activities of daily living such as grocery shopping or prolonged standing or ambulation secondary to her discogenic lower back pain.  Today patient denies more distal radiculopathy into the lower extremities or feet or lower extremity weakness.  Patient is interested in discussing intervention.  Of note patient has failed to have improvement in his lower back pain with prior lumbar medial branch block, sacroiliac joint injections.    Interval Hx: 3/9/23  Patient presents for one-month follow-up.  Today she reports she is significantly better since our last clinic visit.  At that time patient had slipped in a low off and injured her lower back and right leg.  Today she reports this pain is significantly improved and pain is intermittent and today is rated a 3/10.  Today patient reports pain is isolated to the midline lower lumbar spine.  Pain is exacerbated with lumbar flexion such as when she is picking up close.  Fibromyalgia Pain has been significantly improved with the initiation of Savella medication.  Patient has reached a titration of 50 mg twice daily.  Patient recently refilled this  medication.  She denies any side effects from this medication.  Today she denies any significant lower extremity weakness, bowel or bladder incontinence or saddle anesthesia      Interval history 02/07/2023  Patient presents status post bilateral sacroiliac joint and greater trochanteric bursa injection 01/24/2023 and bilateral L3-5 lumbar medial branch block 12/13/2022.  Patient had recent mechanical fall confounding benefit from recent injections.  Today she reports she was reaching over a mattress cover to reach a stack of bibles and slid into a slint.  Patient reports she was behind and tall wall in a took 20-30 minutes for her to stand.  Patient also reports exacerbation of fibromyalgia pain.  Since her fall patient reports pain which radiates into the buttock and down the posterior aspect of the right lower extremity to the dorsum of the foot in L4-S1 distribution.  Patient has continued gabapentin 300 mg twice daily, Celebrex in the evenings as well as Tylenol 1000 mg twice daily.    Interval history 11/29/2022    Patient presents status post bilateral sacroiliac joint greater trochanteric bursa 10/10/2022.  Patient reports 90% relief overlying bilateral sacroiliac joints and greater trochanteric bursa following her injection.  Today she reports primarily lumbar axial back pain as well as significant neck pain.  Lower back pain is exacerbated with prolonged standing such as when she is washing dishes.  She denies significant distal radiculopathy into the right lower extremities as described at our last clinic visit.  Neck pain is elicited with cervical flexion, extension and lateral flexion and she does report reduced mobility.  She reports her pain began following a mechanical fall down the stairs at [facility] in September 1986.  Patient has continued gabapentin 300 mg in the morning, 300 mg in the afternoon and 600 mg in the evening.    Interval history 10/04/2022  Ms. Mccollum is a 77-year-old female with past  medical history significant for depression, hyperlipidemia, hypertension, mitral valve prolapse, peripheral vascular disease, history of COVID-19, type 2 diabetes, multi joint arthritis, fibromyalgia who presents to [facility] care, previous Dr. Dutta patient.  Today patient reports return of pain in the lower back which radiates into bilateral hips and down the posterior aspect of the right lower extremity in L4-5 distribution to the dorsum of the foot.  Patient reports pain is intermittent but has increased in intensity.  Pain is described as shocking in nature and today is rated a 6/10.  Patient reports she feels as if her skin is crawling.  patient is currently taking gabapentin 300 mg up to 3 times daily when pain is severe.  Pain interferes with the patient's sleep.  Patient also reports history of fibromyalgia which limits her mobility and duration of standing and ambulation.  Patient does endorse associated weakness in the lower extremities associated with her pain.  Patient is interested in pursuing repeat intervention as she is obtained several months of significant relief with prior sacroiliac joint and greater trochanteric bursa injections.  Patient has continued physician directed physical therapy exercises at home daily.      History of Present Illness 01/16/2020: Dr. Dutta:   Kaylin Mccollum is a 74 y.o. female  who is presenting with a chief complaint of lumbar back pain. The patient began experiencing this problem insidiously, and the pain has been gradually worsening over the past 5 month(s). The pain is described as throbbing, shooting, burning and electrical and is located in the bilateral lumbar spine. Pain is intermittent and lasts hours. The pain radiates to bilateral lower extremities L4 distribudution. The patient rates her pain a 8 out of ten and interferes with activities of daily living a 7 out of ten. Pain is exacerbated by flexion of the lumbar spine, ambulation, and is improved by  rest.      She also complains of right knee pain. The patient began experiencing this problem insidiously. The pain is described as cramping, aching and is located in the right knee. Pain is intermittent and lasts hours. The pain is nonradiating. The patient rates her pain a 8 out of ten and interferes with activities of daily living a 7 out of ten. Pain is exacerbated by getting up from a seated position and standing, and is improved by rest. Patient reports no prior trauma, prior arthroscopy bilaterally in 1990s.       - pertinent negatives: No fever, No chills, No weight loss, No bladder dysfunction, No bowel dysfunction, No saddle anesthesia  - pertinent positives: none        Pain Disability Index Review:   @[facility](4749:3)@    Non-Pharmacologic Treatments:  Physical Therapy/Home Exercise: yes  Ice/Heat:yes  TENS: no  Acupuncture: no  Massage: no  Chiropractic: no    Other: no      Pain Medications:  - Adjuvant Medications: Lorazepam (Ativan), Neurontin (Gabapentin), and Topical Ointment (Voltaren Gel, Steroid cream, Anti-Inflammatory Cream, Compound cream)      Pain injections:  Dr. Dumont:  -06/21/2023: Bilateral sacroiliac joint and greater trochanteric bursa injection  -05/29/2023: Bilateral intra-articular knee injection  -05/09/2023: L3-4 via disc allograft supplementation  -04/25/2023:  L5-S1 via disc allograft supplementation  -01/24/2023: Bilateral sacroiliac joint and greater trochanteric bursa injection  -12/13/2022: Bilateral L3-5 lumbar medial branch block  - 10/10/2022: Bilateral greater trochanteric bursa and sacroiliac joint injection      -04/20/2022: Right-sided acetabular femoral injection; Dr. Dutta  -03/01/2022: Bilateral sacroiliac joint and greater trochanteric bursa injection; Dr. Dutta  -07/08/2021: Bilateral sacroiliac joint and greater trochanteric bursa injection; Dr. Dutta  -01/05/2021: Bilateral sacroiliac joint and greater trochanteric bursa injection; Dr. Burns  -10/08/2020: Bilateral  "sacroiliac joint and bilateral greater trochanteric bursa injection; Dr. Dutta  -05/06/2020: Bilateral sacroiliac joint and greater trochanteric bursa injection; Dr. Dutta    Past Medical History:   Diagnosis Date    Arthritis     Cataract     COVID-19 02/25/2021    Diabetes mellitus 1995    BS didn't check 09/13/2022    Diabetes mellitus, type 2     Fibromyalgia     General anesthetics causing adverse effect in therapeutic use     bradycardia     Hypertension     Migraines     Mitral valve prolapse     Osteoarthritis     Rotator cuff tear 04/01/2015       Review of patient's allergies indicates:   Allergen Reactions    Demerol [meperidine] Other (See Comments)     Burning when adm IV  Able to tolerate IM, "Turned the veins in my hand purple."    Latex, natural rubber Other (See Comments)     "Burns my skin",  Symptoms get worse the longer she is exposed    Zocor [simvastatin] Other (See Comments)     Tightening of muscles    Statins-hmg-coa reductase inhibitors Other (See Comments)     myopathy    Sulfa (sulfonamide antibiotics)      Blurred vision       Past Surgical History:   Procedure Laterality Date    ARTHROSCOPY OF KNEE Right 03/10/2020    Procedure: ARTHROSCOPY, KNEE;  Surgeon: Les Schneider MD;  Location: [facility] OR;  Service: Orthopedics;  Laterality: Right;    CATARACT EXTRACTION W/  INTRAOCULAR LENS IMPLANT Left 07/19/2017    CATARACT EXTRACTION W/  INTRAOCULAR LENS IMPLANT Right 2017    CHOLECYSTECTOMY      CHONDROPLASTY OF KNEE Right 03/10/2020    Procedure: CHONDROPLASTY, KNEE;  Surgeon: Les Schneider MD;  Location: [facility] OR;  Service: Orthopedics;  Laterality: Right;  Anterior compartment     COLONOSCOPY N/A 09/25/2018    Procedure: COLONOSCOPY;  Surgeon: Bethel Carmona MD;  Location: [facility] ENDO;  Service: Endoscopy;  Laterality: N/A;    EXCISION OF MEDIAL MENISCUS OF KNEE Right 03/10/2020    Procedure: MENISCECTOMY, KNEE, MEDIAL;  Surgeon: Les Schneider MD;  Location: [facility] OR;  " Service: Orthopedics;  Laterality: Right;  Partial, Medial , Lateral     EYE SURGERY      INJECTION OF ANESTHETIC AGENT AROUND MEDIAL BRANCH NERVES INNERVATING LUMBAR FACET JOINT Bilateral 12/13/2022    Procedure: Bilateral L3-5 MBB;  Surgeon: Humberto Dumont MD;  Location: [facility] PAIN MGT;  Service: Pain Management;  Laterality: Bilateral;    INJECTION OF ANESTHETIC AGENT AROUND NERVE Right 08/08/2019    Procedure: Right Genicular nerve block with local;  Surgeon: Manpreet Dutta MD;  Location: [facility] PAIN MGT;  Service: Pain Management;  Laterality: Right;    INJECTION OF ANESTHETIC AGENT INTO SACROILIAC JOINT Bilateral 05/06/2020    Procedure: Bilateral Sacroiliac Joint Injection;  Surgeon: Manpreet Dutta MD;  Location: [facility] PAIN MGT;  Service: Pain Management;  Laterality: Bilateral;    INJECTION OF ANESTHETIC AGENT INTO SACROILIAC JOINT Bilateral 10/08/2020    Procedure: Bilateral SI and Bilateral GTB with RN IV sedation;  Surgeon: Manpreet Dutta MD;  Location: [facility] PAIN MGT;  Service: Pain Management;  Laterality: Bilateral;    INJECTION OF ANESTHETIC AGENT INTO SACROILIAC JOINT Bilateral 01/05/2021    Procedure: Bilateral BLOCK, SACROILIAC JOINT and Bilateral GTB witn RN IV sedation;  Surgeon: Daniel Burns MD;  Location: [facility] PAIN MGT;  Service: Pain Management;  Laterality: Bilateral;    INJECTION OF ANESTHETIC AGENT INTO SACROILIAC JOINT Bilateral 07/08/2021    Procedure: Bilateral BLOCK, SACROILIAC JOINT bilateral GTB RN IV sedation;  Surgeon: Manpreet Dutta MD;  Location: [facility] PAIN MGT;  Service: Pain Management;  Laterality: Bilateral;    INJECTION OF ANESTHETIC AGENT INTO SACROILIAC JOINT Bilateral 03/01/2022    Procedure: Bilateral BLOCK, SACROILIAC JOINT and Bilateral GTB RN IV sedation;  Surgeon: Manpreet Dutta MD;  Location: [facility] PAIN MGT;  Service: Pain Management;  Laterality: Bilateral;    INJECTION OF ANESTHETIC AGENT INTO SACROILIAC JOINT Bilateral 10/10/2022    Procedure: Bilateral Sacroiliac Joint  Injection;  Surgeon: Humberto Dumont MD;  Location: [facility] PAIN MGT;  Service: Pain Management;  Laterality: Bilateral;    INJECTION OF ANESTHETIC AGENT INTO SACROILIAC JOINT Bilateral 01/24/2023    Procedure: Bilateral Sacroiliac Joint Injection;  Surgeon: Humberto Dumont MD;  Location: [facility] PAIN [facility]GT;  Service: Pain Management;  Laterality: Bilateral;    INJECTION OF ANESTHETIC AGENT INTO SACROILIAC JOINT Bilateral 06/21/2023    Procedure: Bilateral Sacroiliac Joint Injection;  Surgeon: Humberto Dumont MD;  Location: [facility] PAIN [facility]GT;  Service: Pain Management;  Laterality: Bilateral;    INJECTION OF JOINT Bilateral 09/05/2019    Procedure: Bilateral GT bursa injection;  Surgeon: Manpreet Dutta MD;  Location: [facility];  Service: Pain Management;  Laterality: Bilateral;    INJECTION OF JOINT Bilateral 05/06/2020    Procedure: Bilateral GT bursa injection;  Surgeon: Manpreet Dutta MD;  Location: [facility] PAIN [facility]GT;  Service: Pain Management;  Laterality: Bilateral;    INJECTION OF JOINT Right 04/28/2022    Procedure: Injection, Joint Right Hip Injection RN IV sedation;  Surgeon: Manpreet Dutta MD;  Location: [facility]IN [facility]GT;  Service: Pain Management;  Laterality: Right;    INJECTION OF JOINT Bilateral 10/10/2022    Procedure: Bilateral GT bursa injection with RN IV sedation;  Surgeon: Humberto Dumont MD;  Location: [facility]IN [facility]GT;  Service: Pain Management;  Laterality: Bilateral;    INJECTION OF JOINT Bilateral 01/24/2023    Procedure: Bilateral GT bursa injection;  Surgeon: Humberto Dumont MD;  Location: [facility]IN [facility]GT;  Service: Pain Management;  Laterality: Bilateral;    INJECTION OF JOINT Bilateral 05/23/2023    Procedure: Bilateral intraarticular knee injection with Synvisc-1; ;  Surgeon: Humberto Dumont MD;  Location: [facility];  Service: Pain Management;  Laterality: Bilateral;    INJECTION OF JOINT Bilateral 06/21/2023    Procedure: Bilateral GT bursa injection;  Surgeon: Humberto Dumont MD;  [facility];   Service: Pain Management;  Laterality: Bilateral;    INJECTION, ALLOGRAFT, INTERVERTEBRAL DISC N/A 04/25/2023    Procedure: INJECTION,ALLOGRAFT,INTERVERTEBRAL DISC,1ST LEVEL: L5-S1;  Surgeon: Humberto Dumont MD;  Location: [facility] PAIN [facility]GT;  Service: Pain Management;  Laterality: N/A;    INJECTION, ALLOGRAFT, INTERVERTEBRAL DISC N/A 05/09/2023    Procedure: INJECTION,ALLOGRAFT,INTERVERTEBRAL DISC,2nd LEVEL: L3-4;  Surgeon: Humberto Dumont MD;  Location: [facility] PAIN [facility]GT;  Service: Pain Management;  Laterality: N/A;    KNEE ARTHROSCOPY Bilateral     PARS PLANA VITRECTOMY W/ REPAIR OF MACULAR HOLE Left 02/22/2017    RADIOFREQUENCY THERMOCOAGULATION Left 07/11/2019    Procedure: Right SIJ RFA;  Surgeon: Manpreet Dutta MD;  Location: [facility] PAIN [facility]GT;  Service: Pain Management;  Laterality: Left;    RADIOFREQUENCY THERMOCOAGULATION Right 07/25/2019    Procedure: Right SIJ RFA;  Surgeon: Manpreet Dutta MD;  Location: [facility] PAIN [facility]GT;  Service: Pain Management;  Laterality: Right;    SHOULDER ARTHROSCOPY Right 06/04/2015     Current Outpatient Medications on File Prior to Visit   Medication Sig Dispense Refill    biotin 1 mg tablet Take 1,000 mcg by mouth every morning.       celecoxib (CELEBREX) 200 MG capsule TAKE 1 CAPSULE(200 MG) BY MOUTH TWICE DAILY WITH FOOD 120 capsule 1    clotrimazole-betamethasone 1-0.05% (LOTRISONE) cream Apply topically 2 (two) times daily. 45 g 2    diclofenac sodium (VOLTAREN) 1 % Gel Apply 2 g topically 4 (four) times daily. 1 each 6    ezetimibe (ZETIA) 10 mg tablet TAKE 1 TABLET(10 MG) BY MOUTH EVERY DAY 90 tablet 1    FLUoxetine 40 MG capsule TAKE 1 CAPSULE(40 MG) BY MOUTH EVERY DAY 90 capsule 1    fluticasone (FLONASE) 50 mcg/actuation nasal spray 2 sprays by Each Nare route once daily. (Patient taking differently: 2 sprays by Each Nostril route nightly as needed.) 1 Bottle 0    furosemide (LASIX) 20 MG tablet Take 1 tablet (20 mg total) by mouth 2 (two) times daily. 60 tablet 11    gabapentin  (NEURONTIN) 300 MG capsule TAKE 1 CAPSULE(300 MG) BY MOUTH THREE TIMES DAILY 90 capsule 0    hydroCHLOROthiazide (HYDRODIURIL) 25 MG tablet TAKE 1 TABLET(25 MG) BY MOUTH EVERY DAY 90 tablet 1    INVOKANA 100 mg Tab tablet TAKE 1 TABLET(100 MG) BY MOUTH EVERY DAY 90 tablet 1    LORazepam (ATIVAN) 1 MG tablet TK 3 TS PO 1 HOUR PRIOR TO APPOINTMENT      losartan (COZAAR) 25 MG tablet TAKE 1 TABLET(25 MG) BY MOUTH EVERY DAY 90 tablet 2    metFORMIN (GLUCOPHAGE) 1000 MG tablet TAKE 1 TABLET(1000 MG) BY MOUTH TWICE DAILY WITH MEALS 180 tablet 3    metoprolol succinate (TOPROL-XL) 25 MG 24 hr tablet TAKE 1 TABLET(25 MG) BY MOUTH EVERY DAY 30 tablet 5    omeprazole (PRILOSEC) 20 MG capsule TAKE 1 CAPSULE BY MOUTH EVERY DAY AS NEEDED WITH NSAID 30 capsule 5    potassium chloride SA (K-DUR,KLOR-CON M) 10 MEQ tablet TAKE 1 TABLET(10 MEQ) BY MOUTH EVERY DAY 90 tablet 1    pulse oximeter (PULSE OXIMETER) device by Apply Externally route 2 (two) times a day. Use twice daily at 8 AM and 3 PM and record the value in [place] as directed. 1 each 0    verapamiL (CALAN) 80 MG tablet Take 1 tablet (80 mg total) by mouth 2 (two) times daily. 180 tablet 2    triamcinolone acetonide 0.025% (KENALOG) 0.025 % Oint Apply topically 2 (two) times daily. for 10 days (Patient taking differently: Apply topically 2 (two) times daily as needed.) 15 g 2     No current facility-administered medications on file prior to visit.               GENERAL:  No weight loss, malaise or fevers.  HEENT:   No recent changes in vision or hearing  NECK:  Negative for lumps, no difficulty with swallowing.  RESPIRATORY:  Negative for cough, wheezing or shortness of breath, patient denies any recent URI.  CARDIOVASCULAR:  Negative for chest pain or palpitations.  GI:  Negative for abdominal discomfort, blood in stools or black stools or change in bowel habits.  MUSCULOSKELETAL:  See HPI.  SKIN:  Negative for lesions, rash, and itching.  PSYCH:  No mood disorder or  recent psychosocial stressors.   HEMATOLOGY/LYMPHOLOGY:  Negative for prolonged bleeding, bruising easily or swollen nodes.    NEURO:   No history of syncope, paralysis, seizures or tremors.  All other reviewed and negative other than HPI.    Imaging / Labs / Studies (reviewed on 12/13/2023):    Cervical x-ray 05/18/2023   FINDINGS:  Osteopenia.  Vertebral body heights maintained.  Mild anterolisthesis of C4 on C5 and C5 on C6.  Multilevel osteophyte changes with disc height loss most noted at C5-6 and C6-7.  Multilevel prominent facet arthropathy.  Multilevel left-sided neural foraminal narrowing.     Significant change in alignment on flexion or extension.     Prevertebral soft tissues unremarkable.  Lung apices clear.    Thoracic x-ray 05/18/2023  FINDINGS:  Osteopenia.  Vertebral body heights maintained.  No spondylolisthesis.  Similar more multilevel bridging osteophyte changes.  No acute osseous abnormality.  Soft tissues unremarkable.      MRI Lumbar Spine 2/16/23  FINDINGS:  Grade 1 degenerative spondylolisthesis at L4-L5.  Vertebral body height is normal.  Marrow signal is within normal limits. The conus medullaris terminates at the level of L1-L2.  No abnormal signal within the conus. Intervertebral disc levels are as follows:     T12-L1 disc: Normal disc height with anterior osteophytes and mild degenerative facet hypertrophy.  No spinal or foraminal stenosis.  The dural canal measures 16 mm.     L1-L2 disc : Disc space height loss with chronic Schmorl's nodes.  Posterior disc bulge.  Anterior osteophytes.  Minor facet arthropathy bilaterally.  The dural canal measures 13 mm.  No foraminal stenosis.     L2-L3 disc: Circumferential disc bulge with tiny chronic Schmorl's nodes.  Anterior osteophytes.  Mild degenerative facet hypertrophy.  The dural canal measures 12 mm.  No foraminal stenosis.     L3-L4 disc: Disc space height loss with a circumferential disc bulge and anterior osteophytes.  Mild  "degenerative facet hypertrophy with moderate buckling of the ligamentum flavum.  The dural canal measures 11 mm.  No significant foraminal stenosis.     L4-L5 disc: Grade 1 spondylolisthesis with severe degenerative facet hypertrophy bilaterally.  Buckling of the ligamentum flavum.  The dural canal measures 7 mm AP.  Disc encroaches into the floors of the exit foramina, right greater than left.  There is mild right foraminal stenosis.     L5-S1 disc: Severe disc space height loss with osteophytes at the margins and encroach into the exit foramina.  Mild degenerative facet hypertrophy and buckling of the ligamentum flavum.  The dural canal measures 11 mm.  Mild foraminal stenosis.      Physical Exam:  Last clinic visit:  Vitals:    12/13/23 0815   BP: 130/74   Pulse: 100   Resp: 18   Weight: 92.5 kg (203 lb 14.8 oz)   Height: 5' 10" (1.778 m)   PainSc:   4                Body mass index is 29.26 kg/m².   (reviewed on 12/13/2023)    General: alert and oriented, in no apparent distress.  Gait: normal gait.  Skin: no rashes, no discoloration, no obvious lesions  HEENT: normocephalic, atraumatic. Pupils equal and round.  Cardiovascular: no significant peripheral edema present.  Respiratory: without use of accessory muscles of respiration.    Musculoskeletal - Lumbar Spine:  - ROM fairly preserved   - Pain on flexion of lumbar spine: Absent   - Pain on extension of lumbar spine: Absent         - Lumbar facet loading: Absent   - TTP over the lumbar facet joints: Absent  - TTP over the lumbar paraspinals: Present   - TTP over the SI joints: Present  - TTP over GT bursa: Present, minimal   - Straight Leg Raise: Negative  - CHERYLE: Present    Right Knee:  - TTP: Present over medial/ lateral joint line  - Pain with extension: Present  - Pain with flexion: Present  - Crepitus: Present     Neuro - Lower Extremities:  - BLE Strength: R/L: HF: 5/5, HE: 5/5, KF: 5/5; KE: 5/5; FE: 5/5; FF: 5/5  - Extremity Reflexes: Brisk and " symmetric throughout  - Sensory: Sensation to light touch intact bilaterally      Psych:  Mood and affect is appropriate    Assessment:  Kaylin Mccollum is a 78 y.o. year old female who is presenting with       ICD-10-CM ICD-9-CM    1. Fibromyalgia  M79.7 729.1       2. Primary osteoarthritis of both knees  M17.0 715.16 Case Request-RAD/Other Procedure Area: Bilateral intraarticular knee injection      3. Sacroiliac joint pain  M53.3 724.6 Case Request-RAD/Other Procedure Area: Bilateral SIJ Injection, Bilateral GTB injection      4. Greater trochanteric bursitis, unspecified laterality  M70.60 726.5 Case Request-RAD/Other Procedure Area: Bilateral SIJ Injection, Bilateral GTB injection          Plan:  1. Interventional: - Status post L3-4 via disc allograft supplementation 05/09/2023 and L5-S1 via disc allograft supplementation 04/25/2023 with greater than 80% improvement in discogenic lower back pain.    -Schedule for bilateral sacroiliac joint injection and greater trochanteric bursa injection for sacroiliitis and bursitis.  Patient received greater than 95% relief exceeding 3 months in duration with her prior procedure.  Two weeks following, schedule for bilateral intra-articular knee injection for bilateral knee pain.  Of note patient received greater than 75% relief exceeding 6 months in duration with her prior procedure.  We discussed the procedures, benefits, potential risks and alternative options in detail.  Patient has elected to pursue these procedures.    Anticoagulation:  None, no anticoagulation      2. Pharmacologic:     -Continue gabapentin.  We have reviewed potential side effects of this medication including daytime somnolence, weight gain and peripheral edema  -Gabapentin 300 mg in the morning, 300 mg in the afternoon and 600 mg in the evening    -Increase Savella as this tremendously helps with symptoms of fibromyalgia.  We have discussed that Savella is FDA approved and has demonstrated  improvement in multiple measures including pain, global impression of change in physical function.  We have discussed that the most frequent side effects of this medication can include nausea, constipation, vomiting, dry mouth, flushing or tachycardia.    -100 mg BID  -30 day supply given, 2 refills    3. Rehabilitative:   -We discussed continuing physical therapy to help manage the patient/s painful condition. The patient was counseled that muscle strengthening will improve the long term prognosis in regards to pain and may also help increase range of motion and mobility.    4. Diagnostic:  Reviewed relevant imaging and answered patient's questions.      5. Consult:   -Dr. Schneider for knee and shoulder pain PRN  -Rheumatology: Fibromyalgia    6. Follow up:  4-6 weeks status post both injections     The above plan and management options were discussed at length with patient. Patient is in agreement with the above and verbalized understanding.    - I discussed the goals of interventional chronic pain management with the patient on today's visit. We discussed a multimodal and systematic approach to pain.  This includes diagnostic and therapeutic injections, adjuvant pharmacologic treatment, physical therapy, and at times psychiatry.  I emphasized the importance of regular exercise, core strengthening and stretching, diet and weight loss as a cornerstone of long-term pain management.    - This condition does not require this patient to take time off of work, and the primary goal of our Pain Management services is to improve the patient's functional capacity.  - Patient Questions: Answered all of the patient's questions regarding diagnoses, therapy, treatment and next steps    Humberto Dumont MD

## 2023-12-12 NOTE — H&P (VIEW-ONLY)
Established Patient Interventional Pain Clinic Visit    Chief Pain Complaint:  Chief Complaint   Patient presents with    Low-back Pain     Patient has pain in there lower back that radiates into both hips and behind both thighs.  Pain scale 4/10     Interval Hx: 12/13/2023  Patient presents for four-month follow-up.  Today she reports that her lower back has been feeling excellent since via disc supplementation and that the procedure has made all the difference! Particularly with standing and ambulation.  Patient reports the day following Thanksgiving, rolling off of her bed and falling onto her side with significant difficulty arising to a standing position and pain with standing and ambulation following this trauma.  Today she reports pain over bilateral sacroiliac territory which radiates into the hips as well as pain in bilateral knees.  Pain is rated a 4/10.  Pain is exacerbated with positional changes, standing and with ambulation.  She is continued Savella 50 mg twice daily which she reports has significantly reduced the severity and duration of fibromyalgia flares.  She has requesting a refill or increase in this medication.  She is continued physician directed physical therapy exercises over the last 8 weeks from 10/13/2023 through 12/13/2023 for lower back, sacroiliac joint and bilateral knee pain.      Interval history 08/24/2023  Patient presents status post bilateral sacroiliac joint and greater trochanteric bursa injection 06/21/2023.  Patient reports 95% sustained relief overlying bilateral sacroiliac joint and greater trochanteric bursa territories.  She reports altogether following 2 level via disc allograft supplementation and her most recent procedure, she is able to stand upright and ambulate further distances.  She reports these procedures have taken years off my life! .  Today pain is intermittent and rated a 2/10.  Patient has continued Savella 50 mg twice daily and reports this has  significantly reduced the frequency and intensity of her fibromyalgia flares and debility associated with these flares.  She is requesting a refill of this medication.  Today she denies significant lower extremity weakness, bowel or bladder incontinence or saddle anesthesia.      Interval history 06/15/2023  Patient presents status post bilateral intra-articular knee injection 05/23/2023.  Patient reports 75% sustained improvement in bilateral knee pain following intra-articular knee injection.  Today her primary concern is bilateral hip pain.  Patient reports pain in the lower back which radiates into the groin and down the lateral aspect of bilateral lower extremities to mid thigh.  Patient reports pain is exacerbated 1st thing in the morning when she is attempting to get out of bed.  Patient denies more distal radiculopathy into the lower extremities or feet.  She denies lower extremity weakness, bowel or bladder incontinence or saddle anesthesia.  Patient continues to reports significant improvement with Savella medication which she is taking 50 mg twice daily with fibromyalgia pain.  She is requesting a refill of this medication.  Patient reports lower back pain continues to have greater than 80% sustained relief following 2 level via disc allograft supplementation.      Interval history 05/18/2023  Patient presents status post L5-S1 via disc allograft supplementation 04/25/2023 and via disc allograft supplementation L3-4 05/09/2023.  Patient reports noticeable improvement >80% in lower back pain following two-level  allograft supplementation.  Today her primary concern is bilateral knee pain.  Patient has questions regarding hyaluronic acid supplementation to be use.  Patient reports she has seen videos on Durolane and Euflexxa and is inquiring to the brand to be used on the upcoming Tuesday.  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  She is requesting up-to-date imaging to investigate these territories.  Today she denies significant weakness in the upper lower extremities, bowel or bladder incontinence or saddle anesthesia.      Interval history 04/06/2023  Patient presents for follow-up of lower back pain.  She continues to reports superior relief in fibromyalgia symptoms on Savella medication.  Patient has brought in denial paperwork from her insurance reporting limitations on dosage and quantity allowed.  Patient reports prior to starting this medication she felt that she did not have a life.  now she reports significant improvement in pain as well as chronic fatigue associated with fibromyalgia.  Today she again reports pain in the lower back which is worse with lumbar flexion.  Patient reports she is unable to perform activities of daily living such as grocery shopping or prolonged standing or ambulation secondary to her discogenic lower back pain.  Today patient denies more distal radiculopathy into the lower extremities or feet or lower extremity weakness.  Patient is interested in discussing intervention.  Of note patient has failed to have improvement in his lower back pain with prior lumbar medial branch block, sacroiliac joint injections.    Interval Hx: 3/9/23  Patient presents for one-month follow-up.  Today she reports she is significantly better since our last clinic visit.  At that time patient had slipped in a low off and injured her lower back and right leg.  Today she reports this pain is significantly improved and pain is intermittent and today is rated a 3/10.  Today patient reports pain is isolated to the midline lower lumbar spine.  Pain is exacerbated with lumbar flexion such as when she is picking up close.  Fibromyalgia Pain has been significantly improved with the initiation of Savella medication.  Patient has reached a titration of 50 mg twice daily.  Patient recently refilled this  medication.  She denies any side effects from this medication.  Today she denies any significant lower extremity weakness, bowel or bladder incontinence or saddle anesthesia      Interval history 02/07/2023  Patient presents status post bilateral sacroiliac joint and greater trochanteric bursa injection 01/24/2023 and bilateral L3-5 lumbar medial branch block 12/13/2022.  Patient had recent mechanical fall confounding benefit from recent injections.  Today she reports she was reaching over a mattress cover to reach a stack of bibles and slid into a slint.  Patient reports she was behind and tall wall in a took 20-30 minutes for her to stand.  Patient also reports exacerbation of fibromyalgia pain.  Since her fall patient reports pain which radiates into the buttock and down the posterior aspect of the right lower extremity to the dorsum of the foot in L4-S1 distribution.  Patient has continued gabapentin 300 mg twice daily, Celebrex in the evenings as well as Tylenol 1000 mg twice daily.    Interval history 11/29/2022    Patient presents status post bilateral sacroiliac joint greater trochanteric bursa 10/10/2022.  Patient reports 90% relief overlying bilateral sacroiliac joints and greater trochanteric bursa following her injection.  Today she reports primarily lumbar axial back pain as well as significant neck pain.  Lower back pain is exacerbated with prolonged standing such as when she is washing dishes.  She denies significant distal radiculopathy into the right lower extremities as described at our last clinic visit.  Neck pain is elicited with cervical flexion, extension and lateral flexion and she does report reduced mobility.  She reports her pain began following a mechanical fall down the stairs at [facility] in September 1986.  Patient has continued gabapentin 300 mg in the morning, 300 mg in the afternoon and 600 mg in the evening.    Interval history 10/04/2022  Ms. Mccollum is a 77-year-old female with past  medical history significant for depression, hyperlipidemia, hypertension, mitral valve prolapse, peripheral vascular disease, history of COVID-19, type 2 diabetes, multi joint arthritis, fibromyalgia who presents to \[facility]\"" care, previous Dr. Dutta patient.  Today patient reports return of pain in the lower back which radiates into bilateral hips and down the posterior aspect of the right lower extremity in L4-5 distribution to the dorsum of the foot.  Patient reports pain is intermittent but has increased in intensity.  Pain is described as shocking in nature and today is rated a 6/10.  Patient reports she feels as if her skin is crawling.  patient is currently taking gabapentin 300 mg up to 3 times daily when pain is severe.  Pain interferes with the patient's sleep.  Patient also reports history of fibromyalgia which limits her mobility and duration of standing and ambulation.  Patient does endorse associated weakness in the lower extremities associated with her pain.  Patient is interested in pursuing repeat intervention as she is obtained several months of significant relief with prior sacroiliac joint and greater trochanteric bursa injections.  Patient has continued physician directed physical therapy exercises at home daily.      History of Present Illness 01/16/2020: Dr. Dutta:   Kaylin Mccollum is a 74 y.o. female  who is presenting with a chief complaint of lumbar back pain. The patient began experiencing this problem insidiously, and the pain has been gradually worsening over the past 5 month(s). The pain is described as throbbing, shooting, burning and electrical and is located in the bilateral lumbar spine. Pain is intermittent and lasts hours. The pain radiates to bilateral lower extremities L4 distribudution. The patient rates her pain a 8 out of ten and interferes with activities of daily living a 7 out of ten. Pain is exacerbated by flexion of the lumbar spine, ambulation, and is improved by  rest.      She also complains of right knee pain. The patient began experiencing this problem insidiously. The pain is described as cramping, aching and is located in the right knee. Pain is intermittent and lasts hours. The pain is nonradiating. The patient rates her pain a 8 out of ten and interferes with activities of daily living a 7 out of ten. Pain is exacerbated by getting up from a seated position and standing, and is improved by rest. Patient reports no prior trauma, prior arthroscopy bilaterally in 1990s.       - pertinent negatives: No fever, No chills, No weight loss, No bladder dysfunction, No bowel dysfunction, No saddle anesthesia  - pertinent positives: none        Pain Disability Index Review:   @[facility](4749:[id number])@    Non-Pharmacologic Treatments:  Physical Therapy/Home Exercise: yes  Ice/Heat:yes  TENS: no  Acupuncture: no  Massage: no  Chiropractic: no    Other: no      Pain Medications:  - Adjuvant Medications: Lorazepam (Ativan), Neurontin (Gabapentin), and Topical Ointment (Voltaren Gel, Steroid cream, Anti-Inflammatory Cream, Compound cream)      Pain injections:  Dr. Dumont:  -06/21/2023: Bilateral sacroiliac joint and greater trochanteric bursa injection  -05/29/2023: Bilateral intra-articular knee injection  -05/09/2023: L3-4 via disc allograft supplementation  -04/25/2023:  L5-S1 via disc allograft supplementation  -01/24/2023: Bilateral sacroiliac joint and greater trochanteric bursa injection  -12/13/2022: Bilateral L3-5 lumbar medial branch block  - 10/10/2022: Bilateral greater trochanteric bursa and sacroiliac joint injection      -04/20/2022: Right-sided acetabular femoral injection; Dr. Dutta  -03/01/2022: Bilateral sacroiliac joint and greater trochanteric bursa injection; Dr. Dutta  -07/08/2021: Bilateral sacroiliac joint and greater trochanteric bursa injection; Dr. Dutta  -01/05/2021: Bilateral sacroiliac joint and greater trochanteric bursa injection; Dr. Burns  -10/08/2020: Bilateral  "sacroiliac joint and bilateral greater trochanteric bursa injection; Dr. Dutta  -05/06/2020: Bilateral sacroiliac joint and greater trochanteric bursa injection; Dr. Dutta    Past Medical History:   Diagnosis Date    Arthritis     Cataract     COVID-19 02/25/2021    Diabetes mellitus 1995    BS didn't check 09/13/2022    Diabetes mellitus, type 2     Fibromyalgia     General anesthetics causing adverse effect in therapeutic use     bradycardia     Hypertension     Migraines     Mitral valve prolapse     Osteoarthritis     Rotator cuff tear 04/01/2015       Review of patient's allergies indicates:   Allergen Reactions    Demerol [meperidine] Other (See Comments)     Burning when adm IV  Able to tolerate IM, "Turned the veins in my hand purple."    Latex, natural rubber Other (See Comments)     "Burns my skin",  Symptoms get worse the longer she is exposed    Zocor [simvastatin] Other (See Comments)     Tightening of muscles    Statins-hmg-coa reductase inhibitors Other (See Comments)     myopathy    Sulfa (sulfonamide antibiotics)      Blurred vision       Past Surgical History:   Procedure Laterality Date    ARTHROSCOPY OF KNEE Right 03/10/2020    Procedure: ARTHROSCOPY, KNEE;  Surgeon: Les Schneider MD;  Location: [facility] OR;  Service: Orthopedics;  Laterality: Right;    CATARACT EXTRACTION W/  INTRAOCULAR LENS IMPLANT Left 07/19/2017    CATARACT EXTRACTION W/  INTRAOCULAR LENS IMPLANT Right 2017    CHOLECYSTECTOMY      CHONDROPLASTY OF KNEE Right 03/10/2020    Procedure: CHONDROPLASTY, KNEE;  Surgeon: Les Schneider MD;  Location: [facility] OR;  Service: Orthopedics;  Laterality: Right;  Anterior compartment     COLONOSCOPY N/A 09/25/2018    Procedure: COLONOSCOPY;  Surgeon: Bethel Carmona MD;  Location: [facility] ENDO;  Service: Endoscopy;  Laterality: N/A;    EXCISION OF MEDIAL MENISCUS OF KNEE Right 03/10/2020    Procedure: MENISCECTOMY, KNEE, MEDIAL;  Surgeon: Les Schneider MD;  Location: [facility] OR;  " Service: Orthopedics;  Laterality: Right;  Partial, Medial , Lateral     EYE SURGERY      INJECTION OF ANESTHETIC AGENT AROUND MEDIAL BRANCH NERVES INNERVATING LUMBAR FACET JOINT Bilateral 12/13/2022    Procedure: Bilateral L3-5 MBB;  Surgeon: Humberto Dumont MD;  Location: [facility] PAIN MGT;  Service: Pain Management;  Laterality: Bilateral;    INJECTION OF ANESTHETIC AGENT AROUND NERVE Right 08/08/2019    Procedure: Right Genicular nerve block with local;  Surgeon: Manpreet Dutta MD;  Location: [facility] PAIN [facility]GT;  Service: Pain Management;  Laterality: Right;    INJECTION OF ANESTHETIC AGENT INTO SACROILIAC JOINT Bilateral 05/06/2020    Procedure: Bilateral Sacroiliac Joint Injection;  Surgeon: Manpreet Dutta MD;  Location: [facility]IN [facility]GT;  Service: Pain Management;  Laterality: Bilateral;    INJECTION OF ANESTHETIC AGENT INTO SACROILIAC JOINT Bilateral 10/08/2020    Procedure: Bilateral SI and Bilateral GTB with RN IV sedation;  Surgeon: Manpreet Dutta MD;  Location: [facility] PAIN MGT;  Service: Pain Management;  Laterality: Bilateral;    INJECTION OF ANESTHETIC AGENT INTO SACROILIAC JOINT Bilateral 01/05/2021    Procedure: Bilateral BLOCK, SACROILIAC JOINT and Bilateral GTB witn RN IV sedation;  Surgeon: Daniel Burns MD;  Location: [facility] PAIN MGT;  Service: Pain Management;  Laterality: Bilateral;    INJECTION OF ANESTHETIC AGENT INTO SACROILIAC JOINT Bilateral 07/08/2021    Procedure: Bilateral BLOCK, SACROILIAC JOINT bilateral GTB RN IV sedation;  Surgeon: Manpreet Dutta MD;  Location: [facility] PAIN [facility]GT;  Service: Pain Management;  Laterality: Bilateral;    INJECTION OF ANESTHETIC AGENT INTO SACROILIAC JOINT Bilateral 03/01/2022    Procedure: Bilateral BLOCK, SACROILIAC JOINT and Bilateral GTB RN IV sedation;  Surgeon: Manpreet Dutta MD;  Location: [facility] PAIN [facility]GT;  Service: Pain Management;  Laterality: Bilateral;    INJECTION OF ANESTHETIC AGENT INTO SACROILIAC JOINT Bilateral 10/10/2022    Procedure: Bilateral Sacroiliac Joint  Injection;  Surgeon: Humberto Dumont MD;  Location: [facility]IN [facility]GT;  Service: Pain Management;  Laterality: Bilateral;    INJECTION OF ANESTHETIC AGENT INTO SACROILIAC JOINT Bilateral 01/24/2023    Procedure: Bilateral Sacroiliac Joint Injection;  Surgeon: Humberto Dumont MD;  Location: [facility];  Service: Pain Management;  Laterality: Bilateral;    INJECTION OF ANESTHETIC AGENT INTO SACROILIAC JOINT Bilateral 06/21/2023    Procedure: Bilateral Sacroiliac Joint Injection;  Surgeon: Humberto Dumont MD;  Location: [facility];  Service: Pain Management;  Laterality: Bilateral;    INJECTION OF JOINT Bilateral 09/05/2019    Procedure: Bilateral GT bursa injection;  Surgeon: Manpreet Dutta MD;  [facility];  Service: Pain Management;  Laterality: Bilateral;    INJECTION OF JOINT Bilateral 05/06/2020    Procedure: Bilateral GT bursa injection;  Surgeon: Manpreet Dutta MD;  Location: [facility];  Service: Pain Management;  Laterality: Bilateral;    INJECTION OF JOINT Right 04/28/2022    Procedure: Injection, Joint Right Hip Injection RN IV sedation;  Surgeon: Manpreet Dutta MD;  Location: [facility];  Service: Pain Management;  Laterality: Right;    INJECTION OF JOINT Bilateral 10/10/2022    Procedure: Bilateral GT bursa injection with RN IV sedation;  Surgeon: Humberto Dumont MD;  Location: [facility];  Service: Pain Management;  Laterality: Bilateral;    INJECTION OF JOINT Bilateral 01/24/2023    Procedure: Bilateral GT bursa injection;  Surgeon: Humberto Dumont MD;  Location: [facility];  Service: Pain Management;  Laterality: Bilateral;    INJECTION OF JOINT Bilateral 05/23/2023    Procedure: Bilateral intraarticular knee injection with Synvisc-1; ;  Surgeon: Humberto Dumont MD;  Location: [facility];  Service: Pain Management;  Laterality: Bilateral;    INJECTION OF JOINT Bilateral 06/21/2023    Procedure: Bilateral GT bursa injection;  Surgeon: Humberto Dumont MD;  [facility];   Service: Pain Management;  Laterality: Bilateral;    INJECTION, ALLOGRAFT, INTERVERTEBRAL DISC N/A 04/25/2023    Procedure: INJECTION,ALLOGRAFT,INTERVERTEBRAL DISC,1ST LEVEL: L5-S1;  Surgeon: Humberto Dumont MD;  Location: [facility];  Service: Pain Management;  Laterality: N/A;    INJECTION, ALLOGRAFT, INTERVERTEBRAL DISC N/A 05/09/2023    Procedure: INJECTION,ALLOGRAFT,INTERVERTEBRAL DISC,2nd LEVEL: L3-4;  Surgeon: Humberto Dumont MD;  Location: [facility];  Service: Pain Management;  Laterality: N/A;    KNEE ARTHROSCOPY Bilateral     PARS PLANA VITRECTOMY W/ REPAIR OF MACULAR HOLE Left 02/22/2017    RADIOFREQUENCY THERMOCOAGULATION Left 07/11/2019    Procedure: Right SIJ RFA;  Surgeon: Manpreet Dutta MD;  Location: [facility];  Service: Pain Management;  Laterality: Left;    RADIOFREQUENCY THERMOCOAGULATION Right 07/25/2019    Procedure: Right SIJ RFA;  Surgeon: Manpreet Dutta MD;  Location: [facility];  Service: Pain Management;  Laterality: Right;    SHOULDER ARTHROSCOPY Right 06/04/2015     Current Outpatient Medications on File Prior to Visit   Medication Sig Dispense Refill    biotin 1 mg tablet Take 1,000 mcg by mouth every morning.       celecoxib (CELEBREX) 200 MG capsule TAKE 1 CAPSULE(200 MG) BY MOUTH TWICE DAILY WITH FOOD 120 capsule 1    clotrimazole-betamethasone 1-0.05% (LOTRISONE) cream Apply topically 2 (two) times daily. 45 g 2    diclofenac sodium (VOLTAREN) 1 % Gel Apply 2 g topically 4 (four) times daily. 1 each 6    ezetimibe (ZETIA) 10 mg tablet TAKE 1 TABLET(10 MG) BY MOUTH EVERY DAY 90 tablet 1    FLUoxetine 40 MG capsule TAKE 1 CAPSULE(40 MG) BY MOUTH EVERY DAY 90 capsule 1    fluticasone (FLONASE) 50 mcg/actuation nasal spray 2 sprays by Each Nare route once daily. (Patient taking differently: 2 sprays by Each Nostril route nightly as needed.) 1 Bottle 0    furosemide (LASIX) 20 MG tablet Take 1 tablet (20 mg total) by mouth 2 (two) times daily. 60 tablet 11    gabapentin  (NEURONTIN) 300 MG capsule TAKE 1 CAPSULE(300 MG) BY MOUTH THREE TIMES DAILY 90 capsule 0    hydroCHLOROthiazide (HYDRODIURIL) 25 MG tablet TAKE 1 TABLET(25 MG) BY MOUTH EVERY DAY 90 tablet 1    INVOKANA 100 mg Tab tablet TAKE 1 TABLET(100 MG) BY MOUTH EVERY DAY 90 tablet 1    LORazepam (ATIVAN) 1 MG tablet TK 3 TS PO 1 HOUR PRIOR TO APPOINTMENT      losartan (COZAAR) 25 MG tablet TAKE 1 TABLET(25 MG) BY MOUTH EVERY DAY 90 tablet 2    metFORMIN (GLUCOPHAGE) 1000 MG tablet TAKE 1 TABLET(1000 MG) BY MOUTH TWICE DAILY WITH MEALS 180 tablet 3    metoprolol succinate (TOPROL-XL) 25 MG 24 hr tablet TAKE 1 TABLET(25 MG) BY MOUTH EVERY DAY 30 tablet 5    omeprazole (PRILOSEC) 20 MG capsule TAKE 1 CAPSULE BY MOUTH EVERY DAY AS NEEDED WITH NSAID 30 capsule 5    potassium chloride SA (K-DUR,KLOR-CON M) 10 MEQ tablet TAKE 1 TABLET(10 MEQ) BY MOUTH EVERY DAY 90 tablet 1    pulse oximeter (PULSE OXIMETER) device by Apply Externally route 2 (two) times a day. Use twice daily at 8 AM and 3 PM and record the value in [employer] as directed. 1 each 0    verapamiL (CALAN) 80 MG tablet Take 1 tablet (80 mg total) by mouth 2 (two) times daily. 180 tablet 2    triamcinolone acetonide 0.025% (KENALOG) 0.025 % Oint Apply topically 2 (two) times daily. for 10 days (Patient taking differently: Apply topically 2 (two) times daily as needed.) 15 g 2     No current facility-administered medications on file prior to visit.               GENERAL:  No weight loss, malaise or fevers.  HEENT:   No recent changes in vision or hearing  NECK:  Negative for lumps, no difficulty with swallowing.  RESPIRATORY:  Negative for cough, wheezing or shortness of breath, patient denies any recent URI.  CARDIOVASCULAR:  Negative for chest pain or palpitations.  GI:  Negative for abdominal discomfort, blood in stools or black stools or change in bowel habits.  MUSCULOSKELETAL:  See HPI.  SKIN:  Negative for lesions, rash, and itching.  PSYCH:  No mood disorder or  recent psychosocial stressors.   HEMATOLOGY/LYMPHOLOGY:  Negative for prolonged bleeding, bruising easily or swollen nodes.    NEURO:   No history of syncope, paralysis, seizures or tremors.  All other reviewed and negative other than HPI.    Imaging / Labs / Studies (reviewed on 12/13/2023):    Cervical x-ray 05/18/2023   FINDINGS:  Osteopenia.  Vertebral body heights maintained.  Mild anterolisthesis of C4 on C5 and C5 on C6.  Multilevel osteophyte changes with disc height loss most noted at C5-6 and C6-7.  Multilevel prominent facet arthropathy.  Multilevel left-sided neural foraminal narrowing.     Significant change in alignment on flexion or extension.     Prevertebral soft tissues unremarkable.  Lung apices clear.    Thoracic x-ray 05/18/2023  FINDINGS:  Osteopenia.  Vertebral body heights maintained.  No spondylolisthesis.  Similar more multilevel bridging osteophyte changes.  No acute osseous abnormality.  Soft tissues unremarkable.      MRI Lumbar Spine 2/16/23  FINDINGS:  Grade 1 degenerative spondylolisthesis at L4-L5.  Vertebral body height is normal.  Marrow signal is within normal limits. The conus medullaris terminates at the level of L1-L2.  No abnormal signal within the conus. Intervertebral disc levels are as follows:     T12-L1 disc: Normal disc height with anterior osteophytes and mild degenerative facet hypertrophy.  No spinal or foraminal stenosis.  The dural canal measures 16 mm.     L1-L2 disc : Disc space height loss with chronic Schmorl's nodes.  Posterior disc bulge.  Anterior osteophytes.  Minor facet arthropathy bilaterally.  The dural canal measures 13 mm.  No foraminal stenosis.     L2-L3 disc: Circumferential disc bulge with tiny chronic Schmorl's nodes.  Anterior osteophytes.  Mild degenerative facet hypertrophy.  The dural canal measures 12 mm.  No foraminal stenosis.     L3-L4 disc: Disc space height loss with a circumferential disc bulge and anterior osteophytes.  Mild  "degenerative facet hypertrophy with moderate buckling of the ligamentum flavum.  The dural canal measures 11 mm.  No significant foraminal stenosis.     L4-L5 disc: Grade 1 spondylolisthesis with severe degenerative facet hypertrophy bilaterally.  Buckling of the ligamentum flavum.  The dural canal measures 7 mm AP.  Disc encroaches into the floors of the exit foramina, right greater than left.  There is mild right foraminal stenosis.     L5-S1 disc: Severe disc space height loss with osteophytes at the margins and encroach into the exit foramina.  Mild degenerative facet hypertrophy and buckling of the ligamentum flavum.  The dural canal measures 11 mm.  Mild foraminal stenosis.      Physical Exam:  Last clinic visit:  Vitals:    12/13/23 0815   BP: 130/74   Pulse: 100   Resp: 18   Weight: 92.5 kg (203 lb 14.8 oz)   Height: 5' 10" (1.778 m)   PainSc:   4                Body mass index is 29.26 kg/m².   (reviewed on 12/13/2023)    General: alert and oriented, in no apparent distress.  Gait: normal gait.  Skin: no rashes, no discoloration, no obvious lesions  HEENT: normocephalic, atraumatic. Pupils equal and round.  Cardiovascular: no significant peripheral edema present.  Respiratory: without use of accessory muscles of respiration.    Musculoskeletal - Lumbar Spine:  - ROM fairly preserved   - Pain on flexion of lumbar spine: Absent   - Pain on extension of lumbar spine: Absent         - Lumbar facet loading: Absent   - TTP over the lumbar facet joints: Absent  - TTP over the lumbar paraspinals: Present   - TTP over the SI joints: Present  - TTP over GT bursa: Present, minimal   - Straight Leg Raise: Negative  - CHERYLE: Present    Right Knee:  - TTP: Present over medial/ lateral joint line  - Pain with extension: Present  - Pain with flexion: Present  - Crepitus: Present     Neuro - Lower Extremities:  - BLE Strength: R/L: HF: 5/5, HE: 5/5, KF: 5/5; KE: 5/5; FE: 5/5; FF: 5/5  - Extremity Reflexes: Brisk and " symmetric throughout  - Sensory: Sensation to light touch intact bilaterally      Psych:  Mood and affect is appropriate    Assessment:  Kaylin Mccollum is a 78 y.o. year old female who is presenting with       ICD-10-CM ICD-9-CM    1. Fibromyalgia  M79.7 729.1       2. Primary osteoarthritis of both knees  M17.0 715.16 Case Request-RAD/Other Procedure Area: Bilateral intraarticular knee injection      3. Sacroiliac joint pain  M53.3 724.6 Case Request-RAD/Other Procedure Area: Bilateral SIJ Injection, Bilateral GTB injection      4. Greater trochanteric bursitis, unspecified laterality  M70.60 726.5 Case Request-RAD/Other Procedure Area: Bilateral SIJ Injection, Bilateral GTB injection          Plan:  1. Interventional: - Status post L3-4 via disc allograft supplementation 05/09/2023 and L5-S1 via disc allograft supplementation 04/25/2023 with greater than 80% improvement in discogenic lower back pain.    -Schedule for bilateral sacroiliac joint injection and greater trochanteric bursa injection for sacroiliitis and bursitis.  Patient received greater than 95% relief exceeding 3 months in duration with her prior procedure.  Two weeks following, schedule for bilateral intra-articular knee injection for bilateral knee pain.  Of note patient received greater than 75% relief exceeding 6 months in duration with her prior procedure.  We discussed the procedures, benefits, potential risks and alternative options in detail.  Patient has elected to pursue these procedures.    Anticoagulation:  None, no anticoagulation      2. Pharmacologic:     -Continue gabapentin.  We have reviewed potential side effects of this medication including daytime somnolence, weight gain and peripheral edema  -Gabapentin 300 mg in the morning, 300 mg in the afternoon and 600 mg in the evening    -Increase Savella as this tremendously helps with symptoms of fibromyalgia.  We have discussed that Savella is FDA approved and has demonstrated  improvement in multiple measures including pain, global impression of change in physical function.  We have discussed that the most frequent side effects of this medication can include nausea, constipation, vomiting, dry mouth, flushing or tachycardia.    -100 mg BID  -30 day supply given, 2 refills    3. Rehabilitative:   -We discussed continuing physical therapy to help manage the patient/s painful condition. The patient was counseled that muscle strengthening will improve the long term prognosis in regards to pain and may also help increase range of motion and mobility.    4. Diagnostic:  Reviewed relevant imaging and answered patient's questions.      5. Consult:   -Dr. Schneider for knee and shoulder pain PRN  -Rheumatology: Fibromyalgia    6. Follow up:  4-6 weeks status post both injections     The above plan and management options were discussed at length with patient. Patient is in agreement with the above and verbalized understanding.    - I discussed the goals of interventional chronic pain management with the patient on today's visit. We discussed a multimodal and systematic approach to pain.  This includes diagnostic and therapeutic injections, adjuvant pharmacologic treatment, physical therapy, and at times psychiatry.  I emphasized the importance of regular exercise, core strengthening and stretching, diet and weight loss as a cornerstone of long-term pain management.    - This condition does not require this patient to take time off of work, and the primary goal of our Pain Management services is to improve the patient's functional capacity.  - Patient Questions: Answered all of the patient's questions regarding diagnoses, therapy, treatment and next steps    Humberto Dumont MD

## 2023-12-13 ENCOUNTER — LAB VISIT (OUTPATIENT)
Dept: LAB | Facility: HOSPITAL | Age: 78
End: 2023-12-13
Attending: FAMILY MEDICINE
Payer: MEDICARE

## 2023-12-13 ENCOUNTER — OFFICE VISIT (OUTPATIENT)
Dept: PAIN MEDICINE | Facility: CLINIC | Age: 78
End: 2023-12-13
Payer: MEDICARE

## 2023-12-13 VITALS
BODY MASS INDEX: 29.2 KG/M2 | RESPIRATION RATE: 18 BRPM | HEIGHT: 70 IN | WEIGHT: 203.94 LBS | SYSTOLIC BLOOD PRESSURE: 130 MMHG | DIASTOLIC BLOOD PRESSURE: 74 MMHG | HEART RATE: 100 BPM

## 2023-12-13 DIAGNOSIS — M79.7 FIBROMYALGIA: Primary | ICD-10-CM

## 2023-12-13 DIAGNOSIS — M70.60 GREATER TROCHANTERIC BURSITIS, UNSPECIFIED LATERALITY: ICD-10-CM

## 2023-12-13 DIAGNOSIS — M53.3 SACROILIAC JOINT PAIN: ICD-10-CM

## 2023-12-13 DIAGNOSIS — M17.0 PRIMARY OSTEOARTHRITIS OF BOTH KNEES: ICD-10-CM

## 2023-12-13 DIAGNOSIS — E11.22 TYPE 2 DIABETES MELLITUS WITH STAGE 3A CHRONIC KIDNEY DISEASE, WITHOUT LONG-TERM CURRENT USE OF INSULIN: ICD-10-CM

## 2023-12-13 DIAGNOSIS — E11.69 DIABETES MELLITUS TYPE 2 IN OBESE: ICD-10-CM

## 2023-12-13 DIAGNOSIS — E66.9 DIABETES MELLITUS TYPE 2 IN OBESE: ICD-10-CM

## 2023-12-13 DIAGNOSIS — N18.31 TYPE 2 DIABETES MELLITUS WITH STAGE 3A CHRONIC KIDNEY DISEASE, WITHOUT LONG-TERM CURRENT USE OF INSULIN: ICD-10-CM

## 2023-12-13 LAB
ESTIMATED AVG GLUCOSE: 166 MG/DL (ref 68–131)
HBA1C MFR BLD: 7.4 % (ref 4–5.6)

## 2023-12-13 PROCEDURE — 99999 PR PBB SHADOW E&M-EST. PATIENT-LVL IV: CPT | Mod: PBBFAC,,, | Performed by: ANESTHESIOLOGY

## 2023-12-13 PROCEDURE — 99999 PR PBB SHADOW E&M-EST. PATIENT-LVL IV: ICD-10-PCS | Mod: PBBFAC,,, | Performed by: ANESTHESIOLOGY

## 2023-12-13 PROCEDURE — 99214 PR OFFICE/OUTPT VISIT, EST, LEVL IV, 30-39 MIN: ICD-10-PCS | Mod: S$PBB,,, | Performed by: ANESTHESIOLOGY

## 2023-12-13 PROCEDURE — 36415 COLL VENOUS BLD VENIPUNCTURE: CPT | Performed by: FAMILY MEDICINE

## 2023-12-13 PROCEDURE — 83036 HEMOGLOBIN GLYCOSYLATED A1C: CPT | Performed by: FAMILY MEDICINE

## 2023-12-13 PROCEDURE — 99214 OFFICE O/P EST MOD 30 MIN: CPT | Mod: S$PBB,,, | Performed by: ANESTHESIOLOGY

## 2023-12-13 PROCEDURE — 99214 OFFICE O/P EST MOD 30 MIN: CPT | Mod: PBBFAC | Performed by: ANESTHESIOLOGY

## 2023-12-13 RX ORDER — CANAGLIFLOZIN 100 MG/1
TABLET, FILM COATED ORAL
Qty: 90 TABLET | Refills: 0 | Status: SHIPPED | OUTPATIENT
Start: 2023-12-13

## 2023-12-13 NOTE — TELEPHONE ENCOUNTER
Care Due:                  Date            Visit Type   Department     Provider  --------------------------------------------------------------------------------                                EP -                              PRIMARY      [facility] INTERNAL  Last Visit: 03-      CARE ([facility])   CHARLI Olvera                              EP -                              PRIMARY      [facility] INTERNAL  Next Visit: 03-      CARE ([facility])   CHARLI Olvera                                                            Last  Test          Frequency    Reason                     Performed    Due Date  --------------------------------------------------------------------------------    HBA1C.......  6 months...  INVOKANA.................  08- 02-    [facility] Embedded Care Due Messages. Reference number: [id number].   12/13/2023 2:23:44 PM CST

## 2023-12-14 ENCOUNTER — PATIENT MESSAGE (OUTPATIENT)
Dept: OTHER | Facility: OTHER | Age: 78
End: 2023-12-14
Payer: MEDICARE

## 2023-12-14 RX ORDER — GABAPENTIN 300 MG/1
300 CAPSULE ORAL 3 TIMES DAILY
Qty: 90 CAPSULE | Refills: 0 | Status: SHIPPED | OUTPATIENT
Start: 2023-12-14 | End: 2024-02-01 | Stop reason: SDUPTHER

## 2023-12-14 NOTE — TELEPHONE ENCOUNTER
Refill Decision Note   Kaylin Chun  is requesting a refill authorization.  Brief Assessment and Rationale for Refill:  Approve     Medication Therapy Plan:  FLOS; No dose adjustment recommended per renal fxn.        Pharmacist review requested: Yes   Extended chart review required: Yes   Comments:     Note composed:8:49 PM 12/13/2023

## 2023-12-14 NOTE — TELEPHONE ENCOUNTER
Refill Routing Note   Medication(s) are not appropriate for processing by [facility] for the following reason(s):        Required labs abnormal  EGFRNORACEVR 58.4 (A) 08/30/2023       [facility] action(s):  Defer        Medication Therapy Plan: FLOS    Pharmacist review requested: Yes     Appointments  past 12m or future 3m with PCP    Date Provider   Last Visit   3/1/2023 Lisette Olvera MD   Next Visit   3/5/2024 Lisette Olvera MD   ED visits in past 90 days: 0        Note composed:6:28 PM 12/13/2023

## 2023-12-19 NOTE — PRE-PROCEDURE INSTRUCTIONS
Spoke with patient regarding procedure scheduled on 1.2     Arrival time 0630     Has patient been sick with fever or on antibiotics within the last 7 days? No     Does the patient have any open wounds, sores or rashes? No     Does the patient have any recent fractures? no     Has patient received a vaccination within the last 7 days? No     Received the COVID vaccination? yes     Has the patient stopped all medications as directed? na     Does patient have a pacemaker, defibrillator, or implantable stimulator? No     Does the patient have a ride to and from procedure and someone reliable to remain with patient?  REKHA     Is the patient diabetic? YES     Does the patient have sleep apnea? Or use O2 at home? No and no     Is the patient receiving sedation? yes     Is the patient instructed to remain NPO beginning at midnight the night before their procedure? yes     Procedure location confirmed with patient? Yes     Covid- Denies signs/symptoms. Instructed to notify PAT/MD if any changes.

## 2024-01-03 NOTE — PRE-PROCEDURE INSTRUCTIONS
Spoke with patient regarding procedure scheduled on 1.16     Arrival time 0630     Has patient been sick with fever or on antibiotics within the last 7 days? No     Does the patient have any open wounds, sores or rashes? No     Does the patient have any recent fractures? no     Has patient received a vaccination within the last 7 days? No     Received the COVID vaccination? yes     Has the patient stopped all medications as directed? na     Does patient have a pacemaker, defibrillator, or implantable stimulator? No     Does the patient have a ride to and from procedure and someone reliable to remain with patient?  REKHA     Is the patient diabetic? YES     Does the patient have sleep apnea? Or use O2 at home? No and no     Is the patient receiving sedation? yes     Is the patient instructed to remain NPO beginning at midnight the night before their procedure? yes     Procedure location confirmed with patient? Yes     Covid- Denies signs/symptoms. Instructed to notify PAT/MD if any changes.

## 2024-01-09 ENCOUNTER — HOSPITAL ENCOUNTER (OUTPATIENT)
Facility: HOSPITAL | Age: 79
Discharge: HOME | End: 2024-01-09
Attending: ANESTHESIOLOGY | Admitting: ANESTHESIOLOGY
Payer: MEDICARE

## 2024-01-09 VITALS
HEART RATE: 83 BPM | HEIGHT: 70 IN | WEIGHT: 211.56 LBS | DIASTOLIC BLOOD PRESSURE: 55 MMHG | TEMPERATURE: 96 F | SYSTOLIC BLOOD PRESSURE: 119 MMHG | OXYGEN SATURATION: 93 % | BODY MASS INDEX: 30.29 KG/M2 | RESPIRATION RATE: 18 BRPM

## 2024-01-09 DIAGNOSIS — M53.3 SACROILIAC JOINT PAIN: ICD-10-CM

## 2024-01-09 DIAGNOSIS — M46.1 SACROILIITIS: ICD-10-CM

## 2024-01-09 DIAGNOSIS — M70.60 GREATER TROCHANTERIC BURSITIS, UNSPECIFIED LATERALITY: ICD-10-CM

## 2024-01-09 LAB — POCT GLUCOSE: 146 MG/DL (ref 70–110)

## 2024-01-09 PROCEDURE — 25500020 PHARM REV CODE 255: Performed by: ANESTHESIOLOGY

## 2024-01-09 PROCEDURE — 27096 INJECT SACROILIAC JOINT: CPT | Mod: 50,,, | Performed by: ANESTHESIOLOGY

## 2024-01-09 PROCEDURE — 63600175 PHARM REV CODE 636 W HCPCS: Performed by: ANESTHESIOLOGY

## 2024-01-09 PROCEDURE — 20610 DRAIN/INJ JOINT/BURSA W/O US: CPT | Mod: 50,59,, | Performed by: ANESTHESIOLOGY

## 2024-01-09 PROCEDURE — 82962 GLUCOSE BLOOD TEST: CPT | Performed by: ANESTHESIOLOGY

## 2024-01-09 PROCEDURE — 20610 DRAIN/INJ JOINT/BURSA W/O US: CPT | Mod: 50,59 | Performed by: ANESTHESIOLOGY

## 2024-01-09 PROCEDURE — 27096 INJECT SACROILIAC JOINT: CPT | Mod: 50 | Performed by: ANESTHESIOLOGY

## 2024-01-09 PROCEDURE — 25000003 PHARM REV CODE 250: Performed by: ANESTHESIOLOGY

## 2024-01-09 RX ORDER — FENTANYL CITRATE 50 UG/ML
INJECTION, SOLUTION INTRAMUSCULAR; INTRAVENOUS
Status: DISCONTINUED | OUTPATIENT
Start: 2024-01-09 | End: 2024-01-09 | Stop reason: HOSPADM

## 2024-01-09 RX ORDER — BUPIVACAINE HYDROCHLORIDE 2.5 MG/ML
INJECTION, SOLUTION EPIDURAL; INFILTRATION; INTRACAUDAL
Status: DISCONTINUED | OUTPATIENT
Start: 2024-01-09 | End: 2024-01-09 | Stop reason: HOSPADM

## 2024-01-09 RX ORDER — MIDAZOLAM HYDROCHLORIDE 1 MG/ML
INJECTION, SOLUTION INTRAMUSCULAR; INTRAVENOUS
Status: DISCONTINUED | OUTPATIENT
Start: 2024-01-09 | End: 2024-01-09 | Stop reason: HOSPADM

## 2024-01-09 RX ORDER — SODIUM BICARBONATE 1 MEQ/ML
SYRINGE (ML) INTRAVENOUS
Status: DISCONTINUED | OUTPATIENT
Start: 2024-01-09 | End: 2024-01-09 | Stop reason: HOSPADM

## 2024-01-09 RX ORDER — TRIAMCINOLONE ACETONIDE 40 MG/ML
INJECTION, SUSPENSION INTRA-ARTICULAR; INTRAMUSCULAR
Status: DISCONTINUED | OUTPATIENT
Start: 2024-01-09 | End: 2024-01-09 | Stop reason: HOSPADM

## 2024-01-09 NOTE — OP NOTE
Kaylin Mccollum  78 y.o. female      Vitals:    01/09/24 0640   BP: (!) 144/66   Pulse: 92   Resp: 16   Temp: 96 °F (35.6 °C)       Procedure Date: 01/09/2024        INFORMED CONSENT: The procedure, risks, benefits and options were discussed with patient. There are no contraindications to the procedure. The patient expressed understanding and agreed to proceed. The personnel performing the procedure was discussed. I verify that I personally obtained consent prior to the start of the procedure and the signed consent can be found on the patient's chart.       Anesthesia:   Conscious sedation provided by M.D    The patient was monitored with continuous pulse oximetry, EKG, and intermittent blood pressure monitors.  The patient was hemodynamically stable throughout the entire process was responsive to voice, and breathing spontaneously.  Supplemental O2 was provided at 2L/min via nasal cannula.  Patient was comfortable for the duration of the procedure. (See nurse documentation and case log for sedation time)    There was a total of 2mg IV Midazolam and 50mcg Fentanyl titrated for the procedure    Pre Procedure diagnosis: Sacroiliac joint pain [M53.3]  Greater trochanteric bursitis, unspecified laterality [M70.60]  Post-Procedure diagnosis: SAME      PROCEDURE:  1) Bilateral greater trochanteric bursa injection    2) Bilateral sacroiliac joint injection                            REASON FOR PROCEDURE:   Sacroiliitis [M46.1]  Greater trochanteric bursitis[M70.61]      MEDICATIONS INJECTED: 1mL 40mg/ml Kenalog and 4mL Bupivacaine 0.25% into each site    LOCAL ANESTHETIC USED: Xylocaine 1% 6ml     ESTIMATED BLOOD LOSS: None.   COMPLICATIONS: None.     TECHNIQUE:   Greater trochanteric bursa injection:  The area overlying the greater trochanteric bursa was identified using fluoroscopy, and the area overlying the skin was prepped and draped in usual sterile fashion. Local Xylocaine was injected by raising a wheel and going  down to the periosteum using a 27-gauge hypodermic needle. A 5 inch 22-gauge spinal needle was introduce into the Bilateral greater trochanteric bursa. Negative pressure applied to confirm no intravascular placement. Omnipaque was injected to confirm placement and to confirm that there was no vascular runoff. The medication was then injected slowly.  Displacement of the contrast after injection of the medication confirmed that the medication went into the area of the greater trochanteric bursa    Sacroiliac joint injection:   Laying in the prone position, the patient was prepped and draped in the usual sterile fashion using ChloraPrep and fenestrated drape.  The area was determined under fluoroscopy.  Local Xylocaine was injected by raising a wheel and going down to the periosteum using a 27-gauge hypodermic needle.  The 3.5 inch 22-gauge spinal needle was introduce into the Bilateral sacroiliac joint.  Negative pressure applied to confirm no intravascular placement.  Omnipaque was injected to confirm placement and to confirm that there was no vascular runoff.  The medication was then injected slowly.  The patient tolerated the procedure well.                       The patient was monitored for approximately 30 minutes after the procedure. Patient was given post procedure and discharge instructions to follow at home. We will see the patient back in two weeks or the patient may call to inform of status. The patient was discharged in a stable condition

## 2024-01-09 NOTE — DISCHARGE SUMMARY
Discharge Note  Short Stay      SUMMARY     Admit Date: 1/9/2024    Attending Physician: Humberto Dumont MD        Discharge Physician: Humberto Dumont MD        Discharge Date: 1/9/2024 7:34 AM    Procedure(s) (LRB):  Bilateral SIJ Injection (Bilateral)  Bilateral GTB injection (Bilateral)    Final Diagnosis: Sacroiliac joint pain [M53.3]  Greater trochanteric bursitis, unspecified laterality [M70.60]    Disposition: Home or self care    Patient Instructions:   Current Discharge Medication List        CONTINUE these medications which have NOT CHANGED    Details   ezetimibe (ZETIA) 10 mg tablet TAKE 1 TABLET(10 MG) BY MOUTH EVERY DAY  Qty: 90 tablet, Refills: 1    Associated Diagnoses: Hyperlipidemia associated with type 2 diabetes mellitus      FLUoxetine 40 MG capsule TAKE 1 CAPSULE(40 MG) BY MOUTH EVERY DAY  Qty: 90 capsule, Refills: 1    Associated Diagnoses: Fibromyalgia; Chronic major depressive disorder      furosemide (LASIX) 20 MG tablet Take 1 tablet (20 mg total) by mouth 2 (two) times daily.  Qty: 60 tablet, Refills: 11      gabapentin (NEURONTIN) 300 MG capsule TAKE 1 CAPSULE(300 MG) BY MOUTH THREE TIMES DAILY  Qty: 90 capsule, Refills: 0      hydroCHLOROthiazide (HYDRODIURIL) 25 MG tablet TAKE 1 TABLET(25 MG) BY MOUTH EVERY DAY  Qty: 90 tablet, Refills: 1    Comments: Future refill requests to be sent to Dr. Tim ESPARZA 100 mg Tab tablet TAKE 1 TABLET(100 MG) BY MOUTH EVERY DAY  Qty: 90 tablet, Refills: 0    Associated Diagnoses: Diabetes mellitus type 2 in obese      losartan (COZAAR) 25 MG tablet TAKE 1 TABLET(25 MG) BY MOUTH EVERY DAY  Qty: 90 tablet, Refills: 2      metFORMIN (GLUCOPHAGE) 1000 MG tablet TAKE 1 TABLET(1000 MG) BY MOUTH TWICE DAILY WITH MEALS  Qty: 180 tablet, Refills: 3    Associated Diagnoses: Diabetes mellitus type 2 in obese      metoprolol succinate (TOPROL-XL) 25 MG 24 hr tablet TAKE 1 TABLET(25 MG) BY MOUTH EVERY DAY  Qty: 30 tablet, Refills: 5    Comments: .       verapamiL (CALAN) 80 MG tablet Take 1 tablet (80 mg total) by mouth 2 (two) times daily.  Qty: 180 tablet, Refills: 2    Associated Diagnoses: Hypertension associated with diabetes; MVP (mitral valve prolapse)      biotin 1 mg tablet Take 1,000 mcg by mouth every morning.       celecoxib (CELEBREX) 200 MG capsule TAKE 1 CAPSULE(200 MG) BY MOUTH TWICE DAILY WITH FOOD  Qty: 120 capsule, Refills: 1    Associated Diagnoses: Chondromalacia, right knee; Left shoulder tendinitis      clotrimazole-betamethasone 1-0.05% (LOTRISONE) cream Apply topically 2 (two) times daily.  Qty: 45 g, Refills: 2      diclofenac sodium (VOLTAREN) 1 % Gel Apply 2 g topically 4 (four) times daily.  Qty: 1 each, Refills: 6      fluticasone (FLONASE) 50 mcg/actuation nasal spray 2 sprays by Each Nare route once daily.  Qty: 1 Bottle, Refills: 0    Associated Diagnoses: Sinusitis      LORazepam (ATIVAN) 1 MG tablet TK 3 TS PO 1 HOUR PRIOR TO APPOINTMENT      milnacipran (SAVELLA) 100 mg Tab Take 1 tablet (100 mg total) by mouth 2 (two) times daily.  Qty: 60 tablet, Refills: 2      omeprazole (PRILOSEC) 20 MG capsule TAKE 1 CAPSULE BY MOUTH EVERY DAY AS NEEDED WITH NSAID  Qty: 30 capsule, Refills: 5    Associated Diagnoses: Gastroesophageal reflux disease without esophagitis      potassium chloride SA (K-DUR,KLOR-CON M) 10 MEQ tablet TAKE 1 TABLET(10 MEQ) BY MOUTH EVERY DAY  Qty: 90 tablet, Refills: 1    Comments: Future refill requests to go to Dr. Dumont      pulse oximeter (PULSE OXIMETER) device by Apply Externally route 2 (two) times a day. Use twice daily at 8 AM and 3 PM and record the value in [employer] as directed.  Qty: 1 each, Refills: 0    Comments: This is a NO CHARGE item.  Please override price to zero.  DO NOT PRINT.  NORMAL MODE e-PRESCRIBE ONLY.  Associated Diagnoses: COVID-19 virus detected      triamcinolone acetonide 0.025% (KENALOG) 0.025 % Oint Apply topically 2 (two) times daily. for 10 days  Qty: 15 g, Refills: 2     Associated Diagnoses: Tinea corporis                 Discharge Diagnosis: Sacroiliac joint pain [M53.3]  Greater trochanteric bursitis, unspecified laterality [M70.60]  Condition on Discharge: Stable with no complications to procedure   Diet on Discharge: Same as before.  Activity: as per instruction sheet.  Discharge to: Home with a responsible adult.  Follow up: 2-4 weeks       Please call the office at [phone] if you experience any weakness or loss of sensation, fever > 101.5, pain uncontrolled with oral medications, persistent nausea/vomiting/or diarrhea, redness or drainage from the incisions, or any other worrisome concerns. If physician on call was not reached or could not communicate with our office for any reason please go to the nearest emergency department

## 2024-01-09 NOTE — DISCHARGE INSTRUCTIONS

## 2024-01-12 NOTE — PRE-PROCEDURE INSTRUCTIONS
Spoke with patient regarding procedure scheduled on 1.23     Arrival time 0700. Verbalized understanding

## 2024-01-14 NOTE — PATIENT INSTRUCTIONS
Injection of right knee with 80 mg Depo-Medrol mixed with 5 cc 1% lidocaine 05/21/2020  Ice the needed next few days  Approval for Synvisc-One injection/rooster comb/hyaluronic into the right knee  Return to clinic in 4 weeks  
9 (severe pain)

## 2024-01-19 RX ORDER — DICLOFENAC SODIUM 10 MG/G
GEL TOPICAL
Qty: 100 G | Refills: 2 | Status: SHIPPED | OUTPATIENT
Start: 2024-01-19

## 2024-01-23 ENCOUNTER — HOSPITAL ENCOUNTER (OUTPATIENT)
Facility: HOSPITAL | Age: 79
Discharge: HOME | End: 2024-01-23
Attending: ANESTHESIOLOGY | Admitting: ANESTHESIOLOGY
Payer: MEDICARE

## 2024-01-23 VITALS
TEMPERATURE: 98 F | BODY MASS INDEX: 30.55 KG/M2 | RESPIRATION RATE: 16 BRPM | SYSTOLIC BLOOD PRESSURE: 150 MMHG | HEIGHT: 69 IN | DIASTOLIC BLOOD PRESSURE: 70 MMHG | WEIGHT: 206.25 LBS | HEART RATE: 69 BPM | OXYGEN SATURATION: 97 %

## 2024-01-23 DIAGNOSIS — M17.0 PRIMARY OSTEOARTHRITIS OF BOTH KNEES: Primary | ICD-10-CM

## 2024-01-23 DIAGNOSIS — M17.9 KNEE OSTEOARTHRITIS: ICD-10-CM

## 2024-01-23 LAB — POCT GLUCOSE: 198 MG/DL (ref 70–110)

## 2024-01-23 PROCEDURE — 20610 DRAIN/INJ JOINT/BURSA W/O US: CPT | Mod: 50 | Performed by: ANESTHESIOLOGY

## 2024-01-23 PROCEDURE — 25000003 PHARM REV CODE 250: Performed by: ANESTHESIOLOGY

## 2024-01-23 PROCEDURE — 82962 GLUCOSE BLOOD TEST: CPT | Performed by: ANESTHESIOLOGY

## 2024-01-23 PROCEDURE — 63600175 PHARM REV CODE 636 W HCPCS: Performed by: ANESTHESIOLOGY

## 2024-01-23 PROCEDURE — 20610 DRAIN/INJ JOINT/BURSA W/O US: CPT | Mod: 50,,, | Performed by: ANESTHESIOLOGY

## 2024-01-23 PROCEDURE — 77002 NEEDLE LOCALIZATION BY XRAY: CPT | Mod: 26,,, | Performed by: ANESTHESIOLOGY

## 2024-01-23 PROCEDURE — 25500020 PHARM REV CODE 255: Performed by: ANESTHESIOLOGY

## 2024-01-23 RX ORDER — MULTIVIT WITH MINERALS/HERBS
1 TABLET ORAL DAILY
COMMUNITY

## 2024-01-23 RX ORDER — BUPIVACAINE HYDROCHLORIDE 2.5 MG/ML
INJECTION, SOLUTION EPIDURAL; INFILTRATION; INTRACAUDAL
Status: DISCONTINUED | OUTPATIENT
Start: 2024-01-23 | End: 2024-01-23 | Stop reason: HOSPADM

## 2024-01-23 RX ORDER — FENTANYL CITRATE 50 UG/ML
INJECTION, SOLUTION INTRAMUSCULAR; INTRAVENOUS
Status: DISCONTINUED | OUTPATIENT
Start: 2024-01-23 | End: 2024-01-23 | Stop reason: HOSPADM

## 2024-01-23 RX ORDER — INDOMETHACIN 25 MG/1
CAPSULE ORAL
Status: DISCONTINUED | OUTPATIENT
Start: 2024-01-23 | End: 2024-01-23 | Stop reason: HOSPADM

## 2024-01-23 RX ORDER — MIDAZOLAM HYDROCHLORIDE 1 MG/ML
INJECTION, SOLUTION INTRAMUSCULAR; INTRAVENOUS
Status: DISCONTINUED | OUTPATIENT
Start: 2024-01-23 | End: 2024-01-23 | Stop reason: HOSPADM

## 2024-01-23 RX ORDER — CHOLECALCIFEROL (VITAMIN D3) 25 MCG
1000 TABLET ORAL DAILY
COMMUNITY

## 2024-01-23 NOTE — OP NOTE
Procedure Note:     bilateral Intra-articular Knee Synvisc 1 Injection Under Fluoroscopy    01/23/2024                                 Surgeon: Humberto Dumont MD       Assistant: None     Pre-Op Diagnosis:  bilateral Primary osteoarthritis of both knees [M17.0]    Post-Op Diagnosis: Primary osteoarthritis of both knees [M17.0]     EBL: None     Complications: None     Specimens: None    Sedation:  Conscious sedation provided by M.D    The patient was monitored with continuous pulse oximetry, EKG, and intermittent blood pressure monitors.  The patient was hemodynamically stable throughout the entire process was responsive to voice, and breathing spontaneously.  Supplemental O2 was provided at 2L/min via nasal cannula.  Patient was comfortable for the duration of the procedure. (See nurse documentation and case log for sedation time)    There was a total of 1mg IV Midazolam and 50mcg Fentanyl titrated for the procedure      Description of procedure:     After written consent was obtained, patient placed in supine position.  The area over the  lateral aspect of the bilateral  knee(s) joint prepped with chlorhexidine.  The area was draped in the usual sterile fashion.  Approximately 5 mL 1% lidocaine was infiltrated into the skin overlying the predetermined entry point. A 22 gauge spinal needle was then advanced under fluoroscopy in the AP views into the knee joint. After negative aspiration there was injection of 1 mL radio opaque contrast dye to confirm needle location within the joint, and no evidence of intravascular spread. This was followed by injection of 6 mL of  sodium hyaluronate into the joint space. Displacement of the contrast indicated that the medication went into the area of the joint space. Needle was withdrawn and a sterile band-aid applied to the skin.     Patient tolerated the procedure well, and was reporting improvement of pain symptoms after the injection. Vishalda NICHELLE Chun was discharged from the  clinic in stable condition.

## 2024-01-23 NOTE — DISCHARGE SUMMARY
Discharge Note  Short Stay      SUMMARY     Admit Date: 1/23/2024    Attending Physician: Humberto Dumont MD        Discharge Physician: Humberto Dumont MD        Discharge Date: 1/23/2024 8:19 AM    Procedure(s) (LRB):  Bilateral intraarticular knee injection with Synvisc 1  (Bilateral)    Final Diagnosis: Primary osteoarthritis of both knees [M17.0]    Disposition: Home or self care    Patient Instructions:   Current Discharge Medication List        CONTINUE these medications which have NOT CHANGED    Details   b complex vitamins tablet Take 1 tablet by mouth once daily.      biotin 1 mg tablet Take 1,000 mcg by mouth every morning.       celecoxib (CELEBREX) 200 MG capsule TAKE 1 CAPSULE(200 MG) BY MOUTH TWICE DAILY WITH FOOD  Qty: 120 capsule, Refills: 1    Associated Diagnoses: Chondromalacia, right knee; Left shoulder tendinitis      ezetimibe (ZETIA) 10 mg tablet TAKE 1 TABLET(10 MG) BY MOUTH EVERY DAY  Qty: 90 tablet, Refills: 1    Associated Diagnoses: Hyperlipidemia associated with type 2 diabetes mellitus      FLUoxetine 40 MG capsule TAKE 1 CAPSULE(40 MG) BY MOUTH EVERY DAY  Qty: 90 capsule, Refills: 1    Associated Diagnoses: Fibromyalgia; Chronic major depressive disorder      gabapentin (NEURONTIN) 300 MG capsule TAKE 1 CAPSULE(300 MG) BY MOUTH THREE TIMES DAILY  Qty: 90 capsule, Refills: 0      hydroCHLOROthiazide (HYDRODIURIL) 25 MG tablet TAKE 1 TABLET(25 MG) BY MOUTH EVERY DAY  Qty: 90 tablet, Refills: 1    Comments: Future refill requests to be sent to Dr. Tim ESPARZA 100 mg Tab tablet TAKE 1 TABLET(100 MG) BY MOUTH EVERY DAY  Qty: 90 tablet, Refills: 0    Associated Diagnoses: Diabetes mellitus type 2 in obese      losartan (COZAAR) 25 MG tablet TAKE 1 TABLET(25 MG) BY MOUTH EVERY DAY  Qty: 90 tablet, Refills: 2      metFORMIN (GLUCOPHAGE) 1000 MG tablet TAKE 1 TABLET(1000 MG) BY MOUTH TWICE DAILY WITH MEALS  Qty: 180 tablet, Refills: 3    Associated Diagnoses: Diabetes mellitus type 2  in obese      metoprolol succinate (TOPROL-XL) 25 MG 24 hr tablet TAKE 1 TABLET(25 MG) BY MOUTH EVERY DAY  Qty: 30 tablet, Refills: 5    Comments: .      milnacipran (SAVELLA) 100 mg Tab Take 1 tablet (100 mg total) by mouth 2 (two) times daily.  Qty: 60 tablet, Refills: 2      omeprazole (PRILOSEC) 20 MG capsule TAKE 1 CAPSULE BY MOUTH EVERY DAY AS NEEDED WITH NSAID  Qty: 30 capsule, Refills: 5    Associated Diagnoses: Gastroesophageal reflux disease without esophagitis      verapamiL (CALAN) 80 MG tablet Take 1 tablet (80 mg total) by mouth 2 (two) times daily.  Qty: 180 tablet, Refills: 2    Associated Diagnoses: Hypertension associated with diabetes; MVP (mitral valve prolapse)      vitamin D (VITAMIN D3) 1000 units Tab Take 1,000 Units by mouth once daily.      clotrimazole-betamethasone 1-0.05% (LOTRISONE) cream Apply topically 2 (two) times daily.  Qty: 45 g, Refills: 2      diclofenac sodium (VOLTAREN) 1 % Gel APPLY 2 GRAMS TOPICALLY TO THE AFFECTED AREA FOUR TIMES DAILY  Qty: 100 g, Refills: 2      fluticasone (FLONASE) 50 mcg/actuation nasal spray 2 sprays by Each Nare route once daily.  Qty: 1 Bottle, Refills: 0    Associated Diagnoses: Sinusitis      furosemide (LASIX) 20 MG tablet Take 1 tablet (20 mg total) by mouth 2 (two) times daily.  Qty: 60 tablet, Refills: 11      LORazepam (ATIVAN) 1 MG tablet TK 3 TS PO 1 HOUR PRIOR TO APPOINTMENT      potassium chloride SA (K-DUR,KLOR-CON M) 10 MEQ tablet TAKE 1 TABLET(10 MEQ) BY MOUTH EVERY DAY  Qty: 90 tablet, Refills: 1    Comments: Future refill requests to go to Dr. Dumont      pulse oximeter (PULSE OXIMETER) device by Apply Externally route 2 (two) times a day. Use twice daily at 8 AM and 3 PM and record the value in [facility] as directed.  Qty: 1 each, Refills: 0    Comments: This is a NO CHARGE item.  Please override price to zero.  DO NOT PRINT.  NORMAL MODE e-PRESCRIBE ONLY.  Associated Diagnoses: COVID-19 virus detected      triamcinolone acetonide  0.025% (KENALOG) 0.025 % Oint Apply topically 2 (two) times daily. for 10 days  Qty: 15 g, Refills: 2    Associated Diagnoses: Tinea corporis                 Discharge Diagnosis: Primary osteoarthritis of both knees [M17.0]  Condition on Discharge: Stable with no complications to procedure   Diet on Discharge: Same as before.  Activity: as per instruction sheet.  Discharge to: Home with a responsible adult.  Follow up: 2-4 weeks       Please call the office at [phone] if you experience any weakness or loss of sensation, fever > 101.5, pain uncontrolled with oral medications, persistent nausea/vomiting/or diarrhea, redness or drainage from the incisions, or any other worrisome concerns. If physician on call was not reached or could not communicate with our office for any reason please go to the nearest emergency department

## 2024-01-23 NOTE — H&P
HPI  Patient presenting for Procedure(s) (LRB):  Bilateral intraarticular knee injection with Synvisc 1  (Bilateral)     Patient on Anti-coagulation No    No health changes since previous encounter    Past Medical History:   Diagnosis Date    Arthritis     Cataract     COVID-19 02/25/2021    Diabetes mellitus 1995    BS didn't check 09/13/2022    Diabetes mellitus, type 2     Fibromyalgia     General anesthetics causing adverse effect in therapeutic use     bradycardia     Hypertension     Migraines     Mitral valve prolapse     Osteoarthritis     Rotator cuff tear 04/01/2015     Past Surgical History:   Procedure Laterality Date    ARTHROSCOPY OF KNEE Right 03/10/2020    Procedure: ARTHROSCOPY, KNEE;  Surgeon: Les Schneider MD;  Location: [facility] OR;  Service: Orthopedics;  Laterality: Right;    CATARACT EXTRACTION W/  INTRAOCULAR LENS IMPLANT Left 07/19/2017    CATARACT EXTRACTION W/  INTRAOCULAR LENS IMPLANT Right 2017    CHOLECYSTECTOMY      CHONDROPLASTY OF KNEE Right 03/10/2020    Procedure: CHONDROPLASTY, KNEE;  Surgeon: Les Schneider MD;  Location: [facility] OR;  Service: Orthopedics;  Laterality: Right;  Anterior compartment     COLONOSCOPY N/A 09/25/2018    Procedure: COLONOSCOPY;  Surgeon: Bethel Carmona MD;  Location: [facility] ENDO;  Service: Endoscopy;  Laterality: N/A;    EXCISION OF MEDIAL MENISCUS OF KNEE Right 03/10/2020    Procedure: MENISCECTOMY, KNEE, MEDIAL;  Surgeon: Les Schneider MD;  Location: [facility] OR;  Service: Orthopedics;  Laterality: Right;  Partial, Medial , Lateral     EYE SURGERY      INJECTION OF ANESTHETIC AGENT AROUND MEDIAL BRANCH NERVES INNERVATING LUMBAR FACET JOINT Bilateral 12/13/2022    Procedure: Bilateral L3-5 MBB;  Surgeon: Humberto Dumont MD;  Location: [facility] PAIN MGT;  Service: Pain Management;  Laterality: Bilateral;    INJECTION OF ANESTHETIC AGENT AROUND NERVE Right 08/08/2019    Procedure: Right Genicular nerve block with local;  Surgeon: Manpreet Dutta  MD;  Location: [facility] PAIN MGT;  Service: Pain Management;  Laterality: Right;    INJECTION OF ANESTHETIC AGENT INTO SACROILIAC JOINT Bilateral 05/06/2020    Procedure: Bilateral Sacroiliac Joint Injection;  Surgeon: Manpreet Dutta MD;  [facility];  Service: Pain Management;  Laterality: Bilateral;    INJECTION OF ANESTHETIC AGENT INTO SACROILIAC JOINT Bilateral 10/08/2020    Procedure: Bilateral SI and Bilateral GTB with RN IV sedation;  Surgeon: Manpreet Dutta MD;  Location: [facility] PAIN MGT;  Service: Pain Management;  Laterality: Bilateral;    INJECTION OF ANESTHETIC AGENT INTO SACROILIAC JOINT Bilateral 01/05/2021    Procedure: Bilateral BLOCK, SACROILIAC JOINT and Bilateral GTB witn RN IV sedation;  Surgeon: Daniel Burns MD;  Location: [facility] PAIN MGT;  Service: Pain Management;  Laterality: Bilateral;    INJECTION OF ANESTHETIC AGENT INTO SACROILIAC JOINT Bilateral 07/08/2021    Procedure: Bilateral BLOCK, SACROILIAC JOINT bilateral GTB RN IV sedation;  Surgeon: Manpreet Dutta MD;  Location: [facility] PAIN MGT;  Service: Pain Management;  Laterality: Bilateral;    INJECTION OF ANESTHETIC AGENT INTO SACROILIAC JOINT Bilateral 03/01/2022    Procedure: Bilateral BLOCK, SACROILIAC JOINT and Bilateral GTB RN IV sedation;  Surgeon: Manpreet Dutta MD;  Location: [facility] PAIN MGT;  Service: Pain Management;  Laterality: Bilateral;    INJECTION OF ANESTHETIC AGENT INTO SACROILIAC JOINT Bilateral 10/10/2022    Procedure: Bilateral Sacroiliac Joint Injection;  Surgeon: Humberto Dumont MD;  Location: [facility] PAIN MGT;  Service: Pain Management;  Laterality: Bilateral;    INJECTION OF ANESTHETIC AGENT INTO SACROILIAC JOINT Bilateral 01/24/2023    Procedure: Bilateral Sacroiliac Joint Injection;  Surgeon: Humberto Dumont MD;  Location: [facility] PAIN MGT;  Service: Pain Management;  Laterality: Bilateral;    INJECTION OF ANESTHETIC AGENT INTO SACROILIAC JOINT Bilateral 06/21/2023    Procedure: Bilateral Sacroiliac Joint Injection;   Surgeon: Humberto Dumont MD;  [facility];  Service: Pain Management;  Laterality: Bilateral;    INJECTION OF ANESTHETIC AGENT INTO SACROILIAC JOINT Bilateral 1/9/2024    Procedure: Bilateral SIJ Injection;  Surgeon: Humberto Dumont MD;  Location: [facility]GT;  Service: Pain Management;  Laterality: Bilateral;    INJECTION OF JOINT Bilateral 09/05/2019    Procedure: Bilateral GT bursa injection;  Surgeon: Manpreet Dutta MD;  Location: [facility]GT;  Service: Pain Management;  Laterality: Bilateral;    INJECTION OF JOINT Bilateral 05/06/2020    Procedure: Bilateral GT bursa injection;  Surgeon: Manpreet Dutta MD;  Location: [facility];  Service: Pain Management;  Laterality: Bilateral;    INJECTION OF JOINT Right 04/28/2022    Procedure: Injection, Joint Right Hip Injection RN IV sedation;  Surgeon: Manpreet Dutta MD;  Location: [facility];  Service: Pain Management;  Laterality: Right;    INJECTION OF JOINT Bilateral 10/10/2022    Procedure: Bilateral GT bursa injection with RN IV sedation;  Surgeon: Humberto Dumont MD;  Location: [facility];  Service: Pain Management;  Laterality: Bilateral;    INJECTION OF JOINT Bilateral 01/24/2023    Procedure: Bilateral GT bursa injection;  Surgeon: Humberto Dumont MD;  Location: [facility];  Service: Pain Management;  Laterality: Bilateral;    INJECTION OF JOINT Bilateral 05/23/2023    Procedure: Bilateral intraarticular knee injection with Synvisc-1; ;  Surgeon: Humberto Dumont MD;  Location: [facility];  Service: Pain Management;  Laterality: Bilateral;    INJECTION OF JOINT Bilateral 06/21/2023    Procedure: Bilateral GT bursa injection;  Surgeon: Humberto Dumont MD;  Location: [facility];  Service: Pain Management;  Laterality: Bilateral;    INJECTION OF JOINT Bilateral 1/9/2024    Procedure: Bilateral GTB injection;  Surgeon: Humberto Dumont MD;  Location: [facility];  Service: Pain Management;  Laterality: Bilateral;    INJECTION, ALLOGRAFT,  "INTERVERTEBRAL DISC N/A 04/25/2023    Procedure: INJECTION,ALLOGRAFT,INTERVERTEBRAL DISC,1ST LEVEL: L5-S1;  Surgeon: Humberto Dumont MD;  Location: [facility]IN [facility];  Service: Pain Management;  Laterality: N/A;    INJECTION, ALLOGRAFT, INTERVERTEBRAL DISC N/A 05/09/2023    Procedure: INJECTION,ALLOGRAFT,INTERVERTEBRAL DISC,2nd LEVEL: L3-4;  Surgeon: Humberto Dumont MD;  Location: [facility];  Service: Pain Management;  Laterality: N/A;    KNEE ARTHROSCOPY Bilateral     PARS PLANA VITRECTOMY W/ REPAIR OF MACULAR HOLE Left 02/22/2017    RADIOFREQUENCY THERMOCOAGULATION Left 07/11/2019    Procedure: Right SIJ RFA;  Surgeon: Manpreet Dutta MD;  Location: [facility];  Service: Pain Management;  Laterality: Left;    RADIOFREQUENCY THERMOCOAGULATION Right 07/25/2019    Procedure: Right SIJ RFA;  Surgeon: Manpreet Dutta MD;  Location: [facility];  Service: Pain Management;  Laterality: Right;    SHOULDER ARTHROSCOPY Right 06/04/2015     Review of patient's allergies indicates:   Allergen Reactions    Demerol [meperidine] Other (See Comments)     Burning when adm IV  Able to tolerate IM, "Turned the veins in my hand purple."    Latex, natural rubber Other (See Comments)     "Burns my skin",  Symptoms get worse the longer she is exposed    Zocor [simvastatin] Other (See Comments)     Tightening of muscles    Statins-hmg-coa reductase inhibitors Other (See Comments)     myopathy    Sulfa (sulfonamide antibiotics)      Blurred vision        No current facility-administered medications on file prior to encounter.     Current Outpatient Medications on File Prior to Encounter   Medication Sig Dispense Refill    b complex vitamins tablet Take 1 tablet by mouth once daily.      biotin 1 mg tablet Take 1,000 mcg by mouth every morning.       celecoxib (CELEBREX) 200 MG capsule TAKE 1 CAPSULE(200 MG) BY MOUTH TWICE DAILY WITH FOOD 120 capsule 1    ezetimibe (ZETIA) 10 mg tablet TAKE 1 TABLET(10 MG) BY MOUTH EVERY DAY 90 tablet 1    " FLUoxetine 40 MG capsule TAKE 1 CAPSULE(40 MG) BY MOUTH EVERY DAY 90 capsule 1    hydroCHLOROthiazide (HYDRODIURIL) 25 MG tablet TAKE 1 TABLET(25 MG) BY MOUTH EVERY DAY 90 tablet 1    losartan (COZAAR) 25 MG tablet TAKE 1 TABLET(25 MG) BY MOUTH EVERY DAY 90 tablet 2    metFORMIN (GLUCOPHAGE) 1000 MG tablet TAKE 1 TABLET(1000 MG) BY MOUTH TWICE DAILY WITH MEALS 180 tablet 3    metoprolol succinate (TOPROL-XL) 25 MG 24 hr tablet TAKE 1 TABLET(25 MG) BY MOUTH EVERY DAY 30 tablet 5    milnacipran (SAVELLA) 100 mg Tab Take 1 tablet (100 mg total) by mouth 2 (two) times daily. 60 tablet 2    omeprazole (PRILOSEC) 20 MG capsule TAKE 1 CAPSULE BY MOUTH EVERY DAY AS NEEDED WITH NSAID 30 capsule 5    verapamiL (CALAN) 80 MG tablet Take 1 tablet (80 mg total) by mouth 2 (two) times daily. 180 tablet 2    vitamin D (VITAMIN D3) 1000 units Tab Take 1,000 Units by mouth once daily.      clotrimazole-betamethasone 1-0.05% (LOTRISONE) cream Apply topically 2 (two) times daily. 45 g 2    fluticasone (FLONASE) 50 mcg/actuation nasal spray 2 sprays by Each Nare route once daily. (Patient taking differently: 2 sprays by Each Nostril route nightly as needed.) 1 Bottle 0    furosemide (LASIX) 20 MG tablet Take 1 tablet (20 mg total) by mouth 2 (two) times daily. 60 tablet 11    LORazepam (ATIVAN) 1 MG tablet TK 3 TS PO 1 HOUR PRIOR TO APPOINTMENT      potassium chloride SA (K-DUR,KLOR-CON M) 10 MEQ tablet TAKE 1 TABLET(10 MEQ) BY MOUTH EVERY DAY 90 tablet 1    pulse oximeter (PULSE OXIMETER) device by Apply Externally route 2 (two) times a day. Use twice daily at 8 AM and 3 PM and record the value in [facility] as directed. 1 each 0    triamcinolone acetonide 0.025% (KENALOG) 0.025 % Oint Apply topically 2 (two) times daily. for 10 days (Patient taking differently: Apply topically 2 (two) times daily as needed.) 15 g 2        PMHx, PSHx, Allergies, Medications reviewed in epic    ROS negative except pain complaints in  "HPI    OBJECTIVE:    /62 (BP Location: Right arm, Patient Position: Sitting)   Pulse 82   Temp 97.8 °F (36.6 °C) (Temporal)   Resp 16   Ht 5' 8.5" (1.74 m)   Wt 93.6 kg (206 lb 3.9 oz)   SpO2 98%   Breastfeeding No   BMI 30.90 kg/m²     PHYSICAL EXAMINATION:    GENERAL: Well appearing, in no acute distress, alert and oriented x3.  PSYCH:  Mood and affect appropriate.  SKIN: Skin color, texture, turgor normal, no rashes or lesions which will impact the procedure.  CV: RRR with palpation of the radial artery.  PULM: No evidence of respiratory difficulty, symmetric chest rise. Clear to auscultation.  NEURO: Cranial nerves grossly intact.    Plan:    Proceed with procedure as planned Procedure(s) (LRB):  Bilateral intraarticular knee injection with Synvisc 1  (Bilateral)    Humberto Dumont MD  01/23/2024            "

## 2024-01-23 NOTE — DISCHARGE INSTRUCTIONS

## 2024-01-25 DIAGNOSIS — I34.1 MVP (MITRAL VALVE PROLAPSE): ICD-10-CM

## 2024-01-25 DIAGNOSIS — I15.2 HYPERTENSION ASSOCIATED WITH DIABETES: Chronic | ICD-10-CM

## 2024-01-25 DIAGNOSIS — E11.59 HYPERTENSION ASSOCIATED WITH DIABETES: Chronic | ICD-10-CM

## 2024-01-25 RX ORDER — VERAPAMIL HYDROCHLORIDE 80 MG/1
80 TABLET ORAL 2 TIMES DAILY
Qty: 180 TABLET | Refills: 2 | Status: SHIPPED | OUTPATIENT
Start: 2024-01-25

## 2024-02-01 RX ORDER — GABAPENTIN 300 MG/1
300 CAPSULE ORAL 3 TIMES DAILY
Qty: 90 CAPSULE | Refills: 0 | OUTPATIENT
Start: 2024-02-01

## 2024-02-01 NOTE — TELEPHONE ENCOUNTER
----- Message from Josefa David sent at 2/1/2024  3:44 PM CST -----  Contact: Kaylin  .Type:  RX Refill Request    Who Called:  Kaylin   Refill or New Rx: refill   RX Name and Strength: gabapentin (NEURONTIN) 300 MG capsule   How is the patient currently taking it? (ex. 1XDay):as prescribed   Is this a 30 day or 90 day RX: 90  Preferred Pharmacy with phone number: .  [id number] - [place] - [address] [address] [facility]  [address] [address] [place] [address]  Phone: [phone] Fax: [phone]    Local or Mail Order: Local   Ordering Provider: / Tim   Would the patient rather a call back or a response via [facility]? Call   Best Call Back Number: .[phone]             Thanks

## 2024-02-02 RX ORDER — GABAPENTIN 300 MG/1
300 CAPSULE ORAL 3 TIMES DAILY
Qty: 90 CAPSULE | Refills: 0 | Status: SHIPPED | OUTPATIENT
Start: 2024-02-02 | End: 2024-03-28 | Stop reason: SDUPTHER

## 2024-02-14 ENCOUNTER — TELEPHONE (OUTPATIENT)
Dept: PAIN MEDICINE | Facility: CLINIC | Age: 79
End: 2024-02-14
Payer: MEDICARE

## 2024-02-14 NOTE — TELEPHONE ENCOUNTER
Reached out to patient to schedule appointment from messages. Apt has been made.   Pt understand. All questions answered.     Sylvain Srivastava  Medical Assistant

## 2024-02-14 NOTE — TELEPHONE ENCOUNTER
----- Message from Danya Rojo sent at 2/14/2024  3:25 PM CST -----  Pt is requesting a call from nurse to schedule an appt for injection. Please call pt back at [phone]

## 2024-02-15 ENCOUNTER — OFFICE VISIT (OUTPATIENT)
Dept: PAIN MEDICINE | Facility: CLINIC | Age: 79
End: 2024-02-15
Payer: MEDICARE

## 2024-02-15 DIAGNOSIS — M46.1 SACROILIITIS: ICD-10-CM

## 2024-02-15 DIAGNOSIS — M25.552 BILATERAL HIP PAIN: ICD-10-CM

## 2024-02-15 DIAGNOSIS — M54.16 LUMBAR RADICULOPATHY: ICD-10-CM

## 2024-02-15 DIAGNOSIS — M70.60 GREATER TROCHANTERIC BURSITIS, UNSPECIFIED LATERALITY: ICD-10-CM

## 2024-02-15 DIAGNOSIS — M17.0 PRIMARY OSTEOARTHRITIS OF BOTH KNEES: Primary | ICD-10-CM

## 2024-02-15 DIAGNOSIS — M25.551 BILATERAL HIP PAIN: ICD-10-CM

## 2024-02-15 PROCEDURE — 99214 OFFICE O/P EST MOD 30 MIN: CPT | Mod: 95,,, | Performed by: ANESTHESIOLOGY

## 2024-02-15 NOTE — PROGRESS NOTES
Established Patient Interventional Pain Clinic Visit    The patient location is: home  The chief complaint leading to consultation is: back and hip pain  Visit type: Virtual visit with synchronous audio and video  Total time spent with patient: 11-15m  Each patient to whom he or she provides medical services by telemedicine is: (1) informed of the relationship between the physician and patient and the respective role of any other health care provider with respect to management of the patient; and (2) notified that he or she may decline to receive medical services by telemedicine and may withdraw from such care at any time.    Chief Pain Complaint:  No chief complaint on file.    Interval history 02/15/2024  Patient presents status post bilateral sacroiliac joint and greater trochanteric bursa injection 01/09/2024 and  Bilateral intra-articular knee injection with Synvisc-One 01/23/2024.  Patient reports at least 80% sustained relief overlying bilateral sacroiliac joints, GTB and in bilateral knees following her procedures.  Today her primary concern is pain in a bandlike distribution in the lower back which radiates down into the hips and down towards the feet in L4-5 dermatomal distribution.  Pain is exacerbated with positional changes moving from sitting to standing and with standing and with ambulation.  She does report associated weakness in the lower extremities associated with her pain.  Pain today is rated a 6/10.  She is continued gabapentin 300 mg in the morning, 300 mg in the afternoon and 600 mg in the evening.  She also increased Savella to 100 mg with noticeable improvement in her pain.  She is continued physician directed physical therapy exercises over the last 8 weeks from 12/15/2023 through 02/15/2024 with noticeable improvement in pain, range of motion and functionality.  She denies bowel or bladder incontinence saddle anesthesia.    Interval Hx: 12/13/2023  Patient presents for four-month  follow-up.  Today she reports that her lower back has been feeling excellent since via disc supplementation and that the procedure has made all the difference! Particularly with standing and ambulation.  Patient reports the day following Thanksgiving, rolling off of her bed and falling onto her side with significant difficulty arising to a standing position and pain with standing and ambulation following this trauma.  Today she reports pain over bilateral sacroiliac territory which radiates into the hips as well as pain in bilateral knees.  Pain is rated a 4/10.  Pain is exacerbated with positional changes, standing and with ambulation.  She is continued Savella 50 mg twice daily which she reports has significantly reduced the severity and duration of fibromyalgia flares.  She has requesting a refill or increase in this medication.  She is continued physician directed physical therapy exercises over the last 8 weeks from 10/13/2023 through 12/13/2023 for lower back, sacroiliac joint and bilateral knee pain.      Interval history 08/24/2023  Patient presents status post bilateral sacroiliac joint and greater trochanteric bursa injection 06/21/2023.  Patient reports 95% sustained relief overlying bilateral sacroiliac joint and greater trochanteric bursa territories.  She reports altogether following 2 level via disc allograft supplementation and her most recent procedure, she is able to stand upright and ambulate further distances.  She reports these procedures have taken years off my life! .  Today pain is intermittent and rated a 2/10.  Patient has continued Savella 50 mg twice daily and reports this has significantly reduced the frequency and intensity of her fibromyalgia flares and debility associated with these flares.  She is requesting a refill of this medication.  Today she denies significant lower extremity weakness, bowel or bladder incontinence or saddle anesthesia.      Interval history  06/15/2023  Patient presents status post bilateral intra-articular knee injection 05/23/2023.  Patient reports 75% sustained improvement in bilateral knee pain following intra-articular knee injection.  Today her primary concern is bilateral hip pain.  Patient reports pain in the lower back which radiates into the groin and down the lateral aspect of bilateral lower extremities to mid thigh.  Patient reports pain is exacerbated 1st thing in the morning when she is attempting to get out of bed.  Patient denies more distal radiculopathy into the lower extremities or feet.  She denies lower extremity weakness, bowel or bladder incontinence or saddle anesthesia.  Patient continues to reports significant improvement with Savella medication which she is taking 50 mg twice daily with fibromyalgia pain.  She is requesting a refill of this medication.  Patient reports lower back pain continues to have greater than 80% sustained relief following 2 level via disc allograft supplementation.      Interval history 05/18/2023  Patient presents status post L5-S1 via disc allograft supplementation 04/25/2023 and via disc allograft supplementation L3-4 05/09/2023.  Patient reports noticeable improvement >80% in lower back pain following two-level  allograft supplementation.  Today her primary concern is bilateral knee pain.  Patient has questions regarding hyaluronic acid supplementation to be use.  Patient reports she has seen videos on Durolane and Euflexxa and is inquiring to the brand to be used on the upcoming Tuesday.  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  She is requesting up-to-date imaging to investigate these territories.  Today she denies significant weakness in the upper lower extremities, bowel or bladder incontinence or saddle anesthesia.      Interval history 04/06/2023  Patient presents for follow-up of lower back  pain.  She continues to reports superior relief in fibromyalgia symptoms on Savella medication.  Patient has brought in denial paperwork from her insurance reporting limitations on dosage and quantity allowed.  Patient reports prior to starting this medication she felt that she did not have a life.  now she reports significant improvement in pain as well as chronic fatigue associated with fibromyalgia.  Today she again reports pain in the lower back which is worse with lumbar flexion.  Patient reports she is unable to perform activities of daily living such as grocery shopping or prolonged standing or ambulation secondary to her discogenic lower back pain.  Today patient denies more distal radiculopathy into the lower extremities or feet or lower extremity weakness.  Patient is interested in discussing intervention.  Of note patient has failed to have improvement in his lower back pain with prior lumbar medial branch block, sacroiliac joint injections.    Interval Hx: 3/9/23  Patient presents for one-month follow-up.  Today she reports she is significantly better since our last clinic visit.  At that time patient had slipped in a low off and injured her lower back and right leg.  Today she reports this pain is significantly improved and pain is intermittent and today is rated a 3/10.  Today patient reports pain is isolated to the midline lower lumbar spine.  Pain is exacerbated with lumbar flexion such as when she is picking up close.  Fibromyalgia Pain has been significantly improved with the initiation of Savella medication.  Patient has reached a titration of 50 mg twice daily.  Patient recently refilled this medication.  She denies any side effects from this medication.  Today she denies any significant lower extremity weakness, bowel or bladder incontinence or saddle anesthesia      Interval history 02/07/2023  Patient presents status post bilateral sacroiliac joint and greater trochanteric bursa injection  01/24/2023 and bilateral L3-5 lumbar medial branch block 12/13/2022.  Patient had recent mechanical fall confounding benefit from recent injections.  Today she reports she was reaching over a mattress cover to reach a stack of bibles and slid into a slint.  Patient reports she was behind and tall wall in a took 20-30 minutes for her to stand.  Patient also reports exacerbation of fibromyalgia pain.  Since her fall patient reports pain which radiates into the buttock and down the posterior aspect of the right lower extremity to the dorsum of the foot in L4-S1 distribution.  Patient has continued gabapentin 300 mg twice daily, Celebrex in the evenings as well as Tylenol 1000 mg twice daily.    Interval history 11/29/2022    Patient presents status post bilateral sacroiliac joint greater trochanteric bursa 10/10/2022.  Patient reports 90% relief overlying bilateral sacroiliac joints and greater trochanteric bursa following her injection.  Today she reports primarily lumbar axial back pain as well as significant neck pain.  Lower back pain is exacerbated with prolonged standing such as when she is washing dishes.  She denies significant distal radiculopathy into the right lower extremities as described at our last clinic visit.  Neck pain is elicited with cervical flexion, extension and lateral flexion and she does report reduced mobility.  She reports her pain began following a mechanical fall down the stairs at [facility] in September 1986.  Patient has continued gabapentin 300 mg in the morning, 300 mg in the afternoon and 600 mg in the evening.    Interval history 10/04/2022  Ms. Mccollum is a 77-year-old female with past medical history significant for depression, hyperlipidemia, hypertension, mitral valve prolapse, peripheral vascular disease, history of COVID-19, type 2 diabetes, multi joint arthritis, fibromyalgia who presents to [facility] care, previous Dr. Dutta patient.  Today patient reports return of pain in the  lower back which radiates into bilateral hips and down the posterior aspect of the right lower extremity in L4-5 distribution to the dorsum of the foot.  Patient reports pain is intermittent but has increased in intensity.  Pain is described as shocking in nature and today is rated a 6/10.  Patient reports she feels as if her skin is crawling.  patient is currently taking gabapentin 300 mg up to 3 times daily when pain is severe.  Pain interferes with the patient's sleep.  Patient also reports history of fibromyalgia which limits her mobility and duration of standing and ambulation.  Patient does endorse associated weakness in the lower extremities associated with her pain.  Patient is interested in pursuing repeat intervention as she is obtained several months of significant relief with prior sacroiliac joint and greater trochanteric bursa injections.  Patient has continued physician directed physical therapy exercises at home daily.      History of Present Illness 01/16/2020: Dr. Dutta:   Kaylin Mccollum is a 74 y.o. female  who is presenting with a chief complaint of lumbar back pain. The patient began experiencing this problem insidiously, and the pain has been gradually worsening over the past 5 month(s). The pain is described as throbbing, shooting, burning and electrical and is located in the bilateral lumbar spine. Pain is intermittent and lasts hours. The pain radiates to bilateral lower extremities L4 distribudution. The patient rates her pain a 8 out of ten and interferes with activities of daily living a 7 out of ten. Pain is exacerbated by flexion of the lumbar spine, ambulation, and is improved by rest.      She also complains of right knee pain. The patient began experiencing this problem insidiously. The pain is described as cramping, aching and is located in the right knee. Pain is intermittent and lasts hours. The pain is nonradiating. The patient rates her pain a 8 out of ten and interferes  with activities of daily living a 7 out of ten. Pain is exacerbated by getting up from a seated position and standing, and is improved by rest. Patient reports no prior trauma, prior arthroscopy bilaterally in 1990s.       - pertinent negatives: No fever, No chills, No weight loss, No bladder dysfunction, No bowel dysfunction, No saddle anesthesia  - pertinent positives: none        Pain Disability Index Review:   @[facility](4749:[id number])@    Non-Pharmacologic Treatments:  Physical Therapy/Home Exercise: yes  Ice/Heat:yes  TENS: no  Acupuncture: no  Massage: no  Chiropractic: no    Other: no      Pain Medications:  - Adjuvant Medications: Lorazepam (Ativan), Neurontin (Gabapentin), and Topical Ointment (Voltaren Gel, Steroid cream, Anti-Inflammatory Cream, Compound cream)      Pain injections:  Dr. Dumont:  -01/23/2024: Bilateral intra-articular knee injection with Synvisc-One  -01/09/2024: Bilateral sacroiliac joint and greater trochanteric bursa injection  -06/21/2023: Bilateral sacroiliac joint and greater trochanteric bursa injection  -05/29/2023: Bilateral intra-articular knee injection  -05/09/2023: L3-4 via disc allograft supplementation  -04/25/2023:  L5-S1 via disc allograft supplementation  -01/24/2023: Bilateral sacroiliac joint and greater trochanteric bursa injection  -12/13/2022: Bilateral L3-5 lumbar medial branch block  - 10/10/2022: Bilateral greater trochanteric bursa and sacroiliac joint injection      -04/20/2022: Right-sided acetabular femoral injection; Dr. Dutta  -03/01/2022: Bilateral sacroiliac joint and greater trochanteric bursa injection; Dr. Dtuta  -07/08/2021: Bilateral sacroiliac joint and greater trochanteric bursa injection; Dr. Dutta  -01/05/2021: Bilateral sacroiliac joint and greater trochanteric bursa injection; Dr. Burns  -10/08/2020: Bilateral sacroiliac joint and bilateral greater trochanteric bursa injection; Dr. Dutta  -05/06/2020: Bilateral sacroiliac joint and greater trochanteric  "bursa injection; Dr. Dutta    Past Medical History:   Diagnosis Date    Arthritis     Cataract     COVID-19 02/25/2021    Diabetes mellitus 1995    BS didn't check 09/13/2022    Diabetes mellitus, type 2     Fibromyalgia     General anesthetics causing adverse effect in therapeutic use     bradycardia     Hypertension     Migraines     Mitral valve prolapse     Osteoarthritis     Rotator cuff tear 04/01/2015       Review of patient's allergies indicates:   Allergen Reactions    Demerol [meperidine] Other (See Comments)     Burning when adm IV  Able to tolerate IM, "Turned the veins in my hand purple."    Latex, natural rubber Other (See Comments)     "Burns my skin",  Symptoms get worse the longer she is exposed    Zocor [simvastatin] Other (See Comments)     Tightening of muscles    Statins-hmg-coa reductase inhibitors Other (See Comments)     myopathy    Sulfa (sulfonamide antibiotics)      Blurred vision    Nickel Rash     Pt states she had sores to ear lobes from nickel in earrings        Past Surgical History:   Procedure Laterality Date    ARTHROSCOPY OF KNEE Right 03/10/2020    Procedure: ARTHROSCOPY, KNEE;  Surgeon: Les Schneider MD;  Location: [facility] OR;  Service: Orthopedics;  Laterality: Right;    CATARACT EXTRACTION W/  INTRAOCULAR LENS IMPLANT Left 07/19/2017    CATARACT EXTRACTION W/  INTRAOCULAR LENS IMPLANT Right 2017    CHOLECYSTECTOMY      CHONDROPLASTY OF KNEE Right 03/10/2020    Procedure: CHONDROPLASTY, KNEE;  Surgeon: Les Schneider MD;  Location: [facility] OR;  Service: Orthopedics;  Laterality: Right;  Anterior compartment     COLONOSCOPY N/A 09/25/2018    Procedure: COLONOSCOPY;  Surgeon: Bethel Carmona MD;  Location: [facility] ENDO;  Service: Endoscopy;  Laterality: N/A;    EXCISION OF MEDIAL MENISCUS OF KNEE Right 03/10/2020    Procedure: MENISCECTOMY, KNEE, MEDIAL;  Surgeon: Les Schneider MD;  Location: [facility] OR;  Service: Orthopedics;  Laterality: Right;  Partial, Medial , " Lateral     EYE SURGERY      INJECTION OF ANESTHETIC AGENT AROUND MEDIAL BRANCH NERVES INNERVATING LUMBAR FACET JOINT Bilateral 12/13/2022    Procedure: Bilateral L3-5 MBB;  Surgeon: Humberto Dumont MD;  Location: [facility] PAIN MGT;  Service: Pain Management;  Laterality: Bilateral;    INJECTION OF ANESTHETIC AGENT AROUND NERVE Right 08/08/2019    Procedure: Right Genicular nerve block with local;  Surgeon: Manpreet Dutta MD;  Location: [facility] PAIN MGT;  Service: Pain Management;  Laterality: Right;    INJECTION OF ANESTHETIC AGENT INTO SACROILIAC JOINT Bilateral 05/06/2020    Procedure: Bilateral Sacroiliac Joint Injection;  Surgeon: Manpreet Dutta MD;  Location: [facility] PAIN MGT;  Service: Pain Management;  Laterality: Bilateral;    INJECTION OF ANESTHETIC AGENT INTO SACROILIAC JOINT Bilateral 10/08/2020    Procedure: Bilateral SI and Bilateral GTB with RN IV sedation;  Surgeon: Manpreet Dutta MD;  Location: [facility] PAIN MGT;  Service: Pain Management;  Laterality: Bilateral;    INJECTION OF ANESTHETIC AGENT INTO SACROILIAC JOINT Bilateral 01/05/2021    Procedure: Bilateral BLOCK, SACROILIAC JOINT and Bilateral GTB witn RN IV sedation;  Surgeon: Daniel Burns MD;  Location: [facility] PAIN MGT;  Service: Pain Management;  Laterality: Bilateral;    INJECTION OF ANESTHETIC AGENT INTO SACROILIAC JOINT Bilateral 07/08/2021    Procedure: Bilateral BLOCK, SACROILIAC JOINT bilateral GTB RN IV sedation;  Surgeon: Manpreet Dutta MD;  Location: [facility] PAIN MGT;  Service: Pain Management;  Laterality: Bilateral;    INJECTION OF ANESTHETIC AGENT INTO SACROILIAC JOINT Bilateral 03/01/2022    Procedure: Bilateral BLOCK, SACROILIAC JOINT and Bilateral GTB RN IV sedation;  Surgeon: Manpreet Dutta MD;  Location: [facility]GT;  Service: Pain Management;  Laterality: Bilateral;    INJECTION OF ANESTHETIC AGENT INTO SACROILIAC JOINT Bilateral 10/10/2022    Procedure: Bilateral Sacroiliac Joint Injection;  Surgeon: Humberto Dumont MD;  Location: [facility] PAIN MGT;   Service: Pain Management;  Laterality: Bilateral;    INJECTION OF ANESTHETIC AGENT INTO SACROILIAC JOINT Bilateral 01/24/2023    Procedure: Bilateral Sacroiliac Joint Injection;  Surgeon: Humberto Dumont MD;  Location: [facility]GT;  Service: Pain Management;  Laterality: Bilateral;    INJECTION OF ANESTHETIC AGENT INTO SACROILIAC JOINT Bilateral 06/21/2023    Procedure: Bilateral Sacroiliac Joint Injection;  Surgeon: Humberto Dumont MD;  Location: [facility] PAIN MGT;  Service: Pain Management;  Laterality: Bilateral;    INJECTION OF ANESTHETIC AGENT INTO SACROILIAC JOINT Bilateral 1/9/2024    Procedure: Bilateral SIJ Injection;  Surgeon: Humberto Dumont MD;  Location: [facility] PAIN MGT;  Service: Pain Management;  Laterality: Bilateral;    INJECTION OF JOINT Bilateral 09/05/2019    Procedure: Bilateral GT bursa injection;  Surgeon: Manpreet Dutta MD;  Location: [facility]GT;  Service: Pain Management;  Laterality: Bilateral;    INJECTION OF JOINT Bilateral 05/06/2020    Procedure: Bilateral GT bursa injection;  Surgeon: Manpreet Dutta MD;  Location: [facility];  Service: Pain Management;  Laterality: Bilateral;    INJECTION OF JOINT Right 04/28/2022    Procedure: Injection, Joint Right Hip Injection RN IV sedation;  Surgeon: Manpreet Dutta MD;  Location: [facility];  Service: Pain Management;  Laterality: Right;    INJECTION OF JOINT Bilateral 10/10/2022    Procedure: Bilateral GT bursa injection with RN IV sedation;  Surgeon: Humberto Dumont MD;  Location: [facility];  Service: Pain Management;  Laterality: Bilateral;    INJECTION OF JOINT Bilateral 01/24/2023    Procedure: Bilateral GT bursa injection;  Surgeon: Humberto Dumont MD;  Location: [facility];  Service: Pain Management;  Laterality: Bilateral;    INJECTION OF JOINT Bilateral 05/23/2023    Procedure: Bilateral intraarticular knee injection with Synvisc-1; ;  Surgeon: Humberto Dumont MD;  Location: [facility];  Service: Pain Management;  Laterality:  Bilateral;    INJECTION OF JOINT Bilateral 06/21/2023    Procedure: Bilateral GT bursa injection;  Surgeon: Humberto Dumont MD;  Location: [facility] PAIN [facility]GT;  Service: Pain Management;  Laterality: Bilateral;    INJECTION OF JOINT Bilateral 1/9/2024    Procedure: Bilateral GTB injection;  Surgeon: Humberto Dumont MD;  Location: [facility] PAIN [facility]GT;  Service: Pain Management;  Laterality: Bilateral;    INJECTION OF JOINT Bilateral 1/23/2024    Procedure: Bilateral intraarticular knee injection with Synvisc 1 ;  Surgeon: Humberto Dumont MD;  Location: [facility] PAIN [facility];  Service: Pain Management;  Laterality: Bilateral;    INJECTION, ALLOGRAFT, INTERVERTEBRAL DISC N/A 04/25/2023    Procedure: INJECTION,ALLOGRAFT,INTERVERTEBRAL DISC,1ST LEVEL: L5-S1;  Surgeon: Humberto Dumont MD;  Location: [facility] PAIN [facility]GT;  Service: Pain Management;  Laterality: N/A;    INJECTION, ALLOGRAFT, INTERVERTEBRAL DISC N/A 05/09/2023    Procedure: INJECTION,ALLOGRAFT,INTERVERTEBRAL DISC,2nd LEVEL: L3-4;  Surgeon: Humberto Dumont MD;  Location: [facility] PAIN [facility];  Service: Pain Management;  Laterality: N/A;    KNEE ARTHROSCOPY Bilateral     PARS PLANA VITRECTOMY W/ REPAIR OF MACULAR HOLE Left 02/22/2017    RADIOFREQUENCY THERMOCOAGULATION Left 07/11/2019    Procedure: Right SIJ RFA;  Surgeon: Manpreet Dutta MD;  Location: [facility]IN [facility];  Service: Pain Management;  Laterality: Left;    RADIOFREQUENCY THERMOCOAGULATION Right 07/25/2019    Procedure: Right SIJ RFA;  Surgeon: Manpreet Dutta MD;  Location: [facility]IN [facility];  Service: Pain Management;  Laterality: Right;    SHOULDER ARTHROSCOPY Right 06/04/2015     Current Outpatient Medications on File Prior to Visit   Medication Sig Dispense Refill    b complex vitamins tablet Take 1 tablet by mouth once daily.      biotin 1 mg tablet Take 1,000 mcg by mouth every morning.       celecoxib (CELEBREX) 200 MG capsule TAKE 1 CAPSULE(200 MG) BY MOUTH TWICE DAILY WITH FOOD 120 capsule 1    clotrimazole-betamethasone 1-0.05%  (LOTRISONE) cream Apply topically 2 (two) times daily. 45 g 2    diclofenac sodium (VOLTAREN) 1 % Gel APPLY 2 GRAMS TOPICALLY TO THE AFFECTED AREA FOUR TIMES DAILY 100 g 2    ezetimibe (ZETIA) 10 mg tablet TAKE 1 TABLET(10 MG) BY MOUTH EVERY DAY 90 tablet 1    FLUoxetine 40 MG capsule TAKE 1 CAPSULE(40 MG) BY MOUTH EVERY DAY 90 capsule 1    fluticasone (FLONASE) 50 mcg/actuation nasal spray 2 sprays by Each Nare route once daily. (Patient taking differently: 2 sprays by Each Nostril route nightly as needed.) 1 Bottle 0    furosemide (LASIX) 20 MG tablet Take 1 tablet (20 mg total) by mouth 2 (two) times daily. 60 tablet 11    gabapentin (NEURONTIN) 300 MG capsule Take 1 capsule (300 mg total) by mouth 3 (three) times daily. 90 capsule 0    hydroCHLOROthiazide (HYDRODIURIL) 25 MG tablet TAKE 1 TABLET(25 MG) BY MOUTH EVERY DAY 90 tablet 1    INVOKANA 100 mg Tab tablet TAKE 1 TABLET(100 MG) BY MOUTH EVERY DAY 90 tablet 0    LORazepam (ATIVAN) 1 MG tablet TK 3 TS PO 1 HOUR PRIOR TO APPOINTMENT      losartan (COZAAR) 25 MG tablet TAKE 1 TABLET(25 MG) BY MOUTH EVERY DAY 90 tablet 2    metFORMIN (GLUCOPHAGE) 1000 MG tablet TAKE 1 TABLET(1000 MG) BY MOUTH TWICE DAILY WITH MEALS 180 tablet 3    metoprolol succinate (TOPROL-XL) 25 MG 24 hr tablet TAKE 1 TABLET(25 MG) BY MOUTH EVERY DAY 30 tablet 5    milnacipran (SAVELLA) 100 mg Tab Take 1 tablet (100 mg total) by mouth 2 (two) times daily. 60 tablet 2    omeprazole (PRILOSEC) 20 MG capsule TAKE 1 CAPSULE BY MOUTH EVERY DAY AS NEEDED WITH NSAID 30 capsule 5    potassium chloride SA (K-DUR,KLOR-CON M) 10 MEQ tablet TAKE 1 TABLET(10 MEQ) BY MOUTH EVERY DAY 90 tablet 1    pulse oximeter (PULSE OXIMETER) device by Apply Externally route 2 (two) times a day. Use twice daily at 8 AM and 3 PM and record the value in [employer] as directed. 1 each 0    triamcinolone acetonide 0.025% (KENALOG) 0.025 % Oint Apply topically 2 (two) times daily. for 10 days (Patient taking differently:  Apply topically 2 (two) times daily as needed.) 15 g 2    verapamiL (CALAN) 80 MG tablet TAKE 1 TABLET(80 MG) BY MOUTH TWICE DAILY 180 tablet 2    vitamin D (VITAMIN D3) 1000 units Tab Take 1,000 Units by mouth once daily.       No current facility-administered medications on file prior to visit.               GENERAL:  No weight loss, malaise or fevers.  HEENT:   No recent changes in vision or hearing  NECK:  Negative for lumps, no difficulty with swallowing.  RESPIRATORY:  Negative for cough, wheezing or shortness of breath, patient denies any recent URI.  CARDIOVASCULAR:  Negative for chest pain or palpitations.  GI:  Negative for abdominal discomfort, blood in stools or black stools or change in bowel habits.  MUSCULOSKELETAL:  See HPI.  SKIN:  Negative for lesions, rash, and itching.  PSYCH:  No mood disorder or recent psychosocial stressors.   HEMATOLOGY/LYMPHOLOGY:  Negative for prolonged bleeding, bruising easily or swollen nodes.    NEURO:   No history of syncope, paralysis, seizures or tremors.  All other reviewed and negative other than HPI.    Imaging / Labs / Studies (reviewed on 2/15/2024):    Cervical x-ray 05/18/2023   FINDINGS:  Osteopenia.  Vertebral body heights maintained.  Mild anterolisthesis of C4 on C5 and C5 on C6.  Multilevel osteophyte changes with disc height loss most noted at C5-6 and C6-7.  Multilevel prominent facet arthropathy.  Multilevel left-sided neural foraminal narrowing.     Significant change in alignment on flexion or extension.     Prevertebral soft tissues unremarkable.  Lung apices clear.    Thoracic x-ray 05/18/2023  FINDINGS:  Osteopenia.  Vertebral body heights maintained.  No spondylolisthesis.  Similar more multilevel bridging osteophyte changes.  No acute osseous abnormality.  Soft tissues unremarkable.      MRI Lumbar Spine 02/16/2023  FINDINGS:  Grade 1 degenerative spondylolisthesis at L4-L5.  Vertebral body height is normal.  Marrow signal is within normal  limits. The conus medullaris terminates at the level of L1-L2.  No abnormal signal within the conus. Intervertebral disc levels are as follows:     T12-L1 disc: Normal disc height with anterior osteophytes and mild degenerative facet hypertrophy.  No spinal or foraminal stenosis.  The dural canal measures 16 mm.     L1-L2 disc : Disc space height loss with chronic Schmorl's nodes.  Posterior disc bulge.  Anterior osteophytes.  Minor facet arthropathy bilaterally.  The dural canal measures 13 mm.  No foraminal stenosis.     L2-L3 disc: Circumferential disc bulge with tiny chronic Schmorl's nodes.  Anterior osteophytes.  Mild degenerative facet hypertrophy.  The dural canal measures 12 mm.  No foraminal stenosis.     L3-L4 disc: Disc space height loss with a circumferential disc bulge and anterior osteophytes.  Mild degenerative facet hypertrophy with moderate buckling of the ligamentum flavum.  The dural canal measures 11 mm.  No significant foraminal stenosis.     L4-L5 disc: Grade 1 spondylolisthesis with severe degenerative facet hypertrophy bilaterally.  Buckling of the ligamentum flavum.  The dural canal measures 7 mm AP.  Disc encroaches into the floors of the exit foramina, right greater than left.  There is mild right foraminal stenosis.     L5-S1 disc: Severe disc space height loss with osteophytes at the margins and encroach into the exit foramina.  Mild degenerative facet hypertrophy and buckling of the ligamentum flavum.  The dural canal measures 11 mm.  Mild foraminal stenosis.      Physical Exam:  Last clinic visit:  There were no vitals filed for this visit.               There is no height or weight on file to calculate BMI.   (reviewed on 2/15/2024)    General: alert and oriented, in no apparent distress.  Gait: normal gait.  Skin: no rashes, no discoloration, no obvious lesions  HEENT: normocephalic, atraumatic. Pupils equal and round.  Cardiovascular: no significant peripheral edema  present.  Respiratory: without use of accessory muscles of respiration.    Musculoskeletal - Lumbar Spine:  - ROM fairly preserved   - Pain on flexion of lumbar spine: Absent   - Pain on extension of lumbar spine: Absent         - Lumbar facet loading: Absent   - TTP over the lumbar facet joints: Absent  - TTP over the lumbar paraspinals: Present   - TTP over the SI joints: Present  - TTP over GT bursa: Present, minimal   - Straight Leg Raise: Negative  - CHERYLE: Present    Right Knee:  - TTP: Present over medial/ lateral joint line  - Pain with extension: Present  - Pain with flexion: Present  - Crepitus: Present     Neuro - Lower Extremities:  - BLE Strength: R/L: HF: 5/5, HE: 5/5, KF: 5/5; KE: 5/5; FE: 5/5; FF: 5/5  - Extremity Reflexes: Brisk and symmetric throughout  - Sensory: Sensation to light touch intact bilaterally      Psych:  Mood and affect is appropriate    Assessment:  Kaylin Mccollum is a 79 y.o. year old female who is presenting with       ICD-10-CM ICD-9-CM    1. Primary osteoarthritis of both knees  M17.0 715.16       2. Sacroiliitis  M46.1 720.2       3. Greater trochanteric bursitis, unspecified laterality  M70.60 726.5       4. Bilateral hip pain  M25.551 719.45 X-Ray Hips Bilateral 2 View Inc AP Pelvis    M25.552        5. Lumbar radiculopathy  M54.16 724.4 Case Request-RAD/Other Procedure Area: Bilateral L4/5 TF JAMIN            Plan:  1. Interventional: - Status post L3-4 via disc allograft supplementation 05/09/2023 and L5-S1 via disc allograft supplementation 04/25/2023 with greater than 80% improvement in discogenic lower back pain.    -Schedule for bilateral L4-5 transforaminal epidural steroid injection to see if this helps with lumbar radiculopathy.  We discussed the procedures, benefits, potential risks and alternative options in detail.  Patient has elected to pursue these procedures.    Anticoagulation:  None, no anticoagulation    2. Pharmacologic:    reviewed and consistent with  use    -Continue gabapentin.  We have reviewed potential side effects of this medication including daytime somnolence, weight gain and peripheral edema  -Gabapentin 300 mg in the morning, 300 mg in the afternoon and 600 mg in the evening    -Continue Savella as this tremendously helps with symptoms of fibromyalgia.  We have discussed that Savella is FDA approved and has demonstrated improvement in multiple measures including pain, global impression of change in physical function.  We have discussed that the most frequent side effects of this medication can include nausea, constipation, vomiting, dry mouth, flushing or tachycardia.    -100 mg BID  -30 day supply given, 2 refills given prior    3. Rehabilitative:   -We discussed continuing physical therapy to help manage the patient/s painful condition. The patient was counseled that muscle strengthening will improve the long term prognosis in regards to pain and may also help increase range of motion and mobility.    4. Diagnostic:  Reviewed relevant imaging and answered patient's questions.      5. Consult:   -Dr. Schneider for knee and shoulder pain PRN  -Rheumatology: Fibromyalgia    6. Follow up:  4-6 weeks status post injection     The above plan and management options were discussed at length with patient. Patient is in agreement with the above and verbalized understanding.    - I discussed the goals of interventional chronic pain management with the patient on today's visit. We discussed a multimodal and systematic approach to pain.  This includes diagnostic and therapeutic injections, adjuvant pharmacologic treatment, physical therapy, and at times psychiatry.  I emphasized the importance of regular exercise, core strengthening and stretching, diet and weight loss as a cornerstone of long-term pain management.    - This condition does not require this patient to take time off of work, and the primary goal of our Pain Management services is to improve the  patient's functional capacity.  - Patient Questions: Answered all of the patient's questions regarding diagnoses, therapy, treatment and next steps    Humberto Dumont MD

## 2024-02-16 RX ORDER — LOSARTAN POTASSIUM 25 MG/1
TABLET ORAL
Qty: 90 TABLET | Refills: 2 | Status: SHIPPED | OUTPATIENT
Start: 2024-02-16 | End: 2024-03-13

## 2024-02-27 ENCOUNTER — PATIENT MESSAGE (OUTPATIENT)
Dept: OTHER | Facility: OTHER | Age: 79
End: 2024-02-27
Payer: MEDICARE

## 2024-02-27 DIAGNOSIS — M77.8 LEFT SHOULDER TENDINITIS: ICD-10-CM

## 2024-02-27 DIAGNOSIS — M94.261 CHONDROMALACIA, RIGHT KNEE: ICD-10-CM

## 2024-02-27 RX ORDER — CELECOXIB 200 MG/1
CAPSULE ORAL
Qty: 120 CAPSULE | Refills: 1 | Status: SHIPPED | OUTPATIENT
Start: 2024-02-27

## 2024-03-01 ENCOUNTER — LAB VISIT (OUTPATIENT)
Dept: LAB | Facility: HOSPITAL | Age: 79
End: 2024-03-01
Attending: PHYSICIAN ASSISTANT
Payer: MEDICARE

## 2024-03-01 DIAGNOSIS — N18.31 TYPE 2 DIABETES MELLITUS WITH STAGE 3A CHRONIC KIDNEY DISEASE, WITHOUT LONG-TERM CURRENT USE OF INSULIN: ICD-10-CM

## 2024-03-01 DIAGNOSIS — E11.22 TYPE 2 DIABETES MELLITUS WITH STAGE 3A CHRONIC KIDNEY DISEASE, WITHOUT LONG-TERM CURRENT USE OF INSULIN: ICD-10-CM

## 2024-03-01 LAB
ALBUMIN/CREAT UR: 22.6 UG/MG (ref 0–30)
CREAT UR-MCNC: 133 MG/DL (ref 15–325)
MICROALBUMIN UR DL<=1MG/L-MCNC: 30 UG/ML

## 2024-03-01 PROCEDURE — 82043 UR ALBUMIN QUANTITATIVE: CPT | Performed by: PHYSICIAN ASSISTANT

## 2024-03-19 DIAGNOSIS — M79.7 FIBROMYALGIA: Chronic | ICD-10-CM

## 2024-03-19 DIAGNOSIS — F32.9 CHRONIC MAJOR DEPRESSIVE DISORDER: Chronic | ICD-10-CM

## 2024-03-19 RX ORDER — FLUOXETINE HYDROCHLORIDE 40 MG/1
40 CAPSULE ORAL
Qty: 90 CAPSULE | Refills: 0 | Status: SHIPPED | OUTPATIENT
Start: 2024-03-19 | End: 2024-05-16 | Stop reason: SDUPTHER

## 2024-03-19 NOTE — TELEPHONE ENCOUNTER
Refill Decision Note   Kaylin Mccollum  is requesting a refill authorization.  Brief Assessment and Rationale for Refill:  Approve     Medication Therapy Plan:         Comments:     Note composed:5:30 PM 03/19/2024

## 2024-03-19 NOTE — TELEPHONE ENCOUNTER
No care due was identified.  [facility] Embedded Care Due Messages. Reference number: [id number].   3/19/2024 5:39:19 AM CDT

## 2024-03-20 ENCOUNTER — TELEPHONE (OUTPATIENT)
Dept: PAIN MEDICINE | Facility: CLINIC | Age: 79
End: 2024-03-20
Payer: MEDICARE

## 2024-03-22 NOTE — PRE-PROCEDURE INSTRUCTIONS
Spoke with patient regarding procedure scheduled on 4.9    Arrival time 0630     Has patient been sick with fever or on antibiotics within the last 7 days? No     Does the patient have any open wounds, sores or rashes? No     Does the patient have any recent fractures? no     Has patient received a vaccination within the last 7 days? No     Received the COVID vaccination? yes     Has the patient stopped all medications as directed? na     Does patient have a pacemaker, defibrillator, or implantable stimulator? No     Does the patient have a ride to and from procedure and someone reliable to remain with patient?  DAUGHTER     Is the patient diabetic? YES     Does the patient have sleep apnea? Or use O2 at home? no     Is the patient receiving sedation? yes     Is the patient instructed to remain NPO beginning at midnight the night before their procedure? yes     Procedure location confirmed with patient? Yes     Covid- Denies signs/symptoms. Instructed to notify PAT/MD if any changes.

## 2024-03-28 RX ORDER — GABAPENTIN 300 MG/1
300 CAPSULE ORAL 3 TIMES DAILY
Qty: 90 CAPSULE | Refills: 0 | Status: SHIPPED | OUTPATIENT
Start: 2024-03-28 | End: 2024-05-10 | Stop reason: SDUPTHER

## 2024-03-28 NOTE — TELEPHONE ENCOUNTER
Pt is requesting for a refill of: milnacipran (SAVELLA) 100 mg Tab  & gabapentin (NEURONTIN) 300 MG capsule   Last filed:1/13/23 & 2/02/24   Last encounter: 2/15/24  Next proc: 4/09/24  Up coming apt: 5/20/24  Pharmacy:[id number] - [address]   Is this something you can do?

## 2024-04-09 ENCOUNTER — HOSPITAL ENCOUNTER (OUTPATIENT)
Facility: HOSPITAL | Age: 79
Discharge: HOME | End: 2024-04-09
Attending: ANESTHESIOLOGY | Admitting: ANESTHESIOLOGY
Payer: MEDICARE

## 2024-04-09 VITALS
OXYGEN SATURATION: 97 % | SYSTOLIC BLOOD PRESSURE: 150 MMHG | TEMPERATURE: 97 F | RESPIRATION RATE: 18 BRPM | HEIGHT: 69 IN | HEART RATE: 81 BPM | DIASTOLIC BLOOD PRESSURE: 73 MMHG | WEIGHT: 205 LBS | BODY MASS INDEX: 30.36 KG/M2

## 2024-04-09 DIAGNOSIS — M54.16 LUMBAR RADICULOPATHY: ICD-10-CM

## 2024-04-09 LAB — POCT GLUCOSE: 121 MG/DL (ref 70–110)

## 2024-04-09 PROCEDURE — 82962 GLUCOSE BLOOD TEST: CPT | Performed by: ANESTHESIOLOGY

## 2024-04-09 PROCEDURE — 25500020 PHARM REV CODE 255: Performed by: ANESTHESIOLOGY

## 2024-04-09 PROCEDURE — 64483 NJX AA&/STRD TFRM EPI L/S 1: CPT | Mod: 50 | Performed by: ANESTHESIOLOGY

## 2024-04-09 PROCEDURE — 64483 NJX AA&/STRD TFRM EPI L/S 1: CPT | Mod: 50,,, | Performed by: ANESTHESIOLOGY

## 2024-04-09 PROCEDURE — 63600175 PHARM REV CODE 636 W HCPCS: Performed by: ANESTHESIOLOGY

## 2024-04-09 PROCEDURE — 25000003 PHARM REV CODE 250: Performed by: ANESTHESIOLOGY

## 2024-04-09 RX ORDER — MIDAZOLAM HYDROCHLORIDE 1 MG/ML
INJECTION, SOLUTION INTRAMUSCULAR; INTRAVENOUS
Status: DISCONTINUED | OUTPATIENT
Start: 2024-04-09 | End: 2024-04-09 | Stop reason: HOSPADM

## 2024-04-09 RX ORDER — FENTANYL CITRATE 50 UG/ML
INJECTION, SOLUTION INTRAMUSCULAR; INTRAVENOUS
Status: DISCONTINUED | OUTPATIENT
Start: 2024-04-09 | End: 2024-04-09 | Stop reason: HOSPADM

## 2024-04-09 RX ORDER — DEXAMETHASONE SODIUM PHOSPHATE 10 MG/ML
INJECTION INTRAMUSCULAR; INTRAVENOUS
Status: DISCONTINUED | OUTPATIENT
Start: 2024-04-09 | End: 2024-04-09 | Stop reason: HOSPADM

## 2024-04-09 RX ORDER — BUPIVACAINE HYDROCHLORIDE 2.5 MG/ML
INJECTION, SOLUTION EPIDURAL; INFILTRATION; INTRACAUDAL
Status: DISCONTINUED | OUTPATIENT
Start: 2024-04-09 | End: 2024-04-09 | Stop reason: HOSPADM

## 2024-04-09 RX ORDER — INDOMETHACIN 25 MG/1
CAPSULE ORAL
Status: DISCONTINUED | OUTPATIENT
Start: 2024-04-09 | End: 2024-04-09 | Stop reason: HOSPADM

## 2024-04-09 NOTE — DISCHARGE INSTRUCTIONS

## 2024-04-09 NOTE — OP NOTE
Kaylin Mccollum  79 y.o. female      Vitals:    04/09/24 0730   BP: (!) 140/60   Pulse: 75   Resp: 14   Temp:      Procedure Date:04/09/2024        INFORMED CONSENT: The procedure, risks, benefits and options were discussed with patient. There are no contraindications to the procedure. The patient expressed understanding and agreed to proceed. The personnel performing the procedure was discussed. I verify that I personally obtained consent prior to the start of the procedure and the signed consent can be found on the patient's chart.       Anesthesia:   Conscious sedation provided by M.D    The patient was monitored with continuous pulse oximetry, EKG, and intermittent blood pressure monitors.  The patient was hemodynamically stable throughout the entire process was responsive to voice, and breathing spontaneously.  Supplemental O2 was provided at 2L/min via nasal cannula.  Patient was comfortable for the duration of the procedure. (See nurse documentation and case log for sedation time)    There was a total of 2mg IV Midazolam and 50mcg Fentanyl titrated for the procedure    Pre Procedure diagnosis: Lumbar radiculopathy [M54.16]  Post-Procedure diagnosis: SAME     Complications: None    Specimens: None      DESCRIPTION OF PROCEDURE: The patient was brought to the procedure room. IV access was obtained prior to the procedure. The patient was positioned prone on the fluoroscopy table. Continuous hemodynamic monitoring was initiated including blood pressure, EKG, and pulse oximetry. . The skin was prepped with chlorhexidine and draped in a sterile fashion.      The  L4/5  transforaminal spaces were identified with fluoroscopy in the  AP, oblique, and lateral views.  A 22 gauge spinal quinke needle was then advanced into the area of the trans foraminal spaces bilateral with confirmation of proper needle position using AP, oblique, and lateral fluoroscopic views. Once the needle tip was in the area of the  transforaminal space, and there was no blood, CSF or paraesthesias,  1.5 mL of Omnipaque 300mg/ml was injected on bilateral for a total of 3mL.  Fluoroscopic imaging in the AP and lateral views revealed a clear outline of the spinal nerve with proximal spread of agent through the neural foramen into the epidural space. A total combination of 2mL of Bupivacaine and 10 mg dexamethasone was injected on each side and at each level with displacement of the contrast dye confirming that the medication went into the area of the transforaminal spaces bilateral. A sterile bandaid was applied.   Patient tolerated the procedure well.    Patient was taken back to the recovery room for further observation.     The patient was discharged to home in stable condition

## 2024-04-09 NOTE — DISCHARGE SUMMARY
Discharge Note  Short Stay      SUMMARY     Admit Date: 4/9/2024    Attending Physician: Humberto Dumont MD        Discharge Physician: Humberto Dumont MD        Discharge Date: 4/9/2024 7:33 AM    Procedure(s) (LRB):  Bilateral L4/5 TF JAMIN (Bilateral)    Final Diagnosis: Lumbar radiculopathy [M54.16]    Disposition: Home or self care    Patient Instructions:   Current Discharge Medication List        CONTINUE these medications which have NOT CHANGED    Details   celecoxib (CELEBREX) 200 MG capsule TAKE 1 CAPSULE(200 MG) BY MOUTH TWICE DAILY WITH FOOD  Qty: 120 capsule, Refills: 1    Associated Diagnoses: Chondromalacia, right knee; Left shoulder tendinitis      gabapentin (NEURONTIN) 300 MG capsule Take 1 capsule (300 mg total) by mouth 3 (three) times daily.  Qty: 90 capsule, Refills: 0      hydroCHLOROthiazide (HYDRODIURIL) 25 MG tablet TAKE 1 TABLET(25 MG) BY MOUTH EVERY DAY  Qty: 90 tablet, Refills: 1    Comments: Future refill requests to be sent to Dr. Tim ESPARZA 100 mg Tab tablet TAKE 1 TABLET(100 MG) BY MOUTH EVERY DAY  Qty: 90 tablet, Refills: 0    Associated Diagnoses: Diabetes mellitus type 2 in obese      losartan (COZAAR) 25 MG tablet Take 0.5 tablets (12.5 mg total) by mouth every evening.  Qty: 45 tablet, Refills: 1    Comments: Dose reduction  Associated Diagnoses: Hypertension associated with diabetes      metFORMIN (GLUCOPHAGE) 1000 MG tablet TAKE 1 TABLET(1000 MG) BY MOUTH TWICE DAILY WITH MEALS  Qty: 180 tablet, Refills: 3    Associated Diagnoses: Diabetes mellitus type 2 in obese      metoprolol succinate (TOPROL-XL) 25 MG 24 hr tablet TAKE 1 TABLET(25 MG) BY MOUTH EVERY DAY  Qty: 30 tablet, Refills: 5    Comments: .      semaglutide (OZEMPIC) 0.25 mg or 0.5 mg (2 mg/3 mL) pen injector Inject 0.25 mg into the skin every 7 days.  Qty: 3 mL, Refills: 1    Comments: Patient requests delivery  Associated Diagnoses: Hypertension associated with diabetes      verapamiL (CALAN) 80 MG tablet TAKE  1 TABLET(80 MG) BY MOUTH TWICE DAILY  Qty: 180 tablet, Refills: 2    Associated Diagnoses: Hypertension associated with diabetes; MVP (mitral valve prolapse)      b complex vitamins tablet Take 1 tablet by mouth once daily.      biotin 1 mg tablet Take 1,000 mcg by mouth every morning.       clotrimazole-betamethasone 1-0.05% (LOTRISONE) cream Apply topically 2 (two) times daily.  Qty: 45 g, Refills: 2      diclofenac sodium (VOLTAREN) 1 % Gel APPLY 2 GRAMS TOPICALLY TO THE AFFECTED AREA FOUR TIMES DAILY  Qty: 100 g, Refills: 2      ezetimibe (ZETIA) 10 mg tablet TAKE 1 TABLET(10 MG) BY MOUTH EVERY DAY  Qty: 90 tablet, Refills: 1    Associated Diagnoses: Hyperlipidemia associated with type 2 diabetes mellitus      FLUoxetine 40 MG capsule TAKE 1 CAPSULE(40 MG) BY MOUTH EVERY DAY  Qty: 90 capsule, Refills: 0    Associated Diagnoses: Fibromyalgia; Chronic major depressive disorder      fluticasone (FLONASE) 50 mcg/actuation nasal spray 2 sprays by Each Nare route once daily.  Qty: 1 Bottle, Refills: 0    Associated Diagnoses: Sinusitis      furosemide (LASIX) 20 MG tablet Take 1 tablet (20 mg total) by mouth 2 (two) times daily.  Qty: 60 tablet, Refills: 11      LORazepam (ATIVAN) 1 MG tablet TK 3 TS PO 1 HOUR PRIOR TO APPOINTMENT      milnacipran (SAVELLA) 100 mg Tab Take 1 tablet (100 mg total) by mouth 2 (two) times daily.  Qty: 60 tablet, Refills: 2      omeprazole (PRILOSEC) 20 MG capsule TAKE 1 CAPSULE BY MOUTH EVERY DAY AS NEEDED WITH NSAID  Qty: 30 capsule, Refills: 5    Associated Diagnoses: Gastroesophageal reflux disease without esophagitis      potassium chloride SA (K-DUR,KLOR-CON M) 10 MEQ tablet TAKE 1 TABLET(10 MEQ) BY MOUTH EVERY DAY  Qty: 90 tablet, Refills: 1    Comments: Future refill requests to go to Dr. Dumont      pulse oximeter (PULSE OXIMETER) device by Apply Externally route 2 (two) times a day. Use twice daily at 8 AM and 3 PM and record the value in [employer] as directed.  Qty: 1 each,  Refills: 0    Comments: This is a NO CHARGE item.  Please override price to zero.  DO NOT PRINT.  NORMAL MODE e-PRESCRIBE ONLY.  Associated Diagnoses: COVID-19 virus detected      triamcinolone acetonide 0.025% (KENALOG) 0.025 % Oint Apply topically 2 (two) times daily. for 10 days  Qty: 15 g, Refills: 2    Associated Diagnoses: Tinea corporis      vitamin D (VITAMIN D3) 1000 units Tab Take 1,000 Units by mouth once daily.                 Discharge Diagnosis: Lumbar radiculopathy [M54.16]  Condition on Discharge: Stable with no complications to procedure   Diet on Discharge: Same as before.  Activity: as per instruction sheet.  Discharge to: Home with a responsible adult.  Follow up: 2-4 weeks       Please call the office at [phone] if you experience any weakness or loss of sensation, fever > 101.5, pain uncontrolled with oral medications, persistent nausea/vomiting/or diarrhea, redness or drainage from the incisions, or any other worrisome concerns. If physician on call was not reached or could not communicate with our office for any reason please go to the nearest emergency department

## 2024-04-09 NOTE — H&P
HPI  Patient presenting for Procedure(s) (LRB):  Bilateral L4/5 TF JAMIN (Bilateral)     Patient on Anti-coagulation No    No health changes since previous encounter    Past Medical History:   Diagnosis Date    Arthritis     Cataract     COVID-19 02/25/2021    Diabetes mellitus 1995    BS didn't check 09/13/2022    Diabetes mellitus, type 2     Fibromyalgia     General anesthetics causing adverse effect in therapeutic use     bradycardia     Hypertension     Migraines     Mitral valve prolapse     Osteoarthritis     Rotator cuff tear 04/01/2015     Past Surgical History:   Procedure Laterality Date    ARTHROSCOPY OF KNEE Right 03/10/2020    Procedure: ARTHROSCOPY, KNEE;  Surgeon: Les Schneider MD;  Location: [facility] OR;  Service: Orthopedics;  Laterality: Right;    CATARACT EXTRACTION W/  INTRAOCULAR LENS IMPLANT Left 07/19/2017    CATARACT EXTRACTION W/  INTRAOCULAR LENS IMPLANT Right 2017    CHOLECYSTECTOMY      CHONDROPLASTY OF KNEE Right 03/10/2020    Procedure: CHONDROPLASTY, KNEE;  Surgeon: Les Schneider MD;  Location: [facility] OR;  Service: Orthopedics;  Laterality: Right;  Anterior compartment     COLONOSCOPY N/A 09/25/2018    Procedure: COLONOSCOPY;  Surgeon: Bethel Carmona MD;  Location: [facility] ENDO;  Service: Endoscopy;  Laterality: N/A;    EXCISION OF MEDIAL MENISCUS OF KNEE Right 03/10/2020    Procedure: MENISCECTOMY, KNEE, MEDIAL;  Surgeon: Les Schneider MD;  Location: [facility] OR;  Service: Orthopedics;  Laterality: Right;  Partial, Medial , Lateral     EYE SURGERY      INJECTION OF ANESTHETIC AGENT AROUND MEDIAL BRANCH NERVES INNERVATING LUMBAR FACET JOINT Bilateral 12/13/2022    Procedure: Bilateral L3-5 MBB;  Surgeon: Humberto Dumont MD;  Location: [facility] PAIN MGT;  Service: Pain Management;  Laterality: Bilateral;    INJECTION OF ANESTHETIC AGENT AROUND NERVE Right 08/08/2019    Procedure: Right Genicular nerve block with local;  Surgeon: Manpreet Dutta MD;  Location: [facility] PAIN MGT;  Service:  Pain Management;  Laterality: Right;    INJECTION OF ANESTHETIC AGENT INTO SACROILIAC JOINT Bilateral 05/06/2020    Procedure: Bilateral Sacroiliac Joint Injection;  Surgeon: Manpreet Dutta MD;  Location: [facility] PAIN MGT;  Service: Pain Management;  Laterality: Bilateral;    INJECTION OF ANESTHETIC AGENT INTO SACROILIAC JOINT Bilateral 10/08/2020    Procedure: Bilateral SI and Bilateral GTB with RN IV sedation;  Surgeon: Manpreet Dutta MD;  Location: [facility] PAIN MGT;  Service: Pain Management;  Laterality: Bilateral;    INJECTION OF ANESTHETIC AGENT INTO SACROILIAC JOINT Bilateral 01/05/2021    Procedure: Bilateral BLOCK, SACROILIAC JOINT and Bilateral GTB witn RN IV sedation;  Surgeon: Daniel Burns MD;  Location: [facility] PAIN MGT;  Service: Pain Management;  Laterality: Bilateral;    INJECTION OF ANESTHETIC AGENT INTO SACROILIAC JOINT Bilateral 07/08/2021    Procedure: Bilateral BLOCK, SACROILIAC JOINT bilateral GTB RN IV sedation;  Surgeon: Manpreet Dutta MD;  Location: [facility] PAIN MGT;  Service: Pain Management;  Laterality: Bilateral;    INJECTION OF ANESTHETIC AGENT INTO SACROILIAC JOINT Bilateral 03/01/2022    Procedure: Bilateral BLOCK, SACROILIAC JOINT and Bilateral GTB RN IV sedation;  Surgeon: Manpreet Dutta MD;  Location: [facility] PAIN MGT;  Service: Pain Management;  Laterality: Bilateral;    INJECTION OF ANESTHETIC AGENT INTO SACROILIAC JOINT Bilateral 10/10/2022    Procedure: Bilateral Sacroiliac Joint Injection;  Surgeon: Humberto Dumont MD;  Location: [facility] PAIN MGT;  Service: Pain Management;  Laterality: Bilateral;    INJECTION OF ANESTHETIC AGENT INTO SACROILIAC JOINT Bilateral 01/24/2023    Procedure: Bilateral Sacroiliac Joint Injection;  Surgeon: Humberto Dumont MD;  Location: [facility] PAIN MGT;  Service: Pain Management;  Laterality: Bilateral;    INJECTION OF ANESTHETIC AGENT INTO SACROILIAC JOINT Bilateral 06/21/2023    Procedure: Bilateral Sacroiliac Joint Injection;  Surgeon: Humberto Dumont MD;  Location: [facility]  PAIN MGT;  Service: Pain Management;  Laterality: Bilateral;    INJECTION OF ANESTHETIC AGENT INTO SACROILIAC JOINT Bilateral 1/9/2024    Procedure: Bilateral SIJ Injection;  Surgeon: Humberto Dumont MD;  Location: [facility] PAIN MGT;  Service: Pain Management;  Laterality: Bilateral;    INJECTION OF JOINT Bilateral 09/05/2019    Procedure: Bilateral GT bursa injection;  Surgeon: Manpreet Dutta MD;  Location: [facility] PAIN MGT;  Service: Pain Management;  Laterality: Bilateral;    INJECTION OF JOINT Bilateral 05/06/2020    Procedure: Bilateral GT bursa injection;  Surgeon: Manpreet Dutta MD;  Location: [facility] PAIN MGT;  Service: Pain Management;  Laterality: Bilateral;    INJECTION OF JOINT Right 04/28/2022    Procedure: Injection, Joint Right Hip Injection RN IV sedation;  Surgeon: Manpreet Dutta MD;  Location: [facility] PAIN MGT;  Service: Pain Management;  Laterality: Right;    INJECTION OF JOINT Bilateral 10/10/2022    Procedure: Bilateral GT bursa injection with RN IV sedation;  Surgeon: Humberto Dumont MD;  Location: [facility] PAIN [facility]GT;  Service: Pain Management;  Laterality: Bilateral;    INJECTION OF JOINT Bilateral 01/24/2023    Procedure: Bilateral GT bursa injection;  Surgeon: Humberto Dumont MD;  [facility];  Service: Pain Management;  Laterality: Bilateral;    INJECTION OF JOINT Bilateral 05/23/2023    Procedure: Bilateral intraarticular knee injection with Synvisc-1; ;  Surgeon: Humberto Dumont MD;  [facility];  Service: Pain Management;  Laterality: Bilateral;    INJECTION OF JOINT Bilateral 06/21/2023    Procedure: Bilateral GT bursa injection;  Surgeon: Humberto Dumont MD;  Location: [facility] PAIN [facility]GT;  Service: Pain Management;  Laterality: Bilateral;    INJECTION OF JOINT Bilateral 1/9/2024    Procedure: Bilateral GTB injection;  Surgeon: Humberto Dumont MD;  [facility];  Service: Pain Management;  Laterality: Bilateral;    INJECTION OF JOINT Bilateral 1/23/2024    Procedure: Bilateral  "intraarticular knee injection with Synvisc 1 ;  Surgeon: Humberto Dumont MD;  Location: [facility] PAIN MGT;  Service: Pain Management;  Laterality: Bilateral;    INJECTION, ALLOGRAFT, INTERVERTEBRAL DISC N/A 04/25/2023    Procedure: INJECTION,ALLOGRAFT,INTERVERTEBRAL DISC,1ST LEVEL: L5-S1;  Surgeon: Humberto Dumont MD;  Location: [facility] PAIN MGT;  Service: Pain Management;  Laterality: N/A;    INJECTION, ALLOGRAFT, INTERVERTEBRAL DISC N/A 05/09/2023    Procedure: INJECTION,ALLOGRAFT,INTERVERTEBRAL DISC,2nd LEVEL: L3-4;  Surgeon: Humberto Dumont MD;  Location: [facility] PAIN [facility]GT;  Service: Pain Management;  Laterality: N/A;    KNEE ARTHROSCOPY Bilateral     PARS PLANA VITRECTOMY W/ REPAIR OF MACULAR HOLE Left 02/22/2017    RADIOFREQUENCY THERMOCOAGULATION Left 07/11/2019    Procedure: Right SIJ RFA;  Surgeon: Manpreet Dutta MD;  Location: [facility] PAIN [facility]GT;  Service: Pain Management;  Laterality: Left;    RADIOFREQUENCY THERMOCOAGULATION Right 07/25/2019    Procedure: Right SIJ RFA;  Surgeon: Manpreet Dutta MD;  Location: [facility]IN [facility]GT;  Service: Pain Management;  Laterality: Right;    SHOULDER ARTHROSCOPY Right 06/04/2015     Review of patient's allergies indicates:   Allergen Reactions    Demerol [meperidine] Other (See Comments)     Burning when adm IV  Able to tolerate IM, "Turned the veins in my hand purple."    Latex, natural rubber Other (See Comments)     "Burns my skin",  Symptoms get worse the longer she is exposed    Zocor [simvastatin] Other (See Comments)     Tightening of muscles    Statins-hmg-coa reductase inhibitors Other (See Comments)     myopathy    Sulfa (sulfonamide antibiotics)      Blurred vision    Nickel Rash     Pt states she had sores to ear lobes from nickel in earrings         No current facility-administered medications on file prior to encounter.     Current Outpatient Medications on File Prior to Encounter   Medication Sig Dispense Refill    hydroCHLOROthiazide (HYDRODIURIL) 25 MG tablet TAKE 1 " TABLET(25 MG) BY MOUTH EVERY DAY 90 tablet 1    INVOKANA 100 mg Tab tablet TAKE 1 TABLET(100 MG) BY MOUTH EVERY DAY 90 tablet 0    metFORMIN (GLUCOPHAGE) 1000 MG tablet TAKE 1 TABLET(1000 MG) BY MOUTH TWICE DAILY WITH MEALS 180 tablet 3    metoprolol succinate (TOPROL-XL) 25 MG 24 hr tablet TAKE 1 TABLET(25 MG) BY MOUTH EVERY DAY 30 tablet 5    verapamiL (CALAN) 80 MG tablet TAKE 1 TABLET(80 MG) BY MOUTH TWICE DAILY 180 tablet 2    b complex vitamins tablet Take 1 tablet by mouth once daily.      biotin 1 mg tablet Take 1,000 mcg by mouth every morning.       clotrimazole-betamethasone 1-0.05% (LOTRISONE) cream Apply topically 2 (two) times daily. 45 g 2    diclofenac sodium (VOLTAREN) 1 % Gel APPLY 2 GRAMS TOPICALLY TO THE AFFECTED AREA FOUR TIMES DAILY 100 g 2    ezetimibe (ZETIA) 10 mg tablet TAKE 1 TABLET(10 MG) BY MOUTH EVERY DAY 90 tablet 1    fluticasone (FLONASE) 50 mcg/actuation nasal spray 2 sprays by Each Nare route once daily. (Patient taking differently: 2 sprays by Each Nostril route nightly as needed.) 1 Bottle 0    furosemide (LASIX) 20 MG tablet Take 1 tablet (20 mg total) by mouth 2 (two) times daily. 60 tablet 11    LORazepam (ATIVAN) 1 MG tablet TK 3 TS PO 1 HOUR PRIOR TO APPOINTMENT      omeprazole (PRILOSEC) 20 MG capsule TAKE 1 CAPSULE BY MOUTH EVERY DAY AS NEEDED WITH NSAID 30 capsule 5    potassium chloride SA (K-DUR,KLOR-CON M) 10 MEQ tablet TAKE 1 TABLET(10 MEQ) BY MOUTH EVERY DAY 90 tablet 1    pulse oximeter (PULSE OXIMETER) device by Apply Externally route 2 (two) times a day. Use twice daily at 8 AM and 3 PM and record the value in [facility] as directed. 1 each 0    triamcinolone acetonide 0.025% (KENALOG) 0.025 % Oint Apply topically 2 (two) times daily. for 10 days (Patient taking differently: Apply topically 2 (two) times daily as needed.) 15 g 2    vitamin D (VITAMIN D3) 1000 units Tab Take 1,000 Units by mouth once daily.          PMHx, PSHx, Allergies, Medications reviewed in  "epic    ROS negative except pain complaints in HPI    OBJECTIVE:    BP (!) 150/69 (BP Location: Right arm, Patient Position: Sitting)   Pulse 84   Temp 96.7 °F (35.9 °C) (Temporal)   Resp 14   Ht 5' 8.5" (1.74 m)   Wt 93 kg (205 lb 0.4 oz)   Breastfeeding No   BMI 30.72 kg/m²     PHYSICAL EXAMINATION:    GENERAL: Well appearing, in no acute distress, alert and oriented x3.  PSYCH:  Mood and affect appropriate.  SKIN: Skin color, texture, turgor normal, no rashes or lesions which will impact the procedure.  CV: RRR with palpation of the radial artery.  PULM: No evidence of respiratory difficulty, symmetric chest rise. Clear to auscultation.  NEURO: Cranial nerves grossly intact.    Plan:    Proceed with procedure as planned Procedure(s) (LRB):  Bilateral L4/5 TF JAMIN (Bilateral)    Humberto Dumont MD  04/09/2024            "

## 2024-04-18 DIAGNOSIS — K21.9 GASTROESOPHAGEAL REFLUX DISEASE WITHOUT ESOPHAGITIS: ICD-10-CM

## 2024-04-18 RX ORDER — METOPROLOL SUCCINATE 25 MG/1
25 TABLET, EXTENDED RELEASE ORAL
Qty: 90 TABLET | Refills: 3 | Status: SHIPPED | OUTPATIENT
Start: 2024-04-18

## 2024-04-18 RX ORDER — OMEPRAZOLE 20 MG/1
CAPSULE, DELAYED RELEASE ORAL
Qty: 30 CAPSULE | Refills: 5 | Status: SHIPPED | OUTPATIENT
Start: 2024-04-18

## 2024-04-25 ENCOUNTER — OFFICE VISIT (OUTPATIENT)
Dept: INTERNAL MEDICINE | Facility: CLINIC | Age: 79
End: 2024-04-25
Payer: MEDICARE

## 2024-04-25 VITALS
RESPIRATION RATE: 18 BRPM | HEART RATE: 77 BPM | BODY MASS INDEX: 30.3 KG/M2 | DIASTOLIC BLOOD PRESSURE: 52 MMHG | HEIGHT: 69 IN | WEIGHT: 204.56 LBS | SYSTOLIC BLOOD PRESSURE: 94 MMHG | OXYGEN SATURATION: 98 %

## 2024-04-25 DIAGNOSIS — T46.6X5A ADVERSE EFFECT OF STATIN: ICD-10-CM

## 2024-04-25 DIAGNOSIS — N18.31 CHRONIC KIDNEY DISEASE, STAGE 3A: ICD-10-CM

## 2024-04-25 DIAGNOSIS — I15.2 HYPERTENSION ASSOCIATED WITH DIABETES: Chronic | ICD-10-CM

## 2024-04-25 DIAGNOSIS — N18.31 TYPE 2 DIABETES MELLITUS WITH STAGE 3A CHRONIC KIDNEY DISEASE, WITHOUT LONG-TERM CURRENT USE OF INSULIN: Primary | ICD-10-CM

## 2024-04-25 DIAGNOSIS — I73.9 PVD (PERIPHERAL VASCULAR DISEASE): ICD-10-CM

## 2024-04-25 DIAGNOSIS — F32.5 MAJOR DEPRESSIVE DISORDER IN FULL REMISSION, UNSPECIFIED WHETHER RECURRENT: ICD-10-CM

## 2024-04-25 DIAGNOSIS — E78.5 HYPERLIPIDEMIA ASSOCIATED WITH TYPE 2 DIABETES MELLITUS: ICD-10-CM

## 2024-04-25 DIAGNOSIS — E11.69 HYPERLIPIDEMIA ASSOCIATED WITH TYPE 2 DIABETES MELLITUS: ICD-10-CM

## 2024-04-25 DIAGNOSIS — Z78.0 POSTMENOPAUSAL: ICD-10-CM

## 2024-04-25 DIAGNOSIS — E11.59 HYPERTENSION ASSOCIATED WITH DIABETES: Chronic | ICD-10-CM

## 2024-04-25 DIAGNOSIS — E11.22 TYPE 2 DIABETES MELLITUS WITH STAGE 3A CHRONIC KIDNEY DISEASE, WITHOUT LONG-TERM CURRENT USE OF INSULIN: Primary | ICD-10-CM

## 2024-04-25 PROCEDURE — 99999 PR PBB SHADOW E&M-EST. PATIENT-LVL V: CPT | Mod: PBBFAC,,, | Performed by: FAMILY MEDICINE

## 2024-04-25 PROCEDURE — 99214 OFFICE O/P EST MOD 30 MIN: CPT | Mod: S$PBB,,, | Performed by: FAMILY MEDICINE

## 2024-04-25 PROCEDURE — G2211 COMPLEX E/M VISIT ADD ON: HCPCS | Mod: S$PBB,,, | Performed by: FAMILY MEDICINE

## 2024-04-25 PROCEDURE — 99215 OFFICE O/P EST HI 40 MIN: CPT | Mod: PBBFAC | Performed by: FAMILY MEDICINE

## 2024-04-25 RX ORDER — EZETIMIBE 10 MG/1
10 TABLET ORAL DAILY
Qty: 90 TABLET | Refills: 3 | Status: SHIPPED | OUTPATIENT
Start: 2024-04-25

## 2024-04-25 NOTE — PROGRESS NOTES
Subjective:       Patient ID: Kaylin Mccollum is a 79 y.o. female.    Chief Complaint: Annual Exam    Patient presents to clinic today for annual physical exam.  Patient experienced dizziness when consuming Losartan during the day, then adjusted to night time intake to alleviate this complaint. This change was orchestrated by Dr. Dumont. Besides Losartan, she is on Invokana, Metformin, and Ozempic for diabetes management, as well as Verapamil, Metoprolol and Hydrochlorothiazide for blood pressure regulation. There were no complaints related to the other medications taken. Her Zetia intake is meant to manage cholesterol levels, to which she experiences no side effects. However, recent lab reports show her cholesterol levels exceeding desirable parameters, with rising triglyceride levels. As her A1c has also marginally increased but remains below 8, she expressed interest in potentially replacing Zetia with Repatha. Patient herself and her daughter have noted a daily presence of minor memory issues, such as difficulty with everyday tasks and occasional word recall, which she attributes to distraction or aging. There is not significant cognitive impairment, as severe memory lapse symptoms are absent. She remains functional and oriented in her daily activities. She has discussed dietary modifications to assist with her cholesterol levels, such as increased oatmeal intake. Moreover, she plans to decrease coffee, particularly coffee with creamer intake and increase tea consumption to help manage her sugar levels. She qualifies for a new vaccine for RSV. It's recommended that she consider scheduling a COVID booster shot and a flu vaccine for the upcoming fall season.        Review of Systems   Constitutional:  Positive for fatigue. Negative for chills, fever and unexpected weight change.   HENT:  Negative for congestion, dental problem, ear pain, hearing loss, rhinorrhea and trouble swallowing.    Eyes:  Negative for pain  and visual disturbance.   Respiratory:  Negative for cough and shortness of breath.    Cardiovascular:  Negative for chest pain, palpitations and leg swelling.   Gastrointestinal:  Positive for abdominal distention (IBS). Negative for abdominal pain, blood in stool, constipation, diarrhea, nausea and vomiting.   Genitourinary:  Negative for difficulty urinating and vaginal discharge.   Musculoskeletal:  Positive for arthralgias and myalgias.   Skin:  Negative for rash.   Neurological:  Positive for dizziness (improved with recent BP medication changes) and headaches. Negative for weakness and numbness.   Hematological:  Negative for adenopathy. Does not bruise/bleed easily.   Psychiatric/Behavioral:  Negative for dysphoric mood and sleep disturbance. The patient is not nervous/anxious.        Above positive ROS are chronic/stable per patient report unless otherwise specified.  Objective:      Physical Exam  Vitals reviewed.   Constitutional:       General: She is not in acute distress.     Appearance: Normal appearance. She is well-developed.   HENT:      Head: Normocephalic and atraumatic.      Right Ear: Tympanic membrane, ear canal and external ear normal.      Left Ear: Tympanic membrane, ear canal and external ear normal.      Nose: Nose normal. No mucosal edema or rhinorrhea.      Mouth/Throat:      Pharynx: Uvula midline.   Eyes:      General: Lids are normal. No scleral icterus.     Extraocular Movements: Extraocular movements intact.      Conjunctiva/sclera: Conjunctivae normal.      Pupils: Pupils are equal, round, and reactive to light.   Neck:      Thyroid: No thyromegaly.   Cardiovascular:      Rate and Rhythm: Normal rate and regular rhythm.      Heart sounds: No murmur heard.     No friction rub. No gallop.   Pulmonary:      Effort: Pulmonary effort is normal.      Breath sounds: Normal breath sounds. No wheezing, rhonchi or rales.   Abdominal:      General: Bowel sounds are normal. There is no  distension.      Palpations: Abdomen is soft. There is no mass.      Tenderness: There is no abdominal tenderness.   Musculoskeletal:         General: Normal range of motion.      Cervical back: Normal range of motion and neck supple.   Lymphadenopathy:      Cervical: No cervical adenopathy.   Skin:     General: Skin is warm and dry.      Findings: No lesion or rash.      Nails: There is no clubbing.   Neurological:      Mental Status: She is alert and oriented to person, place, and time.      Cranial Nerves: No cranial nerve deficit.      Sensory: No sensory deficit.      Gait: Gait normal.   Psychiatric:         Mood and Affect: Mood and affect normal.         Assessment:       1. Type 2 diabetes mellitus with stage 3a chronic kidney disease, without long-term current use of insulin    2. Hyperlipidemia associated with type 2 diabetes mellitus    3. Adverse effect of statin    4. Hypertension associated with diabetes    5. Chronic kidney disease, stage 3a    6. Major depressive disorder in full remission, unspecified whether recurrent    7. PVD (peripheral vascular disease)    8. Postmenopausal        Plan:   1. Type 2 diabetes mellitus with stage 3a chronic kidney disease, without long-term current use of insulin  Assessment & Plan:  Above goal, continue current medications and work on diet    Lab Results   Component Value Date    HGBA1C 7.8 (H) 03/01/2024    HGBA1C 7.4 (H) 12/13/2023    LDLCALC 132.6 03/01/2024    LABMICR 30.0 03/01/2024    CREATRANDUR 133.0 03/01/2024    MICALBCREAT 22.6 03/01/2024         Orders:  -     Ambulatory referral/consult to Optometry; Future; Expected date: 05/02/2024    2. Hyperlipidemia associated with type 2 diabetes mellitus  Assessment & Plan:  Above goal, on zetia, statin intolerant; Suggested the patient discuss the cholesterol management medication, Repatha, with Dr. Dumont.     Lab Results   Component Value Date    CHOL 222 (H) 03/01/2024    LDLCALC 132.6 03/01/2024    TRIG  167 (H) 03/01/2024    HDL 56 03/01/2024    ALT 19 03/01/2024    AST 25 03/01/2024    ALKPHOS 56 03/01/2024     The 10-year ASCVD risk score (Sole DELEON, et al., 2019) is: 26.6%    Values used to calculate the score:      Age: 79 years      Sex: Female      Is Non- : No      Diabetic: Yes      Tobacco smoker: No      Systolic Blood Pressure: 94 mmHg      Is BP treated: Yes      HDL Cholesterol: 56 mg/dL      Total Cholesterol: 222 mg/dL      Orders:  -     ezetimibe (ZETIA) 10 mg tablet; Take 1 tablet (10 mg total) by mouth once daily.  Dispense: 90 tablet; Refill: 3    3. Adverse effect of statin    4. Hypertension associated with diabetes  Assessment & Plan:  BP low today, asymptomatic, digital program BPs within acceptable limits; continue current medications and keep follow up with Cardiology for reassessment      5. Chronic kidney disease, stage 3a  Assessment & Plan:  Stable, on ARB    Lab Results   Component Value Date    BUN 20 03/01/2024    CREATININE 1.2 03/01/2024    EGFRNORACEVR 46.6 (A) 03/01/2024           6. Major depressive disorder in full remission, unspecified whether recurrent  Assessment & Plan:  Stable on prozac      7. PVD (peripheral vascular disease)  Overview:  Followed by Cardiology, continue current treatment plan       8. Postmenopausal  -     DXA Bone Density Axial Skeleton 1 or more sites; Future; Expected date: 04/25/2024      LIFESTYLE CHANGES:    Advised the patient to monitor her diet more diligently, especially in terms of processed foods, flour, and sugar.    Encouraged the patient to increase her oatmeal intake to help manage her cholesterol levels.    Advised the patient to use a pill counter to help remember her medications.    Suggested the patient consider receiving the new RSV vaccine and a COVID booster, available at the pharmacy.     Visit today included increased complexity associated with the care of the episodic problem hyperlipidemia, which was  addressed while instituting co-management of the longitudinal care of the patient due to the serious and/or complex managed problem(s) .    I have evaluated and discussed management associated with medical care services that serve as the continuing focal point for all needed health care services and/or with medical care services that are part of ongoing care related to my patient's single, serious condition or a complex condition(s).    I am providing ongoing care and I am the primary care provider for this patient, and they are being managed, monitored, and/or observed for their chronic conditions over time.     I have addressed their ongoing health maintenance requirements and needs for all health care services and reviewed co-management plans provided by specialty providers when available.    Health Maintenance Due   Topic Date Due    RSV Vaccine (Age 60+ and Pregnant patients) (1 - 1-dose 60+ series) Never done    COVID-19 Vaccine (5 - 2023-24 season) 09/01/2023    Eye Exam  09/13/2023    DEXA Scan  07/22/2024     Health Maintenance reviewed/updated.    Follow up in about 6 months (around 10/25/2024), or if symptoms worsen or fail to improve, for EP/f/u 6 month with Alea.    This note was generated with the assistance of ambient listening technology. Verbal consent was obtained by the patient and accompanying visitor(s) for the recording of patient appointment to facilitate this note. I attest to having reviewed and edited the generated note for accuracy, though some syntax or spelling errors may persist. Please contact the author of this note for any clarification.

## 2024-04-26 PROBLEM — F32.5 MAJOR DEPRESSIVE DISORDER IN FULL REMISSION, UNSPECIFIED WHETHER RECURRENT: Status: ACTIVE | Noted: 2024-04-26

## 2024-04-27 ENCOUNTER — PATIENT MESSAGE (OUTPATIENT)
Dept: ADMINISTRATIVE | Facility: OTHER | Age: 79
End: 2024-04-27
Payer: MEDICARE

## 2024-04-27 NOTE — ASSESSMENT & PLAN NOTE
Above goal, on zetia, statin intolerant; Suggested the patient discuss the cholesterol management medication, Repatha, with Dr. Dumont.     Lab Results   Component Value Date    CHOL 222 (H) 03/01/2024    LDLCALC 132.6 03/01/2024    TRIG 167 (H) 03/01/2024    HDL 56 03/01/2024    ALT 19 03/01/2024    AST 25 03/01/2024    ALKPHOS 56 03/01/2024     The 10-year ASCVD risk score (Sole DELEON, et al., 2019) is: 26.6%    Values used to calculate the score:      Age: 79 years      Sex: Female      Is Non- : No      Diabetic: Yes      Tobacco smoker: No      Systolic Blood Pressure: 94 mmHg      Is BP treated: Yes      HDL Cholesterol: 56 mg/dL      Total Cholesterol: 222 mg/dL

## 2024-04-27 NOTE — ASSESSMENT & PLAN NOTE
Above goal, continue current medications and work on diet    Lab Results   Component Value Date    HGBA1C 7.8 (H) 03/01/2024    HGBA1C 7.4 (H) 12/13/2023    LDLCALC 132.6 03/01/2024    LABMICR 30.0 03/01/2024    CREATRANDUR 133.0 03/01/2024    MICALBCREAT 22.6 03/01/2024

## 2024-04-27 NOTE — ASSESSMENT & PLAN NOTE
BP low today, asymptomatic, digital program BPs within acceptable limits; continue current medications and keep follow up with Cardiology for reassessment

## 2024-04-27 NOTE — ASSESSMENT & PLAN NOTE
Stable, on ARB    Lab Results   Component Value Date    BUN 20 03/01/2024    CREATININE 1.2 03/01/2024    EGFRNORACEVR 46.6 (A) 03/01/2024

## 2024-04-30 DIAGNOSIS — Z00.00 ENCOUNTER FOR MEDICARE ANNUAL WELLNESS EXAM: ICD-10-CM

## 2024-05-02 DIAGNOSIS — I15.2 HYPERTENSION ASSOCIATED WITH DIABETES: Chronic | ICD-10-CM

## 2024-05-02 DIAGNOSIS — I73.9 PVD (PERIPHERAL VASCULAR DISEASE): Primary | ICD-10-CM

## 2024-05-02 DIAGNOSIS — E11.59 HYPERTENSION ASSOCIATED WITH DIABETES: Chronic | ICD-10-CM

## 2024-05-03 ENCOUNTER — HOSPITAL ENCOUNTER (OUTPATIENT)
Dept: RADIOLOGY | Facility: HOSPITAL | Age: 79
Discharge: HOME | End: 2024-05-03
Attending: ANESTHESIOLOGY
Payer: MEDICARE

## 2024-05-03 ENCOUNTER — OFFICE VISIT (OUTPATIENT)
Dept: CARDIOLOGY | Facility: CLINIC | Age: 79
End: 2024-05-03
Payer: MEDICARE

## 2024-05-03 ENCOUNTER — HOSPITAL ENCOUNTER (OUTPATIENT)
Dept: CARDIOLOGY | Facility: HOSPITAL | Age: 79
Discharge: HOME | End: 2024-05-03
Attending: INTERNAL MEDICINE
Payer: MEDICARE

## 2024-05-03 VITALS
HEIGHT: 68 IN | OXYGEN SATURATION: 95 % | SYSTOLIC BLOOD PRESSURE: 108 MMHG | WEIGHT: 203.69 LBS | DIASTOLIC BLOOD PRESSURE: 60 MMHG | BODY MASS INDEX: 30.87 KG/M2 | HEART RATE: 80 BPM

## 2024-05-03 DIAGNOSIS — E78.5 HYPERLIPIDEMIA ASSOCIATED WITH TYPE 2 DIABETES MELLITUS: ICD-10-CM

## 2024-05-03 DIAGNOSIS — E11.22 TYPE 2 DIABETES MELLITUS WITH STAGE 3A CHRONIC KIDNEY DISEASE, WITHOUT LONG-TERM CURRENT USE OF INSULIN: ICD-10-CM

## 2024-05-03 DIAGNOSIS — I15.2 HYPERTENSION ASSOCIATED WITH DIABETES: Chronic | ICD-10-CM

## 2024-05-03 DIAGNOSIS — I34.1 MVP (MITRAL VALVE PROLAPSE): ICD-10-CM

## 2024-05-03 DIAGNOSIS — M25.551 BILATERAL HIP PAIN: ICD-10-CM

## 2024-05-03 DIAGNOSIS — R60.0 LOCALIZED EDEMA: ICD-10-CM

## 2024-05-03 DIAGNOSIS — E11.69 HYPERLIPIDEMIA ASSOCIATED WITH TYPE 2 DIABETES MELLITUS: ICD-10-CM

## 2024-05-03 DIAGNOSIS — E11.59 HYPERTENSION ASSOCIATED WITH DIABETES: Chronic | ICD-10-CM

## 2024-05-03 DIAGNOSIS — M25.552 BILATERAL HIP PAIN: ICD-10-CM

## 2024-05-03 DIAGNOSIS — I73.9 PVD (PERIPHERAL VASCULAR DISEASE): Primary | ICD-10-CM

## 2024-05-03 DIAGNOSIS — N18.31 TYPE 2 DIABETES MELLITUS WITH STAGE 3A CHRONIC KIDNEY DISEASE, WITHOUT LONG-TERM CURRENT USE OF INSULIN: ICD-10-CM

## 2024-05-03 DIAGNOSIS — I73.9 PVD (PERIPHERAL VASCULAR DISEASE): ICD-10-CM

## 2024-05-03 DIAGNOSIS — I49.3 PVC (PREMATURE VENTRICULAR CONTRACTION): ICD-10-CM

## 2024-05-03 PROCEDURE — 93005 ELECTROCARDIOGRAM TRACING: CPT

## 2024-05-03 PROCEDURE — 99215 OFFICE O/P EST HI 40 MIN: CPT | Mod: PBBFAC,25 | Performed by: INTERNAL MEDICINE

## 2024-05-03 PROCEDURE — 99214 OFFICE O/P EST MOD 30 MIN: CPT | Mod: S$PBB,25,, | Performed by: INTERNAL MEDICINE

## 2024-05-03 PROCEDURE — 73521 X-RAY EXAM HIPS BI 2 VIEWS: CPT | Mod: TC

## 2024-05-03 PROCEDURE — 93010 ELECTROCARDIOGRAM REPORT: CPT | Mod: ,,, | Performed by: INTERNAL MEDICINE

## 2024-05-03 PROCEDURE — 99999 PR PBB SHADOW E&M-EST. PATIENT-LVL V: CPT | Mod: PBBFAC,,, | Performed by: INTERNAL MEDICINE

## 2024-05-03 PROCEDURE — 73521 X-RAY EXAM HIPS BI 2 VIEWS: CPT | Mod: 26,,, | Performed by: RADIOLOGY

## 2024-05-03 NOTE — PROGRESS NOTES
Subjective:   Patient ID:  Kaylin Mccollum is a 79 y.o. female who presents for follow up of Follow-up      80 yo female, 6 months f/u  PMH h/o MVP, HTN DM 20 yrs. H/o PVCs. Statin intolerance, Fibromyalgia H/o COVID 19 in  quarantine at home. (sinus headache)  On verapamil since she was 54.   Palpitation and dizziness controlled by Verapamil  Occasional chest pain, with sharp pain.   C/o dry mouth  Chronic fatigue from fibra myalgia. Some shoulder pain from the fall  Mother had afib. Father healthy. Young brother had heart procedure at age of 6.  Not on regular exercise. No smoking/drinking  ekg today NSR and poor R progression on repcoridal leads    05/2021 visit   ECHO mild MR and normal EF. Stress test no ischemia; ZIOPATCH showed rare AT longest 13 beats.  Still dry mouth and occasional pinching chest pain  Headache chronic due to migraine  Chronic fatigue, mild exercise endurance  BP and LDL controlled. A1C 6.2 at OSH in      visit  PVD swelling of the leg controlled by lasix.   BP controlled  Still fatigue and weakness   ekg NSR. A1C 7.6 BP controled     visit  C/o chest tightness when walked from the parking to the office today. Resolved after rest.  Walking and shopping could cause the muscle pain and fatigue. '  Chronic lower back injection for the pain. occasionally faint and clammy  No palpitation and leg swelling      visit  Had steroid injection of the hips. Palpitation and controlled by verapamil. BRUNO with climbing the stairs. No dizziness and faint.    04/23 visit  Rx of fibromyalgia working but copay elevated and will wean off now  Occasional palpitation at night  No dizziness faint dyspnea. Chronic mild leg swelling  Ekg NSR     stress echo normal EF and mld MR. Normal stress test; BARDY unremarkable     07/23 visit  Feet and leg swelling improved but 2+ some varicose on the feet skin. No palpitation dizziness faint  Reviewed digit HTN and BP up  to 140 to 150 mmHG    10/23 visit  BP controlled at home per digit program   and taking zetia  Ekg NSR    Interval history  Some dizziness and improved after decreased BP medication.  EKG reviewed by myself today NSR Q wave in inferior leads and poor R progression on precordial leads. Old change  Leg swelling and left foot erythema. No pain and weakness              Past Medical History:   Diagnosis Date    Arthritis     Cataract     COVID-19 02/25/2021    Diabetes mellitus 1995    BS didn't check 09/13/2022    Diabetes mellitus, type 2     Fibromyalgia     General anesthetics causing adverse effect in therapeutic use     bradycardia     Hypertension     Migraines     Mitral valve prolapse     Osteoarthritis     Rotator cuff tear 04/01/2015       Past Surgical History:   Procedure Laterality Date    ARTHROSCOPY OF KNEE Right 03/10/2020    Procedure: ARTHROSCOPY, KNEE;  Surgeon: Les Schneider MD;  Location: [facility] OR;  Service: Orthopedics;  Laterality: Right;    CATARACT EXTRACTION W/  INTRAOCULAR LENS IMPLANT Left 07/19/2017    CATARACT EXTRACTION W/  INTRAOCULAR LENS IMPLANT Right 2017    CHOLECYSTECTOMY      CHONDROPLASTY OF KNEE Right 03/10/2020    Procedure: CHONDROPLASTY, KNEE;  Surgeon: Les Schneider MD;  Location: [facility] OR;  Service: Orthopedics;  Laterality: Right;  Anterior compartment     COLONOSCOPY N/A 09/25/2018    Procedure: COLONOSCOPY;  Surgeon: Bethel Carmona MD;  Location: [facility] ENDO;  Service: Endoscopy;  Laterality: N/A;    EXCISION OF MEDIAL MENISCUS OF KNEE Right 03/10/2020    Procedure: MENISCECTOMY, KNEE, MEDIAL;  Surgeon: Les Schneider MD;  Location: [facility] OR;  Service: Orthopedics;  Laterality: Right;  Partial, Medial , Lateral     EYE SURGERY      INJECTION OF ANESTHETIC AGENT AROUND MEDIAL BRANCH NERVES INNERVATING LUMBAR FACET JOINT Bilateral 12/13/2022    Procedure: Bilateral L3-5 MBB;  Surgeon: Humberto Dumont MD;  Location: [facility] PAIN MGT;  Service: Pain  Management;  Laterality: Bilateral;    INJECTION OF ANESTHETIC AGENT AROUND NERVE Right 08/08/2019    Procedure: Right Genicular nerve block with local;  Surgeon: Manpreet Dutta MD;  Location: [facility] PAIN MGT;  Service: Pain Management;  Laterality: Right;    INJECTION OF ANESTHETIC AGENT INTO SACROILIAC JOINT Bilateral 05/06/2020    Procedure: Bilateral Sacroiliac Joint Injection;  Surgeon: Manpreet Dutta MD;  Location: [facility] PAIN [facility]GT;  Service: Pain Management;  Laterality: Bilateral;    INJECTION OF ANESTHETIC AGENT INTO SACROILIAC JOINT Bilateral 10/08/2020    Procedure: Bilateral SI and Bilateral GTB with RN IV sedation;  Surgeon: Manpreet Dutta MD;  Location: [facility] PAIN MGT;  Service: Pain Management;  Laterality: Bilateral;    INJECTION OF ANESTHETIC AGENT INTO SACROILIAC JOINT Bilateral 01/05/2021    Procedure: Bilateral BLOCK, SACROILIAC JOINT and Bilateral GTB witn RN IV sedation;  Surgeon: Daniel Burns MD;  Location: [facility] PAIN MGT;  Service: Pain Management;  Laterality: Bilateral;    INJECTION OF ANESTHETIC AGENT INTO SACROILIAC JOINT Bilateral 07/08/2021    Procedure: Bilateral BLOCK, SACROILIAC JOINT bilateral GTB RN IV sedation;  Surgeon: Manpreet Dutta MD;  Location: [facility] PAIN MGT;  Service: Pain Management;  Laterality: Bilateral;    INJECTION OF ANESTHETIC AGENT INTO SACROILIAC JOINT Bilateral 03/01/2022    Procedure: Bilateral BLOCK, SACROILIAC JOINT and Bilateral GTB RN IV sedation;  Surgeon: Manpreet Dutta MD;  Location: [facility] PAIN MGT;  Service: Pain Management;  Laterality: Bilateral;    INJECTION OF ANESTHETIC AGENT INTO SACROILIAC JOINT Bilateral 10/10/2022    Procedure: Bilateral Sacroiliac Joint Injection;  Surgeon: Humberto Dumont MD;  [facility];  Service: Pain Management;  Laterality: Bilateral;    INJECTION OF ANESTHETIC AGENT INTO SACROILIAC JOINT Bilateral 01/24/2023    Procedure: Bilateral Sacroiliac Joint Injection;  Surgeon: Humberto Dumont MD;  Location: [facility];  Service:  Pain Management;  Laterality: Bilateral;    INJECTION OF ANESTHETIC AGENT INTO SACROILIAC JOINT Bilateral 06/21/2023    Procedure: Bilateral Sacroiliac Joint Injection;  Surgeon: Humberto Dumont MD;  Location: [facility];  Service: Pain Management;  Laterality: Bilateral;    INJECTION OF ANESTHETIC AGENT INTO SACROILIAC JOINT Bilateral 1/9/2024    Procedure: Bilateral SIJ Injection;  Surgeon: Humberto Dumont MD;  Location: [facility] PAIN MGT;  Service: Pain Management;  Laterality: Bilateral;    INJECTION OF JOINT Bilateral 09/05/2019    Procedure: Bilateral GT bursa injection;  Surgeon: Manpreet Dutta MD;  Location: [facility];  Service: Pain Management;  Laterality: Bilateral;    INJECTION OF JOINT Bilateral 05/06/2020    Procedure: Bilateral GT bursa injection;  Surgeon: Manpreet Dutta MD;  Location: [facility];  Service: Pain Management;  Laterality: Bilateral;    INJECTION OF JOINT Right 04/28/2022    Procedure: Injection, Joint Right Hip Injection RN IV sedation;  Surgeon: Manpreet Dutta MD;  Location: [facility];  Service: Pain Management;  Laterality: Right;    INJECTION OF JOINT Bilateral 10/10/2022    Procedure: Bilateral GT bursa injection with RN IV sedation;  Surgeon: Humberto Dumont MD;  Location: [facility];  Service: Pain Management;  Laterality: Bilateral;    INJECTION OF JOINT Bilateral 01/24/2023    Procedure: Bilateral GT bursa injection;  Surgeon: Humberto Dumont MD;  Location: [facility];  Service: Pain Management;  Laterality: Bilateral;    INJECTION OF JOINT Bilateral 05/23/2023    Procedure: Bilateral intraarticular knee injection with Synvisc-1; ;  Surgeon: Humberto Dumont MD;  Location: [facility];  Service: Pain Management;  Laterality: Bilateral;    INJECTION OF JOINT Bilateral 06/21/2023    Procedure: Bilateral GT bursa injection;  Surgeon: Humberto Dumont MD;  Location: [facility];  Service: Pain Management;  Laterality: Bilateral;    INJECTION OF JOINT Bilateral 1/9/2024     Procedure: Bilateral GTB injection;  Surgeon: Humberto Dumont MD;  Location: [facility] PAIN [facility]GT;  Service: Pain Management;  Laterality: Bilateral;    INJECTION OF JOINT Bilateral 1/23/2024    Procedure: Bilateral intraarticular knee injection with Synvisc 1 ;  Surgeon: Humberto Dumont MD;  Location: [facility] PAIN [facility]GT;  Service: Pain Management;  Laterality: Bilateral;    INJECTION, ALLOGRAFT, INTERVERTEBRAL DISC N/A 04/25/2023    Procedure: INJECTION,ALLOGRAFT,INTERVERTEBRAL DISC,1ST LEVEL: L5-S1;  Surgeon: Humberto Dumont MD;  Location: [facility] PAIN [facility]GT;  Service: Pain Management;  Laterality: N/A;    INJECTION, ALLOGRAFT, INTERVERTEBRAL DISC N/A 05/09/2023    Procedure: INJECTION,ALLOGRAFT,INTERVERTEBRAL DISC,2nd LEVEL: L3-4;  Surgeon: Humberto Dumont MD;  Location: [facility] PAIN [facility]GT;  Service: Pain Management;  Laterality: N/A;    KNEE ARTHROSCOPY Bilateral     PARS PLANA VITRECTOMY W/ REPAIR OF MACULAR HOLE Left 02/22/2017    RADIOFREQUENCY THERMOCOAGULATION Left 07/11/2019    Procedure: Right SIJ RFA;  Surgeon: Manpreet Dutta MD;  Location: [facility]IN [facility]GT;  Service: Pain Management;  Laterality: Left;    RADIOFREQUENCY THERMOCOAGULATION Right 07/25/2019    Procedure: Right SIJ RFA;  Surgeon: Manpreet Dutta MD;  Location: [facility];  Service: Pain Management;  Laterality: Right;    SELECTIVE INJECTION OF ANESTHETIC AGENT AROUND LUMBAR SPINAL NERVE ROOT BY TRANSFORAMINAL APPROACH Bilateral 4/9/2024    Procedure: Bilateral L4/5 TF JAMIN;  Surgeon: Humberto Dumont MD;  Location: [facility]IN [facility]GT;  Service: Pain Management;  Laterality: Bilateral;    SHOULDER ARTHROSCOPY Right 06/04/2015       Social History     Tobacco Use    Smoking status: Never    Smokeless tobacco: Never    Tobacco comments:     Never smoked   Substance Use Topics    Alcohol use: Not Currently    Drug use: No       Family History   Problem Relation Name Age of Onset    Heart disease Mother Georgia Ti Price         A-Fib    Glaucoma Mother Beena Luke  Price     Cataracts Mother Beena Thrasher     Cancer Mother Beena Thrasher         colon    Arthritis Mother Beena Thrasher         : 17    Depression Mother Beena Thrasher     Depression Brother Octavio & Gutierrez Thrasher     Birth defects Brother Octavio Thrasher         Cardiac    Heart disease Brother Octavio Thrasher         Heart Surgery  as 6 y.o.    Kidney disease Daughter Yolette Mccollum         ESRD    Anesthesia problems Daughter Yolette Mccollum         cardiac arrest during nephrectomy    Birth defects Daughter Yolette Mccollum         Renal    Depression Daughter Yolette Mccollum     Learning disabilities Daughter Yolette Mccollum         Dyslexia, ADD    Depression Son Daniel & Brandon Mccollum     Learning disabilities Son Daniel & Brandon Mccollum         ADD & ADHD    Diabetes Maternal Aunt Nara Zamora          9/3/2023    Diabetes Maternal Uncle Madison Ti, Sr.          2019    Cancer Maternal Uncle Madison Ti, Sr.     Breast cancer Paternal Aunt      Diabetes Maternal Grandmother Bonnie Cervantesamy Luke     Heart disease Maternal Grandmother Bonnie Sandoval Ti         Congestive heart failure    Alcohol abuse Maternal Grandmother Bonnie Sandoval Ti         Death: 84    Depression Maternal Grandmother Bonnie Cervantesamy Luke     Hypertension Maternal Grandmother Bonnie Sandoval Ti     Cancer Maternal Grandmother Bonnie Sandoval Ti     Arthritis Maternal Grandmother Bonnie Sandoval Ti     Diabetes Maternal Grandfather Attila Lo Ti     Cancer Maternal Grandfather Attila Lo Ti     Alcohol abuse Maternal Grandfather Attila Luke          1964    Asthma Son Brandon FREIRE    Objective:   Physical Exam  HENT:      Head: Normocephalic.   Eyes:      Pupils: Pupils are equal, round, and reactive to light.   Neck:      Thyroid: No thyromegaly.      Vascular: Normal carotid pulses. No carotid  bruit or JVD.   Cardiovascular:      Rate and Rhythm: Normal rate and regular rhythm. No extrasystoles are present.     Chest Wall: PMI is not displaced.      Pulses: Normal pulses.      Heart sounds: Normal heart sounds. No murmur heard.     No gallop. No S3 sounds.   Pulmonary:      Effort: No respiratory distress.      Breath sounds: Normal breath sounds. No stridor.   Abdominal:      General: Bowel sounds are normal.      Palpations: Abdomen is soft.      Tenderness: There is no abdominal tenderness. There is no rebound.   Musculoskeletal:         General: Swelling present.   Skin:     Findings: No rash.   Neurological:      Mental Status: She is alert and oriented to person, place, and time.   Psychiatric:         Behavior: Behavior normal.         Lab Results   Component Value Date    CHOL 222 (H) 03/01/2024    CHOL 216 (H) 08/30/2023    CHOL 211 (H) 02/16/2023     Lab Results   Component Value Date    HDL 56 03/01/2024    HDL 61 08/30/2023    HDL 65 02/16/2023     Lab Results   Component Value Date    LDLCALC 132.6 03/01/2024    LDLCALC 127.0 08/30/2023    LDLCALC 131.6 02/16/2023     Lab Results   Component Value Date    TRIG 167 (H) 03/01/2024    TRIG 140 08/30/2023    TRIG 72 02/16/2023     Lab Results   Component Value Date    CHOLHDL 25.2 03/01/2024    CHOLHDL 28.2 08/30/2023    CHOLHDL 30.8 02/16/2023       Chemistry        Component Value Date/Time     03/01/2024 1152    K 3.8 03/01/2024 1152    CL 98 03/01/2024 1152    CO2 27 03/01/2024 1152    BUN 20 03/01/2024 1152    CREATININE 1.2 03/01/2024 1152     (H) 03/01/2024 1152        Component Value Date/Time    CALCIUM 10.1 03/01/2024 1152    ALKPHOS 56 03/01/2024 1152    AST 25 03/01/2024 1152    ALT 19 03/01/2024 1152    BILITOT 0.4 03/01/2024 1152    ESTGFRAFRICA >60.0 02/09/2022 1308    EGFRNONAA >60.0 02/09/2022 1308          Lab Results   Component Value Date    HGBA1C 7.8 (H) 03/01/2024     Lab Results   Component Value Date    TSH  "2.524 03/01/2024     No results found for: "INR", "PROTIME"  Lab Results   Component Value Date    WBC 5.96 03/01/2024    HGB 13.7 03/01/2024    HCT 43.3 03/01/2024    MCV 97 03/01/2024     03/01/2024     BMP  Sodium   Date Value Ref Range Status   03/01/2024 141 136 - 145 mmol/L Final     Potassium   Date Value Ref Range Status   03/01/2024 3.8 3.5 - 5.1 mmol/L Final     Chloride   Date Value Ref Range Status   03/01/2024 98 95 - 110 mmol/L Final     CO2   Date Value Ref Range Status   03/01/2024 27 23 - 29 mmol/L Final     BUN   Date Value Ref Range Status   03/01/2024 20 8 - 23 mg/dL Final     Creatinine   Date Value Ref Range Status   03/01/2024 1.2 0.5 - 1.4 mg/dL Final     Calcium   Date Value Ref Range Status   03/01/2024 10.1 8.7 - 10.5 mg/dL Final     Anion Gap   Date Value Ref Range Status   03/01/2024 16 8 - 16 mmol/L Final     eGFR if    Date Value Ref Range Status   02/09/2022 >60.0 >60 mL/min/1.73 m^2 Final     eGFR if non    Date Value Ref Range Status   02/09/2022 >60.0 >60 mL/min/1.73 m^2 Final     Comment:     Calculation used to obtain the estimated glomerular filtration  rate (eGFR) is the CKD-EPI equation.        BNP  @LABRCNTIP(BNP,BNPTRIAGEBLO)@  @LABRCNTIP(troponini)@  CrCl cannot be calculated (Patient's most recent lab result is older than the maximum 7 days allowed.).  No results found in the last 24 hours.  No results found in the last 24 hours.  No results found in the last 24 hours.    Assessment:      1. PVD (peripheral vascular disease)    2. Hyperlipidemia associated with type 2 diabetes mellitus    3. Hypertension associated with diabetes    4. MVP (mitral valve prolapse)    5. PVC (premature ventricular contraction)    6. Type 2 diabetes mellitus with stage 3a chronic kidney disease, without long-term current use of insulin    7. Localized edema        Plan:   LE venous US and ARLENE for swelling and erythema  Continue zetia hctz losartan " torpolXL verapamil lasix as needed  DM Rx per PCP  Counseled DASH  Check Lipid profile with PCP in 6 months  Recommend heart-healthy diet, weight control and regular exercise.  Frank. Risk modification.   I have reviewed all pertinent labs and cardiac studies independently. Plans and recommendations have been formulated under my direct supervision. All questions answered and patient voiced understanding.   If symptoms persist go to the ED  RTC in 6 months

## 2024-05-04 LAB
OHS QRS DURATION: 78 MS
OHS QTC CALCULATION: 461 MS

## 2024-05-10 NOTE — TELEPHONE ENCOUNTER
Pt is requesting for a refill of: gabapentin (NEURONTIN) 300 MG capsule   Last filed:3/28/24   Last encounter: 2/15/24  Last proc: 4/09/24   Up coming apt:5/20/24    Pharmacy:[id number] - [address]   Is this something you can do?

## 2024-05-13 RX ORDER — GABAPENTIN 300 MG/1
300 CAPSULE ORAL 3 TIMES DAILY
Qty: 90 CAPSULE | Refills: 0 | Status: SHIPPED | OUTPATIENT
Start: 2024-05-13

## 2024-05-14 ENCOUNTER — HOSPITAL ENCOUNTER (OUTPATIENT)
Dept: CARDIOLOGY | Facility: HOSPITAL | Age: 79
Discharge: HOME | End: 2024-05-14
Attending: INTERNAL MEDICINE
Payer: MEDICARE

## 2024-05-14 VITALS
HEIGHT: 68 IN | WEIGHT: 203 LBS | BODY MASS INDEX: 30.77 KG/M2 | HEIGHT: 68 IN | BODY MASS INDEX: 30.77 KG/M2 | WEIGHT: 203 LBS

## 2024-05-14 DIAGNOSIS — R60.0 LOCALIZED EDEMA: ICD-10-CM

## 2024-05-14 DIAGNOSIS — I73.9 PVD (PERIPHERAL VASCULAR DISEASE): ICD-10-CM

## 2024-05-14 LAB
LEFT ABI: 1.68
LEFT ARM BP: 115 MMHG
LEFT DORSALIS PEDIS: 193 MMHG
LEFT POSTERIOR TIBIAL: 123 MMHG
LEFT TBI: 0.45
LEFT TOE PRESSURE: 52 MMHG
RIGHT ABI: 1.21
RIGHT ARM BP: 114 MMHG
RIGHT POSTERIOR TIBIAL: 139 MMHG
RIGHT TBI: 0.94
RIGHT TOE PRESSURE: 108 MMHG

## 2024-05-14 PROCEDURE — 93922 UPR/L XTREMITY ART 2 LEVELS: CPT | Mod: 26,,, | Performed by: INTERNAL MEDICINE

## 2024-05-14 PROCEDURE — 93970 EXTREMITY STUDY: CPT

## 2024-05-14 PROCEDURE — 93922 UPR/L XTREMITY ART 2 LEVELS: CPT

## 2024-05-14 PROCEDURE — 93970 EXTREMITY STUDY: CPT | Mod: 26,,, | Performed by: INTERNAL MEDICINE

## 2024-05-16 ENCOUNTER — TELEPHONE (OUTPATIENT)
Dept: PAIN MEDICINE | Facility: CLINIC | Age: 79
End: 2024-05-16
Payer: MEDICARE

## 2024-05-16 ENCOUNTER — TELEPHONE (OUTPATIENT)
Dept: CARDIOLOGY | Facility: CLINIC | Age: 79
End: 2024-05-16
Payer: MEDICARE

## 2024-05-16 DIAGNOSIS — M79.7 FIBROMYALGIA: Chronic | ICD-10-CM

## 2024-05-16 DIAGNOSIS — F32.9 CHRONIC MAJOR DEPRESSIVE DISORDER: Chronic | ICD-10-CM

## 2024-05-16 NOTE — TELEPHONE ENCOUNTER
Spoke with pt in regards to test results. Pt verbalized understanding information.                     ----- Message from Jesus Dumont MD sent at 5/15/2024  7:34 PM CDT -----  LE venous US showed no DVT  ABIs normal; no arterial blockage

## 2024-05-16 NOTE — TELEPHONE ENCOUNTER
Called patient in regards to switching in office visit to virtual. Patient declined offer.    Lisa Price MA

## 2024-05-17 RX ORDER — FLUOXETINE HYDROCHLORIDE 40 MG/1
40 CAPSULE ORAL DAILY
Qty: 90 CAPSULE | Refills: 1 | Status: SHIPPED | OUTPATIENT
Start: 2024-05-17

## 2024-05-17 NOTE — TELEPHONE ENCOUNTER
No care due was identified.  [facility] Embedded Care Due Messages. Reference number: [id number].   5/16/2024 11:03:16 PM CDT

## 2024-05-20 ENCOUNTER — OFFICE VISIT (OUTPATIENT)
Dept: PAIN MEDICINE | Facility: CLINIC | Age: 79
End: 2024-05-20
Payer: MEDICARE

## 2024-05-20 ENCOUNTER — LAB VISIT (OUTPATIENT)
Dept: LAB | Facility: HOSPITAL | Age: 79
End: 2024-05-20
Attending: ANESTHESIOLOGY
Payer: MEDICARE

## 2024-05-20 VITALS
DIASTOLIC BLOOD PRESSURE: 75 MMHG | HEART RATE: 96 BPM | WEIGHT: 207.44 LBS | HEIGHT: 68 IN | SYSTOLIC BLOOD PRESSURE: 134 MMHG | BODY MASS INDEX: 31.44 KG/M2 | RESPIRATION RATE: 18 BRPM

## 2024-05-20 DIAGNOSIS — N18.31 CHRONIC KIDNEY DISEASE, STAGE 3A: ICD-10-CM

## 2024-05-20 DIAGNOSIS — M54.16 LUMBAR RADICULOPATHY: ICD-10-CM

## 2024-05-20 DIAGNOSIS — M53.3 SACROILIAC JOINT PAIN: Primary | ICD-10-CM

## 2024-05-20 DIAGNOSIS — M17.0 PRIMARY OSTEOARTHRITIS OF BOTH KNEES: ICD-10-CM

## 2024-05-20 DIAGNOSIS — M54.59 DISCOGENIC LUMBAR PAIN: ICD-10-CM

## 2024-05-20 LAB
ALBUMIN SERPL BCP-MCNC: 3.5 G/DL (ref 3.5–5.2)
ANION GAP SERPL CALC-SCNC: 10 MMOL/L (ref 8–16)
BUN SERPL-MCNC: 17 MG/DL (ref 8–23)
CALCIUM SERPL-MCNC: 10 MG/DL (ref 8.7–10.5)
CHLORIDE SERPL-SCNC: 102 MMOL/L (ref 95–110)
CO2 SERPL-SCNC: 30 MMOL/L (ref 23–29)
CREAT SERPL-MCNC: 0.9 MG/DL (ref 0.5–1.4)
EST. GFR  (NO RACE VARIABLE): >60 ML/MIN/1.73 M^2
GLUCOSE SERPL-MCNC: 192 MG/DL (ref 70–110)
PHOSPHATE SERPL-MCNC: 3.1 MG/DL (ref 2.7–4.5)
POTASSIUM SERPL-SCNC: 3.9 MMOL/L (ref 3.5–5.1)
SODIUM SERPL-SCNC: 142 MMOL/L (ref 136–145)

## 2024-05-20 PROCEDURE — 99214 OFFICE O/P EST MOD 30 MIN: CPT | Mod: S$PBB,,, | Performed by: ANESTHESIOLOGY

## 2024-05-20 PROCEDURE — 80069 RENAL FUNCTION PANEL: CPT | Performed by: ANESTHESIOLOGY

## 2024-05-20 PROCEDURE — 99999 PR PBB SHADOW E&M-EST. PATIENT-LVL V: CPT | Mod: PBBFAC,,, | Performed by: ANESTHESIOLOGY

## 2024-05-20 PROCEDURE — 36415 COLL VENOUS BLD VENIPUNCTURE: CPT | Performed by: ANESTHESIOLOGY

## 2024-05-20 PROCEDURE — 99215 OFFICE O/P EST HI 40 MIN: CPT | Mod: PBBFAC | Performed by: ANESTHESIOLOGY

## 2024-05-20 NOTE — PROGRESS NOTES
"Established Patient Interventional Pain Clinic Visit    The patient location is: home  The chief complaint leading to consultation is: back and hip pain  Visit type: Virtual visit with synchronous audio and video  Total time spent with patient: 11-15m  Each patient to whom he or she provides medical services by telemedicine is: (1) informed of the relationship between the physician and patient and the respective role of any other health care provider with respect to management of the patient; and (2) notified that he or she may decline to receive medical services by telemedicine and may withdraw from such care at any time.    Chief Pain Complaint:  Chief Complaint   Patient presents with    Low-back Pain     Patient received 90% of relief from injection.  Patient has lower back pain and headache.  Pain level 2/10     Interval history 05/20/2024  Patient presents status post bilateral L4-5 transforaminal epidural steroid injection 04/09/2024.  Patient reports approximately 90% sustained relief in lower extremity radicular symptoms following her procedure.  Patient does report confusion regarding epidural steroid injection as with her prior medical history, she was familiar with an interlaminar approach on the [facility].  She does report intermittent sciatic pain which can occur with certain movements, such as awakening in the morning, driving or crossing her legs.  Pain today is rated a 2/10.  She reports recent bouts of fibromyalgia flares." She does believes Savella medication at 100 mg twice daily has these symptoms well controlled.  She does report her pharmacy, [facility] does not keep this medication in stock and most recently she had to rash in her medication into 50 mg doses and still Ms. Day dose for 1 day.  Today she continues to report sustained relief in lower discogenic back pain following via disc allograft, 1 year prior.  Patient does report it is difficult to participate in exercise including " aquatic therapy secondary to exacerbation of fibromyalgia.  Patient does remain active performing household activities.  She does report that she lives in a [facility] and is able to ambulate on her Street.  Patient expresses concern regarding chart review.  She reports diagnosis of stage 3 chronic kidney disease and is inquiring regarding her renal function.  We have reviewed her labs and I have encouraged her to reach out to her primary care physician regarding potential nephrology referral if needed.      Interval history 02/15/2024  Patient presents status post bilateral sacroiliac joint and greater trochanteric bursa injection 01/09/2024 and  Bilateral intra-articular knee injection with Synvisc-One 01/23/2024.  Patient reports at least 80% sustained relief overlying bilateral sacroiliac joints, GTB and in bilateral knees following her procedures.  Today her primary concern is pain in a bandlike distribution in the lower back which radiates down into the hips and down towards the feet in L4-5 dermatomal distribution.  Pain is exacerbated with positional changes moving from sitting to standing and with standing and with ambulation.  She does report associated weakness in the lower extremities associated with her pain.  Pain today is rated a 6/10.  She is continued gabapentin 300 mg in the morning, 300 mg in the afternoon and 600 mg in the evening.  She also increased Savella to 100 mg with noticeable improvement in her pain.  She is continued physician directed physical therapy exercises over the last 8 weeks from 12/15/2023 through 02/15/2024 with noticeable improvement in pain, range of motion and functionality.  She denies bowel or bladder incontinence saddle anesthesia.    Interval Hx: 12/13/2023  Patient presents for four-month follow-up.  Today she reports that her lower back has been feeling excellent since via disc supplementation and that the procedure has made all the difference! Particularly with  standing and ambulation.  Patient reports the day following Thanksgiving, rolling off of her bed and falling onto her side with significant difficulty arising to a standing position and pain with standing and ambulation following this trauma.  Today she reports pain over bilateral sacroiliac territory which radiates into the hips as well as pain in bilateral knees.  Pain is rated a 4/10.  Pain is exacerbated with positional changes, standing and with ambulation.  She is continued Savella 50 mg twice daily which she reports has significantly reduced the severity and duration of fibromyalgia flares.  She has requesting a refill or increase in this medication.  She is continued physician directed physical therapy exercises over the last 8 weeks from 10/13/2023 through 12/13/2023 for lower back, sacroiliac joint and bilateral knee pain.      Interval history 08/24/2023  Patient presents status post bilateral sacroiliac joint and greater trochanteric bursa injection 06/21/2023.  Patient reports 95% sustained relief overlying bilateral sacroiliac joint and greater trochanteric bursa territories.  She reports altogether following 2 level via disc allograft supplementation and her most recent procedure, she is able to stand upright and ambulate further distances.  She reports these procedures have taken years off my life! .  Today pain is intermittent and rated a 2/10.  Patient has continued Savella 50 mg twice daily and reports this has significantly reduced the frequency and intensity of her fibromyalgia flares and debility associated with these flares.  She is requesting a refill of this medication.  Today she denies significant lower extremity weakness, bowel or bladder incontinence or saddle anesthesia.      Interval history 06/15/2023  Patient presents status post bilateral intra-articular knee injection 05/23/2023.  Patient reports 75% sustained improvement in bilateral knee pain following intra-articular knee  injection.  Today her primary concern is bilateral hip pain.  Patient reports pain in the lower back which radiates into the groin and down the lateral aspect of bilateral lower extremities to mid thigh.  Patient reports pain is exacerbated 1st thing in the morning when she is attempting to get out of bed.  Patient denies more distal radiculopathy into the lower extremities or feet.  She denies lower extremity weakness, bowel or bladder incontinence or saddle anesthesia.  Patient continues to reports significant improvement with Savella medication which she is taking 50 mg twice daily with fibromyalgia pain.  She is requesting a refill of this medication.  Patient reports lower back pain continues to have greater than 80% sustained relief following 2 level via disc allograft supplementation.      Interval history 05/18/2023  Patient presents status post L5-S1 via disc allograft supplementation 04/25/2023 and via disc allograft supplementation L3-4 05/09/2023.  Patient reports noticeable improvement >80% in lower back pain following two-level  allograft supplementation.  Today her primary concern is bilateral knee pain.  Patient has questions regarding hyaluronic acid supplementation to be use.  Patient reports she has seen videos on Durolane and Euflexxa and is inquiring to the brand to be used on the upcoming Tuesday.  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  She is requesting up-to-date imaging to investigate these territories.  Today she denies significant weakness in the upper lower extremities, bowel or bladder incontinence or saddle anesthesia.      Interval history 04/06/2023  Patient presents for follow-up of lower back pain.  She continues to reports superior relief in fibromyalgia symptoms on Savella medication.  Patient has brought in denial paperwork from her insurance reporting limitations on dosage and  quantity allowed.  Patient reports prior to starting this medication she felt that she did not have a life.  now she reports significant improvement in pain as well as chronic fatigue associated with fibromyalgia.  Today she again reports pain in the lower back which is worse with lumbar flexion.  Patient reports she is unable to perform activities of daily living such as grocery shopping or prolonged standing or ambulation secondary to her discogenic lower back pain.  Today patient denies more distal radiculopathy into the lower extremities or feet or lower extremity weakness.  Patient is interested in discussing intervention.  Of note patient has failed to have improvement in his lower back pain with prior lumbar medial branch block, sacroiliac joint injections.    Interval Hx: 3/9/23  Patient presents for one-month follow-up.  Today she reports she is significantly better since our last clinic visit.  At that time patient had slipped in a low off and injured her lower back and right leg.  Today she reports this pain is significantly improved and pain is intermittent and today is rated a 3/10.  Today patient reports pain is isolated to the midline lower lumbar spine.  Pain is exacerbated with lumbar flexion such as when she is picking up close.  Fibromyalgia Pain has been significantly improved with the initiation of Savella medication.  Patient has reached a titration of 50 mg twice daily.  Patient recently refilled this medication.  She denies any side effects from this medication.  Today she denies any significant lower extremity weakness, bowel or bladder incontinence or saddle anesthesia      Interval history 02/07/2023  Patient presents status post bilateral sacroiliac joint and greater trochanteric bursa injection 01/24/2023 and bilateral L3-5 lumbar medial branch block 12/13/2022.  Patient had recent mechanical fall confounding benefit from recent injections.  Today she reports she was reaching over a  mattress cover to reach a stack of bibles and slid into a slint.  Patient reports she was behind and tall wall in a took 20-30 minutes for her to stand.  Patient also reports exacerbation of fibromyalgia pain.  Since her fall patient reports pain which radiates into the buttock and down the posterior aspect of the right lower extremity to the dorsum of the foot in L4-S1 distribution.  Patient has continued gabapentin 300 mg twice daily, Celebrex in the evenings as well as Tylenol 1000 mg twice daily.    Interval history 11/29/2022    Patient presents status post bilateral sacroiliac joint greater trochanteric bursa 10/10/2022.  Patient reports 90% relief overlying bilateral sacroiliac joints and greater trochanteric bursa following her injection.  Today she reports primarily lumbar axial back pain as well as significant neck pain.  Lower back pain is exacerbated with prolonged standing such as when she is washing dishes.  She denies significant distal radiculopathy into the right lower extremities as described at our last clinic visit.  Neck pain is elicited with cervical flexion, extension and lateral flexion and she does report reduced mobility.  She reports her pain began following a mechanical fall down the stairs at [facility] in September 1986.  Patient has continued gabapentin 300 mg in the morning, 300 mg in the afternoon and 600 mg in the evening.    Interval history 10/04/2022  Ms. Mccollum is a 77-year-old female with past medical history significant for depression, hyperlipidemia, hypertension, mitral valve prolapse, peripheral vascular disease, history of COVID-19, type 2 diabetes, multi joint arthritis, fibromyalgia who presents to [facility] care, previous Dr. Dutta patient.  Today patient reports return of pain in the lower back which radiates into bilateral hips and down the posterior aspect of the right lower extremity in L4-5 distribution to the dorsum of the foot.  Patient reports pain is intermittent but  has increased in intensity.  Pain is described as shocking in nature and today is rated a 6/10.  Patient reports she feels as if her skin is crawling.  patient is currently taking gabapentin 300 mg up to 3 times daily when pain is severe.  Pain interferes with the patient's sleep.  Patient also reports history of fibromyalgia which limits her mobility and duration of standing and ambulation.  Patient does endorse associated weakness in the lower extremities associated with her pain.  Patient is interested in pursuing repeat intervention as she is obtained several months of significant relief with prior sacroiliac joint and greater trochanteric bursa injections.  Patient has continued physician directed physical therapy exercises at home daily.      History of Present Illness 01/16/2020: Dr. Dutta:   Kaylin Mccollum is a 74 y.o. female  who is presenting with a chief complaint of lumbar back pain. The patient began experiencing this problem insidiously, and the pain has been gradually worsening over the past 5 month(s). The pain is described as throbbing, shooting, burning and electrical and is located in the bilateral lumbar spine. Pain is intermittent and lasts hours. The pain radiates to bilateral lower extremities L4 distribudution. The patient rates her pain a 8 out of ten and interferes with activities of daily living a 7 out of ten. Pain is exacerbated by flexion of the lumbar spine, ambulation, and is improved by rest.      She also complains of right knee pain. The patient began experiencing this problem insidiously. The pain is described as cramping, aching and is located in the right knee. Pain is intermittent and lasts hours. The pain is nonradiating. The patient rates her pain a 8 out of ten and interferes with activities of daily living a 7 out of ten. Pain is exacerbated by getting up from a seated position and standing, and is improved by rest. Patient reports no prior trauma, prior arthroscopy  bilaterally in 1990s.       - pertinent negatives: No fever, No chills, No weight loss, No bladder dysfunction, No bowel dysfunction, No saddle anesthesia  - pertinent positives: none        Pain Disability Index Review:   @[facility](4749:3)@    Non-Pharmacologic Treatments:  Physical Therapy/Home Exercise: yes  Ice/Heat:yes  TENS: no  Acupuncture: no  Massage: no  Chiropractic: no    Other: no      Pain Medications:  - Adjuvant Medications: Lorazepam (Ativan), Neurontin (Gabapentin), and Topical Ointment (Voltaren Gel, Steroid cream, Anti-Inflammatory Cream, Compound cream)      Pain injections:  Dr. Dumont:  -04/09/2024: Bilateral L4-5 transforaminal epidural steroid injection  -01/23/2024: Bilateral intra-articular knee injection with Synvisc-One  -01/09/2024: Bilateral sacroiliac joint and greater trochanteric bursa injection  -06/21/2023: Bilateral sacroiliac joint and greater trochanteric bursa injection  -05/29/2023: Bilateral intra-articular knee injection  -05/09/2023: L3-4 via disc allograft supplementation  -04/25/2023:  L5-S1 via disc allograft supplementation  -01/24/2023: Bilateral sacroiliac joint and greater trochanteric bursa injection  -12/13/2022: Bilateral L3-5 lumbar medial branch block  - 10/10/2022: Bilateral greater trochanteric bursa and sacroiliac joint injection      -04/20/2022: Right-sided acetabular femoral injection; Dr. Dutta  -03/01/2022: Bilateral sacroiliac joint and greater trochanteric bursa injection; Dr. Dutta  -07/08/2021: Bilateral sacroiliac joint and greater trochanteric bursa injection; Dr. Dutta  -01/05/2021: Bilateral sacroiliac joint and greater trochanteric bursa injection; Dr. Burns  -10/08/2020: Bilateral sacroiliac joint and bilateral greater trochanteric bursa injection; Dr. Dutta  -05/06/2020: Bilateral sacroiliac joint and greater trochanteric bursa injection; Dr. Dutta    Past Medical History:   Diagnosis Date    Arthritis     Cataract     COVID-19 02/25/2021     "Diabetes mellitus 1995    BS didn't check 09/13/2022    Diabetes mellitus, type 2     Fibromyalgia     General anesthetics causing adverse effect in therapeutic use     bradycardia     Hypertension     Migraines     Mitral valve prolapse     Osteoarthritis     Rotator cuff tear 04/01/2015       Review of patient's allergies indicates:   Allergen Reactions    Demerol [meperidine] Other (See Comments)     Burning when adm IV  Able to tolerate IM, "Turned the veins in my hand purple."    Latex, natural rubber Other (See Comments)     "Burns my skin",  Symptoms get worse the longer she is exposed    Zocor [simvastatin] Other (See Comments)     Tightening of muscles    Statins-hmg-coa reductase inhibitors Other (See Comments)     myopathy    Sulfa (sulfonamide antibiotics)      Blurred vision    Nickel Rash     Pt states she had sores to ear lobes from nickel in earrings        Past Surgical History:   Procedure Laterality Date    ARTHROSCOPY OF KNEE Right 03/10/2020    Procedure: ARTHROSCOPY, KNEE;  Surgeon: Les Schneider MD;  Location: [facility] OR;  Service: Orthopedics;  Laterality: Right;    CATARACT EXTRACTION W/  INTRAOCULAR LENS IMPLANT Left 07/19/2017    CATARACT EXTRACTION W/  INTRAOCULAR LENS IMPLANT Right 2017    CHOLECYSTECTOMY      CHONDROPLASTY OF KNEE Right 03/10/2020    Procedure: CHONDROPLASTY, KNEE;  Surgeon: Les Schneider MD;  Location: [facility] OR;  Service: Orthopedics;  Laterality: Right;  Anterior compartment     COLONOSCOPY N/A 09/25/2018    Procedure: COLONOSCOPY;  Surgeon: Bethel Carmona MD;  Location: [facility] ENDO;  Service: Endoscopy;  Laterality: N/A;    EXCISION OF MEDIAL MENISCUS OF KNEE Right 03/10/2020    Procedure: MENISCECTOMY, KNEE, MEDIAL;  Surgeon: Les Schneider MD;  Location: [facility] OR;  Service: Orthopedics;  Laterality: Right;  Partial, Medial , Lateral     EYE SURGERY      INJECTION OF ANESTHETIC AGENT AROUND MEDIAL BRANCH NERVES INNERVATING LUMBAR FACET JOINT Bilateral " 12/13/2022    Procedure: Bilateral L3-5 MBB;  Surgeon: Humberto Dumont MD;  Location: [facility] PAIN MGT;  Service: Pain Management;  Laterality: Bilateral;    INJECTION OF ANESTHETIC AGENT AROUND NERVE Right 08/08/2019    Procedure: Right Genicular nerve block with local;  Surgeon: Manpreet Dutta MD;  Location: [facility] PAIN MGT;  Service: Pain Management;  Laterality: Right;    INJECTION OF ANESTHETIC AGENT INTO SACROILIAC JOINT Bilateral 05/06/2020    Procedure: Bilateral Sacroiliac Joint Injection;  Surgeon: Manpreet Dutta MD;  Location: [facility] PAIN MGT;  Service: Pain Management;  Laterality: Bilateral;    INJECTION OF ANESTHETIC AGENT INTO SACROILIAC JOINT Bilateral 10/08/2020    Procedure: Bilateral SI and Bilateral GTB with RN IV sedation;  Surgeon: Manpreet Dutta MD;  Location: [facility] PAIN MGT;  Service: Pain Management;  Laterality: Bilateral;    INJECTION OF ANESTHETIC AGENT INTO SACROILIAC JOINT Bilateral 01/05/2021    Procedure: Bilateral BLOCK, SACROILIAC JOINT and Bilateral GTB witn RN IV sedation;  Surgeon: Daniel Burns MD;  Location: [facility] PAIN MGT;  Service: Pain Management;  Laterality: Bilateral;    INJECTION OF ANESTHETIC AGENT INTO SACROILIAC JOINT Bilateral 07/08/2021    Procedure: Bilateral BLOCK, SACROILIAC JOINT bilateral GTB RN IV sedation;  Surgeon: Manpreet Dutta MD;  Location: [facility] PAIN MGT;  Service: Pain Management;  Laterality: Bilateral;    INJECTION OF ANESTHETIC AGENT INTO SACROILIAC JOINT Bilateral 03/01/2022    Procedure: Bilateral BLOCK, SACROILIAC JOINT and Bilateral GTB RN IV sedation;  Surgeon: Manpreet Dutta MD;  Location: [facility] PAIN MGT;  Service: Pain Management;  Laterality: Bilateral;    INJECTION OF ANESTHETIC AGENT INTO SACROILIAC JOINT Bilateral 10/10/2022    Procedure: Bilateral Sacroiliac Joint Injection;  Surgeon: Humberto Dumont MD;  Location: [facility] PAIN MGT;  Service: Pain Management;  Laterality: Bilateral;    INJECTION OF ANESTHETIC AGENT INTO SACROILIAC JOINT Bilateral 01/24/2023     Procedure: Bilateral Sacroiliac Joint Injection;  Surgeon: Humberto Dumont MD;  Location: [facility] PAIN MGT;  Service: Pain Management;  Laterality: Bilateral;    INJECTION OF ANESTHETIC AGENT INTO SACROILIAC JOINT Bilateral 06/21/2023    Procedure: Bilateral Sacroiliac Joint Injection;  Surgeon: Humberto Dumont MD;  Location: [facility] PAIN MGT;  Service: Pain Management;  Laterality: Bilateral;    INJECTION OF ANESTHETIC AGENT INTO SACROILIAC JOINT Bilateral 1/9/2024    Procedure: Bilateral SIJ Injection;  Surgeon: Humberto Dumont MD;  Location: [facility] PAIN MGT;  Service: Pain Management;  Laterality: Bilateral;    INJECTION OF JOINT Bilateral 09/05/2019    Procedure: Bilateral GT bursa injection;  Surgeon: Manpreet Dutta MD;  Location: [facility] PAIN [facility]GT;  Service: Pain Management;  Laterality: Bilateral;    INJECTION OF JOINT Bilateral 05/06/2020    Procedure: Bilateral GT bursa injection;  Surgeon: Manpreet Dutta MD;  Location: [facility] PAIN [facility]GT;  Service: Pain Management;  Laterality: Bilateral;    INJECTION OF JOINT Right 04/28/2022    Procedure: Injection, Joint Right Hip Injection RN IV sedation;  Surgeon: Manpreet Dutta MD;  Location: [facility] PAIN [facility]GT;  Service: Pain Management;  Laterality: Right;    INJECTION OF JOINT Bilateral 10/10/2022    Procedure: Bilateral GT bursa injection with RN IV sedation;  Surgeon: Humberto Dumont MD;  Location: [facility]IN [facility]GT;  Service: Pain Management;  Laterality: Bilateral;    INJECTION OF JOINT Bilateral 01/24/2023    Procedure: Bilateral GT bursa injection;  Surgeon: Humberto Dumont MD;  Location: [facility]IN [facility]GT;  Service: Pain Management;  Laterality: Bilateral;    INJECTION OF JOINT Bilateral 05/23/2023    Procedure: Bilateral intraarticular knee injection with Synvisc-1; ;  Surgeon: Humberto Dumont MD;  Location: [facility]IN [facility]GT;  Service: Pain Management;  Laterality: Bilateral;    INJECTION OF JOINT Bilateral 06/21/2023    Procedure: Bilateral GT bursa injection;  Surgeon: Humberto Dumont MD;   Location: [facility] PAIN [facility]GT;  Service: Pain Management;  Laterality: Bilateral;    INJECTION OF JOINT Bilateral 1/9/2024    Procedure: Bilateral GTB injection;  Surgeon: Humberto Dumont MD;  Location: [facility] PAIN [facility]GT;  Service: Pain Management;  Laterality: Bilateral;    INJECTION OF JOINT Bilateral 1/23/2024    Procedure: Bilateral intraarticular knee injection with Synvisc 1 ;  Surgeon: Humberto Dumont MD;  Location: [facility] PAIN [facility]GT;  Service: Pain Management;  Laterality: Bilateral;    INJECTION, ALLOGRAFT, INTERVERTEBRAL DISC N/A 04/25/2023    Procedure: INJECTION,ALLOGRAFT,INTERVERTEBRAL DISC,1ST LEVEL: L5-S1;  Surgeon: Humberto Dumont MD;  Location: [facility] PAIN [facility]GT;  Service: Pain Management;  Laterality: N/A;    INJECTION, ALLOGRAFT, INTERVERTEBRAL DISC N/A 05/09/2023    Procedure: INJECTION,ALLOGRAFT,INTERVERTEBRAL DISC,2nd LEVEL: L3-4;  Surgeon: Humberto Dumont MD;  Location: [facility] PAIN [facility]GT;  Service: Pain Management;  Laterality: N/A;    KNEE ARTHROSCOPY Bilateral     PARS PLANA VITRECTOMY W/ REPAIR OF MACULAR HOLE Left 02/22/2017    RADIOFREQUENCY THERMOCOAGULATION Left 07/11/2019    Procedure: Right SIJ RFA;  Surgeon: Manpreet Dutta MD;  Location: [facility] PAIN [facility]GT;  Service: Pain Management;  Laterality: Left;    RADIOFREQUENCY THERMOCOAGULATION Right 07/25/2019    Procedure: Right SIJ RFA;  Surgeon: Manpreet Dutta MD;  Location: [facility] PAIN [facility]GT;  Service: Pain Management;  Laterality: Right;    SELECTIVE INJECTION OF ANESTHETIC AGENT AROUND LUMBAR SPINAL NERVE ROOT BY TRANSFORAMINAL APPROACH Bilateral 4/9/2024    Procedure: Bilateral L4/5 TF JAMIN;  Surgeon: Humberto Dumont MD;  Location: [facility] PAIN [facility]GT;  Service: Pain Management;  Laterality: Bilateral;    SHOULDER ARTHROSCOPY Right 06/04/2015     Current Outpatient Medications on File Prior to Visit   Medication Sig Dispense Refill    b complex vitamins tablet Take 1 tablet by mouth once daily.      biotin 1 mg tablet Take 1,000 mcg by mouth every morning.        celecoxib (CELEBREX) 200 MG capsule TAKE 1 CAPSULE(200 MG) BY MOUTH TWICE DAILY WITH FOOD 120 capsule 1    clotrimazole-betamethasone 1-0.05% (LOTRISONE) cream Apply topically 2 (two) times daily. 45 g 2    diclofenac sodium (VOLTAREN) 1 % Gel APPLY 2 GRAMS TOPICALLY TO THE AFFECTED AREA FOUR TIMES DAILY 100 g 2    ezetimibe (ZETIA) 10 mg tablet Take 1 tablet (10 mg total) by mouth once daily. 90 tablet 3    FLUoxetine 40 MG capsule Take 1 capsule (40 mg total) by mouth once daily. 90 capsule 1    fluticasone (FLONASE) 50 mcg/actuation nasal spray 2 sprays by Each Nare route once daily. (Patient taking differently: 2 sprays by Each Nostril route nightly as needed.) 1 Bottle 0    gabapentin (NEURONTIN) 300 MG capsule Take 1 capsule (300 mg total) by mouth 3 (three) times daily. 90 capsule 0    hydroCHLOROthiazide (HYDRODIURIL) 25 MG tablet TAKE 1 TABLET(25 MG) BY MOUTH EVERY DAY 90 tablet 1    INVOKANA 100 mg Tab tablet TAKE 1 TABLET(100 MG) BY MOUTH EVERY DAY 90 tablet 0    losartan (COZAAR) 25 MG tablet Take 0.5 tablets (12.5 mg total) by mouth every evening. 45 tablet 1    metFORMIN (GLUCOPHAGE) 1000 MG tablet TAKE 1 TABLET(1000 MG) BY MOUTH TWICE DAILY WITH MEALS 180 tablet 3    metoprolol succinate (TOPROL-XL) 25 MG 24 hr tablet TAKE 1 TABLET(25 MG) BY MOUTH EVERY DAY 90 tablet 3    omeprazole (PRILOSEC) 20 MG capsule TAKE ONE CAPSULE BY MOUTH EVERY DAY AS NEEDED WITH NSAID 30 capsule 5    potassium chloride SA (K-DUR,KLOR-CON M) 10 MEQ tablet TAKE 1 TABLET(10 MEQ) BY MOUTH EVERY DAY 90 tablet 1    pulse oximeter (PULSE OXIMETER) device by Apply Externally route 2 (two) times a day. Use twice daily at 8 AM and 3 PM and [facility]. 1 each 0    semaglutide (OZEMPIC) 0.25 mg or 0.5 mg (2 mg/3 mL) pen injector Inject 0.25 mg into the skin every 7 days. 3 mL 1    verapamiL (CALAN) 80 MG tablet TAKE 1 TABLET(80 MG) BY MOUTH TWICE DAILY 180 tablet 2    vitamin D (VITAMIN D3) 1000 units Tab  Take 1,000 Units by mouth once daily.      furosemide (LASIX) 20 MG tablet Take 1 tablet (20 mg total) by mouth 2 (two) times daily. 60 tablet 11    milnacipran (SAVELLA) 100 mg Tab Take 1 tablet (100 mg total) by mouth 2 (two) times daily. 60 tablet 2    triamcinolone acetonide 0.025% (KENALOG) 0.025 % Oint Apply topically 2 (two) times daily. for 10 days (Patient taking differently: Apply topically 2 (two) times daily as needed.) 15 g 2     No current facility-administered medications on file prior to visit.               GENERAL:  No weight loss, malaise or fevers.  HEENT:   No recent changes in vision or hearing  NECK:  Negative for lumps, no difficulty with swallowing.  RESPIRATORY:  Negative for cough, wheezing or shortness of breath, patient denies any recent URI.  CARDIOVASCULAR:  Negative for chest pain or palpitations.  GI:  Negative for abdominal discomfort, blood in stools or black stools or change in bowel habits.  MUSCULOSKELETAL:  See HPI.  SKIN:  Negative for lesions, rash, and itching.  PSYCH:  No mood disorder or recent psychosocial stressors.   HEMATOLOGY/LYMPHOLOGY:  Negative for prolonged bleeding, bruising easily or swollen nodes.    NEURO:   No history of syncope, paralysis, seizures or tremors.  All other reviewed and negative other than HPI.    Imaging / Labs / Studies (reviewed on 5/20/2024):    Cervical x-ray 05/18/2023   FINDINGS:  Osteopenia.  Vertebral body heights maintained.  Mild anterolisthesis of C4 on C5 and C5 on C6.  Multilevel osteophyte changes with disc height loss most noted at C5-6 and C6-7.  Multilevel prominent facet arthropathy.  Multilevel left-sided neural foraminal narrowing.     Significant change in alignment on flexion or extension.     Prevertebral soft tissues unremarkable.  Lung apices clear.    Thoracic x-ray 05/18/2023  FINDINGS:  Osteopenia.  Vertebral body heights maintained.  No spondylolisthesis.  Similar more multilevel bridging osteophyte changes.  No  "acute osseous abnormality.  Soft tissues unremarkable.      MRI Lumbar Spine 02/16/2023  FINDINGS:  Grade 1 degenerative spondylolisthesis at L4-L5.  Vertebral body height is normal.  Marrow signal is within normal limits. The conus medullaris terminates at the level of L1-L2.  No abnormal signal within the conus. Intervertebral disc levels are as follows:     T12-L1 disc: Normal disc height with anterior osteophytes and mild degenerative facet hypertrophy.  No spinal or foraminal stenosis.  The dural canal measures 16 mm.     L1-L2 disc : Disc space height loss with chronic Schmorl's nodes.  Posterior disc bulge.  Anterior osteophytes.  Minor facet arthropathy bilaterally.  The dural canal measures 13 mm.  No foraminal stenosis.     L2-L3 disc: Circumferential disc bulge with tiny chronic Schmorl's nodes.  Anterior osteophytes.  Mild degenerative facet hypertrophy.  The dural canal measures 12 mm.  No foraminal stenosis.     L3-L4 disc: Disc space height loss with a circumferential disc bulge and anterior osteophytes.  Mild degenerative facet hypertrophy with moderate buckling of the ligamentum flavum.  The dural canal measures 11 mm.  No significant foraminal stenosis.     L4-L5 disc: Grade 1 spondylolisthesis with severe degenerative facet hypertrophy bilaterally.  Buckling of the ligamentum flavum.  The dural canal measures 7 mm AP.  Disc encroaches into the floors of the exit foramina, right greater than left.  There is mild right foraminal stenosis.     L5-S1 disc: Severe disc space height loss with osteophytes at the margins and encroach into the exit foramina.  Mild degenerative facet hypertrophy and buckling of the ligamentum flavum.  The dural canal measures 11 mm.  Mild foraminal stenosis.      Physical Exam:  Last clinic visit:  Vitals:    05/20/24 0742   BP: 134/75   Pulse: 96   Resp: 18   Weight: 94.1 kg (207 lb 7.3 oz)   Height: 5' 8" (1.727 m)   PainSc:   2              Body mass index is 31.54 " kg/m².   (reviewed on 5/20/2024)    General: alert and oriented, in no apparent distress.  Gait: normal gait.  Skin: no rashes, no discoloration, no obvious lesions  HEENT: normocephalic, atraumatic. Pupils equal and round.  Cardiovascular: no significant peripheral edema present.  Respiratory: without use of accessory muscles of respiration.    Musculoskeletal - Lumbar Spine:  - ROM fairly preserved   - Pain on flexion of lumbar spine: Absent   - Pain on extension of lumbar spine: Absent         - Lumbar facet loading: Absent   - TTP over the lumbar facet joints: Absent  - TTP over the lumbar paraspinals: Present   - TTP over the SI joints: Present  - TTP over GT bursa: Present, minimal   - Straight Leg Raise: Negative  - CHERYLE: Present    Right Knee:  - TTP: Present over medial/ lateral joint line  - Pain with extension: Present  - Pain with flexion: Present  - Crepitus: Present     Neuro - Lower Extremities:  - BLE Strength: R/L: HF: 5/5, HE: 5/5, KF: 5/5; KE: 5/5; FE: 5/5; FF: 5/5  - Extremity Reflexes: Brisk and symmetric throughout  - Sensory: Sensation to light touch intact bilaterally      Psych:  Mood and affect is appropriate    Assessment:  Kaylin Mccollum is a 79 y.o. year old female who is presenting with       ICD-10-CM ICD-9-CM    1. Sacroiliac joint pain  M53.3 724.6       2. Primary osteoarthritis of both knees  M17.0 715.16       3. Lumbar radiculopathy  M54.16 724.4       4. Discogenic lumbar pain  M54.59 724.2       5. Chronic kidney disease, stage 3a  N18.31 585.3 RENAL FUNCTION PANEL              Plan:  1. Interventional:   - Status post L3-4 via disc allograft supplementation 05/09/2023 and L5-S1 via disc allograft supplementation 04/25/2023 with greater than 80% improvement in discogenic lower back pain.    -Patient has 90% sustained relief in lower extremity radicular symptoms following bilateral L4-5 transforaminal epidural steroid injection.  We discussed repeating this injection in the  future should radicular symptoms exacerbate.    Anticoagulation:  None, no anticoagulation    2. Pharmacologic:    reviewed and consistent with use    -Continue gabapentin.  We have reviewed potential side effects of this medication including daytime somnolence, weight gain and peripheral edema  -Gabapentin 300 mg in the morning, 300 mg in the afternoon and 600 mg in the evening    -Continue Savella as this tremendously helps with symptoms of fibromyalgia.  We have discussed that Savella is FDA approved and has demonstrated improvement in multiple measures including pain, global impression of change in physical function.  We have discussed that the most frequent side effects of this medication can include nausea, constipation, vomiting, dry mouth, flushing or tachycardia.    -100 mg BID    3. Rehabilitative:   -We discussed continuing at home physician directed physical therapy to help manage the patient/s painful condition. The patient was counseled that muscle strengthening will improve the long term prognosis in regards to pain and may also help increase range of motion and mobility.  I have encouraged the patient to perform low intensity aerobic exercise to help with fibromyalgia.    4. Diagnostic:  Reviewed relevant imaging (MRI lumbar spine, x-ray cervical spine, x-ray thoracic spine) and answered patient's questions.      5. Consult:   -Dr. Schneider for knee and shoulder pain PRN  -Rheumatology: Fibromyalgia  -PCP: Dr. Olvera:  Follow-up for potential renal insufficiency    6. Follow up:  3 months     The above plan and management options were discussed at length with patient. Patient is in agreement with the above and verbalized understanding.    - I discussed the goals of interventional chronic pain management with the patient on today's visit. We discussed a multimodal and systematic approach to pain.  This includes diagnostic and therapeutic injections, adjuvant pharmacologic treatment, physical  therapy, and at times psychiatry.  I emphasized the importance of regular exercise, core strengthening and stretching, diet and weight loss as a cornerstone of long-term pain management.    - This condition does not require this patient to take time off of work, and the primary goal of our Pain Management services is to improve the patient's functional capacity.  - Patient Questions: Answered all of the patient's questions regarding diagnoses, therapy, treatment and next steps    Humberto Dumont MD

## 2024-05-30 ENCOUNTER — OFFICE VISIT (OUTPATIENT)
Dept: PODIATRY | Facility: CLINIC | Age: 79
End: 2024-05-30
Payer: MEDICARE

## 2024-05-30 DIAGNOSIS — N18.31 TYPE 2 DIABETES MELLITUS WITH STAGE 3A CHRONIC KIDNEY DISEASE, WITHOUT LONG-TERM CURRENT USE OF INSULIN: ICD-10-CM

## 2024-05-30 DIAGNOSIS — E11.22 TYPE 2 DIABETES MELLITUS WITH STAGE 3A CHRONIC KIDNEY DISEASE, WITHOUT LONG-TERM CURRENT USE OF INSULIN: ICD-10-CM

## 2024-05-30 DIAGNOSIS — Z86.19 HISTORY OF TINEA PEDIS: Primary | ICD-10-CM

## 2024-05-30 PROCEDURE — 99213 OFFICE O/P EST LOW 20 MIN: CPT | Mod: PBBFAC | Performed by: PODIATRIST

## 2024-05-30 PROCEDURE — 99999 PR PBB SHADOW E&M-EST. PATIENT-LVL III: CPT | Mod: PBBFAC,,, | Performed by: PODIATRIST

## 2024-05-30 PROCEDURE — 99213 OFFICE O/P EST LOW 20 MIN: CPT | Mod: S$PBB,,, | Performed by: PODIATRIST

## 2024-05-30 NOTE — PROGRESS NOTES
Subjective:       Patient ID: Kaylin Mccollum is a 79 y.o. female.    Chief Complaint: Tinea Pedis (Patient states she wore socks 24/7 for a period of time. Then she started having itching, discoloration. She states she is out of the medication previously prescribed that worked well the last time this occurred. She denies pain at present. )      HPI: Kaylin Mccollum presents to the office today, for evaluation of the right foot and left foot.  She states that she was previously having recurrent tinea pedis and pruritus.  Reports that she did utilize some topical medication and moisturizing creams which significantly helped.  She states she has not currently having symptoms.  States no pain currently.  She does have history of diabetes mellitus which makes her somewhat concerned.    Hemoglobin A1C   Date Value Ref Range Status   03/01/2024 7.8 (H) 4.0 - 5.6 % Final     Comment:     ADA Screening Guidelines:  5.7-6.4%  Consistent with prediabetes  >or=6.5%  Consistent with diabetes    High levels of fetal hemoglobin interfere with the HbA1C  assay. Heterozygous hemoglobin variants (HbS, HgC, etc)do  not significantly interfere with this assay.   However, presence of multiple variants may affect accuracy.     12/13/2023 7.4 (H) 4.0 - 5.6 % Final     Comment:     ADA Screening Guidelines:  5.7-6.4%  Consistent with prediabetes  >or=6.5%  Consistent with diabetes    High levels of fetal hemoglobin interfere with the HbA1C  assay. Heterozygous hemoglobin variants (HbS, HgC, etc)do  not significantly interfere with this assay.   However, presence of multiple variants may affect accuracy.     08/30/2023 8.1 (H) 4.0 - 5.6 % Final     Comment:     ADA Screening Guidelines:  5.7-6.4%  Consistent with prediabetes  >or=6.5%  Consistent with diabetes    High levels of fetal hemoglobin interfere with the HbA1C  assay. Heterozygous hemoglobin variants (HbS, HgC, etc)do  not significantly interfere with this assay.   However,  "presence of multiple variants may affect accuracy.     .    Review of patient's allergies indicates:   Allergen Reactions    Demerol [meperidine] Other (See Comments)     Burning when adm IV  Able to tolerate IM, "Turned the veins in my hand purple."    Latex, natural rubber Other (See Comments)     "Burns my skin",  Symptoms get worse the longer she is exposed    Zocor [simvastatin] Other (See Comments)     Tightening of muscles    Statins-hmg-coa reductase inhibitors Other (See Comments)     myopathy    Sulfa (sulfonamide antibiotics)      Blurred vision    Nickel Rash     Pt states she had sores to ear lobes from nickel in earrings        Past Medical History:   Diagnosis Date    Arthritis     Cataract     COVID-19 2021    Diabetes mellitus     BS didn't check 2022    Diabetes mellitus, type 2     Fibromyalgia     General anesthetics causing adverse effect in therapeutic use     bradycardia     Hypertension     Migraines     Mitral valve prolapse     Osteoarthritis     Rotator cuff tear 2015       Family History   Problem Relation Name Age of Onset    Heart disease Mother Beena Thrasher         A-Fib    Glaucoma Mother Beena Thrasher     Cataracts Mother Beena Thrasher     Cancer Mother Beena Thrasher         colon    Arthritis Mother Beena Thrasher         : 17    Depression Mother Beena Thrasher     Depression Brother Octavio & Gutierrez Thrasher     Birth defects Brother Octavio Thrasher         Cardiac    Heart disease Brother Octavio Thrasher         Heart Surgery  as 8 y.o.    Kidney disease Daughter Yolette Mccollum         ESRD    Anesthesia problems Daughter Yolette Mccollum         cardiac arrest during nephrectomy    Birth defects Daughter Yolette Mccollum         Renal    Depression Daughter Yolette Mccollum     Learning disabilities Daughter Yolette Mccollum         Dyslexia, ADD    Depression Son Daniel & Brandon Mccollum  "    Learning disabilities Son Daniel & Brandon Mccollum         ADD & ADHD    Diabetes Maternal Aunt Nara Zamora          9/3/2023    Diabetes Maternal Uncle Madison Luke, Sr.          2019    Cancer Maternal Uncle Madison Luke, Sr.     Breast cancer Paternal Aunt      Diabetes Maternal Grandmother Bonnie Luke     Heart disease Maternal Grandmother Bonnie Luke         Congestive heart failure    Alcohol abuse Maternal Grandmother Bonnie Luke         Death: 84    Depression Maternal Grandmother Bonnie Luke     Hypertension Maternal Grandmother Bonnie Luke     Cancer Maternal Grandmother Bonnie Luke     Arthritis Maternal Grandmother Bonnie Luke     Diabetes Maternal Grandfather Attila Luke     Cancer Maternal Grandfather Attila Luke     Alcohol abuse Maternal Grandfather Attila Luke          1964    Asthma Son Brandon Mccollum        Social History     Socioeconomic History    Marital status:    Occupational History     Employer: [employer]   Tobacco Use    Smoking status: Never    Smokeless tobacco: Never    Tobacco comments:     Never smoked   Substance and Sexual Activity    Alcohol use: Not Currently    Drug use: No    Sexual activity: Not Currently     Birth control/protection: Abstinence, Post-menopausal   Social History Narrative    . 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     Social Determinants of Health     Financial Resource Strain: Low Risk  (3/2/2024)    Overall Financial Resource Strain (CARDIA)     Difficulty of Paying Living Expenses: Not very hard   Food Insecurity: No Food Insecurity (3/2/2024)    Hunger Vital Sign     Worried About Running Out of Food in the Last Year: Never true     Ran Out of Food in the Last Year: Never true   Transportation Needs: No Transportation Needs (3/2/2024)    PRAPARE - Transportation     Lack of Transportation (Medical): No     Lack of  Transportation (Non-Medical): No   Physical Activity: Inactive (3/2/2024)    Exercise Vital Sign     Days of Exercise per Week: 0 days     Minutes of Exercise per Session: 0 min   Stress: No Stress Concern Present (3/2/2024)    [place] [employer] - Occupational Stress Questionnaire     Feeling of Stress : Not at all   Housing Stability: Low Risk  (3/2/2024)    Housing Stability Vital Sign     Unable to Pay for Housing in the Last Year: No     Number of Places Lived in the Last Year: 1     Unstable Housing in the Last Year: No       Past Surgical History:   Procedure Laterality Date    ARTHROSCOPY OF KNEE Right 03/10/2020    Procedure: ARTHROSCOPY, KNEE;  Surgeon: Les Schneider MD;  Location: [facility] OR;  Service: Orthopedics;  Laterality: Right;    CATARACT EXTRACTION W/  INTRAOCULAR LENS IMPLANT Left 07/19/2017    CATARACT EXTRACTION W/  INTRAOCULAR LENS IMPLANT Right 2017    CHOLECYSTECTOMY      CHONDROPLASTY OF KNEE Right 03/10/2020    Procedure: CHONDROPLASTY, KNEE;  Surgeon: Les Schneider MD;  Location: [facility] OR;  Service: Orthopedics;  Laterality: Right;  Anterior compartment     COLONOSCOPY N/A 09/25/2018    Procedure: COLONOSCOPY;  Surgeon: Bethel Carmona MD;  Location: [facility] ENDO;  Service: Endoscopy;  Laterality: N/A;    EXCISION OF MEDIAL MENISCUS OF KNEE Right 03/10/2020    Procedure: MENISCECTOMY, KNEE, MEDIAL;  Surgeon: Les Schneider MD;  Location: [facility] OR;  Service: Orthopedics;  Laterality: Right;  Partial, Medial , Lateral     EYE SURGERY      INJECTION OF ANESTHETIC AGENT AROUND MEDIAL BRANCH NERVES INNERVATING LUMBAR FACET JOINT Bilateral 12/13/2022    Procedure: Bilateral L3-5 MBB;  Surgeon: Humberto Dumont MD;  [facility];  Service: Pain Management;  Laterality: Bilateral;    INJECTION OF ANESTHETIC AGENT AROUND NERVE Right 08/08/2019    Procedure: Right Genicular nerve block with local;  Surgeon: Manpreet Dutta MD;  Location: [facility];   Service: Pain Management;  Laterality: Right;    INJECTION OF ANESTHETIC AGENT INTO SACROILIAC JOINT Bilateral 05/06/2020    Procedure: Bilateral Sacroiliac Joint Injection;  Surgeon: Manpreet Dutta MD;  Location: [facility];  Service: Pain Management;  Laterality: Bilateral;    INJECTION OF ANESTHETIC AGENT INTO SACROILIAC JOINT Bilateral 10/08/2020    Procedure: Bilateral SI and Bilateral GTB with RN IV sedation;  Surgeon: Manpreet Dutta MD;  Location: [facility];  Service: Pain Management;  Laterality: Bilateral;    INJECTION OF ANESTHETIC AGENT INTO SACROILIAC JOINT Bilateral 01/05/2021    Procedure: Bilateral BLOCK, SACROILIAC JOINT and Bilateral GTB witn RN IV sedation;  Surgeon: Daniel Burns MD;  Location: [facility];  Service: Pain Management;  Laterality: Bilateral;    INJECTION OF ANESTHETIC AGENT INTO SACROILIAC JOINT Bilateral 07/08/2021    Procedure: Bilateral BLOCK, SACROILIAC JOINT bilateral GTB RN IV sedation;  Surgeon: Manpreet Dutta MD;  Location: [facility];  Service: Pain Management;  Laterality: Bilateral;    INJECTION OF ANESTHETIC AGENT INTO SACROILIAC JOINT Bilateral 03/01/2022    Procedure: Bilateral BLOCK, SACROILIAC JOINT and Bilateral GTB RN IV sedation;  Surgeon: Manpreet Dutta MD;  Location: [facility];  Service: Pain Management;  Laterality: Bilateral;    INJECTION OF ANESTHETIC AGENT INTO SACROILIAC JOINT Bilateral 10/10/2022    Procedure: Bilateral Sacroiliac Joint Injection;  Surgeon: Humberto Dumont MD;  Location: [facility];  Service: Pain Management;  Laterality: Bilateral;    INJECTION OF ANESTHETIC AGENT INTO SACROILIAC JOINT Bilateral 01/24/2023    Procedure: Bilateral Sacroiliac Joint Injection;  Surgeon: Humberto Dumont MD;  Location: [facility];  Service: Pain Management;  Laterality: Bilateral;    INJECTION OF ANESTHETIC AGENT INTO SACROILIAC JOINT Bilateral 06/21/2023    Procedure: Bilateral Sacroiliac Joint Injection;  Surgeon: Humberto Dumont MD;   Location: [facility];  Service: Pain Management;  Laterality: Bilateral;    INJECTION OF ANESTHETIC AGENT INTO SACROILIAC JOINT Bilateral 1/9/2024    Procedure: Bilateral SIJ Injection;  Surgeon: Humberto Dumont MD;  Location: [facility];  Service: Pain Management;  Laterality: Bilateral;    INJECTION OF JOINT Bilateral 09/05/2019    Procedure: Bilateral GT bursa injection;  Surgeon: Manpreet Dutta MD;  Location: [facility];  Service: Pain Management;  Laterality: Bilateral;    INJECTION OF JOINT Bilateral 05/06/2020    Procedure: Bilateral GT bursa injection;  Surgeon: Manpreet Dutta MD;  Location: [facility];  Service: Pain Management;  Laterality: Bilateral;    INJECTION OF JOINT Right 04/28/2022    Procedure: Injection, Joint Right Hip Injection RN IV sedation;  Surgeon: Manpreet Dutta MD;  Location: [facility];  Service: Pain Management;  Laterality: Right;    INJECTION OF JOINT Bilateral 10/10/2022    Procedure: Bilateral GT bursa injection with RN IV sedation;  Surgeon: Humberto Dumont MD;  Location: [facility];  Service: Pain Management;  Laterality: Bilateral;    INJECTION OF JOINT Bilateral 01/24/2023    Procedure: Bilateral GT bursa injection;  Surgeon: Humberto Dumont MD;  Location: [facility];  Service: Pain Management;  Laterality: Bilateral;    INJECTION OF JOINT Bilateral 05/23/2023    Procedure: Bilateral intraarticular knee injection with Synvisc-1; ;  Surgeon: Humberto Dumont MD;  Location: [facility];  Service: Pain Management;  Laterality: Bilateral;    INJECTION OF JOINT Bilateral 06/21/2023    Procedure: Bilateral GT bursa injection;  Surgeon: Humberto Dumont MD;  Location: [facility];  Service: Pain Management;  Laterality: Bilateral;    INJECTION OF JOINT Bilateral 1/9/2024    Procedure: Bilateral GTB injection;  Surgeon: Humberto Dumont MD;  Location: [facility];  Service: Pain Management;  Laterality: Bilateral;    INJECTION OF JOINT Bilateral 1/23/2024     Procedure: Bilateral intraarticular knee injection with Synvisc 1 ;  Surgeon: Humberto Dumont MD;  Location: [facility];  Service: Pain Management;  Laterality: Bilateral;    INJECTION, ALLOGRAFT, INTERVERTEBRAL DISC N/A 04/25/2023    Procedure: INJECTION,ALLOGRAFT,INTERVERTEBRAL DISC,1ST LEVEL: L5-S1;  Surgeon: Humberto Dumont MD;  Location: [facility];  Service: Pain Management;  Laterality: N/A;    INJECTION, ALLOGRAFT, INTERVERTEBRAL DISC N/A 05/09/2023    Procedure: INJECTION,ALLOGRAFT,INTERVERTEBRAL DISC,2nd LEVEL: L3-4;  Surgeon: Humberto Dumont MD;  Location: [facility];  Service: Pain Management;  Laterality: N/A;    KNEE ARTHROSCOPY Bilateral     PARS PLANA VITRECTOMY W/ REPAIR OF MACULAR HOLE Left 02/22/2017    RADIOFREQUENCY THERMOCOAGULATION Left 07/11/2019    Procedure: Right SIJ RFA;  Surgeon: Manpreet Dutta MD;  Location: [facility];  Service: Pain Management;  Laterality: Left;    RADIOFREQUENCY THERMOCOAGULATION Right 07/25/2019    Procedure: Right SIJ RFA;  Surgeon: Manpreet Dutta MD;  Location: [facility];  Service: Pain Management;  Laterality: Right;    SELECTIVE INJECTION OF ANESTHETIC AGENT AROUND LUMBAR SPINAL NERVE ROOT BY TRANSFORAMINAL APPROACH Bilateral 4/9/2024    Procedure: Bilateral L4/5 TF JAMIN;  Surgeon: Humberto Dumont MD;  Location: [facility];  Service: Pain Management;  Laterality: Bilateral;    SHOULDER ARTHROSCOPY Right 06/04/2015       Review of Systems        Objective:   There were no vitals taken for this visit.    Physical Exam  LOWER EXTREMITY PHYSICAL EXAMINATION    ORTHOPEDIC:  No gross or structural anatomic deformity present    VASCULAR:  The right dorsalis pedis pulse 2/4 and the right posterior tibial pulse 2/4.  The left dorsalis pedis pulse 2/4 and posterior tibial pulse on the left is 2/4.  Capillary refill is intact.  Pedal hair growth intact    NEUROLOGY:  Sharp/dull, light touch, proprioception all intact and equal bilaterally.  Vibratory  sensation is intact.  Protective sensation intact    DERMATOLOGY: Skin is supple and moist.  There is a superficial blister present to the dorsal aspect the right 4th toe without signs of acute infection.  Is limited to the layer of the epidermis.  Does not expose dermal layers are tissue.  No signs of tinea pedis.  Healed fissure to the posterior right heel.    Assessment:     1. History of tinea pedis    2. Type 2 diabetes mellitus with stage 3a chronic kidney disease, without long-term current use of insulin        Plan:     History of tinea pedis    Type 2 diabetes mellitus with stage 3a chronic kidney disease, without long-term current use of insulin      Thorough discussion is had with the patient today, concerning the diagnosis, its etiology, and the treatment algorithm at present.    Foot counseling and education is provided at this visit. Patient is advised to wear socks and shoes at all times.  Do not walk barefoot, or with just socks, even when indoors.  Be sure to check and inspect the inside of the shoe before putting them on her feet.  Protect your feet at all times.  Walking shoes and/or athletic shoes are the best types of shoe gear. Do not wear vinyl or plastic type shoe gear, as they do not stretch and/or breathe.  Protect your feet from hot and/or cold. Elevate the extremities when sitting.  Do not wear excessively tight socks and/or shoe gear. Wiggle your toes for a few minutes throughout the day. Move your ankles up and down, in and out, to help blood flow in your lower extremity.

## 2024-06-12 RX ORDER — HYDROCHLOROTHIAZIDE 25 MG/1
25 TABLET ORAL DAILY
Qty: 90 TABLET | Refills: 3 | Status: SHIPPED | OUTPATIENT
Start: 2024-06-12

## 2024-06-28 ENCOUNTER — HOSPITAL ENCOUNTER (EMERGENCY)
Facility: HOSPITAL | Age: 79
Discharge: HOME | End: 2024-06-28
Attending: FAMILY MEDICINE
Payer: MEDICARE

## 2024-06-28 VITALS
BODY MASS INDEX: 30.2 KG/M2 | SYSTOLIC BLOOD PRESSURE: 124 MMHG | WEIGHT: 198.63 LBS | HEART RATE: 85 BPM | RESPIRATION RATE: 19 BRPM | DIASTOLIC BLOOD PRESSURE: 58 MMHG | OXYGEN SATURATION: 95 % | TEMPERATURE: 98 F

## 2024-06-28 DIAGNOSIS — R42 DIZZINESS: ICD-10-CM

## 2024-06-28 DIAGNOSIS — I95.9 HYPOTENSION: ICD-10-CM

## 2024-06-28 DIAGNOSIS — M25.561 RIGHT KNEE PAIN: Primary | ICD-10-CM

## 2024-06-28 LAB
ALBUMIN SERPL BCP-MCNC: 3.1 G/DL (ref 3.5–5.2)
ALP SERPL-CCNC: 60 U/L (ref 55–135)
ALT SERPL W/O P-5'-P-CCNC: 17 U/L (ref 10–44)
ANION GAP SERPL CALC-SCNC: 12 MMOL/L (ref 8–16)
AST SERPL-CCNC: 20 U/L (ref 10–40)
BASOPHILS # BLD AUTO: 0.06 K/UL (ref 0–0.2)
BASOPHILS NFR BLD: 0.7 % (ref 0–1.9)
BILIRUB SERPL-MCNC: 0.2 MG/DL (ref 0.1–1)
BNP SERPL-MCNC: 36 PG/ML (ref 0–99)
BUN SERPL-MCNC: 16 MG/DL (ref 8–23)
CALCIUM SERPL-MCNC: 9.4 MG/DL (ref 8.7–10.5)
CHLORIDE SERPL-SCNC: 103 MMOL/L (ref 95–110)
CO2 SERPL-SCNC: 25 MMOL/L (ref 23–29)
CREAT SERPL-MCNC: 1.1 MG/DL (ref 0.5–1.4)
DIFFERENTIAL METHOD BLD: ABNORMAL
EOSINOPHIL # BLD AUTO: 0.1 K/UL (ref 0–0.5)
EOSINOPHIL NFR BLD: 1.7 % (ref 0–8)
ERYTHROCYTE [DISTWIDTH] IN BLOOD BY AUTOMATED COUNT: 11.9 % (ref 11.5–14.5)
EST. GFR  (NO RACE VARIABLE): 52 ML/MIN/1.73 M^2
GLUCOSE SERPL-MCNC: 87 MG/DL (ref 70–110)
HCT VFR BLD AUTO: 36.9 % (ref 37–48.5)
HCV AB SERPL QL IA: NEGATIVE
HEP C VIRUS HOLD SPECIMEN: NORMAL
HGB BLD-MCNC: 12.1 G/DL (ref 12–16)
HIV 1+2 AB+HIV1 P24 AG SERPL QL IA: NEGATIVE
IMM GRANULOCYTES # BLD AUTO: 0.06 K/UL (ref 0–0.04)
IMM GRANULOCYTES NFR BLD AUTO: 0.7 % (ref 0–0.5)
LYMPHOCYTES # BLD AUTO: 1.6 K/UL (ref 1–4.8)
LYMPHOCYTES NFR BLD: 19.8 % (ref 18–48)
MCH RBC QN AUTO: 30.7 PG (ref 27–31)
MCHC RBC AUTO-ENTMCNC: 32.8 G/DL (ref 32–36)
MCV RBC AUTO: 94 FL (ref 82–98)
MONOCYTES # BLD AUTO: 0.5 K/UL (ref 0.3–1)
MONOCYTES NFR BLD: 6.3 % (ref 4–15)
NEUTROPHILS # BLD AUTO: 5.8 K/UL (ref 1.8–7.7)
NEUTROPHILS NFR BLD: 70.8 % (ref 38–73)
NRBC BLD-RTO: 0 /100 WBC
PLATELET # BLD AUTO: 297 K/UL (ref 150–450)
PMV BLD AUTO: 9.1 FL (ref 9.2–12.9)
POTASSIUM SERPL-SCNC: 5 MMOL/L (ref 3.5–5.1)
PROT SERPL-MCNC: 6.6 G/DL (ref 6–8.4)
RBC # BLD AUTO: 3.94 M/UL (ref 4–5.4)
SODIUM SERPL-SCNC: 140 MMOL/L (ref 136–145)
TROPONIN I SERPL DL<=0.01 NG/ML-MCNC: <0.006 NG/ML (ref 0–0.03)
WBC # BLD AUTO: 8.24 K/UL (ref 3.9–12.7)

## 2024-06-28 PROCEDURE — 85025 COMPLETE CBC W/AUTO DIFF WBC: CPT | Performed by: FAMILY MEDICINE

## 2024-06-28 PROCEDURE — 99285 EMERGENCY DEPT VISIT HI MDM: CPT | Mod: 25

## 2024-06-28 PROCEDURE — 93010 ELECTROCARDIOGRAM REPORT: CPT | Mod: ,,, | Performed by: STUDENT IN AN ORGANIZED HEALTH CARE EDUCATION/TRAINING PROGRAM

## 2024-06-28 PROCEDURE — 86803 HEPATITIS C AB TEST: CPT | Performed by: EMERGENCY MEDICINE

## 2024-06-28 PROCEDURE — 83880 ASSAY OF NATRIURETIC PEPTIDE: CPT | Performed by: FAMILY MEDICINE

## 2024-06-28 PROCEDURE — 87389 HIV-1 AG W/HIV-1&-2 AB AG IA: CPT | Performed by: EMERGENCY MEDICINE

## 2024-06-28 PROCEDURE — 93005 ELECTROCARDIOGRAM TRACING: CPT

## 2024-06-28 PROCEDURE — 96361 HYDRATE IV INFUSION ADD-ON: CPT

## 2024-06-28 PROCEDURE — 96374 THER/PROPH/DIAG INJ IV PUSH: CPT

## 2024-06-28 PROCEDURE — 96376 TX/PRO/DX INJ SAME DRUG ADON: CPT

## 2024-06-28 PROCEDURE — 63600175 PHARM REV CODE 636 W HCPCS: Performed by: FAMILY MEDICINE

## 2024-06-28 PROCEDURE — 25000003 PHARM REV CODE 250: Performed by: FAMILY MEDICINE

## 2024-06-28 PROCEDURE — 84484 ASSAY OF TROPONIN QUANT: CPT | Performed by: FAMILY MEDICINE

## 2024-06-28 PROCEDURE — 80053 COMPREHEN METABOLIC PANEL: CPT | Performed by: FAMILY MEDICINE

## 2024-06-28 RX ORDER — KETOROLAC TROMETHAMINE 30 MG/ML
30 INJECTION, SOLUTION INTRAMUSCULAR; INTRAVENOUS
Status: COMPLETED | OUTPATIENT
Start: 2024-06-28 | End: 2024-06-28

## 2024-06-28 RX ORDER — KETOROLAC TROMETHAMINE 30 MG/ML
15 INJECTION, SOLUTION INTRAMUSCULAR; INTRAVENOUS
Status: COMPLETED | OUTPATIENT
Start: 2024-06-28 | End: 2024-06-28

## 2024-06-28 RX ADMIN — KETOROLAC TROMETHAMINE 15 MG: 30 INJECTION, SOLUTION INTRAMUSCULAR at 11:06

## 2024-06-28 RX ADMIN — SODIUM CHLORIDE 500 ML: 9 INJECTION, SOLUTION INTRAVENOUS at 05:06

## 2024-06-28 RX ADMIN — KETOROLAC TROMETHAMINE 30 MG: 30 INJECTION, SOLUTION INTRAMUSCULAR at 05:06

## 2024-06-28 NOTE — DISCHARGE INSTRUCTIONS
Hold all blood pressure medicines for systolic blood pressure 120 or less.  Follow up with Dr. Schneider right knee pain

## 2024-06-28 NOTE — ED PROVIDER NOTES
"SCRIBE #1 NOTE: I, Guadalupe Hernandez, am scribing for, and in the presence of, Aditi Willams MD. I have scribed the entire note.       History     Chief Complaint   Patient presents with    Dizziness     Dizziness and weakness. Orthostatic in the field. Received approx 500mL NS.      Review of patient's allergies indicates:   Allergen Reactions    Demerol [meperidine] Other (See Comments)     Burning when adm IV  Able to tolerate IM, "Turned the veins in my hand purple."    Latex, natural rubber Other (See Comments)     "Burns my skin",  Symptoms get worse the longer she is exposed    Zocor [simvastatin] Other (See Comments)     Tightening of muscles    Statins-hmg-coa reductase inhibitors Other (See Comments)     myopathy    Sulfa (sulfonamide antibiotics)      Blurred vision    Nickel Rash     Pt states she had sores to ear lobes from nickel in earrings          History of Present Illness     HPI    6/28/2024, 4:16 PM  History obtained from the patient      History of Present Illness: Kaylin Mccollum is a 79 y.o. female patient with a PMHx of mitral valve prolapse, DM Type II, HTN, HLD, PVD, PVC, and fibromyalgia  who presents to the Emergency Department for evaluation of R-knee pain which onset after a fall last night. Pt states that she has been tapering off of her BP medications due to dizziness, weakness, and hypotension. Pt states that she fainted and fell in her house last night. After her fall, pt was unable to get up on her own due to R-knee pain and weakness. Pt has a hx of BL knee arthroscopy, and is worried that she injured her knee. Pt is normally able to walk w/o assistance, but has been unable to since her fall. Pt also reports that in addition to decreasing her BP medications, she has not taken her Ozempic since Sunday. Symptoms are constant and moderate in severity. No mitigating or exacerbating factors reported. Associated sxs include weakness, dizziness, and syncope. Patient denies any " fever, CP, SOB, and all other sxs at this time. No prior Tx reported. No further complaints or concerns at this time.       Arrival mode: Ambulance Service    PCP: Lisette Olvera MD        Past Medical History:  Past Medical History:   Diagnosis Date    Arthritis     Cataract     COVID-19 02/25/2021    Diabetes mellitus 1995    BS didn't check 09/13/2022    Diabetes mellitus, type 2     Fibromyalgia     General anesthetics causing adverse effect in therapeutic use     bradycardia     Hypertension     Migraines     Mitral valve prolapse     Osteoarthritis     Rotator cuff tear 04/01/2015       Past Surgical History:  Past Surgical History:   Procedure Laterality Date    ARTHROSCOPY OF KNEE Right 03/10/2020    Procedure: ARTHROSCOPY, KNEE;  Surgeon: Les Schneider MD;  Location: [facility] OR;  Service: Orthopedics;  Laterality: Right;    CATARACT EXTRACTION W/  INTRAOCULAR LENS IMPLANT Left 07/19/2017    CATARACT EXTRACTION W/  INTRAOCULAR LENS IMPLANT Right 2017    CHOLECYSTECTOMY      CHONDROPLASTY OF KNEE Right 03/10/2020    Procedure: CHONDROPLASTY, KNEE;  Surgeon: Les Schneider MD;  Location: [facility] OR;  Service: Orthopedics;  Laterality: Right;  Anterior compartment     COLONOSCOPY N/A 09/25/2018    Procedure: COLONOSCOPY;  Surgeon: Bethel Carmona MD;  Location: [facility] ENDO;  Service: Endoscopy;  Laterality: N/A;    EXCISION OF MEDIAL MENISCUS OF KNEE Right 03/10/2020    Procedure: MENISCECTOMY, KNEE, MEDIAL;  Surgeon: Les Schneider MD;  Location: [facility] OR;  Service: Orthopedics;  Laterality: Right;  Partial, Medial , Lateral     EYE SURGERY      INJECTION OF ANESTHETIC AGENT AROUND MEDIAL BRANCH NERVES INNERVATING LUMBAR FACET JOINT Bilateral 12/13/2022    Procedure: Bilateral L3-5 MBB;  Surgeon: Humberto Dumont MD;  Location: [facility] PAIN MGT;  Service: Pain Management;  Laterality: Bilateral;    INJECTION OF ANESTHETIC AGENT AROUND NERVE Right 08/08/2019    Procedure: Right Genicular nerve block  with local;  Surgeon: Manpreet Dutta MD;  Location: [facility] PAIN MGT;  Service: Pain Management;  Laterality: Right;    INJECTION OF ANESTHETIC AGENT INTO SACROILIAC JOINT Bilateral 05/06/2020    Procedure: Bilateral Sacroiliac Joint Injection;  Surgeon: Manpreet Dutta MD;  Location: [facility] PAIN MGT;  Service: Pain Management;  Laterality: Bilateral;    INJECTION OF ANESTHETIC AGENT INTO SACROILIAC JOINT Bilateral 10/08/2020    Procedure: Bilateral SI and Bilateral GTB with RN IV sedation;  Surgeon: Manpreet Dutta MD;  Location: [facility] PAIN MGT;  Service: Pain Management;  Laterality: Bilateral;    INJECTION OF ANESTHETIC AGENT INTO SACROILIAC JOINT Bilateral 01/05/2021    Procedure: Bilateral BLOCK, SACROILIAC JOINT and Bilateral GTB witn RN IV sedation;  Surgeon: Daniel Burns MD;  Location: [facility] PAIN MGT;  Service: Pain Management;  Laterality: Bilateral;    INJECTION OF ANESTHETIC AGENT INTO SACROILIAC JOINT Bilateral 07/08/2021    Procedure: Bilateral BLOCK, SACROILIAC JOINT bilateral GTB RN IV sedation;  Surgeon: Manpreet Dutta MD;  Location: [facility] PAIN MGT;  Service: Pain Management;  Laterality: Bilateral;    INJECTION OF ANESTHETIC AGENT INTO SACROILIAC JOINT Bilateral 03/01/2022    Procedure: Bilateral BLOCK, SACROILIAC JOINT and Bilateral GTB RN IV sedation;  Surgeon: Manpreet Dutta MD;  Location: [facility] PAIN MGT;  Service: Pain Management;  Laterality: Bilateral;    INJECTION OF ANESTHETIC AGENT INTO SACROILIAC JOINT Bilateral 10/10/2022    Procedure: Bilateral Sacroiliac Joint Injection;  Surgeon: Humberto Dumont MD;  Location: [facility] PAIN MGT;  Service: Pain Management;  Laterality: Bilateral;    INJECTION OF ANESTHETIC AGENT INTO SACROILIAC JOINT Bilateral 01/24/2023    Procedure: Bilateral Sacroiliac Joint Injection;  Surgeon: Humberto Dumont MD;  Location: [facility] PAIN MGT;  Service: Pain Management;  Laterality: Bilateral;    INJECTION OF ANESTHETIC AGENT INTO SACROILIAC JOINT Bilateral 06/21/2023    Procedure: Bilateral  Sacroiliac Joint Injection;  Surgeon: Humberto Dumont MD;  Location: [facility] PAIN MGT;  Service: Pain Management;  Laterality: Bilateral;    INJECTION OF ANESTHETIC AGENT INTO SACROILIAC JOINT Bilateral 1/9/2024    Procedure: Bilateral SIJ Injection;  Surgeon: Humberto Dumont MD;  Location: [facility] PAIN MGT;  Service: Pain Management;  Laterality: Bilateral;    INJECTION OF JOINT Bilateral 09/05/2019    Procedure: Bilateral GT bursa injection;  Surgeon: Manpreet Dutta MD;  Location: [facility] PAIN MGT;  Service: Pain Management;  Laterality: Bilateral;    INJECTION OF JOINT Bilateral 05/06/2020    Procedure: Bilateral GT bursa injection;  Surgeon: Manpreet Dutta MD;  Location: [facility] PAIN MGT;  Service: Pain Management;  Laterality: Bilateral;    INJECTION OF JOINT Right 04/28/2022    Procedure: Injection, Joint Right Hip Injection RN IV sedation;  Surgeon: Manpreet Dutta MD;  Location: [facility] PAIN MGT;  Service: Pain Management;  Laterality: Right;    INJECTION OF JOINT Bilateral 10/10/2022    Procedure: Bilateral GT bursa injection with RN IV sedation;  Surgeon: Humberto Dumont MD;  Location: [facility] PAIN MGT;  Service: Pain Management;  Laterality: Bilateral;    INJECTION OF JOINT Bilateral 01/24/2023    Procedure: Bilateral GT bursa injection;  Surgeon: Humberto Dumont MD;  Location: [facility] PAIN MGT;  Service: Pain Management;  Laterality: Bilateral;    INJECTION OF JOINT Bilateral 05/23/2023    Procedure: Bilateral intraarticular knee injection with Synvisc-1; ;  Surgeon: Humberto Dumont MD;  Location: [facility] PAIN MGT;  Service: Pain Management;  Laterality: Bilateral;    INJECTION OF JOINT Bilateral 06/21/2023    Procedure: Bilateral GT bursa injection;  Surgeon: Humberto Dumont MD;  Location: [facility] PAIN MGT;  Service: Pain Management;  Laterality: Bilateral;    INJECTION OF JOINT Bilateral 1/9/2024    Procedure: Bilateral GTB injection;  Surgeon: Humberto Dumont MD;  Location: [facility] PAIN MGT;  Service: Pain Management;  Laterality:  Bilateral;    INJECTION OF JOINT Bilateral 2024    Procedure: Bilateral intraarticular knee injection with Synvisc 1 ;  Surgeon: Humberto Dumont MD;  Location: [facility] PAIN MGT;  Service: Pain Management;  Laterality: Bilateral;    INJECTION, ALLOGRAFT, INTERVERTEBRAL DISC N/A 2023    Procedure: INJECTION,ALLOGRAFT,INTERVERTEBRAL DISC,1ST LEVEL: L5-S1;  Surgeon: Humberto Dumont MD;  Location: [facility] PAIN MGT;  Service: Pain Management;  Laterality: N/A;    INJECTION, ALLOGRAFT, INTERVERTEBRAL DISC N/A 2023    Procedure: INJECTION,ALLOGRAFT,INTERVERTEBRAL DISC,2nd LEVEL: L3-4;  Surgeon: Humberto Dumont MD;  Location: [facility] PAIN MGT;  Service: Pain Management;  Laterality: N/A;    KNEE ARTHROSCOPY Bilateral     PARS PLANA VITRECTOMY W/ REPAIR OF MACULAR HOLE Left 2017    RADIOFREQUENCY THERMOCOAGULATION Left 2019    Procedure: Right SIJ RFA;  Surgeon: Manpreet Dutta MD;  Location: [facility] PAIN MGT;  Service: Pain Management;  Laterality: Left;    RADIOFREQUENCY THERMOCOAGULATION Right 2019    Procedure: Right SIJ RFA;  Surgeon: Manpreet Dutta MD;  Location: [facility] PAIN MGT;  Service: Pain Management;  Laterality: Right;    SELECTIVE INJECTION OF ANESTHETIC AGENT AROUND LUMBAR SPINAL NERVE ROOT BY TRANSFORAMINAL APPROACH Bilateral 2024    Procedure: Bilateral L4/5 TF JAMIN;  Surgeon: Humberto Dumont MD;  Location: [facility] PAIN MGT;  Service: Pain Management;  Laterality: Bilateral;    SHOULDER ARTHROSCOPY Right 2015         Family History:  Family History   Problem Relation Name Age of Onset    Heart disease Mother Beena Thrasher         A-Fib    Glaucoma Mother Beena Thrasher     Cataracts Mother Beena Thrasher     Cancer Mother Beena Thrasher         colon    Arthritis Mother Beena Thrasher         : 17    Depression Mother Beena Thrasher     Depression Brother Octavio & Gutierrez Thrasher     Birth defects Brother Octavio Thrasher          Cardiac    Heart disease Brother Octavio Thrasher         Heart Surgery  as 6 y.o.    Kidney disease Daughter Yolette Mccollum         ESRD    Anesthesia problems Daughter Yolette Mccollum         cardiac arrest during nephrectomy    Birth defects Daughter Yolette Mccollum         Renal    Depression Daughter Yolette Mccollum     Learning disabilities Daughter Yolette Mccollum         Dyslexia, ADD    Depression Son Daniel & Brandon Mccollum     Learning disabilities Son Daniel & Brandon Mccollum         ADD & ADHD    Diabetes Maternal Aunt Nara Zamora          9/3/2023    Diabetes Maternal Uncle Madison Luke, Sr.          2019    Cancer Maternal Uncle Madison Luke, Sr.     Breast cancer Paternal Aunt      Diabetes Maternal Grandmother Bonnie Luke     Heart disease Maternal Grandmother Bonnie Luke         Congestive heart failure    Alcohol abuse Maternal Grandmother Bonnie Luke         Death: 84    Depression Maternal Grandmother Bonnie Luke     Hypertension Maternal Grandmother Bonnie Luke     Cancer Maternal Grandmother Bonnie Luke     Arthritis Maternal Grandmother Bonnie Luke     Diabetes Maternal Grandfather Attila Luke     Cancer Maternal Grandfather Attila Luke     Alcohol abuse Maternal Grandfather Attila Luke          1964    Asthma Son Brandon Mccollum        Social History:  Social History     Tobacco Use    Smoking status: Never    Smokeless tobacco: Never    Tobacco comments:     Never smoked   Substance and Sexual Activity    Alcohol use: Not Currently    Drug use: No    Sexual activity: Not Currently     Birth control/protection: Abstinence, Post-menopausal        Review of Systems     Review of Systems   Constitutional:  Negative for fever.   HENT:  Negative for sore throat.    Respiratory:  Negative for shortness of breath.    Cardiovascular:  Negative for chest pain.    Gastrointestinal:  Negative for nausea.   Genitourinary:  Negative for dysuria.   Musculoskeletal:  Negative for back pain.        (+) R-knee pain   Skin:  Negative for rash.   Neurological:  Positive for dizziness, syncope and weakness.   Hematological:  Does not bruise/bleed easily.      Physical Exam     Initial Vitals [06/28/24 1553]   BP Pulse Resp Temp SpO2   105/66 86 20 98.1 °F (36.7 °C) 96 %      MAP       --          Physical Exam  Nursing Notes and Vital Signs Reviewed.  Constitutional: Patient is in no acute distress. Well-developed and well-nourished.  Head: Atraumatic. Normocephalic.  Eyes: PERRL. EOM intact. Conjunctivae are not pale. No scleral icterus.  ENT: Mucous membranes are moist. Oropharynx is clear and symmetric.    Neck: Supple. Full ROM. No lymphadenopathy.  Cardiovascular: Regular rate. Regular rhythm. No murmurs, rubs, or gallops. Distal pulses are 2+ and symmetric.  Pulmonary/Chest: No respiratory distress. Clear to auscultation bilaterally. No wheezing or rales.  Abdominal: Soft and non-distended.  There is no tenderness.  No rebound, guarding, or rigidity. Good bowel sounds.  Genitourinary: No CVA tenderness  Musculoskeletal: No obvious deformities. No edema. No calf tenderness.  Skin: Warm and dry. Soft-tissue swelling in R-knee. No joint effusion. No bony deformity.  Neurological:  Alert, awake, and appropriate.  Normal speech.  No acute focal neurological deficits are appreciated.  Psychiatric: Normal affect. Good eye contact. Appropriate in content.     ED Course   Procedures  ED Vital Signs:  Vitals:    06/28/24 1553 06/28/24 1633 06/28/24 1639 06/28/24 1738   BP: 105/66 128/62     Pulse: 86 80  83   Resp: 20 15     Temp: 98.1 °F (36.7 °C)      TempSrc: Oral      SpO2: 96% 97%     Weight:   90.1 kg (198 lb 10.2 oz)     06/28/24 1743 06/28/24 1746 06/28/24 1747 06/28/24 1900   BP: 128/60 (!) 116/56 (!) 100/46 132/60   Pulse: 84 90 94 87   Resp:    10   Temp:       TempSrc:        SpO2: 97% 98% (!) 94% 97%   Weight:        06/28/24 1945   BP: (!) 124/58   Pulse: 85   Resp: 19   Temp:    TempSrc:    SpO2: 95%   Weight:        Abnormal Lab Results:  Labs Reviewed   CBC W/ AUTO DIFFERENTIAL - Abnormal; Notable for the following components:       Result Value    RBC 3.94 (*)     Hematocrit 36.9 (*)     MPV 9.1 (*)     Immature Granulocytes 0.7 (*)     Immature Grans (Abs) 0.06 (*)     All other components within normal limits   COMPREHENSIVE METABOLIC PANEL - Abnormal; Notable for the following components:    Albumin 3.1 (*)     eGFR 52 (*)     All other components within normal limits   HIV 1 / 2 ANTIBODY    Narrative:     Release to patient->Immediate   HEPATITIS C ANTIBODY    Narrative:     Release to patient->Immediate   HEP C VIRUS HOLD SPECIMEN    Narrative:     Release to patient->Immediate   B-TYPE NATRIURETIC PEPTIDE   TROPONIN I        All Lab Results:  Results for orders placed or performed during the hospital encounter of 06/28/24   HIV 1/2 Ag/Ab (4th Gen)   Result Value Ref Range    HIV 1/2 Ag/Ab Negative Negative   Hepatitis C Antibody   Result Value Ref Range    Hepatitis C Ab Negative Negative   HCV Virus Hold Specimen   Result Value Ref Range    HEP C Virus Hold Specimen Hold for HCV sendout    CBC auto differential   Result Value Ref Range    WBC 8.24 3.90 - 12.70 K/uL    RBC 3.94 (L) 4.00 - 5.40 M/uL    Hemoglobin 12.1 12.0 - 16.0 g/dL    Hematocrit 36.9 (L) 37.0 - 48.5 %    MCV 94 82 - 98 fL    MCH 30.7 27.0 - 31.0 pg    MCHC 32.8 32.0 - 36.0 g/dL    RDW 11.9 11.5 - 14.5 %    Platelets 297 150 - 450 K/uL    MPV 9.1 (L) 9.2 - 12.9 fL    Immature Granulocytes 0.7 (H) 0.0 - 0.5 %    Gran # (ANC) 5.8 1.8 - 7.7 K/uL    Immature Grans (Abs) 0.06 (H) 0.00 - 0.04 K/uL    Lymph # 1.6 1.0 - 4.8 K/uL    Mono # 0.5 0.3 - 1.0 K/uL    Eos # 0.1 0.0 - 0.5 K/uL    Baso # 0.06 0.00 - 0.20 K/uL    nRBC 0 0 /100 WBC    Gran % 70.8 38.0 - 73.0 %    Lymph % 19.8 18.0 - 48.0 %    Mono % 6.3 4.0 -  15.0 %    Eosinophil % 1.7 0.0 - 8.0 %    Basophil % 0.7 0.0 - 1.9 %    Differential Method Automated    Comprehensive metabolic panel   Result Value Ref Range    Sodium 140 136 - 145 mmol/L    Potassium 5.0 3.5 - 5.1 mmol/L    Chloride 103 95 - 110 mmol/L    CO2 25 23 - 29 mmol/L    Glucose 87 70 - 110 mg/dL    BUN 16 8 - 23 mg/dL    Creatinine 1.1 0.5 - 1.4 mg/dL    Calcium 9.4 8.7 - 10.5 mg/dL    Total Protein 6.6 6.0 - 8.4 g/dL    Albumin 3.1 (L) 3.5 - 5.2 g/dL    Total Bilirubin 0.2 0.1 - 1.0 mg/dL    Alkaline Phosphatase 60 55 - 135 U/L    AST 20 10 - 40 U/L    ALT 17 10 - 44 U/L    eGFR 52 (A) >60 mL/min/1.73 m^2    Anion Gap 12 8 - 16 mmol/L   B-Type natriuretic peptide (BNP)   Result Value Ref Range    BNP 36 0 - 99 pg/mL   Troponin I   Result Value Ref Range    Troponin I <0.006 0.000 - 0.026 ng/mL   EKG 12-lead   Result Value Ref Range    QRS Duration 76 ms    [facility] QTC Calculation 461 ms     *Note: Due to a large number of results and/or encounters for the requested time period, some results have not been displayed. A complete set of results can be found in Results Review.        Imaging Results:  Imaging Results              X-Ray Knee 3 View Right (Final result)  Result time 06/28/24 17:01:02      Final result by Ovidio Peter DO (06/28/24 17:01:02)                   Narrative:    Exam: XR KNEE 3 VIEW RIGHT    Comparison: None    Clinical Indication:  Pain    Findings: Large joint effusion without acute bone or joint abnormality.  Tricompartmental degenerative change.  Vascular calcification.    Finalized on: 6/28/2024 5:01 PM By:  Ovidio Peter  [facility]# [id number]      2024-06-28 17:03:10.206    [facility]                                     X-Ray Chest AP Portable (Final result)  Result time 06/29/24 00:15:39      Final result by Ivon Degroot MD (06/29/24 00:15:39)                   Impression:      No acute intrathoracic abnormality identified on this single radiographic view of the  chest.      Electronically signed by: Ivon Degroot MD  Date:    06/29/2024  Time:    00:15               Narrative:    EXAMINATION:  XR CHEST AP PORTABLE    CLINICAL HISTORY:  Chest Pain;    TECHNIQUE:  Single frontal view of the chest was performed.    COMPARISON:  03/30/2020    FINDINGS:  Cardiac monitoring leads overlie the chest.  Cardiomediastinal silhouette is within normal limits of size and configuration.  Mediastinal structures are midline.  The lungs are symmetrically expanded with coarse interstitial attenuation.  There is no evidence of confluent airspace consolidation, substantial volume of pleural fluid or pneumothorax.  Osseous structures are intact with degenerative change and postoperative change of the proximal right humerus.                                       The EKG was ordered, reviewed, and independently interpreted by the ED provider.  Interpretation time: 16:20  Rate: 85 BPM  Rhythm: normal sinus rhythm  Interpretation: Low voltage QRS. Cannot rule out anterior infarct, age undetermined. No STEMI.           The Emergency Provider reviewed the vital signs and test results, which are outlined above.     ED Discussion       5:38 PM: Reassessed pt at this time. Discussed with pt all pertinent ED information and results. Discussed pt dx and plan of tx. Gave pt all f/u and return to the ED instructions. All questions and concerns were addressed at this time. Pt expresses understanding of information and instructions, and is comfortable with plan to discharge. Pt is stable for discharge.    I discussed with patient and/or family/caretaker that evaluation in the ED does not suggest any emergent or life threatening medical conditions requiring immediate intervention beyond what was provided in the ED, and I believe patient is safe for discharge.  Regardless, an unremarkable evaluation in the ED does not preclude the development or presence of a serious of life threatening condition. As such,  patient was instructed to return immediately for any worsening or change in current symptoms.        Medical Decision Making  Amount and/or Complexity of Data Reviewed  External Data Reviewed: notes.  Labs: ordered. Decision-making details documented in ED Course.  Radiology: ordered. Decision-making details documented in ED Course.  ECG/medicine tests: ordered and independent interpretation performed. Decision-making details documented in ED Course.    Risk  Prescription drug management.                ED Medication(s):  Medications   ketorolac injection 30 mg (30 mg Intravenous Given 6/28/24 1747)   sodium chloride 0.9% bolus 500 mL 500 mL (0 mLs Intravenous Stopped 6/28/24 1925)   ketorolac injection 15 mg (15 mg Intravenous Given 6/28/24 2302)       Discharge Medication List as of 6/28/2024  5:26 PM           Follow-up Information       Schedule an appointment as soon as possible for a visit  with Lisette Olvera MD.    Specialty: Family Medicine  Contact information:  [address] [place] [address] [address] Ken PIPER [id number]  [phone]                                 Scribe Attestation:   Scribe #1: I performed the above scribed service and the documentation accurately describes the services I performed. I attest to the accuracy of the note.     Attending:   Physician Attestation Statement for Scribe #1: I, Aditi Willams MD, personally performed the services described in this documentation, as scribed by Guadalupe Hernandez, in my presence, and it is both accurate and complete.       Scribe Attestation:   Scribe #1: I performed the above scribed service and the documentation accurately describes the services I performed. I attest to the accuracy of the note.         Clinical Impression       ICD-10-CM ICD-9-CM   1. Right knee pain  M25.561 719.46   2. Hypotension  I95.9 458.9   3. Dizziness  R42 780.4       Disposition:   Disposition: Discharged  Condition: Stable        Aditi Willams MD  06/30/24  [id number]

## 2024-06-30 LAB
OHS QRS DURATION: 76 MS
OHS QTC CALCULATION: 461 MS

## 2024-07-01 ENCOUNTER — PATIENT OUTREACH (OUTPATIENT)
Dept: EMERGENCY MEDICINE | Facility: HOSPITAL | Age: 79
End: 2024-07-01
Payer: MEDICARE

## 2024-07-01 NOTE — PROGRESS NOTES
Shaniqua Shrestha  ED Navigator  Emergency Department    Project: [facility] ED Navigator  Role: Community Health Worker    Date: 07/01/2024  Patient Name: Kaylin Mccollum  MRN: [id number]  PCP: Lisette Olvera MD    Assessment:     Kaylin Mccollum is a 79 y.o. female who has presented to ED for Hypotension; Right knee pain; Dizziness. Patient has visited the ED 1 times in the past 3 months. Patient did contact PCP.     ED Navigator Initial Assessment    ED Navigator Enrollment Documentation  Consent to Services  Does patient consent to completing the assessment?: Yes  Contact  Method of Initial Contact: Phone  Transportation  Does the patient have issues with Transportation?: No  Does the patient have transportation to and from healthcare appointments?: Yes  Insurance Coverage  Do you have coverage/adequate coverage?: Yes  Type/kind of coverage: MSSP  Is patient able to afford co-pays/deductibles?: Yes  Is patient able to afford HME or supplies?: Yes  Does patient have an established [facility] PCP?: Yes  Able to access?: Yes  Does the patient have a lack of adequate coverage?: No  Specialist Appointment  Did the patient come to the ED to see a specialist?: No  Does the patient have a pending specialist referral?: No  Does the patient have a specialist appointment made?: No  PCP Follow Up Appointment  Has the patient had an appointment with a primary care provider in the past year?: Yes  Approximate date: 4/25/24  Provider: Sydney Olvera Student RN  Does the patient have a follow up appontment with a PCP?: Yes  Upcoming appointment date: 7/3/24  Provider: Sydney Olvera Student RN  When was the last time you saw your PCP?: 4/25/24  Medications  Is patient able to afford medication?: Yes  Is patient unable to get medication due to lack of transportation?: No  Psychological  Does the patient have psycho-social concerns?: Yes  What concerns does the patient have?: Anxiety and/or Depression (Comment: Hx of depression  charted)  Food  Does the patient have concerns about food?: No  Communication/Education  Does the patient have limited English proficiency/English not primary language?: No  Does patient have low literacy and/or low health literacy?: Yes  Does patient have concerns with care?: No  Does patient have dissatisfaction with care?: No  Other Financial Concerns  Does the patient have immediate financial distress?: No  Does the patient have general financial concerns?: No  Other Social Barriers/Concerns  Does the patient have any additional barriers or concerns?: Other (see comments) (Comment: Chronic Conditions)  Primary Barrier  Barriers identified: Cognitive barrier (health literacy, language and communication, etc.), Psychological barrier (mistrust, anxiety, etc.)  Root Cause of ED Utilization: Chronic Conditions  Plan to address Chronic Conditions: Remind patient of upcoming PCP/specialist appointment within 24 to 48 hours before scheduled appt  Next steps: Scheduled Appointment/Referral, Provided Education  Scheduled Appointment Date: 7/3/24  Appointment Reminder Date: 7/1/24  Was education/educational materials provided surrounding PCP services/creating a medical home?: Yes Was education verbal or written?: Written     Was education/educational materials provided surrounding low cost, healthy foods?: Yes Was education verbal or written?: Written     Was education/educational materials provided surrounding other items? If so, use comment to explain.: Yes Was education verbal or written?: Written   Additional Documentation: Pt was seen in the ED on 6/30/24 for Hypotension; Right knee pain; Dizziness. I spoke with pt for Post ED visit follow up navigation to assist with scheduling a 7-day Post ED visit follow up appt. Pt states that she had contacted pcp to schedule a 7-day Post ED visit follow up appt and was scheduled an appt on 7/3/24 w/Dr. Lisette Olvera at 2:40 p.m. Pt does not have transportation issues and has  no additional needs at this time.  Closing Encounter.    Shaniqua Shrestha           Social History     Socioeconomic History    Marital status:    Occupational History     Employer: [employer]   Tobacco Use    Smoking status: Never    Smokeless tobacco: Never    Tobacco comments:     Never smoked   Substance and Sexual Activity    Alcohol use: Not Currently    Drug use: No    Sexual activity: Not Currently     Birth control/protection: Abstinence, Post-menopausal   Social History Narrative    . 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     Social Determinants of Health     Financial Resource Strain: Low Risk  (7/1/2024)    Overall Financial Resource Strain (CARDIA)     Difficulty of Paying Living Expenses: Not hard at all   Food Insecurity: No Food Insecurity (7/1/2024)    Hunger Vital Sign     Worried About Running Out of Food in the Last Year: Never true     Ran Out of Food in the Last Year: Never true   Transportation Needs: No Transportation Needs (7/1/2024)    PRAPARE - Transportation     Lack of Transportation (Medical): No     Lack of Transportation (Non-Medical): No   Physical Activity: Inactive (3/2/2024)    Exercise Vital Sign     Days of Exercise per Week: 0 days     Minutes of Exercise per Session: 0 min   Stress: Stress Concern Present (7/1/2024)    [employer] [place] [employer] - Occupational Stress Questionnaire     Feeling of Stress : Rather much   Housing Stability: Low Risk  (7/1/2024)    Housing Stability Vital Sign     Unable to Pay for Housing in the Last Year: No     Homeless in the Last Year: No       Plan:   Pt was seen in the ED on 6/30/24 for Hypotension; Right knee pain; Dizziness. I spoke with pt for Post ED visit follow up navigation to assist with scheduling a 7-day Post ED visit follow up appt. Pt states that she had contacted pcp to schedule a 7-day Post ED visit follow up appt and was scheduled an appt on 7/3/24 w/Dr. Lisette Olvera at 2:40 p.m. Pt does not  have transportation issues and has no additional needs at this time.  Closing Encounter.    Shaniqua Shrestha  Medicare Status: Enrolled  This patient has a Medicare coverage.    Appointment made with: Lisette Olvera MD

## 2024-07-02 ENCOUNTER — TELEPHONE (OUTPATIENT)
Dept: PAIN MEDICINE | Facility: CLINIC | Age: 79
End: 2024-07-02
Payer: MEDICARE

## 2024-07-02 NOTE — PROGRESS NOTES
Established Patient - TeleHealth Visit    The patient location is: [place]  The chief complaint leading to consultation is: chronic pain     Visit type: audiovisual    Face to Face time with patient: 10-15 minutes  20 minutes of total time spent on the encounter, which includes face to face time and non-face to face time preparing to see the patient (eg, review of tests), Obtaining and/or reviewing separately obtained history, Documenting clinical information in the electronic or other health record, Independently interpreting results (not separately reported) and communicating results to the patient/family/caregiver, or Care coordination (not separately reported).     Each patient to whom he or she provides medical services by telemedicine is:  (1) informed of the relationship between the physician and patient and the respective role of any other health care provider with respect to management of the patient; and (2) notified that he or she may decline to receive medical services by telemedicine and may withdraw from such care at any time.        Chief Pain Complaint:  No chief complaint on file.    Interval History (7/3/2024):  Patient Kaylin Mccollum presents today for follow-up visit.  Patient being seen today for evaluation of lower back pain.  She fell last week after becoming dizzy where she slumped down against a wall and landed on her buttocks with his knees bent at a sharp angle.  After that she has been having some increased lower back pain as well as right knee pain.  She did go to the ER following a fall with a full workup and did have an x-ray on the right knee and was told no acute changes.  She rates her pain today a 7/10.  Her back pain is radiating down the right leg mainly from the right knee to the right ankle in the front of the shin.  At times she will have shooting pains in the right leg.  She does feel that this feels different than her typical lower back pain.  She continues to take gabapentin,  "Magdalene, Celebrex.  She also requests to repeat her Synvisc-One injections to both knees.  She had these injection 6 months ago and states that she got 80% relief until the fall.  Patient denies night fever/night sweats, urinary incontinence, bowel incontinence, significant weight loss and significant motor weakness.   Patient denies any other complaints or concerns at this time.      Interval history 05/20/2024  Patient presents status post bilateral L4-5 transforaminal epidural steroid injection 04/09/2024.  Patient reports approximately 90% sustained relief in lower extremity radicular symptoms following her procedure.  Patient does report confusion regarding epidural steroid injection as with her prior medical history, she was familiar with an interlaminar approach on the [employer].  She does report intermittent sciatic pain which can occur with certain movements, such as awakening in the morning, driving or crossing her legs.  Pain today is rated a 2/10.  She reports recent bouts of fibromyalgia flares." She does believes Savella medication at 100 mg twice daily has these symptoms well controlled.  She does report her pharmacy, [facility] does not keep this medication in stock and most recently she had to rash in her medication into 50 mg doses and still Ms. Day dose for 1 day.  Today she continues to report sustained relief in lower discogenic back pain following via disc allograft, 1 year prior.  Patient does report it is difficult to participate in exercise including aquatic therapy secondary to exacerbation of fibromyalgia.  Patient does remain active performing household activities.  She does report that she lives in a [facility] and is able to ambulate on her Street.  Patient expresses concern regarding chart review.  She reports diagnosis of stage 3 chronic kidney disease and is inquiring regarding her renal function.  We have reviewed her labs and I have encouraged her to reach out to her primary " care physician regarding potential nephrology referral if needed.      Interval history 02/15/2024  Patient presents status post bilateral sacroiliac joint and greater trochanteric bursa injection 01/09/2024 and  Bilateral intra-articular knee injection with Synvisc-One 01/23/2024.  Patient reports at least 80% sustained relief overlying bilateral sacroiliac joints, GTB and in bilateral knees following her procedures.  Today her primary concern is pain in a bandlike distribution in the lower back which radiates down into the hips and down towards the feet in L4-5 dermatomal distribution.  Pain is exacerbated with positional changes moving from sitting to standing and with standing and with ambulation.  She does report associated weakness in the lower extremities associated with her pain.  Pain today is rated a 6/10.  She is continued gabapentin 300 mg in the morning, 300 mg in the afternoon and 600 mg in the evening.  She also increased Savella to 100 mg with noticeable improvement in her pain.  She is continued physician directed physical therapy exercises over the last 8 weeks from 12/15/2023 through 02/15/2024 with noticeable improvement in pain, range of motion and functionality.  She denies bowel or bladder incontinence saddle anesthesia.    Interval Hx: 12/13/2023  Patient presents for four-month follow-up.  Today she reports that her lower back has been feeling excellent since via disc supplementation and that the procedure has made all the difference! Particularly with standing and ambulation.  Patient reports the day following Thanksgiving, rolling off of her bed and falling onto her side with significant difficulty arising to a standing position and pain with standing and ambulation following this trauma.  Today she reports pain over bilateral sacroiliac territory which radiates into the hips as well as pain in bilateral knees.  Pain is rated a 4/10.  Pain is exacerbated with positional changes, standing  and with ambulation.  She is continued Savella 50 mg twice daily which she reports has significantly reduced the severity and duration of fibromyalgia flares.  She has requesting a refill or increase in this medication.  She is continued physician directed physical therapy exercises over the last 8 weeks from 10/13/2023 through 12/13/2023 for lower back, sacroiliac joint and bilateral knee pain.      Interval history 08/24/2023  Patient presents status post bilateral sacroiliac joint and greater trochanteric bursa injection 06/21/2023.  Patient reports 95% sustained relief overlying bilateral sacroiliac joint and greater trochanteric bursa territories.  She reports altogether following 2 level via disc allograft supplementation and her most recent procedure, she is able to stand upright and ambulate further distances.  She reports these procedures have taken years off my life! .  Today pain is intermittent and rated a 2/10.  Patient has continued Savella 50 mg twice daily and reports this has significantly reduced the frequency and intensity of her fibromyalgia flares and debility associated with these flares.  She is requesting a refill of this medication.  Today she denies significant lower extremity weakness, bowel or bladder incontinence or saddle anesthesia.      Interval history 06/15/2023  Patient presents status post bilateral intra-articular knee injection 05/23/2023.  Patient reports 75% sustained improvement in bilateral knee pain following intra-articular knee injection.  Today her primary concern is bilateral hip pain.  Patient reports pain in the lower back which radiates into the groin and down the lateral aspect of bilateral lower extremities to mid thigh.  Patient reports pain is exacerbated 1st thing in the morning when she is attempting to get out of bed.  Patient denies more distal radiculopathy into the lower extremities or feet.  She denies lower extremity weakness, bowel or bladder  incontinence or saddle anesthesia.  Patient continues to reports significant improvement with Savella medication which she is taking 50 mg twice daily with fibromyalgia pain.  She is requesting a refill of this medication.  Patient reports lower back pain continues to have greater than 80% sustained relief following 2 level via disc allograft supplementation.      Interval history 05/18/2023  Patient presents status post L5-S1 via disc allograft supplementation 04/25/2023 and via disc allograft supplementation L3-4 05/09/2023.  Patient reports noticeable improvement >80% in lower back pain following two-level  allograft supplementation.  Today her primary concern is bilateral knee pain.  Patient has questions regarding hyaluronic acid supplementation to be use.  Patient reports she has seen videos on Durolane and Euflexxa and is inquiring to the brand to be used on the upcoming Tuesday.  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  She is requesting up-to-date imaging to investigate these territories.  Today she denies significant weakness in the upper lower extremities, bowel or bladder incontinence or saddle anesthesia.      Interval history 04/06/2023  Patient presents for follow-up of lower back pain.  She continues to reports superior relief in fibromyalgia symptoms on Savella medication.  Patient has brought in denial paperwork from her insurance reporting limitations on dosage and quantity allowed.  Patient reports prior to starting this medication she felt that she did not have a life.  now she reports significant improvement in pain as well as chronic fatigue associated with fibromyalgia.  Today she again reports pain in the lower back which is worse with lumbar flexion.  Patient reports she is unable to perform activities of daily living such as grocery shopping or prolonged standing or ambulation secondary  to her discogenic lower back pain.  Today patient denies more distal radiculopathy into the lower extremities or feet or lower extremity weakness.  Patient is interested in discussing intervention.  Of note patient has failed to have improvement in his lower back pain with prior lumbar medial branch block, sacroiliac joint injections.    Interval Hx: 3/9/23  Patient presents for one-month follow-up.  Today she reports she is significantly better since our last clinic visit.  At that time patient had slipped in a low off and injured her lower back and right leg.  Today she reports this pain is significantly improved and pain is intermittent and today is rated a 3/10.  Today patient reports pain is isolated to the midline lower lumbar spine.  Pain is exacerbated with lumbar flexion such as when she is picking up close.  Fibromyalgia Pain has been significantly improved with the initiation of Savella medication.  Patient has reached a titration of 50 mg twice daily.  Patient recently refilled this medication.  She denies any side effects from this medication.  Today she denies any significant lower extremity weakness, bowel or bladder incontinence or saddle anesthesia      Interval history 02/07/2023  Patient presents status post bilateral sacroiliac joint and greater trochanteric bursa injection 01/24/2023 and bilateral L3-5 lumbar medial branch block 12/13/2022.  Patient had recent mechanical fall confounding benefit from recent injections.  Today she reports she was reaching over a mattress cover to reach a stack of bibles and slid into a slint.  Patient reports she was behind and tall wall in a took 20-30 minutes for her to stand.  Patient also reports exacerbation of fibromyalgia pain.  Since her fall patient reports pain which radiates into the buttock and down the posterior aspect of the right lower extremity to the dorsum of the foot in L4-S1 distribution.  Patient has continued gabapentin 300 mg twice daily,  Celebrex in the evenings as well as Tylenol 1000 mg twice daily.    Interval history 11/29/2022    Patient presents status post bilateral sacroiliac joint greater trochanteric bursa 10/10/2022.  Patient reports 90% relief overlying bilateral sacroiliac joints and greater trochanteric bursa following her injection.  Today she reports primarily lumbar axial back pain as well as significant neck pain.  Lower back pain is exacerbated with prolonged standing such as when she is washing dishes.  She denies significant distal radiculopathy into the right lower extremities as described at our last clinic visit.  Neck pain is elicited with cervical flexion, extension and lateral flexion and she does report reduced mobility.  She reports her pain began following a mechanical fall down the stairs at [facility] in September 1986.  Patient has continued gabapentin 300 mg in the morning, 300 mg in the afternoon and 600 mg in the evening.    Interval history 10/04/2022  Ms. Mccollum is a 77-year-old female with past medical history significant for depression, hyperlipidemia, hypertension, mitral valve prolapse, peripheral vascular disease, history of COVID-19, type 2 diabetes, multi joint arthritis, fibromyalgia who presents to [facility] care, previous Dr. Dutta patient.  Today patient reports return of pain in the lower back which radiates into bilateral hips and down the posterior aspect of the right lower extremity in L4-5 distribution to the dorsum of the foot.  Patient reports pain is intermittent but has increased in intensity.  Pain is described as shocking in nature and today is rated a 6/10.  Patient reports she feels as if her skin is crawling.  patient is currently taking gabapentin 300 mg up to 3 times daily when pain is severe.  Pain interferes with the patient's sleep.  Patient also reports history of fibromyalgia which limits her mobility and duration of standing and ambulation.  Patient does endorse associated weakness  in the lower extremities associated with her pain.  Patient is interested in pursuing repeat intervention as she is obtained several months of significant relief with prior sacroiliac joint and greater trochanteric bursa injections.  Patient has continued physician directed physical therapy exercises at home daily.      History of Present Illness 01/16/2020: Dr. Dutta:   Kaylin Mccollum is a 74 y.o. female  who is presenting with a chief complaint of lumbar back pain. The patient began experiencing this problem insidiously, and the pain has been gradually worsening over the past 5 month(s). The pain is described as throbbing, shooting, burning and electrical and is located in the bilateral lumbar spine. Pain is intermittent and lasts hours. The pain radiates to bilateral lower extremities L4 distribudution. The patient rates her pain a 8 out of ten and interferes with activities of daily living a 7 out of ten. Pain is exacerbated by flexion of the lumbar spine, ambulation, and is improved by rest.      She also complains of right knee pain. The patient began experiencing this problem insidiously. The pain is described as cramping, aching and is located in the right knee. Pain is intermittent and lasts hours. The pain is nonradiating. The patient rates her pain a 8 out of ten and interferes with activities of daily living a 7 out of ten. Pain is exacerbated by getting up from a seated position and standing, and is improved by rest. Patient reports no prior trauma, prior arthroscopy bilaterally in 1990s.       - pertinent negatives: No fever, No chills, No weight loss, No bladder dysfunction, No bowel dysfunction, No saddle anesthesia  - pertinent positives: none        Pain Disability Index Review:   @REVFS(4749:3)@    Non-Pharmacologic Treatments:  Physical Therapy/Home Exercise: yes  Ice/Heat:yes  TENS: no  Acupuncture: no  Massage: no  Chiropractic: no    Other: no      Pain Medications:  - Adjuvant Medications:  Lorazepam (Ativan), Neurontin (Gabapentin), and Topical Ointment (Voltaren Gel, Steroid cream, Anti-Inflammatory Cream, Compound cream)      Pain injections:  Dr. Dumont:  -04/09/2024: Bilateral L4-5 transforaminal epidural steroid injection  -01/23/2024: Bilateral intra-articular knee injection with Synvisc-One  -01/09/2024: Bilateral sacroiliac joint and greater trochanteric bursa injection  -06/21/2023: Bilateral sacroiliac joint and greater trochanteric bursa injection  -05/29/2023: Bilateral intra-articular knee injection  -05/09/2023: L3-4 via disc allograft supplementation  -04/25/2023:  L5-S1 via disc allograft supplementation  -01/24/2023: Bilateral sacroiliac joint and greater trochanteric bursa injection  -12/13/2022: Bilateral L3-5 lumbar medial branch block  - 10/10/2022: Bilateral greater trochanteric bursa and sacroiliac joint injection      -04/20/2022: Right-sided acetabular femoral injection; Dr. Dutta  -03/01/2022: Bilateral sacroiliac joint and greater trochanteric bursa injection; Dr. Dutta  -07/08/2021: Bilateral sacroiliac joint and greater trochanteric bursa injection; Dr. Dutta  -01/05/2021: Bilateral sacroiliac joint and greater trochanteric bursa injection; Dr. Burns  -10/08/2020: Bilateral sacroiliac joint and bilateral greater trochanteric bursa injection; Dr. Dutta  -05/06/2020: Bilateral sacroiliac joint and greater trochanteric bursa injection; Dr. Dutta    Past Medical History:   Diagnosis Date    Arthritis     Cataract     COVID-19 02/25/2021    Diabetes mellitus 1995    BS didn't check 09/13/2022    Diabetes mellitus, type 2     Fibromyalgia     General anesthetics causing adverse effect in therapeutic use     bradycardia     Hypertension     Migraines     Mitral valve prolapse     Osteoarthritis     Rotator cuff tear 04/01/2015       Review of patient's allergies indicates:   Allergen Reactions    Demerol [meperidine] Other (See Comments)     Burning when adm IV  Able to tolerate IM,  ""Turned the veins in my hand purple."    Latex, natural rubber Other (See Comments)     "Burns my skin",  Symptoms get worse the longer she is exposed    Zocor [simvastatin] Other (See Comments)     Tightening of muscles    Statins-hmg-coa reductase inhibitors Other (See Comments)     myopathy    Sulfa (sulfonamide antibiotics)      Blurred vision    Nickel Rash     Pt states she had sores to ear lobes from nickel in earrings        Past Surgical History:   Procedure Laterality Date    ARTHROSCOPY OF KNEE Right 03/10/2020    Procedure: ARTHROSCOPY, KNEE;  Surgeon: Les Schneider MD;  Location: [facility] OR;  Service: Orthopedics;  Laterality: Right;    CATARACT EXTRACTION W/  INTRAOCULAR LENS IMPLANT Left 07/19/2017    CATARACT EXTRACTION W/  INTRAOCULAR LENS IMPLANT Right 2017    CHOLECYSTECTOMY      CHONDROPLASTY OF KNEE Right 03/10/2020    Procedure: CHONDROPLASTY, KNEE;  Surgeon: Les Schneider MD;  Location: [facility] OR;  Service: Orthopedics;  Laterality: Right;  Anterior compartment     COLONOSCOPY N/A 09/25/2018    Procedure: COLONOSCOPY;  Surgeon: Bethel Carmona MD;  Location: [facility] ENDO;  Service: Endoscopy;  Laterality: N/A;    EXCISION OF MEDIAL MENISCUS OF KNEE Right 03/10/2020    Procedure: MENISCECTOMY, KNEE, MEDIAL;  Surgeon: Les Schneider MD;  Location: [facility] OR;  Service: Orthopedics;  Laterality: Right;  Partial, Medial , Lateral     EYE SURGERY      INJECTION OF ANESTHETIC AGENT AROUND MEDIAL BRANCH NERVES INNERVATING LUMBAR FACET JOINT Bilateral 12/13/2022    Procedure: Bilateral L3-5 MBB;  Surgeon: Humberto Dumont MD;  Location: [facility] PAIN MGT;  Service: Pain Management;  Laterality: Bilateral;    INJECTION OF ANESTHETIC AGENT AROUND NERVE Right 08/08/2019    Procedure: Right Genicular nerve block with local;  Surgeon: Manpreet Dutta MD;  Location: [facility] PAIN [facility]GT;  Service: Pain Management;  Laterality: Right;    INJECTION OF ANESTHETIC AGENT INTO SACROILIAC JOINT Bilateral 05/06/2020    " Procedure: Bilateral Sacroiliac Joint Injection;  Surgeon: Manpreet Dutta MD;  Location: [facility]IN [facility]GT;  Service: Pain Management;  Laterality: Bilateral;    INJECTION OF ANESTHETIC AGENT INTO SACROILIAC JOINT Bilateral 10/08/2020    Procedure: Bilateral SI and Bilateral GTB with RN IV sedation;  Surgeon: Manpreet Dutta MD;  Location: [facility] PAIN [facility]GT;  Service: Pain Management;  Laterality: Bilateral;    INJECTION OF ANESTHETIC AGENT INTO SACROILIAC JOINT Bilateral 01/05/2021    Procedure: Bilateral BLOCK, SACROILIAC JOINT and Bilateral GTB witn RN IV sedation;  Surgeon: Daniel Burns MD;  Location: [facility];  Service: Pain Management;  Laterality: Bilateral;    INJECTION OF ANESTHETIC AGENT INTO SACROILIAC JOINT Bilateral 07/08/2021    Procedure: Bilateral BLOCK, SACROILIAC JOINT bilateral GTB RN IV sedation;  Surgeon: Manpreet Dutta MD;  Location: [facility];  Service: Pain Management;  Laterality: Bilateral;    INJECTION OF ANESTHETIC AGENT INTO SACROILIAC JOINT Bilateral 03/01/2022    Procedure: Bilateral BLOCK, SACROILIAC JOINT and Bilateral GTB RN IV sedation;  Surgeon: Manpreet Dutta MD;  Location: [facility];  Service: Pain Management;  Laterality: Bilateral;    INJECTION OF ANESTHETIC AGENT INTO SACROILIAC JOINT Bilateral 10/10/2022    Procedure: Bilateral Sacroiliac Joint Injection;  Surgeon: Humberto Dumont MD;  Location: [facility];  Service: Pain Management;  Laterality: Bilateral;    INJECTION OF ANESTHETIC AGENT INTO SACROILIAC JOINT Bilateral 01/24/2023    Procedure: Bilateral Sacroiliac Joint Injection;  Surgeon: Humberto Dumont MD;  Location: [facility];  Service: Pain Management;  Laterality: Bilateral;    INJECTION OF ANESTHETIC AGENT INTO SACROILIAC JOINT Bilateral 06/21/2023    Procedure: Bilateral Sacroiliac Joint Injection;  Surgeon: Humberto Dumont MD;  Location: [facility];  Service: Pain Management;  Laterality: Bilateral;    INJECTION OF ANESTHETIC AGENT INTO SACROILIAC JOINT  Bilateral 1/9/2024    Procedure: Bilateral SIJ Injection;  Surgeon: Humberto Dumont MD;  Location: [facility];  Service: Pain Management;  Laterality: Bilateral;    INJECTION OF JOINT Bilateral 09/05/2019    Procedure: Bilateral GT bursa injection;  Surgeon: Manpreet Dutta MD;  Location: [facility];  Service: Pain Management;  Laterality: Bilateral;    INJECTION OF JOINT Bilateral 05/06/2020    Procedure: Bilateral GT bursa injection;  Surgeon: Manpreet Dutta MD;  Location: [facility];  Service: Pain Management;  Laterality: Bilateral;    INJECTION OF JOINT Right 04/28/2022    Procedure: Injection, Joint Right Hip Injection RN IV sedation;  Surgeon: Manpreet Dutta MD;  Location: [facility];  Service: Pain Management;  Laterality: Right;    INJECTION OF JOINT Bilateral 10/10/2022    Procedure: Bilateral GT bursa injection with RN IV sedation;  Surgeon: Humberto Dumont MD;  Location: [facility];  Service: Pain Management;  Laterality: Bilateral;    INJECTION OF JOINT Bilateral 01/24/2023    Procedure: Bilateral GT bursa injection;  Surgeon: Humberto Dumont MD;  Location: [facility];  Service: Pain Management;  Laterality: Bilateral;    INJECTION OF JOINT Bilateral 05/23/2023    Procedure: Bilateral intraarticular knee injection with Synvisc-1; ;  Surgeon: Humberto Dumont MD;  Location: [facility];  Service: Pain Management;  Laterality: Bilateral;    INJECTION OF JOINT Bilateral 06/21/2023    Procedure: Bilateral GT bursa injection;  Surgeon: Humberto Dumont MD;  Location: [facility];  Service: Pain Management;  Laterality: Bilateral;    INJECTION OF JOINT Bilateral 1/9/2024    Procedure: Bilateral GTB injection;  Surgeon: Humberto Dumont MD;  Location: [facility];  Service: Pain Management;  Laterality: Bilateral;    INJECTION OF JOINT Bilateral 1/23/2024    Procedure: Bilateral intraarticular knee injection with Synvisc 1 ;  Surgeon: Humberto Dumont MD;  Location: [facility];  Service:  Pain Management;  Laterality: Bilateral;    INJECTION, ALLOGRAFT, INTERVERTEBRAL DISC N/A 04/25/2023    Procedure: INJECTION,ALLOGRAFT,INTERVERTEBRAL DISC,1ST LEVEL: L5-S1;  Surgeon: Humberto Dumont MD;  Location: [facility];  Service: Pain Management;  Laterality: N/A;    INJECTION, ALLOGRAFT, INTERVERTEBRAL DISC N/A 05/09/2023    Procedure: INJECTION,ALLOGRAFT,INTERVERTEBRAL DISC,2nd LEVEL: L3-4;  Surgeon: Humberto Dumont MD;  Location: [facility];  Service: Pain Management;  Laterality: N/A;    KNEE ARTHROSCOPY Bilateral     PARS PLANA VITRECTOMY W/ REPAIR OF MACULAR HOLE Left 02/22/2017    RADIOFREQUENCY THERMOCOAGULATION Left 07/11/2019    Procedure: Right SIJ RFA;  Surgeon: Manpreet Dutta MD;  Location: [facility];  Service: Pain Management;  Laterality: Left;    RADIOFREQUENCY THERMOCOAGULATION Right 07/25/2019    Procedure: Right SIJ RFA;  Surgeon: Manpreet Dutta MD;  Location: [facility];  Service: Pain Management;  Laterality: Right;    SELECTIVE INJECTION OF ANESTHETIC AGENT AROUND LUMBAR SPINAL NERVE ROOT BY TRANSFORAMINAL APPROACH Bilateral 4/9/2024    Procedure: Bilateral L4/5 TF JAMIN;  Surgeon: Humberto Dumont MD;  Location: [facility];  Service: Pain Management;  Laterality: Bilateral;    SHOULDER ARTHROSCOPY Right 06/04/2015     Current Outpatient Medications on File Prior to Visit   Medication Sig Dispense Refill    b complex vitamins tablet Take 1 tablet by mouth once daily.      biotin 1 mg tablet Take 1,000 mcg by mouth every morning.       celecoxib (CELEBREX) 200 MG capsule TAKE 1 CAPSULE(200 MG) BY MOUTH TWICE DAILY WITH FOOD 120 capsule 1    clotrimazole-betamethasone 1-0.05% (LOTRISONE) cream Apply topically 2 (two) times daily. 45 g 2    diclofenac sodium (VOLTAREN) 1 % Gel APPLY 2 GRAMS TOPICALLY TO THE AFFECTED AREA FOUR TIMES DAILY 100 g 2    ezetimibe (ZETIA) 10 mg tablet Take 1 tablet (10 mg total) by mouth once daily. 90 tablet 3    FLUoxetine 40 MG capsule Take 1 capsule (40  mg total) by mouth once daily. 90 capsule 1    fluticasone (FLONASE) 50 mcg/actuation nasal spray 2 sprays by Each Nare route once daily. (Patient taking differently: 2 sprays by Each Nostril route nightly as needed.) 1 Bottle 0    furosemide (LASIX) 20 MG tablet Take 1 tablet (20 mg total) by mouth 2 (two) times daily. 60 tablet 11    gabapentin (NEURONTIN) 300 MG capsule Take 1 capsule (300 mg total) by mouth 3 (three) times daily. 90 capsule 0    hydroCHLOROthiazide (HYDRODIURIL) 25 MG tablet Take 1 tablet (25 mg total) by mouth once daily. 90 tablet 3    INVOKANA 100 mg Tab tablet TAKE 1 TABLET(100 MG) BY MOUTH EVERY DAY 90 tablet 0    losartan (COZAAR) 25 MG tablet Take 0.5 tablets (12.5 mg total) by mouth every evening. 45 tablet 1    metFORMIN (GLUCOPHAGE) 1000 MG tablet TAKE 1 TABLET(1000 MG) BY MOUTH TWICE DAILY WITH MEALS 180 tablet 3    metoprolol succinate (TOPROL-XL) 25 MG 24 hr tablet TAKE 1 TABLET(25 MG) BY MOUTH EVERY DAY 90 tablet 3    milnacipran (SAVELLA) 100 mg Tab Take 1 tablet (100 mg total) by mouth 2 (two) times daily. 60 tablet 2    omeprazole (PRILOSEC) 20 MG capsule TAKE ONE CAPSULE BY MOUTH EVERY DAY AS NEEDED WITH NSAID 30 capsule 5    potassium chloride SA (K-DUR,KLOR-CON M) 10 MEQ tablet TAKE 1 TABLET(10 MEQ) BY MOUTH EVERY DAY 90 tablet 1    pulse oximeter (PULSE OXIMETER) device by Apply Externally route 2 (two) times a day. Use twice daily at 8 AM and 3 PM and record the value in [facility] as directed. 1 each 0    semaglutide (OZEMPIC) 0.25 mg or 0.5 mg (2 mg/3 mL) pen injector Inject 0.25 mg into the skin every 7 days. 3 mL 1    triamcinolone acetonide 0.025% (KENALOG) 0.025 % Oint Apply topically 2 (two) times daily. for 10 days (Patient taking differently: Apply topically 2 (two) times daily as needed.) 15 g 2    verapamiL (CALAN) 80 MG tablet TAKE 1 TABLET(80 MG) BY MOUTH TWICE DAILY 180 tablet 2    vitamin D (VITAMIN D3) 1000 units Tab Take 1,000 Units by mouth once daily.        No current facility-administered medications on file prior to visit.       Imaging / Labs / Studies (reviewed on 7/3/2024):    Cervical x-ray 05/18/2023   FINDINGS:  Osteopenia.  Vertebral body heights maintained.  Mild anterolisthesis of C4 on C5 and C5 on C6.  Multilevel osteophyte changes with disc height loss most noted at C5-6 and C6-7.  Multilevel prominent facet arthropathy.  Multilevel left-sided neural foraminal narrowing.     Significant change in alignment on flexion or extension.     Prevertebral soft tissues unremarkable.  Lung apices clear.    Thoracic x-ray 05/18/2023  FINDINGS:  Osteopenia.  Vertebral body heights maintained.  No spondylolisthesis.  Similar more multilevel bridging osteophyte changes.  No acute osseous abnormality.  Soft tissues unremarkable.      MRI Lumbar Spine 02/16/2023  FINDINGS:  Grade 1 degenerative spondylolisthesis at L4-L5.  Vertebral body height is normal.  Marrow signal is within normal limits. The conus medullaris terminates at the level of L1-L2.  No abnormal signal within the conus. Intervertebral disc levels are as follows:     T12-L1 disc: Normal disc height with anterior osteophytes and mild degenerative facet hypertrophy.  No spinal or foraminal stenosis.  The dural canal measures 16 mm.     L1-L2 disc : Disc space height loss with chronic Schmorl's nodes.  Posterior disc bulge.  Anterior osteophytes.  Minor facet arthropathy bilaterally.  The dural canal measures 13 mm.  No foraminal stenosis.     L2-L3 disc: Circumferential disc bulge with tiny chronic Schmorl's nodes.  Anterior osteophytes.  Mild degenerative facet hypertrophy.  The dural canal measures 12 mm.  No foraminal stenosis.     L3-L4 disc: Disc space height loss with a circumferential disc bulge and anterior osteophytes.  Mild degenerative facet hypertrophy with moderate buckling of the ligamentum flavum.  The dural canal measures 11 mm.  No significant foraminal stenosis.     L4-L5 disc: Grade 1  spondylolisthesis with severe degenerative facet hypertrophy bilaterally.  Buckling of the ligamentum flavum.  The dural canal measures 7 mm AP.  Disc encroaches into the floors of the exit foramina, right greater than left.  There is mild right foraminal stenosis.     L5-S1 disc: Severe disc space height loss with osteophytes at the margins and encroach into the exit foramina.  Mild degenerative facet hypertrophy and buckling of the ligamentum flavum.  The dural canal measures 11 mm.  Mild foraminal stenosis.          ROS:      GENERAL:  No weight loss, malaise or fevers.  HEENT:   No recent changes in vision or hearing  NECK:  Negative for lumps, no difficulty with swallowing.  RESPIRATORY:  Negative for cough, wheezing or shortness of breath, patient denies any recent URI.  CARDIOVASCULAR:  Negative for chest pain or palpitations.  GI:  Negative for abdominal discomfort, blood in stools or black stools or change in bowel habits.  MUSCULOSKELETAL:  See HPI.  SKIN:  Negative for lesions, rash, and itching.  PSYCH:  No mood disorder or recent psychosocial stressors.   HEMATOLOGY/LYMPHOLOGY:  Negative for prolonged bleeding, bruising easily or swollen nodes.    NEURO:   No history of syncope, paralysis, seizures or tremors.  All other reviewed and negative other than HPI.    Physical Exam:  Telemedicine Exam  There were no vitals filed for this visit.  There is no height or weight on file to calculate BMI.      Physical Exam: last in clinic visit:    There were no vitals filed for this visit.             There is no height or weight on file to calculate BMI.   (reviewed on 7/3/2024)    General: alert and oriented, in no apparent distress.  Gait: normal gait.  Skin: no rashes, no discoloration, no obvious lesions  HEENT: normocephalic, atraumatic. Pupils equal and round.  Cardiovascular: no significant peripheral edema present.  Respiratory: without use of accessory muscles of respiration.    Musculoskeletal - Lumbar  Spine:  - ROM fairly preserved   - Pain on flexion of lumbar spine: Absent   - Pain on extension of lumbar spine: Absent         - Lumbar facet loading: Absent   - TTP over the lumbar facet joints: Absent  - TTP over the lumbar paraspinals: Present   - TTP over the SI joints: Present  - TTP over GT bursa: Present, minimal   - Straight Leg Raise: Negative  - CHERYLE: Present    Right Knee:  - TTP: Present over medial/ lateral joint line  - Pain with extension: Present  - Pain with flexion: Present  - Crepitus: Present     Neuro - Lower Extremities:  - BLE Strength: R/L: HF: 5/5, HE: 5/5, KF: 5/5; KE: 5/5; FE: 5/5; FF: 5/5  - Extremity Reflexes: Brisk and symmetric throughout  - Sensory: Sensation to light touch intact bilaterally      Psych:  Mood and affect is appropriate    Assessment:  Kaylin Mccollum is a 79 y.o. year old female who is presenting with       ICD-10-CM ICD-9-CM    1. Fall, initial encounter  W19.XXXA E888.9 X-Ray Lumbar Complete Including Flex And Ext      2. Lumbar radiculopathy  M54.16 724.4 X-Ray Lumbar Complete Including Flex And Ext      3. Primary osteoarthritis of both knees  M17.0 715.16 Case Request-RAD/Other Procedure Area: Bilateral Synvisc Knee injection                Plan:  1. Interventional: Schedule for bilateral knee synvisc one injections after 7/23/2024   Explained the risks and benefits of the procedure in detail with the patient today in clinic along with alternative treatment options, and the patient elected to pursue the intervention.      - Status post L3-4 via disc allograft supplementation 05/09/2023 and L5-S1 via disc allograft supplementation 04/25/2023 with greater than 80% improvement in discogenic lower back pain.    -Patient has 90% sustained relief in lower extremity radicular symptoms following bilateral L4-5 transforaminal epidural steroid injection.  We discussed repeating this injection in the future should radicular symptoms exacerbate.    Anticoagulation:   None, no anticoagulation    2. Pharmacologic:    reviewed and consistent with use    -Continue gabapentin.  We have reviewed potential side effects of this medication including daytime somnolence, weight gain and peripheral edema  -Gabapentin 300 mg in the morning, 300 mg in the afternoon and 600 mg in the evening    -Continue Savella as this tremendously helps with symptoms of fibromyalgia.  We have discussed that Savella is FDA approved and has demonstrated improvement in multiple measures including pain, global impression of change in physical function.  We have discussed that the most frequent side effects of this medication can include nausea, constipation, vomiting, dry mouth, flushing or tachycardia.    -100 mg BID    Continue celebrex  Do not combine with other NSAIDs such as ibuprofen, aleve, advil. Take with food. If any GI upset, stop medication and contact office.   We have previously discussed potential deleterious side effects of NSAIDs on the cardiovascular, gastrointestinal and renal systems. We have discussed judicious use of this medication.        3. Rehabilitative:   -We discussed continuing at home physician directed physical therapy to help manage the patient/s painful condition. The patient was counseled that muscle strengthening will improve the long term prognosis in regards to pain and may also help increase range of motion and mobility.  I have encouraged the patient to perform low intensity aerobic exercise to help with fibromyalgia.    4. Diagnostic:  Reviewed relevant imaging (MRI lumbar spine, x-ray cervical spine, x-ray thoracic spine) and answered patient's questions.    Xray lumbar spine ordered    5. Consult:   -Dr. Schneider for knee and shoulder pain PRN  -Rheumatology: Fibromyalgia  -PCP: Dr. Olvera:  Follow-up for potential renal insufficiency    6. Follow up:  4 weeks after procedure     The above plan and management options were discussed at length with patient. Patient  is in agreement with the above and verbalized understanding.    - I discussed the goals of interventional chronic pain management with the patient on today's visit. We discussed a multimodal and systematic approach to pain.  This includes diagnostic and therapeutic injections, adjuvant pharmacologic treatment, physical therapy, and at times psychiatry.  I emphasized the importance of regular exercise, core strengthening and stretching, diet and weight loss as a cornerstone of long-term pain management.    - This condition does not require this patient to take time off of work, and the primary goal of our Pain Management services is to improve the patient's functional capacity.  - Patient Questions: Answered all of the patient's questions regarding diagnoses, therapy, treatment and next steps    Sydney Duke NP

## 2024-07-02 NOTE — TELEPHONE ENCOUNTER
Called patient and scheduled an appt being that she had a fall and needed to be seen. Pt verbalized understanding.    Cristiano DOWNING

## 2024-07-02 NOTE — H&P (VIEW-ONLY)
Established Patient - TeleHealth Visit    The patient location is: [place]  The chief complaint leading to consultation is: chronic pain     Visit type: audiovisual    Face to Face time with patient: 10-15 minutes  20 minutes of total time spent on the encounter, which includes face to face time and non-face to face time preparing to see the patient (eg, review of tests), Obtaining and/or reviewing separately obtained history, Documenting clinical information in the electronic or other health record, Independently interpreting results (not separately reported) and communicating results to the patient/family/caregiver, or Care coordination (not separately reported).     Each patient to whom he or she provides medical services by telemedicine is:  (1) informed of the relationship between the physician and patient and the respective role of any other health care provider with respect to management of the patient; and (2) notified that he or she may decline to receive medical services by telemedicine and may withdraw from such care at any time.        Chief Pain Complaint:  No chief complaint on file.    Interval History (7/3/2024):  Patient Kaylin Mccollum presents today for follow-up visit.  Patient being seen today for evaluation of lower back pain.  She fell last week after becoming dizzy where she slumped down against a wall and landed on her buttocks with his knees bent at a sharp angle.  After that she has been having some increased lower back pain as well as right knee pain.  She did go to the ER following a fall with a full workup and did have an x-ray on the right knee and was told no acute changes.  She rates her pain today a 7/10.  Her back pain is radiating down the right leg mainly from the right knee to the right ankle in the front of the shin.  At times she will have shooting pains in the right leg.  She does feel that this feels different than her typical lower back pain.  She continues to take gabapentin,  "Magdalene, Celebrex.  She also requests to repeat her Synvisc-One injections to both knees.  She had these injection 6 months ago and states that she got 80% relief until the fall.  Patient denies night fever/night sweats, urinary incontinence, bowel incontinence, significant weight loss and significant motor weakness.   Patient denies any other complaints or concerns at this time.      Interval history 05/20/2024  Patient presents status post bilateral L4-5 transforaminal epidural steroid injection 04/09/2024.  Patient reports approximately 90% sustained relief in lower extremity radicular symptoms following her procedure.  Patient does report confusion regarding epidural steroid injection as with her prior medical history, she was familiar with an interlaminar approach on the [employer].  She does report intermittent sciatic pain which can occur with certain movements, such as awakening in the morning, driving or crossing her legs.  Pain today is rated a 2/10.  She reports recent bouts of fibromyalgia flares." She does believes Savella medication at 100 mg twice daily has these symptoms well controlled.  She does report her pharmacy, [facility] does not keep this medication in stock and most recently she had to rash in her medication into 50 mg doses and still Ms. Day dose for 1 day.  Today she continues to report sustained relief in lower discogenic back pain following via disc allograft, 1 year prior.  Patient does report it is difficult to participate in exercise including aquatic therapy secondary to exacerbation of fibromyalgia.  Patient does remain active performing household activities.  She does report that she lives in a [facility] and is able to ambulate on her Street.  Patient expresses concern regarding chart review.  She reports diagnosis of stage 3 chronic kidney disease and is inquiring regarding her renal function.  We have reviewed her labs and I have encouraged her to reach out to her primary " care physician regarding potential nephrology referral if needed.      Interval history 02/15/2024  Patient presents status post bilateral sacroiliac joint and greater trochanteric bursa injection 01/09/2024 and  Bilateral intra-articular knee injection with Synvisc-One 01/23/2024.  Patient reports at least 80% sustained relief overlying bilateral sacroiliac joints, GTB and in bilateral knees following her procedures.  Today her primary concern is pain in a bandlike distribution in the lower back which radiates down into the hips and down towards the feet in L4-5 dermatomal distribution.  Pain is exacerbated with positional changes moving from sitting to standing and with standing and with ambulation.  She does report associated weakness in the lower extremities associated with her pain.  Pain today is rated a 6/10.  She is continued gabapentin 300 mg in the morning, 300 mg in the afternoon and 600 mg in the evening.  She also increased Savella to 100 mg with noticeable improvement in her pain.  She is continued physician directed physical therapy exercises over the last 8 weeks from 12/15/2023 through 02/15/2024 with noticeable improvement in pain, range of motion and functionality.  She denies bowel or bladder incontinence saddle anesthesia.    Interval Hx: 12/13/2023  Patient presents for four-month follow-up.  Today she reports that her lower back has been feeling excellent since via disc supplementation and that the procedure has made all the difference! Particularly with standing and ambulation.  Patient reports the day following Thanksgiving, rolling off of her bed and falling onto her side with significant difficulty arising to a standing position and pain with standing and ambulation following this trauma.  Today she reports pain over bilateral sacroiliac territory which radiates into the hips as well as pain in bilateral knees.  Pain is rated a 4/10.  Pain is exacerbated with positional changes, standing  and with ambulation.  She is continued Savella 50 mg twice daily which she reports has significantly reduced the severity and duration of fibromyalgia flares.  She has requesting a refill or increase in this medication.  She is continued physician directed physical therapy exercises over the last 8 weeks from 10/13/2023 through 12/13/2023 for lower back, sacroiliac joint and bilateral knee pain.      Interval history 08/24/2023  Patient presents status post bilateral sacroiliac joint and greater trochanteric bursa injection 06/21/2023.  Patient reports 95% sustained relief overlying bilateral sacroiliac joint and greater trochanteric bursa territories.  She reports altogether following 2 level via disc allograft supplementation and her most recent procedure, she is able to stand upright and ambulate further distances.  She reports these procedures have taken years off my life! .  Today pain is intermittent and rated a 2/10.  Patient has continued Savella 50 mg twice daily and reports this has significantly reduced the frequency and intensity of her fibromyalgia flares and debility associated with these flares.  She is requesting a refill of this medication.  Today she denies significant lower extremity weakness, bowel or bladder incontinence or saddle anesthesia.      Interval history 06/15/2023  Patient presents status post bilateral intra-articular knee injection 05/23/2023.  Patient reports 75% sustained improvement in bilateral knee pain following intra-articular knee injection.  Today her primary concern is bilateral hip pain.  Patient reports pain in the lower back which radiates into the groin and down the lateral aspect of bilateral lower extremities to mid thigh.  Patient reports pain is exacerbated 1st thing in the morning when she is attempting to get out of bed.  Patient denies more distal radiculopathy into the lower extremities or feet.  She denies lower extremity weakness, bowel or bladder  incontinence or saddle anesthesia.  Patient continues to reports significant improvement with Savella medication which she is taking 50 mg twice daily with fibromyalgia pain.  She is requesting a refill of this medication.  Patient reports lower back pain continues to have greater than 80% sustained relief following 2 level via disc allograft supplementation.      Interval history 05/18/2023  Patient presents status post L5-S1 via disc allograft supplementation 04/25/2023 and via disc allograft supplementation L3-4 05/09/2023.  Patient reports noticeable improvement >80% in lower back pain following two-level  allograft supplementation.  Today her primary concern is bilateral knee pain.  Patient has questions regarding hyaluronic acid supplementation to be use.  Patient reports she has seen videos on Durolane and Euflexxa and is inquiring to the brand to be used on the upcoming Tuesday.  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  She is requesting up-to-date imaging to investigate these territories.  Today she denies significant weakness in the upper lower extremities, bowel or bladder incontinence or saddle anesthesia.      Interval history 04/06/2023  Patient presents for follow-up of lower back pain.  She continues to reports superior relief in fibromyalgia symptoms on Savella medication.  Patient has brought in denial paperwork from her insurance reporting limitations on dosage and quantity allowed.  Patient reports prior to starting this medication she felt that she did not have a life.  now she reports significant improvement in pain as well as chronic fatigue associated with fibromyalgia.  Today she again reports pain in the lower back which is worse with lumbar flexion.  Patient reports she is unable to perform activities of daily living such as grocery shopping or prolonged standing or ambulation secondary  to her discogenic lower back pain.  Today patient denies more distal radiculopathy into the lower extremities or feet or lower extremity weakness.  Patient is interested in discussing intervention.  Of note patient has failed to have improvement in his lower back pain with prior lumbar medial branch block, sacroiliac joint injections.    Interval Hx: 3/9/23  Patient presents for one-month follow-up.  Today she reports she is significantly better since our last clinic visit.  At that time patient had slipped in a low off and injured her lower back and right leg.  Today she reports this pain is significantly improved and pain is intermittent and today is rated a 3/10.  Today patient reports pain is isolated to the midline lower lumbar spine.  Pain is exacerbated with lumbar flexion such as when she is picking up close.  Fibromyalgia Pain has been significantly improved with the initiation of Savella medication.  Patient has reached a titration of 50 mg twice daily.  Patient recently refilled this medication.  She denies any side effects from this medication.  Today she denies any significant lower extremity weakness, bowel or bladder incontinence or saddle anesthesia      Interval history 02/07/2023  Patient presents status post bilateral sacroiliac joint and greater trochanteric bursa injection 01/24/2023 and bilateral L3-5 lumbar medial branch block 12/13/2022.  Patient had recent mechanical fall confounding benefit from recent injections.  Today she reports she was reaching over a mattress cover to reach a stack of bibles and slid into a slint.  Patient reports she was behind and tall wall in a took 20-30 minutes for her to stand.  Patient also reports exacerbation of fibromyalgia pain.  Since her fall patient reports pain which radiates into the buttock and down the posterior aspect of the right lower extremity to the dorsum of the foot in L4-S1 distribution.  Patient has continued gabapentin 300 mg twice daily,  Celebrex in the evenings as well as Tylenol 1000 mg twice daily.    Interval history 11/29/2022    Patient presents status post bilateral sacroiliac joint greater trochanteric bursa 10/10/2022.  Patient reports 90% relief overlying bilateral sacroiliac joints and greater trochanteric bursa following her injection.  Today she reports primarily lumbar axial back pain as well as significant neck pain.  Lower back pain is exacerbated with prolonged standing such as when she is washing dishes.  She denies significant distal radiculopathy into the right lower extremities as described at our last clinic visit.  Neck pain is elicited with cervical flexion, extension and lateral flexion and she does report reduced mobility.  She reports her pain began following a mechanical fall [facility] the stairs at [facility] [place] in September 1986.  Patient has continued gabapentin 300 mg in the morning, 300 mg in the afternoon and 600 mg in the evening.    Interval history 10/04/2022  Ms. Mccollum is a 77-year-old female with past medical history significant for depression, hyperlipidemia, hypertension, mitral valve prolapse, peripheral vascular disease, history of COVID-19, type 2 diabetes, multi joint arthritis, fibromyalgia who presents to [facility] care, previous Dr. Dutta patient.  Today patient reports return of pain in the lower back which radiates into bilateral hips and down the posterior aspect of the right lower extremity in L4-5 distribution to the dorsum of the foot.  Patient reports pain is intermittent but has increased in intensity.  Pain is described as shocking in nature and today is rated a 6/10.  Patient reports she feels as if her skin is crawling.  patient is currently taking gabapentin 300 mg up to 3 times daily when pain is severe.  Pain interferes with the patient's sleep.  Patient also reports history of fibromyalgia which limits her mobility and duration of standing and ambulation.  Patient does endorse associated weakness  in the lower extremities associated with her pain.  Patient is interested in pursuing repeat intervention as she is obtained several months of significant relief with prior sacroiliac joint and greater trochanteric bursa injections.  Patient has continued physician directed physical therapy exercises at home daily.      History of Present Illness 01/16/2020: Dr. Dutta:   Kaylin Mccollum is a 74 y.o. female  who is presenting with a chief complaint of lumbar back pain. The patient began experiencing this problem insidiously, and the pain has been gradually worsening over the past 5 month(s). The pain is described as throbbing, shooting, burning and electrical and is located in the bilateral lumbar spine. Pain is intermittent and lasts hours. The pain radiates to bilateral lower extremities L4 distribudution. The patient rates her pain a 8 out of ten and interferes with activities of daily living a 7 out of ten. Pain is exacerbated by flexion of the lumbar spine, ambulation, and is improved by rest.      She also complains of right knee pain. The patient began experiencing this problem insidiously. The pain is described as cramping, aching and is located in the right knee. Pain is intermittent and lasts hours. The pain is nonradiating. The patient rates her pain a 8 out of ten and interferes with activities of daily living a 7 out of ten. Pain is exacerbated by getting up from a seated position and standing, and is improved by rest. Patient reports no prior trauma, prior arthroscopy bilaterally in 1990s.       - pertinent negatives: No fever, No chills, No weight loss, No bladder dysfunction, No bowel dysfunction, No saddle anesthesia  - pertinent positives: none        Pain Disability Index Review:   @REVFS(4749:3)@    Non-Pharmacologic Treatments:  Physical Therapy/Home Exercise: yes  Ice/Heat:yes  TENS: no  Acupuncture: no  Massage: no  Chiropractic: no    Other: no      Pain Medications:  - Adjuvant Medications:  Lorazepam (Ativan), Neurontin (Gabapentin), and Topical Ointment (Voltaren Gel, Steroid cream, Anti-Inflammatory Cream, Compound cream)      Pain injections:  Dr. Dumont:  -04/09/2024: Bilateral L4-5 transforaminal epidural steroid injection  -01/23/2024: Bilateral intra-articular knee injection with Synvisc-One  -01/09/2024: Bilateral sacroiliac joint and greater trochanteric bursa injection  -06/21/2023: Bilateral sacroiliac joint and greater trochanteric bursa injection  -05/29/2023: Bilateral intra-articular knee injection  -05/09/2023: L3-4 via disc allograft supplementation  -04/25/2023:  L5-S1 via disc allograft supplementation  -01/24/2023: Bilateral sacroiliac joint and greater trochanteric bursa injection  -12/13/2022: Bilateral L3-5 lumbar medial branch block  - 10/10/2022: Bilateral greater trochanteric bursa and sacroiliac joint injection      -04/20/2022: Right-sided acetabular femoral injection; Dr. Dutta  -03/01/2022: Bilateral sacroiliac joint and greater trochanteric bursa injection; Dr. Dutta  -07/08/2021: Bilateral sacroiliac joint and greater trochanteric bursa injection; Dr. Dutta  -01/05/2021: Bilateral sacroiliac joint and greater trochanteric bursa injection; Dr. Burns  -10/08/2020: Bilateral sacroiliac joint and bilateral greater trochanteric bursa injection; Dr. Dutta  -05/06/2020: Bilateral sacroiliac joint and greater trochanteric bursa injection; Dr. Dutta    Past Medical History:   Diagnosis Date    Arthritis     Cataract     COVID-19 02/25/2021    Diabetes mellitus 1995    BS didn't check 09/13/2022    Diabetes mellitus, type 2     Fibromyalgia     General anesthetics causing adverse effect in therapeutic use     bradycardia     Hypertension     Migraines     Mitral valve prolapse     Osteoarthritis     Rotator cuff tear 04/01/2015       Review of patient's allergies indicates:   Allergen Reactions    Demerol [meperidine] Other (See Comments)     Burning when adm IV  Able to tolerate IM,  ""Turned the veins in my hand purple."    Latex, natural rubber Other (See Comments)     "Burns my skin",  Symptoms get worse the longer she is exposed    Zocor [simvastatin] Other (See Comments)     Tightening of muscles    Statins-hmg-coa reductase inhibitors Other (See Comments)     myopathy    Sulfa (sulfonamide antibiotics)      Blurred vision    Nickel Rash     Pt states she had sores to ear lobes from nickel in earrings        Past Surgical History:   Procedure Laterality Date    ARTHROSCOPY OF KNEE Right 03/10/2020    Procedure: ARTHROSCOPY, KNEE;  Surgeon: Les Schneider MD;  Location: [facility] OR;  Service: Orthopedics;  Laterality: Right;    CATARACT EXTRACTION W/  INTRAOCULAR LENS IMPLANT Left 07/19/2017    CATARACT EXTRACTION W/  INTRAOCULAR LENS IMPLANT Right 2017    CHOLECYSTECTOMY      CHONDROPLASTY OF KNEE Right 03/10/2020    Procedure: CHONDROPLASTY, KNEE;  Surgeon: Les Schneider MD;  Location: [facility] OR;  Service: Orthopedics;  Laterality: Right;  Anterior compartment     COLONOSCOPY N/A 09/25/2018    Procedure: COLONOSCOPY;  Surgeon: Bethel Carmona MD;  Location: [facility] ENDO;  Service: Endoscopy;  Laterality: N/A;    EXCISION OF MEDIAL MENISCUS OF KNEE Right 03/10/2020    Procedure: MENISCECTOMY, KNEE, MEDIAL;  Surgeon: Les Schneider MD;  Location: [facility] OR;  Service: Orthopedics;  Laterality: Right;  Partial, Medial , Lateral     EYE SURGERY      INJECTION OF ANESTHETIC AGENT AROUND MEDIAL BRANCH NERVES INNERVATING LUMBAR FACET JOINT Bilateral 12/13/2022    Procedure: Bilateral L3-5 MBB;  Surgeon: Humberto Dumont MD;  Location: [facility] PAIN MGT;  Service: Pain Management;  Laterality: Bilateral;    INJECTION OF ANESTHETIC AGENT AROUND NERVE Right 08/08/2019    Procedure: Right Genicular nerve block with local;  Surgeon: Manpreet Dutta MD;  Location: [facility] PAIN MGT;  Service: Pain Management;  Laterality: Right;    INJECTION OF ANESTHETIC AGENT INTO SACROILIAC JOINT Bilateral 05/06/2020    " Procedure: Bilateral Sacroiliac Joint Injection;  Surgeon: Manpreet Dutta MD;  Location: [facility] PAIN MGT;  Service: Pain Management;  Laterality: Bilateral;    INJECTION OF ANESTHETIC AGENT INTO SACROILIAC JOINT Bilateral 10/08/2020    Procedure: Bilateral SI and Bilateral GTB with RN IV sedation;  Surgeon: Manpreet Dutta MD;  Location: [facility] PAIN MGT;  Service: Pain Management;  Laterality: Bilateral;    INJECTION OF ANESTHETIC AGENT INTO SACROILIAC JOINT Bilateral 01/05/2021    Procedure: Bilateral BLOCK, SACROILIAC JOINT and Bilateral GTB witn RN IV sedation;  Surgeon: Daniel Burns MD;  Location: [facility] PAIN [facility]GT;  Service: Pain Management;  Laterality: Bilateral;    INJECTION OF ANESTHETIC AGENT INTO SACROILIAC JOINT Bilateral 07/08/2021    Procedure: Bilateral BLOCK, SACROILIAC JOINT bilateral GTB RN IV sedation;  Surgeon: Manpreet Dutta MD;  Location: [facility]IN [facility]GT;  Service: Pain Management;  Laterality: Bilateral;    INJECTION OF ANESTHETIC AGENT INTO SACROILIAC JOINT Bilateral 03/01/2022    Procedure: Bilateral BLOCK, SACROILIAC JOINT and Bilateral GTB RN IV sedation;  Surgeon: Manpreet Dutta MD;  Location: [facility];  Service: Pain Management;  Laterality: Bilateral;    INJECTION OF ANESTHETIC AGENT INTO SACROILIAC JOINT Bilateral 10/10/2022    Procedure: Bilateral Sacroiliac Joint Injection;  Surgeon: Humberto Dumont MD;  Location: [facility];  Service: Pain Management;  Laterality: Bilateral;    INJECTION OF ANESTHETIC AGENT INTO SACROILIAC JOINT Bilateral 01/24/2023    Procedure: Bilateral Sacroiliac Joint Injection;  Surgeon: Humberto Dumont MD;  Location: [facility];  Service: Pain Management;  Laterality: Bilateral;    INJECTION OF ANESTHETIC AGENT INTO SACROILIAC JOINT Bilateral 06/21/2023    Procedure: Bilateral Sacroiliac Joint Injection;  Surgeon: Humberto Dumont MD;  Location: [facility];  Service: Pain Management;  Laterality: Bilateral;    INJECTION OF ANESTHETIC AGENT INTO SACROILIAC JOINT  Bilateral 1/9/2024    Procedure: Bilateral SIJ Injection;  Surgeon: Humberto Dumont MD;  Location: [facility] PAIN [facility]GT;  Service: Pain Management;  Laterality: Bilateral;    INJECTION OF JOINT Bilateral 09/05/2019    Procedure: Bilateral GT bursa injection;  Surgeon: Manpreet Dutta MD;  Location: [facility] PAIN [facility]GT;  Service: Pain Management;  Laterality: Bilateral;    INJECTION OF JOINT Bilateral 05/06/2020    Procedure: Bilateral GT bursa injection;  Surgeon: Manpreet Dutta MD;  Location: [facility] PAIN [facility];  Service: Pain Management;  Laterality: Bilateral;    INJECTION OF JOINT Right 04/28/2022    Procedure: Injection, Joint Right Hip Injection RN IV sedation;  Surgeon: Manpreet Dutta MD;  Location: [facility] PAIN [facility]GT;  Service: Pain Management;  Laterality: Right;    INJECTION OF JOINT Bilateral 10/10/2022    Procedure: Bilateral GT bursa injection with RN IV sedation;  Surgeon: Humberto Dumont MD;  Location: [facility] PAIN [facility];  Service: Pain Management;  Laterality: Bilateral;    INJECTION OF JOINT Bilateral 01/24/2023    Procedure: Bilateral GT bursa injection;  Surgeon: Humberto Dumont MD;  Location: [facility];  Service: Pain Management;  Laterality: Bilateral;    INJECTION OF JOINT Bilateral 05/23/2023    Procedure: Bilateral intraarticular knee injection with Synvisc-1; ;  Surgeon: Humberto Dumont MD;  Location: [facility];  Service: Pain Management;  Laterality: Bilateral;    INJECTION OF JOINT Bilateral 06/21/2023    Procedure: Bilateral GT bursa injection;  Surgeon: Humberto Dumont MD;  Location: [facility];  Service: Pain Management;  Laterality: Bilateral;    INJECTION OF JOINT Bilateral 1/9/2024    Procedure: Bilateral GTB injection;  Surgeon: Humberto Dumont MD;  Location: [facility];  Service: Pain Management;  Laterality: Bilateral;    INJECTION OF JOINT Bilateral 1/23/2024    Procedure: Bilateral intraarticular knee injection with Synvisc 1 ;  Surgeon: Humberto Dumont MD;  Location: [facility]IN [facility];  Service:  Pain Management;  Laterality: Bilateral;    INJECTION, ALLOGRAFT, INTERVERTEBRAL DISC N/A 04/25/2023    Procedure: INJECTION,ALLOGRAFT,INTERVERTEBRAL DISC,1ST LEVEL: L5-S1;  Surgeon: Humberto Dumont MD;  Location: [facility] PAIN [facility]GT;  Service: Pain Management;  Laterality: N/A;    INJECTION, ALLOGRAFT, INTERVERTEBRAL DISC N/A 05/09/2023    Procedure: INJECTION,ALLOGRAFT,INTERVERTEBRAL DISC,2nd LEVEL: L3-4;  Surgeon: Humberto Dumont MD;  Location: [facility];  Service: Pain Management;  Laterality: N/A;    KNEE ARTHROSCOPY Bilateral     PARS PLANA VITRECTOMY W/ REPAIR OF MACULAR HOLE Left 02/22/2017    RADIOFREQUENCY THERMOCOAGULATION Left 07/11/2019    Procedure: Right SIJ RFA;  Surgeon: Manpreet Dutta MD;  Location: [facility];  Service: Pain Management;  Laterality: Left;    RADIOFREQUENCY THERMOCOAGULATION Right 07/25/2019    Procedure: Right SIJ RFA;  Surgeon: Manpreet Dutta MD;  Location: [facility];  Service: Pain Management;  Laterality: Right;    SELECTIVE INJECTION OF ANESTHETIC AGENT AROUND LUMBAR SPINAL NERVE ROOT BY TRANSFORAMINAL APPROACH Bilateral 4/9/2024    Procedure: Bilateral L4/5 TF JAMIN;  Surgeon: Humberto Dumont MD;  Location: [facility];  Service: Pain Management;  Laterality: Bilateral;    SHOULDER ARTHROSCOPY Right 06/04/2015     Current Outpatient Medications on File Prior to Visit   Medication Sig Dispense Refill    b complex vitamins tablet Take 1 tablet by mouth once daily.      biotin 1 mg tablet Take 1,000 mcg by mouth every morning.       celecoxib (CELEBREX) 200 MG capsule TAKE 1 CAPSULE(200 MG) BY MOUTH TWICE DAILY WITH FOOD 120 capsule 1    clotrimazole-betamethasone 1-0.05% (LOTRISONE) cream Apply topically 2 (two) times daily. 45 g 2    diclofenac sodium (VOLTAREN) 1 % Gel APPLY 2 GRAMS TOPICALLY TO THE AFFECTED AREA FOUR TIMES DAILY 100 g 2    ezetimibe (ZETIA) 10 mg tablet Take 1 tablet (10 mg total) by mouth once daily. 90 tablet 3    FLUoxetine 40 MG capsule Take 1 capsule (40  mg total) by mouth once daily. 90 capsule 1    fluticasone (FLONASE) 50 mcg/actuation nasal spray 2 sprays by Each Nare route once daily. (Patient taking differently: 2 sprays by Each Nostril route nightly as needed.) 1 Bottle 0    furosemide (LASIX) 20 MG tablet Take 1 tablet (20 mg total) by mouth 2 (two) times daily. 60 tablet 11    gabapentin (NEURONTIN) 300 MG capsule Take 1 capsule (300 mg total) by mouth 3 (three) times daily. 90 capsule 0    hydroCHLOROthiazide (HYDRODIURIL) 25 MG tablet Take 1 tablet (25 mg total) by mouth once daily. 90 tablet 3    INVOKANA 100 mg Tab tablet TAKE 1 TABLET(100 MG) BY MOUTH EVERY DAY 90 tablet 0    losartan (COZAAR) 25 MG tablet Take 0.5 tablets (12.5 mg total) by mouth every evening. 45 tablet 1    metFORMIN (GLUCOPHAGE) 1000 MG tablet TAKE 1 TABLET(1000 MG) BY MOUTH TWICE DAILY WITH MEALS 180 tablet 3    metoprolol succinate (TOPROL-XL) 25 MG 24 hr tablet TAKE 1 TABLET(25 MG) BY MOUTH EVERY DAY 90 tablet 3    milnacipran (SAVELLA) 100 mg Tab Take 1 tablet (100 mg total) by mouth 2 (two) times daily. 60 tablet 2    omeprazole (PRILOSEC) 20 MG capsule TAKE ONE CAPSULE BY MOUTH EVERY DAY AS NEEDED WITH NSAID 30 capsule 5    potassium chloride SA (K-DUR,KLOR-CON M) 10 MEQ tablet TAKE 1 TABLET(10 MEQ) BY MOUTH EVERY DAY 90 tablet 1    pulse oximeter (PULSE OXIMETER) device by Apply Externally route 2 (two) times a day. Use twice daily at 8 AM and 3 PM and record the value in [facility] as directed. 1 each 0    semaglutide (OZEMPIC) 0.25 mg or 0.5 mg (2 mg/3 mL) pen injector Inject 0.25 mg into the skin every 7 days. 3 mL 1    triamcinolone acetonide 0.025% (KENALOG) 0.025 % Oint Apply topically 2 (two) times daily. for 10 days (Patient taking differently: Apply topically 2 (two) times daily as needed.) 15 g 2    verapamiL (CALAN) 80 MG tablet TAKE 1 TABLET(80 MG) BY MOUTH TWICE DAILY 180 tablet 2    vitamin D (VITAMIN D3) 1000 units Tab Take 1,000 Units by mouth once daily.        No current facility-administered medications on file prior to visit.       Imaging / Labs / Studies (reviewed on 7/3/2024):    Cervical x-ray 05/18/2023   FINDINGS:  Osteopenia.  Vertebral body heights maintained.  Mild anterolisthesis of C4 on C5 and C5 on C6.  Multilevel osteophyte changes with disc height loss most noted at C5-6 and C6-7.  Multilevel prominent facet arthropathy.  Multilevel left-sided neural foraminal narrowing.     Significant change in alignment on flexion or extension.     Prevertebral soft tissues unremarkable.  Lung apices clear.    Thoracic x-ray 05/18/2023  FINDINGS:  Osteopenia.  Vertebral body heights maintained.  No spondylolisthesis.  Similar more multilevel bridging osteophyte changes.  No acute osseous abnormality.  Soft tissues unremarkable.      MRI Lumbar Spine 02/16/2023  FINDINGS:  Grade 1 degenerative spondylolisthesis at L4-L5.  Vertebral body height is normal.  Marrow signal is within normal limits. The conus medullaris terminates at the level of L1-L2.  No abnormal signal within the conus. Intervertebral disc levels are as follows:     T12-L1 disc: Normal disc height with anterior osteophytes and mild degenerative facet hypertrophy.  No spinal or foraminal stenosis.  The dural canal measures 16 mm.     L1-L2 disc : Disc space height loss with chronic Schmorl's nodes.  Posterior disc bulge.  Anterior osteophytes.  Minor facet arthropathy bilaterally.  The dural canal measures 13 mm.  No foraminal stenosis.     L2-L3 disc: Circumferential disc bulge with tiny chronic Schmorl's nodes.  Anterior osteophytes.  Mild degenerative facet hypertrophy.  The dural canal measures 12 mm.  No foraminal stenosis.     L3-L4 disc: Disc space height loss with a circumferential disc bulge and anterior osteophytes.  Mild degenerative facet hypertrophy with moderate buckling of the ligamentum flavum.  The dural canal measures 11 mm.  No significant foraminal stenosis.     L4-L5 disc: Grade 1  spondylolisthesis with severe degenerative facet hypertrophy bilaterally.  Buckling of the ligamentum flavum.  The dural canal measures 7 mm AP.  Disc encroaches into the floors of the exit foramina, right greater than left.  There is mild right foraminal stenosis.     L5-S1 disc: Severe disc space height loss with osteophytes at the margins and encroach into the exit foramina.  Mild degenerative facet hypertrophy and buckling of the ligamentum flavum.  The dural canal measures 11 mm.  Mild foraminal stenosis.          ROS:      GENERAL:  No weight loss, malaise or fevers.  HEENT:   No recent changes in vision or hearing  NECK:  Negative for lumps, no difficulty with swallowing.  RESPIRATORY:  Negative for cough, wheezing or shortness of breath, patient denies any recent URI.  CARDIOVASCULAR:  Negative for chest pain or palpitations.  GI:  Negative for abdominal discomfort, blood in stools or black stools or change in bowel habits.  MUSCULOSKELETAL:  See HPI.  SKIN:  Negative for lesions, rash, and itching.  PSYCH:  No mood disorder or recent psychosocial stressors.   HEMATOLOGY/LYMPHOLOGY:  Negative for prolonged bleeding, bruising easily or swollen nodes.    NEURO:   No history of syncope, paralysis, seizures or tremors.  All other reviewed and negative other than HPI.    Physical Exam:  Telemedicine Exam  There were no vitals filed for this visit.  There is no height or weight on file to calculate BMI.      Physical Exam: last in clinic visit:    There were no vitals filed for this visit.             There is no height or weight on file to calculate BMI.   (reviewed on 7/3/2024)    General: alert and oriented, in no apparent distress.  Gait: normal gait.  Skin: no rashes, no discoloration, no obvious lesions  HEENT: normocephalic, atraumatic. Pupils equal and round.  Cardiovascular: no significant peripheral edema present.  Respiratory: without use of accessory muscles of respiration.    Musculoskeletal - Lumbar  Spine:  - ROM fairly preserved   - Pain on flexion of lumbar spine: Absent   - Pain on extension of lumbar spine: Absent         - Lumbar facet loading: Absent   - TTP over the lumbar facet joints: Absent  - TTP over the lumbar paraspinals: Present   - TTP over the SI joints: Present  - TTP over GT bursa: Present, minimal   - Straight Leg Raise: Negative  - CHERYLE: Present    Right Knee:  - TTP: Present over medial/ lateral joint line  - Pain with extension: Present  - Pain with flexion: Present  - Crepitus: Present     Neuro - Lower Extremities:  - BLE Strength: R/L: HF: 5/5, HE: 5/5, KF: 5/5; KE: 5/5; FE: 5/5; FF: 5/5  - Extremity Reflexes: Brisk and symmetric throughout  - Sensory: Sensation to light touch intact bilaterally      Psych:  Mood and affect is appropriate    Assessment:  Kaylin Mccollum is a 79 y.o. year old female who is presenting with       ICD-10-CM ICD-9-CM    1. Fall, initial encounter  W19.XXXA E888.9 X-Ray Lumbar Complete Including Flex And Ext      2. Lumbar radiculopathy  M54.16 724.4 X-Ray Lumbar Complete Including Flex And Ext      3. Primary osteoarthritis of both knees  M17.0 715.16 Case Request-RAD/Other Procedure Area: Bilateral Synvisc Knee injection                Plan:  1. Interventional: Schedule for bilateral knee synvisc one injections after 7/23/2024   Explained the risks and benefits of the procedure in detail with the patient today in clinic along with alternative treatment options, and the patient elected to pursue the intervention.      - Status post L3-4 via disc allograft supplementation 05/09/2023 and L5-S1 via disc allograft supplementation 04/25/2023 with greater than 80% improvement in discogenic lower back pain.    -Patient has 90% sustained relief in lower extremity radicular symptoms following bilateral L4-5 transforaminal epidural steroid injection.  We discussed repeating this injection in the future should radicular symptoms exacerbate.    Anticoagulation:   None, no anticoagulation    2. Pharmacologic:    reviewed and consistent with use    -Continue gabapentin.  We have reviewed potential side effects of this medication including daytime somnolence, weight gain and peripheral edema  -Gabapentin 300 mg in the morning, 300 mg in the afternoon and 600 mg in the evening    -Continue Savella as this tremendously helps with symptoms of fibromyalgia.  We have discussed that Savella is FDA approved and has demonstrated improvement in multiple measures including pain, global impression of change in physical function.  We have discussed that the most frequent side effects of this medication can include nausea, constipation, vomiting, dry mouth, flushing or tachycardia.    -100 mg BID    Continue celebrex  Do not combine with other NSAIDs such as ibuprofen, aleve, advil. Take with food. If any GI upset, stop medication and contact office.   We have previously discussed potential deleterious side effects of NSAIDs on the cardiovascular, gastrointestinal and renal systems. We have discussed judicious use of this medication.        3. Rehabilitative:   -We discussed continuing at home physician directed physical therapy to help manage the patient/s painful condition. The patient was counseled that muscle strengthening will improve the long term prognosis in regards to pain and may also help increase range of motion and mobility.  I have encouraged the patient to perform low intensity aerobic exercise to help with fibromyalgia.    4. Diagnostic:  Reviewed relevant imaging (MRI lumbar spine, x-ray cervical spine, x-ray thoracic spine) and answered patient's questions.    Xray lumbar spine ordered    5. Consult:   -Dr. Schneider for knee and shoulder pain PRN  -Rheumatology: Fibromyalgia  -PCP: Dr. Olvera:  Follow-up for potential renal insufficiency    6. Follow up:  4 weeks after procedure     The above plan and management options were discussed at length with patient. Patient  is in agreement with the above and verbalized understanding.    - I discussed the goals of interventional chronic pain management with the patient on today's visit. We discussed a multimodal and systematic approach to pain.  This includes diagnostic and therapeutic injections, adjuvant pharmacologic treatment, physical therapy, and at times psychiatry.  I emphasized the importance of regular exercise, core strengthening and stretching, diet and weight loss as a cornerstone of long-term pain management.    - This condition does not require this patient to take time off of work, and the primary goal of our Pain Management services is to improve the patient's functional capacity.  - Patient Questions: Answered all of the patient's questions regarding diagnoses, therapy, treatment and next steps    Sydney Duke NP

## 2024-07-03 ENCOUNTER — OFFICE VISIT (OUTPATIENT)
Dept: PAIN MEDICINE | Facility: CLINIC | Age: 79
End: 2024-07-03
Payer: MEDICARE

## 2024-07-03 ENCOUNTER — PATIENT OUTREACH (OUTPATIENT)
Dept: EMERGENCY MEDICINE | Facility: HOSPITAL | Age: 79
End: 2024-07-03
Payer: MEDICARE

## 2024-07-03 ENCOUNTER — OFFICE VISIT (OUTPATIENT)
Dept: INTERNAL MEDICINE | Facility: CLINIC | Age: 79
End: 2024-07-03
Payer: MEDICARE

## 2024-07-03 VITALS — BODY MASS INDEX: 30.2 KG/M2 | HEIGHT: 68 IN

## 2024-07-03 DIAGNOSIS — E11.59 HYPERTENSION ASSOCIATED WITH DIABETES: Primary | ICD-10-CM

## 2024-07-03 DIAGNOSIS — R55 SYNCOPE AND COLLAPSE: ICD-10-CM
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
## 2024-07-03 DIAGNOSIS — M54.16 LUMBAR RADICULOPATHY: ICD-10-CM

## 2024-07-03 DIAGNOSIS — I15.2 HYPERTENSION ASSOCIATED WITH DIABETES: Primary | ICD-10-CM

## 2024-07-03 DIAGNOSIS — M17.0 PRIMARY OSTEOARTHRITIS OF BOTH KNEES: ICD-10-CM

## 2024-07-03 PROCEDURE — 99214 OFFICE O/P EST MOD 30 MIN: CPT | Mod: 95,,, | Performed by: FAMILY MEDICINE

## 2024-07-03 RX ORDER — GABAPENTIN 300 MG/1
300 CAPSULE ORAL 3 TIMES DAILY
Qty: 90 CAPSULE | Refills: 0 | Status: SHIPPED | OUTPATIENT
Start: 2024-07-03

## 2024-07-03 NOTE — PROGRESS NOTES
Subjective:       Patient ID: Kaylin Mccollum is a 79 y.o. female.    Chief Complaint: Fall and Medication Problem    The patient location is: home  The chief complaint leading to consultation is: ER follow up    Visit type: audiovisual    Face to Face time with patient: 8 minutes (chart review started 2:49 pm; video started 2:52 pm; video ended 3:00 pm)  15 minutes of total time spent on the encounter, which includes face to face time and non-face to face time preparing to see the patient (eg, review of tests), Obtaining and/or reviewing separately obtained history, Documenting clinical information in the electronic or other health record, Independently interpreting results (not separately reported) and communicating results to the patient/family/caregiver, or Care coordination (not separately reported).     Each patient to whom he or she provides medical services by telemedicine is:  (1) informed of the relationship between the physician and patient and the respective role of any other health care provider with respect to management of the patient; and (2) notified that he or she may decline to receive medical services by telemedicine and may withdraw from such care at any time.    History of Present Illness    Virtual visit today for follow up from ER visit for fall and loss of consciousness. She reports losing consciousness while walking from her bed to the bathroom due to dizziness. She did not completely black out but experienced graying of vision prior to the fall. She denies any other injuries from the fall besides back pain and right knee swelling which were evaluated. She is scheduled for an x-ray of her back to evaluate for injuries. She reports experiencing dizziness and weakness for months. The week prior to presentation, she spent a significant amount of time in bed due to severe dizziness and weakness. She has been self-adjusting blood pressure medications in attempt to alleviate dizziness, most  recently discontinuing losartan, metoprolol, and hydrochlorothiazide after recent emergency room visit. She states some improvement in dizziness since discontinuing these medications. She had been adjusting her blood pressure medications on her own due to persistent dizziness and weakness over the past few months. She stopped taking losartan completely due to its effect on balance and dizziness. She had cut losartan in half, taking half in the morning and half in the evening, but still felt dizzy. She stopped taking metoprolol and hydrochlorothiazide since the ER visit per doctor's recommendation. She is currently only taking verapamil twice daily which she has been on for about 30 years. She denies experiencing any other symptoms related to her blood pressure medications or discussing medication adjustments with her providers prior to the fall. She ambulates with a walker currently due to balance issues but has started attempting short distances without the walker while at home. She reports starting to walk without a walker for short distances yesterday. She has to stand and get her balance before walking, and is shuffling when ambulating. She uses a walker that requires both hands as well as a four-pronged cane that can be held with one hand. She has been on Ozempic for about a month. She has cut back on sugars in her diet and is eating avocados and nectarines. She stopped drinking coffee with creamer a few weeks ago. Patient is otherwise without concerns today.      Review of Systems   Constitutional:  Positive for activity change. Negative for unexpected weight change.   HENT:  Negative for hearing loss, rhinorrhea and trouble swallowing.    Eyes:  Negative for discharge and visual disturbance.   Respiratory:  Negative for chest tightness and wheezing.    Cardiovascular:  Negative for chest pain and palpitations.   Gastrointestinal:  Positive for diarrhea and vomiting. Negative for blood in stool and  constipation.   Endocrine: Negative for polydipsia and polyuria.   Genitourinary:  Negative for difficulty urinating, dysuria, hematuria and menstrual problem.   Musculoskeletal:  Positive for arthralgias and joint swelling. Negative for neck pain.   Neurological:  Positive for weakness. Negative for headaches.   Psychiatric/Behavioral:  Negative for confusion and dysphoric mood.          Objective:      Physical Exam  Constitutional:       General: She is not in acute distress.     Appearance: She is well-developed.   HENT:      Head: Normocephalic and atraumatic.   Eyes:      General: Lids are normal. No scleral icterus.     Extraocular Movements: Extraocular movements intact.      Conjunctiva/sclera: Conjunctivae normal.   Pulmonary:      Effort: Pulmonary effort is normal.   Neurological:      Mental Status: She is alert and oriented to person, place, and time.   Psychiatric:         Mood and Affect: Mood and affect normal.       Assessment:       1. Hypertension associated with diabetes    2. Syncope and collapse        Plan:   1. Hypertension associated with diabetes  -     Ambulatory referral/consult to Cardiology; Future; Expected date: 07/10/2024    2. Syncope and collapse  -     Ambulatory referral/consult to Cardiology; Future; Expected date: 07/10/2024     Reviewed ER notes.   Patient self-adjusted medications due to dizziness and weakness, stopping losartan, metoprolol, and hydrochlorothiazide. Dizziness has improved since stopping these medications.   Blood pressure is reasonable at 126/72. Continued current medication regimen and will monitor blood pressure through digital program.   Referred to Dr. Dumont, cardiologist, for follow up given recent syncopal episode.     Continued verapamil twice daily.   Discontinued losartan.   Discontinued metoprolol.   Discontinued hydrochlorothiazide.   Contact office with any continued problems or issues with blood pressure management.     Discussed importance of  staying hydrated and avoiding heat exposure.       Health Maintenance reviewed/updated.    Follow up if symptoms worsen or fail to improve, for keep routine follow up.    This note was generated with the assistance of ambient listening technology. Verbal consent was obtained by the patient and accompanying visitor(s) for the recording of patient appointment to facilitate this note. I attest to having reviewed and edited the generated note for accuracy, though some syntax or spelling errors may persist. Please contact the author of this note for any clarification.

## 2024-07-03 NOTE — PROGRESS NOTES
Pt was seen in the ED on 6/28/24. Pt was scheduled a 7-day Post ED visit follow up appt and was contacted to provide a reminder for the upcoming appt scheduled with Dr. Sydney Olvera on 7/3/24 @ 2:40 p.m. Pt is planning to attend. Closing Encounter.     Shaniqua Shrestha

## 2024-07-03 NOTE — TELEPHONE ENCOUNTER
Pt is requesting for a refill of: gabapentin (NEURONTIN) 300 MG capsule   Last filed:5/13/24   Last encounter: today   Up coming apt: 8/20/24   Pharmacy:[id number] - [place]   Is this something you can do?

## 2024-07-05 ENCOUNTER — PATIENT MESSAGE (OUTPATIENT)
Dept: PAIN MEDICINE | Facility: CLINIC | Age: 79
End: 2024-07-05
Payer: MEDICARE

## 2024-07-09 DIAGNOSIS — E66.9 DIABETES MELLITUS TYPE 2 IN OBESE: ICD-10-CM

## 2024-07-09 DIAGNOSIS — E11.69 DIABETES MELLITUS TYPE 2 IN OBESE: ICD-10-CM

## 2024-07-09 RX ORDER — GABAPENTIN 300 MG/1
300 CAPSULE ORAL 3 TIMES DAILY
Qty: 90 CAPSULE | Refills: 0 | Status: CANCELLED | OUTPATIENT
Start: 2024-07-09

## 2024-07-09 NOTE — TELEPHONE ENCOUNTER
Care Due:                  Date            Visit Type   Department     Provider  --------------------------------------------------------------------------------                                ESTABLISHED                              PATIENT -    [facility]  Last Visit: 07-      [facility]      MEDICINE       Lisette Olvera  Next Visit: None Scheduled  None         None Found                                                            Last  Test          Frequency    Reason                     Performed    Due Date  --------------------------------------------------------------------------------    HBA1C.......  6 months...  ISAIAS semaglutide....  03- 08-    [facility] Embedded Care Due Messages. Reference number: [id number].   7/09/2024 2:18:18 PM CDT

## 2024-07-10 RX ORDER — CANAGLIFLOZIN 100 MG/1
100 TABLET, FILM COATED ORAL DAILY
Qty: 90 TABLET | Refills: 3 | Status: SHIPPED | OUTPATIENT
Start: 2024-07-10

## 2024-07-10 NOTE — TELEPHONE ENCOUNTER
Pt is requesting for a refill of: gabapentin (NEURONTIN) 300 MG capsule   Last filed:7/03/24   Last encounter: 7/03/24   Up coming apt: 8/20/24   Pharmacy:[facility] #[id number] - [place] - [id number] [facility]   Is this something you can do?

## 2024-07-10 NOTE — TELEPHONE ENCOUNTER
Refill Routing Note   Medication(s) are not appropriate for processing by [facility] for the following reason(s):        Required labs abnormal    [facility] action(s):  Defer   Requires labs : Yes           Pharmacist review requested: Yes     Appointments  past 12m or future 3m with PCP    Date Provider   Last Visit   7/3/2024 Lisette Olvera MD   Next Visit   Visit date not found Lisette Olvera MD   ED visits in past 90 days: 1        Note composed:11:04 PM 07/09/2024

## 2024-07-10 NOTE — TELEPHONE ENCOUNTER
Refill Routing Note   Medication(s) are not appropriate for processing by [facility] for the following reason(s):        Required labs abnormal    [facility] action(s):  Defer     Requires labs : Yes           Pharmacist review requested: Yes     Appointments  past 12m or future 3m with PCP    Date Provider   Last Visit   7/3/2024 Lisette Olvera MD   Next Visit   Visit date not found Lisette Olvera MD   ED visits in past 90 days: 1        Note composed:9:28 PM 07/09/2024

## 2024-07-11 NOTE — PRE-PROCEDURE INSTRUCTIONS
Spoke with patient regarding procedure scheduled on 7.23     Arrival time 0800     Has patient been sick with fever or on antibiotics within the last 7 days? No     Does the patient have any open wounds, sores or rashes? No     Does the patient have any recent fractures? no     Has patient received a vaccination within the last 7 days? No     Received the COVID vaccination?      Has the patient stopped all medications as directed? na     Does patient have a pacemaker, defibrillator, or implantable stimulator? No     Does the patient have a ride to and from procedure and someone reliable to remain with patient? eloina      Is the patient diabetic? yes     Does the patient have sleep apnea? Or use O2 at home? no     Is the patient receiving sedation?      Is the patient instructed to remain NPO beginning at midnight the night before their procedure? yes     Procedure location confirmed with patient? Yes     Covid- Denies signs/symptoms. Instructed to notify PAT/MD if any changes.

## 2024-07-17 ENCOUNTER — TELEPHONE (OUTPATIENT)
Dept: INTERNAL MEDICINE | Facility: CLINIC | Age: 79
End: 2024-07-17
Payer: MEDICARE

## 2024-07-17 DIAGNOSIS — E66.9 DIABETES MELLITUS TYPE 2 IN OBESE: Primary | ICD-10-CM

## 2024-07-17 DIAGNOSIS — E11.69 DIABETES MELLITUS TYPE 2 IN OBESE: Primary | ICD-10-CM

## 2024-07-17 NOTE — TELEPHONE ENCOUNTER
Insurance states that invokana is not on the patient formulary  It needs to be changed to one of the following   Farxiga, Jardiance,  Synjardy, Glyxambi

## 2024-07-18 RX ORDER — DICLOFENAC SODIUM 10 MG/G
GEL TOPICAL 4 TIMES DAILY
Qty: 100 G | Refills: 2 | Status: SHIPPED | OUTPATIENT
Start: 2024-07-18

## 2024-07-22 RX ORDER — GABAPENTIN 300 MG/1
300 CAPSULE ORAL 3 TIMES DAILY
Qty: 90 CAPSULE | Refills: 2 | Status: SHIPPED | OUTPATIENT
Start: 2024-07-22

## 2024-07-22 RX ORDER — CLOTRIMAZOLE AND BETAMETHASONE DIPROPIONATE 10; .64 MG/G; MG/G
CREAM TOPICAL 2 TIMES DAILY
Qty: 45 G | Refills: 2 | Status: SHIPPED | OUTPATIENT
Start: 2024-07-22

## 2024-07-22 NOTE — TELEPHONE ENCOUNTER
Pt is requesting for a refill of: gabapentin (NEURONTIN) 300 MG capsule   Last filed:7/03/24   Last encounter: 7/03/24  Last proc: 7/23/24   Up coming apt: 8/20/24   Pharmacy:: [facility] #[id number] - [place]   Is this something you can do?

## 2024-07-23 ENCOUNTER — PATIENT MESSAGE (OUTPATIENT)
Dept: ADMINISTRATIVE | Facility: OTHER | Age: 79
End: 2024-07-23
Payer: MEDICARE

## 2024-07-23 ENCOUNTER — HOSPITAL ENCOUNTER (OUTPATIENT)
Facility: HOSPITAL | Age: 79
Discharge: HOME | End: 2024-07-23
Attending: ANESTHESIOLOGY | Admitting: ANESTHESIOLOGY
Payer: MEDICARE

## 2024-07-23 VITALS
HEART RATE: 91 BPM | BODY MASS INDEX: 30.34 KG/M2 | SYSTOLIC BLOOD PRESSURE: 146 MMHG | RESPIRATION RATE: 16 BRPM | OXYGEN SATURATION: 97 % | WEIGHT: 200.19 LBS | TEMPERATURE: 98 F | HEIGHT: 68 IN | DIASTOLIC BLOOD PRESSURE: 58 MMHG

## 2024-07-23 DIAGNOSIS — M17.0 BILATERAL PRIMARY OSTEOARTHRITIS OF KNEE: ICD-10-CM

## 2024-07-23 DIAGNOSIS — M17.9 KNEE OSTEOARTHRITIS: ICD-10-CM

## 2024-07-23 LAB — POCT GLUCOSE: 244 MG/DL (ref 70–110)

## 2024-07-23 PROCEDURE — 77002 NEEDLE LOCALIZATION BY XRAY: CPT | Mod: 26,,, | Performed by: ANESTHESIOLOGY

## 2024-07-23 PROCEDURE — 82962 GLUCOSE BLOOD TEST: CPT | Performed by: ANESTHESIOLOGY

## 2024-07-23 PROCEDURE — 20610 DRAIN/INJ JOINT/BURSA W/O US: CPT | Mod: 50,,, | Performed by: ANESTHESIOLOGY

## 2024-07-23 PROCEDURE — 25000003 PHARM REV CODE 250: Performed by: ANESTHESIOLOGY

## 2024-07-23 PROCEDURE — 63600175 PHARM REV CODE 636 W HCPCS: Mod: JZ,JG | Performed by: ANESTHESIOLOGY

## 2024-07-23 PROCEDURE — 25500020 PHARM REV CODE 255: Performed by: ANESTHESIOLOGY

## 2024-07-23 PROCEDURE — 63600175 PHARM REV CODE 636 W HCPCS: Mod: JZ,JG

## 2024-07-23 PROCEDURE — 20610 DRAIN/INJ JOINT/BURSA W/O US: CPT | Mod: 50 | Performed by: ANESTHESIOLOGY

## 2024-07-23 RX ORDER — FENTANYL CITRATE 50 UG/ML
INJECTION, SOLUTION INTRAMUSCULAR; INTRAVENOUS
Status: DISCONTINUED | OUTPATIENT
Start: 2024-07-23 | End: 2024-07-23 | Stop reason: HOSPADM

## 2024-07-23 RX ORDER — INDOMETHACIN 25 MG/1
CAPSULE ORAL
Status: DISCONTINUED | OUTPATIENT
Start: 2024-07-23 | End: 2024-07-23 | Stop reason: HOSPADM

## 2024-07-23 NOTE — OP NOTE
Called and spoke to patient about weight loss meds. Patient inquiring about them because someone told him about them.Did inform him that he could make an appt and discuss with provider when ready. Patient verbalized understanding with read back.   Procedure Note:     bilateral Intra-articular Knee Synvisc 1 Injection Under Fluoroscopy    07/23/2024                                 Surgeon: Humberto Dumont MD       Assistant: None     Pre-Op Diagnosis:  bilateral Primary osteoarthritis of both knees [M17.0]    Post-Op Diagnosis: Primary osteoarthritis of both knees [M17.0]     EBL: None     Complications: None     Specimens: None    Sedation:  Conscious sedation provided by M.D    The patient was monitored with continuous pulse oximetry, EKG, and intermittent blood pressure monitors.  The patient was hemodynamically stable throughout the entire process was responsive to voice, and breathing spontaneously.  Supplemental O2 was provided at 2L/min via nasal cannula.  Patient was comfortable for the duration of the procedure. (See nurse documentation and case log for sedation time)    There was a total of 0mg IV Midazolam and 75mcg Fentanyl titrated for the procedure      Description of procedure:     After written consent was obtained, patient placed in supine position.  The area over the  lateral aspect of the bilateral  knee(s) joint prepped with chlorhexidine.  The area was draped in the usual sterile fashion.  Approximately 5 mL 1% lidocaine was infiltrated into the skin overlying the predetermined entry point. A 22 gauge spinal needle was then advanced under fluoroscopy in the AP views into the knee joint. After negative aspiration there was injection of 1 mL radio opaque contrast dye to confirm needle location within the joint, and no evidence of intravascular spread. This was followed by injection of 6 mL of sodium hyaluronate into the joint space. Displacement of the contrast indicated that the medication went into the area of the joint space. Needle was withdrawn and a sterile band-aid applied to the skin.     Patient tolerated the procedure well, and was reporting improvement of pain symptoms after the injection. Vishalda NICHELLE Chun was discharged from the  clinic in stable condition.

## 2024-07-23 NOTE — DISCHARGE SUMMARY
Discharge Note  Short Stay      SUMMARY     Admit Date: 7/23/2024    Attending Physician: Humberto Dumont MD        Discharge Physician: Humberto Dumont MD        Discharge Date: 7/23/2024 8:28 AM    Procedure(s) (LRB):  Bilateral Synvisc Knee injection (Bilateral)    Final Diagnosis: Primary osteoarthritis of both knees [M17.0]    Disposition: Home or self care    Patient Instructions:   Current Discharge Medication List        CONTINUE these medications which have NOT CHANGED    Details   empagliflozin (JARDIANCE) 10 mg tablet Take 1 tablet (10 mg total) by mouth once daily.  Qty: 90 tablet, Refills: 3    Associated Diagnoses: Diabetes mellitus type 2 in obese      ezetimibe (ZETIA) 10 mg tablet Take 1 tablet (10 mg total) by mouth once daily.  Qty: 90 tablet, Refills: 3    Associated Diagnoses: Hyperlipidemia associated with type 2 diabetes mellitus      FLUoxetine 40 MG capsule Take 1 capsule (40 mg total) by mouth once daily.  Qty: 90 capsule, Refills: 1    Associated Diagnoses: Fibromyalgia; Chronic major depressive disorder      gabapentin (NEURONTIN) 300 MG capsule Take 1 capsule (300 mg total) by mouth 3 (three) times daily.  Qty: 90 capsule, Refills: 2      losartan (COZAAR) 25 MG tablet Take 0.5 tablets (12.5 mg total) by mouth every evening.  Qty: 45 tablet, Refills: 1    Comments: Dose reduction  Associated Diagnoses: Hypertension associated with diabetes      metFORMIN (GLUCOPHAGE) 1000 MG tablet TAKE 1 TABLET(1000 MG) BY MOUTH TWICE DAILY WITH MEALS  Qty: 180 tablet, Refills: 3    Associated Diagnoses: Diabetes mellitus type 2 in obese      milnacipran (SAVELLA) 100 mg Tab Take 1 tablet (100 mg total) by mouth 2 (two) times daily.  Qty: 180 tablet, Refills: 0      semaglutide (OZEMPIC) 0.25 mg or 0.5 mg (2 mg/3 mL) pen injector Inject 0.25 mg into the skin every 7 days.  Qty: 3 mL, Refills: 1    Comments: Patient requests delivery  Associated Diagnoses: Hypertension associated with diabetes      verapamiL  (CALAN) 80 MG tablet TAKE 1 TABLET(80 MG) BY MOUTH TWICE DAILY  Qty: 180 tablet, Refills: 2    Associated Diagnoses: Hypertension associated with diabetes; MVP (mitral valve prolapse)      vitamin D (VITAMIN D3) 1000 units Tab Take 1,000 Units by mouth once daily.      b complex vitamins tablet Take 1 tablet by mouth once daily.      biotin 1 mg tablet Take 1,000 mcg by mouth every morning.       celecoxib (CELEBREX) 200 MG capsule TAKE 1 CAPSULE(200 MG) BY MOUTH TWICE DAILY WITH FOOD  Qty: 120 capsule, Refills: 1    Associated Diagnoses: Chondromalacia, right knee; Left shoulder tendinitis      clotrimazole-betamethasone 1-0.05% (LOTRISONE) cream Apply topically 2 (two) times daily.  Qty: 45 g, Refills: 2      diclofenac sodium (VOLTAREN) 1 % Gel Apply topically 4 (four) times daily.  Qty: 100 g, Refills: 2      fluticasone (FLONASE) 50 mcg/actuation nasal spray 2 sprays by Each Nare route once daily.  Qty: 1 Bottle, Refills: 0    Associated Diagnoses: Sinusitis      furosemide (LASIX) 20 MG tablet Take 1 tablet (20 mg total) by mouth 2 (two) times daily.  Qty: 60 tablet, Refills: 11      hydroCHLOROthiazide (HYDRODIURIL) 25 MG tablet Take 1 tablet (25 mg total) by mouth once daily.  Qty: 90 tablet, Refills: 3    Comments: Future refill requests to be sent to Dr. Dumont      metoprolol succinate (TOPROL-XL) 25 MG 24 hr tablet TAKE 1 TABLET(25 MG) BY MOUTH EVERY DAY  Qty: 90 tablet, Refills: 3    Comments: .      omeprazole (PRILOSEC) 20 MG capsule TAKE ONE CAPSULE BY MOUTH EVERY DAY AS NEEDED WITH NSAID  Qty: 30 capsule, Refills: 5    Associated Diagnoses: Gastroesophageal reflux disease without esophagitis      potassium chloride SA (K-DUR,KLOR-CON M) 10 MEQ tablet TAKE 1 TABLET(10 MEQ) BY MOUTH EVERY DAY  Qty: 90 tablet, Refills: 1    Comments: Future refill requests to go to Dr. Dumont      pulse oximeter (PULSE OXIMETER) device by Apply Externally route 2 (two) times a day. Use twice daily at 8 AM and 3 PM and  record the value in [place] as directed.  Qty: 1 each, Refills: 0    Comments: This is a NO CHARGE item.  Please override price to zero.  DO NOT PRINT.  NORMAL MODE e-PRESCRIBE ONLY.  Associated Diagnoses: COVID-19 virus detected      triamcinolone acetonide 0.025% (KENALOG) 0.025 % Oint Apply topically 2 (two) times daily. for 10 days  Qty: 15 g, Refills: 2    Associated Diagnoses: Tinea corporis                 Discharge Diagnosis: Primary osteoarthritis of both knees [M17.0]  Condition on Discharge: Stable with no complications to procedure   Diet on Discharge: Same as before.  Activity: as per instruction sheet.  Discharge to: Home with a responsible adult.  Follow up: 2-4 weeks       Please call the office at [phone] if you experience any weakness or loss of sensation, fever > 101.5, pain uncontrolled with oral medications, persistent nausea/vomiting/or diarrhea, redness or drainage from the incisions, or any other worrisome concerns. If physician on call was not reached or could not communicate with our office for any reason please go to the nearest emergency department

## 2024-07-23 NOTE — DISCHARGE INSTRUCTIONS

## 2024-07-25 ENCOUNTER — HOSPITAL ENCOUNTER (OUTPATIENT)
Dept: RADIOLOGY | Facility: HOSPITAL | Age: 79
Discharge: HOME | End: 2024-07-25
Attending: NURSE PRACTITIONER
Payer: MEDICARE
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
## 2024-07-25 DIAGNOSIS — M54.16 LUMBAR RADICULOPATHY: ICD-10-CM

## 2024-07-25 PROCEDURE — 72114 X-RAY EXAM L-S SPINE BENDING: CPT | Mod: 26,,, | Performed by: RADIOLOGY

## 2024-07-25 PROCEDURE — 72114 X-RAY EXAM L-S SPINE BENDING: CPT | Mod: TC

## 2024-08-02 ENCOUNTER — OFFICE VISIT (OUTPATIENT)
Dept: CARDIOLOGY | Facility: CLINIC | Age: 79
End: 2024-08-02
Payer: MEDICARE

## 2024-08-02 ENCOUNTER — PATIENT MESSAGE (OUTPATIENT)
Dept: OTOLARYNGOLOGY | Facility: CLINIC | Age: 79
End: 2024-08-02
Payer: MEDICARE

## 2024-08-02 VITALS
WEIGHT: 196.44 LBS | HEIGHT: 68 IN | HEART RATE: 107 BPM | BODY MASS INDEX: 29.77 KG/M2 | OXYGEN SATURATION: 96 % | SYSTOLIC BLOOD PRESSURE: 112 MMHG | DIASTOLIC BLOOD PRESSURE: 60 MMHG

## 2024-08-02 DIAGNOSIS — I73.9 PVD (PERIPHERAL VASCULAR DISEASE): ICD-10-CM

## 2024-08-02 DIAGNOSIS — E11.69 HYPERLIPIDEMIA ASSOCIATED WITH TYPE 2 DIABETES MELLITUS: ICD-10-CM

## 2024-08-02 DIAGNOSIS — I49.3 PVC (PREMATURE VENTRICULAR CONTRACTION): ICD-10-CM

## 2024-08-02 DIAGNOSIS — I34.1 MVP (MITRAL VALVE PROLAPSE): ICD-10-CM

## 2024-08-02 DIAGNOSIS — E11.59 HYPERTENSION ASSOCIATED WITH DIABETES: ICD-10-CM

## 2024-08-02 DIAGNOSIS — R55 SYNCOPE AND COLLAPSE: ICD-10-CM

## 2024-08-02 DIAGNOSIS — E78.5 HYPERLIPIDEMIA ASSOCIATED WITH TYPE 2 DIABETES MELLITUS: ICD-10-CM

## 2024-08-02 DIAGNOSIS — R42 DIZZINESS: Primary | ICD-10-CM

## 2024-08-02 DIAGNOSIS — I15.2 HYPERTENSION ASSOCIATED WITH DIABETES: ICD-10-CM

## 2024-08-02 PROCEDURE — 99215 OFFICE O/P EST HI 40 MIN: CPT | Mod: PBBFAC | Performed by: INTERNAL MEDICINE

## 2024-08-02 PROCEDURE — 99999 PR PBB SHADOW E&M-EST. PATIENT-LVL V: CPT | Mod: PBBFAC,,, | Performed by: INTERNAL MEDICINE

## 2024-08-02 RX ORDER — VERAPAMIL HYDROCHLORIDE 40 MG/1
40 TABLET ORAL 2 TIMES DAILY
Qty: 60 TABLET | Refills: 11 | Status: SHIPPED | OUTPATIENT
Start: 2024-08-02 | End: 2025-08-02

## 2024-08-02 RX ORDER — FUROSEMIDE 20 MG/1
20 TABLET ORAL DAILY PRN
Qty: 30 TABLET | Refills: 11 | Status: SHIPPED | OUTPATIENT
Start: 2024-08-02 | End: 2025-08-02

## 2024-08-02 NOTE — PROGRESS NOTES
Subjective:   Patient ID:  Kaylin Mccollum is a 79 y.o. female who presents for follow up of Dizziness and Shortness of Breath      78 yo female, 6 months f/u  PMH h/o MVP, HTN DM 20 yrs. H/o PVCs. Statin intolerance, Fibromyalgia H/o COVID 19 in  quarantine at home. (sinus headache)  On verapamil since she was 54.   Palpitation and dizziness controlled by Verapamil  Occasional chest pain, with sharp pain.   C/o dry mouth  Chronic fatigue from fibra myalgia. Some shoulder pain from the fall  Mother had afib. Father healthy. Young brother had heart procedure at age of 6.  Not on regular exercise. No smoking/drinking  ekg today NSR and poor R progression on repcoridal leads    05/2021 visit   ECHO mild MR and normal EF. Stress test no ischemia; ZIOPATCH showed rare AT longest 13 beats.  Still dry mouth and occasional pinching chest pain  Headache chronic due to migraine  Chronic fatigue, mild exercise endurance  BP and LDL controlled. A1C 6.2 at OSH in      visit  PVD swelling of the leg controlled by lasix.   BP controlled  Still fatigue and weakness   ekg NSR. A1C 7.6 BP controled     visit  C/o chest tightness when walked from the parking to the office today. Resolved after rest.  Walking and shopping could cause the muscle pain and fatigue. '  Chronic lower back injection for the pain. occasionally faint and clammy  No palpitation and leg swelling      visit  Had steroid injection of the hips. Palpitation and controlled by verapamil. BRUNO with climbing the stairs. No dizziness and faint.    04/23 visit  Rx of fibromyalgia working but copay elevated and will wean off now  Occasional palpitation at night  No dizziness faint dyspnea. Chronic mild leg swelling  Ekg NSR     stress echo normal EF and mld MR. Normal stress test; BARDY unremarkable     07/23 visit  Feet and leg swelling improved but 2+ some varicose on the feet skin. No palpitation dizziness  faint  Reviewed digit HTN and BP up to 140 to 150 mmHG    10/23 visit  BP controlled at home per digit program   and taking zetia  Ekg NSR    01/24 visit   Some dizziness and improved after decreased BP medication.  EKG reviewed by myself today NSR Q wave in inferior leads and poor R progression on precordial leads. Old change  Leg swelling and left foot erythema. No pain and weakness    Interval history  Remains dizziness. Weight loss 10 lbs in 1yr started ozempic in 05/24 for DM  Sinus tachy now  Taking verapamil for migraine. Also helps for palpitation   Stopped metoprolol and Losartan        Past Medical History:   Diagnosis Date    Arthritis     Cataract     COVID-19 02/25/2021    Diabetes mellitus 1995    BS didn't check 09/13/2022    Diabetes mellitus, type 2     Fibromyalgia     General anesthetics causing adverse effect in therapeutic use     bradycardia     Hypertension     Migraines     Mitral valve prolapse     Osteoarthritis     Rotator cuff tear 04/01/2015       Past Surgical History:   Procedure Laterality Date    ARTHROSCOPY OF KNEE Right 03/10/2020    Procedure: ARTHROSCOPY, KNEE;  Surgeon: Les Schneider MD;  Location: [facility] OR;  Service: Orthopedics;  Laterality: Right;    CATARACT EXTRACTION W/  INTRAOCULAR LENS IMPLANT Left 07/19/2017    CATARACT EXTRACTION W/  INTRAOCULAR LENS IMPLANT Right 2017    CHOLECYSTECTOMY      CHONDROPLASTY OF KNEE Right 03/10/2020    Procedure: CHONDROPLASTY, KNEE;  Surgeon: Les Schneider MD;  Location: [facility] OR;  Service: Orthopedics;  Laterality: Right;  Anterior compartment     COLONOSCOPY N/A 09/25/2018    Procedure: COLONOSCOPY;  Surgeon: Bethel Carmona MD;  Location: [facility] ENDO;  Service: Endoscopy;  Laterality: N/A;    EXCISION OF MEDIAL MENISCUS OF KNEE Right 03/10/2020    Procedure: MENISCECTOMY, KNEE, MEDIAL;  Surgeon: Les Schneider MD;  Location: [facility] OR;  Service: Orthopedics;  Laterality: Right;  Partial,  Medial , Lateral     EYE SURGERY      INJECTION OF ANESTHETIC AGENT AROUND MEDIAL BRANCH NERVES INNERVATING LUMBAR FACET JOINT Bilateral 12/13/2022    Procedure: Bilateral L3-5 MBB;  Surgeon: Humberto Dumont MD;  Location: [facility];  Service: Pain Management;  Laterality: Bilateral;    INJECTION OF ANESTHETIC AGENT AROUND NERVE Right 08/08/2019    Procedure: Right Genicular nerve block with local;  Surgeon: Manpreet Dutta MD;  Location: [facility];  Service: Pain Management;  Laterality: Right;    INJECTION OF ANESTHETIC AGENT INTO SACROILIAC JOINT Bilateral 05/06/2020    Procedure: Bilateral Sacroiliac Joint Injection;  Surgeon: Manpreet Dutta MD;  Location: [facility];  Service: Pain Management;  Laterality: Bilateral;    INJECTION OF ANESTHETIC AGENT INTO SACROILIAC JOINT Bilateral 10/08/2020    Procedure: Bilateral SI and Bilateral GTB with RN IV sedation;  Surgeon: Manpreet Dutta MD;  Location: [facility];  Service: Pain Management;  Laterality: Bilateral;    INJECTION OF ANESTHETIC AGENT INTO SACROILIAC JOINT Bilateral 01/05/2021    Procedure: Bilateral BLOCK, SACROILIAC JOINT and Bilateral GTB witn RN IV sedation;  Surgeon: Daniel Burns MD;  Location: [facility];  Service: Pain Management;  Laterality: Bilateral;    INJECTION OF ANESTHETIC AGENT INTO SACROILIAC JOINT Bilateral 07/08/2021    Procedure: Bilateral BLOCK, SACROILIAC JOINT bilateral GTB RN IV sedation;  Surgeon: Manpreet Dutta MD;  [facility];  Service: Pain Management;  Laterality: Bilateral;    INJECTION OF ANESTHETIC AGENT INTO SACROILIAC JOINT Bilateral 03/01/2022    Procedure: Bilateral BLOCK, SACROILIAC JOINT and Bilateral GTB RN IV sedation;  Surgeon: Manpreet Dutta MD;  Location: [facility];  Service: Pain Management;  Laterality: Bilateral;    INJECTION OF ANESTHETIC AGENT INTO SACROILIAC JOINT Bilateral 10/10/2022    Procedure: Bilateral Sacroiliac Joint Injection;  Surgeon: Humberto Dumont MD;   Location: [facility];  Service: Pain Management;  Laterality: Bilateral;    INJECTION OF ANESTHETIC AGENT INTO SACROILIAC JOINT Bilateral 01/24/2023    Procedure: Bilateral Sacroiliac Joint Injection;  Surgeon: Humberto Dumont MD;  Location: [facility];  Service: Pain Management;  Laterality: Bilateral;    INJECTION OF ANESTHETIC AGENT INTO SACROILIAC JOINT Bilateral 06/21/2023    Procedure: Bilateral Sacroiliac Joint Injection;  Surgeon: Humberto Dumont MD;  [facility]: [facility];  Service: Pain Management;  Laterality: Bilateral;    INJECTION OF ANESTHETIC AGENT INTO SACROILIAC JOINT Bilateral 1/9/2024    Procedure: Bilateral SIJ Injection;  Surgeon: Humberto Dumont MD;  [facility]: [facility];  Service: Pain Management;  Laterality: Bilateral;    INJECTION OF JOINT Bilateral 09/05/2019    Procedure: Bilateral GT bursa injection;  Surgeon: Manpreet Dutta MD;  [facility];  Service: Pain Management;  Laterality: Bilateral;    INJECTION OF JOINT Bilateral 05/06/2020    Procedure: Bilateral GT bursa injection;  Surgeon: Manpreet Dutta MD;  [facility];  Service: Pain Management;  Laterality: Bilateral;    INJECTION OF JOINT Right 04/28/2022    Procedure: Injection, Joint Right Hip Injection RN IV sedation;  Surgeon: Manpreet Dutta MD;  [facility]: [facility];  Service: Pain Management;  Laterality: Right;    INJECTION OF JOINT Bilateral 10/10/2022    Procedure: Bilateral GT bursa injection with RN IV sedation;  Surgeon: Humberto Dumont MD;  [facility]: [facility];  Service: Pain Management;  Laterality: Bilateral;    INJECTION OF JOINT Bilateral 01/24/2023    Procedure: Bilateral GT bursa injection;  Surgeon: Humberto Dumont MD;  [facility]: [facility];  Service: Pain Management;  Laterality: Bilateral;    INJECTION OF JOINT Bilateral 05/23/2023    Procedure: Bilateral intraarticular knee injection with Synvisc-1; ;  Surgeon: Humberto Dumont MD;  [facility]: [facility];   Service: Pain Management;  Laterality: Bilateral;    INJECTION OF JOINT Bilateral 06/21/2023    Procedure: Bilateral GT bursa injection;  Surgeon: Humberto Dumont MD;  Location: [facility];  Service: Pain Management;  Laterality: Bilateral;    INJECTION OF JOINT Bilateral 1/9/2024    Procedure: Bilateral GTB injection;  Surgeon: Humberto Dumont MD;  Location: [facility];  Service: Pain Management;  Laterality: Bilateral;    INJECTION OF JOINT Bilateral 1/23/2024    Procedure: Bilateral intraarticular knee injection with Synvisc 1 ;  Surgeon: Humberto Dumont MD;  Location: [facility];  Service: Pain Management;  Laterality: Bilateral;    INJECTION OF JOINT Bilateral 7/23/2024    Procedure: Bilateral Synvisc Knee injection;  Surgeon: Humberto Dumont MD;  Location: [facility];  Service: Pain Management;  Laterality: Bilateral;    INJECTION, ALLOGRAFT, INTERVERTEBRAL DISC N/A 04/25/2023    Procedure: INJECTION,ALLOGRAFT,INTERVERTEBRAL DISC,1ST LEVEL: L5-S1;  Surgeon: Humberto Dumont MD;  Location: [facility];  Service: Pain Management;  Laterality: N/A;    INJECTION, ALLOGRAFT, INTERVERTEBRAL DISC N/A 05/09/2023    Procedure: INJECTION,ALLOGRAFT,INTERVERTEBRAL DISC,2nd LEVEL: L3-4;  Surgeon: Humberto Dumont MD;  Location: [facility];  Service: Pain Management;  Laterality: N/A;    KNEE ARTHROSCOPY Bilateral     PARS PLANA VITRECTOMY W/ REPAIR OF MACULAR HOLE Left 02/22/2017    RADIOFREQUENCY THERMOCOAGULATION Left 07/11/2019    Procedure: Right SIJ RFA;  Surgeon: Manpreet Dutta MD;  Location: [facility];  Service: Pain Management;  Laterality: Left;    RADIOFREQUENCY THERMOCOAGULATION Right 07/25/2019    Procedure: Right SIJ RFA;  Surgeon: Manpreet Dutta MD;  [facility]: [facility];  Service: Pain Management;  Laterality: Right;    SELECTIVE INJECTION OF ANESTHETIC AGENT AROUND LUMBAR SPINAL NERVE ROOT BY TRANSFORAMINAL APPROACH Bilateral 4/9/2024    Procedure: Bilateral L4/5 TF JAMIN;   Surgeon: Humberto Dumont MD;  [facility];  Service: Pain Management;  Laterality: Bilateral;    SHOULDER ARTHROSCOPY Right 2015       Social History     Tobacco Use    Smoking status: Never    Smokeless tobacco: Never    Tobacco comments:     Never smoked   Substance Use Topics    Alcohol use: Not Currently    Drug use: No       Family History   Problem Relation Name Age of Onset    Heart disease Mother Beena Thrasher         A-Fib    Glaucoma Mother Beena Thrasher     Cataracts Mother Beena Thrasher     Cancer Mother Beena Thrasher         colon    Arthritis Mother Beena Thrasher         : 17    Depression Mother Beena Thrasher     Depression Brother Octavio & Gutierrez Thrasher     Birth defects Brother Octavio Thrasher         Cardiac    Heart disease Brother Octavio Thrasher         Heart Surgery  as 6 y.o.    Kidney disease Daughter Yolette Mccollum         ESRD    Anesthesia problems Daughter Yolette Mccollum         cardiac arrest during nephrectomy    Birth defects Daughter Yolette Mccollum         Renal    Depression Daughter Yolette Mccollum     Learning disabilities Daughter Yolette Mccollum         Dyslexia, ADD    Depression Son Daniel & Brandon Mccollum     Learning disabilities Son Daniel & Brandon Mccollum         ADD & ADHD    Diabetes Maternal Aunt Nara Zamora          9/3/2023    Diabetes Maternal Uncle Foreignponce Ti, Sr.          2019    Cancer Maternal Uncle Madison Ti, Sr.     Breast cancer Paternal Aunt      Diabetes Maternal Grandmother Bonnie Luke     Heart disease Maternal Grandmother Bonnie Luke         Congestive heart failure    Alcohol abuse Maternal Grandmother Rice Jaime Luke         Death: 84    Depression Maternal Grandmother Bonnie Luke     Hypertension Maternal Grandmother Bonnie Luke     Cancer Maternal  Grandmother Bonnie Luke     Arthritis Maternal Grandmother Bonnie Luke     Diabetes Maternal Grandfather Attila Luke     Cancer Maternal Grandfather Attila Luke     Alcohol abuse Maternal Grandfather Attila Luke          1964    Asthma Son Brandon Mccollum          Review of Systems   Neurological:  Positive for dizziness.     Objective:   Physical Exam  HENT:      Head: Normocephalic.   Eyes:      Pupils: Pupils are equal, round, and reactive to light.   Neck:      Thyroid: No thyromegaly.      Vascular: Normal carotid pulses. No carotid bruit or JVD.   Cardiovascular:      Rate and Rhythm: Normal rate and regular rhythm. No extrasystoles are present.     Chest Wall: PMI is not displaced.      Pulses: Normal pulses.      Heart sounds: Normal heart sounds. No murmur heard.     No gallop. No S3 sounds.   Pulmonary:      Effort: No respiratory distress.      Breath sounds: Normal breath sounds. No stridor.   Abdominal:      General: Bowel sounds are normal.      Palpations: Abdomen is soft.      Tenderness: There is no abdominal tenderness. There is no rebound.   Musculoskeletal:         General: Swelling present.   Skin:     Findings: No rash.   Neurological:      Mental Status: She is alert and oriented to person, place, and time.   Psychiatric:         Behavior: Behavior normal.       Lab Results   Component Value Date    CHOL 222 (H) 2024    CHOL 216 (H) 2023    CHOL 211 (H) 2023     Lab Results   Component Value Date    HDL 56 2024    HDL 61 2023    HDL 65 2023     Lab Results   Component Value Date    LDLCALC 132.6 2024    LDLCALC 127.0 2023    LDLCALC 131.6 2023     Lab Results   Component Value Date    TRIG 167 (H) 2024    TRIG 140 2023    TRIG 72 2023     Lab Results   Component Value Date    CHOLHDL 25.2 2024    CHOLHDL 28.2 2023    CHOLHDL 30.8 2023        "Chemistry        Component Value Date/Time     06/28/2024 1639    K 5.0 06/28/2024 1639     06/28/2024 1639    CO2 25 06/28/2024 1639    BUN 16 06/28/2024 1639    CREATININE 1.1 06/28/2024 1639    GLU 87 06/28/2024 1639        Component Value Date/Time    CALCIUM 9.4 06/28/2024 1639    ALKPHOS 60 06/28/2024 1639    AST 20 06/28/2024 1639    ALT 17 06/28/2024 1639    BILITOT 0.2 06/28/2024 1639    ESTGFRAFRICA >60.0 02/09/2022 1308    EGFRNONAA >60.0 02/09/2022 1308          Lab Results   Component Value Date    HGBA1C 7.8 (H) 03/01/2024     Lab Results   Component Value Date    TSH 2.524 03/01/2024     No results found for: "INR", "PROTIME"  Lab Results   Component Value Date    WBC 8.24 06/28/2024    HGB 12.1 06/28/2024    HCT 36.9 (L) 06/28/2024    MCV 94 06/28/2024     06/28/2024     BMP  Sodium   Date Value Ref Range Status   06/28/2024 140 136 - 145 mmol/L Final     Potassium   Date Value Ref Range Status   06/28/2024 5.0 3.5 - 5.1 mmol/L Final     Chloride   Date Value Ref Range Status   06/28/2024 103 95 - 110 mmol/L Final     CO2   Date Value Ref Range Status   06/28/2024 25 23 - 29 mmol/L Final     BUN   Date Value Ref Range Status   06/28/2024 16 8 - 23 mg/dL Final     Creatinine   Date Value Ref Range Status   06/28/2024 1.1 0.5 - 1.4 mg/dL Final     Calcium   Date Value Ref Range Status   06/28/2024 9.4 8.7 - 10.5 mg/dL Final     Anion Gap   Date Value Ref Range Status   06/28/2024 12 8 - 16 mmol/L Final     eGFR if    Date Value Ref Range Status   02/09/2022 >60.0 >60 mL/min/1.73 m^2 Final     eGFR if non    Date Value Ref Range Status   02/09/2022 >60.0 >60 mL/min/1.73 m^2 Final     Comment:     Calculation used to obtain the estimated glomerular filtration  rate (eGFR) is the CKD-EPI equation.        BNP  @LABRCNTIP(BNP,BNPTRIAGEBLO)@  @LABRCNTIP(troponini)@  CrCl cannot be calculated (Patient's most recent lab result is older than the maximum 7 days " allowed.).  No results found in the last 24 hours.  No results found in the last 24 hours.  No results found in the last 24 hours.    Assessment:      1. Dizziness    2. Hypertension associated with diabetes    3. Syncope and collapse    4. MVP (mitral valve prolapse)    5. PVC (premature ventricular contraction)    6. Hyperlipidemia associated with type 2 diabetes mellitus    7. PVD (peripheral vascular disease)        Plan:   D/c hctz and decrease verapamil to 40 mg bid to avoid low BP and dizziness  Advise lasix as needed for PVD  Continue compression socks  RTC in 3 m   Refer to ENT

## 2024-08-08 ENCOUNTER — PATIENT MESSAGE (OUTPATIENT)
Dept: OPHTHALMOLOGY | Facility: CLINIC | Age: 79
End: 2024-08-08

## 2024-08-08 ENCOUNTER — OFFICE VISIT (OUTPATIENT)
Dept: OPHTHALMOLOGY | Facility: CLINIC | Age: 79
End: 2024-08-08
Payer: MEDICARE

## 2024-08-08 DIAGNOSIS — E11.9 DIABETES MELLITUS TYPE 2 WITHOUT RETINOPATHY: Primary | ICD-10-CM

## 2024-08-08 DIAGNOSIS — H35.342 MACULAR HOLE, LEFT: ICD-10-CM

## 2024-08-08 DIAGNOSIS — E11.22 TYPE 2 DIABETES MELLITUS WITH STAGE 3A CHRONIC KIDNEY DISEASE, WITHOUT LONG-TERM CURRENT USE OF INSULIN: ICD-10-CM

## 2024-08-08 DIAGNOSIS — N18.31 TYPE 2 DIABETES MELLITUS WITH STAGE 3A CHRONIC KIDNEY DISEASE, WITHOUT LONG-TERM CURRENT USE OF INSULIN: ICD-10-CM

## 2024-08-08 PROCEDURE — 92134 CPTRZ OPH DX IMG PST SGM RTA: CPT | Mod: PBBFAC | Performed by: OPTOMETRIST

## 2024-08-08 PROCEDURE — 92014 COMPRE OPH EXAM EST PT 1/>: CPT | Mod: S$PBB,,, | Performed by: OPTOMETRIST

## 2024-08-08 PROCEDURE — 99999 PR PBB SHADOW E&M-EST. PATIENT-LVL IV: CPT | Mod: PBBFAC,,, | Performed by: OPTOMETRIST

## 2024-08-08 PROCEDURE — 99214 OFFICE O/P EST MOD 30 MIN: CPT | Mod: PBBFAC,25 | Performed by: OPTOMETRIST

## 2024-08-08 PROCEDURE — 92015 DETERMINE REFRACTIVE STATE: CPT | Mod: ,,, | Performed by: OPTOMETRIST

## 2024-08-12 ENCOUNTER — OFFICE VISIT (OUTPATIENT)
Dept: OTOLARYNGOLOGY | Facility: CLINIC | Age: 79
End: 2024-08-12
Payer: MEDICARE

## 2024-08-12 VITALS — HEIGHT: 70 IN | BODY MASS INDEX: 28.12 KG/M2 | WEIGHT: 196.44 LBS

## 2024-08-12 DIAGNOSIS — R42 LIGHTHEADEDNESS: ICD-10-CM

## 2024-08-12 PROCEDURE — 99203 OFFICE O/P NEW LOW 30 MIN: CPT | Mod: S$PBB,,, | Performed by: STUDENT IN AN ORGANIZED HEALTH CARE EDUCATION/TRAINING PROGRAM

## 2024-08-12 PROCEDURE — 99214 OFFICE O/P EST MOD 30 MIN: CPT | Mod: PBBFAC | Performed by: STUDENT IN AN ORGANIZED HEALTH CARE EDUCATION/TRAINING PROGRAM

## 2024-08-12 PROCEDURE — 99999 PR PBB SHADOW E&M-EST. PATIENT-LVL IV: CPT | Mod: PBBFAC,,, | Performed by: STUDENT IN AN ORGANIZED HEALTH CARE EDUCATION/TRAINING PROGRAM

## 2024-08-12 NOTE — PROGRESS NOTES
Chief complaint:   Chief Complaint   Patient presents with    Dizziness     Pt is coming in for dizziness, pt stop taking some of her blood pressure medicines and states the dizziness has improved. Pt believe to much of her blood pressure med's has caused her to have frequent dizzy spells. Pt also c/o of sharp pain to L ear. Pt states she was feeling unbalance and had a fall on June 28, 2024        Referring Provider:  [place]  [id number] [place] [address]    History of Present Illness:     Ms. Mccollum is a 79 y.o. female w/MVP, HTN DM 20 yrs. H/o PVCs. Statin intolerance, Fibromyalgia presenting for evaluation of dizziness.     Onset: abopt 1 year, seemed to start around the time she was having significant fatigue all the time   Frequency of episodes: daily at first,rare now  Quality: lightheaded, feeling vision going black  Exacerbating factors: standing up  Relieving factors:  reducing BP medication (she has come off of 3 the 4) and reduced verapamil with significant improvement  Additionally has felt imbalanced while walking. Had a fall June 2024.  Prior history of similar events: No    Associated signs and symptoms:    [] Hearing loss  [x] Ear pain - left, brief, occasionally, does have right jaw popping at times  [] Tinnitus  [] Headache  [] Light sensitivity  [] Sound sensitivity  [] Nausea   [] Vomiting  [] Dizziness with valsalva or weather change  [] Autophony    The patient denies significant hearing loss risk factors, ototoxic or vestibulotoxic medication exposure, chronic vestibular suppressant use, head/ facial/ vitaliy trauma, and otologic surgery.        Ta    History        Past Medical History:   Past Medical History:   Diagnosis Date    Arthritis     Cataract     COVID-19 02/25/2021    Diabetes mellitus 1995    BS didn't check 09/13/2022    Diabetes mellitus, type 2     Fibromyalgia     General anesthetics causing adverse effect in therapeutic use     bradycardia     Hypertension      Migraines     Mitral valve prolapse     Osteoarthritis     Rotator cuff tear 04/01/2015    .          Past Surgical History:  Past Surgical History:   Procedure Laterality Date    ARTHROSCOPY OF KNEE Right 03/10/2020    Procedure: ARTHROSCOPY, KNEE;  Surgeon: Les Schneider MD;  Location: [facility] OR;  Service: Orthopedics;  Laterality: Right;    CATARACT EXTRACTION W/  INTRAOCULAR LENS IMPLANT Left 07/19/2017    CATARACT EXTRACTION W/  INTRAOCULAR LENS IMPLANT Right 2017    CHOLECYSTECTOMY      CHONDROPLASTY OF KNEE Right 03/10/2020    Procedure: CHONDROPLASTY, KNEE;  Surgeon: Les Schneider MD;  Location: [facility] OR;  Service: Orthopedics;  Laterality: Right;  Anterior compartment     COLONOSCOPY N/A 09/25/2018    Procedure: COLONOSCOPY;  Surgeon: Bethel Carmona MD;  Location: [facility] ENDO;  Service: Endoscopy;  Laterality: N/A;    EXCISION OF MEDIAL MENISCUS OF KNEE Right 03/10/2020    Procedure: MENISCECTOMY, KNEE, MEDIAL;  Surgeon: Les Schneider MD;  Location: [facility] OR;  Service: Orthopedics;  Laterality: Right;  Partial, Medial , Lateral     EYE SURGERY      INJECTION OF ANESTHETIC AGENT AROUND MEDIAL BRANCH NERVES INNERVATING LUMBAR FACET JOINT Bilateral 12/13/2022    Procedure: Bilateral L3-5 MBB;  Surgeon: Humberto Dumont MD;  Location: [facility] PAIN MGT;  Service: Pain Management;  Laterality: Bilateral;    INJECTION OF ANESTHETIC AGENT AROUND NERVE Right 08/08/2019    Procedure: Right Genicular nerve block with local;  Surgeon: Manpreet Dutta MD;  Location: [facility] PAIN MGT;  Service: Pain Management;  Laterality: Right;    INJECTION OF ANESTHETIC AGENT INTO SACROILIAC JOINT Bilateral 05/06/2020    Procedure: Bilateral Sacroiliac Joint Injection;  Surgeon: Manpreet Dutta MD;  Location: [facility] PAIN MGT;  Service: Pain Management;  Laterality: Bilateral;    INJECTION OF ANESTHETIC AGENT INTO SACROILIAC JOINT Bilateral 10/08/2020    Procedure: Bilateral SI and Bilateral GTB with RN IV sedation;  Surgeon:  Manpreet Dutta MD;  [facility];  Service: Pain Management;  Laterality: Bilateral;    INJECTION OF ANESTHETIC AGENT INTO SACROILIAC JOINT Bilateral 01/05/2021    Procedure: Bilateral BLOCK, SACROILIAC JOINT and Bilateral GTB witn RN IV sedation;  Surgeon: Daniel Burns MD;  Location: [facility] PAIN [facility]GT;  Service: Pain Management;  Laterality: Bilateral;    INJECTION OF ANESTHETIC AGENT INTO SACROILIAC JOINT Bilateral 07/08/2021    Procedure: Bilateral BLOCK, SACROILIAC JOINT bilateral GTB RN IV sedation;  Surgeon: Manpreet Dutta MD;  Location: [facility] PAIN [facility]GT;  Service: Pain Management;  Laterality: Bilateral;    INJECTION OF ANESTHETIC AGENT INTO SACROILIAC JOINT Bilateral 03/01/2022    Procedure: Bilateral BLOCK, SACROILIAC JOINT and Bilateral GTB RN IV sedation;  Surgeon: Manpreet Dutta MD;  Location: [facility]GT;  Service: Pain Management;  Laterality: Bilateral;    INJECTION OF ANESTHETIC AGENT INTO SACROILIAC JOINT Bilateral 10/10/2022    Procedure: Bilateral Sacroiliac Joint Injection;  Surgeon: Humberto Dumont MD;  Location: [facility] PAIN MGT;  Service: Pain Management;  Laterality: Bilateral;    INJECTION OF ANESTHETIC AGENT INTO SACROILIAC JOINT Bilateral 01/24/2023    Procedure: Bilateral Sacroiliac Joint Injection;  Surgeon: Humberto Dumont MD;  [facility];  Service: Pain Management;  Laterality: Bilateral;    INJECTION OF ANESTHETIC AGENT INTO SACROILIAC JOINT Bilateral 06/21/2023    Procedure: Bilateral Sacroiliac Joint Injection;  Surgeon: Humberto Dumont MD;  Location: [facility];  Service: Pain Management;  Laterality: Bilateral;    INJECTION OF ANESTHETIC AGENT INTO SACROILIAC JOINT Bilateral 1/9/2024    Procedure: Bilateral SIJ Injection;  Surgeon: Humberto Dumont MD;  Location: [facility];  Service: Pain Management;  Laterality: Bilateral;    INJECTION OF JOINT Bilateral 09/05/2019    Procedure: Bilateral GT bursa injection;  Surgeon: Manpreet Dutta MD;  Location: [facility] PAIN MGT;   Service: Pain Management;  Laterality: Bilateral;    INJECTION OF JOINT Bilateral 05/06/2020    Procedure: Bilateral GT bursa injection;  Surgeon: Manpreet Dutta MD;  Location: [facility];  Service: Pain Management;  Laterality: Bilateral;    INJECTION OF JOINT Right 04/28/2022    Procedure: Injection, Joint Right Hip Injection RN IV sedation;  Surgeon: Manpreet Dutta MD;  Location: [facility];  Service: Pain Management;  Laterality: Right;    INJECTION OF JOINT Bilateral 10/10/2022    Procedure: Bilateral GT bursa injection with RN IV sedation;  Surgeon: Humberto Dumont MD;  Location: [facility];  Service: Pain Management;  Laterality: Bilateral;    INJECTION OF JOINT Bilateral 01/24/2023    Procedure: Bilateral GT bursa injection;  Surgeon: Humberto Dumont MD;  Location: [facility];  Service: Pain Management;  Laterality: Bilateral;    INJECTION OF JOINT Bilateral 05/23/2023    Procedure: Bilateral intraarticular knee injection with Synvisc-1; ;  Surgeon: Humberto Dumont MD;  Location: [facility];  Service: Pain Management;  Laterality: Bilateral;    INJECTION OF JOINT Bilateral 06/21/2023    Procedure: Bilateral GT bursa injection;  Surgeon: Humberto Dumont MD;  Location: [facility];  Service: Pain Management;  Laterality: Bilateral;    INJECTION OF JOINT Bilateral 1/9/2024    Procedure: Bilateral GTB injection;  Surgeon: Humberto Dumont MD;  Location: [facility];  Service: Pain Management;  Laterality: Bilateral;    INJECTION OF JOINT Bilateral 1/23/2024    Procedure: Bilateral intraarticular knee injection with Synvisc 1 ;  Surgeon: Humberto Dumont MD;  Location: [facility] PAIN MGT;  Service: Pain Management;  Laterality: Bilateral;    INJECTION OF JOINT Bilateral 7/23/2024    Procedure: Bilateral Synvisc Knee injection;  Surgeon: Humberto Dumont MD;  Location: [facility];  Service: Pain Management;  Laterality: Bilateral;    INJECTION, ALLOGRAFT, INTERVERTEBRAL DISC N/A 04/25/2023     "Procedure: INJECTION,ALLOGRAFT,INTERVERTEBRAL DISC,1ST LEVEL: L5-S1;  Surgeon: Humberto Dumont MD;  Location: [facility] PAIN [facility]GT;  Service: Pain Management;  Laterality: N/A;    INJECTION, ALLOGRAFT, INTERVERTEBRAL DISC N/A 05/09/2023    Procedure: INJECTION,ALLOGRAFT,INTERVERTEBRAL DISC,2nd LEVEL: L3-4;  Surgeon: Humberto Dumont MD;  Location: [facility] PAIN [facility]GT;  Service: Pain Management;  Laterality: N/A;    KNEE ARTHROSCOPY Bilateral     PARS PLANA VITRECTOMY W/ REPAIR OF MACULAR HOLE Left 02/22/2017    RADIOFREQUENCY THERMOCOAGULATION Left 07/11/2019    Procedure: Right SIJ RFA;  Surgeon: Manpreet Dutta MD;  Location: [facility] PAIN [facility]GT;  Service: Pain Management;  Laterality: Left;    RADIOFREQUENCY THERMOCOAGULATION Right 07/25/2019    Procedure: Right SIJ RFA;  Surgeon: Manpreet Dutta MD;  Location: [facility] PAIN [facility]GT;  Service: Pain Management;  Laterality: Right;    SELECTIVE INJECTION OF ANESTHETIC AGENT AROUND LUMBAR SPINAL NERVE ROOT BY TRANSFORAMINAL APPROACH Bilateral 4/9/2024    Procedure: Bilateral L4/5 TF JAMIN;  Surgeon: Humberto Dumont MD;  Location: [facility] PAIN [facility]GT;  Service: Pain Management;  Laterality: Bilateral;    SHOULDER ARTHROSCOPY Right 06/04/2015   .         Medications: Medication list was reviewed. She  has a current medication list which includes the following prescription(s): b complex vitamins, biotin, celecoxib, clotrimazole-betamethasone 1-0.05%, diclofenac sodium, empagliflozin, ezetimibe, fluoxetine, fluticasone propionate, furosemide, gabapentin, metformin, milnacipran, omeprazole, potassium chloride sa, pulse oximeter, ozempic, verapamil, vitamin d, and triamcinolone acetonide 0.025%.         Allergies:   Review of patient's allergies indicates:   Allergen Reactions    Demerol [meperidine] Other (See Comments)     Burning when adm IV  Able to tolerate IM, "Turned the veins in my hand purple."    Latex, natural rubber Other (See Comments)     "Burns my skin",  Symptoms get worse the longer she is exposed "    Zocor [simvastatin] Other (See Comments)     Tightening of muscles    Statins-hmg-coa reductase inhibitors Other (See Comments)     myopathy    Sulfa (sulfonamide antibiotics)      Blurred vision    Nickel Rash     Pt states she had sores to ear lobes from nickel in earrings             Family history: family history includes Alcohol abuse in her maternal grandfather and maternal grandmother; Anesthesia problems in her daughter; Arthritis in her maternal grandmother and mother; Asthma in her son; Birth defects in her brother and daughter; Breast cancer in her paternal aunt; Cancer in her maternal grandfather, maternal grandmother, maternal uncle, and mother; Cataracts in her mother; Depression in her brother, daughter, maternal grandmother, mother, and son; Diabetes in her maternal aunt, maternal grandfather, maternal grandmother, and maternal uncle; Glaucoma in her mother; Heart disease in her brother, maternal grandmother, and mother; Hypertension in her maternal grandmother; Kidney disease in her daughter; Learning disabilities in her daughter and son.         Social History          Alcohol use:  reports that she does not currently use alcohol.            Tobacco:  reports that she has never smoked. She has never used smokeless tobacco.         Please see the patient's intake form for full details of past medical history, past surgical history, family history, social history and review of systems. ?This information was reviewed by me and noted.      Physical Examination     There were no vitals filed for this visit.     General: Well developed, well nourished, well hydrated. Verbal with a strong voice and not dysphonic.     Head/Face: Normocephalic, atraumatic. No scars or lesions. Facial musculature equal.     Eyes: No scleral icterus or conjunctival hemorrhage. EOMI. PERRLA.     Ears:     Right ear: No gross deformity. EAC is clear of debris and erythema. The TM is intact with a pneumatized middle ear. No  signs of retraction, fluid or infection.      Left ear: No gross deformity. EAC is clear of debris and erythema. The TM is intact with a pneumatized middle ear. No signs of retraction, fluid or infection.     Neurologic: Moving all extremities without gross abnormality.CN II-XII grossly intact. House-Brackmann 1/6.     Motor strength:  Strength 5/5 throughout.    Sensation:  Sensory function to light touch and proprioception intact throughout.    Gait:  No ataxia and can tandem gait 6 steps.    Sidney Hallpike - no torsional nystagmus    Data review    Review office notes  Cardiology 8/2/24  Plan:   D/c hctz and decrease verapamil to 40 mg bid to avoid low BP and dizziness  Advise lasix as needed for PVD  Continue compression socks  RTC in 3 m   Refer to ENT           Assessment     1. Lightheadedness          Plan:      Dizziness secondary to medication side effects +/- orthostatic hypotension. Based on the  history and exam  there is no evidence of a vestibular dysfunction.  Her symptoms have improved since reducing BP medication.        Jozef Herrera MD  [facility] Department of Otol[facility]ology   [facility] - [address]  [place] [id number]  P: [phone]  F: [phone]

## 2024-08-20 NOTE — H&P (VIEW-ONLY)
Established Patient - TeleHealth Visit    The patient location is: [place] - home  The chief complaint leading to consultation is: chronic pain     Visit type: audiovisual    Face to Face time with patient: 10-15 minutes  20 minutes of total time spent on the encounter, which includes face to face time and non-face to face time preparing to see the patient (eg, review of tests), Obtaining and/or reviewing separately obtained history, Documenting clinical information in the electronic or other health record, Independently interpreting results (not separately reported) and communicating results to the patient/family/caregiver, or Care coordination (not separately reported).     Each patient to whom he or she provides medical services by telemedicine is:  (1) informed of the relationship between the physician and patient and the respective role of any other health care provider with respect to management of the patient; and (2) notified that he or she may decline to receive medical services by telemedicine and may withdraw from such care at any time.        Chronic Pain -- Established Patient (Follow-up visit)    Chief Pain Complaint:  No chief complaint on file.    Low back pain with BLE radicular pain   Bilateral knee pain - better after Synvisc-One    Interval History (8/21/2024): Kaylin Mccollum presents today for follow-up visit.  she underwent bilateral Synvisc-One intra-articular knee injection on 07/23/2024 with 90% pain relief.  The patient reports that she is/was better following the procedure.  The changes lasted 4 weeks so far.  The changes have continued through this visit.  Patient reports pain as 6/10 today due to low back pain.  She reports sitting often makes her pain worse.  Pain starts in the low back and radiates into both legs, some days 1 leg worse than the other.  She continues taking gabapentin and other medications.  She had great relief after lumbar epidural in April of this year, but she feels  "that after the fall, pain flared up in general.    Interval History (07/03/2024):  Patient presents today for follow-up visit.  Patient being seen today for evaluation of lower back pain.  She fell last week after becoming dizzy where she slumped down against a wall and landed on her buttocks with his knees bent at a sharp angle.  After that she has been having some increased lower back pain as well as right knee pain.  She did go to the ER following a fall with a full workup and did have an x-ray on the right knee and was told no acute changes.  She rates her pain today a 7/10.  Her back pain is radiating down the right leg mainly from the right knee to the right ankle in the front of the shin.  At times she will have shooting pains in the right leg.  She does feel that this feels different than her typical lower back pain.  She continues to take gabapentin, Savella, Celebrex.  She also requests to repeat her Synvisc-One injections to both knees.  She had these injection 6 months ago and states that she got 80% relief until the fall.  Patient denies night fever/night sweats, urinary incontinence, bowel incontinence, significant weight loss and significant motor weakness.   Patient denies any other complaints or concerns at this time.    Interval history 05/20/2024  Patient presents status post bilateral L4-5 transforaminal epidural steroid injection 04/09/2024.  Patient reports approximately 90% sustained relief in lower extremity radicular symptoms following her procedure.  Patient does report confusion regarding epidural steroid injection as with her prior medical history, she was familiar with an interlaminar approach on the [facility].  She does report intermittent sciatic pain which can occur with certain movements, such as awakening in the morning, driving or crossing her legs.  Pain today is rated a 2/10.  She reports recent bouts of fibromyalgia flares." She does believes Savella medication at 100 mg " twice daily has these symptoms well controlled.  She does report her [facility], [facility] does not keep this medication in stock and most recently she had to rash in her medication into 50 mg doses and still Ms. Day dose for 1 day.  Today she continues to report sustained relief in lower discogenic back pain following via disc allograft, 1 year prior.  Patient does report it is difficult to participate in exercise including aquatic therapy secondary to exacerbation of fibromyalgia.  Patient does remain active performing household activities.  She does report that she lives in a [facility] and is able to ambulate on her Street.  Patient expresses concern regarding chart review.  She reports diagnosis of stage 3 chronic kidney disease and is inquiring regarding her renal function.  We have reviewed her labs and I have encouraged her to reach out to her primary care physician regarding potential nephrology referral if needed.    Interval history 02/15/2024  Patient presents status post bilateral sacroiliac joint and greater trochanteric bursa injection 01/09/2024 and  Bilateral intra-articular knee injection with Synvisc-One 01/23/2024.  Patient reports at least 80% sustained relief overlying bilateral sacroiliac joints, GTB and in bilateral knees following her procedures.  Today her primary concern is pain in a bandlike distribution in the lower back which radiates down into the hips and down towards the feet in L4-5 dermatomal distribution.  Pain is exacerbated with positional changes moving from sitting to standing and with standing and with ambulation.  She does report associated weakness in the lower extremities associated with her pain.  Pain today is rated a 6/10.  She is continued gabapentin 300 mg in the morning, 300 mg in the afternoon and 600 mg in the evening.  She also increased Savella to 100 mg with noticeable improvement in her pain.  She is continued physician directed physical therapy exercises over  the last 8 weeks from 12/15/2023 through 02/15/2024 with noticeable improvement in pain, range of motion and functionality.  She denies bowel or bladder incontinence saddle anesthesia.    Interval Hx: 12/13/2023  Patient presents for four-month follow-up.  Today she reports that her lower back has been feeling excellent since via disc supplementation and that the procedure has made all the difference! Particularly with standing and ambulation.  Patient reports the day following Thanksgiving, rolling off of her bed and falling onto her side with significant difficulty arising to a standing position and pain with standing and ambulation following this trauma.  Today she reports pain over bilateral sacroiliac territory which radiates into the hips as well as pain in bilateral knees.  Pain is rated a 4/10.  Pain is exacerbated with positional changes, standing and with ambulation.  She is continued Savella 50 mg twice daily which she reports has significantly reduced the severity and duration of fibromyalgia flares.  She has requesting a refill or increase in this medication.  She is continued physician directed physical therapy exercises over the last 8 weeks from 10/13/2023 through 12/13/2023 for lower back, sacroiliac joint and bilateral knee pain.    Interval history 08/24/2023  Patient presents status post bilateral sacroiliac joint and greater trochanteric bursa injection 06/21/2023.  Patient reports 95% sustained relief overlying bilateral sacroiliac joint and greater trochanteric bursa territories.  She reports altogether following 2 level via disc allograft supplementation and her most recent procedure, she is able to stand upright and ambulate further distances.  She reports these procedures have taken years off my life! .  Today pain is intermittent and rated a 2/10.  Patient has continued Savella 50 mg twice daily and reports this has significantly reduced the frequency and intensity of her fibromyalgia  flares and debility associated with these flares.  She is requesting a refill of this medication.  Today she denies significant lower extremity weakness, bowel or bladder incontinence or saddle anesthesia.    Interval history 06/15/2023  Patient presents status post bilateral intra-articular knee injection 05/23/2023.  Patient reports 75% sustained improvement in bilateral knee pain following intra-articular knee injection.  Today her primary concern is bilateral hip pain.  Patient reports pain in the lower back which radiates into the groin and down the lateral aspect of bilateral lower extremities to mid thigh.  Patient reports pain is exacerbated 1st thing in the morning when she is attempting to get out of bed.  Patient denies more distal radiculopathy into the lower extremities or feet.  She denies lower extremity weakness, bowel or bladder incontinence or saddle anesthesia.  Patient continues to reports significant improvement with Savella medication which she is taking 50 mg twice daily with fibromyalgia pain.  She is requesting a refill of this medication.  Patient reports lower back pain continues to have greater than 80% sustained relief following 2 level via disc allograft supplementation.      Interval history 05/18/2023  Patient presents status post L5-S1 via disc allograft supplementation 04/25/2023 and via disc allograft supplementation L3-4 05/09/2023.  Patient reports noticeable improvement >80% in lower back pain following two-level  allograft supplementation.  Today her primary concern is bilateral knee pain.  Patient has questions regarding hyaluronic acid supplementation to be use.  Patient reports she has seen videos on Durolane and Euflexxa and is inquiring to the brand to be used on the upcoming Tuesday.  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  She is requesting up-to-date imaging to  investigate these territories.  Today she denies significant weakness in the upper lower extremities, bowel or bladder incontinence or saddle anesthesia.    Interval history 04/06/2023  Patient presents for follow-up of lower back pain.  She continues to reports superior relief in fibromyalgia symptoms on Savella medication.  Patient has brought in denial paperwork from her insurance reporting limitations on dosage and quantity allowed.  Patient reports prior to starting this medication she felt that she did not have a life.  now she reports significant improvement in pain as well as chronic fatigue associated with fibromyalgia.  Today she again reports pain in the lower back which is worse with lumbar flexion.  Patient reports she is unable to perform activities of daily living such as grocery shopping or prolonged standing or ambulation secondary to her discogenic lower back pain.  Today patient denies more distal radiculopathy into the lower extremities or feet or lower extremity weakness.  Patient is interested in discussing intervention.  Of note patient has failed to have improvement in his lower back pain with prior lumbar medial branch block, sacroiliac joint injections.    Interval Hx: 3/9/23  Patient presents for one-month follow-up.  Today she reports she is significantly better since our last clinic visit.  At that time patient had slipped in a low off and injured her lower back and right leg.  Today she reports this pain is significantly improved and pain is intermittent and today is rated a 3/10.  Today patient reports pain is isolated to the midline lower lumbar spine.  Pain is exacerbated with lumbar flexion such as when she is picking up close.  Fibromyalgia Pain has been significantly improved with the initiation of Savella medication.  Patient has reached a titration of 50 mg twice daily.  Patient recently refilled this medication.  She denies any side effects from this medication.  Today she  denies any significant lower extremity weakness, bowel or bladder incontinence or saddle anesthesia      Interval history 02/07/2023  Patient presents status post bilateral sacroiliac joint and greater trochanteric bursa injection 01/24/2023 and bilateral L3-5 lumbar medial branch block 12/13/2022.  Patient had recent mechanical fall confounding benefit from recent injections.  Today she reports she was reaching over a mattress cover to reach a stack of bibles and slid into a slint.  Patient reports she was behind and tall wall in a took 20-30 minutes for her to stand.  Patient also reports exacerbation of fibromyalgia pain.  Since her fall patient reports pain which radiates into the buttock and down the posterior aspect of the right lower extremity to the dorsum of the foot in L4-S1 distribution.  Patient has continued gabapentin 300 mg twice daily, Celebrex in the evenings as well as Tylenol 1000 mg twice daily.    Interval history 11/29/2022    Patient presents status post bilateral sacroiliac joint greater trochanteric bursa 10/10/2022.  Patient reports 90% relief overlying bilateral sacroiliac joints and greater trochanteric bursa following her injection.  Today she reports primarily lumbar axial back pain as well as significant neck pain.  Lower back pain is exacerbated with prolonged standing such as when she is washing dishes.  She denies significant distal radiculopathy into the right lower extremities as described at our last clinic visit.  Neck pain is elicited with cervical flexion, extension and lateral flexion and she does report reduced mobility.  She reports her pain began following a mechanical fall down the stairs at [facility] in September 1986.  Patient has continued gabapentin 300 mg in the morning, 300 mg in the afternoon and 600 mg in the evening.    Interval history 10/04/2022  Ms. Mccollum is a 77-year-old female with past medical history significant for depression, hyperlipidemia, hypertension,  mitral valve prolapse, peripheral vascular disease, history of COVID-19, type 2 diabetes, multi joint arthritis, fibromyalgia who presents to [facility] care, previous Dr. Dutta patient.  Today patient reports return of pain in the lower back which radiates into bilateral hips and down the posterior aspect of the right lower extremity in L4-5 distribution to the dorsum of the foot.  Patient reports pain is intermittent but has increased in intensity.  Pain is described as shocking in nature and today is rated a 6/10.  Patient reports she feels as if her skin is crawling.  patient is currently taking gabapentin 300 mg up to 3 times daily when pain is severe.  Pain interferes with the patient's sleep.  Patient also reports history of fibromyalgia which limits her mobility and duration of standing and ambulation.  Patient does endorse associated weakness in the lower extremities associated with her pain.  Patient is interested in pursuing repeat intervention as she is obtained several months of significant relief with prior sacroiliac joint and greater trochanteric bursa injections.  Patient has continued physician directed physical therapy exercises at home daily.    History of Present Illness 01/16/2020: Dr. Dutta:   Kaylin Mccollum is a 74 y.o. female  who is presenting with a chief complaint of lumbar back pain. The patient began experiencing this problem insidiously, and the pain has been gradually worsening over the past 5 month(s). The pain is described as throbbing, shooting, burning and electrical and is located in the bilateral lumbar spine. Pain is intermittent and lasts hours. The pain radiates to bilateral lower extremities L4 distribudution. The patient rates her pain a 8 out of ten and interferes with activities of daily living a 7 out of ten. Pain is exacerbated by flexion of the lumbar spine, ambulation, and is improved by rest.      She also complains of right knee pain. The patient began  experiencing this problem insidiously. The pain is described as cramping, aching and is located in the right knee. Pain is intermittent and lasts hours. The pain is nonradiating. The patient rates her pain a 8 out of ten and interferes with activities of daily living a 7 out of ten. Pain is exacerbated by getting up from a seated position and standing, and is improved by rest. Patient reports no prior trauma, prior arthroscopy bilaterally in 1990s.  - pertinent negatives: No fever, No chills, No weight loss, No bladder dysfunction, No bowel dysfunction, No saddle anesthesia  - pertinent positives: none        Pain Disability Index (PDI) Score Review:      5/20/2024     7:43 AM 11/29/2023    10:44 AM 8/24/2023     1:04 PM   Last 3 PDI Scores   Pain Disability Index (PDI) 12 11    35 14       Non-Pharmacologic Treatments:  Physical Therapy/Home Exercise: yes  Ice/Heat:yes  TENS: no  Acupuncture: no  Massage: no  Chiropractic: no    Other: no      Pain Medications:  - Adjuvant Medications: Lorazepam (Ativan), Neurontin (Gabapentin), and Topical Ointment (Voltaren Gel, Steroid cream, Anti-Inflammatory Cream, Compound cream)      Pain injections:  Dr. Dumont:  -07/23/2024: Bilateral intra-articular knee joint injection with Synvisc-One with 90% pain relief   -04/09/2024: Bilateral L4-5 TF JAMIN   -01/23/2024: Bilateral intra-articular knee injection with Synvisc-One  -01/09/2024: Bilateral sacroiliac joint and greater trochanteric bursa injection  -06/21/2023: Bilateral sacroiliac joint and greater trochanteric bursa injection  -05/29/2023: Bilateral intra-articular knee injection  -05/09/2023: L3-4 via disc allograft supplementation  -04/25/2023:  L5-S1 via disc allograft supplementation  -01/24/2023: Bilateral sacroiliac joint and greater trochanteric bursa injection  -12/13/2022: Bilateral L3-5 lumbar medial branch block  - 10/10/2022: Bilateral greater trochanteric bursa and sacroiliac joint injection      -04/20/2022:  Right-sided acetabular femoral injection; Dr. Dutta  -03/01/2022: Bilateral sacroiliac joint and greater trochanteric bursa injection; Dr. Dutta  -07/08/2021: Bilateral sacroiliac joint and greater trochanteric bursa injection; Dr. Dutta  -01/05/2021: Bilateral sacroiliac joint and greater trochanteric bursa injection; Dr. Burns  -10/08/2020: Bilateral sacroiliac joint and bilateral greater trochanteric bursa injection; Dr. Dutta  -05/06/2020: Bilateral sacroiliac joint and greater trochanteric bursa injection; Dr. Dutta          Imaging/ Diagnostic Studies/ Labs (Reviewed on 8/21/2024):    07/25/2024 X-Ray Lumbar Complete Including Flex And Ext  FINDINGS: No fracture of the lumbar spine.  Intervertebral disc heights are preserved.  6 mm of anterolisthesis of L4 on L5.  Bilateral facet joint osteoarthritis at L4-5 and L5-S1.    Cervical x-ray 05/18/2023   FINDINGS:  Osteopenia.  Vertebral body heights maintained.  Mild anterolisthesis of C4 on C5 and C5 on C6.  Multilevel osteophyte changes with disc height loss most noted at C5-6 and C6-7.  Multilevel prominent facet arthropathy.  Multilevel left-sided neural foraminal narrowing.     Significant change in alignment on flexion or extension.     Prevertebral soft tissues unremarkable.  Lung apices clear.    Thoracic x-ray 05/18/2023  FINDINGS:  Osteopenia.  Vertebral body heights maintained.  No spondylolisthesis.  Similar more multilevel bridging osteophyte changes.  No acute osseous abnormality.  Soft tissues unremarkable.      MRI Lumbar Spine 02/16/2023  FINDINGS:  Grade 1 degenerative spondylolisthesis at L4-L5.  Vertebral body height is normal.  Marrow signal is within normal limits. The conus medullaris terminates at the level of L1-L2.  No abnormal signal within the conus. Intervertebral disc levels are as follows:     T12-L1 disc: Normal disc height with anterior osteophytes and mild degenerative facet hypertrophy.  No spinal or foraminal stenosis.  The  dural canal measures 16 mm.     L1-L2 disc : Disc space height loss with chronic Schmorl's nodes.  Posterior disc bulge.  Anterior osteophytes.  Minor facet arthropathy bilaterally.  The dural canal measures 13 mm.  No foraminal stenosis.     L2-L3 disc: Circumferential disc bulge with tiny chronic Schmorl's nodes.  Anterior osteophytes.  Mild degenerative facet hypertrophy.  The dural canal measures 12 mm.  No foraminal stenosis.     L3-L4 disc: Disc space height loss with a circumferential disc bulge and anterior osteophytes.  Mild degenerative facet hypertrophy with moderate buckling of the ligamentum flavum.  The dural canal measures 11 mm.  No significant foraminal stenosis.     L4-L5 disc: Grade 1 spondylolisthesis with severe degenerative facet hypertrophy bilaterally.  Buckling of the ligamentum flavum.  The dural canal measures 7 mm AP.  Disc encroaches into the floors of the exit foramina, right greater than left.  There is mild right foraminal stenosis.     L5-S1 disc: Severe disc space height loss with osteophytes at the margins and encroach into the exit foramina.  Mild degenerative facet hypertrophy and buckling of the ligamentum flavum.  The dural canal measures 11 mm.  Mild foraminal stenosis.          Review of Systems:   GENERAL:  No weight loss, malaise or fevers.  HEENT:   No recent changes in vision or hearing  NECK:  Negative for lumps, no difficulty with swallowing.  RESPIRATORY:  Negative for cough, wheezing or shortness of breath, patient denies any recent URI.  CARDIOVASCULAR:  Negative for chest pain or palpitations.  GI:  Negative for abdominal discomfort, blood in stools or black stools or change in bowel habits.  MUSCULOSKELETAL:  See HPI.  SKIN:  Negative for lesions, rash, and itching.  PSYCH:  No mood disorder or recent psychosocial stressors.   HEMATOLOGY/LYMPHOLOGY:  Negative for prolonged bleeding, bruising easily or swollen nodes.    NEURO:   No history of syncope, paralysis,  seizures or tremors.  All other reviewed and negative other than HPI.        Telemedicine Exam  There were no vitals filed for this visit.  There is no height or weight on file to calculate BMI.   (reviewed on 8/21/2024)     GENERAL: Well appearing, in no acute distress, alert and oriented x3.  Cooperative.  PSYCH:  Mood and affect appropriate.  SKIN: Skin color & texture with no obvious abnormalities.    HEAD/FACE:  Normocephalic, atraumatic.    PULM:  No difficulty breathing. No nasal flaring. No obvious wheezing.  EXTREMITIES: No obvious deformities. Moving all extremities well, appears to have symmetric strength throughout.  MUSCULOSKELETAL: No obvious atrophy abnormalities are noted.   NEURO: No obvious neurologic deficit.   GAIT: sitting.     Physical Exam: last in clinic visit:  General: alert and oriented, in no apparent distress.  Gait: normal gait.  Skin: no rashes, no discoloration, no obvious lesions  HEENT: normocephalic, atraumatic. Pupils equal and round.  Cardiovascular: no significant peripheral edema present.  Respiratory: without use of accessory muscles of respiration.    Musculoskeletal - Lumbar Spine:  - ROM fairly preserved   - Pain on flexion of lumbar spine: Absent   - Pain on extension of lumbar spine: Absent         - Lumbar facet loading: Absent   - TTP over the lumbar facet joints: Absent  - TTP over the lumbar paraspinals: Present   - TTP over the SI joints: Present  - TTP over GT bursa: Present, minimal   - Straight Leg Raise: Negative  - CHERYLE: Present    Right Knee:  - TTP: Present over medial/ lateral joint line  - Pain with extension: Present  - Pain with flexion: Present  - Crepitus: Present     Neuro - Lower Extremities:  - BLE Strength: R/L: HF: 5/5, HE: 5/5, KF: 5/5; KE: 5/5; FE: 5/5; FF: 5/5  - Extremity Reflexes: Brisk and symmetric throughout  - Sensory: Sensation to light touch intact bilaterally      Psych:  Mood and affect is appropriate        Assessment:  Kaylin STEWARD  Young is a 79 y.o. year old female who is presenting with       ICD-10-CM ICD-9-CM    1. Bilateral lumbar radiculopathy  M54.16 724.4 Case Request-RAD/Other Procedure Area: Bilateral L4/5 TF JAMIN      2. DDD (degenerative disc disease), lumbar  M51.36 722.52       3. Spondylolisthesis of lumbar region  M43.16 738.4       4. Lumbar spondylosis  M47.816 721.3       5. Bilateral primary osteoarthritis of knee  M17.0 715.16               Plan:  1. Interventional:   S/p 7/23/2024: Bilateral intra-articular knee joint injection with Synvisc-One with 90% pain relief     Schedule Bilateral L4/5 TF JAMIN. Patient is not taking prescription blood thinners or ASA.   Patient had 90% sustained relief in lower extremity radicular symptoms following bilateral L4-5 transforaminal epidural steroid injection in April 2024.    - Status post L3-4 via disc allograft supplementation 05/09/2023 and L5-S1 via disc allograft supplementation 04/25/2023 with greater than 80% improvement in discogenic lower back pain.    Anticoagulation:  None, no anticoagulation    2. Pharmacologic:     -Refill gabapentin 300mg QAM/ 300mg at lunch/ 600mg QHS.  We have previously reviewed potential side effects of this medication including daytime somnolence, weight gain and peripheral edema    -Refill Savella 100 mg BID - as this tremendously helps with symptoms of fibromyalgia.  We have previouslydiscussed that Savella is FDA approved and has demonstrated improvement in multiple measures including pain, global impression of change in physical function.  We have discussed that the most frequent side effects of this medication can include nausea, constipation, vomiting, dry mouth, flushing or tachycardia.      -Continue Celebrex 200mg BID PRN - from Orthopedics.  We have previously discussed potential deleterious side effects of NSAIDs on the cardiovascular, gastrointestinal and renal systems. We have discussed judicious use of this medication.      - LA   reviewed and appropriate.      3. Rehabilitative:   -We discussed continuing at home physician directed physical therapy to help manage the patient/s painful condition. The patient was counseled that muscle strengthening will improve the long term prognosis in regards to pain and may also help increase range of motion and mobility.  I have encouraged the patient to perform low intensity aerobic exercise to help with fibromyalgia.    4. Diagnostic:  Reviewed relevant imaging (MRI Lumbar Spine 02/16/2023).     Updated Xray lumbar reviewed.    5. Consult:   -Continue follow-up with Dr. Schneider for knee and shoulder pain PRN  -Continue follow-up with Rheumatology: Fibromyalgia  -Continue follow-up with PCP: Dr. Olvera:  Follow-up for potential renal insufficiency    6. Follow up:  4 weeks post-procedure - virtual visit     Future Appointments   Date Time Provider Department Center   10/29/2024  4:20 PM Alea Quezada PA-C ON [facility]   11/4/2024  4:00 PM Jesus Dumont MD ON [facility]        - Patient Questions: Answered all of the patient's questions regarding diagnosis, therapy, and treatment.    - This condition does not require this patient to take time off of work, and the primary goal of our Pain Management services is to improve the patient's functional capacity.   - I discussed the risks, benefits, and alternatives to potential treatment options. All questions and concerns were fully addressed today in clinic.         Kristin Mccall PA-C  Interventional Pain Management - [facility]    Disclaimer:  This note was prepared using voice recognition system and is likely to have sound alike errors that may have been overlooked even after proof reading.  Please call me with any questions.

## 2024-08-20 NOTE — PROGRESS NOTES
Established Patient - TeleHealth Visit    The patient location is: [place] - home  The chief complaint leading to consultation is: chronic pain     Visit type: audiovisual    Face to Face time with patient: 10-15 minutes  20 minutes of total time spent on the encounter, which includes face to face time and non-face to face time preparing to see the patient (eg, review of tests), Obtaining and/or reviewing separately obtained history, Documenting clinical information in the electronic or other health record, Independently interpreting results (not separately reported) and communicating results to the patient/family/caregiver, or Care coordination (not separately reported).     Each patient to whom he or she provides medical services by telemedicine is:  (1) informed of the relationship between the physician and patient and the respective role of any other health care provider with respect to management of the patient; and (2) notified that he or she may decline to receive medical services by telemedicine and may withdraw from such care at any time.        Chronic Pain -- Established Patient (Follow-up visit)    Chief Pain Complaint:  No chief complaint on file.    Low back pain with BLE radicular pain   Bilateral knee pain - better after Synvisc-One    Interval History (8/21/2024): Kaylin Mccollum presents today for follow-up visit.  she underwent bilateral Synvisc-One intra-articular knee injection on 07/23/2024 with 90% pain relief.  The patient reports that she is/was better following the procedure.  The changes lasted 4 weeks so far.  The changes have continued through this visit.  Patient reports pain as 6/10 today due to low back pain.  She reports sitting often makes her pain worse.  Pain starts in the low back and radiates into both legs, some days 1 leg worse than the other.  She continues taking gabapentin and other medications.  She had great relief after lumbar epidural in April of this year, but she feels  "that after the fall, pain flared up in general.    Interval History (07/03/2024):  Patient presents today for follow-up visit.  Patient being seen today for evaluation of lower back pain.  She fell last week after becoming dizzy where she slumped down against a wall and landed on her buttocks with his knees bent at a sharp angle.  After that she has been having some increased lower back pain as well as right knee pain.  She did go to the ER following a fall with a full workup and did have an x-ray on the right knee and was told no acute changes.  She rates her pain today a 7/10.  Her back pain is radiating down the right leg mainly from the right knee to the right ankle in the front of the shin.  At times she will have shooting pains in the right leg.  She does feel that this feels different than her typical lower back pain.  She continues to take gabapentin, Savella, Celebrex.  She also requests to repeat her Synvisc-One injections to both knees.  She had these injection 6 months ago and states that she got 80% relief until the fall.  Patient denies night fever/night sweats, urinary incontinence, bowel incontinence, significant weight loss and significant motor weakness.   Patient denies any other complaints or concerns at this time.    Interval history 05/20/2024  Patient presents status post bilateral L4-5 transforaminal epidural steroid injection 04/09/2024.  Patient reports approximately 90% sustained relief in lower extremity radicular symptoms following her procedure.  Patient does report confusion regarding epidural steroid injection as with her prior medical history, she was familiar with an interlaminar approach on the [facility].  She does report intermittent sciatic pain which can occur with certain movements, such as awakening in the morning, driving or crossing her legs.  Pain today is rated a 2/10.  She reports recent bouts of fibromyalgia flares." She does believes Savella medication at 100 mg " twice daily has these symptoms well controlled.  She does report her [facility], [facility] does not keep this medication in stock and most recently she had to rash in her medication into 50 mg doses and still Ms. Day dose for 1 day.  Today she continues to report sustained relief in lower discogenic back pain following via disc allograft, 1 year prior.  Patient does report it is difficult to participate in exercise including aquatic therapy secondary to exacerbation of fibromyalgia.  Patient does remain active performing household activities.  She does report that she lives in a [facility] and is able to ambulate on her Street.  Patient expresses concern regarding chart review.  She reports diagnosis of stage 3 chronic kidney disease and is inquiring regarding her renal function.  We have reviewed her labs and I have encouraged her to reach out to her primary care physician regarding potential nephrology referral if needed.    Interval history 02/15/2024  Patient presents status post bilateral sacroiliac joint and greater trochanteric bursa injection 01/09/2024 and  Bilateral intra-articular knee injection with Synvisc-One 01/23/2024.  Patient reports at least 80% sustained relief overlying bilateral sacroiliac joints, GTB and in bilateral knees following her procedures.  Today her primary concern is pain in a bandlike distribution in the lower back which radiates down into the hips and down towards the feet in L4-5 dermatomal distribution.  Pain is exacerbated with positional changes moving from sitting to standing and with standing and with ambulation.  She does report associated weakness in the lower extremities associated with her pain.  Pain today is rated a 6/10.  She is continued gabapentin 300 mg in the morning, 300 mg in the afternoon and 600 mg in the evening.  She also increased Savella to 100 mg with noticeable improvement in her pain.  She is continued physician directed physical therapy exercises over  the last 8 weeks from 12/15/2023 through 02/15/2024 with noticeable improvement in pain, range of motion and functionality.  She denies bowel or bladder incontinence saddle anesthesia.    Interval Hx: 12/13/2023  Patient presents for four-month follow-up.  Today she reports that her lower back has been feeling excellent since via disc supplementation and that the procedure has made all the difference! Particularly with standing and ambulation.  Patient reports the day following Thanksgiving, rolling off of her bed and falling onto her side with significant difficulty arising to a standing position and pain with standing and ambulation following this trauma.  Today she reports pain over bilateral sacroiliac territory which radiates into the hips as well as pain in bilateral knees.  Pain is rated a 4/10.  Pain is exacerbated with positional changes, standing and with ambulation.  She is continued Savella 50 mg twice daily which she reports has significantly reduced the severity and duration of fibromyalgia flares.  She has requesting a refill or increase in this medication.  She is continued physician directed physical therapy exercises over the last 8 weeks from 10/13/2023 through 12/13/2023 for lower back, sacroiliac joint and bilateral knee pain.    Interval history 08/24/2023  Patient presents status post bilateral sacroiliac joint and greater trochanteric bursa injection 06/21/2023.  Patient reports 95% sustained relief overlying bilateral sacroiliac joint and greater trochanteric bursa territories.  She reports altogether following 2 level via disc allograft supplementation and her most recent procedure, she is able to stand upright and ambulate further distances.  She reports these procedures have taken years off my life! .  Today pain is intermittent and rated a 2/10.  Patient has continued Savella 50 mg twice daily and reports this has significantly reduced the frequency and intensity of her fibromyalgia  flares and debility associated with these flares.  She is requesting a refill of this medication.  Today she denies significant lower extremity weakness, bowel or bladder incontinence or saddle anesthesia.    Interval history 06/15/2023  Patient presents status post bilateral intra-articular knee injection 05/23/2023.  Patient reports 75% sustained improvement in bilateral knee pain following intra-articular knee injection.  Today her primary concern is bilateral hip pain.  Patient reports pain in the lower back which radiates into the groin and down the lateral aspect of bilateral lower extremities to mid thigh.  Patient reports pain is exacerbated 1st thing in the morning when she is attempting to get out of bed.  Patient denies more distal radiculopathy into the lower extremities or feet.  She denies lower extremity weakness, bowel or bladder incontinence or saddle anesthesia.  Patient continues to reports significant improvement with Savella medication which she is taking 50 mg twice daily with fibromyalgia pain.  She is requesting a refill of this medication.  Patient reports lower back pain continues to have greater than 80% sustained relief following 2 level via disc allograft supplementation.      Interval history 05/18/2023  Patient presents status post L5-S1 via disc allograft supplementation 04/25/2023 and via disc allograft supplementation L3-4 05/09/2023.  Patient reports noticeable improvement >80% in lower back pain following two-level  allograft supplementation.  Today her primary concern is bilateral knee pain.  Patient has questions regarding hyaluronic acid supplementation to be use.  Patient reports she has seen videos on Durolane and Euflexxa and is inquiring to the brand to be used on the upcoming Tuesday.  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  She is requesting up-to-date imaging to  investigate these territories.  Today she denies significant weakness in the upper lower extremities, bowel or bladder incontinence or saddle anesthesia.    Interval history 04/06/2023  Patient presents for follow-up of lower back pain.  She continues to reports superior relief in fibromyalgia symptoms on Savella medication.  Patient has brought in denial paperwork from her insurance reporting limitations on dosage and quantity allowed.  Patient reports prior to starting this medication she felt that she did not have a life.  now she reports significant improvement in pain as well as chronic fatigue associated with fibromyalgia.  Today she again reports pain in the lower back which is worse with lumbar flexion.  Patient reports she is unable to perform activities of daily living such as grocery shopping or prolonged standing or ambulation secondary to her discogenic lower back pain.  Today patient denies more distal radiculopathy into the lower extremities or feet or lower extremity weakness.  Patient is interested in discussing intervention.  Of note patient has failed to have improvement in his lower back pain with prior lumbar medial branch block, sacroiliac joint injections.    Interval Hx: 3/9/23  Patient presents for one-month follow-up.  Today she reports she is significantly better since our last clinic visit.  At that time patient had slipped in a low off and injured her lower back and right leg.  Today she reports this pain is significantly improved and pain is intermittent and today is rated a 3/10.  Today patient reports pain is isolated to the midline lower lumbar spine.  Pain is exacerbated with lumbar flexion such as when she is picking up close.  Fibromyalgia Pain has been significantly improved with the initiation of Savella medication.  Patient has reached a titration of 50 mg twice daily.  Patient recently refilled this medication.  She denies any side effects from this medication.  Today she  denies any significant lower extremity weakness, bowel or bladder incontinence or saddle anesthesia      Interval history 02/07/2023  Patient presents status post bilateral sacroiliac joint and greater trochanteric bursa injection 01/24/2023 and bilateral L3-5 lumbar medial branch block 12/13/2022.  Patient had recent mechanical fall confounding benefit from recent injections.  Today she reports she was reaching over a mattress cover to reach a stack of bibles and slid into a slint.  Patient reports she was behind and tall wall in a took 20-30 minutes for her to stand.  Patient also reports exacerbation of fibromyalgia pain.  Since her fall patient reports pain which radiates into the buttock and down the posterior aspect of the right lower extremity to the dorsum of the foot in L4-S1 distribution.  Patient has continued gabapentin 300 mg twice daily, Celebrex in the evenings as well as Tylenol 1000 mg twice daily.    Interval history 11/29/2022    Patient presents status post bilateral sacroiliac joint greater trochanteric bursa 10/10/2022.  Patient reports 90% relief overlying bilateral sacroiliac joints and greater trochanteric bursa following her injection.  Today she reports primarily lumbar axial back pain as well as significant neck pain.  Lower back pain is exacerbated with prolonged standing such as when she is washing dishes.  She denies significant distal radiculopathy into the right lower extremities as described at our last clinic visit.  Neck pain is elicited with cervical flexion, extension and lateral flexion and she does report reduced mobility.  She reports her pain began following a mechanical fall down the stairs at [facility] in September 1986.  Patient has continued gabapentin 300 mg in the morning, 300 mg in the afternoon and 600 mg in the evening.    Interval history 10/04/2022  Ms. Mccollum is a 77-year-old female with past medical history significant for depression, hyperlipidemia, hypertension,  mitral valve prolapse, peripheral vascular disease, history of COVID-19, type 2 diabetes, multi joint arthritis, fibromyalgia who presents to [facility] care, previous Dr. Dutta patient.  Today patient reports return of pain in the lower back which radiates into bilateral hips and down the posterior aspect of the right lower extremity in L4-5 distribution to the dorsum of the foot.  Patient reports pain is intermittent but has increased in intensity.  Pain is described as shocking in nature and today is rated a 6/10.  Patient reports she feels as if her skin is crawling.  patient is currently taking gabapentin 300 mg up to 3 times daily when pain is severe.  Pain interferes with the patient's sleep.  Patient also reports history of fibromyalgia which limits her mobility and duration of standing and ambulation.  Patient does endorse associated weakness in the lower extremities associated with her pain.  Patient is interested in pursuing repeat intervention as she is obtained several months of significant relief with prior sacroiliac joint and greater trochanteric bursa injections.  Patient has continued physician directed physical therapy exercises at home daily.    History of Present Illness 01/16/2020: Dr. Dutta:   Kaylin Mccollum is a 74 y.o. female  who is presenting with a chief complaint of lumbar back pain. The patient began experiencing this problem insidiously, and the pain has been gradually worsening over the past 5 month(s). The pain is described as throbbing, shooting, burning and electrical and is located in the bilateral lumbar spine. Pain is intermittent and lasts hours. The pain radiates to bilateral lower extremities L4 distribudution. The patient rates her pain a 8 out of ten and interferes with activities of daily living a 7 out of ten. Pain is exacerbated by flexion of the lumbar spine, ambulation, and is improved by rest.      She also complains of right knee pain. The patient began  experiencing this problem insidiously. The pain is described as cramping, aching and is located in the right knee. Pain is intermittent and lasts hours. The pain is nonradiating. The patient rates her pain a 8 out of ten and interferes with activities of daily living a 7 out of ten. Pain is exacerbated by getting up from a seated position and standing, and is improved by rest. Patient reports no prior trauma, prior arthroscopy bilaterally in 1990s.  - pertinent negatives: No fever, No chills, No weight loss, No bladder dysfunction, No bowel dysfunction, No saddle anesthesia  - pertinent positives: none        Pain Disability Index (PDI) Score Review:      5/20/2024     7:43 AM 11/29/2023    10:44 AM 8/24/2023     1:04 PM   Last 3 PDI Scores   Pain Disability Index (PDI) 12 11    35 14       Non-Pharmacologic Treatments:  Physical Therapy/Home Exercise: yes  Ice/Heat:yes  TENS: no  Acupuncture: no  Massage: no  Chiropractic: no    Other: no      Pain Medications:  - Adjuvant Medications: Lorazepam (Ativan), Neurontin (Gabapentin), and Topical Ointment (Voltaren Gel, Steroid cream, Anti-Inflammatory Cream, Compound cream)      Pain injections:  Dr. Dumont:  -07/23/2024: Bilateral intra-articular knee joint injection with Synvisc-One with 90% pain relief   -04/09/2024: Bilateral L4-5 TF JAMIN   -01/23/2024: Bilateral intra-articular knee injection with Synvisc-One  -01/09/2024: Bilateral sacroiliac joint and greater trochanteric bursa injection  -06/21/2023: Bilateral sacroiliac joint and greater trochanteric bursa injection  -05/29/2023: Bilateral intra-articular knee injection  -05/09/2023: L3-4 via disc allograft supplementation  -04/25/2023:  L5-S1 via disc allograft supplementation  -01/24/2023: Bilateral sacroiliac joint and greater trochanteric bursa injection  -12/13/2022: Bilateral L3-5 lumbar medial branch block  - 10/10/2022: Bilateral greater trochanteric bursa and sacroiliac joint injection      -04/20/2022:  Right-sided acetabular femoral injection; Dr. Dutta  -03/01/2022: Bilateral sacroiliac joint and greater trochanteric bursa injection; Dr. Dutta  -07/08/2021: Bilateral sacroiliac joint and greater trochanteric bursa injection; Dr. Dutta  -01/05/2021: Bilateral sacroiliac joint and greater trochanteric bursa injection; Dr. Burns  -10/08/2020: Bilateral sacroiliac joint and bilateral greater trochanteric bursa injection; Dr. Dutta  -05/06/2020: Bilateral sacroiliac joint and greater trochanteric bursa injection; Dr. Dutta          Imaging/ Diagnostic Studies/ Labs (Reviewed on 8/21/2024):    07/25/2024 X-Ray Lumbar Complete Including Flex And Ext  FINDINGS: No fracture of the lumbar spine.  Intervertebral disc heights are preserved.  6 mm of anterolisthesis of L4 on L5.  Bilateral facet joint osteoarthritis at L4-5 and L5-S1.    Cervical x-ray 05/18/2023   FINDINGS:  Osteopenia.  Vertebral body heights maintained.  Mild anterolisthesis of C4 on C5 and C5 on C6.  Multilevel osteophyte changes with disc height loss most noted at C5-6 and C6-7.  Multilevel prominent facet arthropathy.  Multilevel left-sided neural foraminal narrowing.     Significant change in alignment on flexion or extension.     Prevertebral soft tissues unremarkable.  Lung apices clear.    Thoracic x-ray 05/18/2023  FINDINGS:  Osteopenia.  Vertebral body heights maintained.  No spondylolisthesis.  Similar more multilevel bridging osteophyte changes.  No acute osseous abnormality.  Soft tissues unremarkable.      MRI Lumbar Spine 02/16/2023  FINDINGS:  Grade 1 degenerative spondylolisthesis at L4-L5.  Vertebral body height is normal.  Marrow signal is within normal limits. The conus medullaris terminates at the level of L1-L2.  No abnormal signal within the conus. Intervertebral disc levels are as follows:     T12-L1 disc: Normal disc height with anterior osteophytes and mild degenerative facet hypertrophy.  No spinal or foraminal stenosis.  The  dural canal measures 16 mm.     L1-L2 disc : Disc space height loss with chronic Schmorl's nodes.  Posterior disc bulge.  Anterior osteophytes.  Minor facet arthropathy bilaterally.  The dural canal measures 13 mm.  No foraminal stenosis.     L2-L3 disc: Circumferential disc bulge with tiny chronic Schmorl's nodes.  Anterior osteophytes.  Mild degenerative facet hypertrophy.  The dural canal measures 12 mm.  No foraminal stenosis.     L3-L4 disc: Disc space height loss with a circumferential disc bulge and anterior osteophytes.  Mild degenerative facet hypertrophy with moderate buckling of the ligamentum flavum.  The dural canal measures 11 mm.  No significant foraminal stenosis.     L4-L5 disc: Grade 1 spondylolisthesis with severe degenerative facet hypertrophy bilaterally.  Buckling of the ligamentum flavum.  The dural canal measures 7 mm AP.  Disc encroaches into the floors of the exit foramina, right greater than left.  There is mild right foraminal stenosis.     L5-S1 disc: Severe disc space height loss with osteophytes at the margins and encroach into the exit foramina.  Mild degenerative facet hypertrophy and buckling of the ligamentum flavum.  The dural canal measures 11 mm.  Mild foraminal stenosis.          Review of Systems:   GENERAL:  No weight loss, malaise or fevers.  HEENT:   No recent changes in vision or hearing  NECK:  Negative for lumps, no difficulty with swallowing.  RESPIRATORY:  Negative for cough, wheezing or shortness of breath, patient denies any recent URI.  CARDIOVASCULAR:  Negative for chest pain or palpitations.  GI:  Negative for abdominal discomfort, blood in stools or black stools or change in bowel habits.  MUSCULOSKELETAL:  See HPI.  SKIN:  Negative for lesions, rash, and itching.  PSYCH:  No mood disorder or recent psychosocial stressors.   HEMATOLOGY/LYMPHOLOGY:  Negative for prolonged bleeding, bruising easily or swollen nodes.    NEURO:   No history of syncope, paralysis,  seizures or tremors.  All other reviewed and negative other than HPI.        Telemedicine Exam  There were no vitals filed for this visit.  There is no height or weight on file to calculate BMI.   (reviewed on 8/21/2024)     GENERAL: Well appearing, in no acute distress, alert and oriented x3.  Cooperative.  PSYCH:  Mood and affect appropriate.  SKIN: Skin color & texture with no obvious abnormalities.    HEAD/FACE:  Normocephalic, atraumatic.    PULM:  No difficulty breathing. No nasal flaring. No obvious wheezing.  EXTREMITIES: No obvious deformities. Moving all extremities well, appears to have symmetric strength throughout.  MUSCULOSKELETAL: No obvious atrophy abnormalities are noted.   NEURO: No obvious neurologic deficit.   GAIT: sitting.     Physical Exam: last in clinic visit:  General: alert and oriented, in no apparent distress.  Gait: normal gait.  Skin: no rashes, no discoloration, no obvious lesions  HEENT: normocephalic, atraumatic. Pupils equal and round.  Cardiovascular: no significant peripheral edema present.  Respiratory: without use of accessory muscles of respiration.    Musculoskeletal - Lumbar Spine:  - ROM fairly preserved   - Pain on flexion of lumbar spine: Absent   - Pain on extension of lumbar spine: Absent         - Lumbar facet loading: Absent   - TTP over the lumbar facet joints: Absent  - TTP over the lumbar paraspinals: Present   - TTP over the SI joints: Present  - TTP over GT bursa: Present, minimal   - Straight Leg Raise: Negative  - CHERYLE: Present    Right Knee:  - TTP: Present over medial/ lateral joint line  - Pain with extension: Present  - Pain with flexion: Present  - Crepitus: Present     Neuro - Lower Extremities:  - BLE Strength: R/L: HF: 5/5, HE: 5/5, KF: 5/5; KE: 5/5; FE: 5/5; FF: 5/5  - Extremity Reflexes: Brisk and symmetric throughout  - Sensory: Sensation to light touch intact bilaterally      Psych:  Mood and affect is appropriate        Assessment:  Kaylin STEWARD  Young is a 79 y.o. year old female who is presenting with       ICD-10-CM ICD-9-CM    1. Bilateral lumbar radiculopathy  M54.16 724.4 Case Request-RAD/Other Procedure Area: Bilateral L4/5 TF JAMIN      2. DDD (degenerative disc disease), lumbar  M51.36 722.52       3. Spondylolisthesis of lumbar region  M43.16 738.4       4. Lumbar spondylosis  M47.816 721.3       5. Bilateral primary osteoarthritis of knee  M17.0 715.16               Plan:  1. Interventional:   S/p 7/23/2024: Bilateral intra-articular knee joint injection with Synvisc-One with 90% pain relief     Schedule Bilateral L4/5 TF JAMIN. Patient is not taking prescription blood thinners or ASA.   Patient had 90% sustained relief in lower extremity radicular symptoms following bilateral L4-5 transforaminal epidural steroid injection in April 2024.    - Status post L3-4 via disc allograft supplementation 05/09/2023 and L5-S1 via disc allograft supplementation 04/25/2023 with greater than 80% improvement in discogenic lower back pain.    Anticoagulation:  None, no anticoagulation    2. Pharmacologic:     -Refill gabapentin 300mg QAM/ 300mg at lunch/ 600mg QHS.  We have previously reviewed potential side effects of this medication including daytime somnolence, weight gain and peripheral edema    -Refill Savella 100 mg BID - as this tremendously helps with symptoms of fibromyalgia.  We have previouslydiscussed that Savella is FDA approved and has demonstrated improvement in multiple measures including pain, global impression of change in physical function.  We have discussed that the most frequent side effects of this medication can include nausea, constipation, vomiting, dry mouth, flushing or tachycardia.      -Continue Celebrex 200mg BID PRN - from Orthopedics.  We have previously discussed potential deleterious side effects of NSAIDs on the cardiovascular, gastrointestinal and renal systems. We have discussed judicious use of this medication.      - [place]   reviewed and appropriate.      3. Rehabilitative:   -We discussed continuing at home physician directed physical therapy to help manage the patient/s painful condition. The patient was counseled that muscle strengthening will improve the long term prognosis in regards to pain and may also help increase range of motion and mobility.  I have encouraged the patient to perform low intensity aerobic exercise to help with fibromyalgia.    4. Diagnostic:  Reviewed relevant imaging (MRI Lumbar Spine 02/16/2023).     Updated Xray lumbar reviewed.    5. Consult:   -Continue follow-up with Dr. Schneider for knee and shoulder pain PRN  -Continue follow-up with Rheumatology: Fibromyalgia  -Continue follow-up with PCP: Dr. Olvera:  Follow-up for potential renal insufficiency    6. Follow up:  4 weeks post-procedure - virtual visit     Future Appointments   Date Time Provider Department Center   10/29/2024  4:20 PM Alea Quezada PA-C ON [facility]   11/4/2024  4:00 PM Jesus Dumont MD ON [facility]        - Patient Questions: Answered all of the patient's questions regarding diagnosis, therapy, and treatment.    - This condition does not require this patient to take time off of work, and the primary goal of our Pain Management services is to improve the patient's functional capacity.   - I discussed the risks, benefits, and alternatives to potential treatment options. All questions and concerns were fully addressed today in clinic.         Kristin Mccall PA-C  Interventional Pain Management - [facility]    Disclaimer:  This note was prepared using voice recognition system and is likely to have sound alike errors that may have been overlooked even after proof reading.  Please call me with any questions.

## 2024-08-21 ENCOUNTER — OFFICE VISIT (OUTPATIENT)
Dept: PAIN MEDICINE | Facility: CLINIC | Age: 79
End: 2024-08-21
Payer: MEDICARE

## 2024-08-21 DIAGNOSIS — M43.16 SPONDYLOLISTHESIS OF LUMBAR REGION: ICD-10-CM

## 2024-08-21 DIAGNOSIS — M47.816 LUMBAR SPONDYLOSIS: ICD-10-CM

## 2024-08-21 DIAGNOSIS — M54.16 BILATERAL LUMBAR RADICULOPATHY: Primary | ICD-10-CM

## 2024-08-21 DIAGNOSIS — M51.36 DDD (DEGENERATIVE DISC DISEASE), LUMBAR: ICD-10-CM

## 2024-08-21 DIAGNOSIS — M17.0 BILATERAL PRIMARY OSTEOARTHRITIS OF KNEE: ICD-10-CM

## 2024-08-22 ENCOUNTER — PATIENT MESSAGE (OUTPATIENT)
Dept: PAIN MEDICINE | Facility: CLINIC | Age: 79
End: 2024-08-22
Payer: MEDICARE

## 2024-08-26 ENCOUNTER — PATIENT MESSAGE (OUTPATIENT)
Dept: CARDIOLOGY | Facility: CLINIC | Age: 79
End: 2024-08-26
Payer: MEDICARE

## 2024-08-28 RX ORDER — VERAPAMIL HYDROCHLORIDE 80 MG/1
80 TABLET ORAL 2 TIMES DAILY
Qty: 60 TABLET | Refills: 11 | Status: SHIPPED | OUTPATIENT
Start: 2024-08-28 | End: 2025-08-28

## 2024-08-30 NOTE — PRE-PROCEDURE INSTRUCTIONS
Spoke with patient regarding procedure scheduled on 9.17     Arrival time 0630     Has patient been sick with fever or on antibiotics within the last 7 days? No     Does the patient have any open wounds, sores or rashes? No     Does the patient have any recent fractures? no     Has patient received a vaccination within the last 7 days? No     Received the COVID vaccination?      Has the patient stopped all medications as directed? na     Does patient have a pacemaker, defibrillator, or implantable stimulator? No     Does the patient have a ride to and from procedure and someone reliable to remain with patient?  eloina     Is the patient diabetic? yes     Does the patient have sleep apnea? Or use O2 at home? no     Is the patient receiving sedation?      Is the patient instructed to remain NPO beginning at midnight the night before their procedure? yes     Procedure location confirmed with patient? Yes     Covid- Denies signs/symptoms. Instructed to notify PAT/MD if any changes.

## 2024-09-17 ENCOUNTER — HOSPITAL ENCOUNTER (OUTPATIENT)
Facility: HOSPITAL | Age: 79
Discharge: HOME | End: 2024-09-17
Attending: ANESTHESIOLOGY | Admitting: ANESTHESIOLOGY
Payer: MEDICARE

## 2024-09-17 VITALS
BODY MASS INDEX: 28.36 KG/M2 | RESPIRATION RATE: 14 BRPM | HEIGHT: 69 IN | OXYGEN SATURATION: 98 % | TEMPERATURE: 97 F | SYSTOLIC BLOOD PRESSURE: 152 MMHG | HEART RATE: 78 BPM | DIASTOLIC BLOOD PRESSURE: 70 MMHG | WEIGHT: 191.5 LBS

## 2024-09-17 DIAGNOSIS — M54.16 LUMBAR RADICULOPATHY: ICD-10-CM

## 2024-09-17 LAB — POCT GLUCOSE: 131 MG/DL (ref 70–110)

## 2024-09-17 PROCEDURE — 64483 NJX AA&/STRD TFRM EPI L/S 1: CPT | Mod: 50 | Performed by: ANESTHESIOLOGY

## 2024-09-17 PROCEDURE — 25500020 PHARM REV CODE 255: Performed by: ANESTHESIOLOGY

## 2024-09-17 PROCEDURE — 25000003 PHARM REV CODE 250: Performed by: ANESTHESIOLOGY

## 2024-09-17 PROCEDURE — 64483 NJX AA&/STRD TFRM EPI L/S 1: CPT | Mod: 50,,, | Performed by: ANESTHESIOLOGY

## 2024-09-17 PROCEDURE — 82962 GLUCOSE BLOOD TEST: CPT | Performed by: ANESTHESIOLOGY

## 2024-09-17 PROCEDURE — 63600175 PHARM REV CODE 636 W HCPCS: Performed by: ANESTHESIOLOGY

## 2024-09-17 RX ORDER — BUPIVACAINE HYDROCHLORIDE 2.5 MG/ML
INJECTION, SOLUTION EPIDURAL; INFILTRATION; INTRACAUDAL
Status: DISCONTINUED | OUTPATIENT
Start: 2024-09-17 | End: 2024-09-17 | Stop reason: HOSPADM

## 2024-09-17 RX ORDER — DEXAMETHASONE SODIUM PHOSPHATE 10 MG/ML
INJECTION INTRAMUSCULAR; INTRAVENOUS
Status: DISCONTINUED | OUTPATIENT
Start: 2024-09-17 | End: 2024-09-17 | Stop reason: HOSPADM

## 2024-09-17 RX ORDER — INDOMETHACIN 25 MG/1
CAPSULE ORAL
Status: DISCONTINUED | OUTPATIENT
Start: 2024-09-17 | End: 2024-09-17 | Stop reason: HOSPADM

## 2024-09-17 RX ORDER — MIDAZOLAM HYDROCHLORIDE 1 MG/ML
INJECTION, SOLUTION INTRAMUSCULAR; INTRAVENOUS
Status: DISCONTINUED | OUTPATIENT
Start: 2024-09-17 | End: 2024-09-17 | Stop reason: HOSPADM

## 2024-09-17 RX ORDER — FENTANYL CITRATE 50 UG/ML
INJECTION, SOLUTION INTRAMUSCULAR; INTRAVENOUS
Status: DISCONTINUED | OUTPATIENT
Start: 2024-09-17 | End: 2024-09-17 | Stop reason: HOSPADM

## 2024-09-17 NOTE — OP NOTE
Kaylin Mccollum  79 y.o. female      Vitals:    09/17/24 0639   BP: 138/66   Pulse: 98   Resp: 16   Temp: 97.2 °F (36.2 °C)     Procedure Date: 09/17/2024        INFORMED CONSENT: The procedure, risks, benefits and options were discussed with patient. There are no contraindications to the procedure. The patient expressed understanding and agreed to proceed. The personnel performing the procedure was discussed. I verify that I personally obtained consent prior to the start of the procedure and the signed consent can be found on the patient's chart.       Anesthesia:   Conscious sedation provided by M.D    The patient was monitored with continuous pulse oximetry, EKG, and intermittent blood pressure monitors.  The patient was hemodynamically stable throughout the entire process was responsive to voice, and breathing spontaneously.  Supplemental O2 was provided at 2L/min via nasal cannula.  Patient was comfortable for the duration of the procedure. (See nurse documentation and case log for sedation time)    There was a total of 2mg IV Midazolam and 50mcg Fentanyl titrated for the procedure    Pre Procedure diagnosis: Bilateral lumbar radiculopathy [M54.16]  Post-Procedure diagnosis: SAME     Complications: None    Specimens: None      DESCRIPTION OF PROCEDURE: The patient was brought to the procedure room. IV access was obtained prior to the procedure. The patient was positioned prone on the fluoroscopy table. Continuous hemodynamic monitoring was initiated including blood pressure, EKG, and pulse oximetry. . The skin was prepped with chlorhexidine and draped in a sterile fashion.      The  L4/5  transforaminal spaces were identified with fluoroscopy in the  AP, oblique, and lateral views.  A 22 gauge spinal quinke needle was then advanced into the area of the trans foraminal spaces bilateral with confirmation of proper needle position using AP, oblique, and lateral fluoroscopic views. Once the needle tip was in the  area of the transforaminal space, and there was no blood, CSF or paraesthesias,  1.5 mL of Omnipaque 300mg/ml was injected on bilateral for a total of 3mL.  Fluoroscopic imaging in the AP and lateral views revealed a clear outline of the spinal nerve with proximal spread of agent through the neural foramen into the epidural space. A total combination of 2mL of Bupivacaine and 10 mg dexamethasone was injected on each side and at each level with displacement of the contrast dye confirming that the medication went into the area of the transforaminal spaces bilateral. A sterile bandaid was applied.   Patient tolerated the procedure well.    Patient was taken back to the recovery room for further observation.     The patient was discharged to home in stable condition

## 2024-09-17 NOTE — DISCHARGE SUMMARY
Discharge Note  Short Stay      SUMMARY     Admit Date: 9/17/2024    Attending Physician: Humberto Dumont MD        Discharge Physician: Humberto Dumont MD        Discharge Date: 9/17/2024 7:49 AM    Procedure(s) (LRB):  Bilateral L4/5 TF JAMIN (Bilateral)    Final Diagnosis: Bilateral lumbar radiculopathy [M54.16]    Disposition: Home or self care    Patient Instructions:   Current Discharge Medication List        CONTINUE these medications which have NOT CHANGED    Details   b complex vitamins tablet Take 1 tablet by mouth once daily.      biotin 1 mg tablet Take 1,000 mcg by mouth every morning.       celecoxib (CELEBREX) 200 MG capsule TAKE 1 CAPSULE(200 MG) BY MOUTH TWICE DAILY WITH FOOD  Qty: 120 capsule, Refills: 1    Associated Diagnoses: Chondromalacia, right knee; Left shoulder tendinitis      empagliflozin (JARDIANCE) 10 mg tablet Take 1 tablet (10 mg total) by mouth once daily.  Qty: 90 tablet, Refills: 3    Associated Diagnoses: Diabetes mellitus type 2 in obese      ezetimibe (ZETIA) 10 mg tablet Take 1 tablet (10 mg total) by mouth once daily.  Qty: 90 tablet, Refills: 3    Associated Diagnoses: Hyperlipidemia associated with type 2 diabetes mellitus      FLUoxetine 40 MG capsule Take 1 capsule (40 mg total) by mouth once daily.  Qty: 90 capsule, Refills: 1    Associated Diagnoses: Fibromyalgia; Chronic major depressive disorder      furosemide (LASIX) 20 MG tablet Take 1 tablet (20 mg total) by mouth daily as needed (edema).  Qty: 30 tablet, Refills: 11      gabapentin (NEURONTIN) 300 MG capsule Take 1 capsule (300 mg total) by mouth 3 (three) times daily.  Qty: 90 capsule, Refills: 2      metFORMIN (GLUCOPHAGE) 1000 MG tablet TAKE 1 TABLET(1000 MG) BY MOUTH TWICE DAILY WITH MEALS  Qty: 180 tablet, Refills: 3    Associated Diagnoses: Diabetes mellitus type 2 in obese      milnacipran (SAVELLA) 100 mg Tab Take 1 tablet (100 mg total) by mouth 2 (two) times daily.  Qty: 180 tablet, Refills: 0      omeprazole  (PRILOSEC) 20 MG capsule TAKE ONE CAPSULE BY MOUTH EVERY DAY AS NEEDED WITH NSAID  Qty: 30 capsule, Refills: 5    Associated Diagnoses: Gastroesophageal reflux disease without esophagitis      potassium chloride SA (K-DUR,KLOR-CON M) 10 MEQ tablet TAKE 1 TABLET(10 MEQ) BY MOUTH EVERY DAY  Qty: 90 tablet, Refills: 1    Comments: Future refill requests to go to Dr. Dumont      verapamiL (CALAN) 80 MG tablet Take 1 tablet (80 mg total) by mouth 2 (two) times daily.  Qty: 60 tablet, Refills: 11      vitamin D (VITAMIN D3) 1000 units Tab Take 1,000 Units by mouth once daily.      clotrimazole-betamethasone 1-0.05% (LOTRISONE) cream Apply topically 2 (two) times daily.  Qty: 45 g, Refills: 2      diclofenac sodium (VOLTAREN) 1 % Gel Apply topically 4 (four) times daily.  Qty: 100 g, Refills: 2      fluticasone (FLONASE) 50 mcg/actuation nasal spray 2 sprays by Each Nare route once daily.  Qty: 1 Bottle, Refills: 0    Associated Diagnoses: Sinusitis      pulse oximeter (PULSE OXIMETER) device by Apply Externally route 2 (two) times a day. Use twice daily at 8 AM and 3 PM and record the value in [facility] as directed.  Qty: 1 each, Refills: 0    Comments: This is a NO CHARGE item.  Please override price to zero.  DO NOT PRINT.  NORMAL MODE e-PRESCRIBE ONLY.  Associated Diagnoses: COVID-19 virus detected      semaglutide (OZEMPIC) 0.25 mg or 0.5 mg (2 mg/3 mL) pen injector Inject 0.25 mg into the skin every 7 days.  Qty: 3 mL, Refills: 1    Comments: Patient requests delivery  Associated Diagnoses: Hypertension associated with diabetes      triamcinolone acetonide 0.025% (KENALOG) 0.025 % Oint Apply topically 2 (two) times daily. for 10 days  Qty: 15 g, Refills: 2    Associated Diagnoses: Tinea corporis                 Discharge Diagnosis: Bilateral lumbar radiculopathy [M54.16]  Condition on Discharge: Stable with no complications to procedure   Diet on Discharge: Same as before.  Activity: as per instruction sheet.  Discharge  to: Home with a responsible adult.  Follow up: 2-4 weeks       Please call the office at [phone] if you experience any weakness or loss of sensation, fever > 101.5, pain uncontrolled with oral medications, persistent nausea/vomiting/or diarrhea, redness or drainage from the incisions, or any other worrisome concerns. If physician on call was not reached or could not communicate with our office for any reason please go to the nearest emergency department

## 2024-09-17 NOTE — DISCHARGE INSTRUCTIONS

## 2024-10-11 NOTE — TELEPHONE ENCOUNTER
Can you refill?milnacipran (SAVELLA) 100 mg Tab     Last Visit:08/21/2024  Next Visit:10/18/2024  Last refill:08/21/2024  How pt patient is currently taking medication requested: Sig - Route: Take 1 tablet (100 mg total) by mouth 2 (two) times daily. - Oral   Pharmacy:   [id number] [place] [facility] [place] [facility] [address] AT [facility]

## 2024-10-16 DIAGNOSIS — E11.69 DIABETES MELLITUS TYPE 2 IN OBESE: ICD-10-CM

## 2024-10-16 DIAGNOSIS — E66.9 DIABETES MELLITUS TYPE 2 IN OBESE: ICD-10-CM

## 2024-10-16 DIAGNOSIS — K21.9 GASTROESOPHAGEAL REFLUX DISEASE WITHOUT ESOPHAGITIS: ICD-10-CM

## 2024-10-16 RX ORDER — METFORMIN HYDROCHLORIDE 1000 MG/1
1000 TABLET ORAL 2 TIMES DAILY WITH MEALS
Qty: 180 TABLET | Refills: 1 | Status: SHIPPED | OUTPATIENT
Start: 2024-10-16

## 2024-10-16 RX ORDER — OMEPRAZOLE 20 MG/1
CAPSULE, DELAYED RELEASE ORAL
Qty: 30 CAPSULE | Refills: 5 | Status: SHIPPED | OUTPATIENT
Start: 2024-10-16

## 2024-10-16 NOTE — TELEPHONE ENCOUNTER
Care Due:                  Date            Visit Type   Department     Provider  --------------------------------------------------------------------------------                                ESTABLISHED                              PATIENT -    [facility]  Last Visit: 07-      [facility]       Lisette Olvera  Next Visit: None Scheduled  None         None Found                                                            Last  Test          Frequency    Reason                     Performed    Due Date  --------------------------------------------------------------------------------    HBA1C.......  6 months...  empagliflozin, metFORMIN,   03- 08-                             semaglutide..............    [employer] Embedded Care Due Messages. Reference number: [id number].   10/16/2024 9:52:00 AM CDT

## 2024-10-16 NOTE — TELEPHONE ENCOUNTER
Refill Routing Note   Medication(s) are not appropriate for processing by [facility] for the following reason(s):        Required labs outdated    [facility] action(s):  Defer   Requires labs : Yes             Appointments  past 12m or future 3m with PCP    Date Provider   Last Visit   7/3/2024 Lisette Olvera MD   Next Visit   Visit date not found Lisette Olvera MD   ED visits in past 90 days: 0        Note composed:2:48 PM 10/16/2024

## 2024-10-17 NOTE — PROGRESS NOTES
Established Patient - TeleHealth Visit    The patient location is: [place]  The chief complaint leading to consultation is: chronic pain     Visit type: audiovisual    Face to Face time with patient: 10-15 minutes  20 minutes of total time spent on the encounter, which includes face to face time and non-face to face time preparing to see the patient (eg, review of tests), Obtaining and/or reviewing separately obtained history, Documenting clinical information in the electronic or other health record, Independently interpreting results (not separately reported) and communicating results to the patient/family/caregiver, or Care coordination (not separately reported).     Each patient to whom he or she provides medical services by telemedicine is:  (1) informed of the relationship between the physician and patient and the respective role of any other health care provider with respect to management of the patient; and (2) notified that he or she may decline to receive medical services by telemedicine and may withdraw from such care at any time.        Chronic Pain -- Established Patient (Follow-up visit)    Chief Pain Complaint:  Chief Complaint   Patient presents with    Follow-up     Low back pain with BLE radicular pain   Bilateral knee pain - better after Synvisc-One    Interval History (10/18/2024): Patient presents today for follow-up telemedicine visit.  she underwent Bilateral L4/5 TF JAMIN on 9/17/24.  The patient reports that she is/was better following the procedure.  she reports 90% pain relief.  The changes lasted 4 weeks so far.  The changes have continued through this visit.  Patient reports pain as 1/10 today.    Interval History (8/21/2024): Kaylin Mccollum presents today for follow-up visit.  she underwent bilateral Synvisc-One intra-articular knee injection on 07/23/2024 with 90% pain relief.  The patient reports that she is/was better following the procedure.  The changes lasted 4 weeks so far.  The  changes have continued through this visit.  Patient reports pain as 6/10 today due to low back pain.  She reports sitting often makes her pain worse.  Pain starts in the low back and radiates into both legs, some days 1 leg worse than the other.  She continues taking gabapentin and other medications.  She had great relief after lumbar epidural in April of this year, but she feels that after the fall, pain flared up in general.    Interval History (07/03/2024):  Patient presents today for follow-up visit.  Patient being seen today for evaluation of lower back pain.  She fell last week after becoming dizzy where she slumped down against a wall and landed on her buttocks with his knees bent at a sharp angle.  After that she has been having some increased lower back pain as well as right knee pain.  She did go to the ER following a fall with a full workup and did have an x-ray on the right knee and was told no acute changes.  She rates her pain today a 7/10.  Her back pain is radiating down the right leg mainly from the right knee to the right ankle in the front of the shin.  At times she will have shooting pains in the right leg.  She does feel that this feels different than her typical lower back pain.  She continues to take gabapentin, Savella, Celebrex.  She also requests to repeat her Synvisc-One injections to both knees.  She had these injection 6 months ago and states that she got 80% relief until the fall.  Patient denies night fever/night sweats, urinary incontinence, bowel incontinence, significant weight loss and significant motor weakness.   Patient denies any other complaints or concerns at this time.    Interval history 05/20/2024  Patient presents status post bilateral L4-5 transforaminal epidural steroid injection 04/09/2024.  Patient reports approximately 90% sustained relief in lower extremity radicular symptoms following her procedure.  Patient does report confusion regarding epidural steroid  "injection as with her prior medical history, she was familiar with an interlaminar approach on the [facility].  She does report intermittent sciatic pain which can occur with certain movements, such as awakening in the morning, driving or crossing her legs.  Pain today is rated a 2/10.  She reports recent bouts of fibromyalgia flares." She does believes Savella medication at 100 mg twice daily has these symptoms well controlled.  She does report her pharmacy, [facility] does not keep this medication in stock and most recently she had to rash in her medication into 50 mg doses and still Ms. Day dose for 1 day.  Today she continues to report sustained relief in lower discogenic back pain following via disc allograft, 1 year prior.  Patient does report it is difficult to participate in exercise including aquatic therapy secondary to exacerbation of fibromyalgia.  Patient does remain active performing household activities.  She does report that she lives in a [facility] and is able to ambulate on her Street.  Patient expresses concern regarding chart review.  She reports diagnosis of stage 3 chronic kidney disease and is inquiring regarding her renal function.  We have reviewed her labs and I have encouraged her to reach out to her primary care physician regarding potential nephrology referral if needed.    Interval history 02/15/2024  Patient presents status post bilateral sacroiliac joint and greater trochanteric bursa injection 01/09/2024 and  Bilateral intra-articular knee injection with Synvisc-One 01/23/2024.  Patient reports at least 80% sustained relief overlying bilateral sacroiliac joints, GTB and in bilateral knees following her procedures.  Today her primary concern is pain in a bandlike distribution in the lower back which radiates down into the hips and down towards the feet in L4-5 dermatomal distribution.  Pain is exacerbated with positional changes moving from sitting to standing and with standing " and with ambulation.  She does report associated weakness in the lower extremities associated with her pain.  Pain today is rated a 6/10.  She is continued gabapentin 300 mg in the morning, 300 mg in the afternoon and 600 mg in the evening.  She also increased Savella to 100 mg with noticeable improvement in her pain.  She is continued physician directed physical therapy exercises over the last 8 weeks from 12/15/2023 through 02/15/2024 with noticeable improvement in pain, range of motion and functionality.  She denies bowel or bladder incontinence saddle anesthesia.    Interval Hx: 12/13/2023  Patient presents for four-month follow-up.  Today she reports that her lower back has been feeling excellent since via disc supplementation and that the procedure has made all the difference! Particularly with standing and ambulation.  Patient reports the day following Thanksgiving, rolling off of her bed and falling onto her side with significant difficulty arising to a standing position and pain with standing and ambulation following this trauma.  Today she reports pain over bilateral sacroiliac territory which radiates into the hips as well as pain in bilateral knees.  Pain is rated a 4/10.  Pain is exacerbated with positional changes, standing and with ambulation.  She is continued Savella 50 mg twice daily which she reports has significantly reduced the severity and duration of fibromyalgia flares.  She has requesting a refill or increase in this medication.  She is continued physician directed physical therapy exercises over the last 8 weeks from 10/13/2023 through 12/13/2023 for lower back, sacroiliac joint and bilateral knee pain.    Interval history 08/24/2023  Patient presents status post bilateral sacroiliac joint and greater trochanteric bursa injection 06/21/2023.  Patient reports 95% sustained relief overlying bilateral sacroiliac joint and greater trochanteric bursa territories.  She reports altogether  following 2 level via disc allograft supplementation and her most recent procedure, she is able to stand upright and ambulate further distances.  She reports these procedures have taken years off my life! .  Today pain is intermittent and rated a 2/10.  Patient has continued Savella 50 mg twice daily and reports this has significantly reduced the frequency and intensity of her fibromyalgia flares and debility associated with these flares.  She is requesting a refill of this medication.  Today she denies significant lower extremity weakness, bowel or bladder incontinence or saddle anesthesia.    Interval history 06/15/2023  Patient presents status post bilateral intra-articular knee injection 05/23/2023.  Patient reports 75% sustained improvement in bilateral knee pain following intra-articular knee injection.  Today her primary concern is bilateral hip pain.  Patient reports pain in the lower back which radiates into the groin and down the lateral aspect of bilateral lower extremities to mid thigh.  Patient reports pain is exacerbated 1st thing in the morning when she is attempting to get out of bed.  Patient denies more distal radiculopathy into the lower extremities or feet.  She denies lower extremity weakness, bowel or bladder incontinence or saddle anesthesia.  Patient continues to reports significant improvement with Savella medication which she is taking 50 mg twice daily with fibromyalgia pain.  She is requesting a refill of this medication.  Patient reports lower back pain continues to have greater than 80% sustained relief following 2 level via disc allograft supplementation.      Interval history 05/18/2023  Patient presents status post L5-S1 via disc allograft supplementation 04/25/2023 and via disc allograft supplementation L3-4 05/09/2023.  Patient reports noticeable improvement >80% in lower back pain following two-level  allograft supplementation.  Today her primary concern is bilateral knee pain.   Patient has questions regarding hyaluronic acid supplementation to be use.  Patient reports she has seen videos on Durolane and Euflexxa and is inquiring to the brand to be used on the upcoming Tuesday.  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  She is requesting up-to-date imaging to investigate these territories.  Today she denies significant weakness in the upper lower extremities, bowel or bladder incontinence or saddle anesthesia.    Interval history 04/06/2023  Patient presents for follow-up of lower back pain.  She continues to reports superior relief in fibromyalgia symptoms on Savella medication.  Patient has brought in denial paperwork from her insurance reporting limitations on dosage and quantity allowed.  Patient reports prior to starting this medication she felt that she did not have a life.  now she reports significant improvement in pain as well as chronic fatigue associated with fibromyalgia.  Today she again reports pain in the lower back which is worse with lumbar flexion.  Patient reports she is unable to perform activities of daily living such as grocery shopping or prolonged standing or ambulation secondary to her discogenic lower back pain.  Today patient denies more distal radiculopathy into the lower extremities or feet or lower extremity weakness.  Patient is interested in discussing intervention.  Of note patient has failed to have improvement in his lower back pain with prior lumbar medial branch block, sacroiliac joint injections.    Interval Hx: 3/9/23  Patient presents for one-month follow-up.  Today she reports she is significantly better since our last clinic visit.  At that time patient had slipped in a low off and injured her lower back and right leg.  Today she reports this pain is significantly improved and pain is intermittent and today is rated a 3/10.  Today patient reports  pain is isolated to the midline lower lumbar spine.  Pain is exacerbated with lumbar flexion such as when she is picking up close.  Fibromyalgia Pain has been significantly improved with the initiation of Savella medication.  Patient has reached a titration of 50 mg twice daily.  Patient recently refilled this medication.  She denies any side effects from this medication.  Today she denies any significant lower extremity weakness, bowel or bladder incontinence or saddle anesthesia    Interval history 02/07/2023  Patient presents status post bilateral sacroiliac joint and greater trochanteric bursa injection 01/24/2023 and bilateral L3-5 lumbar medial branch block 12/13/2022.  Patient had recent mechanical fall confounding benefit from recent injections.  Today she reports she was reaching over a mattress cover to reach a stack of bibles and slid into a slint.  Patient reports she was behind and tall wall in a took 20-30 minutes for her to stand.  Patient also reports exacerbation of fibromyalgia pain.  Since her fall patient reports pain which radiates into the buttock and down the posterior aspect of the right lower extremity to the dorsum of the foot in L4-S1 distribution.  Patient has continued gabapentin 300 mg twice daily, Celebrex in the evenings as well as Tylenol 1000 mg twice daily.    Interval history 11/29/2022    Patient presents status post bilateral sacroiliac joint greater trochanteric bursa 10/10/2022.  Patient reports 90% relief overlying bilateral sacroiliac joints and greater trochanteric bursa following her injection.  Today she reports primarily lumbar axial back pain as well as significant neck pain.  Lower back pain is exacerbated with prolonged standing such as when she is washing dishes.  She denies significant distal radiculopathy into the right lower extremities as described at our last clinic visit.  Neck pain is elicited with cervical flexion, extension and lateral flexion and she does  report reduced mobility.  She reports her pain began following a mechanical fall down the stairs at [facility] in September 1986.  Patient has continued gabapentin 300 mg in the morning, 300 mg in the afternoon and 600 mg in the evening.    Interval history 10/04/2022  Ms. Mccollum is a 77-year-old female with past medical history significant for depression, hyperlipidemia, hypertension, mitral valve prolapse, peripheral vascular disease, history of COVID-19, type 2 diabetes, multi joint arthritis, fibromyalgia who presents to [facility] care, previous Dr. Dutta patient.  Today patient reports return of pain in the lower back which radiates into bilateral hips and down the posterior aspect of the right lower extremity in L4-5 distribution to the dorsum of the foot.  Patient reports pain is intermittent but has increased in intensity.  Pain is described as shocking in nature and today is rated a 6/10.  Patient reports she feels as if her skin is crawling.  patient is currently taking gabapentin 300 mg up to 3 times daily when pain is severe.  Pain interferes with the patient's sleep.  Patient also reports history of fibromyalgia which limits her mobility and duration of standing and ambulation.  Patient does endorse associated weakness in the lower extremities associated with her pain.  Patient is interested in pursuing repeat intervention as she is obtained several months of significant relief with prior sacroiliac joint and greater trochanteric bursa injections.  Patient has continued physician directed physical therapy exercises at home daily.    History of Present Illness 01/16/2020: Dr. Dutta:   Kaylin Mccollum is a 74 y.o. female  who is presenting with a chief complaint of lumbar back pain. The patient began experiencing this problem insidiously, and the pain has been gradually worsening over the past 5 month(s). The pain is described as throbbing, shooting, burning and electrical and is located in the bilateral  lumbar spine. Pain is intermittent and lasts hours. The pain radiates to bilateral lower extremities L4 distribudution. The patient rates her pain a 8 out of ten and interferes with activities of daily living a 7 out of ten. Pain is exacerbated by flexion of the lumbar spine, ambulation, and is improved by rest.      She also complains of right knee pain. The patient began experiencing this problem insidiously. The pain is described as cramping, aching and is located in the right knee. Pain is intermittent and lasts hours. The pain is nonradiating. The patient rates her pain a 8 out of ten and interferes with activities of daily living a 7 out of ten. Pain is exacerbated by getting up from a seated position and standing, and is improved by rest. Patient reports no prior trauma, prior arthroscopy bilaterally in 1990s.  - pertinent negatives: No fever, No chills, No weight loss, No bladder dysfunction, No bowel dysfunction, No saddle anesthesia  - pertinent positives: none        Pain Disability Index (PDI) Score Review:      5/20/2024     7:43 AM 11/29/2023    10:44 AM 8/24/2023     1:04 PM   Last 3 PDI Scores   Pain Disability Index (PDI) 12 11    35 14       Non-Pharmacologic Treatments:  Physical Therapy/Home Exercise: yes  Ice/Heat:yes  TENS: no  Acupuncture: no  Massage: no  Chiropractic: no    Other: no      Pain Medications:  - Adjuvant Medications: Lorazepam (Ativan), Neurontin (Gabapentin), and Topical Ointment (Voltaren Gel, Steroid cream, Anti-Inflammatory Cream, Compound cream)      Pain injections:  Dr. Dumont:  -9/17/2024: Bilateral L4/5 TF JAMIN with 90% pain relief  -07/23/2024: Bilateral intra-articular knee joint injection with Synvisc-One with 90% pain relief   -04/09/2024: Bilateral L4-5 TF JAMIN   -01/23/2024: Bilateral intra-articular knee injection with Synvisc-One  -01/09/2024: Bilateral sacroiliac joint and greater trochanteric bursa injection  -06/21/2023: Bilateral sacroiliac joint and greater  trochanteric bursa injection  -05/29/2023: Bilateral intra-articular knee injection  -05/09/2023: L3-4 via disc allograft supplementation  -04/25/2023:  L5-S1 via disc allograft supplementation  -01/24/2023: Bilateral sacroiliac joint and greater trochanteric bursa injection  -12/13/2022: Bilateral L3-5 lumbar medial branch block  - 10/10/2022: Bilateral greater trochanteric bursa and sacroiliac joint injection      -04/20/2022: Right-sided acetabular femoral injection; Dr. Dutta  -03/01/2022: Bilateral sacroiliac joint and greater trochanteric bursa injection; Dr. Dutta  -07/08/2021: Bilateral sacroiliac joint and greater trochanteric bursa injection; Dr. Dutta  -01/05/2021: Bilateral sacroiliac joint and greater trochanteric bursa injection; Dr. Burns  -10/08/2020: Bilateral sacroiliac joint and bilateral greater trochanteric bursa injection; Dr. Dutta  -05/06/2020: Bilateral sacroiliac joint and greater trochanteric bursa injection; Dr. Dutta          Imaging/ Diagnostic Studies/ Labs (Reviewed on 10/18/2024):    7/25/2024 X-Ray Lumbar Complete Including Flex And Ext  FINDINGS: No fracture of the lumbar spine.  Intervertebral disc heights are preserved.  6 mm of anterolisthesis of L4 on L5.  Bilateral facet joint osteoarthritis at L4-5 and L5-S1.    Cervical x-ray 05/18/2023   FINDINGS:  Osteopenia.  Vertebral body heights maintained.  Mild anterolisthesis of C4 on C5 and C5 on C6.  Multilevel osteophyte changes with disc height loss most noted at C5-6 and C6-7.  Multilevel prominent facet arthropathy. Multilevel left-sided neural foraminal narrowing.     Significant change in alignment on flexion or extension.     Prevertebral soft tissues unremarkable.  Lung apices clear.    Thoracic x-ray 05/18/2023  FINDINGS:  Osteopenia.  Vertebral body heights maintained.  No spondylolisthesis.  Similar more multilevel bridging osteophyte changes.  No acute osseous abnormality.  Soft tissues unremarkable.      MRI Lumbar  Spine 02/16/2023  FINDINGS:  Grade 1 degenerative spondylolisthesis at L4-L5.  Vertebral body height is normal.  Marrow signal is within normal limits. The conus medullaris terminates at the level of L1-L2.  No abnormal signal within the conus. Intervertebral disc levels are as follows:     T12-L1 disc: Normal disc height with anterior osteophytes and mild degenerative facet hypertrophy.  No spinal or foraminal stenosis.  The dural canal measures 16 mm.     L1-L2 disc : Disc space height loss with chronic Schmorl's nodes.  Posterior disc bulge.  Anterior osteophytes.  Minor facet arthropathy bilaterally.  The dural canal measures 13 mm.  No foraminal stenosis.     L2-L3 disc: Circumferential disc bulge with tiny chronic Schmorl's nodes.  Anterior osteophytes.  Mild degenerative facet hypertrophy.  The dural canal measures 12 mm.  No foraminal stenosis.     L3-L4 disc: Disc space height loss with a circumferential disc bulge and anterior osteophytes.  Mild degenerative facet hypertrophy with moderate buckling of the ligamentum flavum.  The dural canal measures 11 mm.  No significant foraminal stenosis.     L4-L5 disc: Grade 1 spondylolisthesis with severe degenerative facet hypertrophy bilaterally.  Buckling of the ligamentum flavum.  The dural canal measures 7 mm AP.  Disc encroaches into the floors of the exit foramina, right greater than left.  There is mild right foraminal stenosis.     L5-S1 disc: Severe disc space height loss with osteophytes at the margins and encroach into the exit foramina.  Mild degenerative facet hypertrophy and buckling of the ligamentum flavum.  The dural canal measures 11 mm.  Mild foraminal stenosis.          Review of Systems:   GENERAL:  No weight loss, malaise or fevers.  HEENT:   No recent changes in vision or hearing  NECK:  Negative for lumps, no difficulty with swallowing.  RESPIRATORY:  Negative for cough, wheezing or shortness of breath, patient denies any recent  "URI.  CARDIOVASCULAR:  Negative for chest pain or palpitations.  GI:  Negative for abdominal discomfort, blood in stools or black stools or change in bowel habits.  MUSCULOSKELETAL:  See HPI.  SKIN:  Negative for lesions, rash, and itching.  PSYCH:  No mood disorder or recent psychosocial stressors.   HEMATOLOGY/LYMPHOLOGY:  Negative for prolonged bleeding, bruising easily or swollen nodes.    NEURO:   No history of syncope, paralysis, seizures or tremors.  All other reviewed and negative other than HPI.        Telemedicine Physical Exam:   Vitals:    10/18/24 0734   Height: 5' 8.5" (1.74 m)  Comment: pt reported   Body mass index is 28.69 kg/m².   (reviewed on 10/18/2024)     GENERAL: Well appearing, in no acute distress, alert and oriented x3.    PSYCH:  Mood and affect appropriate.  SKIN: Skin color & texture with no obvious abnormalities.    HEAD/FACE:  Normocephalic, atraumatic.    PULM:  No difficulty breathing. No nasal flaring. No obvious wheezing.  EXTREMITIES: No obvious deformities.   MUSCULOSKELETAL: No obvious atrophy abnormalities are noted.   GAIT: sitting.     Physical Exam: last in clinic visit:  General: alert and oriented, in no apparent distress.  Gait: normal gait.  Skin: no rashes, no discoloration, no obvious lesions  HEENT: normocephalic, atraumatic. Pupils equal and round.  Cardiovascular: no significant peripheral edema present.  Respiratory: without use of accessory muscles of respiration.    Musculoskeletal - Lumbar Spine:  - ROM fairly preserved   - Pain on flexion of lumbar spine: Absent   - Pain on extension of lumbar spine: Absent         - Lumbar facet loading: Absent   - TTP over the lumbar facet joints: Absent  - TTP over the lumbar paraspinals: Present   - TTP over the SI joints: Present  - TTP over GT bursa: Present, minimal   - Straight Leg Raise: Negative  - CHERYLE: Present    Right Knee:  - TTP: Present over medial/ lateral joint line  - Pain with extension: Present  - Pain " with flexion: Present  - Crepitus: Present     Neuro - Lower Extremities:  - BLE Strength: R/L: HF: 5/5, HE: 5/5, KF: 5/5; KE: 5/5; FE: 5/5; FF: 5/5  - Extremity Reflexes: Brisk and symmetric throughout  - Sensory: Sensation to light touch intact bilaterally      Psych:  Mood and affect is appropriate        Assessment:  Kaylin Mccollum is a 79 y.o. year old female who is presenting with       ICD-10-CM ICD-9-CM    1. Bilateral lumbar radiculopathy  M54.16 724.4       2. Spondylolisthesis of lumbar region  M43.16 738.4       3. Degeneration of intervertebral disc of lumbar region with lower extremity pain  M51.361 722.52       4. Lumbar spondylosis  M47.816 721.3       5. Bilateral primary osteoarthritis of knee  M17.0 715.16           Plan:  1. Interventional:   - S/p Bilateral L4/5 TF JAMIN on 9/17/24 with 90% pain relief.  Patient had 90% sustained relief in lower extremity radicular symptoms following bilateral L4-5 transforaminal epidural steroid injection in April 2024 for several months.    - Patient had 90% pain relief with Bilateral intra-articular knee joint injection with Synvisc-One on 7/23/2024; we can repeat in January 2025 if pain returns.    - Status post L3-4 via disc allograft supplementation 05/09/2023 and L5-S1 via disc allograft supplementation 04/25/2023 with greater than 80% improvement in discogenic lower back pain.    Anticoagulation:  None, no anticoagulation    2. Pharmacologic:   -Refill gabapentin 300mg QAM/ 300mg at lunch/ 600mg QHS.  We have previously reviewed potential side effects of this medication including daytime somnolence, weight gain and peripheral edema    -Refill Savella 100 mg BID - as this tremendously helps with symptoms of fibromyalgia.  We have previouslydiscussed that Savella is FDA approved and has demonstrated improvement in multiple measures including pain, global impression of change in physical function.  We have discussed that the most frequent side effects of  this medication can include nausea, constipation, vomiting, dry mouth, flushing or tachycardia.      -Continue Celebrex 200mg BID PRN - from Orthopedics.  We have previously discussed potential deleterious side effects of NSAIDs on the cardiovascular, gastrointestinal and renal systems. We have discussed judicious use of this medication.      - LA  reviewed and appropriate.      3. Rehabilitative:   -We discussed continuing at home physician directed physical therapy to help manage the patient/s painful condition. The patient was counseled that muscle strengthening will improve the long term prognosis in regards to pain and may also help increase range of motion and mobility.  I have encouraged the patient to perform low intensity aerobic exercise to help with fibromyalgia.    4. Diagnostic:  Reviewed relevant imaging (MRI Lumbar Spine 02/16/2023).     5. Consult:   -Continue follow-up with Dr. Schneider for knee and shoulder pain PRN  -Continue follow-up with Rheumatology: Fibromyalgia  -Continue follow-up with PCP: Dr. Olvera:  Follow-up for potential renal insufficiency    6. Follow up:  3 months follow-up - virtual visit      Future Appointments   Date Time Provider Department Center   10/29/2024  4:20 PM Alea Quezada PA-C ON [facility]   11/4/2024  4:00 PM Jesus Dumont MD ON CARD[facility]   1/10/2025  8:40 AM Kristin Mccall PA-C [facility]        - Patient Questions: Answered all of the patient's questions regarding diagnosis, therapy, and treatment.    - This condition does not require this patient to take time off of work, and the primary goal of our Pain Management services is to improve the patient's functional capacity.   - I discussed the risks, benefits, and alternatives to potential treatment options. All questions and concerns were fully addressed today in clinic.         Kristin Mccall PA-C  Interventional Pain Management - [facility]  [place]    Disclaimer:  This note was prepared using voice recognition system and is likely to have sound alike errors that may have been overlooked even after proof reading.  Please call me with any questions.

## 2024-10-18 ENCOUNTER — OFFICE VISIT (OUTPATIENT)
Dept: PAIN MEDICINE | Facility: CLINIC | Age: 79
End: 2024-10-18
Payer: MEDICARE

## 2024-10-18 VITALS — HEIGHT: 69 IN | BODY MASS INDEX: 28.69 KG/M2

## 2024-10-18 DIAGNOSIS — M54.16 BILATERAL LUMBAR RADICULOPATHY: Primary | ICD-10-CM

## 2024-10-18 DIAGNOSIS — M17.0 BILATERAL PRIMARY OSTEOARTHRITIS OF KNEE: ICD-10-CM

## 2024-10-18 DIAGNOSIS — M43.16 SPONDYLOLISTHESIS OF LUMBAR REGION: ICD-10-CM

## 2024-10-18 DIAGNOSIS — M51.361 DEGENERATION OF INTERVERTEBRAL DISC OF LUMBAR REGION WITH LOWER EXTREMITY PAIN: ICD-10-CM

## 2024-10-18 DIAGNOSIS — M47.816 LUMBAR SPONDYLOSIS: ICD-10-CM

## 2024-10-19 DIAGNOSIS — I15.2 HYPERTENSION ASSOCIATED WITH DIABETES: Chronic | ICD-10-CM

## 2024-10-19 DIAGNOSIS — E11.59 HYPERTENSION ASSOCIATED WITH DIABETES: Chronic | ICD-10-CM

## 2024-10-19 DIAGNOSIS — I34.1 MVP (MITRAL VALVE PROLAPSE): ICD-10-CM

## 2024-10-21 RX ORDER — VERAPAMIL HYDROCHLORIDE 80 MG/1
80 TABLET ORAL 2 TIMES DAILY
Qty: 180 TABLET | Refills: 2 | Status: SHIPPED | OUTPATIENT
Start: 2024-10-21

## 2024-10-25 ENCOUNTER — LAB VISIT (OUTPATIENT)
Dept: LAB | Facility: HOSPITAL | Age: 79
End: 2024-10-25
Attending: PHYSICIAN ASSISTANT
Payer: MEDICARE

## 2024-10-25 DIAGNOSIS — E78.5 HYPERLIPIDEMIA ASSOCIATED WITH TYPE 2 DIABETES MELLITUS: ICD-10-CM

## 2024-10-25 DIAGNOSIS — E11.69 HYPERLIPIDEMIA ASSOCIATED WITH TYPE 2 DIABETES MELLITUS: ICD-10-CM

## 2024-10-25 DIAGNOSIS — E11.22 TYPE 2 DIABETES MELLITUS WITH STAGE 3A CHRONIC KIDNEY DISEASE, WITHOUT LONG-TERM CURRENT USE OF INSULIN: ICD-10-CM

## 2024-10-25 DIAGNOSIS — N18.31 TYPE 2 DIABETES MELLITUS WITH STAGE 3A CHRONIC KIDNEY DISEASE, WITHOUT LONG-TERM CURRENT USE OF INSULIN: ICD-10-CM

## 2024-10-25 LAB
ALBUMIN SERPL BCP-MCNC: 3.6 G/DL (ref 3.5–5.2)
ALP SERPL-CCNC: 68 U/L (ref 40–150)
ALT SERPL W/O P-5'-P-CCNC: 42 U/L (ref 10–44)
ANION GAP SERPL CALC-SCNC: 10 MMOL/L (ref 8–16)
AST SERPL-CCNC: 42 U/L (ref 10–40)
BILIRUB SERPL-MCNC: 0.4 MG/DL (ref 0.1–1)
BUN SERPL-MCNC: 17 MG/DL (ref 8–23)
CALCIUM SERPL-MCNC: 10 MG/DL (ref 8.7–10.5)
CHLORIDE SERPL-SCNC: 106 MMOL/L (ref 95–110)
CHOLEST SERPL-MCNC: 170 MG/DL (ref 120–199)
CHOLEST/HDLC SERPL: 3.5 {RATIO} (ref 2–5)
CO2 SERPL-SCNC: 25 MMOL/L (ref 23–29)
CREAT SERPL-MCNC: 1 MG/DL (ref 0.5–1.4)
EST. GFR  (NO RACE VARIABLE): 58 ML/MIN/1.73 M^2
ESTIMATED AVG GLUCOSE: 126 MG/DL (ref 68–131)
GLUCOSE SERPL-MCNC: 152 MG/DL (ref 70–110)
HBA1C MFR BLD: 6 % (ref 4–5.6)
HDLC SERPL-MCNC: 49 MG/DL (ref 40–75)
HDLC SERPL: 28.8 % (ref 20–50)
LDLC SERPL CALC-MCNC: 101.2 MG/DL (ref 63–159)
NONHDLC SERPL-MCNC: 121 MG/DL
POTASSIUM SERPL-SCNC: 3.9 MMOL/L (ref 3.5–5.1)
PROT SERPL-MCNC: 6.8 G/DL (ref 6–8.4)
SODIUM SERPL-SCNC: 141 MMOL/L (ref 136–145)
TRIGL SERPL-MCNC: 99 MG/DL (ref 30–150)

## 2024-10-25 PROCEDURE — 80061 LIPID PANEL: CPT | Performed by: PHYSICIAN ASSISTANT

## 2024-10-25 PROCEDURE — 80053 COMPREHEN METABOLIC PANEL: CPT | Performed by: PHYSICIAN ASSISTANT

## 2024-10-25 PROCEDURE — 83036 HEMOGLOBIN GLYCOSYLATED A1C: CPT | Performed by: PHYSICIAN ASSISTANT

## 2024-10-25 PROCEDURE — 36415 COLL VENOUS BLD VENIPUNCTURE: CPT | Performed by: PHYSICIAN ASSISTANT

## 2024-10-29 ENCOUNTER — TELEPHONE (OUTPATIENT)
Dept: INTERNAL MEDICINE | Facility: CLINIC | Age: 79
End: 2024-10-29
Payer: MEDICARE

## 2024-10-29 ENCOUNTER — PATIENT MESSAGE (OUTPATIENT)
Dept: INTERNAL MEDICINE | Facility: CLINIC | Age: 79
End: 2024-10-29
Payer: MEDICARE

## 2024-10-31 ENCOUNTER — OFFICE VISIT (OUTPATIENT)
Dept: INTERNAL MEDICINE | Facility: CLINIC | Age: 79
End: 2024-10-31
Payer: MEDICARE

## 2024-10-31 VITALS
WEIGHT: 184.44 LBS | DIASTOLIC BLOOD PRESSURE: 82 MMHG | SYSTOLIC BLOOD PRESSURE: 120 MMHG | HEIGHT: 60 IN | HEART RATE: 98 BPM | BODY MASS INDEX: 36.21 KG/M2 | OXYGEN SATURATION: 96 %

## 2024-10-31 DIAGNOSIS — M79.7 FIBROMYALGIA: Chronic | ICD-10-CM

## 2024-10-31 DIAGNOSIS — R29.898 LEG WEAKNESS, BILATERAL: ICD-10-CM

## 2024-10-31 DIAGNOSIS — N18.31 CHRONIC KIDNEY DISEASE, STAGE 3A: ICD-10-CM

## 2024-10-31 DIAGNOSIS — Z12.31 ENCOUNTER FOR SCREENING MAMMOGRAM FOR BREAST CANCER: ICD-10-CM

## 2024-10-31 DIAGNOSIS — F32.9 CHRONIC MAJOR DEPRESSIVE DISORDER: Chronic | ICD-10-CM

## 2024-10-31 DIAGNOSIS — I15.2 HYPERTENSION ASSOCIATED WITH DIABETES: ICD-10-CM

## 2024-10-31 DIAGNOSIS — K21.9 GASTROESOPHAGEAL REFLUX DISEASE WITHOUT ESOPHAGITIS: ICD-10-CM

## 2024-10-31 DIAGNOSIS — R47.89 WORD FINDING PROBLEM: ICD-10-CM

## 2024-10-31 DIAGNOSIS — N18.31 TYPE 2 DIABETES MELLITUS WITH STAGE 3A CHRONIC KIDNEY DISEASE, WITHOUT LONG-TERM CURRENT USE OF INSULIN: ICD-10-CM

## 2024-10-31 DIAGNOSIS — E11.22 TYPE 2 DIABETES MELLITUS WITH STAGE 3A CHRONIC KIDNEY DISEASE, WITHOUT LONG-TERM CURRENT USE OF INSULIN: ICD-10-CM

## 2024-10-31 DIAGNOSIS — E11.69 DIABETES MELLITUS TYPE 2 IN OBESE: ICD-10-CM

## 2024-10-31 DIAGNOSIS — E11.69 HYPERLIPIDEMIA ASSOCIATED WITH TYPE 2 DIABETES MELLITUS: ICD-10-CM

## 2024-10-31 DIAGNOSIS — E66.9 DIABETES MELLITUS TYPE 2 IN OBESE: ICD-10-CM

## 2024-10-31 DIAGNOSIS — E78.5 HYPERLIPIDEMIA ASSOCIATED WITH TYPE 2 DIABETES MELLITUS: ICD-10-CM

## 2024-10-31 DIAGNOSIS — M85.852 OSTEOPENIA OF LEFT HIP: ICD-10-CM

## 2024-10-31 DIAGNOSIS — E11.59 HYPERTENSION ASSOCIATED WITH DIABETES: ICD-10-CM

## 2024-10-31 DIAGNOSIS — Z00.00 ROUTINE GENERAL MEDICAL EXAMINATION AT A HEALTH CARE FACILITY: Primary | ICD-10-CM

## 2024-10-31 PROCEDURE — G2211 COMPLEX E/M VISIT ADD ON: HCPCS | Mod: S$PBB,,, | Performed by: PHYSICIAN ASSISTANT

## 2024-10-31 PROCEDURE — 99214 OFFICE O/P EST MOD 30 MIN: CPT | Mod: S$PBB,,, | Performed by: PHYSICIAN ASSISTANT

## 2024-10-31 PROCEDURE — 99999 PR PBB SHADOW E&M-EST. PATIENT-LVL V: CPT | Mod: PBBFAC,,, | Performed by: PHYSICIAN ASSISTANT

## 2024-10-31 PROCEDURE — 99215 OFFICE O/P EST HI 40 MIN: CPT | Mod: PBBFAC | Performed by: PHYSICIAN ASSISTANT

## 2024-10-31 RX ORDER — SEMAGLUTIDE 0.68 MG/ML
0.5 INJECTION, SOLUTION SUBCUTANEOUS
Qty: 3 ML | Refills: 1 | Status: SHIPPED | OUTPATIENT
Start: 2024-10-31

## 2024-10-31 RX ORDER — FLUOXETINE HYDROCHLORIDE 40 MG/1
40 CAPSULE ORAL DAILY
Qty: 90 CAPSULE | Refills: 3 | Status: SHIPPED | OUTPATIENT
Start: 2024-10-31 | End: 2025-10-31

## 2024-10-31 RX ORDER — METFORMIN HYDROCHLORIDE 1000 MG/1
1000 TABLET ORAL 2 TIMES DAILY WITH MEALS
Qty: 180 TABLET | Refills: 3 | Status: SHIPPED | OUTPATIENT
Start: 2024-10-31 | End: 2025-10-31

## 2024-10-31 RX ORDER — OMEPRAZOLE 20 MG/1
20 CAPSULE, DELAYED RELEASE ORAL DAILY PRN
Qty: 30 CAPSULE | Refills: 5 | Status: SHIPPED | OUTPATIENT
Start: 2024-10-31 | End: 2025-04-29

## 2024-10-31 NOTE — PATIENT INSTRUCTIONS
You can get your RSV vaccine at the pharmacy.      Get your flu vaccine this Fall.       The bivalent covid booster is now available. You can visit [facility] in our primary care building by the front entrance to get this vaccine.     Pharmacy hours:  Monday-Friday 8am-5:30pm
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
## 2024-11-04 ENCOUNTER — OFFICE VISIT (OUTPATIENT)
Dept: CARDIOLOGY | Facility: CLINIC | Age: 79
End: 2024-11-04
Payer: MEDICARE

## 2024-11-04 VITALS
DIASTOLIC BLOOD PRESSURE: 80 MMHG | OXYGEN SATURATION: 97 % | HEART RATE: 105 BPM | WEIGHT: 186.94 LBS | BODY MASS INDEX: 36.7 KG/M2 | SYSTOLIC BLOOD PRESSURE: 127 MMHG | RESPIRATION RATE: 16 BRPM | HEIGHT: 60 IN

## 2024-11-04 DIAGNOSIS — E78.5 HYPERLIPIDEMIA ASSOCIATED WITH TYPE 2 DIABETES MELLITUS: ICD-10-CM

## 2024-11-04 DIAGNOSIS — E11.69 HYPERLIPIDEMIA ASSOCIATED WITH TYPE 2 DIABETES MELLITUS: ICD-10-CM

## 2024-11-04 DIAGNOSIS — E11.59 HYPERTENSION ASSOCIATED WITH DIABETES: Chronic | ICD-10-CM

## 2024-11-04 DIAGNOSIS — I95.1 ORTHOSTATIC HYPOTENSION: Primary | ICD-10-CM

## 2024-11-04 DIAGNOSIS — E66.01 SEVERE OBESITY (BMI 35.0-39.9) WITH COMORBIDITY: ICD-10-CM

## 2024-11-04 DIAGNOSIS — I15.2 HYPERTENSION ASSOCIATED WITH DIABETES: Chronic | ICD-10-CM

## 2024-11-04 DIAGNOSIS — R07.89 OTHER CHEST PAIN: ICD-10-CM

## 2024-11-04 DIAGNOSIS — I34.1 MVP (MITRAL VALVE PROLAPSE): ICD-10-CM

## 2024-11-04 DIAGNOSIS — I49.3 PVC (PREMATURE VENTRICULAR CONTRACTION): ICD-10-CM

## 2024-11-04 DIAGNOSIS — I73.9 PVD (PERIPHERAL VASCULAR DISEASE): ICD-10-CM

## 2024-11-04 PROCEDURE — 99999 PR PBB SHADOW E&M-EST. PATIENT-LVL V: CPT | Mod: PBBFAC,,, | Performed by: INTERNAL MEDICINE

## 2024-11-04 PROCEDURE — 99214 OFFICE O/P EST MOD 30 MIN: CPT | Mod: S$PBB,,, | Performed by: INTERNAL MEDICINE

## 2024-11-04 PROCEDURE — 99215 OFFICE O/P EST HI 40 MIN: CPT | Mod: PBBFAC | Performed by: INTERNAL MEDICINE

## 2024-11-04 NOTE — PROGRESS NOTES
Subjective:   Patient ID:  Kaylin Mccollum is a 79 y.o. female who presents for follow up of No chief complaint on file.      78 yo female, 6 months f/u  PMH h/o MVP, HTN DM 20 yrs. OH, vasovagal reaction, H/o PVCs. Statin intolerance, Fibromyalgia H/o COVID 19 in  quarantine at home. (sinus headache)  On verapamil since she was 54.   Palpitation and dizziness controlled by Verapamil  Occasional chest pain, with sharp pain.   C/o dry mouth  Chronic fatigue from fibra myalgia. Some shoulder pain from the fall  Mother had afib. Father healthy. Young brother had heart procedure at age of 6.  Not on regular exercise. No smoking/drinking  ekg today NSR and poor R progression on repcoridal leads    05/2021 visit   ECHO mild MR and normal EF. Stress test no ischemia; ZIOPATCH showed rare AT longest 13 beats.  Still dry mouth and occasional pinching chest pain  Headache chronic due to migraine  Chronic fatigue, mild exercise endurance  BP and LDL controlled. A1C 6.2 at OSH in      visit  PVD swelling of the leg controlled by lasix.   BP controlled  Still fatigue and weakness   ekg NSR. A1C 7.6 BP controled     visit  C/o chest tightness when walked from the parking to the office today. Resolved after rest.  Walking and shopping could cause the muscle pain and fatigue. '  Chronic lower back injection for the pain. occasionally faint and clammy  No palpitation and leg swelling      visit  Had steroid injection of the hips. Palpitation and controlled by verapamil. BRUNO with climbing the stairs. No dizziness and faint.    04/23 visit  Rx of fibromyalgia working but copay elevated and will wean off now  Occasional palpitation at night  No dizziness faint dyspnea. Chronic mild leg swelling  Ekg NSR     stress echo normal EF and mld MR. Normal stress test; BARDY unremarkable     07/23 visit  Feet and leg swelling improved but 2+ some varicose on the feet skin. No palpitation  dizziness faint  Reviewed digit HTN and BP up to 140 to 150 mmHG    10/23 visit  BP controlled at home per digit program   and taking zetia  Ekg NSR    01/24 visit   Some dizziness and improved after decreased BP medication.  EKG reviewed by myself today NSR Q wave in inferior leads and poor R progression on precordial leads. Old change  Leg swelling and left foot erythema. No pain and weakness    08/24 visit  Remains dizziness. Weight loss 10 lbs in 1yr started ozempic in 05/24 for DM  Sinus tachy now  Taking verapamil for migraine. Also helps for palpitation   Stopped metoprolol and Losartan.    Interval history  On verapamil 120 mg bid for migraine and palpitation,  BP stable. Improved positional dizziness and fall. No low BP   On compression socks            Past Medical History:   Diagnosis Date    Arthritis     Cataract     COVID-19 02/25/2021    Diabetes mellitus 1995    BS didn't check 09/13/2022    Diabetes mellitus, type 2     Fibromyalgia     General anesthetics causing adverse effect in therapeutic use     bradycardia     Hypertension     Migraines     Mitral valve prolapse     Osteoarthritis     Rotator cuff tear 04/01/2015       Past Surgical History:   Procedure Laterality Date    ARTHROSCOPY OF KNEE Right 03/10/2020    Procedure: ARTHROSCOPY, KNEE;  Surgeon: Les Schneider MD;  Location: [facility] OR;  Service: Orthopedics;  Laterality: Right;    CATARACT EXTRACTION W/  INTRAOCULAR LENS IMPLANT Left 07/19/2017    CATARACT EXTRACTION W/  INTRAOCULAR LENS IMPLANT Right 2017    CHOLECYSTECTOMY      CHONDROPLASTY OF KNEE Right 03/10/2020    Procedure: CHONDROPLASTY, KNEE;  Surgeon: Les Schneider MD;  Location: [facility] OR;  Service: Orthopedics;  Laterality: Right;  Anterior compartment     COLONOSCOPY N/A 09/25/2018    Procedure: COLONOSCOPY;  Surgeon: Bethel Carmona MD;  Location: [facility] ENDO;  Service: Endoscopy;  Laterality: N/A;    EXCISION OF MEDIAL MENISCUS OF KNEE Right 03/10/2020     Procedure: MENISCECTOMY, KNEE, MEDIAL;  Surgeon: Les Schneider MD;  Location: [facility] OR;  Service: Orthopedics;  Laterality: Right;  Partial, Medial , Lateral     EYE SURGERY      INJECTION OF ANESTHETIC AGENT AROUND MEDIAL BRANCH NERVES INNERVATING LUMBAR FACET JOINT Bilateral 12/13/2022    Procedure: Bilateral L3-5 MBB;  Surgeon: Humberto Dumont MD;  Location: [facility] PAIN MGT;  Service: Pain Management;  Laterality: Bilateral;    INJECTION OF ANESTHETIC AGENT AROUND NERVE Right 08/08/2019    Procedure: Right Genicular nerve block with local;  Surgeon: Manpreet Dutta MD;  Location: [facility] PAIN MGT;  Service: Pain Management;  Laterality: Right;    INJECTION OF ANESTHETIC AGENT INTO SACROILIAC JOINT Bilateral 05/06/2020    Procedure: Bilateral Sacroiliac Joint Injection;  Surgeon: Manpreet Dutta MD;  Location: [facility] PAIN MGT;  Service: Pain Management;  Laterality: Bilateral;    INJECTION OF ANESTHETIC AGENT INTO SACROILIAC JOINT Bilateral 10/08/2020    Procedure: Bilateral SI and Bilateral GTB with RN IV sedation;  Surgeon: Manpreet Dutta MD;  Location: [facility] PAIN MGT;  Service: Pain Management;  Laterality: Bilateral;    INJECTION OF ANESTHETIC AGENT INTO SACROILIAC JOINT Bilateral 01/05/2021    Procedure: Bilateral BLOCK, SACROILIAC JOINT and Bilateral GTB witn RN IV sedation;  Surgeon: Daniel Burns MD;  Location: [facility] PAIN MGT;  Service: Pain Management;  Laterality: Bilateral;    INJECTION OF ANESTHETIC AGENT INTO SACROILIAC JOINT Bilateral 07/08/2021    Procedure: Bilateral BLOCK, SACROILIAC JOINT bilateral GTB RN IV sedation;  Surgeon: Manpreet Dutta MD;  Location: [facility] PAIN MGT;  Service: Pain Management;  Laterality: Bilateral;    INJECTION OF ANESTHETIC AGENT INTO SACROILIAC JOINT Bilateral 03/01/2022    Procedure: Bilateral BLOCK, SACROILIAC JOINT and Bilateral GTB RN IV sedation;  Surgeon: Manpreet Dutta MD;  Location: [facility] PAIN MGT;  Service: Pain Management;  Laterality: Bilateral;    INJECTION OF ANESTHETIC  AGENT INTO SACROILIAC JOINT Bilateral 10/10/2022    Procedure: Bilateral Sacroiliac Joint Injection;  Surgeon: Humberto Dumont MD;  Location: [facility] PAIN [facility]GT;  Service: Pain Management;  Laterality: Bilateral;    INJECTION OF ANESTHETIC AGENT INTO SACROILIAC JOINT Bilateral 01/24/2023    Procedure: Bilateral Sacroiliac Joint Injection;  Surgeon: Humberto Dumont MD;  Location: [facility];  Service: Pain Management;  Laterality: Bilateral;    INJECTION OF ANESTHETIC AGENT INTO SACROILIAC JOINT Bilateral 06/21/2023    Procedure: Bilateral Sacroiliac Joint Injection;  Surgeon: Humberto Dumont MD;  Location: [facility];  Service: Pain Management;  Laterality: Bilateral;    INJECTION OF ANESTHETIC AGENT INTO SACROILIAC JOINT Bilateral 1/9/2024    Procedure: Bilateral SIJ Injection;  Surgeon: Humberto Dumont MD;  Location: [facility] PAIN [facility]GT;  Service: Pain Management;  Laterality: Bilateral;    INJECTION OF JOINT Bilateral 09/05/2019    Procedure: Bilateral GT bursa injection;  Surgeon: Manpreet Dutta MD;  Location: [facility];  Service: Pain Management;  Laterality: Bilateral;    INJECTION OF JOINT Bilateral 05/06/2020    Procedure: Bilateral GT bursa injection;  Surgeon: Manpreet Dutta MD;  Location: [facility];  Service: Pain Management;  Laterality: Bilateral;    INJECTION OF JOINT Right 04/28/2022    Procedure: Injection, Joint Right Hip Injection RN IV sedation;  Surgeon: Manpreet Dutta MD;  Location: [facility];  Service: Pain Management;  Laterality: Right;    INJECTION OF JOINT Bilateral 10/10/2022    Procedure: Bilateral GT bursa injection with RN IV sedation;  Surgeon: Humberto Dumont MD;  Location: [facility];  Service: Pain Management;  Laterality: Bilateral;    INJECTION OF JOINT Bilateral 01/24/2023    Procedure: Bilateral GT bursa injection;  Surgeon: Humberto Dumont MD;  Location: [facility];  Service: Pain Management;  Laterality: Bilateral;    INJECTION OF JOINT Bilateral 05/23/2023    Procedure:  Bilateral intraarticular knee injection with Synvisc-1; ;  Surgeon: Humberto Dumont MD;  Location: [facility];  Service: Pain Management;  Laterality: Bilateral;    INJECTION OF JOINT Bilateral 06/21/2023    Procedure: Bilateral GT bursa injection;  Surgeon: Humberto Dumont MD;  Location: [facility];  Service: Pain Management;  Laterality: Bilateral;    INJECTION OF JOINT Bilateral 1/9/2024    Procedure: Bilateral GTB injection;  Surgeon: Humberto Dumont MD;  Location: [facility];  Service: Pain Management;  Laterality: Bilateral;    INJECTION OF JOINT Bilateral 1/23/2024    Procedure: Bilateral intraarticular knee injection with Synvisc 1 ;  Surgeon: Humberto Dumont MD;  Location: [facility];  Service: Pain Management;  Laterality: Bilateral;    INJECTION OF JOINT Bilateral 7/23/2024    Procedure: Bilateral Synvisc Knee injection;  Surgeon: Humberto Dumont MD;  Location: [facility];  Service: Pain Management;  Laterality: Bilateral;    INJECTION, ALLOGRAFT, INTERVERTEBRAL DISC N/A 04/25/2023    Procedure: INJECTION,ALLOGRAFT,INTERVERTEBRAL DISC,1ST LEVEL: L5-S1;  Surgeon: Humberto Dumont MD;  Location: [facility];  Service: Pain Management;  Laterality: N/A;    INJECTION, ALLOGRAFT, INTERVERTEBRAL DISC N/A 05/09/2023    Procedure: INJECTION,ALLOGRAFT,INTERVERTEBRAL DISC,2nd LEVEL: L3-4;  Surgeon: Humberto Dumont MD;  Location: [facility];  Service: Pain Management;  Laterality: N/A;    KNEE ARTHROSCOPY Bilateral     PARS PLANA VITRECTOMY W/ REPAIR OF MACULAR HOLE Left 02/22/2017    RADIOFREQUENCY THERMOCOAGULATION Left 07/11/2019    Procedure: Right SIJ RFA;  Surgeon: Manpreet Dutta MD;  Location: [facility];  Service: Pain Management;  Laterality: Left;    RADIOFREQUENCY THERMOCOAGULATION Right 07/25/2019    Procedure: Right SIJ RFA;  Surgeon: Manpreet Dutta MD;  Location: [facility];  Service: Pain Management;  Laterality: Right;    SELECTIVE INJECTION OF ANESTHETIC AGENT AROUND  LUMBAR SPINAL NERVE ROOT BY TRANSFORAMINAL APPROACH Bilateral 2024    Procedure: Bilateral L4/5 TF JAMIN;  Surgeon: Humberto Dumont MD;  [facility];  Service: Pain Management;  Laterality: Bilateral;    SELECTIVE INJECTION OF ANESTHETIC AGENT AROUND LUMBAR SPINAL NERVE ROOT BY TRANSFORAMINAL APPROACH Bilateral 2024    Procedure: Bilateral L4/5 TF JAMIN;  Surgeon: Humberto Dumont MD;  Location: [facility]IN [facility]GT;  Service: Pain Management;  Laterality: Bilateral;    SHOULDER ARTHROSCOPY Right 2015       Social History     Tobacco Use    Smoking status: Never    Smokeless tobacco: Never    Tobacco comments:     Never smoked   Substance Use Topics    Alcohol use: Not Currently    Drug use: No       Family History   Problem Relation Name Age of Onset    Heart disease Mother Beena Thrasher         A-Fib    Glaucoma Mother Beena Thrasher     Cataracts Mother Beena Thrasher     Cancer Mother Beena Thrasher         colon    Arthritis Mother Beena Thrasher         : 17    Depression Mother Beena Thrasher     Depression Brother Octavio & Gutierrez Thrasher     Birth defects Brother Octavio Thrasher         Cardiac    Heart disease Brother Octavio Thrasher         Heart Surgery  as 6 y.o.    Kidney disease Daughter Yolette Tinreina Mccollum         ESRD    Anesthesia problems Daughter Yolette Zhangreina Mccollum         cardiac arrest during nephrectomy    Birth defects Daughter Yolette Tin Young         Renal    Depression Daughter Yolette Tin Young     Learning disabilities Daughter Yolette Zhangis Young         Dyslexia, ADD    Depression Son Daniel & Brandon Mccollum     Learning disabilities Son Daniel & Brandon Mccollum         ADD & ADHD    Diabetes Maternal Aunt Nara Zamora          9/3/2023    Diabetes Maternal Uncle Madison Luke, Sr.          2019    Cancer Maternal Uncle Madison Luke, Sr.     Breast cancer Paternal Aunt      Diabetes Maternal  Grandmother Bonnie Luke     Heart disease Maternal Grandmother Bonnie Luke         Congestive heart failure    Alcohol abuse Maternal Grandmother Bonnie Luke         Death: 84    Depression Maternal Grandmother Bonnie Luke     Hypertension Maternal Grandmother Bonnie Luke     Cancer Maternal Grandmother Bonnie Luke     Arthritis Maternal Grandmother Bonnie Luke     Diabetes Maternal Grandfather Attila Luke     Cancer Maternal Grandfather Attila Luke     Alcohol abuse Maternal Grandfather Attila Luke          1964    Asthma Son Brandon FREIRE    Objective:   Physical Exam  HENT:      Head: Normocephalic.   Eyes:      Pupils: Pupils are equal, round, and reactive to light.   Neck:      Thyroid: No thyromegaly.      Vascular: Normal carotid pulses. No carotid bruit or JVD.   Cardiovascular:      Rate and Rhythm: Normal rate and regular rhythm. No extrasystoles are present.     Chest Wall: PMI is not displaced.      Pulses: Normal pulses.      Heart sounds: Normal heart sounds. No murmur heard.     No gallop. No S3 sounds.   Pulmonary:      Effort: No respiratory distress.      Breath sounds: Normal breath sounds. No stridor.   Abdominal:      General: Bowel sounds are normal.      Palpations: Abdomen is soft.      Tenderness: There is no abdominal tenderness. There is no rebound.   Musculoskeletal:         General: Swelling present.   Skin:     Findings: No rash.   Neurological:      Mental Status: She is alert and oriented to person, place, and time.   Psychiatric:         Behavior: Behavior normal.         Lab Results   Component Value Date    CHOL 170 10/25/2024    CHOL 222 (H) 2024    CHOL 216 (H) 2023     Lab Results   Component Value Date    HDL 49 10/25/2024    HDL 56 2024    HDL 61 2023     Lab Results   Component Value Date    LDLCALC 101.2 10/25/2024    LDLCALC 132.6 2024  "   LDLCALC 127.0 08/30/2023     Lab Results   Component Value Date    TRIG 99 10/25/2024    TRIG 167 (H) 03/01/2024    TRIG 140 08/30/2023     Lab Results   Component Value Date    CHOLHDL 28.8 10/25/2024    CHOLHDL 25.2 03/01/2024    CHOLHDL 28.2 08/30/2023       Chemistry        Component Value Date/Time     10/25/2024 1422    K 3.9 10/25/2024 1422     10/25/2024 1422    CO2 25 10/25/2024 1422    BUN 17 10/25/2024 1422    CREATININE 1.0 10/25/2024 1422     (H) 10/25/2024 1422        Component Value Date/Time    CALCIUM 10.0 10/25/2024 1422    ALKPHOS 68 10/25/2024 1422    AST 42 (H) 10/25/2024 1422    ALT 42 10/25/2024 1422    BILITOT 0.4 10/25/2024 1422    ESTGFRAFRICA >60.0 02/09/2022 1308    EGFRNONAA >60.0 02/09/2022 1308          Lab Results   Component Value Date    HGBA1C 6.0 (H) 10/25/2024     Lab Results   Component Value Date    TSH 2.524 03/01/2024     No results found for: "INR", "PROTIME"  Lab Results   Component Value Date    WBC 8.24 06/28/2024    HGB 12.1 06/28/2024    HCT 36.9 (L) 06/28/2024    MCV 94 06/28/2024     06/28/2024     BMP  Sodium   Date Value Ref Range Status   10/25/2024 141 136 - 145 mmol/L Final     Potassium   Date Value Ref Range Status   10/25/2024 3.9 3.5 - 5.1 mmol/L Final     Chloride   Date Value Ref Range Status   10/25/2024 106 95 - 110 mmol/L Final     CO2   Date Value Ref Range Status   10/25/2024 25 23 - 29 mmol/L Final     BUN   Date Value Ref Range Status   10/25/2024 17 8 - 23 mg/dL Final     Creatinine   Date Value Ref Range Status   10/25/2024 1.0 0.5 - 1.4 mg/dL Final     Calcium   Date Value Ref Range Status   10/25/2024 10.0 8.7 - 10.5 mg/dL Final     Anion Gap   Date Value Ref Range Status   10/25/2024 10 8 - 16 mmol/L Final     eGFR if    Date Value Ref Range Status   02/09/2022 >60.0 >60 mL/min/1.73 m^2 Final     eGFR if non    Date Value Ref Range Status   02/09/2022 >60.0 >60 mL/min/1.73 m^2 Final    "  Comment:     Calculation used to obtain the estimated glomerular filtration  rate (eGFR) is the CKD-EPI equation.        BNP  @LABRCNTIP(BNP,BNPTRIAGEBLO)@  @LABRCNTIP(troponini)@  CrCl cannot be calculated (Patient's most recent lab result is older than the maximum 7 days allowed.).  No results found in the last 24 hours.  No results found in the last 24 hours.  No results found in the last 24 hours.    Assessment:      1. Orthostatic hypotension    2. Severe obesity (BMI 35.0-39.9) with comorbidity    3. Hyperlipidemia associated with type 2 diabetes mellitus    4. Hypertension associated with diabetes    5. MVP (mitral valve prolapse)    6. Other chest pain    7. PVC (premature ventricular contraction)    8. PVD (peripheral vascular disease)      Positional dizziness    Plan:   Advise Abd binder  Continue comprsison sock  Fall precaution  Contieuh verapmil jardiance zetia lasix   RTC in 6m

## 2024-11-05 ENCOUNTER — PATIENT MESSAGE (OUTPATIENT)
Dept: CARDIOLOGY | Facility: HOSPITAL | Age: 79
End: 2024-11-05
Payer: MEDICARE

## 2024-11-06 ENCOUNTER — TELEPHONE (OUTPATIENT)
Dept: CARDIOLOGY | Facility: HOSPITAL | Age: 79
End: 2024-11-06
Payer: MEDICARE

## 2024-11-06 NOTE — TELEPHONE ENCOUNTER
----- Message from Carlita sent at 11/6/2024  2:45 PM CST -----  Contact: Kaylin  .Type:  Patient Returning Call    Who Called:Kaylin  Who Left Message for Patient:nurse  Does the patient know what this is regarding?:yes  Would the patient rather a call back or a response via [facility]? Call back  Best Call Back Number:.[phone]   Additional Information: na        Thanks  MEDARDO

## 2024-11-07 ENCOUNTER — HOSPITAL ENCOUNTER (OUTPATIENT)
Dept: RADIOLOGY | Facility: HOSPITAL | Age: 79
Discharge: HOME | End: 2024-11-07
Attending: PHYSICIAN ASSISTANT
Payer: MEDICARE

## 2024-11-07 ENCOUNTER — PATIENT MESSAGE (OUTPATIENT)
Dept: CARDIOLOGY | Facility: HOSPITAL | Age: 79
End: 2024-11-07
Payer: MEDICARE

## 2024-11-07 ENCOUNTER — CLINICAL SUPPORT (OUTPATIENT)
Dept: REHABILITATION | Facility: HOSPITAL | Age: 79
End: 2024-11-07
Payer: MEDICARE

## 2024-11-07 DIAGNOSIS — R29.898 WEAKNESS OF BOTH LOWER EXTREMITIES: Primary | ICD-10-CM

## 2024-11-07 DIAGNOSIS — Z12.31 ENCOUNTER FOR SCREENING MAMMOGRAM FOR BREAST CANCER: ICD-10-CM

## 2024-11-07 DIAGNOSIS — R29.898 LEG WEAKNESS, BILATERAL: ICD-10-CM

## 2024-11-07 DIAGNOSIS — Z74.09 IMPAIRED FUNCTIONAL MOBILITY, BALANCE, GAIT, AND ENDURANCE: ICD-10-CM

## 2024-11-07 PROCEDURE — 77063 BREAST TOMOSYNTHESIS BI: CPT | Mod: TC

## 2024-11-07 PROCEDURE — 77067 SCR MAMMO BI INCL CAD: CPT | Mod: 26,,, | Performed by: RADIOLOGY

## 2024-11-07 PROCEDURE — 97112 NEUROMUSCULAR REEDUCATION: CPT | Mod: PN

## 2024-11-07 PROCEDURE — 77063 BREAST TOMOSYNTHESIS BI: CPT | Mod: 26,,, | Performed by: RADIOLOGY

## 2024-11-07 PROCEDURE — 97161 PT EVAL LOW COMPLEX 20 MIN: CPT | Mod: PN

## 2024-11-10 PROBLEM — Z74.09 IMPAIRED FUNCTIONAL MOBILITY, BALANCE, GAIT, AND ENDURANCE: Status: ACTIVE | Noted: 2024-11-10

## 2024-11-10 NOTE — PLAN OF CARE
[facility]   Physical Therapy Initial Evaluation     Date: 11/7/2024     Name: Kaylin Mccollum  Clinic Number: [id number]  Therapy Diagnosis:   Encounter Diagnosis   Name Primary?    Leg weakness, bilateral       Physician: Karley Hernandez PA-C      Physician Orders: PT Eval and Treat  Medical Diagnosis from Referral: Bilateral lower extremity weakness  Evaluation Date: 11/7/2024  Authorization Period Expiration: 12/31/2024  Plan of Care Expiration: 1/6/2025   Progress Update: 12/7/2024  Visit # / Visits authorized: 1 / 1   FOTO: 11/7/2024 - Scored: 1 / 3       PRECAUTIONS: Standard Precautions and Safety/fall precautions     Time In: 1400  Time Out: 1445  Total Appointment Time (timed & untimed codes): 45 minutes    SUBJECTIVE     Date of onset: 11/6/2024  Chief Complaint: Falls and lower extremity weakness    History of current condition - Kaylin is a 79 y.o. female who presents to physical therapy reporting weakness and falls (3 in 2 years).  She was taking medication for BP.  She states she has to use arms to stand from chair and turning to left she has increased loss of balance.    Falls: [] No  [x] Yes:     Imaging: [] Xray [] MRI [] CT: Reviewed    Prior Therapy:  [] No  [x] Yes:   Social History: Pt lives alone [] House [x] Apartment/Condo []other  Stairs: [] No  [x] Yes:   Occupation: None: previous LPN/Medic  Prior Level of Function: Independent with all activities of daily living  Current Level of Function: weakness    Pain:  Current 2/10  Location: Fibromyalgia    Pts goals: Pt reported goals are to improve pain and function    _______________________________________________________  Medical History:   Past Medical History:   Diagnosis Date    Arthritis     Cataract     COVID-19 02/25/2021    Diabetes mellitus 1995    BS didn't check 09/13/2022    Diabetes mellitus, type 2     Fibromyalgia     General anesthetics causing adverse effect in therapeutic use     bradycardia      Hypertension     Migraines     Mitral valve prolapse     Osteoarthritis     Rotator cuff tear 04/01/2015       Surgical History:   Kaylin Mccollum  has a past surgical history that includes Cholecystectomy; Knee arthroscopy (Bilateral); Shoulder arthroscopy (Right, 06/04/2015); Pars plana vitrectomy w/ repair of macular hole (Left, 02/22/2017); Colonoscopy (N/A, 09/25/2018); Cataract extraction w/  intraocular lens implant (Left, 07/19/2017); Cataract extraction w/  intraocular lens implant (Right, 2017); Radiofrequency thermocoagulation (Left, 07/11/2019); Radiofrequency thermocoagulation (Right, 07/25/2019); Injection of anesthetic agent around nerve (Right, 08/08/2019); Injection of joint (Bilateral, 09/05/2019); Eye surgery; Arthroscopy of knee (Right, 03/10/2020); Chondroplasty of knee (Right, 03/10/2020); Excision of medial meniscus of knee (Right, 03/10/2020); Injection of joint (Bilateral, 05/06/2020); Injection of anesthetic agent into sacroiliac joint (Bilateral, 05/06/2020); Injection of anesthetic agent into sacroiliac joint (Bilateral, 10/08/2020); Injection of anesthetic agent into sacroiliac joint (Bilateral, 01/05/2021); Injection of anesthetic agent into sacroiliac joint (Bilateral, 07/08/2021); Injection of anesthetic agent into sacroiliac joint (Bilateral, 03/01/2022); Injection of joint (Right, 04/28/2022); Injection of joint (Bilateral, 10/10/2022); Injection of anesthetic agent into sacroiliac joint (Bilateral, 10/10/2022); Injection of anesthetic agent around medial branch nerves innervating lumbar facet joint (Bilateral, 12/13/2022); Injection of joint (Bilateral, 01/24/2023); Injection of anesthetic agent into sacroiliac joint (Bilateral, 01/24/2023); injection, allograft, intervertebral disc (N/A, 04/25/2023); injection, allograft, intervertebral disc (N/A, 05/09/2023); Injection of joint (Bilateral, 05/23/2023); Injection of joint (Bilateral, 06/21/2023); Injection of anesthetic agent  "into sacroiliac joint (Bilateral, 06/21/2023); Injection of anesthetic agent into sacroiliac joint (Bilateral, 1/9/2024); Injection of joint (Bilateral, 1/9/2024); Injection of joint (Bilateral, 1/23/2024); Selective injection of anesthetic agent around lumbar spinal nerve root by transforaminal approach (Bilateral, 4/9/2024); Injection of joint (Bilateral, 7/23/2024); and Selective injection of anesthetic agent around lumbar spinal nerve root by transforaminal approach (Bilateral, 9/17/2024).    Medications:   Kaylin has a current medication list which includes the following prescription(s): b complex vitamins, biotin, celecoxib, clotrimazole-betamethasone 1-0.05%, diclofenac sodium, empagliflozin, ezetimibe, fluoxetine, fluticasone propionate, furosemide, gabapentin, metformin, milnacipran, omeprazole, potassium chloride sa, pulse oximeter, abrysvo (pf), ozempic, triamcinolone acetonide 0.025%, verapamil, and vitamin d.    Allergies:   Review of patient's allergies indicates:   Allergen Reactions    Demerol [meperidine] Other (See Comments)     Burning when adm IV  Able to tolerate IM, "Turned the veins in my hand purple."    Latex, natural rubber Other (See Comments)     "Burns my skin",  Symptoms get worse the longer she is exposed    Zocor [simvastatin] Other (See Comments)     Tightening of muscles    Statins-hmg-coa reductase inhibitors Other (See Comments)     myopathy    Sulfa (sulfonamide antibiotics)      Blurred vision    Nickel Rash     Pt states she had sores to ear lobes from nickel in earrings           OBJECTIVE     Posture:  Pt presents with postural abnormalities which include:    [x] Forward Head   [] Increased Lumbar Lordosis   [x] Rounded Shoulder   [] Flat Back Posture   [] Increased Thoracic Kyphosis [] Inominate Rotation   [] Increased Trunk Sway  [] Genu Recurvatum   [] Increased Trunk Rotation  [] Pes Planus   [] Other:     Sensation:  Sensation is [x] Intact [] Impaired-- to light " touch     Evaluation Reassessment Reassessment   Single Limb Stance R LE 1 second  (<10 sec = HIGH FALL RISK)     Single Limb Stance L LE 1 second  (<10 sec = HIGH FALL RISK)       Endurance Assessment:     Evaluation Reassessment Reassessment   Timed Up and Go 14 sec     Self Selected Walking Speed NT m/sec   (from 6 MWT)     30 Second Chair Rise   Unable without use of hands         FUNCTION:     Intake Outcome Measure for FOTO Lower leg w/o knee Survey    Therapist reviewed FOTO scores for Kaylin Mccollum on 11/7/2024.   FOTO documents entered into [employer] - see Media section.    Intake Score: 55%         TREATMENT     Total Treatment time separate from Evaluation: (15) minutes    Kaylin received the following interventions:     CPT Intervention   Duration/ Details      Bridges      Supine hip abduction with band      Sit to stand with both hands                                         PLAN         CPT Codes available for Billing:   (-) minutes of Manual therapy (MT) to improve pain and ROM.  (-) minutes of Therapeutic Exercise (TE) to develop strength, endurance, range of motion, and flexibility.  (15) minutes of Neuromuscular Re-Education (NMR)  to improve: Balance, Coordination, Kinesthetic, Sense, Proprioception, and Posture.  (-) minutes of Therapeutic Activities (TA) to improve functional performance.  (-) minutes of Gait Training (GT) to improve functional gait mechanics.  (-) minutes of Self- Care and Education  Unattended Electrical Stimulation (ES) for muscle performance or pain modulation.  Vasopneumatic Device Therapy () for management of swelling/edema. (37107)  BFR: Blood flow restriction applied during exercise  Functional Dry Needling (FDN)  Functional Performance Test (FPT)  NP or (-): Not Performed      PATIENT EDUCATION AND HOME EXERCISES     Education/Self-Care provided:  (included in treatment) minutes   Patient educated on the impairments noted above and the effects of physical therapy  intervention to improve overall condition and Quality of Life  Patient was educated on all the above exercise prior/during/after for proper posture, positioning, and execution for safe performance with home exercise program.     Written Home Exercises Provided: yes. Prefers: [x] Printed [] Electronic  Exercises were reviewed and Kaylin was able to demonstrate them prior to the end of the session.  Kaylin demonstrated good understanding of the education provided. See EMR under Patient Instructions for exercises provided during therapy sessions.      ASSESSMENT     Kaylin is a 79 y.o. female referred to outpatient Physical Therapy with a medical diagnosis of   Encounter Diagnosis   Name Primary?    Leg weakness, bilateral    .    Physical exam supports weakness and balance impairments including: decreased range of motion, decreased muscular strength, decreased endurance, decreased muscular length/flexibility, impaired joint mobility, and impaired functional mobility.     The above impairments will be addressed through manual therapy techniques, therapeutic exercises, functional training, and modalities as necessary. Patient was treated and educated on exercises for home program, progression of therapy, and benefits of therapy to achieve full functional mobility.       Pt prognosis is Good.   Pt will benefit from skilled outpatient Physical Therapy to address the deficits stated above and in the chart below, provide pt/family education, and to maximize pt's level of independence.     Plan of care discussed with patient: Yes  Pt's spiritual, cultural and educational needs considered and patient is agreeable to the plan of care and goals as stated below:     Anticipated Barriers for therapy: none    Medical Necessity is demonstrated by the following:     History  Co-morbidities and personal factors that may impact the plan of care [x] LOW: no personal factors / co-morbidities  [] MODERATE: 1-2 personal factors /  co-morbidities  [] HIGH: 3+ personal factors / co-morbidities    Moderate / High Support Documentation:   Co-morbidities affecting plan of care:   Past Medical History:   Diagnosis Date    Arthritis     Cataract     COVID-19 02/25/2021    Diabetes mellitus 1995    BS didn't check 09/13/2022    Diabetes mellitus, type 2     Fibromyalgia     General anesthetics causing adverse effect in therapeutic use     bradycardia     Hypertension     Migraines     Mitral valve prolapse     Osteoarthritis     Rotator cuff tear 04/01/2015       Personal Factors:   no deficits     Examination  Body Structures and Functions, activity limitations and participation restrictions that may impact the plan of care [] LOW: addressing 1-2 elements  [] MODERATE: 3+ elements  [x] HIGH: 4+ elements (please support below)    Moderate / High Support Documentation:   [] Head / Neck  [] Spine  [] Upper Quarter  [] Lower Quarter  [] Range of motion Deficits  [] Gross Symmetry Deficits  [] Strength Deficits  [] Balance Deficits  [] Gait Deficits  [] Unable to participate in daily activities  [] Unable to perform functional tasks  [] Unable to Care for Self or others  [] Community/ Social Life changes due to impairments     Clinical Presentation [] LOW: stable  [x] MODERATE: Evolving  [] HIGH: Unstable     Decision Making/ Complexity Score: low         SHORT TERM GOALS:  4 weeks Progress Date Met   Recent signs and systems trend is improving in order to progress towards Long term goals.  [] Met  [] Not Met  [] Progressing    Patient will be independent with Home Exercise Program  in order to further progress and return to maximal function. [] Met  [] Not Met  [] Progressing    Pain rating at Worst: 5 /10 in order to progress towards increased independence with activity. [] Met  [] Not Met  [] Progressing    Patient will be able to correct postural deviations in sitting and standing, to decrease pain and promote postural awareness for injury  prevention.  [] Met  [] Not Met  [] Progressing    Patient will improve functional outcome (FOTO) score: by 5% to increase self-worth & perceived functional ability towards long term goals [] Met  [] Not Met  [] Progressing      LONG TERM GOALS: Discharge Progress Date Met   Patient will return to normal activites of daily living, recreational, and work related activities with less pain and limitation.  [] Met  [] Not Met  [] Progressing    Patient will improve range of motion  to stated goals in order to return to maximal functional potential.  [] Met  [] Not Met  [] Progressing    Patient will improve Strength to stated goals of appropriate musculature in order to improve functional independence.  [] Met  [] Not Met  [] Progressing    Pain Rating at Best: 1/10 to improve Quality of Life.  [] Met  [] Not Met  [] Progressing    Patient will meet predicted functional outcome (FOTO) score: 56% to increase self-worth & perceived functional ability. [] Met  [] Not Met  [] Progressing    Patient will have met/partially met personal goal of: improve pain and function with improved stability [] Met  [] Not Met  [] Progressing        PLAN   Plan of care Certification: 11/7/2024 to 1/6/2024    Outpatient Physical Therapy 2 times weekly for 10 weeks to include any combination of the following interventions: virtual visits, dry needling, modalities, electrical stimulation (IFC, Pre-Mod, Attended with Functional Dry Needling), Manual Therapy, Moist Heat/ Ice, Neuromuscular Re-ed, Patient Education, Self Care, Therapeutic Exercise, Functional Training, and Therapeutic Activites     Thank you for this referral.    Jose Roberto Marte, PT

## 2024-11-12 ENCOUNTER — CLINICAL SUPPORT (OUTPATIENT)
Dept: REHABILITATION | Facility: HOSPITAL | Age: 79
End: 2024-11-12
Payer: MEDICARE

## 2024-11-12 DIAGNOSIS — Z74.09 IMPAIRED FUNCTIONAL MOBILITY, BALANCE, GAIT, AND ENDURANCE: Primary | ICD-10-CM

## 2024-11-12 PROCEDURE — 97110 THERAPEUTIC EXERCISES: CPT | Mod: PN

## 2024-11-12 PROCEDURE — 97112 NEUROMUSCULAR REEDUCATION: CPT | Mod: PN

## 2024-11-14 ENCOUNTER — HOSPITAL ENCOUNTER (OUTPATIENT)
Dept: CARDIOLOGY | Facility: HOSPITAL | Age: 79
Discharge: HOME | End: 2024-11-14
Attending: INTERNAL MEDICINE
Payer: MEDICARE

## 2024-11-14 ENCOUNTER — CLINICAL SUPPORT (OUTPATIENT)
Dept: REHABILITATION | Facility: HOSPITAL | Age: 79
End: 2024-11-14
Payer: MEDICARE

## 2024-11-14 VITALS
WEIGHT: 186 LBS | DIASTOLIC BLOOD PRESSURE: 80 MMHG | SYSTOLIC BLOOD PRESSURE: 127 MMHG | HEIGHT: 60 IN | BODY MASS INDEX: 36.52 KG/M2

## 2024-11-14 DIAGNOSIS — Z74.09 IMPAIRED FUNCTIONAL MOBILITY, BALANCE, GAIT, AND ENDURANCE: Primary | ICD-10-CM

## 2024-11-14 DIAGNOSIS — I34.1 MVP (MITRAL VALVE PROLAPSE): ICD-10-CM

## 2024-11-14 LAB
AORTIC ROOT ANNULUS: 2.92 CM
ASCENDING AORTA: 2.57 CM
AV INDEX (PROSTH): 0.88
AV MEAN GRADIENT: 1.7 MMHG
AV PEAK GRADIENT: 3.2 MMHG
AV VALVE AREA BY VELOCITY RATIO: 2.8 CM²
AV VALVE AREA: 2.8 CM²
AV VELOCITY RATIO: 0.89
BSA FOR ECHO PROCEDURE: 1.89 M2
CV ECHO LV RWT: 0.54 CM
DOP CALC AO PEAK VEL: 0.9 M/S
DOP CALC AO VTI: 16.4 CM
DOP CALC LVOT AREA: 3.1 CM2
DOP CALC LVOT DIAMETER: 2 CM
DOP CALC LVOT PEAK VEL: 0.8 M/S
DOP CALC LVOT STROKE VOLUME: 45.5 CM3
DOP CALC RVOT PEAK VEL: 0.6 M/S
DOP CALC RVOT VTI: 11.4 CM
DOP CALCLVOT PEAK VEL VTI: 14.5 CM
E WAVE DECELERATION TIME: 108.6 MSEC
E/A RATIO: 0.74
E/E' RATIO: 6.95 M/S
ECHO LV POSTERIOR WALL: 1 CM (ref 0.6–1.1)
EJECTION FRACTION: 60 %
FRACTIONAL SHORTENING: 35.1 % (ref 28–44)
INTERVENTRICULAR SEPTUM: 1 CM (ref 0.6–1.1)
IVC DIAMETER: 1.76 CM
IVRT: 97.05 MSEC
LA MAJOR: 4.5 CM
LA MINOR: 3.86 CM
LA WIDTH: 3.6 CM
LEFT ATRIUM AREA SYSTOLIC (APICAL 2 CHAMBER): 14.14 CM2
LEFT ATRIUM AREA SYSTOLIC (APICAL 4 CHAMBER): 15.63 CM2
LEFT ATRIUM SIZE: 3.27 CM
LEFT ATRIUM VOLUME INDEX MOD: 24.2 ML/M2
LEFT ATRIUM VOLUME INDEX: 23 ML/M2
LEFT ATRIUM VOLUME MOD: 43.87 ML
LEFT ATRIUM VOLUME: 41.58 CM3
LEFT INTERNAL DIMENSION IN SYSTOLE: 2.4 CM (ref 2.1–4)
LEFT VENTRICLE DIASTOLIC VOLUME INDEX: 32.01 ML/M2
LEFT VENTRICLE DIASTOLIC VOLUME: 57.94 ML
LEFT VENTRICLE END SYSTOLIC VOLUME APICAL 2 CHAMBER: 39.78 ML
LEFT VENTRICLE END SYSTOLIC VOLUME APICAL 4 CHAMBER: 44.96 ML
LEFT VENTRICLE MASS INDEX: 62.2 G/M2
LEFT VENTRICLE SYSTOLIC VOLUME INDEX: 11 ML/M2
LEFT VENTRICLE SYSTOLIC VOLUME: 19.82 ML
LEFT VENTRICULAR INTERNAL DIMENSION IN DIASTOLE: 3.7 CM (ref 3.5–6)
LEFT VENTRICULAR MASS: 112.5 G
LV LATERAL E/E' RATIO: 5.08 M/S
LV SEPTAL E/E' RATIO: 11 M/S
LVED V (TEICH): 57.94 ML
LVES V (TEICH): 19.82 ML
LVOT MG: 1.29 MMHG
LVOT MV: 0.53 CM/S
MV PEAK A VEL: 0.89 M/S
MV PEAK E VEL: 0.66 M/S
MV STENOSIS PRESSURE HALF TIME: 31.49 MS
MV VALVE AREA P 1/2 METHOD: 6.99 CM2
PISA TR MAX VEL: 2.76 M/S
PV MEAN GRADIENT: 1 MMHG
PV PEAK GRADIENT: 2 MMHG
PV PEAK VELOCITY: 0.79 M/S
RA MAJOR: 3.94 CM
RA PRESSURE ESTIMATED: 3 MMHG
RA WIDTH: 3.19 CM
RIGHT VENTRICULAR END-DIASTOLIC DIMENSION: 3.14 CM
RV TB RVSP: 6 MMHG
SINUS: 2.44 CM
STJ: 2.05 CM
TDI LATERAL: 0.13 M/S
TDI SEPTAL: 0.06 M/S
TDI: 0.1 M/S
TR MAX PG: 30 MMHG
TRICUSPID ANNULAR PLANE SYSTOLIC EXCURSION: 1.84 CM
TV REST PULMONARY ARTERY PRESSURE: 33 MMHG
Z-SCORE OF LEFT VENTRICULAR DIMENSION IN END DIASTOLE: -3.03
Z-SCORE OF LEFT VENTRICULAR DIMENSION IN END SYSTOLE: -2.01

## 2024-11-14 PROCEDURE — 97112 NEUROMUSCULAR REEDUCATION: CPT | Mod: PN

## 2024-11-14 PROCEDURE — 93306 TTE W/DOPPLER COMPLETE: CPT

## 2024-11-14 PROCEDURE — 97110 THERAPEUTIC EXERCISES: CPT | Mod: PN

## 2024-11-14 PROCEDURE — 93306 TTE W/DOPPLER COMPLETE: CPT | Mod: 26,,, | Performed by: INTERNAL MEDICINE

## 2024-11-15 ENCOUNTER — TELEPHONE (OUTPATIENT)
Dept: CARDIOLOGY | Facility: CLINIC | Age: 79
End: 2024-11-15
Payer: MEDICARE

## 2024-11-16 NOTE — PROGRESS NOTES
"[facility]   Physical Therapy Treatment Note      Name: Kaylin Mccollum  Clinic Number: [id number]    Therapy Diagnosis:   Encounter Diagnosis   Name Primary?    Impaired functional mobility, balance, gait, and endurance Yes     Physician: Karley Hernandez PA-C    Visit Date: 11/12/2024    Physician Orders: PT Eval and Treat  Medical Diagnosis from Referral: Bilateral lower extremity weakness  Evaluation Date: 11/7/2024  Authorization Period Expiration: 12/31/2024  Plan of Care Expiration: 1/6/2025      Progress Update: 12/7/2024  Visit # / Visits authorized: 1 / 1          FOTO: 11/7/2024 - Scored: 1 / 3         PRECAUTIONS: Standard Precautions and Safety/fall precautions      Time In: 0755  Time Out: 0848  Total Appointment Time (timed & untimed codes): 53 minutes    PTA Visit #: 0/5       Subjective     Patient reports: weakness and instability with back pain.  She was compliant with home exercise program.  Response to previous treatment: N/A  Functional change: N/A    Pain: 2/10  Location: Fibromyalgia     Objective      Objective Measures updated at progress report unless specified.     Treatment     Kaylin received the treatments listed below:        CPT Intervention  Joint:   Focus Duration / Intensity     X TE NuStep 5'   X NMR Shuttle 3 bands 2 minutes   X NMR Standing hip abduction 2x15   X NMR Standing hip ext 2x15   X TE Standing heel raises 2x15   X NME Sit to stand 22" 3x10   X NMR Seated knee extension 3x10   X NMR Matrix hip abduction 25# 3x10   X NMR Matrix hip adduction 25# 3x10             X NMR Bridges 2x15   X NMR SAQ 3x10 5#   X NMR Clamshell 2x15 RTB                                                                           PLAN             CPT Codes available for Billing:   (00) minutes of Manual therapy (MT) to improve pain and ROM.  (10) minutes of Therapeutic Exercise (TE) to develop strength, endurance, range of motion, and flexibility.  (43) minutes of " Neuromuscular Re-Education (NMR)  to improve: Balance, Coordination, Kinesthetic, Sense, Proprioception, and Posture.  (00) minutes of Therapeutic Activities (TA) to improve functional performance.  Unattended Electrical Stimulation (ES) for muscle performance or pain modulation.  Vasopneumatic Device Therapy () for management of swelling/edema. (97014)  BFR: Blood flow restriction applied during exercise  NP or (-): Not Performed    Patient Education and Home Exercises       Education provided:   - Home program    Written Home Exercises Provided: Pt instructed to continue prior HEP. Exercises were reviewed and Kaylin was able to demonstrate them prior to the end of the session.  Kaylin demonstrated good  understanding of the education provided. See Electronic Medical Record under Patient Instructions for exercises provided during therapy sessions    Assessment     The patient has instability in gait and weakness.  She was instructed in and performed lower extremity strengthening and balance activity    Kaylin Is progressing well towards her goals.   Patient prognosis is Good.     Patient will continue to benefit from skilled outpatient physical therapy to address the deficits listed in the problem list box on initial evaluation, provide pt/family education and to maximize pt's level of independence in the home and community environment.     Patient's spiritual, cultural and educational needs considered and pt agreeable to plan of care and goals.     Anticipated barriers to physical therapy: None    SHORT TERM GOALS:  4 weeks Progress Date Met   Recent signs and systems trend is improving in order to progress towards Long term goals.  [] Met  [] Not Met  [] Progressing     Patient will be independent with Home Exercise Program  in order to further progress and return to maximal function. [] Met  [] Not Met  [] Progressing     Pain rating at Worst: 5 /10 in order to progress towards increased independence  with activity. [] Met  [] Not Met  [] Progressing     Patient will be able to correct postural deviations in sitting and standing, to decrease pain and promote postural awareness for injury prevention.  [] Met  [] Not Met  [] Progressing     Patient will improve functional outcome (FOTO) score: by 5% to increase self-worth & perceived functional ability towards long term goals [] Met  [] Not Met  [] Progressing        LONG TERM GOALS: Discharge Progress Date Met   Patient will return to normal activites of daily living, recreational, and work related activities with less pain and limitation.  [] Met  [] Not Met  [] Progressing     Patient will improve range of motion  to stated goals in order to return to maximal functional potential.  [] Met  [] Not Met  [] Progressing     Patient will improve Strength to stated goals of appropriate musculature in order to improve functional independence.  [] Met  [] Not Met  [] Progressing     Pain Rating at Best: 1/10 to improve Quality of Life.  [] Met  [] Not Met  [] Progressing     Patient will meet predicted functional outcome (FOTO) score: 56% to increase self-worth & perceived functional ability. [] Met  [] Not Met  [] Progressing     Patient will have met/partially met personal goal of: improve pain and function with improved stability [] Met  [] Not Met  [] Progressing           PLAN   Plan of care Certification: 11/7/2024 to 1/6/2024     Outpatient Physical Therapy 2 times weekly for 10 weeks to include any combination of the following interventions: virtual visits, dry needling, modalities, electrical stimulation (IFC, Pre-Mod, Attended with Functional Dry Needling), Manual Therapy, Moist Heat/ Ice, Neuromuscular Re-ed, Patient Education, Self Care, Therapeutic Exercise, Functional Training, and Therapeutic Activites     Jose Roberto Marte, PT

## 2024-11-17 NOTE — PROGRESS NOTES
"[facility]   Physical Therapy Treatment Note      Name: Kaylin Mccollum  Clinic Number: [id number]    Therapy Diagnosis:   Encounter Diagnosis   Name Primary?    Impaired functional mobility, balance, gait, and endurance Yes     Physician: Karley Hernandez PA-C    Visit Date: 11/14/2024    Physician Orders: PT Eval and Treat  Medical Diagnosis from Referral: Bilateral lower extremity weakness  Evaluation Date: 11/7/2024  Authorization Period Expiration: 12/31/2024  Plan of Care Expiration: 1/6/2025      Progress Update: 12/7/2024  Visit # / Visits authorized: 1 / 1          FOTO: 11/7/2024 - Scored: 1 / 3         PRECAUTIONS: Standard Precautions and Safety/fall precautions      Time In: 0955  Time Out: 1048  Total Appointment Time (timed & untimed codes): 53 minutes    PTA Visit #: 0/5       Subjective     Patient reports: weakness and instability with back pain.  She was compliant with home exercise program.  Response to previous treatment: N/A  Functional change: N/A    Pain: 2/10  Location: Fibromyalgia     Objective      Objective Measures updated at progress report unless specified.     Treatment     Kaylin received the treatments listed below:        CPT Intervention  Joint:   Focus Duration / Intensity     X TE NuStep 5'   X NMR Shuttle 3 bands 2 minutes   X NMR Standing hip abduction 2x15   X NMR Standing hip ext 2x15   X TE Standing heel raises 2x15   X NME Sit to stand 22" 3x10   X NMR Seated knee extension 3x10   X NMR Matrix hip abduction 25# 3x10   X NMR Matrix hip adduction 25# 3x10             X NMR Bridges 2x15   X NMR SAQ 3x10 5#   X NMR Clamshell 2x15 RTB                                                                           PLAN             CPT Codes available for Billing:   (00) minutes of Manual therapy (MT) to improve pain and ROM.  (10) minutes of Therapeutic Exercise (TE) to develop strength, endurance, range of motion, and flexibility.  (43) minutes of " Neuromuscular Re-Education (NMR)  to improve: Balance, Coordination, Kinesthetic, Sense, Proprioception, and Posture.  (00) minutes of Therapeutic Activities (TA) to improve functional performance.  Unattended Electrical Stimulation (ES) for muscle performance or pain modulation.  Vasopneumatic Device Therapy () for management of swelling/edema. (49927)  BFR: Blood flow restriction applied during exercise  NP or (-): Not Performed    Patient Education and Home Exercises       Education provided:   - Home program    Written Home Exercises Provided: Pt instructed to continue prior HEP. Exercises were reviewed and Kaylin was able to demonstrate them prior to the end of the session.  Kaylin demonstrated good  understanding of the education provided. See Electronic Medical Record under Patient Instructions for exercises provided during therapy sessions    Assessment     The patient has instability in gait and weakness.  She was instructed in and performed lower extremity strengthening and balance activity    Kaylin Is progressing well towards her goals.   Patient prognosis is Good.     Patient will continue to benefit from skilled outpatient physical therapy to address the deficits listed in the problem list box on initial evaluation, provide pt/family education and to maximize pt's level of independence in the home and community environment.     Patient's spiritual, cultural and educational needs considered and pt agreeable to plan of care and goals.     Anticipated barriers to physical therapy: None    SHORT TERM GOALS:  4 weeks Progress Date Met   Recent signs and systems trend is improving in order to progress towards Long term goals.  [] Met  [] Not Met  [] Progressing     Patient will be independent with Home Exercise Program  in order to further progress and return to maximal function. [] Met  [] Not Met  [] Progressing     Pain rating at Worst: 5 /10 in order to progress towards increased independence  with activity. [] Met  [] Not Met  [] Progressing     Patient will be able to correct postural deviations in sitting and standing, to decrease pain and promote postural awareness for injury prevention.  [] Met  [] Not Met  [] Progressing     Patient will improve functional outcome (FOTO) score: by 5% to increase self-worth & perceived functional ability towards long term goals [] Met  [] Not Met  [] Progressing        LONG TERM GOALS: Discharge Progress Date Met   Patient will return to normal activites of daily living, recreational, and work related activities with less pain and limitation.  [] Met  [] Not Met  [] Progressing     Patient will improve range of motion  to stated goals in order to return to maximal functional potential.  [] Met  [] Not Met  [] Progressing     Patient will improve Strength to stated goals of appropriate musculature in order to improve functional independence.  [] Met  [] Not Met  [] Progressing     Pain Rating at Best: 1/10 to improve Quality of Life.  [] Met  [] Not Met  [] Progressing     Patient will meet predicted functional outcome (FOTO) score: 56% to increase self-worth & perceived functional ability. [] Met  [] Not Met  [] Progressing     Patient will have met/partially met personal goal of: improve pain and function with improved stability [] Met  [] Not Met  [] Progressing           PLAN   Plan of care Certification: 11/7/2024 to 1/6/2024     Outpatient Physical Therapy 2 times weekly for 10 weeks to include any combination of the following interventions: virtual visits, dry needling, modalities, electrical stimulation (IFC, Pre-Mod, Attended with Functional Dry Needling), Manual Therapy, Moist Heat/ Ice, Neuromuscular Re-ed, Patient Education, Self Care, Therapeutic Exercise, Functional Training, and Therapeutic Activites     Jose Roberto Marte, PT

## 2024-11-19 ENCOUNTER — CLINICAL SUPPORT (OUTPATIENT)
Dept: REHABILITATION | Facility: HOSPITAL | Age: 79
End: 2024-11-19
Payer: MEDICARE

## 2024-11-19 DIAGNOSIS — Z74.09 IMPAIRED FUNCTIONAL MOBILITY, BALANCE, GAIT, AND ENDURANCE: Primary | ICD-10-CM

## 2024-11-19 PROCEDURE — 97112 NEUROMUSCULAR REEDUCATION: CPT | Mod: PN

## 2024-11-19 PROCEDURE — 97110 THERAPEUTIC EXERCISES: CPT | Mod: PN

## 2024-11-24 NOTE — PROGRESS NOTES
"[facility]   Physical Therapy Treatment Note      Name: Kaylin Mccollum  Clinic Number: [id number]    Therapy Diagnosis:   Encounter Diagnosis   Name Primary?    Impaired functional mobility, balance, gait, and endurance Yes     Physician: Karley Hernandez PA-C    Visit Date: 11/19/2024    Physician Orders: PT Eval and Treat  Medical Diagnosis from Referral: Bilateral lower extremity weakness  Evaluation Date: 11/7/2024  Authorization Period Expiration: 12/31/2024  Plan of Care Expiration: 1/6/2025      Progress Update: 12/7/2024  Visit # / Visits authorized: 1 / 1          FOTO: 11/7/2024 - Scored: 1 / 3         PRECAUTIONS: Standard Precautions and Safety/fall precautions      Time In: 1335  Time Out: 1430  Total Appointment Time (timed & untimed codes): 55 minutes    PTA Visit #: 0/5       Subjective     Patient reports: she was able to walk on steps with rail step over step  She was compliant with home exercise program.  Response to previous treatment: N/A  Functional change: N/A    Pain: 2/10  Location: Fibromyalgia     Objective      Objective Measures updated at progress report unless specified.     Treatment     Kaylin received the treatments listed below:        CPT Intervention  Joint:   Focus Duration / Intensity     X TE NuStep 5'   X NMR Shuttle 3 bands 2 minutes   X NMR Standing hip abduction 2x15   X NMR Standing hip ext 2x15   X TE Standing heel raises 2x15   X NME Sit to stand 22" 3x10   X NMR Seated knee extension 3x10   X NMR Matrix hip abduction 25# 3x10   X NMR Matrix hip adduction 25# 3x10             X NMR Bridges 2x15   X NMR SAQ 3x10 5#   X NMR Clamshell 2x15 RTB                                                                           PLAN             CPT Codes available for Billing:   (00) minutes of Manual therapy (MT) to improve pain and ROM.  (10) minutes of Therapeutic Exercise (TE) to develop strength, endurance, range of motion, and flexibility.  (45) " minutes of Neuromuscular Re-Education (NMR)  to improve: Balance, Coordination, Kinesthetic, Sense, Proprioception, and Posture.  (00) minutes of Therapeutic Activities (TA) to improve functional performance.  Unattended Electrical Stimulation (ES) for muscle performance or pain modulation.  Vasopneumatic Device Therapy () for management of swelling/edema. (30472)  BFR: Blood flow restriction applied during exercise  NP or (-): Not Performed    Patient Education and Home Exercises       Education provided:   - Home program    Written Home Exercises Provided: Pt instructed to continue prior HEP. Exercises were reviewed and Kaylin was able to demonstrate them prior to the end of the session.  Kaylin demonstrated good  understanding of the education provided. See Electronic Medical Record under Patient Instructions for exercises provided during therapy sessions    Assessment     The patient has instability in gait and weakness.  She was instructed in and performed lower extremity strengthening and balance activity    Vishalda Is progressing well towards her goals.   Patient prognosis is Good.     Patient will continue to benefit from skilled outpatient physical therapy to address the deficits listed in the problem list box on initial evaluation, provide pt/family education and to maximize pt's level of independence in the home and community environment.     Patient's spiritual, cultural and educational needs considered and pt agreeable to plan of care and goals.     Anticipated barriers to physical therapy: None    SHORT TERM GOALS:  4 weeks Progress Date Met   Recent signs and systems trend is improving in order to progress towards Long term goals.  [] Met  [] Not Met  [] Progressing     Patient will be independent with Home Exercise Program  in order to further progress and return to maximal function. [] Met  [] Not Met  [] Progressing     Pain rating at Worst: 5 /10 in order to progress towards increased  independence with activity. [] Met  [] Not Met  [] Progressing     Patient will be able to correct postural deviations in sitting and standing, to decrease pain and promote postural awareness for injury prevention.  [] Met  [] Not Met  [] Progressing     Patient will improve functional outcome (FOTO) score: by 5% to increase self-worth & perceived functional ability towards long term goals [] Met  [] Not Met  [] Progressing        LONG TERM GOALS: Discharge Progress Date Met   Patient will return to normal activites of daily living, recreational, and work related activities with less pain and limitation.  [] Met  [] Not Met  [] Progressing     Patient will improve range of motion  to stated goals in order to return to maximal functional potential.  [] Met  [] Not Met  [] Progressing     Patient will improve Strength to stated goals of appropriate musculature in order to improve functional independence.  [] Met  [] Not Met  [] Progressing     Pain Rating at Best: 1/10 to improve Quality of Life.  [] Met  [] Not Met  [] Progressing     Patient will meet predicted functional outcome (FOTO) score: 56% to increase self-worth & perceived functional ability. [] Met  [] Not Met  [] Progressing     Patient will have met/partially met personal goal of: improve pain and function with improved stability [] Met  [] Not Met  [] Progressing           PLAN   Plan of care Certification: 11/7/2024 to 1/6/2024     Outpatient Physical Therapy 2 times weekly for 10 weeks to include any combination of the following interventions: virtual visits, dry needling, modalities, electrical stimulation (IFC, Pre-Mod, Attended with Functional Dry Needling), Manual Therapy, Moist Heat/ Ice, Neuromuscular Re-ed, Patient Education, Self Care, Therapeutic Exercise, Functional Training, and Therapeutic Activites     Jose Roberto Marte, PT

## 2024-11-26 ENCOUNTER — CLINICAL SUPPORT (OUTPATIENT)
Dept: REHABILITATION | Facility: HOSPITAL | Age: 79
End: 2024-11-26
Payer: MEDICARE

## 2024-11-26 DIAGNOSIS — Z74.09 IMPAIRED FUNCTIONAL MOBILITY, BALANCE, GAIT, AND ENDURANCE: Primary | ICD-10-CM

## 2024-11-26 PROCEDURE — 97110 THERAPEUTIC EXERCISES: CPT | Mod: PN

## 2024-11-26 PROCEDURE — 97112 NEUROMUSCULAR REEDUCATION: CPT | Mod: PN

## 2024-11-26 NOTE — PROGRESS NOTES
"[facility]   Physical Therapy Treatment Note      Name: Kaylin Mccollum  Clinic Number: [id number]    Therapy Diagnosis:   Encounter Diagnosis   Name Primary?    Impaired functional mobility, balance, gait, and endurance Yes     Physician: Karley Hernandez PA-C    Visit Date: 11/26/2024    Physician Orders: PT Eval and Treat  Medical Diagnosis from Referral: Bilateral lower extremity weakness  Evaluation Date: 11/7/2024  Authorization Period Expiration: 12/31/2024  Plan of Care Expiration: 1/6/2025      Progress Update: 12/7/2024  Visit # / Visits authorized: 1 / 1 4/20  FOTO: 11/7/2024 - Scored: 1 / 3         PRECAUTIONS: Standard Precautions and Safety/fall precautions      Time In: 1355  Time Out: 1450  Total Appointment Time (timed & untimed codes): 55 minutes    PTA Visit #: 0/5       Subjective     Patient reports: she had to cancel last visit due to migraine and not feeling well.  She was compliant with home exercise program.  Response to previous treatment: N/A  Functional change: N/A    Pain: 2/10  Location: Fibromyalgia     Objective      Objective Measures updated at progress report unless specified.     Treatment     Kaylin received the treatments listed below:        CPT Intervention  Joint:   Focus Duration / Intensity     X TE NuStep 5'   X NMR Shuttle 3 bands 2 minutes   X NMR Standing hip abduction 2x15   X NMR Standing hip ext 2x15   X TE Standing heel raises 2x15   X NME Sit to stand 22" 3x10   X NMR Seated knee extension 3x10   X TE Matrix hip abduction 25# 3x10   X TE Matrix hip adduction 25# 3x10             X NMR Bridges 2x15   X TE SAQ 3x10 5#   X NMR Clamshell 2x15 RTB                                                                           PLAN             CPT Codes available for Billing:   (00) minutes of Manual therapy (MT) to improve pain and ROM.  (25) minutes of Therapeutic Exercise (TE) to develop strength, endurance, range of motion, and " flexibility.  (30) minutes of Neuromuscular Re-Education (NMR)  to improve: Balance, Coordination, Kinesthetic, Sense, Proprioception, and Posture.  (00) minutes of Therapeutic Activities (TA) to improve functional performance.  Unattended Electrical Stimulation (ES) for muscle performance or pain modulation.  Vasopneumatic Device Therapy () for management of swelling/edema. (72280)  BFR: Blood flow restriction applied during exercise  NP or (-): Not Performed    Patient Education and Home Exercises       Education provided:   - Home program    Written Home Exercises Provided: Pt instructed to continue prior HEP. Exercises were reviewed and Kaylin was able to demonstrate them prior to the end of the session.  Kaylin demonstrated good  understanding of the education provided. See Electronic Medical Record under Patient Instructions for exercises provided during therapy sessions    Assessment     The patient was feeling better today.  She is progressing well with strengthening and balance activity.  She will benefit from PT to continue.    Kaylin Is progressing well towards her goals.   Patient prognosis is Good.     Patient will continue to benefit from skilled outpatient physical therapy to address the deficits listed in the problem list box on initial evaluation, provide pt/family education and to maximize pt's level of independence in the home and community environment.     Patient's spiritual, cultural and educational needs considered and pt agreeable to plan of care and goals.     Anticipated barriers to physical therapy: None    SHORT TERM GOALS:  4 weeks Progress Date Met   Recent signs and systems trend is improving in order to progress towards Long term goals.  [] Met  [] Not Met  [] Progressing     Patient will be independent with Home Exercise Program  in order to further progress and return to maximal function. [] Met  [] Not Met  [] Progressing     Pain rating at Worst: 5 /10 in order to progress  towards increased independence with activity. [] Met  [] Not Met  [] Progressing     Patient will be able to correct postural deviations in sitting and standing, to decrease pain and promote postural awareness for injury prevention.  [] Met  [] Not Met  [] Progressing     Patient will improve functional outcome (FOTO) score: by 5% to increase self-worth & perceived functional ability towards long term goals [] Met  [] Not Met  [] Progressing        LONG TERM GOALS: Discharge Progress Date Met   Patient will return to normal activites of daily living, recreational, and work related activities with less pain and limitation.  [] Met  [] Not Met  [] Progressing     Patient will improve range of motion  to stated goals in order to return to maximal functional potential.  [] Met  [] Not Met  [] Progressing     Patient will improve Strength to stated goals of appropriate musculature in order to improve functional independence.  [] Met  [] Not Met  [] Progressing     Pain Rating at Best: 1/10 to improve Quality of Life.  [] Met  [] Not Met  [] Progressing     Patient will meet predicted functional outcome (FOTO) score: 56% to increase self-worth & perceived functional ability. [] Met  [] Not Met  [] Progressing     Patient will have met/partially met personal goal of: improve pain and function with improved stability [] Met  [] Not Met  [] Progressing           PLAN   Plan of care Certification: 11/7/2024 to 1/6/2024     Outpatient Physical Therapy 2 times weekly for 10 weeks to include any combination of the following interventions: virtual visits, dry needling, modalities, electrical stimulation (IFC, Pre-Mod, Attended with Functional Dry Needling), Manual Therapy, Moist Heat/ Ice, Neuromuscular Re-ed, Patient Education, Self Care, Therapeutic Exercise, Functional Training, and Therapeutic Activites     Jose Roberto Marte, PT

## 2024-12-03 ENCOUNTER — CLINICAL SUPPORT (OUTPATIENT)
Dept: REHABILITATION | Facility: HOSPITAL | Age: 79
End: 2024-12-03
Payer: MEDICARE

## 2024-12-03 DIAGNOSIS — Z74.09 IMPAIRED FUNCTIONAL MOBILITY, BALANCE, GAIT, AND ENDURANCE: Primary | ICD-10-CM

## 2024-12-03 PROCEDURE — 97110 THERAPEUTIC EXERCISES: CPT | Mod: PN

## 2024-12-03 PROCEDURE — 97112 NEUROMUSCULAR REEDUCATION: CPT | Mod: PN

## 2024-12-08 NOTE — PROGRESS NOTES
"[facility]   Physical Therapy Treatment Note      Name: Kaylin Mccollum  Clinic Number: [id number]    Therapy Diagnosis:   Encounter Diagnosis   Name Primary?    Impaired functional mobility, balance, gait, and endurance Yes       Physician: Karley Hernandez PA-C    Visit Date: 12/3/2024    Physician Orders: PT Eval and Treat  Medical Diagnosis from Referral: Bilateral lower extremity weakness  Evaluation Date: 11/7/2024  Authorization Period Expiration: 12/31/2024  Plan of Care Expiration: 1/6/2025      Progress Update: 12/7/2024  Visit # / Visits authorized: 1 / 1 5/20  FOTO: 11/7/2024 - Scored: 1 / 3         PRECAUTIONS: Standard Precautions and Safety/fall precautions      Time In: 1400  Time Out: 1454  Total Appointment Time (timed & untimed codes): 54 minutes    PTA Visit #: 0/5       Subjective     Patient reports: she is doing ok  She was compliant with home exercise program.  Response to previous treatment: N/A  Functional change: N/A    Pain: 2/10  Location: Fibromyalgia     Objective      Objective Measures updated at progress report unless specified.     Treatment     Kaylin received the treatments listed below:        CPT Intervention  Joint:   Focus Duration / Intensity     X TE NuStep 5'   X NMR Shuttle 3 bands 2 minutes   X NMR Standing hip abduction 2x15   X NMR Standing hip ext 2x15   X TE Standing heel raises 2x15   X NME Sit to stand 22" 3x10   X NMR Seated knee extension 3x10   X TE Matrix hip abduction 25# 3x10   X TE Matrix hip adduction 25# 3x10             X NMR Bridges 2x15   X TE SAQ 3x10 5#   X NMR Clamshell 2x15 RTB                                                                           PLAN             CPT Codes available for Billing:   (00) minutes of Manual therapy (MT) to improve pain and ROM.  (24) minutes of Therapeutic Exercise (TE) to develop strength, endurance, range of motion, and flexibility.  (30) minutes of Neuromuscular Re-Education " (NMR)  to improve: Balance, Coordination, Kinesthetic, Sense, Proprioception, and Posture.  (00) minutes of Therapeutic Activities (TA) to improve functional performance.  Unattended Electrical Stimulation (ES) for muscle performance or pain modulation.  Vasopneumatic Device Therapy () for management of swelling/edema. (68017)  BFR: Blood flow restriction applied during exercise  NP or (-): Not Performed    Patient Education and Home Exercises       Education provided:   - Home program    Written Home Exercises Provided: Pt instructed to continue prior HEP. Exercises were reviewed and Kaylin was able to demonstrate them prior to the end of the session.  Kaylin demonstrated good  understanding of the education provided. See Electronic Medical Record under Patient Instructions for exercises provided during therapy sessions    Assessment     She is progressing well with strengthening and balance activity.  She will benefit from PT to continue.    Kaylin Is progressing well towards her goals.   Patient prognosis is Good.     Patient will continue to benefit from skilled outpatient physical therapy to address the deficits listed in the problem list box on initial evaluation, provide pt/family education and to maximize pt's level of independence in the home and community environment.     Patient's spiritual, cultural and educational needs considered and pt agreeable to plan of care and goals.     Anticipated barriers to physical therapy: None    SHORT TERM GOALS:  4 weeks Progress Date Met   Recent signs and systems trend is improving in order to progress towards Long term goals.  [] Met  [] Not Met  [] Progressing     Patient will be independent with Home Exercise Program  in order to further progress and return to maximal function. [] Met  [] Not Met  [] Progressing     Pain rating at Worst: 5 /10 in order to progress towards increased independence with activity. [] Met  [] Not Met  [] Progressing     Patient  will be able to correct postural deviations in sitting and standing, to decrease pain and promote postural awareness for injury prevention.  [] Met  [] Not Met  [] Progressing     Patient will improve functional outcome (FOTO) score: by 5% to increase self-worth & perceived functional ability towards long term goals [] Met  [] Not Met  [] Progressing        LONG TERM GOALS: Discharge Progress Date Met   Patient will return to normal activites of daily living, recreational, and work related activities with less pain and limitation.  [] Met  [] Not Met  [] Progressing     Patient will improve range of motion  to stated goals in order to return to maximal functional potential.  [] Met  [] Not Met  [] Progressing     Patient will improve Strength to stated goals of appropriate musculature in order to improve functional independence.  [] Met  [] Not Met  [] Progressing     Pain Rating at Best: 1/10 to improve Quality of Life.  [] Met  [] Not Met  [] Progressing     Patient will meet predicted functional outcome (FOTO) score: 56% to increase self-worth & perceived functional ability. [] Met  [] Not Met  [] Progressing     Patient will have met/partially met personal goal of: improve pain and function with improved stability [] Met  [] Not Met  [] Progressing           PLAN   Plan of care Certification: 11/7/2024 to 1/6/2024     Outpatient Physical Therapy 2 times weekly for 10 weeks to include any combination of the following interventions: virtual visits, dry needling, modalities, electrical stimulation (IFC, Pre-Mod, Attended with Functional Dry Needling), Manual Therapy, Moist Heat/ Ice, Neuromuscular Re-ed, Patient Education, Self Care, Therapeutic Exercise, Functional Training, and Therapeutic Activites     Jose Roberto Marte, PT

## 2024-12-09 DIAGNOSIS — I34.1 MVP (MITRAL VALVE PROLAPSE): ICD-10-CM

## 2024-12-09 DIAGNOSIS — E11.59 HYPERTENSION ASSOCIATED WITH DIABETES: ICD-10-CM

## 2024-12-09 DIAGNOSIS — I15.2 HYPERTENSION ASSOCIATED WITH DIABETES: ICD-10-CM

## 2024-12-09 RX ORDER — VERAPAMIL HYDROCHLORIDE 120 MG/1
TABLET, FILM COATED ORAL
Qty: 180 TABLET | Refills: 5 | Status: SHIPPED | OUTPATIENT
Start: 2024-12-09

## 2024-12-10 ENCOUNTER — CLINICAL SUPPORT (OUTPATIENT)
Dept: REHABILITATION | Facility: HOSPITAL | Age: 79
End: 2024-12-10
Payer: MEDICARE

## 2024-12-10 DIAGNOSIS — Z74.09 IMPAIRED FUNCTIONAL MOBILITY, BALANCE, GAIT, AND ENDURANCE: Primary | ICD-10-CM

## 2024-12-10 PROCEDURE — 97110 THERAPEUTIC EXERCISES: CPT | Mod: PN

## 2024-12-10 PROCEDURE — 97112 NEUROMUSCULAR REEDUCATION: CPT | Mod: PN

## 2024-12-12 ENCOUNTER — CLINICAL SUPPORT (OUTPATIENT)
Dept: REHABILITATION | Facility: HOSPITAL | Age: 79
End: 2024-12-12
Payer: MEDICARE

## 2024-12-12 DIAGNOSIS — Z74.09 IMPAIRED FUNCTIONAL MOBILITY, BALANCE, GAIT, AND ENDURANCE: Primary | ICD-10-CM

## 2024-12-12 PROCEDURE — 97112 NEUROMUSCULAR REEDUCATION: CPT | Mod: PN

## 2024-12-12 PROCEDURE — 97110 THERAPEUTIC EXERCISES: CPT | Mod: PN

## 2024-12-15 DIAGNOSIS — M77.8 LEFT SHOULDER TENDINITIS: ICD-10-CM

## 2024-12-15 DIAGNOSIS — M94.261 CHONDROMALACIA, RIGHT KNEE: ICD-10-CM

## 2024-12-16 RX ORDER — CELECOXIB 200 MG/1
CAPSULE ORAL
Qty: 120 CAPSULE | Refills: 1 | Status: SHIPPED | OUTPATIENT
Start: 2024-12-16

## 2024-12-16 NOTE — PROGRESS NOTES
"[facility]   Physical Therapy Treatment Note      Name: Kaylin Mccollum  Clinic Number: [id number]    Therapy Diagnosis:   Encounter Diagnosis   Name Primary?    Impaired functional mobility, balance, gait, and endurance Yes       Physician: Karley Hernandez PA-C    Visit Date: 12/10/2024    Physician Orders: PT Eval and Treat  Medical Diagnosis from Referral: Bilateral lower extremity weakness  Evaluation Date: 11/7/2024  Authorization Period Expiration: 12/31/2024  Plan of Care Expiration: 1/6/2025      Progress Update: 12/7/2024  Visit # / Visits authorized: 1 / 1 6 /20  FOTO: 11/7/2024 - Scored: 1 / 3         PRECAUTIONS: Standard Precautions and Safety/fall precautions      Time In: 1400  Time Out: 1455  Total Appointment Time (timed & untimed codes): 55 minutes    PTA Visit #: 0/5       Subjective     Patient reports: she is doing ok and is managing medication better to not have nausea  She was compliant with home exercise program.  Response to previous treatment: N/A  Functional change: N/A    Pain: 2/10  Location: Fibromyalgia     Objective      Objective Measures updated at progress report unless specified.     Treatment     Kaylin received the treatments listed below:        CPT Intervention  Joint:   Focus Duration / Intensity     X TE NuStep 5'   X NMR Shuttle 3 bands 2 minutes   X NMR Standing hip abduction 2x15   X NMR Standing hip ext 2x15   X TE Standing heel raises 2x15   X NME Sit to stand 22" 3x10   X NMR Seated knee extension 3x10   X TE Matrix hip abduction 25# 3x10   X TE Matrix hip adduction 25# 3x10             X NMR Bridges 2x15   X TE SAQ 3x10 5#   X NMR Clamshell 2x15 RTB                                                                           PLAN             CPT Codes available for Billing:   (00) minutes of Manual therapy (MT) to improve pain and ROM.  (25) minutes of Therapeutic Exercise (TE) to develop strength, endurance, range of motion, and " flexibility.  (30) minutes of Neuromuscular Re-Education (NMR)  to improve: Balance, Coordination, Kinesthetic, Sense, Proprioception, and Posture.  (00) minutes of Therapeutic Activities (TA) to improve functional performance.  Unattended Electrical Stimulation (ES) for muscle performance or pain modulation.  Vasopneumatic Device Therapy () for management of swelling/edema. (43139)  BFR: Blood flow restriction applied during exercise  NP or (-): Not Performed    Patient Education and Home Exercises       Education provided:   - Home program    Written Home Exercises Provided: Pt instructed to continue prior HEP. Exercises were reviewed and Kaylin was able to demonstrate them prior to the end of the session.  Kaylin demonstrated good  understanding of the education provided. See Electronic Medical Record under Patient Instructions for exercises provided during therapy sessions    Assessment     She is progressing well with strengthening and balance activity.  She will benefit from PT to continue.    Kaylin Is progressing well towards her goals.   Patient prognosis is Good.     Patient will continue to benefit from skilled outpatient physical therapy to address the deficits listed in the problem list box on initial evaluation, provide pt/family education and to maximize pt's level of independence in the home and community environment.     Patient's spiritual, cultural and educational needs considered and pt agreeable to plan of care and goals.     Anticipated barriers to physical therapy: None    SHORT TERM GOALS:  4 weeks Progress Date Met   Recent signs and systems trend is improving in order to progress towards Long term goals.  [] Met  [] Not Met  [] Progressing     Patient will be independent with Home Exercise Program  in order to further progress and return to maximal function. [] Met  [] Not Met  [] Progressing     Pain rating at Worst: 5 /10 in order to progress towards increased independence with  activity. [] Met  [] Not Met  [] Progressing     Patient will be able to correct postural deviations in sitting and standing, to decrease pain and promote postural awareness for injury prevention.  [] Met  [] Not Met  [] Progressing     Patient will improve functional outcome (FOTO) score: by 5% to increase self-worth & perceived functional ability towards long term goals [] Met  [] Not Met  [] Progressing        LONG TERM GOALS: Discharge Progress Date Met   Patient will return to normal activites of daily living, recreational, and work related activities with less pain and limitation.  [] Met  [] Not Met  [] Progressing     Patient will improve range of motion  to stated goals in order to return to maximal functional potential.  [] Met  [] Not Met  [] Progressing     Patient will improve Strength to stated goals of appropriate musculature in order to improve functional independence.  [] Met  [] Not Met  [] Progressing     Pain Rating at Best: 1/10 to improve Quality of Life.  [] Met  [] Not Met  [] Progressing     Patient will meet predicted functional outcome (FOTO) score: 56% to increase self-worth & perceived functional ability. [] Met  [] Not Met  [] Progressing     Patient will have met/partially met personal goal of: improve pain and function with improved stability [] Met  [] Not Met  [] Progressing           PLAN   Plan of care Certification: 11/7/2024 to 1/6/2024     Outpatient Physical Therapy 2 times weekly for 10 weeks to include any combination of the following interventions: virtual visits, dry needling, modalities, electrical stimulation (IFC, Pre-Mod, Attended with Functional Dry Needling), Manual Therapy, Moist Heat/ Ice, Neuromuscular Re-ed, Patient Education, Self Care, Therapeutic Exercise, Functional Training, and Therapeutic Activites     Jose Roberto Marte, PT

## 2024-12-16 NOTE — PROGRESS NOTES
"[facility]   Physical Therapy Treatment Note      Name: Kaylin Mccollum  Clinic Number: [id number]    Therapy Diagnosis:   Encounter Diagnosis   Name Primary?    Impaired functional mobility, balance, gait, and endurance Yes       Physician: Karley Hernandez PA-C    Visit Date: 12/12/2024    Physician Orders: PT Eval and Treat  Medical Diagnosis from Referral: Bilateral lower extremity weakness  Evaluation Date: 11/7/2024  Authorization Period Expiration: 12/31/2024  Plan of Care Expiration: 1/6/2025      Progress Update: 1/6/2024  Visit # / Visits authorized: 1 / 1 7 /20  FOTO: 11/7/2024 - Scored: 1 / 3         PRECAUTIONS: Standard Precautions and Safety/fall precautions      Time In: 1400  Time Out: 1455  Total Appointment Time (timed & untimed codes): 55 minutes    PTA Visit #: 0/5       Subjective     Patient reports: she is doing ok and is managing medication better to not have nausea  She was compliant with home exercise program.  Response to previous treatment: N/A  Functional change: N/A    Pain: 2/10  Location: Fibromyalgia     Objective      Objective Measures updated at progress report unless specified.     Treatment     Kaylin received the treatments listed below:        CPT Intervention  Joint:   Focus Duration / Intensity     X TE NuStep 5'   X NMR Shuttle 3 bands 2 minutes   X NMR Standing hip abduction 2x15   X NMR Standing hip ext 2x15   X TE Standing heel raises 2x15   X NME Sit to stand 22" 3x10   X NMR Seated knee extension 3x10   X TE Matrix hip abduction 25# 3x10   X TE Matrix hip adduction 25# 3x10             X NMR Bridges 2x15   X TE SAQ 3x10 5#   X NMR Clamshell 2x15 RTB                                                                           PLAN             CPT Codes available for Billing:   (00) minutes of Manual therapy (MT) to improve pain and ROM.  (25) minutes of Therapeutic Exercise (TE) to develop strength, endurance, range of motion, and " flexibility.  (30) minutes of Neuromuscular Re-Education (NMR)  to improve: Balance, Coordination, Kinesthetic, Sense, Proprioception, and Posture.  (00) minutes of Therapeutic Activities (TA) to improve functional performance.  Unattended Electrical Stimulation (ES) for muscle performance or pain modulation.  Vasopneumatic Device Therapy () for management of swelling/edema. (52106)  BFR: Blood flow restriction applied during exercise  NP or (-): Not Performed    Patient Education and Home Exercises       Education provided:   - Home program    Written Home Exercises Provided: Pt instructed to continue prior HEP. Exercises were reviewed and Kaylin was able to demonstrate them prior to the end of the session.  Kaylin demonstrated good  understanding of the education provided. See Electronic Medical Record under Patient Instructions for exercises provided during therapy sessions    Assessment     She is progressing well with strengthening and balance activity.  She will benefit from PT to continue.    Kaylin Is progressing well towards her goals.   Patient prognosis is Good.     Patient will continue to benefit from skilled outpatient physical therapy to address the deficits listed in the problem list box on initial evaluation, provide pt/family education and to maximize pt's level of independence in the home and community environment.     Patient's spiritual, cultural and educational needs considered and pt agreeable to plan of care and goals.     Anticipated barriers to physical therapy: None    SHORT TERM GOALS:  4 weeks Progress Date Met   Recent signs and systems trend is improving in order to progress towards Long term goals.  [] Met  [] Not Met  [] Progressing     Patient will be independent with Home Exercise Program  in order to further progress and return to maximal function. [] Met  [] Not Met  [] Progressing     Pain rating at Worst: 5 /10 in order to progress towards increased independence with  activity. [] Met  [] Not Met  [] Progressing     Patient will be able to correct postural deviations in sitting and standing, to decrease pain and promote postural awareness for injury prevention.  [] Met  [] Not Met  [] Progressing     Patient will improve functional outcome (FOTO) score: by 5% to increase self-worth & perceived functional ability towards long term goals [] Met  [] Not Met  [] Progressing        LONG TERM GOALS: Discharge Progress Date Met   Patient will return to normal activites of daily living, recreational, and work related activities with less pain and limitation.  [] Met  [] Not Met  [] Progressing     Patient will improve range of motion  to stated goals in order to return to maximal functional potential.  [] Met  [] Not Met  [] Progressing     Patient will improve Strength to stated goals of appropriate musculature in order to improve functional independence.  [] Met  [] Not Met  [] Progressing     Pain Rating at Best: 1/10 to improve Quality of Life.  [] Met  [] Not Met  [] Progressing     Patient will meet predicted functional outcome (FOTO) score: 56% to increase self-worth & perceived functional ability. [] Met  [] Not Met  [] Progressing     Patient will have met/partially met personal goal of: improve pain and function with improved stability [] Met  [] Not Met  [] Progressing           PLAN   Plan of care Certification: 11/7/2024 to 1/6/2024     Outpatient Physical Therapy 2 times weekly for 10 weeks to include any combination of the following interventions: virtual visits, dry needling, modalities, electrical stimulation (IFC, Pre-Mod, Attended with Functional Dry Needling), Manual Therapy, Moist Heat/ Ice, Neuromuscular Re-ed, Patient Education, Self Care, Therapeutic Exercise, Functional Training, and Therapeutic Activites     Jose Roberto Marte, PT

## 2024-12-17 ENCOUNTER — CLINICAL SUPPORT (OUTPATIENT)
Dept: REHABILITATION | Facility: HOSPITAL | Age: 79
End: 2024-12-17
Payer: MEDICARE

## 2024-12-17 DIAGNOSIS — Z74.09 IMPAIRED FUNCTIONAL MOBILITY, BALANCE, GAIT, AND ENDURANCE: Primary | ICD-10-CM

## 2024-12-17 PROCEDURE — 97110 THERAPEUTIC EXERCISES: CPT | Mod: PN

## 2024-12-17 PROCEDURE — 97112 NEUROMUSCULAR REEDUCATION: CPT | Mod: PN

## 2024-12-22 NOTE — PROGRESS NOTES
"[facility]   Physical Therapy Treatment Note      Name: Kaylin Mccollum  Clinic Number: [id number]    Therapy Diagnosis:   Encounter Diagnosis   Name Primary?    Impaired functional mobility, balance, gait, and endurance Yes       Physician: Karley Hernandez PA-C    Visit Date: 12/17/2024    Physician Orders: PT Eval and Treat  Medical Diagnosis from Referral: Bilateral lower extremity weakness  Evaluation Date: 11/7/2024  Authorization Period Expiration: 12/31/2024  Plan of Care Expiration: 1/6/2025      Progress Update: 1/6/2024  Visit # / Visits authorized: 1 / 1 8 /20  FOTO: 11/7/2024 - Scored: 1 / 3         PRECAUTIONS: Standard Precautions and Safety/fall precautions      Time In: 1355  Time Out: 1450  Total Appointment Time (timed & untimed codes): 55 minutes    PTA Visit #: 0/5       Subjective     Patient reports: she is feeling ok  She was compliant with home exercise program.  Response to previous treatment: N/A  Functional change: N/A    Pain: 2/10  Location: Fibromyalgia     Objective      Objective Measures updated at progress report unless specified.     Treatment     Kaylin received the treatments listed below:        CPT Intervention  Joint:   Focus Duration / Intensity     X TE NuStep 5'   X NMR Shuttle 3 bands 2 minutes   X NMR Standing hip abduction 2x15   X NMR Standing hip ext 2x15   X TE Standing heel raises 2x15   X NME Sit to stand 22" 3x10   X NMR Seated knee extension 3x10   X TE Matrix hip abduction 25# 3x10   X TE Matrix hip adduction 25# 3x10             X NMR Bridges 2x15   X TE SAQ 3x10 5#   X NMR Clamshell 2x15 RTB                                                                           PLAN             CPT Codes available for Billing:   (00) minutes of Manual therapy (MT) to improve pain and ROM.  (25) minutes of Therapeutic Exercise (TE) to develop strength, endurance, range of motion, and flexibility.  (30) minutes of Neuromuscular " Re-Education (NMR)  to improve: Balance, Coordination, Kinesthetic, Sense, Proprioception, and Posture.  (00) minutes of Therapeutic Activities (TA) to improve functional performance.  Unattended Electrical Stimulation (ES) for muscle performance or pain modulation.  Vasopneumatic Device Therapy () for management of swelling/edema. (45076)  BFR: Blood flow restriction applied during exercise  NP or (-): Not Performed    Patient Education and Home Exercises       Education provided:   - Home program    Written Home Exercises Provided: Pt instructed to continue prior HEP. Exercises were reviewed and Kaylin was able to demonstrate them prior to the end of the session.  Kaylin demonstrated good  understanding of the education provided. See Electronic Medical Record under Patient Instructions for exercises provided during therapy sessions    Assessment     She is progressing well with strengthening and balance activity.  She will benefit from PT to continue.    Kaylin Is progressing well towards her goals.   Patient prognosis is Good.     Patient will continue to benefit from skilled outpatient physical therapy to address the deficits listed in the problem list box on initial evaluation, provide pt/family education and to maximize pt's level of independence in the home and community environment.     Patient's spiritual, cultural and educational needs considered and pt agreeable to plan of care and goals.     Anticipated barriers to physical therapy: None    SHORT TERM GOALS:  4 weeks Progress Date Met   Recent signs and systems trend is improving in order to progress towards Long term goals.  [] Met  [] Not Met  [] Progressing     Patient will be independent with Home Exercise Program  in order to further progress and return to maximal function. [] Met  [] Not Met  [] Progressing     Pain rating at Worst: 5 /10 in order to progress towards increased independence with activity. [] Met  [] Not Met  [] Progressing      Patient will be able to correct postural deviations in sitting and standing, to decrease pain and promote postural awareness for injury prevention.  [] Met  [] Not Met  [] Progressing     Patient will improve functional outcome (FOTO) score: by 5% to increase self-worth & perceived functional ability towards long term goals [] Met  [] Not Met  [] Progressing        LONG TERM GOALS: Discharge Progress Date Met   Patient will return to normal activites of daily living, recreational, and work related activities with less pain and limitation.  [] Met  [] Not Met  [] Progressing     Patient will improve range of motion  to stated goals in order to return to maximal functional potential.  [] Met  [] Not Met  [] Progressing     Patient will improve Strength to stated goals of appropriate musculature in order to improve functional independence.  [] Met  [] Not Met  [] Progressing     Pain Rating at Best: 1/10 to improve Quality of Life.  [] Met  [] Not Met  [] Progressing     Patient will meet predicted functional outcome (FOTO) score: 56% to increase self-worth & perceived functional ability. [] Met  [] Not Met  [] Progressing     Patient will have met/partially met personal goal of: improve pain and function with improved stability [] Met  [] Not Met  [] Progressing           PLAN   Plan of care Certification: 11/7/2024 to 1/6/2024     Outpatient Physical Therapy 2 times weekly for 10 weeks to include any combination of the following interventions: virtual visits, dry needling, modalities, electrical stimulation (IFC, Pre-Mod, Attended with Functional Dry Needling), Manual Therapy, Moist Heat/ Ice, Neuromuscular Re-ed, Patient Education, Self Care, Therapeutic Exercise, Functional Training, and Therapeutic Activites     Jose Roberto Marte, PT

## 2024-12-26 ENCOUNTER — CLINICAL SUPPORT (OUTPATIENT)
Dept: REHABILITATION | Facility: HOSPITAL | Age: 79
End: 2024-12-26
Payer: MEDICARE

## 2024-12-26 DIAGNOSIS — Z74.09 IMPAIRED FUNCTIONAL MOBILITY, BALANCE, GAIT, AND ENDURANCE: Primary | ICD-10-CM

## 2024-12-26 PROCEDURE — 97110 THERAPEUTIC EXERCISES: CPT | Mod: PN

## 2024-12-26 PROCEDURE — 97112 NEUROMUSCULAR REEDUCATION: CPT | Mod: PN

## 2024-12-29 NOTE — PROGRESS NOTES
"[facility]   Physical Therapy Treatment Note      Name: Kaylin Mccollum  Clinic Number: [id number]    Therapy Diagnosis:   Encounter Diagnosis   Name Primary?    Impaired functional mobility, balance, gait, and endurance Yes       Physician: Karley Hernandez PA-C    Visit Date: 12/26/2024    Physician Orders: PT Eval and Treat  Medical Diagnosis from Referral: Bilateral lower extremity weakness  Evaluation Date: 11/7/2024  Authorization Period Expiration: 12/31/2024  Plan of Care Expiration: 1/6/2025      Progress Update: 1/6/2024  Visit # / Visits authorized: 1 / 1 9 /20  FOTO: 11/7/2024 - Scored: 1 / 3         PRECAUTIONS: Standard Precautions and Safety/fall precautions      Time In: 1400  Time Out: 1453  Total Appointment Time (timed & untimed codes): 53 minutes    PTA Visit #: 0/5       Subjective     Patient reports: she is feeling ok.  Some nausea today  She was compliant with home exercise program.  Response to previous treatment: N/A  Functional change: N/A    Pain: 2/10  Location: Fibromyalgia     Objective      Objective Measures updated at progress report unless specified.     Treatment     Kaylin received the treatments listed below:        CPT Intervention  Joint:   Focus Duration / Intensity     X TE NuStep 5'   X NMR Shuttle 3 bands 2 minutes   X NMR Standing hip abduction 2x15   X NMR Standing hip ext 2x15   X TE Standing heel raises 2x15   X NME Sit to stand 22" 3x10   X NMR Seated knee extension 3x10   X TE Matrix hip abduction 25# 3x10   X TE Matrix hip adduction 25# 3x10             X NMR Bridges 2x15   X TE SAQ 3x10 5#   X NMR Clamshell 2x15 RTB                                                                           PLAN             CPT Codes available for Billing:   (00) minutes of Manual therapy (MT) to improve pain and ROM.  (25) minutes of Therapeutic Exercise (TE) to develop strength, endurance, range of motion, and flexibility.  (30) minutes of " Neuromuscular Re-Education (NMR)  to improve: Balance, Coordination, Kinesthetic, Sense, Proprioception, and Posture.  (00) minutes of Therapeutic Activities (TA) to improve functional performance.  Unattended Electrical Stimulation (ES) for muscle performance or pain modulation.  Vasopneumatic Device Therapy () for management of swelling/edema. (78516)  BFR: Blood flow restriction applied during exercise  NP or (-): Not Performed    Patient Education and Home Exercises       Education provided:   - Home program    Written Home Exercises Provided: Pt instructed to continue prior HEP. Exercises were reviewed and Kaylin was able to demonstrate them prior to the end of the session.  Kaylin demonstrated good  understanding of the education provided. See Electronic Medical Record under Patient Instructions for exercises provided during therapy sessions    Assessment     She is progressing well with strengthening and balance activity.  She will benefit from PT to continue.    Kaylin Is progressing well towards her goals.   Patient prognosis is Good.     Patient will continue to benefit from skilled outpatient physical therapy to address the deficits listed in the problem list box on initial evaluation, provide pt/family education and to maximize pt's level of independence in the home and community environment.     Patient's spiritual, cultural and educational needs considered and pt agreeable to plan of care and goals.     Anticipated barriers to physical therapy: None    SHORT TERM GOALS:  4 weeks Progress Date Met   Recent signs and systems trend is improving in order to progress towards Long term goals.  [] Met  [] Not Met  [] Progressing     Patient will be independent with Home Exercise Program  in order to further progress and return to maximal function. [] Met  [] Not Met  [] Progressing     Pain rating at Worst: 5 /10 in order to progress towards increased independence with activity. [] Met  [] Not Met  []  Progressing     Patient will be able to correct postural deviations in sitting and standing, to decrease pain and promote postural awareness for injury prevention.  [] Met  [] Not Met  [] Progressing     Patient will improve functional outcome (FOTO) score: by 5% to increase self-worth & perceived functional ability towards long term goals [] Met  [] Not Met  [] Progressing        LONG TERM GOALS: Discharge Progress Date Met   Patient will return to normal activites of daily living, recreational, and work related activities with less pain and limitation.  [] Met  [] Not Met  [] Progressing     Patient will improve range of motion  to stated goals in order to return to maximal functional potential.  [] Met  [] Not Met  [] Progressing     Patient will improve Strength to stated goals of appropriate musculature in order to improve functional independence.  [] Met  [] Not Met  [] Progressing     Pain Rating at Best: 1/10 to improve Quality of Life.  [] Met  [] Not Met  [] Progressing     Patient will meet predicted functional outcome (FOTO) score: 56% to increase self-worth & perceived functional ability. [] Met  [] Not Met  [] Progressing     Patient will have met/partially met personal goal of: improve pain and function with improved stability [] Met  [] Not Met  [] Progressing           PLAN   Plan of care Certification: 11/7/2024 to 1/6/2024     Outpatient Physical Therapy 2 times weekly for 10 weeks to include any combination of the following interventions: virtual visits, dry needling, modalities, electrical stimulation (IFC, Pre-Mod, Attended with Functional Dry Needling), Manual Therapy, Moist Heat/ Ice, Neuromuscular Re-ed, Patient Education, Self Care, Therapeutic Exercise, Functional Training, and Therapeutic Activites     Jose Roberto Marte, PT

## 2025-01-10 ENCOUNTER — OFFICE VISIT (OUTPATIENT)
Dept: PAIN MEDICINE | Facility: CLINIC | Age: 80
End: 2025-01-10
Payer: MEDICARE

## 2025-01-10 VITALS — HEIGHT: 60 IN | BODY MASS INDEX: 36.33 KG/M2

## 2025-01-10 DIAGNOSIS — M46.1 SACROILIITIS: ICD-10-CM

## 2025-01-10 DIAGNOSIS — M70.62 GREATER TROCHANTERIC BURSITIS OF BOTH HIPS: ICD-10-CM

## 2025-01-10 DIAGNOSIS — M70.61 GREATER TROCHANTERIC BURSITIS OF BOTH HIPS: ICD-10-CM

## 2025-01-10 DIAGNOSIS — M17.0 BILATERAL PRIMARY OSTEOARTHRITIS OF KNEE: Primary | ICD-10-CM

## 2025-01-10 DIAGNOSIS — M53.3 SACROILIAC JOINT PAIN: ICD-10-CM

## 2025-01-10 NOTE — PROGRESS NOTES
Established Patient - TeleHealth Visit    The patient location is: [place]  The chief complaint leading to consultation is: chronic pain     Visit type: audiovisual    Face to Face time with patient: 10-15 minutes  20 minutes of total time spent on the encounter, which includes face to face time and non-face to face time preparing to see the patient (eg, review of tests), Obtaining and/or reviewing separately obtained history, Documenting clinical information in the electronic or other health record, Independently interpreting results (not separately reported) and communicating results to the patient/family/caregiver, or Care coordination (not separately reported).     Each patient to whom he or she provides medical services by telemedicine is:  (1) informed of the relationship between the physician and patient and the respective role of any other health care provider with respect to management of the patient; and (2) notified that he or she may decline to receive medical services by telemedicine and may withdraw from such care at any time.        Chronic Pain -- Established Patient (Follow-up visit)    Chief complaint:  Follow-up     Low back pain with BLE radicular pain   Bilateral knee pain - improved after Synvisc-One      Interval History (1/10/2025):  Kaylin Mccollum presents today for follow-up visit.  Patient was last seen on 10/18/2024, about 3 months ago. She presents today c/o returning knee pain. Patient reports pain as 8/10 today.    She completed 9 session of physical therapy and could not continue due to the right knee pain. She is holding off for now. She c/o bilateral knee pain, but the right is much worse.  She also reports returning SIJ pain, as injection from a year ago has worn off.    Interval History (10/18/2024): Patient presents today for follow-up telemedicine visit.  she underwent Bilateral L4/5 TF JAMIN on 9/17/24.  The patient reports that she is/was better following the procedure.  she  reports 90% pain relief.  The changes lasted 4 weeks so far.  The changes have continued through this visit.  Patient reports pain as 1/10 today.    Interval History (8/21/2024): Kaylni Mccollum presents today for follow-up visit.  she underwent bilateral Synvisc-One intra-articular knee injection on 07/23/2024 with 90% pain relief.  The patient reports that she is/was better following the procedure.  The changes lasted 4 weeks so far.  The changes have continued through this visit.  Patient reports pain as 6/10 today due to low back pain.  She reports sitting often makes her pain worse.  Pain starts in the low back and radiates into both legs, some days 1 leg worse than the other.  She continues taking gabapentin and other medications.  She had great relief after lumbar epidural in April of this year, but she feels that after the fall, pain flared up in general.    Interval History (07/03/2024):  Patient presents today for follow-up visit.  Patient being seen today for evaluation of lower back pain.  She fell last week after becoming dizzy where she slumped down against a wall and landed on her buttocks with his knees bent at a sharp angle.  After that she has been having some increased lower back pain as well as right knee pain.  She did go to the ER following a fall with a full workup and did have an x-ray on the right knee and was told no acute changes.  She rates her pain today a 7/10.  Her back pain is radiating down the right leg mainly from the right knee to the right ankle in the front of the shin.  At times she will have shooting pains in the right leg.  She does feel that this feels different than her typical lower back pain.  She continues to take gabapentin, Savella, Celebrex.  She also requests to repeat her Synvisc-One injections to both knees.  She had these injection 6 months ago and states that she got 80% relief until the fall.  Patient denies night fever/night sweats, urinary incontinence, bowel  "incontinence, significant weight loss and significant motor weakness.   Patient denies any other complaints or concerns at this time.    Interval history 05/20/2024  Patient presents status post bilateral L4-5 transforaminal epidural steroid injection 04/09/2024.  Patient reports approximately 90% sustained relief in lower extremity radicular symptoms following her procedure.  Patient does report confusion regarding epidural steroid injection as with her prior medical history, she was familiar with an interlaminar approach on the [facility].  She does report intermittent sciatic pain which can occur with certain movements, such as awakening in the morning, driving or crossing her legs.  Pain today is rated a 2/10.  She reports recent bouts of fibromyalgia flares." She does believes Savella medication at 100 mg twice daily has these symptoms well controlled.  She does report her pharmacy, [facility] does not keep this medication in stock and most recently she had to rash in her medication into 50 mg doses and still Ms. Day dose for 1 day.  Today she continues to report sustained relief in lower discogenic back pain following via disc allograft, 1 year prior.  Patient does report it is difficult to participate in exercise including aquatic therapy secondary to exacerbation of fibromyalgia.  Patient does remain active performing household activities.  She does report that she lives in a [facility] and is able to ambulate on her Street.  Patient expresses concern regarding chart review.  She reports diagnosis of stage 3 chronic kidney disease and is inquiring regarding her renal function.  We have reviewed her labs and I have encouraged her to reach out to her primary care physician regarding potential nephrology referral if needed.    Interval history 02/15/2024  Patient presents status post bilateral sacroiliac joint and greater trochanteric bursa injection 01/09/2024 and Bilateral intra-articular knee injection " with Synvisc-One 01/23/2024.  Patient reports at least 80% sustained relief overlying bilateral sacroiliac joints, GTB and in bilateral knees following her procedures.  Today her primary concern is pain in a bandlike distribution in the lower back which radiates down into the hips and down towards the feet in L4-5 dermatomal distribution.  Pain is exacerbated with positional changes moving from sitting to standing and with standing and with ambulation.  She does report associated weakness in the lower extremities associated with her pain.  Pain today is rated a 6/10.  She is continued gabapentin 300 mg in the morning, 300 mg in the afternoon and 600 mg in the evening.  She also increased Savella to 100 mg with noticeable improvement in her pain.  She is continued physician directed physical therapy exercises over the last 8 weeks from 12/15/2023 through 02/15/2024 with noticeable improvement in pain, range of motion and functionality.  She denies bowel or bladder incontinence saddle anesthesia.    Interval Hx: 12/13/2023  Patient presents for four-month follow-up.  Today she reports that her lower back has been feeling excellent since via disc supplementation and that the procedure has made all the difference! Particularly with standing and ambulation.  Patient reports the day following Thanksgiving, rolling off of her bed and falling onto her side with significant difficulty arising to a standing position and pain with standing and ambulation following this trauma.  Today she reports pain over bilateral sacroiliac territory which radiates into the hips as well as pain in bilateral knees.  Pain is rated a 4/10.  Pain is exacerbated with positional changes, standing and with ambulation.  She is continued Savella 50 mg twice daily which she reports has significantly reduced the severity and duration of fibromyalgia flares.  She has requesting a refill or increase in this medication.  She is continued physician  directed physical therapy exercises over the last 8 weeks from 10/13/2023 through 12/13/2023 for lower back, sacroiliac joint and bilateral knee pain.    Interval history 08/24/2023  Patient presents status post bilateral sacroiliac joint and greater trochanteric bursa injection 06/21/2023.  Patient reports 95% sustained relief overlying bilateral sacroiliac joint and greater trochanteric bursa territories.  She reports altogether following 2 level via disc allograft supplementation and her most recent procedure, she is able to stand upright and ambulate further distances.  She reports these procedures have taken years off my life! .  Today pain is intermittent and rated a 2/10.  Patient has continued Savella 50 mg twice daily and reports this has significantly reduced the frequency and intensity of her fibromyalgia flares and debility associated with these flares.  She is requesting a refill of this medication.  Today she denies significant lower extremity weakness, bowel or bladder incontinence or saddle anesthesia.    Interval history 06/15/2023  Patient presents status post bilateral intra-articular knee injection 05/23/2023.  Patient reports 75% sustained improvement in bilateral knee pain following intra-articular knee injection.  Today her primary concern is bilateral hip pain.  Patient reports pain in the lower back which radiates into the groin and down the lateral aspect of bilateral lower extremities to mid thigh.  Patient reports pain is exacerbated 1st thing in the morning when she is attempting to get out of bed.  Patient denies more distal radiculopathy into the lower extremities or feet.  She denies lower extremity weakness, bowel or bladder incontinence or saddle anesthesia.  Patient continues to reports significant improvement with Savella medication which she is taking 50 mg twice daily with fibromyalgia pain.  She is requesting a refill of this medication.  Patient reports lower back pain  continues to have greater than 80% sustained relief following 2 level via disc allograft supplementation.      Interval history 05/18/2023  Patient presents status post L5-S1 via disc allograft supplementation 04/25/2023 and via disc allograft supplementation L3-4 05/09/2023.  Patient reports noticeable improvement >80% in lower back pain following two-level  allograft supplementation.  Today her primary concern is bilateral knee pain.  Patient has questions regarding hyaluronic acid supplementation to be use.  Patient reports she has seen videos on Durolane and Euflexxa and is inquiring to the brand to be used on the upcoming Tuesday.  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  She is requesting up-to-date imaging to investigate these territories.  Today she denies significant weakness in the upper lower extremities, bowel or bladder incontinence or saddle anesthesia.    Interval history 04/06/2023  Patient presents for follow-up of lower back pain.  She continues to reports superior relief in fibromyalgia symptoms on Savella medication.  Patient has brought in denial paperwork from her insurance reporting limitations on dosage and quantity allowed.  Patient reports prior to starting this medication she felt that she did not have a life.  now she reports significant improvement in pain as well as chronic fatigue associated with fibromyalgia.  Today she again reports pain in the lower back which is worse with lumbar flexion.  Patient reports she is unable to perform activities of daily living such as grocery shopping or prolonged standing or ambulation secondary to her discogenic lower back pain.  Today patient denies more distal radiculopathy into the lower extremities or feet or lower extremity weakness.  Patient is interested in discussing intervention.  Of note patient has failed to have improvement in his lower  back pain with prior lumbar medial branch block, sacroiliac joint injections.    Interval Hx: 3/9/23  Patient presents for one-month follow-up.  Today she reports she is significantly better since our last clinic visit.  At that time patient had slipped in a low off and injured her lower back and right leg.  Today she reports this pain is significantly improved and pain is intermittent and today is rated a 3/10.  Today patient reports pain is isolated to the midline lower lumbar spine.  Pain is exacerbated with lumbar flexion such as when she is picking up close.  Fibromyalgia Pain has been significantly improved with the initiation of Savella medication.  Patient has reached a titration of 50 mg twice daily.  Patient recently refilled this medication.  She denies any side effects from this medication.  Today she denies any significant lower extremity weakness, bowel or bladder incontinence or saddle anesthesia    Interval history 02/07/2023  Patient presents status post bilateral sacroiliac joint and greater trochanteric bursa injection 01/24/2023 and bilateral L3-5 lumbar medial branch block 12/13/2022.  Patient had recent mechanical fall confounding benefit from recent injections.  Today she reports she was reaching over a mattress cover to reach a stack of bibles and slid into a slint.  Patient reports she was behind and tall wall in a took 20-30 minutes for her to stand.  Patient also reports exacerbation of fibromyalgia pain.  Since her fall patient reports pain which radiates into the buttock and down the posterior aspect of the right lower extremity to the dorsum of the foot in L4-S1 distribution.  Patient has continued gabapentin 300 mg twice daily, Celebrex in the evenings as well as Tylenol 1000 mg twice daily.    Interval history 11/29/2022    Patient presents status post bilateral sacroiliac joint greater trochanteric bursa 10/10/2022.  Patient reports 90% relief overlying bilateral sacroiliac joints  and greater trochanteric bursa following her injection.  Today she reports primarily lumbar axial back pain as well as significant neck pain.  Lower back pain is exacerbated with prolonged standing such as when she is washing dishes.  She denies significant distal radiculopathy into the right lower extremities as described at our last clinic visit.  Neck pain is elicited with cervical flexion, extension and lateral flexion and she does report reduced mobility.  She reports her pain began following a mechanical fall down the stairs at [facility] in September 1986.  Patient has continued gabapentin 300 mg in the morning, 300 mg in the afternoon and 600 mg in the evening.    Interval history 10/04/2022  Ms. Mccollum is a 77-year-old female with past medical history significant for depression, hyperlipidemia, hypertension, mitral valve prolapse, peripheral vascular disease, history of COVID-19, type 2 diabetes, multi joint arthritis, fibromyalgia who presents to [facility] care, previous Dr. Dutta patient.  Today patient reports return of pain in the lower back which radiates into bilateral hips and down the posterior aspect of the right lower extremity in L4-5 distribution to the dorsum of the foot.  Patient reports pain is intermittent but has increased in intensity.  Pain is described as shocking in nature and today is rated a 6/10.  Patient reports she feels as if her skin is crawling.  patient is currently taking gabapentin 300 mg up to 3 times daily when pain is severe.  Pain interferes with the patient's sleep.  Patient also reports history of fibromyalgia which limits her mobility and duration of standing and ambulation.  Patient does endorse associated weakness in the lower extremities associated with her pain.  Patient is interested in pursuing repeat intervention as she is obtained several months of significant relief with prior sacroiliac joint and greater trochanteric bursa injections.  Patient has continued  physician directed physical therapy exercises at home daily.    History of Present Illness 01/16/2020: Dr. Dutta:   Kaylin Mccollum is a 74 y.o. female  who is presenting with a chief complaint of lumbar back pain. The patient began experiencing this problem insidiously, and the pain has been gradually worsening over the past 5 month(s). The pain is described as throbbing, shooting, burning and electrical and is located in the bilateral lumbar spine. Pain is intermittent and lasts hours. The pain radiates to bilateral lower extremities L4 distribudution. The patient rates her pain a 8 out of ten and interferes with activities of daily living a 7 out of ten. Pain is exacerbated by flexion of the lumbar spine, ambulation, and is improved by rest.      She also complains of right knee pain. The patient began experiencing this problem insidiously. The pain is described as cramping, aching and is located in the right knee. Pain is intermittent and lasts hours. The pain is nonradiating. The patient rates her pain a 8 out of ten and interferes with activities of daily living a 7 out of ten. Pain is exacerbated by getting up from a seated position and standing, and is improved by rest. Patient reports no prior trauma, prior arthroscopy bilaterally in 1990s.  - pertinent negatives: No fever, No chills, No weight loss, No bladder dysfunction, No bowel dysfunction, No saddle anesthesia  - pertinent positives: none        Pain Disability Index (PDI) Score Review:      5/20/2024     7:43 AM 11/29/2023    10:44 AM 8/24/2023     1:04 PM   Last 3 PDI Scores   Pain Disability Index (PDI) 12 11    35 14       Non-Pharmacologic Treatments:  Physical Therapy/Home Exercise: yes  Ice/Heat:yes  TENS: no  Acupuncture: no  Massage: no  Chiropractic: no    Other: no      Pain Medications:  - Adjuvant Medications: Lorazepam (Ativan), Neurontin (Gabapentin), and Topical Ointment (Voltaren Gel, Steroid cream, Anti-Inflammatory Cream, Compound  cream)    Pain injections:  Dr. Dumont:  -9/17/2024: Bilateral L4/5 TF JAMIN with 90% pain relief  -07/23/2024: Bilateral intra-articular knee joint injection with Synvisc-One with 90% pain relief   -04/09/2024: Bilateral L4-5 TF JAMIN   -01/23/2024: Bilateral intra-articular knee injection with Synvisc-One  -01/09/2024: Bilateral sacroiliac joint and greater trochanteric bursa injection  -06/21/2023: Bilateral sacroiliac joint and greater trochanteric bursa injection  -05/29/2023: Bilateral intra-articular knee injection  -05/09/2023: L3-4 via disc allograft supplementation  -04/25/2023:  L5-S1 via disc allograft supplementation  -01/24/2023: Bilateral sacroiliac joint and greater trochanteric bursa injection  -12/13/2022: Bilateral L3-5 lumbar medial branch block  -10/10/2022: Bilateral greater trochanteric bursa and sacroiliac joint injection      -04/20/2022: Right-sided acetabular femoral injection; Dr. Dutta  -03/01/2022: Bilateral sacroiliac joint and greater trochanteric bursa injection; Dr. Dutta  -07/08/2021: Bilateral sacroiliac joint and greater trochanteric bursa injection; Dr. Dutta  -01/05/2021: Bilateral sacroiliac joint and greater trochanteric bursa injection; Dr. Burns  -10/08/2020: Bilateral sacroiliac joint and bilateral greater trochanteric bursa injection; Dr. Dutta  -05/06/2020: Bilateral sacroiliac joint and greater trochanteric bursa injection; Dr. Dutta          Imaging/ Diagnostic Studies/ Labs (Reviewed on 1/14/2025):    7/25/2024 X-Ray Lumbar Complete Including Flex And Ext  FINDINGS: No fracture of the lumbar spine.  Intervertebral disc heights are preserved.  6 mm of anterolisthesis of L4 on L5.  Bilateral facet joint osteoarthritis at L4-5 and L5-S1.    Cervical x-ray 05/18/2023   FINDINGS:  Osteopenia.  Vertebral body heights maintained.  Mild anterolisthesis of C4 on C5 and C5 on C6.  Multilevel osteophyte changes with disc height loss most noted at C5-6 and C6-7.  Multilevel prominent  facet arthropathy. Multilevel left-sided neural foraminal narrowing.     Significant change in alignment on flexion or extension.     Prevertebral soft tissues unremarkable.  Lung apices clear.    Thoracic x-ray 05/18/2023  FINDINGS:  Osteopenia.  Vertebral body heights maintained.  No spondylolisthesis.  Similar more multilevel bridging osteophyte changes.  No acute osseous abnormality.  Soft tissues unremarkable.      MRI Lumbar Spine 02/16/2023  FINDINGS:  Grade 1 degenerative spondylolisthesis at L4-L5.  Vertebral body height is normal.  Marrow signal is within normal limits. The conus medullaris terminates at the level of L1-L2.  No abnormal signal within the conus. Intervertebral disc levels are as follows:     T12-L1 disc: Normal disc height with anterior osteophytes and mild degenerative facet hypertrophy.  No spinal or foraminal stenosis.  The dural canal measures 16 mm.     L1-L2 disc : Disc space height loss with chronic Schmorl's nodes.  Posterior disc bulge.  Anterior osteophytes.  Minor facet arthropathy bilaterally.  The dural canal measures 13 mm.  No foraminal stenosis.     L2-L3 disc: Circumferential disc bulge with tiny chronic Schmorl's nodes.  Anterior osteophytes.  Mild degenerative facet hypertrophy.  The dural canal measures 12 mm.  No foraminal stenosis.     L3-L4 disc: Disc space height loss with a circumferential disc bulge and anterior osteophytes.  Mild degenerative facet hypertrophy with moderate buckling of the ligamentum flavum.  The dural canal measures 11 mm.  No significant foraminal stenosis.     L4-L5 disc: Grade 1 spondylolisthesis with severe degenerative facet hypertrophy bilaterally.  Buckling of the ligamentum flavum.  The dural canal measures 7 mm AP.  Disc encroaches into the floors of the exit foramina, right greater than left.  There is mild right foraminal stenosis.     L5-S1 disc: Severe disc space height loss with osteophytes at the margins and encroach into the exit  foramina.  Mild degenerative facet hypertrophy and buckling of the ligamentum flavum.  The dural canal measures 11 mm.  Mild foraminal stenosis.          Review of Systems:   GENERAL:  No weight loss, malaise or fevers.  HEENT:   No recent changes in vision or hearing  NECK:  Negative for lumps, no difficulty with swallowing.  RESPIRATORY:  Negative for cough, wheezing or shortness of breath, patient denies any recent URI.  CARDIOVASCULAR:  Negative for chest pain or palpitations.  GI:  Negative for abdominal discomfort, blood in stools or black stools or change in bowel habits.  MUSCULOSKELETAL:  See HPI.  SKIN:  Negative for lesions, rash, and itching.  PSYCH:  No mood disorder or recent psychosocial stressors.   HEMATOLOGY/LYMPHOLOGY:  Negative for prolonged bleeding, bruising easily or swollen nodes.    NEURO:   No history of syncope, paralysis, seizures or tremors.  All other reviewed and negative other than HPI.        Telemedicine Physical Exam:   Vitals:    01/10/25 0908   Height: 5' (1.524 m)  Comment: pt reported   Body mass index is 36.33 kg/m².   (reviewed on 1/14/2025)     GENERAL: Well appearing, in no acute distress, alert and oriented x3.  Cooperative.  PSYCH:  Mood and affect appropriate.  SKIN: Skin color & texture with no obvious abnormalities.    HEAD/FACE:  Normocephalic, atraumatic.    PULM:  No difficulty breathing. No nasal flaring. No obvious wheezing.  Patient performed SIJ testing:  - TTP over SI joint: Present, also over GTB bilaterally   - Minesh's/ Atif's: Positive    - Sacroiliac Distraction Test (anterior pressure): Positive  - Sacroiliac Compression Test (lateral pressure): Positive    EXTREMITIES: No obvious deformities. Moving all extremities well, appears to have symmetric strength throughout.  MUSCULOSKELETAL: No obvious atrophy abnormalities are noted.   NEURO: No obvious neurologic deficit.   GAIT: sitting.     Physical Exam: last in clinic visit:  General: alert and  oriented, in no apparent distress.  Gait: normal gait.  Skin: no rashes, no discoloration, no obvious lesions  HEENT: normocephalic, atraumatic. Pupils equal and round.  Cardiovascular: no significant peripheral edema present.  Respiratory: without use of accessory muscles of respiration.    Musculoskeletal - Lumbar Spine:  - ROM fairly preserved   - Pain on flexion of lumbar spine: Absent   - Pain on extension of lumbar spine: Absent         - Lumbar facet loading: Absent   - TTP over the lumbar facet joints: Absent  - TTP over the lumbar paraspinals: Present   - TTP over the SI joints: Present  - TTP over GT bursa: Present, minimal   - Straight Leg Raise: Negative  - CHERYLE: Present    Right Knee:  - TTP: Present over medial/ lateral joint line  - Pain with extension: Present  - Pain with flexion: Present  - Crepitus: Present     Neuro - Lower Extremities:  - BLE Strength: R/L: HF: 5/5, HE: 5/5, KF: 5/5; KE: 5/5; FE: 5/5; FF: 5/5  - Extremity Reflexes: Brisk and symmetric throughout  - Sensory: Sensation to light touch intact bilaterally      Psych:  Mood and affect is appropriate        Assessment:  Kaylin Mccollum is a 79 y.o. year old female who is presenting with       ICD-10-CM ICD-9-CM    1. Bilateral primary osteoarthritis of knee  M17.0 715.16 Case Request-RAD/Other Procedure Area: Bilateral Synvisc-One knee injection      2. Sacroiliac joint pain  M53.3 724.6       3. Sacroiliitis  M46.1 720.2 Case Request-RAD/Other Procedure Area: Bilateral SIJ + bilateral GT bursa injection      4. Greater trochanteric bursitis of both hips  M70.61 726.5 Case Request-RAD/Other Procedure Area: Bilateral SIJ + bilateral GT bursa injection    M70.62            Plan:  1. Interventional:   - Schedule Bilateral intra-articular knee joint injection with Synvisc-One. Patient is not taking prescription blood thinners or ASA.    Patient had 90% pain relief with Bilateral intra-articular knee joint injection with Synvisc-One on  7/23/2024.    One week later:  - Schedule Bilateral SIJ + bilateral GT bursa injection. Patient is not taking prescription blood thinners or ASA.    Patient had 80% sustained pain relief from bilateral sacroiliac joint and greater trochanteric bursa injection 01/09/2024 lasting almost a year.    - Patient had 90% sustained relief in lower extremity radicular symptoms following bilateral L4-5 transforaminal epidural steroid injection in April and September 2024 for several months.    - Patient had L3-4 via disc allograft supplementation 05/09/2023 and L5-S1 via disc allograft supplementation 04/25/2023 with greater than 80% improvement in discogenic lower back pain.    Anticoagulation:  None, no anticoagulation    2. Pharmacologic:   -Refill gabapentin 300mg QAM/ 300mg at lunch/ 600mg QHS.  We have previously reviewed potential side effects of this medication including daytime somnolence, weight gain and peripheral edema    -Refill Savella 100 mg BID - as this tremendously helps with symptoms of fibromyalgia.  We have previouslydiscussed that Savella is FDA approved and has demonstrated improvement in multiple measures including pain, global impression of change in physical function.  We have discussed that the most frequent side effects of this medication can include nausea, constipation, vomiting, dry mouth, flushing or tachycardia.      -Continue Celebrex 200mg BID PRN - from Orthopedics.  We have previously discussed potential deleterious side effects of NSAIDs on the cardiovascular, gastrointestinal and renal systems. We have discussed judicious use of this medication.      - [place]  reviewed and appropriate.      3. Rehabilitative:   -We discussed continuing at home physician directed physical therapy to help manage the patient/s painful condition. The patient was counseled that muscle strengthening will improve the long term prognosis in regards to pain and may also help increase range of motion and mobility.   I have encouraged the patient to perform low intensity aerobic exercise to help with fibromyalgia.    4. Diagnostic:  Reviewed relevant imaging (MRI Lumbar Spine 02/16/2023).     5. Consult:   -Continue follow-up with Dr. Schneider for knee and shoulder pain PRN  -Continue follow-up with Rheumatology: Fibromyalgia  -Continue follow-up with PCP: Dr. Olvera:  Follow-up for potential renal insufficiency    6. Follow up:  4 weeks post-procedure x 2  - virtual visit      Future Appointments   Date Time Provider Department Center   4/29/2025  7:30 AM SPECIMEN, [facility]   4/29/2025  8:00 AM LABORATORY, [facility]   5/6/2025  1:40 PM Lisette Olvera MD ON [facility]   5/12/2025  3:40 PM Jesus Dumont MD ON CARDIO [facility]        - Patient Questions: Answered all of the patient's questions regarding diagnosis, therapy, and treatment.    - This condition does not require this patient to take time off of work, and the primary goal of our Pain Management services is to improve the patient's functional capacity.   - I discussed the risks, benefits, and alternatives to potential treatment options. All questions and concerns were fully addressed today in clinic.         Kristin Mccall PA-C  Interventional Pain Management - [facility]    Disclaimer:  This note was prepared using voice recognition system and is likely to have sound alike errors that may have been overlooked even after proof reading.  Please call me with any questions.

## 2025-01-11 DIAGNOSIS — E11.59 HYPERTENSION ASSOCIATED WITH DIABETES: ICD-10-CM

## 2025-01-11 DIAGNOSIS — E11.69 DIABETES MELLITUS TYPE 2 IN OBESE: ICD-10-CM

## 2025-01-11 DIAGNOSIS — I34.1 MVP (MITRAL VALVE PROLAPSE): ICD-10-CM

## 2025-01-11 DIAGNOSIS — E66.9 DIABETES MELLITUS TYPE 2 IN OBESE: ICD-10-CM

## 2025-01-11 DIAGNOSIS — I15.2 HYPERTENSION ASSOCIATED WITH DIABETES: ICD-10-CM

## 2025-01-12 NOTE — TELEPHONE ENCOUNTER
No care due was identified.  [facility] Embedded Care Due Messages. Reference number: [id number].   1/11/2025 10:10:01 PM CST

## 2025-01-13 ENCOUNTER — PATIENT MESSAGE (OUTPATIENT)
Dept: INTERNAL MEDICINE | Facility: CLINIC | Age: 80
End: 2025-01-13
Payer: MEDICARE

## 2025-01-13 RX ORDER — VERAPAMIL HYDROCHLORIDE 120 MG/1
120 TABLET, FILM COATED ORAL 2 TIMES DAILY
Qty: 180 TABLET | Refills: 5 | Status: SHIPPED | OUTPATIENT
Start: 2025-01-13

## 2025-01-13 RX ORDER — GABAPENTIN 300 MG/1
300 CAPSULE ORAL 3 TIMES DAILY
Qty: 90 CAPSULE | Refills: 2 | Status: SHIPPED | OUTPATIENT
Start: 2025-01-13

## 2025-01-13 NOTE — TELEPHONE ENCOUNTER
Good Morning/Afternoon,     Pt is requesting for a refill of: GABAPENTIN 300MG  Last filed:07/22/2024  Last encounter:01/10/2025  Up coming apt: N/A  Pharm[place]: [id number] [place] [place] [facility] [address] AT [facility]     Medical Assistant  Glory ALFARO ([facility])

## 2025-01-28 ENCOUNTER — TELEPHONE (OUTPATIENT)
Dept: PAIN MEDICINE | Facility: CLINIC | Age: 80
End: 2025-01-28
Payer: MEDICARE

## 2025-01-28 NOTE — TELEPHONE ENCOUNTER
Called to schedule pt injection with Dr Dumont, pt was scheduled for Feb 11 & 18    .Josette DOWNING

## 2025-01-28 NOTE — TELEPHONE ENCOUNTER
----- Message from Kristin Mccall PA-C sent at 1/10/2025  9:13 AM CST -----  Pain Provider: Tim    Case:   - Schedule Bilateral intra-articular knee joint injection with Synvisc-One. Patient is not taking prescription blood thinners or ASA.    ///////////// has to be on or after 1/23/25 -- 6 months since previous visco!!!\\\\\\\\\\\    One week later:  - Schedule Bilateral SIJ + bilateral GT bursa injection. Patient is not taking prescription blood thinners or ASA.        4 weeks post-procedure x2  Virtual visit  [facility]   Kristin Mccall PA-C

## 2025-01-30 NOTE — PRE-PROCEDURE INSTRUCTIONS
Spoke with patient regarding procedure scheduled on 2/11     Arrival time 0645     Has patient been sick with fever or on antibiotics within the last 7 days? No     Does the patient have any open wounds, sores or rashes? No     Does the patient have any recent fractures? no     Has patient received a vaccination within the last 7 days? No     Received the COVID vaccination?      Has the patient stopped all medications as directed? na     Does patient have a pacemaker, defibrillator, or implantable stimulator? No     Does the patient have a ride to and from procedure and someone reliable to remain with patient?  daughter     Is the patient diabetic? yes     Does the patient have sleep apnea? Or use O2 at home? no     Is the patient receiving sedation?      Is the patient instructed to remain NPO beginning at midnight the night before their procedure? yes     Procedure location confirmed with patient? Yes     Covid- Denies signs/symptoms. Instructed to notify PAT/MD if any changes.

## 2025-02-02 DIAGNOSIS — I15.2 HYPERTENSION ASSOCIATED WITH DIABETES: ICD-10-CM

## 2025-02-02 DIAGNOSIS — E11.59 HYPERTENSION ASSOCIATED WITH DIABETES: ICD-10-CM

## 2025-02-03 RX ORDER — SEMAGLUTIDE 0.68 MG/ML
0.5 INJECTION, SOLUTION SUBCUTANEOUS
Qty: 3 ML | Refills: 5 | Status: SHIPPED | OUTPATIENT
Start: 2025-02-03 | End: 2025-08-02

## 2025-02-06 NOTE — PRE-PROCEDURE INSTRUCTIONS
Spoke with patient regarding procedure scheduled on 2/18     Arrival time 0800     Has patient been sick with fever or on antibiotics within the last 7 days? No     Does the patient have any open wounds, sores or rashes? No     Does the patient have any recent fractures? no     Has patient received a vaccination within the last 7 days? No     Received the COVID vaccination?      Has the patient stopped all medications as directed? na     Does patient have a pacemaker, defibrillator, or implantable stimulator?no     Does the patient have a ride to and from procedure and someone reliable to remain with patient?      Is the patient diabetic? yes     Does the patient have sleep apnea? Or use O2 at home? no     Is the patient receiving sedation?       Is the patient instructed to remain NPO beginning at midnight the night before their procedure? yes     Procedure location confirmed with patient? Yes     Covid- Denies signs/symptoms. Instructed to notify PAT/MD if any changes.

## 2025-02-11 ENCOUNTER — HOSPITAL ENCOUNTER (OUTPATIENT)
Facility: HOSPITAL | Age: 80
Discharge: HOME | End: 2025-02-11
Attending: ANESTHESIOLOGY | Admitting: ANESTHESIOLOGY
Payer: MEDICARE

## 2025-02-11 VITALS
WEIGHT: 173.31 LBS | DIASTOLIC BLOOD PRESSURE: 78 MMHG | SYSTOLIC BLOOD PRESSURE: 163 MMHG | HEART RATE: 89 BPM | RESPIRATION RATE: 16 BRPM | OXYGEN SATURATION: 95 % | TEMPERATURE: 99 F | HEIGHT: 60 IN | BODY MASS INDEX: 34.02 KG/M2

## 2025-02-11 DIAGNOSIS — M17.9 KNEE OSTEOARTHRITIS: ICD-10-CM

## 2025-02-11 LAB — POCT GLUCOSE: 109 MG/DL (ref 70–110)

## 2025-02-11 PROCEDURE — 25000003 PHARM REV CODE 250: Performed by: ANESTHESIOLOGY

## 2025-02-11 PROCEDURE — 63600175 PHARM REV CODE 636 W HCPCS: Mod: JZ,TB

## 2025-02-11 PROCEDURE — 82962 GLUCOSE BLOOD TEST: CPT | Performed by: ANESTHESIOLOGY

## 2025-02-11 PROCEDURE — 20610 DRAIN/INJ JOINT/BURSA W/O US: CPT | Mod: 50 | Performed by: ANESTHESIOLOGY

## 2025-02-11 PROCEDURE — 25500020 PHARM REV CODE 255: Performed by: ANESTHESIOLOGY

## 2025-02-11 PROCEDURE — 63600175 PHARM REV CODE 636 W HCPCS: Performed by: ANESTHESIOLOGY

## 2025-02-11 RX ORDER — MIDAZOLAM HYDROCHLORIDE 1 MG/ML
INJECTION, SOLUTION INTRAMUSCULAR; INTRAVENOUS
Status: DISCONTINUED | OUTPATIENT
Start: 2025-02-11 | End: 2025-02-11 | Stop reason: HOSPADM

## 2025-02-11 RX ORDER — SODIUM BICARBONATE 1 MEQ/ML
SYRINGE (ML) INTRAVENOUS
Status: DISCONTINUED | OUTPATIENT
Start: 2025-02-11 | End: 2025-02-11 | Stop reason: HOSPADM

## 2025-02-11 RX ORDER — FENTANYL CITRATE 50 UG/ML
INJECTION, SOLUTION INTRAMUSCULAR; INTRAVENOUS
Status: DISCONTINUED | OUTPATIENT
Start: 2025-02-11 | End: 2025-02-11 | Stop reason: HOSPADM

## 2025-02-11 RX ORDER — ONDANSETRON HYDROCHLORIDE 2 MG/ML
4 INJECTION, SOLUTION INTRAVENOUS ONCE
Status: COMPLETED | OUTPATIENT
Start: 2025-02-11 | End: 2025-02-11

## 2025-02-11 RX ADMIN — ONDANSETRON 4 MG: 2 INJECTION INTRAMUSCULAR; INTRAVENOUS at 08:02

## 2025-02-11 NOTE — OP NOTE
Procedure Note:     bilateral Intra-articular Knee Synvisc 1 Injection Under Fluoroscopy    02/11/2025                                 Surgeon: Humberto Dumont MD       Assistant: None     Pre-Op Diagnosis:  bilateral Bilateral primary osteoarthritis of knee [M17.0]    Post-Op Diagnosis: Bilateral primary osteoarthritis of knee [M17.0]     EBL: None       Complications: None     Specimens: None    Sedation:  Conscious sedation provided by M.D    The patient was monitored with continuous pulse oximetry, EKG, and intermittent blood pressure monitors.  The patient was hemodynamically stable throughout the entire process was responsive to voice, and breathing spontaneously.  Supplemental O2 was provided at 2L/min via nasal cannula.  Patient was comfortable for the duration of the procedure. (See nurse documentation and case log for sedation time)    There was a total of 2mg IV Midazolam and 50mcg Fentanyl titrated for the procedure      Description of procedure:     After written consent was obtained, patient placed in supine position.  The area over the  lateral aspect of the bilateral  knee(s) joint prepped with chlorhexidine.  The area was draped in the usual sterile fashion.  Approximately 5 mL 1% lidocaine was infiltrated into the skin overlying the predetermined entry point. A 22 gauge spinal needle was then advanced under fluoroscopy in the AP views into the knee joint. After negative aspiration there was injection of 1 mL radio opaque contrast dye to confirm needle location within the joint, and no evidence of intravascular spread. This was followed by injection of 6mL of sodium hyaluronate Synvisc -1 into the joint space. Displacement of the contrast indicated that the medication went into the area of the joint space. Needle was withdrawn and a sterile band-aid applied to the skin.     Patient tolerated the procedure well, and was reporting improvement of pain symptoms after the injection. Kaylin Mccollum was  discharged from the clinic in stable condition.

## 2025-02-11 NOTE — H&P
HPI  Patient presenting for Procedure(s) (LRB):  Bilateral Synvisc-One knee injection (Bilateral)     Patient on Anti-coagulation No    No health changes since previous encounter    Past Medical History:   Diagnosis Date    Arthritis     Cataract     COVID-19 02/25/2021    Diabetes mellitus 1995    BS didn't check 09/13/2022    Diabetes mellitus, type 2     Fibromyalgia     General anesthetics causing adverse effect in therapeutic use     bradycardia     Hypertension     Migraines     Mitral valve prolapse     Osteoarthritis     Rotator cuff tear 04/01/2015     Past Surgical History:   Procedure Laterality Date    ARTHROSCOPY OF KNEE Right 03/10/2020    Procedure: ARTHROSCOPY, KNEE;  Surgeon: Les Schneider MD;  Location: [facility] OR;  Service: Orthopedics;  Laterality: Right;    CATARACT EXTRACTION W/  INTRAOCULAR LENS IMPLANT Left 07/19/2017    CATARACT EXTRACTION W/  INTRAOCULAR LENS IMPLANT Right 2017    CHOLECYSTECTOMY      CHONDROPLASTY OF KNEE Right 03/10/2020    Procedure: CHONDROPLASTY, KNEE;  Surgeon: Les Schneider MD;  Location: [facility] OR;  Service: Orthopedics;  Laterality: Right;  Anterior compartment     COLONOSCOPY N/A 09/25/2018    Procedure: COLONOSCOPY;  Surgeon: Bethel Carmona MD;  Location: [facility] ENDO;  Service: Endoscopy;  Laterality: N/A;    EXCISION OF MEDIAL MENISCUS OF KNEE Right 03/10/2020    Procedure: MENISCECTOMY, KNEE, MEDIAL;  Surgeon: Les Schneider MD;  Location: [facility] OR;  Service: Orthopedics;  Laterality: Right;  Partial, Medial , Lateral     EYE SURGERY      INJECTION OF ANESTHETIC AGENT AROUND MEDIAL BRANCH NERVES INNERVATING LUMBAR FACET JOINT Bilateral 12/13/2022    Procedure: Bilateral L3-5 MBB;  Surgeon: Humberto Dumont MD;  Location: [facility] PAIN MGT;  Service: Pain Management;  Laterality: Bilateral;    INJECTION OF ANESTHETIC AGENT AROUND NERVE Right 08/08/2019    Procedure: Right Genicular nerve block with local;  Surgeon: Manpreet Dutta MD;  Location: [facility] PAIN  MGT;  Service: Pain Management;  Laterality: Right;    INJECTION OF ANESTHETIC AGENT INTO SACROILIAC JOINT Bilateral 05/06/2020    Procedure: Bilateral Sacroiliac Joint Injection;  Surgeon: Manpreet Dutta MD;  Location: [facility] PAIN MGT;  Service: Pain Management;  Laterality: Bilateral;    INJECTION OF ANESTHETIC AGENT INTO SACROILIAC JOINT Bilateral 10/08/2020    Procedure: Bilateral SI and Bilateral GTB with RN IV sedation;  Surgeon: Manpreet Dutta MD;  Location: [facility] PAIN MGT;  Service: Pain Management;  Laterality: Bilateral;    INJECTION OF ANESTHETIC AGENT INTO SACROILIAC JOINT Bilateral 01/05/2021    Procedure: Bilateral BLOCK, SACROILIAC JOINT and Bilateral GTB witn RN IV sedation;  Surgeon: Daniel Burns MD;  Location: [facility] PAIN [facility]GT;  Service: Pain Management;  Laterality: Bilateral;    INJECTION OF ANESTHETIC AGENT INTO SACROILIAC JOINT Bilateral 07/08/2021    Procedure: Bilateral BLOCK, SACROILIAC JOINT bilateral GTB RN IV sedation;  Surgeon: Manpreet Dutta MD;  Location: [facility]GT;  Service: Pain Management;  Laterality: Bilateral;    INJECTION OF ANESTHETIC AGENT INTO SACROILIAC JOINT Bilateral 03/01/2022    Procedure: Bilateral BLOCK, SACROILIAC JOINT and Bilateral GTB RN IV sedation;  Surgeon: Manpreet Dutta MD;  Location: [facility]GT;  Service: Pain Management;  Laterality: Bilateral;    INJECTION OF ANESTHETIC AGENT INTO SACROILIAC JOINT Bilateral 10/10/2022    Procedure: Bilateral Sacroiliac Joint Injection;  Surgeon: Humberto Dumont MD;  Location: [facility]GT;  Service: Pain Management;  Laterality: Bilateral;    INJECTION OF ANESTHETIC AGENT INTO SACROILIAC JOINT Bilateral 01/24/2023    Procedure: Bilateral Sacroiliac Joint Injection;  Surgeon: Humberto Dumont MD;  Location: [facility];  Service: Pain Management;  Laterality: Bilateral;    INJECTION OF ANESTHETIC AGENT INTO SACROILIAC JOINT Bilateral 06/21/2023    Procedure: Bilateral Sacroiliac Joint Injection;  Surgeon: Humberto Dumont MD;   Location: [facility];  Service: Pain Management;  Laterality: Bilateral;    INJECTION OF ANESTHETIC AGENT INTO SACROILIAC JOINT Bilateral 1/9/2024    Procedure: Bilateral SIJ Injection;  Surgeon: Humberto Dumont MD;  Location: [facility] PAIN MGT;  Service: Pain Management;  Laterality: Bilateral;    INJECTION OF JOINT Bilateral 09/05/2019    Procedure: Bilateral GT bursa injection;  Surgeon: Manpreet Dutta MD;  Location: [facility];  Service: Pain Management;  Laterality: Bilateral;    INJECTION OF JOINT Bilateral 05/06/2020    Procedure: Bilateral GT bursa injection;  Surgeon: Manpreet Dutta MD;  Location: [facility];  Service: Pain Management;  Laterality: Bilateral;    INJECTION OF JOINT Right 04/28/2022    Procedure: Injection, Joint Right Hip Injection RN IV sedation;  Surgeon: Manpreet Dutta MD;  Location: [facility];  Service: Pain Management;  Laterality: Right;    INJECTION OF JOINT Bilateral 10/10/2022    Procedure: Bilateral GT bursa injection with RN IV sedation;  Surgeon: Humberto Dumont MD;  Location: [facility];  Service: Pain Management;  Laterality: Bilateral;    INJECTION OF JOINT Bilateral 01/24/2023    Procedure: Bilateral GT bursa injection;  Surgeon: Humberto Dumont MD;  Location: [facility];  Service: Pain Management;  Laterality: Bilateral;    INJECTION OF JOINT Bilateral 05/23/2023    Procedure: Bilateral intraarticular knee injection with Synvisc-1; ;  Surgeon: Humberto Dumont MD;  Location: [facility];  Service: Pain Management;  Laterality: Bilateral;    INJECTION OF JOINT Bilateral 06/21/2023    Procedure: Bilateral GT bursa injection;  Surgeon: Humberto Dumont MD;  Location: [facility];  Service: Pain Management;  Laterality: Bilateral;    INJECTION OF JOINT Bilateral 1/9/2024    Procedure: Bilateral GTB injection;  Surgeon: Humberto Dumont MD;  Location: [facility];  Service: Pain Management;  Laterality: Bilateral;    INJECTION OF JOINT Bilateral 1/23/2024     Procedure: Bilateral intraarticular knee injection with Synvisc 1 ;  Surgeon: Humberto Dumont MD;  Location: [facility] PAIN [facility];  Service: Pain Management;  Laterality: Bilateral;    INJECTION OF JOINT Bilateral 7/23/2024    Procedure: Bilateral Synvisc Knee injection;  Surgeon: Humberto Dumont MD;  Location: [facility] PAIN [facility]GT;  Service: Pain Management;  Laterality: Bilateral;    INJECTION, ALLOGRAFT, INTERVERTEBRAL DISC N/A 04/25/2023    Procedure: INJECTION,ALLOGRAFT,INTERVERTEBRAL DISC,1ST LEVEL: L5-S1;  Surgeon: Humberto Dumont MD;  Location: [facility] PAIN [facility]GT;  Service: Pain Management;  Laterality: N/A;    INJECTION, ALLOGRAFT, INTERVERTEBRAL DISC N/A 05/09/2023    Procedure: INJECTION,ALLOGRAFT,INTERVERTEBRAL DISC,2nd LEVEL: L3-4;  Surgeon: Humberto Dumont MD;  Location: [facility]IN [facility]GT;  Service: Pain Management;  Laterality: N/A;    KNEE ARTHROSCOPY Bilateral     PARS PLANA VITRECTOMY W/ REPAIR OF MACULAR HOLE Left 02/22/2017    RADIOFREQUENCY THERMOCOAGULATION Left 07/11/2019    Procedure: Right SIJ RFA;  Surgeon: Manpreet Dutta MD;  Location: [facility]GT;  Service: Pain Management;  Laterality: Left;    RADIOFREQUENCY THERMOCOAGULATION Right 07/25/2019    Procedure: Right SIJ RFA;  Surgeon: Manpreet Dutta MD;  Location: [facility];  Service: Pain Management;  Laterality: Right;    SELECTIVE INJECTION OF ANESTHETIC AGENT AROUND LUMBAR SPINAL NERVE ROOT BY TRANSFORAMINAL APPROACH Bilateral 4/9/2024    Procedure: Bilateral L4/5 TF JAMIN;  Surgeon: Humberto Dumont MD;  Location: [facility];  Service: Pain Management;  Laterality: Bilateral;    SELECTIVE INJECTION OF ANESTHETIC AGENT AROUND LUMBAR SPINAL NERVE ROOT BY TRANSFORAMINAL APPROACH Bilateral 9/17/2024    Procedure: Bilateral L4/5 TF JAMIN;  Surgeon: Humberto Dumont MD;  Location: [facility];  Service: Pain Management;  Laterality: Bilateral;    SHOULDER ARTHROSCOPY Right 06/04/2015     Review of patient's allergies indicates:   Allergen Reactions    Demerol  "[meperidine] Other (See Comments)     Burning when adm IV  Able to tolerate IM, "Turned the veins in my hand purple."    Latex, natural rubber Other (See Comments)     "Burns my skin",  Symptoms get worse the longer she is exposed    Zocor [simvastatin] Other (See Comments)     Tightening of muscles    Statins-hmg-coa reductase inhibitors Other (See Comments)     myopathy    Sulfa (sulfonamide antibiotics)      Blurred vision    Nickel Rash     Pt states she had sores to ear lobes from nickel in earrings         No current facility-administered medications on file prior to encounter.     Current Outpatient Medications on File Prior to Encounter   Medication Sig Dispense Refill    b complex vitamins tablet Take 1 tablet by mouth once daily.      biotin 1 mg tablet Take 1,000 mcg by mouth every morning.       celecoxib (CELEBREX) 200 MG capsule TAKE 1 CAPSULE(200 MG) BY MOUTH TWICE DAILY WITH FOOD 120 capsule 1    ezetimibe (ZETIA) 10 mg tablet Take 1 tablet (10 mg total) by mouth once daily. 90 tablet 3    metFORMIN (GLUCOPHAGE) 1000 MG tablet Take 1 tablet (1,000 mg total) by mouth 2 (two) times daily with meals. 180 tablet 3    omeprazole (PRILOSEC) 20 MG capsule Take 1 capsule (20 mg total) by mouth daily as needed (if needed with NSAIDS). 30 capsule 5    potassium chloride SA (K-DUR,KLOR-CON M) 10 MEQ tablet TAKE 1 TABLET(10 MEQ) BY MOUTH EVERY DAY 90 tablet 1    vitamin D (VITAMIN D3) 1000 units Tab Take 1,000 Units by mouth once daily.      clotrimazole-betamethasone 1-0.05% (LOTRISONE) cream Apply topically 2 (two) times daily. 45 g 2    diclofenac sodium (VOLTAREN) 1 % Gel Apply topically 4 (four) times daily. 100 g 2    fluticasone (FLONASE) 50 mcg/actuation nasal spray 2 sprays by Each Nare route once daily. 1 Bottle 0    furosemide (LASIX) 20 MG tablet Take 1 tablet (20 mg total) by mouth daily as needed (edema). 30 tablet 11    milnacipran (SAVELLA) 100 mg Tab Take 1 tablet (100 mg total) by mouth 2 (two) " times daily. 180 tablet 0    pulse oximeter (PULSE OXIMETER) device by Apply Externally route 2 (two) times a day. Use twice daily at 8 AM and 3 PM and record the value [facility] as directed. 1 each 0    RSV, preF A and preF B,PF, (ABRYSVO, PF,) 120 mcg/0.5 mL SolR vaccine Inject 0.5ml into the muscle. 0.5 mL 0    triamcinolone acetonide 0.025% (KENALOG) 0.025 % Oint Apply topically 2 (two) times daily. for 10 days (Patient taking differently: Apply topically 2 (two) times daily as needed.) 15 g 2        PMHx, PSHx, Allergies, Medications reviewed in epic    ROS negative except pain complaints in HPI    OBJECTIVE:    /70 (BP Location: Right arm, Patient Position: Sitting)   Pulse 99   Temp 97.9 °F (36.6 °C) (Temporal)   Resp 17   Ht 5' (1.524 m)   Wt 78.6 kg (173 lb 4.5 oz)   SpO2 97%   Breastfeeding No   BMI 33.84 kg/m²     PHYSICAL EXAMINATION:    GENERAL: Well appearing, in no acute distress, alert and oriented x3.  PSYCH:  Mood and affect appropriate.  SKIN: Skin color, texture, turgor normal, no rashes or lesions which will impact the procedure.  CV: RRR with palpation of the radial artery.  PULM: No evidence of respiratory difficulty, symmetric chest rise. Clear to auscultation.  NEURO: Cranial nerves grossly intact.    Plan:    Proceed with procedure as planned Procedure(s) (LRB):  Bilateral Synvisc-One knee injection (Bilateral)    Humberto Dumont MD  02/11/2025

## 2025-02-11 NOTE — PLAN OF CARE
Upon entering recovery room, pt. became nauseated. Pt. provided emesis bag and ice pack. Zofran administered as ordered.

## 2025-02-11 NOTE — DISCHARGE SUMMARY
Discharge Note  Short Stay      SUMMARY     Admit Date: 2/11/2025    Attending Physician: Humberto Dumont MD        Discharge Physician: Humberto Dumont MD        Discharge Date: 2/11/2025 7:58 AM    Procedure(s) (LRB):  Bilateral Synvisc-One knee injection (Bilateral)    Final Diagnosis: Bilateral primary osteoarthritis of knee [M17.0]    Disposition: Home or self care    Patient Instructions:   Current Discharge Medication List        CONTINUE these medications which have NOT CHANGED    Details   b complex vitamins tablet Take 1 tablet by mouth once daily.      biotin 1 mg tablet Take 1,000 mcg by mouth every morning.       celecoxib (CELEBREX) 200 MG capsule TAKE 1 CAPSULE(200 MG) BY MOUTH TWICE DAILY WITH FOOD  Qty: 120 capsule, Refills: 1    Associated Diagnoses: Chondromalacia, right knee; Left shoulder tendinitis      empagliflozin (JARDIANCE) 10 mg tablet Take 1 tablet (10 mg total) by mouth once daily.  Qty: 90 tablet, Refills: 1    Associated Diagnoses: Diabetes mellitus type 2 in obese      ezetimibe (ZETIA) 10 mg tablet Take 1 tablet (10 mg total) by mouth once daily.  Qty: 90 tablet, Refills: 3    Associated Diagnoses: Hyperlipidemia associated with type 2 diabetes mellitus      FLUoxetine 40 MG capsule Take 1 capsule (40 mg total) by mouth once daily.  Qty: 90 capsule, Refills: 1    Associated Diagnoses: Fibromyalgia; Chronic major depressive disorder      gabapentin (NEURONTIN) 300 MG capsule Take 1 capsule (300 mg total) by mouth 3 (three) times daily.  Qty: 90 capsule, Refills: 2      metFORMIN (GLUCOPHAGE) 1000 MG tablet Take 1 tablet (1,000 mg total) by mouth 2 (two) times daily with meals.  Qty: 180 tablet, Refills: 3    Associated Diagnoses: Diabetes mellitus type 2 in obese      omeprazole (PRILOSEC) 20 MG capsule Take 1 capsule (20 mg total) by mouth daily as needed (if needed with NSAIDS).  Qty: 30 capsule, Refills: 5    Associated Diagnoses: Gastroesophageal reflux disease without esophagitis       potassium chloride SA (K-DUR,KLOR-CON M) 10 MEQ tablet TAKE 1 TABLET(10 MEQ) BY MOUTH EVERY DAY  Qty: 90 tablet, Refills: 1    Comments: Future refill requests to go to Dr. Dumont      verapamiL (CALAN) 120 MG tablet Take 1 tablet (120 mg total) by mouth 2 (two) times daily.  Qty: 180 tablet, Refills: 5    Associated Diagnoses: MVP (mitral valve prolapse); Hypertension associated with diabetes      vitamin D (VITAMIN D3) 1000 units Tab Take 1,000 Units by mouth once daily.      clotrimazole-betamethasone 1-0.05% (LOTRISONE) cream Apply topically 2 (two) times daily.  Qty: 45 g, Refills: 2      diclofenac sodium (VOLTAREN) 1 % Gel Apply topically 4 (four) times daily.  Qty: 100 g, Refills: 2      fluticasone (FLONASE) 50 mcg/actuation nasal spray 2 sprays by Each Nare route once daily.  Qty: 1 Bottle, Refills: 0    Associated Diagnoses: Sinusitis      furosemide (LASIX) 20 MG tablet Take 1 tablet (20 mg total) by mouth daily as needed (edema).  Qty: 30 tablet, Refills: 11      milnacipran (SAVELLA) 100 mg Tab Take 1 tablet (100 mg total) by mouth 2 (two) times daily.  Qty: 180 tablet, Refills: 0      pulse oximeter (PULSE OXIMETER) device by Apply Externally route 2 (two) times a day. Use twice daily at 8 AM and 3 PM and record the value in [place] as directed.  Qty: 1 each, Refills: 0    Comments: This is a NO CHARGE item.  Please override price to zero.  DO NOT PRINT.  NORMAL MODE e-PRESCRIBE ONLY.  Associated Diagnoses: COVID-19 virus detected      RSV, preF A and preF B,PF, (ABRYSVO, PF,) 120 mcg/0.5 mL SolR vaccine Inject 0.5ml into the muscle.  Qty: 0.5 mL, Refills: 0      semaglutide (OZEMPIC) 0.25 mg or 0.5 mg (2 mg/3 mL) pen injector Inject 0.5 mg into the skin every 7 days.  Qty: 3 mL, Refills: 5    Comments: Patient requests delivery  Associated Diagnoses: Hypertension associated with diabetes      triamcinolone acetonide 0.025% (KENALOG) 0.025 % Oint Apply topically 2 (two) times daily. for 10 days  Qty:  15 g, Refills: 2    Associated Diagnoses: Tinea corporis                 Discharge Diagnosis: Bilateral primary osteoarthritis of knee [M17.0]  Condition on Discharge: Stable with no complications to procedure   Diet on Discharge: Same as before.  Activity: as per instruction sheet.  Discharge to: Home with a responsible adult.  Follow up: 2-4 weeks       Please call the office at [phone] if you experience any weakness or loss of sensation, fever > 101.5, pain uncontrolled with oral medications, persistent nausea/vomiting/or diarrhea, redness or drainage from the incisions, or any other worrisome concerns. If physician on call was not reached or could not communicate with our office for any reason please go to the nearest emergency department

## 2025-02-11 NOTE — DISCHARGE INSTRUCTIONS

## 2025-02-11 NOTE — H&P (VIEW-ONLY)
HPI  Patient presenting for Procedure(s) (LRB):  Bilateral Synvisc-One knee injection (Bilateral)     Patient on Anti-coagulation No    No health changes since previous encounter    Past Medical History:   Diagnosis Date    Arthritis     Cataract     COVID-19 02/25/2021    Diabetes mellitus 1995    BS didn't check 09/13/2022    Diabetes mellitus, type 2     Fibromyalgia     General anesthetics causing adverse effect in therapeutic use     bradycardia     Hypertension     Migraines     Mitral valve prolapse     Osteoarthritis     Rotator cuff tear 04/01/2015     Past Surgical History:   Procedure Laterality Date    ARTHROSCOPY OF KNEE Right 03/10/2020    Procedure: ARTHROSCOPY, KNEE;  Surgeon: Les Schneider MD;  Location: [facility] OR;  Service: Orthopedics;  Laterality: Right;    CATARACT EXTRACTION W/  INTRAOCULAR LENS IMPLANT Left 07/19/2017    CATARACT EXTRACTION W/  INTRAOCULAR LENS IMPLANT Right 2017    CHOLECYSTECTOMY      CHONDROPLASTY OF KNEE Right 03/10/2020    Procedure: CHONDROPLASTY, KNEE;  Surgeon: Les Schneider MD;  Location: [facility] OR;  Service: Orthopedics;  Laterality: Right;  Anterior compartment     COLONOSCOPY N/A 09/25/2018    Procedure: COLONOSCOPY;  Surgeon: Bethel Carmona MD;  Location: [facility] ENDO;  Service: Endoscopy;  Laterality: N/A;    EXCISION OF MEDIAL MENISCUS OF KNEE Right 03/10/2020    Procedure: MENISCECTOMY, KNEE, MEDIAL;  Surgeon: Les Schneider MD;  Location: [facility] OR;  Service: Orthopedics;  Laterality: Right;  Partial, Medial , Lateral     EYE SURGERY      INJECTION OF ANESTHETIC AGENT AROUND MEDIAL BRANCH NERVES INNERVATING LUMBAR FACET JOINT Bilateral 12/13/2022    Procedure: Bilateral L3-5 MBB;  Surgeon: Humberto Dumont MD;  Location: [facility] PAIN MGT;  Service: Pain Management;  Laterality: Bilateral;    INJECTION OF ANESTHETIC AGENT AROUND NERVE Right 08/08/2019    Procedure: Right Genicular nerve block with local;  Surgeon: Manpreet Dutta MD;  Location: [facility] PAIN  MGT;  Service: Pain Management;  Laterality: Right;    INJECTION OF ANESTHETIC AGENT INTO SACROILIAC JOINT Bilateral 05/06/2020    Procedure: Bilateral Sacroiliac Joint Injection;  Surgeon: Manpreet Dutta MD;  Location: [facility] PAIN MGT;  Service: Pain Management;  Laterality: Bilateral;    INJECTION OF ANESTHETIC AGENT INTO SACROILIAC JOINT Bilateral 10/08/2020    Procedure: Bilateral SI and Bilateral GTB with RN IV sedation;  Surgeon: Manpreet Dutta MD;  Location: [facility] PAIN MGT;  Service: Pain Management;  Laterality: Bilateral;    INJECTION OF ANESTHETIC AGENT INTO SACROILIAC JOINT Bilateral 01/05/2021    Procedure: Bilateral BLOCK, SACROILIAC JOINT and Bilateral GTB witn RN IV sedation;  Surgeon: Daniel Burns MD;  Location: [facility] PAIN MGT;  Service: Pain Management;  Laterality: Bilateral;    INJECTION OF ANESTHETIC AGENT INTO SACROILIAC JOINT Bilateral 07/08/2021    Procedure: Bilateral BLOCK, SACROILIAC JOINT bilateral GTB RN IV sedation;  Surgeon: Manpreet Dutta MD;  Location: [facility] PAIN MGT;  Service: Pain Management;  Laterality: Bilateral;    INJECTION OF ANESTHETIC AGENT INTO SACROILIAC JOINT Bilateral 03/01/2022    Procedure: Bilateral BLOCK, SACROILIAC JOINT and Bilateral GTB RN IV sedation;  Surgeon: Manpreet Dutta MD;  Location: [facility] PAIN MGT;  Service: Pain Management;  Laterality: Bilateral;    INJECTION OF ANESTHETIC AGENT INTO SACROILIAC JOINT Bilateral 10/10/2022    Procedure: Bilateral Sacroiliac Joint Injection;  Surgeon: Humberto Dumont MD;  Location: [facility] PAIN MGT;  Service: Pain Management;  Laterality: Bilateral;    INJECTION OF ANESTHETIC AGENT INTO SACROILIAC JOINT Bilateral 01/24/2023    Procedure: Bilateral Sacroiliac Joint Injection;  Surgeon: Humberto Dumont MD;  Location: [facility] PAIN MGT;  Service: Pain Management;  Laterality: Bilateral;    INJECTION OF ANESTHETIC AGENT INTO SACROILIAC JOINT Bilateral 06/21/2023    Procedure: Bilateral Sacroiliac Joint Injection;  Surgeon: Humberto Dumont MD;   Location: [facility] PAIN [facility]GT;  Service: Pain Management;  Laterality: Bilateral;    INJECTION OF ANESTHETIC AGENT INTO SACROILIAC JOINT Bilateral 1/9/2024    Procedure: Bilateral SIJ Injection;  Surgeon: Humberto Dumont MD;  Location: [facility] PAIN MGT;  Service: Pain Management;  Laterality: Bilateral;    INJECTION OF JOINT Bilateral 09/05/2019    Procedure: Bilateral GT bursa injection;  Surgeon: Manpreet Dutta MD;  Location: [facility] PAIN MGT;  Service: Pain Management;  Laterality: Bilateral;    INJECTION OF JOINT Bilateral 05/06/2020    Procedure: Bilateral GT bursa injection;  Surgeon: Manpreet Dutta MD;  Location: [facility] PAIN [facility]GT;  Service: Pain Management;  Laterality: Bilateral;    INJECTION OF JOINT Right 04/28/2022    Procedure: Injection, Joint Right Hip Injection RN IV sedation;  Surgeon: Manpreet Dutta MD;  Location: [facility]IN [facility]GT;  Service: Pain Management;  Laterality: Right;    INJECTION OF JOINT Bilateral 10/10/2022    Procedure: Bilateral GT bursa injection with RN IV sedation;  Surgeon: Humberto Dumont MD;  Location: [facility]GT;  Service: Pain Management;  Laterality: Bilateral;    INJECTION OF JOINT Bilateral 01/24/2023    Procedure: Bilateral GT bursa injection;  Surgeon: Humberto Dumont MD;  Location: [facility]GT;  Service: Pain Management;  Laterality: Bilateral;    INJECTION OF JOINT Bilateral 05/23/2023    Procedure: Bilateral intraarticular knee injection with Synvisc-1; ;  Surgeon: Humberto Dumont MD;  Location: [facility]GT;  Service: Pain Management;  Laterality: Bilateral;    INJECTION OF JOINT Bilateral 06/21/2023    Procedure: Bilateral GT bursa injection;  Surgeon: Humberto Dumont MD;  Location: [facility];  Service: Pain Management;  Laterality: Bilateral;    INJECTION OF JOINT Bilateral 1/9/2024    Procedure: Bilateral GTB injection;  Surgeon: Humberto Dumont MD;  Location: [facility];  Service: Pain Management;  Laterality: Bilateral;    INJECTION OF JOINT Bilateral 1/23/2024     Procedure: Bilateral intraarticular knee injection with Synvisc 1 ;  Surgeon: Humberto Dumont MD;  Location: [facility] PAIN MGT;  Service: Pain Management;  Laterality: Bilateral;    INJECTION OF JOINT Bilateral 7/23/2024    Procedure: Bilateral Synvisc Knee injection;  Surgeon: Humberto Dumont MD;  Location: [facility] PAIN MGT;  Service: Pain Management;  Laterality: Bilateral;    INJECTION, ALLOGRAFT, INTERVERTEBRAL DISC N/A 04/25/2023    Procedure: INJECTION,ALLOGRAFT,INTERVERTEBRAL DISC,1ST LEVEL: L5-S1;  Surgeon: Humberto Dumont MD;  Location: [facility] PAIN MGT;  Service: Pain Management;  Laterality: N/A;    INJECTION, ALLOGRAFT, INTERVERTEBRAL DISC N/A 05/09/2023    Procedure: INJECTION,ALLOGRAFT,INTERVERTEBRAL DISC,2nd LEVEL: L3-4;  Surgeon: Humberto Dumont MD;  Location: [facility] PAIN MGT;  Service: Pain Management;  Laterality: N/A;    KNEE ARTHROSCOPY Bilateral     PARS PLANA VITRECTOMY W/ REPAIR OF MACULAR HOLE Left 02/22/2017    RADIOFREQUENCY THERMOCOAGULATION Left 07/11/2019    Procedure: Right SIJ RFA;  Surgeon: Manpreet Dutta MD;  Location: [facility] PAIN MGT;  Service: Pain Management;  Laterality: Left;    RADIOFREQUENCY THERMOCOAGULATION Right 07/25/2019    Procedure: Right SIJ RFA;  Surgeon: Manpreet Dutta MD;  Location: [facility] PAIN MGT;  Service: Pain Management;  Laterality: Right;    SELECTIVE INJECTION OF ANESTHETIC AGENT AROUND LUMBAR SPINAL NERVE ROOT BY TRANSFORAMINAL APPROACH Bilateral 4/9/2024    Procedure: Bilateral L4/5 TF JAMIN;  Surgeon: Humberto Dumont MD;  Location: [facility] PAIN MGT;  Service: Pain Management;  Laterality: Bilateral;    SELECTIVE INJECTION OF ANESTHETIC AGENT AROUND LUMBAR SPINAL NERVE ROOT BY TRANSFORAMINAL APPROACH Bilateral 9/17/2024    Procedure: Bilateral L4/5 TF JAMIN;  Surgeon: Humberto Dumont MD;  Location: [facility] PAIN [facility]GT;  Service: Pain Management;  Laterality: Bilateral;    SHOULDER ARTHROSCOPY Right 06/04/2015     Review of patient's allergies indicates:   Allergen Reactions    Demerol  "[meperidine] Other (See Comments)     Burning when adm IV  Able to tolerate IM, "Turned the veins in my hand purple."    Latex, natural rubber Other (See Comments)     "Burns my skin",  Symptoms get worse the longer she is exposed    Zocor [simvastatin] Other (See Comments)     Tightening of muscles    Statins-hmg-coa reductase inhibitors Other (See Comments)     myopathy    Sulfa (sulfonamide antibiotics)      Blurred vision    Nickel Rash     Pt states she had sores to ear lobes from nickel in earrings         No current facility-administered medications on file prior to encounter.     Current Outpatient Medications on File Prior to Encounter   Medication Sig Dispense Refill    b complex vitamins tablet Take 1 tablet by mouth once daily.      biotin 1 mg tablet Take 1,000 mcg by mouth every morning.       celecoxib (CELEBREX) 200 MG capsule TAKE 1 CAPSULE(200 MG) BY MOUTH TWICE DAILY WITH FOOD 120 capsule 1    ezetimibe (ZETIA) 10 mg tablet Take 1 tablet (10 mg total) by mouth once daily. 90 tablet 3    metFORMIN (GLUCOPHAGE) 1000 MG tablet Take 1 tablet (1,000 mg total) by mouth 2 (two) times daily with meals. 180 tablet 3    omeprazole (PRILOSEC) 20 MG capsule Take 1 capsule (20 mg total) by mouth daily as needed (if needed with NSAIDS). 30 capsule 5    potassium chloride SA (K-DUR,KLOR-CON M) 10 MEQ tablet TAKE 1 TABLET(10 MEQ) BY MOUTH EVERY DAY 90 tablet 1    vitamin D (VITAMIN D3) 1000 units Tab Take 1,000 Units by mouth once daily.      clotrimazole-betamethasone 1-0.05% (LOTRISONE) cream Apply topically 2 (two) times daily. 45 g 2    diclofenac sodium (VOLTAREN) 1 % Gel Apply topically 4 (four) times daily. 100 g 2    fluticasone (FLONASE) 50 mcg/actuation nasal spray 2 sprays by Each Nare route once daily. 1 Bottle 0    furosemide (LASIX) 20 MG tablet Take 1 tablet (20 mg total) by mouth daily as needed (edema). 30 tablet 11    milnacipran (SAVELLA) 100 mg Tab Take 1 tablet (100 mg total) by mouth 2 (two) " times daily. 180 tablet 0    pulse oximeter (PULSE OXIMETER) device by Apply Externally route 2 (two) times a day. Use twice daily at 8 AM and 3 PM and [facility]. 1 each 0    RSV, preF A and preF B,PF, (ABRYSVO, PF,) 120 mcg/0.5 mL SolR vaccine Inject 0.5ml into the muscle. 0.5 mL 0    triamcinolone acetonide 0.025% (KENALOG) 0.025 % Oint Apply topically 2 (two) times daily. for 10 days (Patient taking differently: Apply topically 2 (two) times daily as needed.) 15 g 2        PMHx, PSHx, Allergies, Medications reviewed in epic    ROS negative except pain complaints in HPI    OBJECTIVE:    /70 (BP Location: Right arm, Patient Position: Sitting)   Pulse 99   Temp 97.9 °F (36.6 °C) (Temporal)   Resp 17   Ht 5' (1.524 m)   Wt 78.6 kg (173 lb 4.5 oz)   SpO2 97%   Breastfeeding No   BMI 33.84 kg/m²     PHYSICAL EXAMINATION:    GENERAL: Well appearing, in no acute distress, alert and oriented x3.  PSYCH:  Mood and affect appropriate.  SKIN: Skin color, texture, turgor normal, no rashes or lesions which will impact the procedure.  CV: RRR with palpation of the radial artery.  PULM: No evidence of respiratory difficulty, symmetric chest rise. Clear to auscultation.  NEURO: Cranial nerves grossly intact.    Plan:    Proceed with procedure as planned Procedure(s) (LRB):  Bilateral Synvisc-One knee injection (Bilateral)    Humberto Dumont MD  02/11/2025

## 2025-02-13 NOTE — TELEPHONE ENCOUNTER
Pt is requesting for a refill of: milnacipran (SAVELLA) 100 mg Tab   Last filled:10/11/24  Last encounter: 1/10/25   Up coming apt: 3/20/25  Pharmacy:[id number] - [place]

## 2025-02-18 ENCOUNTER — HOSPITAL ENCOUNTER (OUTPATIENT)
Facility: HOSPITAL | Age: 80
Discharge: HOME | End: 2025-02-18
Attending: ANESTHESIOLOGY | Admitting: ANESTHESIOLOGY
Payer: MEDICARE

## 2025-02-18 VITALS
OXYGEN SATURATION: 97 % | WEIGHT: 172.31 LBS | HEIGHT: 68 IN | SYSTOLIC BLOOD PRESSURE: 158 MMHG | HEART RATE: 96 BPM | DIASTOLIC BLOOD PRESSURE: 78 MMHG | TEMPERATURE: 97 F | BODY MASS INDEX: 26.12 KG/M2 | RESPIRATION RATE: 16 BRPM

## 2025-02-18 DIAGNOSIS — M46.1 SACROILIITIS: ICD-10-CM

## 2025-02-18 LAB — POCT GLUCOSE: 125 MG/DL (ref 70–110)

## 2025-02-18 PROCEDURE — 25000003 PHARM REV CODE 250: Performed by: ANESTHESIOLOGY

## 2025-02-18 PROCEDURE — 27096 INJECT SACROILIAC JOINT: CPT | Mod: 50,,, | Performed by: ANESTHESIOLOGY

## 2025-02-18 PROCEDURE — 20610 DRAIN/INJ JOINT/BURSA W/O US: CPT | Mod: 50,59 | Performed by: ANESTHESIOLOGY

## 2025-02-18 PROCEDURE — 63600175 PHARM REV CODE 636 W HCPCS: Performed by: ANESTHESIOLOGY

## 2025-02-18 PROCEDURE — 25500020 PHARM REV CODE 255: Performed by: ANESTHESIOLOGY

## 2025-02-18 PROCEDURE — 20610 DRAIN/INJ JOINT/BURSA W/O US: CPT | Mod: 50,59,, | Performed by: ANESTHESIOLOGY

## 2025-02-18 PROCEDURE — 27096 INJECT SACROILIAC JOINT: CPT | Mod: 50 | Performed by: ANESTHESIOLOGY

## 2025-02-18 PROCEDURE — 77002 NEEDLE LOCALIZATION BY XRAY: CPT | Mod: 26,59,, | Performed by: ANESTHESIOLOGY

## 2025-02-18 PROCEDURE — 82962 GLUCOSE BLOOD TEST: CPT | Performed by: ANESTHESIOLOGY

## 2025-02-18 RX ORDER — MIDAZOLAM HYDROCHLORIDE 1 MG/ML
INJECTION INTRAMUSCULAR; INTRAVENOUS
Status: DISCONTINUED | OUTPATIENT
Start: 2025-02-18 | End: 2025-02-18 | Stop reason: HOSPADM

## 2025-02-18 RX ORDER — SODIUM BICARBONATE 1 MEQ/ML
SYRINGE (ML) INTRAVENOUS
Status: DISCONTINUED | OUTPATIENT
Start: 2025-02-18 | End: 2025-02-18 | Stop reason: HOSPADM

## 2025-02-18 RX ORDER — TRIAMCINOLONE ACETONIDE 40 MG/ML
INJECTION, SUSPENSION INTRA-ARTICULAR; INTRAMUSCULAR
Status: DISCONTINUED | OUTPATIENT
Start: 2025-02-18 | End: 2025-02-18 | Stop reason: HOSPADM

## 2025-02-18 RX ORDER — FENTANYL CITRATE 50 UG/ML
INJECTION, SOLUTION INTRAMUSCULAR; INTRAVENOUS
Status: DISCONTINUED | OUTPATIENT
Start: 2025-02-18 | End: 2025-02-18 | Stop reason: HOSPADM

## 2025-02-18 RX ORDER — BUPIVACAINE HYDROCHLORIDE 2.5 MG/ML
INJECTION, SOLUTION EPIDURAL; INFILTRATION; INTRACAUDAL
Status: DISCONTINUED | OUTPATIENT
Start: 2025-02-18 | End: 2025-02-18 | Stop reason: HOSPADM

## 2025-02-18 NOTE — DISCHARGE SUMMARY
Discharge Note  Short Stay      SUMMARY     Admit Date: 2/18/2025    Attending Physician: Humberto Dumont MD        Discharge Physician: Humberto Dumont MD        Discharge Date: 2/18/2025 9:58 AM    Procedure(s) (LRB):  Bilateral SIJ + bilateral GT bursa injection (Bilateral)    Final Diagnosis: Sacroiliitis [M46.1]  Greater trochanteric bursitis, unspecified laterality [M70.60]    Disposition: Home or self care    Patient Instructions:   Current Discharge Medication List        CONTINUE these medications which have NOT CHANGED    Details   empagliflozin (JARDIANCE) 10 mg tablet Take 1 tablet (10 mg total) by mouth once daily.  Qty: 90 tablet, Refills: 1    Associated Diagnoses: Diabetes mellitus type 2 in obese      metFORMIN (GLUCOPHAGE) 1000 MG tablet Take 1 tablet (1,000 mg total) by mouth 2 (two) times daily with meals.  Qty: 180 tablet, Refills: 3    Associated Diagnoses: Diabetes mellitus type 2 in obese      b complex vitamins tablet Take 1 tablet by mouth once daily.      biotin 1 mg tablet Take 1,000 mcg by mouth every morning.       celecoxib (CELEBREX) 200 MG capsule TAKE 1 CAPSULE(200 MG) BY MOUTH TWICE DAILY WITH FOOD  Qty: 120 capsule, Refills: 1    Associated Diagnoses: Chondromalacia, right knee; Left shoulder tendinitis      clotrimazole-betamethasone 1-0.05% (LOTRISONE) cream Apply topically 2 (two) times daily.  Qty: 45 g, Refills: 2      diclofenac sodium (VOLTAREN) 1 % Gel Apply topically 4 (four) times daily.  Qty: 100 g, Refills: 2      ezetimibe (ZETIA) 10 mg tablet Take 1 tablet (10 mg total) by mouth once daily.  Qty: 90 tablet, Refills: 3    Associated Diagnoses: Hyperlipidemia associated with type 2 diabetes mellitus      FLUoxetine 40 MG capsule Take 1 capsule (40 mg total) by mouth once daily.  Qty: 90 capsule, Refills: 1    Associated Diagnoses: Fibromyalgia; Chronic major depressive disorder      fluticasone (FLONASE) 50 mcg/actuation nasal spray 2 sprays by Each Nare route once  daily.  Qty: 1 Bottle, Refills: 0    Associated Diagnoses: Sinusitis      furosemide (LASIX) 20 MG tablet Take 1 tablet (20 mg total) by mouth daily as needed (edema).  Qty: 30 tablet, Refills: 11      gabapentin (NEURONTIN) 300 MG capsule Take 1 capsule (300 mg total) by mouth 3 (three) times daily.  Qty: 90 capsule, Refills: 2      milnacipran (SAVELLA) 100 mg Tab Take 1 tablet (100 mg total) by mouth 2 (two) times daily.  Qty: 180 tablet, Refills: 0      omeprazole (PRILOSEC) 20 MG capsule Take 1 capsule (20 mg total) by mouth daily as needed (if needed with NSAIDS).  Qty: 30 capsule, Refills: 5    Associated Diagnoses: Gastroesophageal reflux disease without esophagitis      potassium chloride SA (K-DUR,KLOR-CON M) 10 MEQ tablet TAKE 1 TABLET(10 MEQ) BY MOUTH EVERY DAY  Qty: 90 tablet, Refills: 1    Comments: Future refill requests to go to Dr. Dumont      pulse oximeter (PULSE OXIMETER) device by Apply Externally route 2 (two) times a day. Use twice daily at 8 AM and 3 PM and record the value in [employer] as directed.  Qty: 1 each, Refills: 0    Comments: This is a NO CHARGE item.  Please override price to zero.  DO NOT PRINT.  NORMAL MODE e-PRESCRIBE ONLY.  Associated Diagnoses: COVID-19 virus detected      RSV, preF A and preF B,PF, (ABRYSVO, PF,) 120 mcg/0.5 mL SolR vaccine Inject 0.5ml into the muscle.  Qty: 0.5 mL, Refills: 0      semaglutide (OZEMPIC) 0.25 mg or 0.5 mg (2 mg/3 mL) pen injector Inject 0.5 mg into the skin every 7 days.  Qty: 3 mL, Refills: 5    Comments: Patient requests delivery  Associated Diagnoses: Hypertension associated with diabetes      triamcinolone acetonide 0.025% (KENALOG) 0.025 % Oint Apply topically 2 (two) times daily. for 10 days  Qty: 15 g, Refills: 2    Associated Diagnoses: Tinea corporis      verapamiL (CALAN) 120 MG tablet Take 1 tablet (120 mg total) by mouth 2 (two) times daily.  Qty: 180 tablet, Refills: 5    Associated Diagnoses: MVP (mitral valve prolapse);  Hypertension associated with diabetes      vitamin D (VITAMIN D3) 1000 units Tab Take 1,000 Units by mouth once daily.                 Discharge Diagnosis: Sacroiliitis [M46.1]  Greater trochanteric bursitis, unspecified laterality [M70.60]  Condition on Discharge: Stable with no complications to procedure   Diet on Discharge: Same as before.  Activity: as per instruction sheet.  Discharge to: Home with a responsible adult.  Follow up: 2-4 weeks       Please call the office at [phone] if you experience any weakness or loss of sensation, fever > 101.5, pain uncontrolled with oral medications, persistent nausea/vomiting/or diarrhea, redness or drainage from the incisions, or any other worrisome concerns. If physician on call was not reached or could not communicate with our office for any reason please go to the nearest emergency department

## 2025-02-18 NOTE — DISCHARGE INSTRUCTIONS

## 2025-02-18 NOTE — OP NOTE
Kaylin Mccollum  80 y.o. female      Vitals:    02/18/25 0947   BP: (!) 163/77   Pulse: 94   Resp: 16   Temp:        Procedure Date: 02/18/2025        INFORMED CONSENT: The procedure, risks, benefits and options were discussed with patient. There are no contraindications to the procedure. The patient expressed understanding and agreed to proceed. The personnel performing the procedure was discussed. I verify that I personally obtained consent prior to the start of the procedure and the signed consent can be found on the patient's chart.       Anesthesia:   Conscious sedation provided by M.D    The patient was monitored with continuous pulse oximetry, EKG, and intermittent blood pressure monitors.  The patient was hemodynamically stable throughout the entire process was responsive to voice, and breathing spontaneously.  Supplemental O2 was provided at 2L/min via nasal cannula.  Patient was comfortable for the duration of the procedure. (See nurse documentation and case log for sedation time)    There was a total of 2mg IV Midazolam and 50mcg Fentanyl titrated for the procedure    Pre Procedure diagnosis: Sacroiliitis [M46.1]  Greater trochanteric bursitis, unspecified laterality [M70.60]  Post-Procedure diagnosis: SAME      PROCEDURE:  1) Bilateral greater trochanteric bursa injection    2) Bilateral sacroiliac joint injection                            REASON FOR PROCEDURE:   Sacroiliitis [M46.1]  Greater trochanteric bursitis[M70.61]      MEDICATIONS INJECTED: 1mL 40mg/ml Kenalog and 4mL Bupivacaine 0.25% into each site    LOCAL ANESTHETIC USED: Xylocaine 1% 6ml     ESTIMATED BLOOD LOSS: None.   COMPLICATIONS: None.     TECHNIQUE:   Greater trochanteric bursa injection:  The area overlying the greater trochanteric bursa was identified using fluoroscopy, and the area overlying the skin was prepped and draped in usual sterile fashion. Local Xylocaine was injected by raising a wheel and going down to the periosteum  using a 27-gauge hypodermic needle. A 5 inch 22-gauge spinal needle was introduce into the Bilateral greater trochanteric bursa. Negative pressure applied to confirm no intravascular placement. Omnipaque was injected to confirm placement and to confirm that there was no vascular runoff. The medication was then injected slowly.  Displacement of the contrast after injection of the medication confirmed that the medication went into the area of the greater trochanteric bursa    Sacroiliac joint injection:   Laying in the prone position, the patient was prepped and draped in the usual sterile fashion using ChloraPrep and fenestrated drape.  The area was determined under fluoroscopy.  Local Xylocaine was injected by raising a wheel and going down to the periosteum using a 27-gauge hypodermic needle.  The 3.5 inch 22-gauge spinal needle was introduce into the Bilateral sacroiliac joint.  Negative pressure applied to confirm no intravascular placement.  Omnipaque was injected to confirm placement and to confirm that there was no vascular runoff.  The medication was then injected slowly.  The patient tolerated the procedure well.                       The patient was monitored for approximately 30 minutes after the procedure. Patient was given post procedure and discharge instructions to follow at home. We will see the patient back in two weeks or the patient may call to inform of status. The patient was discharged in a stable condition

## 2025-03-07 ENCOUNTER — PATIENT MESSAGE (OUTPATIENT)
Dept: ADMINISTRATIVE | Facility: OTHER | Age: 80
End: 2025-03-07
Payer: MEDICARE

## 2025-03-17 ENCOUNTER — PATIENT MESSAGE (OUTPATIENT)
Dept: ADMINISTRATIVE | Facility: OTHER | Age: 80
End: 2025-03-17
Payer: MEDICARE

## 2025-03-17 DIAGNOSIS — M94.261 CHONDROMALACIA, RIGHT KNEE: ICD-10-CM

## 2025-03-17 DIAGNOSIS — E66.9 DIABETES MELLITUS TYPE 2 IN OBESE: ICD-10-CM

## 2025-03-17 DIAGNOSIS — E78.5 HYPERLIPIDEMIA ASSOCIATED WITH TYPE 2 DIABETES MELLITUS: ICD-10-CM

## 2025-03-17 DIAGNOSIS — F32.9 CHRONIC MAJOR DEPRESSIVE DISORDER: Chronic | ICD-10-CM

## 2025-03-17 DIAGNOSIS — M79.7 FIBROMYALGIA: Chronic | ICD-10-CM

## 2025-03-17 DIAGNOSIS — I34.1 MVP (MITRAL VALVE PROLAPSE): ICD-10-CM

## 2025-03-17 DIAGNOSIS — E11.69 DIABETES MELLITUS TYPE 2 IN OBESE: ICD-10-CM

## 2025-03-17 DIAGNOSIS — E11.69 HYPERLIPIDEMIA ASSOCIATED WITH TYPE 2 DIABETES MELLITUS: ICD-10-CM

## 2025-03-17 DIAGNOSIS — M77.8 LEFT SHOULDER TENDINITIS: ICD-10-CM

## 2025-03-17 DIAGNOSIS — E11.59 HYPERTENSION ASSOCIATED WITH DIABETES: ICD-10-CM

## 2025-03-17 DIAGNOSIS — I15.2 HYPERTENSION ASSOCIATED WITH DIABETES: ICD-10-CM

## 2025-03-17 NOTE — TELEPHONE ENCOUNTER
No care due was identified.  [facility] Embedded Care Due Messages. Reference number: [id number].   3/17/2025 5:47:44 PM CDT

## 2025-03-17 NOTE — TELEPHONE ENCOUNTER
No care due was identified.  [facility] Embedded Care Due Messages. Reference number: [id number].   3/17/2025 5:54:27 PM CDT

## 2025-03-17 NOTE — TELEPHONE ENCOUNTER
Care Due:                  Date            Visit Type   Department     Provider  --------------------------------------------------------------------------------                                ESTABLISHED                              PATIENT -    [facility]  Last Visit: 07-      [facility]      CHARLI STEWART -                              PRIMARY      [facility]  Next Visit: 05-      CARE ([facility])   MEDICINE       Lisette Olvera                                                            Last  Test          Frequency    Reason                     Performed    Due Date  --------------------------------------------------------------------------------    HBA1C.......  6 months...  empagliflozin............  10-   04-    [facility] Embedded Care Due Messages. Reference number: [id number].   3/17/2025 5:44:59 PM CDT

## 2025-03-18 RX ORDER — CLOTRIMAZOLE AND BETAMETHASONE DIPROPIONATE 10; .64 MG/G; MG/G
CREAM TOPICAL 2 TIMES DAILY
Qty: 45 G | Refills: 2 | Status: SHIPPED | OUTPATIENT
Start: 2025-03-18

## 2025-03-18 RX ORDER — CELECOXIB 200 MG/1
CAPSULE ORAL
Qty: 120 CAPSULE | Refills: 1 | Status: SHIPPED | OUTPATIENT
Start: 2025-03-18

## 2025-03-18 RX ORDER — DICLOFENAC SODIUM 10 MG/G
GEL TOPICAL 4 TIMES DAILY
Qty: 100 G | Refills: 2 | Status: SHIPPED | OUTPATIENT
Start: 2025-03-18

## 2025-03-18 RX ORDER — GABAPENTIN 300 MG/1
300 CAPSULE ORAL 3 TIMES DAILY
Qty: 90 CAPSULE | Refills: 2 | Status: SHIPPED | OUTPATIENT
Start: 2025-03-18

## 2025-03-18 NOTE — TELEPHONE ENCOUNTER
Understanding Anxiety Disorders  Almost everyone gets nervous now and then. It’s normal to have knots in your stomach before a test, or for your heart to race on a first date. But an anxiety disorder is much more than a case of nerves. In fact, its symptoms may be overwhelming. But treatment can relieve many of these symptoms. Talking to your doctor is the first step.    What are anxiety disorders?  An anxiety disorder causes intense feelings of panic and fear. These feelings may arise for no apparent reason. And they tend to recur again and again. They may prevent you from coping with life and cause you great distress. As a result, you may avoid anything that triggers your fear. In extreme cases, you may never leave the house. Anxiety disorders may cause other symptoms, such as:  · Obsessive thoughts you can’t control  · Constant nightmares or painful thoughts of the past  · Nausea, sweating, and muscle tension  · Difficulty sleeping or concentrating  What causes anxiety disorders?  Anxiety disorders tend to run in families. For some people, childhood abuse or neglect may play a role. For others, stressful life events or trauma may trigger anxiety disorders. Anxiety can trigger low self-esteem and poor coping skills.  Common anxiety disorders  · Panic disorder: This causes an intense fear of being in danger.  · Phobias: These are extreme fears of certain objects, places, or events.  · Obsessive-compulsive disorder: This causes you to have unwanted thoughts. You also may perform certain actions over and over.  · Posttraumatic stress disorder: This occurs in people who have survived a terrible ordeal. It can cause nightmares and flashbacks about the event.  · Generalized anxiety disorder: This causes constant worry that can greatly disrupt your life.   Getting better  You may believe that nothing can help you. Or, you might fear what others may think. But most anxiety symptoms can be eased. Having an anxiety  LOV 10/31/24 TE  NOV 5/06/25 KM    disorder is nothing to be ashamed of. Most people do best with treatment that combines medication and therapy. Although these aren’t cures, they can help you live a healthier life.  © 2130-5060 [employer]. [address] [place] [address], [place] [address]. All rights reserved. This information is not intended as a substitute for professional medical care. Always follow your healthcare professional's instructions.

## 2025-03-18 NOTE — TELEPHONE ENCOUNTER
No care due was identified.  [facility] Embedded Care Due Messages. Reference number: [id number].   3/18/2025 12:23:22 PM CDT

## 2025-03-18 NOTE — TELEPHONE ENCOUNTER
Pt is requesting for a refill of: milnacipran (SAVELLA) 100 mg Tab  and Andre   Last filled:2/13/24  Last encounter: 1/10/25   Up coming apt: 3/20/25  Pharmacy:  [id number] - [place] - [id number] AT [facility]

## 2025-03-18 NOTE — TELEPHONE ENCOUNTER
Refill Routing Note   Medication(s) are not appropriate for processing by [facility] for the following reason(s):        No active prescription written by provider    [facility] action(s):  Defer               Appointments  past 12m or future 3m with PCP    Date Provider   Last Visit   7/3/2024 Lisette Olvera MD   Next Visit   3/17/2025 Lisette Olvera MD   ED visits in past 90 days: 0        Note composed:12:22 PM 03/18/2025

## 2025-03-19 NOTE — PROGRESS NOTES
Established Patient - TeleHealth Visit    The patient location is: [place]  The chief complaint leading to consultation is: chronic pain     Visit type: Audiovisual telemedicine visit     Total time spent on the encounter includes Face-to-face time and non-face to face time preparing to see the patient (eg, review of tests), Obtaining and/or reviewing separately obtained history, Documenting clinical information in the electronic or other health record, Independently interpreting results (not separately reported) and communicating results to the patient/family/caregiver, and/or Care coordination (not separately reported).     Each patient to whom he or she provides medical services by telemedicine is:  (1) informed of the relationship between the physician and patient and the respective role of any other health care provider with respect to management of the patient; and (2) notified that he or she may decline to receive medical services by telemedicine and may withdraw from such care at any time.          Chronic Pain -- Established Patient (Follow-up visit)    Chief complaint:  Follow-up     Low back pain with BLE radicular pain   Bilateral knee pain - improved after Synvisc-One        Interval History (3/20/2025): Patient presents today for follow-up visit.  Patient reports pain as 3/10 today.     she underwent Bilateral IA knee joint injection with Synvisc-One on 2/11/25.  The patient reports that she is/was better following the procedure.  she reports 90% pain relief.  The changes lasted 6 weeks so far.  The changes have continued through this visit.      she underwent bilateral SIJ + bilateral GT bursa injection on 2/18/25.  The patient reports that she is/was better following the procedure.  she reports 80% pain relief.  The changes lasted 4 weeks so far.  The changes have continued through this visit.         Interval History (1/10/2025):  Kaylin Mccollum presents today for follow-up visit.  Patient was last  seen on 10/18/2024, about 3 months ago. She presents today c/o returning knee pain. Patient reports pain as 8/10 today.  She completed 9 session of physical therapy and could not continue due to the right knee pain. She is holding off for now. She c/o bilateral knee pain, but the right is much worse.  She also reports returning SIJ pain, as injection from a year ago has worn off.    Interval History (10/18/2024): Patient presents today for follow-up telemedicine visit.  she underwent Bilateral L4/5 TF JAMIN on 9/17/24.  The patient reports that she is/was better following the procedure.  she reports 90% pain relief.  The changes lasted 4 weeks so far.  The changes have continued through this visit.  Patient reports pain as 1/10 today.    Interval History (8/21/2024): Kaylin Mccollum presents today for follow-up visit.  she underwent bilateral Synvisc-One intra-articular knee injection on 07/23/2024 with 90% pain relief.  The patient reports that she is/was better following the procedure.  The changes lasted 4 weeks so far.  The changes have continued through this visit.  Patient reports pain as 6/10 today due to low back pain.  She reports sitting often makes her pain worse.  Pain starts in the low back and radiates into both legs, some days 1 leg worse than the other.  She continues taking gabapentin and other medications.  She had great relief after lumbar epidural in April of this year, but she feels that after the fall, pain flared up in general.    Interval History (07/03/2024):  Patient presents today for follow-up visit.  Patient being seen today for evaluation of lower back pain.  She fell last week after becoming dizzy where she slumped down against a wall and landed on her buttocks with his knees bent at a sharp angle.  After that she has been having some increased lower back pain as well as right knee pain.  She did go to the ER following a fall with a full workup and did have an x-ray on the right knee  "and was told no acute changes.  She rates her pain today a 7/10.  Her back pain is radiating down the right leg mainly from the right knee to the right ankle in the front of the shin.  At times she will have shooting pains in the right leg.  She does feel that this feels different than her typical lower back pain.  She continues to take gabapentin, Savella, Celebrex.  She also requests to repeat her Synvisc-One injections to both knees.  She had these injection 6 months ago and states that she got 80% relief until the fall.  Patient denies night fever/night sweats, urinary incontinence, bowel incontinence, significant weight loss and significant motor weakness.   Patient denies any other complaints or concerns at this time.    Interval history 05/20/2024  Patient presents status post bilateral L4-5 transforaminal epidural steroid injection 04/09/2024.  Patient reports approximately 90% sustained relief in lower extremity radicular symptoms following her procedure.  Patient does report confusion regarding epidural steroid injection as with her prior medical history, she was familiar with an interlaminar approach on the [facility].  She does report intermittent sciatic pain which can occur with certain movements, such as awakening in the morning, driving or crossing her legs.  Pain today is rated a 2/10.  She reports recent bouts of fibromyalgia flares." She does believes Savella medication at 100 mg twice daily has these symptoms well controlled.  She does report her pharmacy, [facility] does not keep this medication in stock and most recently she had to rash in her medication into 50 mg doses and still Ms. Day dose for 1 day.  Today she continues to report sustained relief in lower discogenic back pain following via disc allograft, 1 year prior.  Patient does report it is difficult to participate in exercise including aquatic therapy secondary to exacerbation of fibromyalgia.  Patient does remain active performing " household activities.  She does report that she lives in a [facility] and is able to ambulate on her Street.  Patient expresses concern regarding chart review.  She reports diagnosis of stage 3 chronic kidney disease and is inquiring regarding her renal function.  We have reviewed her labs and I have encouraged her to reach out to her primary care physician regarding potential nephrology referral if needed.    Interval history 02/15/2024  Patient presents status post bilateral sacroiliac joint and greater trochanteric bursa injection 01/09/2024 and Bilateral intra-articular knee injection with Synvisc-One 01/23/2024.  Patient reports at least 80% sustained relief overlying bilateral sacroiliac joints, GTB and in bilateral knees following her procedures.  Today her primary concern is pain in a bandlike distribution in the lower back which radiates down into the hips and down towards the feet in L4-5 dermatomal distribution.  Pain is exacerbated with positional changes moving from sitting to standing and with standing and with ambulation.  She does report associated weakness in the lower extremities associated with her pain.  Pain today is rated a 6/10.  She is continued gabapentin 300 mg in the morning, 300 mg in the afternoon and 600 mg in the evening.  She also increased Savella to 100 mg with noticeable improvement in her pain.  She is continued physician directed physical therapy exercises over the last 8 weeks from 12/15/2023 through 02/15/2024 with noticeable improvement in pain, range of motion and functionality.  She denies bowel or bladder incontinence saddle anesthesia.    Interval Hx: 12/13/2023  Patient presents for four-month follow-up.  Today she reports that her lower back has been feeling excellent since via disc supplementation and that the procedure has made all the difference! Particularly with standing and ambulation.  Patient reports the day following Thanksgiving, rolling off of her bed and  falling onto her side with significant difficulty arising to a standing position and pain with standing and ambulation following this trauma.  Today she reports pain over bilateral sacroiliac territory which radiates into the hips as well as pain in bilateral knees.  Pain is rated a 4/10.  Pain is exacerbated with positional changes, standing and with ambulation.  She is continued Savella 50 mg twice daily which she reports has significantly reduced the severity and duration of fibromyalgia flares.  She has requesting a refill or increase in this medication.  She is continued physician directed physical therapy exercises over the last 8 weeks from 10/13/2023 through 12/13/2023 for lower back, sacroiliac joint and bilateral knee pain.    Interval history 08/24/2023  Patient presents status post bilateral sacroiliac joint and greater trochanteric bursa injection 06/21/2023.  Patient reports 95% sustained relief overlying bilateral sacroiliac joint and greater trochanteric bursa territories.  She reports altogether following 2 level via disc allograft supplementation and her most recent procedure, she is able to stand upright and ambulate further distances.  She reports these procedures have taken years off my life! .  Today pain is intermittent and rated a 2/10.  Patient has continued Savella 50 mg twice daily and reports this has significantly reduced the frequency and intensity of her fibromyalgia flares and debility associated with these flares.  She is requesting a refill of this medication.  Today she denies significant lower extremity weakness, bowel or bladder incontinence or saddle anesthesia.    Interval history 06/15/2023  Patient presents status post bilateral intra-articular knee injection 05/23/2023.  Patient reports 75% sustained improvement in bilateral knee pain following intra-articular knee injection.  Today her primary concern is bilateral hip pain.  Patient reports pain in the lower back which  radiates into the groin and down the lateral aspect of bilateral lower extremities to mid thigh.  Patient reports pain is exacerbated 1st thing in the morning when she is attempting to get out of bed.  Patient denies more distal radiculopathy into the lower extremities or feet.  She denies lower extremity weakness, bowel or bladder incontinence or saddle anesthesia.  Patient continues to reports significant improvement with Savella medication which she is taking 50 mg twice daily with fibromyalgia pain.  She is requesting a refill of this medication.  Patient reports lower back pain continues to have greater than 80% sustained relief following 2 level via disc allograft supplementation.      Interval history 05/18/2023  Patient presents status post L5-S1 via disc allograft supplementation 04/25/2023 and via disc allograft supplementation L3-4 05/09/2023.  Patient reports noticeable improvement >80% in lower back pain following two-level  allograft supplementation.  Today her primary concern is bilateral knee pain.  Patient has questions regarding hyaluronic acid supplementation to be use.  Patient reports she has seen videos on Durolane and Euflexxa and is inquiring to the brand to be used on the upcoming Tuesday.  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  She is requesting up-to-date imaging to investigate these territories.  Today she denies significant weakness in the upper lower extremities, bowel or bladder incontinence or saddle anesthesia.    Interval history 04/06/2023  Patient presents for follow-up of lower back pain.  She continues to reports superior relief in fibromyalgia symptoms on Savella medication.  Patient has brought in denial paperwork from her insurance reporting limitations on dosage and quantity allowed.  Patient reports prior to starting this medication she felt that she did not have a life.   now she reports significant improvement in pain as well as chronic fatigue associated with fibromyalgia.  Today she again reports pain in the lower back which is worse with lumbar flexion.  Patient reports she is unable to perform activities of daily living such as grocery shopping or prolonged standing or ambulation secondary to her discogenic lower back pain.  Today patient denies more distal radiculopathy into the lower extremities or feet or lower extremity weakness.  Patient is interested in discussing intervention.  Of note patient has failed to have improvement in his lower back pain with prior lumbar medial branch block, sacroiliac joint injections.    Interval Hx: 3/9/23  Patient presents for one-month follow-up.  Today she reports she is significantly better since our last clinic visit.  At that time patient had slipped in a low off and injured her lower back and right leg.  Today she reports this pain is significantly improved and pain is intermittent and today is rated a 3/10.  Today patient reports pain is isolated to the midline lower lumbar spine.  Pain is exacerbated with lumbar flexion such as when she is picking up close.  Fibromyalgia Pain has been significantly improved with the initiation of Savella medication.  Patient has reached a titration of 50 mg twice daily.  Patient recently refilled this medication.  She denies any side effects from this medication.  Today she denies any significant lower extremity weakness, bowel or bladder incontinence or saddle anesthesia    Interval history 02/07/2023  Patient presents status post bilateral sacroiliac joint and greater trochanteric bursa injection 01/24/2023 and bilateral L3-5 lumbar medial branch block 12/13/2022.  Patient had recent mechanical fall confounding benefit from recent injections.  Today she reports she was reaching over a mattress cover to reach a stack of bibles and slid into a slint.  Patient reports she was behind and tall wall in  a took 20-30 minutes for her to stand.  Patient also reports exacerbation of fibromyalgia pain.  Since her fall patient reports pain which radiates into the buttock and down the posterior aspect of the right lower extremity to the dorsum of the foot in L4-S1 distribution.  Patient has continued gabapentin 300 mg twice daily, Celebrex in the evenings as well as Tylenol 1000 mg twice daily.    Interval history 11/29/2022    Patient presents status post bilateral sacroiliac joint greater trochanteric bursa 10/10/2022.  Patient reports 90% relief overlying bilateral sacroiliac joints and greater trochanteric bursa following her injection.  Today she reports primarily lumbar axial back pain as well as significant neck pain.  Lower back pain is exacerbated with prolonged standing such as when she is washing dishes.  She denies significant distal radiculopathy into the right lower extremities as described at our last clinic visit.  Neck pain is elicited with cervical flexion, extension and lateral flexion and she does report reduced mobility.  She reports her pain began following a mechanical fall [facility] the stairs at [facility] in September 1986.  Patient has continued gabapentin 300 mg in the morning, 300 mg in the afternoon and 600 mg in the evening.    Interval history 10/04/2022  Ms. Mccollum is a 77-year-old female with past medical history significant for depression, hyperlipidemia, hypertension, mitral valve prolapse, peripheral vascular disease, history of COVID-19, type 2 diabetes, multi joint arthritis, fibromyalgia who presents to [facility] care, previous Dr. Dutta patient.  Today patient reports return of pain in the lower back which radiates into bilateral hips and down the posterior aspect of the right lower extremity in L4-5 distribution to the dorsum of the foot.  Patient reports pain is intermittent but has increased in intensity.  Pain is described as shocking in nature and today is rated a 6/10.  Patient reports  she feels as if her skin is crawling.  patient is currently taking gabapentin 300 mg up to 3 times daily when pain is severe.  Pain interferes with the patient's sleep.  Patient also reports history of fibromyalgia which limits her mobility and duration of standing and ambulation.  Patient does endorse associated weakness in the lower extremities associated with her pain.  Patient is interested in pursuing repeat intervention as she is obtained several months of significant relief with prior sacroiliac joint and greater trochanteric bursa injections.  Patient has continued physician directed physical therapy exercises at home daily.    History of Present Illness 01/16/2020: Dr. Dutta:   Kaylin Mccollum is a 74 y.o. female  who is presenting with a chief complaint of lumbar back pain. The patient began experiencing this problem insidiously, and the pain has been gradually worsening over the past 5 month(s). The pain is described as throbbing, shooting, burning and electrical and is located in the bilateral lumbar spine. Pain is intermittent and lasts hours. The pain radiates to bilateral lower extremities L4 distribudution. The patient rates her pain a 8 out of ten and interferes with activities of daily living a 7 out of ten. Pain is exacerbated by flexion of the lumbar spine, ambulation, and is improved by rest.      She also complains of right knee pain. The patient began experiencing this problem insidiously. The pain is described as cramping, aching and is located in the right knee. Pain is intermittent and lasts hours. The pain is nonradiating. The patient rates her pain a 8 out of ten and interferes with activities of daily living a 7 out of ten. Pain is exacerbated by getting up from a seated position and standing, and is improved by rest. Patient reports no prior trauma, prior arthroscopy bilaterally in 1990s.  - pertinent negatives: No fever, No chills, No weight loss, No bladder dysfunction, No bowel  dysfunction, No saddle anesthesia  - pertinent positives: none        Pain Disability Index (PDI) Score Review:      5/20/2024     7:43 AM 11/29/2023    10:44 AM 8/24/2023     1:04 PM   Last 3 PDI Scores   Pain Disability Index (PDI) 12 11    35 14       Non-Pharmacologic Treatments:  Physical Therapy/Home Exercise: yes  Ice/Heat:yes  TENS: no  Acupuncture: no  Massage: no  Chiropractic: no    Other: no      Pain Medications:  - Adjuvant Medications: Lorazepam (Ativan), Neurontin (Gabapentin), and Topical Ointment (Voltaren Gel, Steroid cream, Anti-Inflammatory Cream, Compound cream)        Pain injections:  Dr. Dumont:  -02/18/2025:  bilateral SIJ + bilateral GT bursa injection on 2/18/25  -02/11/2025:  Bilateral IA knee joint injection with Synvisc-One on 2/11/25  -09/17/2024: Bilateral L4/5 TF JAMIN with 90% pain relief  -07/23/2024: Bilateral intra-articular knee joint injection with Synvisc-One with 90% pain relief   -04/09/2024: Bilateral L4-5 TF JAMIN   -01/23/2024: Bilateral intra-articular knee injection with Synvisc-One  -01/09/2024: Bilateral sacroiliac joint and greater trochanteric bursa injection  -06/21/2023: Bilateral sacroiliac joint and greater trochanteric bursa injection  -05/29/2023: Bilateral intra-articular knee injection  -05/09/2023: L3-4 via disc allograft supplementation  -04/25/2023:  L5-S1 via disc allograft supplementation  -01/24/2023: Bilateral sacroiliac joint and greater trochanteric bursa injection  -12/13/2022: Bilateral L3-5 lumbar medial branch block  -10/10/2022: Bilateral greater trochanteric bursa and sacroiliac joint injection    Dr. Dutta & Dr. Burns:  -04/20/2022: Right-sided acetabular femoral injection; Dr. Dutta  -03/01/2022: Bilateral sacroiliac joint and greater trochanteric bursa injection; Dr. Dutta  -07/08/2021: Bilateral sacroiliac joint and greater trochanteric bursa injection; Dr. Dutta  -01/05/2021: Bilateral sacroiliac joint and greater trochanteric bursa injection;  Dr. Burns  -10/08/2020: Bilateral sacroiliac joint and bilateral greater trochanteric bursa injection; Dr. Dutta  -05/06/2020: Bilateral sacroiliac joint and greater trochanteric bursa injection; Dr. Dutta          Imaging/ Diagnostic Studies/ Labs (Reviewed on 3/20/2025):    7/25/2024 X-Ray Lumbar Complete Including Flex And Ext  FINDINGS: No fracture of the lumbar spine.  Intervertebral disc heights are preserved.  6 mm of anterolisthesis of L4 on L5.  Bilateral facet joint osteoarthritis at L4-5 and L5-S1.      Cervical x-ray 05/18/2023   FINDINGS:  Osteopenia.  Vertebral body heights maintained.  Mild anterolisthesis of C4 on C5 and C5 on C6.  Multilevel osteophyte changes with disc height loss most noted at C5-6 and C6-7.  Multilevel prominent facet arthropathy. Multilevel left-sided neural foraminal narrowing.  Significant change in alignment on flexion or extension.  Prevertebral soft tissues unremarkable.  Lung apices clear.      Thoracic x-ray 05/18/2023  FINDINGS:  Osteopenia.  Vertebral body heights maintained.  No spondylolisthesis.  Similar more multilevel bridging osteophyte changes.  No acute osseous abnormality.  Soft tissues unremarkable.      MRI Lumbar Spine 02/16/2023  FINDINGS:  Grade 1 degenerative spondylolisthesis at L4-L5.  Vertebral body height is normal.  Marrow signal is within normal limits. The conus medullaris terminates at the level of L1-L2.  No abnormal signal within the conus. Intervertebral disc levels are as follows:     T12-L1 disc: Normal disc height with anterior osteophytes and mild degenerative facet hypertrophy.  No spinal or foraminal stenosis.  The dural canal measures 16 mm.     L1-L2 disc : Disc space height loss with chronic Schmorl's nodes.  Posterior disc bulge.  Anterior osteophytes.  Minor facet arthropathy bilaterally.  The dural canal measures 13 mm.  No foraminal stenosis.     L2-L3 disc: Circumferential disc bulge with tiny chronic Schmorl's nodes.  Anterior  "osteophytes.  Mild degenerative facet hypertrophy.  The dural canal measures 12 mm.  No foraminal stenosis.     L3-L4 disc: Disc space height loss with a circumferential disc bulge and anterior osteophytes.  Mild degenerative facet hypertrophy with moderate buckling of the ligamentum flavum.  The dural canal measures 11 mm.  No significant foraminal stenosis.     L4-L5 disc: Grade 1 spondylolisthesis with severe degenerative facet hypertrophy bilaterally.  Buckling of the ligamentum flavum.  The dural canal measures 7 mm AP.  Disc encroaches into the floors of the exit foramina, right greater than left.  There is mild right foraminal stenosis.     L5-S1 disc: Severe disc space height loss with osteophytes at the margins and encroach into the exit foramina.  Mild degenerative facet hypertrophy and buckling of the ligamentum flavum.  The dural canal measures 11 mm. Mild foraminal stenosis.          Review of Systems:   GENERAL:  No weight loss, malaise or fevers.  HEENT:   No recent changes in vision or hearing  NECK:  Negative for lumps, no difficulty with swallowing.  RESPIRATORY:  Negative for cough, wheezing or shortness of breath, patient denies any recent URI.  CARDIOVASCULAR:  Negative for chest pain or palpitations.  GI:  Negative for abdominal discomfort, blood in stools or black stools or change in bowel habits.  MUSCULOSKELETAL:  See HPI.  SKIN:  Negative for lesions, rash, and itching.  PSYCH:  No mood disorder or recent psychosocial stressors.   HEMATOLOGY/LYMPHOLOGY:  Negative for prolonged bleeding, bruising easily or swollen nodes.    NEURO:   No history of syncope, paralysis, seizures or tremors.  All other reviewed and negative other than HPI.        Telemedicine Physical Exam:   Vitals:    03/20/25 0853   Height: 5' 8" (1.727 m)   Body mass index is 26.2 kg/m².   (reviewed on 3/20/2025)     GENERAL: Well appearing, in no acute distress, alert and oriented x3.  Cooperative.  PSYCH:  Mood and affect " appropriate.  SKIN: Skin color & texture with no obvious abnormalities.    HEAD/FACE:  Normocephalic, atraumatic.    PULM:  No difficulty breathing. No nasal flaring. No obvious wheezing.  Patient performed SIJ testing:  - TTP over SI joint: Present, also over GTB bilaterally   - Minesh's/ Atif's: Positive    - Sacroiliac Distraction Test (anterior pressure): Positive  - Sacroiliac Compression Test (lateral pressure): Positive    EXTREMITIES: No obvious deformities. Moving all extremities well, appears to have symmetric strength throughout.  MUSCULOSKELETAL: No obvious atrophy abnormalities are noted.   GAIT: sitting.     Physical Exam: last in clinic visit:  General: alert and oriented, in no apparent distress.  Gait: normal gait.  Skin: no rashes, no discoloration, no obvious lesions  HEENT: normocephalic, atraumatic. Pupils equal and round.  Cardiovascular: no significant peripheral edema present.  Respiratory: without use of accessory muscles of respiration.    Musculoskeletal - Lumbar Spine:  - ROM fairly preserved   - Pain on flexion of lumbar spine: Absent   - Pain on extension of lumbar spine: Absent         - Lumbar facet loading: Absent   - TTP over the lumbar facet joints: Absent  - TTP over the lumbar paraspinals: Present   - TTP over the SI joints: Present  - TTP over GT bursa: Present, minimal   - Straight Leg Raise: Negative  - MINESH: Present    Right Knee:  - TTP: Present over medial/ lateral joint line  - Pain with extension: Present  - Pain with flexion: Present  - Crepitus: Present     Neuro - Lower Extremities:  - BLE Strength: R/L: HF: 5/5, HE: 5/5, KF: 5/5; KE: 5/5; FE: 5/5; FF: 5/5  - Extremity Reflexes: Brisk and symmetric throughout  - Sensory: Sensation to light touch intact bilaterally      Psych:  Mood and affect is appropriate        Assessment:  Kaylin Mccollum is a 80 y.o. year old female who is presenting with       ICD-10-CM ICD-9-CM    1. Degeneration of intervertebral disc of  lumbar region with lower extremity pain  M51.361 722.52 Case Request-RAD/Other Procedure Area: INJECTION,ALLOGRAFT,INTERVERTEBRAL DISC,1ST LEVEL: L3-4 Viadisc      Case Request-RAD/Other Procedure Area: INJECTION,ALLOGRAFT,INTERVERTEBRAL DISC,1ST LEVEL: L5-S1 Viadisc      2. Chronic migraine without aura, with intractable migraine, so stated, with status migrainosus  G43.711 346.73 Prior authorization Order      3. Intractable chronic migraine without aura and with status migrainosus  G43.711 346.73 Prior authorization Order      4. Headache, chronic daily  R51.9 784.0 Prior authorization Order      5. Painful diabetic neuropathy  E11.40 250.60 Prior authorization Order     357.2       6. Cervical spondylosis  M47.812 721.0 X-Ray Cervical Spine 5 View W Flex Extxt      7. Headache, cervicogenic  G44.86 784.0             Plan:  1. Interventional:   - S/p Bilateral IA knee joint injection with Synvisc-One on 2/11/25 with 90% pain relief.    - S/p bilateral SIJ + bilateral GT bursa injection on 2/18/25 with 80% pain relief.  Patient had 80% sustained pain relief from bilateral sacroiliac joint and greater trochanteric bursa injection 01/09/2024 lasting almost a year.    - Schedule for L5-S1 viable disc tissue allograft supplementation, and then L3-4 level 1 week following.  LSO brace for 4 weeks post 1st injection.  Patient is a candidate for Viadisc secondary to chronic discogenic low back pain secondary to multiple failed attempts of other interventions (8 weeks of physician directed physical therapy, lumbar MBB. SIJ injection) and medical management (anti-inflammatories, antispasmodics, membrane stabilizing agents). Explained the risks and benefits of the procedure in detail with the patient today in clinic along with alternative treatment options, and the patient elected to pursue the intervention at this time.  Patient is not taking prescription blood thinners or ASA.   Patient had L3-4 via disc allograft  supplementation 05/09/2023 and L5-S1 via disc allograft supplementation 04/25/2023 with greater than 80% improvement in discogenic lower back pain.    - Schedule for Botox Treatment for chronic headache treatment.  Patient is a candidate for Botox as they have migraine headaches exceeding 15 days out of the month and has failed to have improvement with numerous prior agents including:    -Preventative:  -Beta Blockers/ Calcium channel blockers: Verapamil  -Adjuvant: Savella    -Abortive:  -Anti-inflammatories: ASA, Ibuprofen, Naproxen, Excedrin migraine, Tylenol, Toradol  -Ergots: Cafergot   -Barbituates: Fiorinal     Pre-Op Diagnosis: chronic intractable migraine  Post-Op Diagnosis:  Same    Procedure:  Botox injections for headache  Anticipated Botox mapping:        - muscles:  5 units bilaterally, 10 units total  -Procerus muscle: 5 units  -Frontalis muscle:  4 sites, 5 units each, 20 units total  -Temporalis muscle: 5 units , 4 sites bilaterally, 20 units total  -Occipitalis muscle: 5 units, 3 sites bilaterally, 30 units total  -Trapezius muscles:  5 units, 3 sites bilaterally, 30 units total  -cervical paraspinous:  5 units, 2 sites bilaterally, 20 units total    Total: at least 135 Units  Waste: TBD     - Schedule in clinic QUTENZA® (capsaicin) 8% topical system (4 patches) for diabetic peripheral neuropathy (DPN) of the feet.   Patient has failed to have improvement with 2 classes of neuropathic medications including membrane stabilizing agents such as Lyrica, Gralise, Gabapentin and Cymbalta, topical agents such as compound cream and capsaicin, anti-inflammatory (naproxen). Explained the risks and benefits of the procedure in detail with the patient in clinic along with alternative treatment options, and the patient elected to pursue the intervention at this time.      - Discuss potential cervical MBB/ RFA at next visit.     - Patient had 90% sustained relief in lower extremity radicular symptoms  following bilateral L4-5 transforaminal epidural steroid injection in April and September 2024 for several months.  Can repeat when needed.    Anticoagulation:  None, no anticoagulation    2. Pharmacologic:   -Refill gabapentin 300mg QAM/ 300mg at lunch/ 600mg QHS.  We have previously reviewed potential side effects of this medication including daytime somnolence, weight gain and peripheral edema    -Refill Savella 100mg BID - as this tremendously helps with symptoms of fibromyalgia.  We have previously discussed that Savella is FDA approved and has demonstrated improvement in multiple measures including pain, global impression of change in physical function.  We have discussed that the most frequent side effects of this medication can include nausea, constipation, vomiting, dry mouth, flushing or tachycardia.      -Continue Celebrex 200mg BID PRN - from Orthopedics.  We have previously discussed potential deleterious side effects of NSAIDs on the cardiovascular, gastrointestinal and renal systems. We have discussed judicious use of this medication.      - LA  reviewed and appropriate.      3. Rehabilitative:   -We discussed continuing at home physician directed physical therapy to help manage the patient/s painful condition. The patient was counseled that muscle strengthening will improve the long term prognosis in regards to pain and may also help increase range of motion and mobility.  I have encouraged the patient to perform low intensity aerobic exercise to help with fibromyalgia.    4. Diagnostic:    - Order cervical x-ray w/ flexion & extension.   - Reviewed relevant imaging (MRI Lumbar Spine 02/16/2023).     5. Consult:   -Continue follow-up with Dr. Schneider for knee and shoulder pain PRN  -Continue follow-up with Rheumatology: Fibromyalgia  -Continue follow-up with PCP: Dr. Olvera:  Follow-up for potential renal insufficiency    6. Follow up:    In clinic Botox treatment for headaches with Dr. Dumont  In  clinic Qutenza patch treatment    Virtual visit after procedures    Future Appointments   Date Time Provider Department Center   4/29/2025  7:30 AM [facility]   4/29/2025  8:00 AM LABORATORY, [facility]   5/6/2025  1:40 PM Lisette Olvera MD [facility]   5/12/2025  3:40 PM Jesus Dumont MD [facility]      - Extensive direct patient care was provided during this telemedicine visit -- at least 14 minutes discussing multiple pain areas/ new complaints. Over 50% of the time was spent counseling/educating the patient. Prolonged duration of time was spent on patient care including prep time reviewing the chart before the visit, time spent with patient during the visit, and the time spent after the visit reviewing studies, documenting note, obtaining information from collaborative providers, etc.    Visit today included increased complexity associated with the care of the episodic problem of chronic pain which was addressed and continue to manage the longitudinal care of the patient due to the serious and/or complex managed problem(s) listed above.    - Patient Questions: Answered all of the patient's questions regarding diagnosis, therapy, and treatment.    - This condition does not require this patient to take time off of work, and the primary goal of our Pain Management services is to improve the patient's functional capacity.   - I discussed the risks, benefits, and alternatives to potential treatment options. All questions and concerns were fully addressed today in clinic.         Kristin Mccall PA-C  Interventional Pain Management - [facility]    Disclaimer:  This note was prepared using voice recognition system and is likely to have sound alike errors that may have been overlooked even after proof reading.  Please call me with any questions.

## 2025-03-20 ENCOUNTER — PATIENT MESSAGE (OUTPATIENT)
Dept: PAIN MEDICINE | Facility: CLINIC | Age: 80
End: 2025-03-20

## 2025-03-20 ENCOUNTER — TELEPHONE (OUTPATIENT)
Dept: PAIN MEDICINE | Facility: CLINIC | Age: 80
End: 2025-03-20

## 2025-03-20 ENCOUNTER — OFFICE VISIT (OUTPATIENT)
Dept: PAIN MEDICINE | Facility: CLINIC | Age: 80
End: 2025-03-20
Payer: MEDICARE

## 2025-03-20 VITALS — HEIGHT: 68 IN | BODY MASS INDEX: 26.2 KG/M2

## 2025-03-20 DIAGNOSIS — G44.86 HEADACHE, CERVICOGENIC: ICD-10-CM

## 2025-03-20 DIAGNOSIS — R51.9 HEADACHE, CHRONIC DAILY: Primary | ICD-10-CM

## 2025-03-20 DIAGNOSIS — R51.9 HEADACHE, CHRONIC DAILY: ICD-10-CM

## 2025-03-20 DIAGNOSIS — G43.711 INTRACTABLE CHRONIC MIGRAINE WITHOUT AURA AND WITH STATUS MIGRAINOSUS: ICD-10-CM

## 2025-03-20 DIAGNOSIS — G43.711 CHRONIC MIGRAINE WITHOUT AURA, WITH INTRACTABLE MIGRAINE, SO STATED, WITH STATUS MIGRAINOSUS: ICD-10-CM

## 2025-03-20 DIAGNOSIS — M51.361 DEGENERATION OF INTERVERTEBRAL DISC OF LUMBAR REGION WITH LOWER EXTREMITY PAIN: Primary | ICD-10-CM

## 2025-03-20 DIAGNOSIS — E11.40 PAINFUL DIABETIC NEUROPATHY: ICD-10-CM

## 2025-03-20 DIAGNOSIS — M47.812 CERVICAL SPONDYLOSIS: ICD-10-CM

## 2025-03-20 PROCEDURE — G2211 COMPLEX E/M VISIT ADD ON: HCPCS | Mod: 95,,, | Performed by: PHYSICIAN ASSISTANT

## 2025-03-20 PROCEDURE — 98007 SYNCH AUDIO-VIDEO EST HI 40: CPT | Mod: 95,,, | Performed by: PHYSICIAN ASSISTANT

## 2025-03-21 RX ORDER — METFORMIN HYDROCHLORIDE 1000 MG/1
1000 TABLET ORAL 2 TIMES DAILY WITH MEALS
Qty: 180 TABLET | Refills: 2 | Status: SHIPPED | OUTPATIENT
Start: 2025-03-21

## 2025-03-21 RX ORDER — EZETIMIBE 10 MG/1
10 TABLET ORAL DAILY
Qty: 90 TABLET | Refills: 2 | Status: SHIPPED | OUTPATIENT
Start: 2025-03-21

## 2025-03-21 RX ORDER — FLUOXETINE HYDROCHLORIDE 40 MG/1
40 CAPSULE ORAL DAILY
Qty: 90 CAPSULE | Refills: 2 | Status: SHIPPED | OUTPATIENT
Start: 2025-03-21

## 2025-03-25 RX ORDER — VERAPAMIL HYDROCHLORIDE 120 MG/1
120 TABLET, FILM COATED ORAL 2 TIMES DAILY
Qty: 180 TABLET | Refills: 5 | Status: SHIPPED | OUTPATIENT
Start: 2025-03-25

## 2025-04-03 ENCOUNTER — HOSPITAL ENCOUNTER (OUTPATIENT)
Dept: RADIOLOGY | Facility: HOSPITAL | Age: 80
Discharge: HOME | End: 2025-04-03
Attending: PHYSICIAN ASSISTANT
Payer: MEDICARE

## 2025-04-03 DIAGNOSIS — M47.812 CERVICAL SPONDYLOSIS: ICD-10-CM

## 2025-04-03 PROCEDURE — 72052 X-RAY EXAM NECK SPINE 6/>VWS: CPT | Mod: TC

## 2025-04-03 PROCEDURE — 72052 X-RAY EXAM NECK SPINE 6/>VWS: CPT | Mod: 26,,, | Performed by: RADIOLOGY

## 2025-04-03 RX ORDER — TOPIRAMATE 25 MG/1
25 TABLET ORAL 2 TIMES DAILY
Qty: 60 TABLET | Refills: 0 | Status: SHIPPED | OUTPATIENT
Start: 2025-04-03

## 2025-04-22 ENCOUNTER — HOSPITAL ENCOUNTER (OUTPATIENT)
Facility: HOSPITAL | Age: 80
Discharge: HOME | End: 2025-04-22
Attending: ANESTHESIOLOGY | Admitting: ANESTHESIOLOGY
Payer: MEDICARE

## 2025-04-22 VITALS
OXYGEN SATURATION: 98 % | SYSTOLIC BLOOD PRESSURE: 148 MMHG | TEMPERATURE: 97 F | BODY MASS INDEX: 24.5 KG/M2 | HEART RATE: 90 BPM | RESPIRATION RATE: 14 BRPM | DIASTOLIC BLOOD PRESSURE: 68 MMHG | HEIGHT: 68 IN | WEIGHT: 161.69 LBS

## 2025-04-22 DIAGNOSIS — M51.360 DISCOGENIC LOW BACK PAIN: ICD-10-CM

## 2025-04-22 PROBLEM — M51.361 DEGENERATION OF INTERVERTEBRAL DISC OF LUMBAR REGION WITH LOWER EXTREMITY PAIN: Status: ACTIVE | Noted: 2023-04-25

## 2025-04-22 LAB — POCT GLUCOSE: 119 MG/DL (ref 70–110)

## 2025-04-22 PROCEDURE — C1889 IMPLANT/INSERT DEVICE, NOC: HCPCS | Performed by: ANESTHESIOLOGY

## 2025-04-22 PROCEDURE — 63600175 PHARM REV CODE 636 W HCPCS: Performed by: ANESTHESIOLOGY

## 2025-04-22 PROCEDURE — 82962 GLUCOSE BLOOD TEST: CPT | Performed by: ANESTHESIOLOGY

## 2025-04-22 PROCEDURE — 25000003 PHARM REV CODE 250: Performed by: ANESTHESIOLOGY

## 2025-04-22 PROCEDURE — 0627T PERQ NJX ALGC FLUOR LMBR 1ST: CPT | Mod: ,,, | Performed by: ANESTHESIOLOGY

## 2025-04-22 PROCEDURE — 0627T PERQ NJX ALGC FLUOR LMBR 1ST: CPT | Performed by: ANESTHESIOLOGY

## 2025-04-22 RX ORDER — MIDAZOLAM HYDROCHLORIDE 1 MG/ML
INJECTION, SOLUTION INTRAMUSCULAR; INTRAVENOUS
Status: DISCONTINUED | OUTPATIENT
Start: 2025-04-22 | End: 2025-04-22 | Stop reason: HOSPADM

## 2025-04-22 RX ORDER — SODIUM BICARBONATE 1 MEQ/ML
SYRINGE (ML) INTRAVENOUS
Status: DISCONTINUED | OUTPATIENT
Start: 2025-04-22 | End: 2025-04-22 | Stop reason: HOSPADM

## 2025-04-22 RX ORDER — FENTANYL CITRATE 50 UG/ML
INJECTION, SOLUTION INTRAMUSCULAR; INTRAVENOUS
Status: DISCONTINUED | OUTPATIENT
Start: 2025-04-22 | End: 2025-04-22 | Stop reason: HOSPADM

## 2025-04-22 RX ORDER — CEFAZOLIN 2 G/1
2 INJECTION, POWDER, FOR SOLUTION INTRAMUSCULAR; INTRAVENOUS
Status: COMPLETED | OUTPATIENT
Start: 2025-04-22 | End: 2025-04-22

## 2025-04-22 RX ORDER — LIDOCAINE HYDROCHLORIDE 10 MG/ML
INJECTION, SOLUTION EPIDURAL; INFILTRATION; INTRACAUDAL; PERINEURAL
Status: DISCONTINUED | OUTPATIENT
Start: 2025-04-22 | End: 2025-04-22 | Stop reason: HOSPADM

## 2025-04-22 RX ADMIN — CEFAZOLIN 2 G: 2 INJECTION, POWDER, LYOPHILIZED, FOR SOLUTION INTRAVENOUS at 07:04

## 2025-04-22 NOTE — DISCHARGE SUMMARY
Discharge Note  Short Stay      SUMMARY     Admit Date: 4/22/2025    Attending Physician: Humberto Dumont MD        Discharge Physician: Humberto Dumont MD        Discharge Date: 4/22/2025 8:11 AM    Procedure(s) (LRB):  INJECTION,ALLOGRAFT,INTERVERTEBRAL DISC,1ST LEVEL: L5-S1 Viadisc (N/A)    Final Diagnosis: Degeneration of intervertebral disc of lumbar region with lower extremity pain [M51.361]    Disposition: Home or self care    Patient Instructions:   Current Discharge Medication List        CONTINUE these medications which have NOT CHANGED    Details   empagliflozin (JARDIANCE) 10 mg tablet Take 1 tablet (10 mg total) by mouth once daily.  Qty: 90 tablet, Refills: 2    Associated Diagnoses: Diabetes mellitus type 2 in obese      metFORMIN (GLUCOPHAGE) 1000 MG tablet Take 1 tablet (1,000 mg total) by mouth 2 (two) times daily with meals.  Qty: 180 tablet, Refills: 2    Associated Diagnoses: Diabetes mellitus type 2 in obese      verapamiL (CALAN) 120 MG tablet Take 1 tablet (120 mg total) by mouth 2 (two) times daily.  Qty: 180 tablet, Refills: 5    Associated Diagnoses: MVP (mitral valve prolapse); Hypertension associated with diabetes      b complex vitamins tablet Take 1 tablet by mouth once daily.      biotin 1 mg tablet Take 1,000 mcg by mouth every morning.       celecoxib (CELEBREX) 200 MG capsule TAKE 1 CAPSULE(200 MG) BY MOUTH TWICE DAILY WITH FOOD  Qty: 120 capsule, Refills: 1    Associated Diagnoses: Chondromalacia, right knee; Left shoulder tendinitis      clotrimazole-betamethasone 1-0.05% (LOTRISONE) cream Apply topically 2 (two) times daily.  Qty: 45 g, Refills: 2      diclofenac sodium (VOLTAREN) 1 % Gel Apply topically 4 (four) times daily.  Qty: 100 g, Refills: 2      ezetimibe (ZETIA) 10 mg tablet Take 1 tablet (10 mg total) by mouth once daily.  Qty: 90 tablet, Refills: 2    Associated Diagnoses: Hyperlipidemia associated with type 2 diabetes mellitus      FLUoxetine 40 MG capsule Take 1  capsule (40 mg total) by mouth once daily.  Qty: 90 capsule, Refills: 2    Associated Diagnoses: Fibromyalgia; Chronic major depressive disorder      fluticasone (FLONASE) 50 mcg/actuation nasal spray 2 sprays by Each Nare route once daily.  Qty: 1 Bottle, Refills: 0    Associated Diagnoses: Sinusitis      furosemide (LASIX) 20 MG tablet Take 1 tablet (20 mg total) by mouth daily as needed (edema).  Qty: 30 tablet, Refills: 11      gabapentin (NEURONTIN) 300 MG capsule Take 1 capsule (300 mg total) by mouth 3 (three) times daily.  Qty: 90 capsule, Refills: 2      milnacipran (SAVELLA) 100 mg Tab Take 1 tablet (100 mg total) by mouth 2 (two) times daily.  Qty: 180 tablet, Refills: 0      omeprazole (PRILOSEC) 20 MG capsule Take 1 capsule (20 mg total) by mouth daily as needed (if needed with NSAIDS).  Qty: 30 capsule, Refills: 5    Associated Diagnoses: Gastroesophageal reflux disease without esophagitis      potassium chloride SA (K-DUR,KLOR-CON M) 10 MEQ tablet TAKE 1 TABLET(10 MEQ) BY MOUTH EVERY DAY  Qty: 90 tablet, Refills: 1    Comments: Future refill requests to go to Dr. Dumont      pulse oximeter (PULSE OXIMETER) device by Apply Externally route 2 (two) times a day. Use twice daily at 8 AM and 3 PM and record the value in [employer] as directed.  Qty: 1 each, Refills: 0    Comments: This is a NO CHARGE item.  Please override price to zero.  DO NOT PRINT.  NORMAL MODE e-PRESCRIBE ONLY.  Associated Diagnoses: COVID-19 virus detected      RSV, preF A and preF B,PF, (ABRYSVO, PF,) 120 mcg/0.5 mL SolR vaccine Inject 0.5ml into the muscle.  Qty: 0.5 mL, Refills: 0      semaglutide (OZEMPIC) 0.25 mg or 0.5 mg (2 mg/3 mL) pen injector Inject 0.5 mg into the skin every 7 days.  Qty: 3 mL, Refills: 5    Comments: Patient requests delivery  Associated Diagnoses: Hypertension associated with diabetes      topiramate (TOPAMAX) 25 MG tablet Take 1 tablet (25 mg total) by mouth 2 (two) times daily.  Qty: 60 tablet, Refills: 0     Associated Diagnoses: Headache, chronic daily      triamcinolone acetonide 0.025% (KENALOG) 0.025 % Oint Apply topically 2 (two) times daily. for 10 days  Qty: 15 g, Refills: 2    Associated Diagnoses: Tinea corporis      vitamin D (VITAMIN D3) 1000 units Tab Take 1,000 Units by mouth once daily.                 Discharge Diagnosis: Degeneration of intervertebral disc of lumbar region with lower extremity pain [M51.361]  Condition on Discharge: Stable with no complications to procedure   Diet on Discharge: Same as before.  Activity: as per instruction sheet.  Discharge to: Home with a responsible adult.  Follow up: 2-4 weeks       Please call the office at [phone] if you experience any weakness or loss of sensation, fever > 101.5, pain uncontrolled with oral medications, persistent nausea/vomiting/or diarrhea, redness or drainage from the incisions, or any other worrisome concerns. If physician on call was not reached or could not communicate with our office for any reason please go to the nearest emergency department

## 2025-04-22 NOTE — H&P (VIEW-ONLY)
HPI  Patient presenting for Procedure(s) (LRB):  INJECTION,ALLOGRAFT,INTERVERTEBRAL DISC,1ST LEVEL: L5-S1 Viadisc (N/A)     Patient on Anti-coagulation No    No health changes since previous encounter    Past Medical History:   Diagnosis Date    Arthritis     Cataract     COVID-19 02/25/2021    Diabetes mellitus 1995    BS didn't check 09/13/2022    Diabetes mellitus, type 2     Fibromyalgia     General anesthetics causing adverse effect in therapeutic use     bradycardia     Hypertension     Migraines     Mitral valve prolapse     Osteoarthritis     Rotator cuff tear 04/01/2015     Past Surgical History:   Procedure Laterality Date    ARTHROSCOPY OF KNEE Right 03/10/2020    Procedure: ARTHROSCOPY, KNEE;  Surgeon: Les Schneider MD;  Location: [facility] OR;  Service: Orthopedics;  Laterality: Right;    CATARACT EXTRACTION W/  INTRAOCULAR LENS IMPLANT Left 07/19/2017    CATARACT EXTRACTION W/  INTRAOCULAR LENS IMPLANT Right 2017    CHOLECYSTECTOMY      CHONDROPLASTY OF KNEE Right 03/10/2020    Procedure: CHONDROPLASTY, KNEE;  Surgeon: Les Schneider MD;  Location: [facility] OR;  Service: Orthopedics;  Laterality: Right;  Anterior compartment     COLONOSCOPY N/A 09/25/2018    Procedure: COLONOSCOPY;  Surgeon: Bethel Carmona MD;  Location: [facility] ENDO;  Service: Endoscopy;  Laterality: N/A;    EXCISION OF MEDIAL MENISCUS OF KNEE Right 03/10/2020    Procedure: MENISCECTOMY, KNEE, MEDIAL;  Surgeon: Les Schneider MD;  Location: [facility] OR;  Service: Orthopedics;  Laterality: Right;  Partial, Medial , Lateral     EYE SURGERY      INJECTION OF ANESTHETIC AGENT AROUND MEDIAL BRANCH NERVES INNERVATING LUMBAR FACET JOINT Bilateral 12/13/2022    Procedure: Bilateral L3-5 MBB;  Surgeon: Humberto Dumont MD;  Location: [facility] PAIN MGT;  Service: Pain Management;  Laterality: Bilateral;    INJECTION OF ANESTHETIC AGENT AROUND NERVE Right 08/08/2019    Procedure: Right Genicular nerve block with local;  Surgeon: Manpreet Dutta MD;   [facility];  Service: Pain Management;  Laterality: Right;    INJECTION OF ANESTHETIC AGENT INTO SACROILIAC JOINT Bilateral 05/06/2020    Procedure: Bilateral Sacroiliac Joint Injection;  Surgeon: Manpreet Dutta MD;  Location: [facility] PAIN [facility]GT;  Service: Pain Management;  Laterality: Bilateral;    INJECTION OF ANESTHETIC AGENT INTO SACROILIAC JOINT Bilateral 10/08/2020    Procedure: Bilateral SI and Bilateral GTB with RN IV sedation;  Surgeon: Manpreet Dutta MD;  Location: [facility] PAIN [facility]GT;  Service: Pain Management;  Laterality: Bilateral;    INJECTION OF ANESTHETIC AGENT INTO SACROILIAC JOINT Bilateral 01/05/2021    Procedure: Bilateral BLOCK, SACROILIAC JOINT and Bilateral GTB witn RN IV sedation;  Surgeon: Daniel Burns MD;  Location: [facility]IN [facility]GT;  Service: Pain Management;  Laterality: Bilateral;    INJECTION OF ANESTHETIC AGENT INTO SACROILIAC JOINT Bilateral 07/08/2021    Procedure: Bilateral BLOCK, SACROILIAC JOINT bilateral GTB RN IV sedation;  Surgeon: Manpreet Dutta MD;  Location: [facility] PAIN MGT;  Service: Pain Management;  Laterality: Bilateral;    INJECTION OF ANESTHETIC AGENT INTO SACROILIAC JOINT Bilateral 03/01/2022    Procedure: Bilateral BLOCK, SACROILIAC JOINT and Bilateral GTB RN IV sedation;  Surgeon: Manpreet Dutta MD;  [facility];  Service: Pain Management;  Laterality: Bilateral;    INJECTION OF ANESTHETIC AGENT INTO SACROILIAC JOINT Bilateral 10/10/2022    Procedure: Bilateral Sacroiliac Joint Injection;  Surgeon: Humberto Dumont MD;  [facility];  Service: Pain Management;  Laterality: Bilateral;    INJECTION OF ANESTHETIC AGENT INTO SACROILIAC JOINT Bilateral 01/24/2023    Procedure: Bilateral Sacroiliac Joint Injection;  Surgeon: Humberto Dumont MD;  Location: [facility];  Service: Pain Management;  Laterality: Bilateral;    INJECTION OF ANESTHETIC AGENT INTO SACROILIAC JOINT Bilateral 06/21/2023    Procedure: Bilateral Sacroiliac Joint Injection;  Surgeon:  Humberto Dumont MD;  Location: [facility];  Service: Pain Management;  Laterality: Bilateral;    INJECTION OF ANESTHETIC AGENT INTO SACROILIAC JOINT Bilateral 1/9/2024    Procedure: Bilateral SIJ Injection;  Surgeon: Humberto Dumont MD;  Location: [facility];  Service: Pain Management;  Laterality: Bilateral;    INJECTION OF JOINT Bilateral 09/05/2019    Procedure: Bilateral GT bursa injection;  Surgeon: Manpreet Dutta MD;  Location: [facility];  Service: Pain Management;  Laterality: Bilateral;    INJECTION OF JOINT Bilateral 05/06/2020    Procedure: Bilateral GT bursa injection;  Surgeon: Manpreet Dutta MD;  Location: [facility];  Service: Pain Management;  Laterality: Bilateral;    INJECTION OF JOINT Right 04/28/2022    Procedure: Injection, Joint Right Hip Injection RN IV sedation;  Surgeon: Manpreet Dutta MD;  Location: [facility];  Service: Pain Management;  Laterality: Right;    INJECTION OF JOINT Bilateral 10/10/2022    Procedure: Bilateral GT bursa injection with RN IV sedation;  Surgeon: Humberto Dumont MD;  Location: [facility];  Service: Pain Management;  Laterality: Bilateral;    INJECTION OF JOINT Bilateral 01/24/2023    Procedure: Bilateral GT bursa injection;  Surgeon: Humberto Dumont MD;  Location: [facility];  Service: Pain Management;  Laterality: Bilateral;    INJECTION OF JOINT Bilateral 05/23/2023    Procedure: Bilateral intraarticular knee injection with Synvisc-1; ;  Surgeon: Humberto Dumont MD;  Location: [facility];  Service: Pain Management;  Laterality: Bilateral;    INJECTION OF JOINT Bilateral 06/21/2023    Procedure: Bilateral GT bursa injection;  Surgeon: Humberto Dumont MD;  Location: [facility];  Service: Pain Management;  Laterality: Bilateral;    INJECTION OF JOINT Bilateral 1/9/2024    Procedure: Bilateral GTB injection;  Surgeon: Humberto Dumont MD;  Location: [facility];  Service: Pain Management;  Laterality: Bilateral;    INJECTION OF JOINT Bilateral  1/23/2024    Procedure: Bilateral intraarticular knee injection with Synvisc 1 ;  Surgeon: Humberto Dumont MD;  Location: [facility] PAIN [facility]GT;  Service: Pain Management;  Laterality: Bilateral;    INJECTION OF JOINT Bilateral 7/23/2024    Procedure: Bilateral Synvisc Knee injection;  Surgeon: Humberto Dumont MD;  Location: [facility] PAIN [facility]GT;  Service: Pain Management;  Laterality: Bilateral;    INJECTION, ALLOGRAFT, INTERVERTEBRAL DISC N/A 04/25/2023    Procedure: INJECTION,ALLOGRAFT,INTERVERTEBRAL DISC,1ST LEVEL: L5-S1;  Surgeon: Humberto Dumont MD;  Location: [facility] PAIN [facility]GT;  Service: Pain Management;  Laterality: N/A;    INJECTION, ALLOGRAFT, INTERVERTEBRAL DISC N/A 05/09/2023    Procedure: INJECTION,ALLOGRAFT,INTERVERTEBRAL DISC,2nd LEVEL: L3-4;  Surgeon: Humberto Dumont MD;  Location: [facility] PAIN [facility];  Service: Pain Management;  Laterality: N/A;    INJECTION, SACROILIAC JOINT Bilateral 2/18/2025    Procedure: Bilateral SIJ + bilateral GT bursa injection;  Surgeon: Humberto Dumont MD;  Location: [facility] PAIN [facility];  Service: Pain Management;  Laterality: Bilateral;    KNEE ARTHROSCOPY Bilateral     LUMBAR PARAVERTEBRAL FACET JOINT NERVE BLOCK Bilateral 2/11/2025    Procedure: Bilateral Synvisc-One knee injection;  Surgeon: Humberto Dumont MD;  Location: [facility] PAIN [facility];  Service: Pain Management;  Laterality: Bilateral;    PARS PLANA VITRECTOMY W/ REPAIR OF MACULAR HOLE Left 02/22/2017    RADIOFREQUENCY THERMOCOAGULATION Left 07/11/2019    Procedure: Right SIJ RFA;  Surgeon: Manpreet Dutta MD;  Location: [facility]IN [facility];  Service: Pain Management;  Laterality: Left;    RADIOFREQUENCY THERMOCOAGULATION Right 07/25/2019    Procedure: Right SIJ RFA;  Surgeon: Manpreet Dutta MD;  Location: [facility];  Service: Pain Management;  Laterality: Right;    SELECTIVE INJECTION OF ANESTHETIC AGENT AROUND LUMBAR SPINAL NERVE ROOT BY TRANSFORAMINAL APPROACH Bilateral 4/9/2024    Procedure: Bilateral L4/5 TF JAMIN;  Surgeon: Humberto Dumont MD;   "Location: [facility] PAIN MGT;  Service: Pain Management;  Laterality: Bilateral;    SELECTIVE INJECTION OF ANESTHETIC AGENT AROUND LUMBAR SPINAL NERVE ROOT BY TRANSFORAMINAL APPROACH Bilateral 9/17/2024    Procedure: Bilateral L4/5 TF JAMIN;  Surgeon: Humberto Dumont MD;  Location: [facility] PAIN MGT;  Service: Pain Management;  Laterality: Bilateral;    SHOULDER ARTHROSCOPY Right 06/04/2015     Review of patient's allergies indicates:   Allergen Reactions    Demerol [meperidine] Other (See Comments)     Burning when adm IV  Able to tolerate IM, "Turned the veins in my hand purple."    Latex, natural rubber Other (See Comments)     "Burns my skin",  Symptoms get worse the longer she is exposed    Zocor [simvastatin] Other (See Comments)     Tightening of muscles    Statins-hmg-coa reductase inhibitors Other (See Comments)     myopathy    Sulfa (sulfonamide antibiotics)      Blurred vision    Nickel Rash     Pt states she had sores to ear lobes from nickel in earrings         Medications Ordered Prior to Encounter[1]     PMHx, PSHx, Allergies, Medications reviewed in epic    ROS negative except pain complaints in HPI    OBJECTIVE:    BP (!) 149/67 (BP Location: Right arm, Patient Position: Sitting)   Pulse 96   Temp 96.8 °F (36 °C) (Temporal)   Resp 16   Ht 5' 8" (1.727 m)   Wt 73.4 kg (161 lb 11.3 oz)   SpO2 98%   Breastfeeding No   BMI 24.59 kg/m²     PHYSICAL EXAMINATION:    GENERAL: Well appearing, in no acute distress, alert and oriented x3.  PSYCH:  Mood and affect appropriate.  SKIN: Skin color, texture, turgor normal, no rashes or lesions which will impact the procedure.  CV: RRR with palpation of the radial artery.  PULM: No evidence of respiratory difficulty, symmetric chest rise. Clear to auscultation.  NEURO: Cranial nerves grossly intact.    Plan:    Proceed with procedure as planned Procedure(s) (LRB):  INJECTION,ALLOGRAFT,INTERVERTEBRAL DISC,1ST LEVEL: L5-S1 Viadisc (N/A)    Humberto Dumont, " MD  04/22/2025                 [1]   No current facility-administered medications on file prior to encounter.     Current Outpatient Medications on File Prior to Encounter   Medication Sig Dispense Refill    empagliflozin (JARDIANCE) 10 mg tablet Take 1 tablet (10 mg total) by mouth once daily. 90 tablet 2    metFORMIN (GLUCOPHAGE) 1000 MG tablet Take 1 tablet (1,000 mg total) by mouth 2 (two) times daily with meals. 180 tablet 2    verapamiL (CALAN) 120 MG tablet Take 1 tablet (120 mg total) by mouth 2 (two) times daily. 180 tablet 5    b complex vitamins tablet Take 1 tablet by mouth once daily.      biotin 1 mg tablet Take 1,000 mcg by mouth every morning.       celecoxib (CELEBREX) 200 MG capsule TAKE 1 CAPSULE(200 MG) BY MOUTH TWICE DAILY WITH FOOD 120 capsule 1    clotrimazole-betamethasone 1-0.05% (LOTRISONE) cream Apply topically 2 (two) times daily. 45 g 2    diclofenac sodium (VOLTAREN) 1 % Gel Apply topically 4 (four) times daily. 100 g 2    ezetimibe (ZETIA) 10 mg tablet Take 1 tablet (10 mg total) by mouth once daily. 90 tablet 2    FLUoxetine 40 MG capsule Take 1 capsule (40 mg total) by mouth once daily. 90 capsule 2    fluticasone (FLONASE) 50 mcg/actuation nasal spray 2 sprays by Each Nare route once daily. 1 Bottle 0    furosemide (LASIX) 20 MG tablet Take 1 tablet (20 mg total) by mouth daily as needed (edema). 30 tablet 11    gabapentin (NEURONTIN) 300 MG capsule Take 1 capsule (300 mg total) by mouth 3 (three) times daily. 90 capsule 2    milnacipran (SAVELLA) 100 mg Tab Take 1 tablet (100 mg total) by mouth 2 (two) times daily. 180 tablet 0    omeprazole (PRILOSEC) 20 MG capsule Take 1 capsule (20 mg total) by mouth daily as needed (if needed with NSAIDS). 30 capsule 5    potassium chloride SA (K-DUR,KLOR-CON M) 10 MEQ tablet TAKE 1 TABLET(10 MEQ) BY MOUTH EVERY DAY 90 tablet 1    pulse oximeter (PULSE OXIMETER) device by Apply Externally route 2 (two) times a day. Use twice daily at 8 AM and 3  PM and record the value in MyChart as directed. 1 each 0    RSV, preF A and preF B,PF, (ABRYSVO, PF,) 120 mcg/0.5 mL SolR vaccine Inject 0.5ml into the muscle. 0.5 mL 0    semaglutide (OZEMPIC) 0.25 mg or 0.5 mg (2 mg/3 mL) pen injector Inject 0.5 mg into the skin every 7 days. 3 mL 5    topiramate (TOPAMAX) 25 MG tablet Take 1 tablet (25 mg total) by mouth 2 (two) times daily. 60 tablet 0    triamcinolone acetonide 0.025% (KENALOG) 0.025 % Oint Apply topically 2 (two) times daily. for 10 days (Patient taking differently: Apply topically 2 (two) times daily as needed.) 15 g 2    vitamin D (VITAMIN D3) 1000 units Tab Take 1,000 Units by mouth once daily.

## 2025-04-22 NOTE — H&P
HPI  Patient presenting for Procedure(s) (LRB):  INJECTION,ALLOGRAFT,INTERVERTEBRAL DISC,1ST LEVEL: L5-S1 Viadisc (N/A)     Patient on Anti-coagulation No    No health changes since previous encounter    Past Medical History:   Diagnosis Date    Arthritis     Cataract     COVID-19 02/25/2021    Diabetes mellitus 1995    BS didn't check 09/13/2022    Diabetes mellitus, type 2     Fibromyalgia     General anesthetics causing adverse effect in therapeutic use     bradycardia     Hypertension     Migraines     Mitral valve prolapse     Osteoarthritis     Rotator cuff tear 04/01/2015     Past Surgical History:   Procedure Laterality Date    ARTHROSCOPY OF KNEE Right 03/10/2020    Procedure: ARTHROSCOPY, KNEE;  Surgeon: Les Schneider MD;  Location: [facility] OR;  Service: Orthopedics;  Laterality: Right;    CATARACT EXTRACTION W/  INTRAOCULAR LENS IMPLANT Left 07/19/2017    CATARACT EXTRACTION W/  INTRAOCULAR LENS IMPLANT Right 2017    CHOLECYSTECTOMY      CHONDROPLASTY OF KNEE Right 03/10/2020    Procedure: CHONDROPLASTY, KNEE;  Surgeon: Les Schneider MD;  Location: [facility] OR;  Service: Orthopedics;  Laterality: Right;  Anterior compartment     COLONOSCOPY N/A 09/25/2018    Procedure: COLONOSCOPY;  Surgeon: Bethel Carmona MD;  Location: [facility] ENDO;  Service: Endoscopy;  Laterality: N/A;    EXCISION OF MEDIAL MENISCUS OF KNEE Right 03/10/2020    Procedure: MENISCECTOMY, KNEE, MEDIAL;  Surgeon: Les Schneider MD;  Location: [facility] OR;  Service: Orthopedics;  Laterality: Right;  Partial, Medial , Lateral     EYE SURGERY      INJECTION OF ANESTHETIC AGENT AROUND MEDIAL BRANCH NERVES INNERVATING LUMBAR FACET JOINT Bilateral 12/13/2022    Procedure: Bilateral L3-5 MBB;  Surgeon: Humberto Dumont MD;  Location: [facility] PAIN MGT;  Service: Pain Management;  Laterality: Bilateral;    INJECTION OF ANESTHETIC AGENT AROUND NERVE Right 08/08/2019    Procedure: Right Genicular nerve block with local;  Surgeon: Manpreet Dutta MD;   [facility];  Service: Pain Management;  Laterality: Right;    INJECTION OF ANESTHETIC AGENT INTO SACROILIAC JOINT Bilateral 05/06/2020    Procedure: Bilateral Sacroiliac Joint Injection;  Surgeon: Manpreet Dutta MD;  Location: [facility] PAIN [facility]GT;  Service: Pain Management;  Laterality: Bilateral;    INJECTION OF ANESTHETIC AGENT INTO SACROILIAC JOINT Bilateral 10/08/2020    Procedure: Bilateral SI and Bilateral GTB with RN IV sedation;  Surgeon: Manpreet Dutta MD;  Location: [facility] PAIN MGT;  Service: Pain Management;  Laterality: Bilateral;    INJECTION OF ANESTHETIC AGENT INTO SACROILIAC JOINT Bilateral 01/05/2021    Procedure: Bilateral BLOCK, SACROILIAC JOINT and Bilateral GTB witn RN IV sedation;  Surgeon: Daniel Burns MD;  Location: [facility] PAIN [facility]GT;  Service: Pain Management;  Laterality: Bilateral;    INJECTION OF ANESTHETIC AGENT INTO SACROILIAC JOINT Bilateral 07/08/2021    Procedure: Bilateral BLOCK, SACROILIAC JOINT bilateral GTB RN IV sedation;  Surgeon: Manpreet Dutta MD;  Location: [facility] PAIN MGT;  Service: Pain Management;  Laterality: Bilateral;    INJECTION OF ANESTHETIC AGENT INTO SACROILIAC JOINT Bilateral 03/01/2022    Procedure: Bilateral BLOCK, SACROILIAC JOINT and Bilateral GTB RN IV sedation;  Surgeon: Manpreet Dutta MD;  [facility];  Service: Pain Management;  Laterality: Bilateral;    INJECTION OF ANESTHETIC AGENT INTO SACROILIAC JOINT Bilateral 10/10/2022    Procedure: Bilateral Sacroiliac Joint Injection;  Surgeon: Humberto Dumont MD;  [facility];  Service: Pain Management;  Laterality: Bilateral;    INJECTION OF ANESTHETIC AGENT INTO SACROILIAC JOINT Bilateral 01/24/2023    Procedure: Bilateral Sacroiliac Joint Injection;  Surgeon: Humberto Dumont MD;  Location: [facility]IN [facility]GT;  Service: Pain Management;  Laterality: Bilateral;    INJECTION OF ANESTHETIC AGENT INTO SACROILIAC JOINT Bilateral 06/21/2023    Procedure: Bilateral Sacroiliac Joint Injection;  Surgeon:  Humberto Dumont MD;  Location: [facility]GT;  Service: Pain Management;  Laterality: Bilateral;    INJECTION OF ANESTHETIC AGENT INTO SACROILIAC JOINT Bilateral 1/9/2024    Procedure: Bilateral SIJ Injection;  Surgeon: Humberto Dumont MD;  Location: [facility];  Service: Pain Management;  Laterality: Bilateral;    INJECTION OF JOINT Bilateral 09/05/2019    Procedure: Bilateral GT bursa injection;  Surgeon: Manpreet Dutta MD;  Location: [facility]GT;  Service: Pain Management;  Laterality: Bilateral;    INJECTION OF JOINT Bilateral 05/06/2020    Procedure: Bilateral GT bursa injection;  Surgeon: Manpreet Dutta MD;  Location: [facility];  Service: Pain Management;  Laterality: Bilateral;    INJECTION OF JOINT Right 04/28/2022    Procedure: Injection, Joint Right Hip Injection RN IV sedation;  Surgeon: Manpreet Dutta MD;  Location: [facility];  Service: Pain Management;  Laterality: Right;    INJECTION OF JOINT Bilateral 10/10/2022    Procedure: Bilateral GT bursa injection with RN IV sedation;  Surgeon: Humberto Dumont MD;  Location: [facility];  Service: Pain Management;  Laterality: Bilateral;    INJECTION OF JOINT Bilateral 01/24/2023    Procedure: Bilateral GT bursa injection;  Surgeon: Humberto Dumont MD;  Location: [facility];  Service: Pain Management;  Laterality: Bilateral;    INJECTION OF JOINT Bilateral 05/23/2023    Procedure: Bilateral intraarticular knee injection with Synvisc-1; ;  Surgeon: Humberto Dumont MD;  Location: [facility];  Service: Pain Management;  Laterality: Bilateral;    INJECTION OF JOINT Bilateral 06/21/2023    Procedure: Bilateral GT bursa injection;  Surgeon: Humberto Dumont MD;  Location: [facility];  Service: Pain Management;  Laterality: Bilateral;    INJECTION OF JOINT Bilateral 1/9/2024    Procedure: Bilateral GTB injection;  Surgeon: Humberto Dumont MD;  Location: [facility];  Service: Pain Management;  Laterality: Bilateral;    INJECTION OF JOINT Bilateral  1/23/2024    Procedure: Bilateral intraarticular knee injection with Synvisc 1 ;  Surgeon: Humberto Dumont MD;  Location: [facility] PAIN MGT;  Service: Pain Management;  Laterality: Bilateral;    INJECTION OF JOINT Bilateral 7/23/2024    Procedure: Bilateral Synvisc Knee injection;  Surgeon: Humberto Dumont MD;  Location: [facility] PAIN MGT;  Service: Pain Management;  Laterality: Bilateral;    INJECTION, ALLOGRAFT, INTERVERTEBRAL DISC N/A 04/25/2023    Procedure: INJECTION,ALLOGRAFT,INTERVERTEBRAL DISC,1ST LEVEL: L5-S1;  Surgeon: Humberto Dumont MD;  Location: [facility] PAIN [facility]GT;  Service: Pain Management;  Laterality: N/A;    INJECTION, ALLOGRAFT, INTERVERTEBRAL DISC N/A 05/09/2023    Procedure: INJECTION,ALLOGRAFT,INTERVERTEBRAL DISC,2nd LEVEL: L3-4;  Surgeon: Humberto Dumont MD;  Location: [facility] PAIN [facility]GT;  Service: Pain Management;  Laterality: N/A;    INJECTION, SACROILIAC JOINT Bilateral 2/18/2025    Procedure: Bilateral SIJ + bilateral GT bursa injection;  Surgeon: Humberto Dumont MD;  Location: [facility] PAIN [facility]GT;  Service: Pain Management;  Laterality: Bilateral;    KNEE ARTHROSCOPY Bilateral     LUMBAR PARAVERTEBRAL FACET JOINT NERVE BLOCK Bilateral 2/11/2025    Procedure: Bilateral Synvisc-One knee injection;  Surgeon: Humberto Dumont MD;  Location: [facility] PAIN [facility]GT;  Service: Pain Management;  Laterality: Bilateral;    PARS PLANA VITRECTOMY W/ REPAIR OF MACULAR HOLE Left 02/22/2017    RADIOFREQUENCY THERMOCOAGULATION Left 07/11/2019    Procedure: Right SIJ RFA;  Surgeon: Manpreet Dutta MD;  Location: [facility] PAIN [facility]GT;  Service: Pain Management;  Laterality: Left;    RADIOFREQUENCY THERMOCOAGULATION Right 07/25/2019    Procedure: Right SIJ RFA;  Surgeon: Manpreet Dutta MD;  Location: [facility] PAIN [facility]GT;  Service: Pain Management;  Laterality: Right;    SELECTIVE INJECTION OF ANESTHETIC AGENT AROUND LUMBAR SPINAL NERVE ROOT BY TRANSFORAMINAL APPROACH Bilateral 4/9/2024    Procedure: Bilateral L4/5 TF JAMIN;  Surgeon: Humberto Dumont MD;   "Location: [facility] PAIN MGT;  Service: Pain Management;  Laterality: Bilateral;    SELECTIVE INJECTION OF ANESTHETIC AGENT AROUND LUMBAR SPINAL NERVE ROOT BY TRANSFORAMINAL APPROACH Bilateral 9/17/2024    Procedure: Bilateral L4/5 TF JAMIN;  Surgeon: Humberto Dumnot MD;  Location: [facility] PAIN MGT;  Service: Pain Management;  Laterality: Bilateral;    SHOULDER ARTHROSCOPY Right 06/04/2015     Review of patient's allergies indicates:   Allergen Reactions    Demerol [meperidine] Other (See Comments)     Burning when adm IV  Able to tolerate IM, "Turned the veins in my hand purple."    Latex, natural rubber Other (See Comments)     "Burns my skin",  Symptoms get worse the longer she is exposed    Zocor [simvastatin] Other (See Comments)     Tightening of muscles    Statins-hmg-coa reductase inhibitors Other (See Comments)     myopathy    Sulfa (sulfonamide antibiotics)      Blurred vision    Nickel Rash     Pt states she had sores to ear lobes from nickel in earrings         Medications Ordered Prior to Encounter[1]     PMHx, PSHx, Allergies, Medications reviewed in epic    ROS negative except pain complaints in HPI    OBJECTIVE:    BP (!) 149/67 (BP Location: Right arm, Patient Position: Sitting)   Pulse 96   Temp 96.8 °F (36 °C) (Temporal)   Resp 16   Ht 5' 8" (1.727 m)   Wt 73.4 kg (161 lb 11.3 oz)   SpO2 98%   Breastfeeding No   BMI 24.59 kg/m²     PHYSICAL EXAMINATION:    GENERAL: Well appearing, in no acute distress, alert and oriented x3.  PSYCH:  Mood and affect appropriate.  SKIN: Skin color, texture, turgor normal, no rashes or lesions which will impact the procedure.  CV: RRR with palpation of the radial artery.  PULM: No evidence of respiratory difficulty, symmetric chest rise. Clear to auscultation.  NEURO: Cranial nerves grossly intact.    Plan:    Proceed with procedure as planned Procedure(s) (LRB):  INJECTION,ALLOGRAFT,INTERVERTEBRAL DISC,1ST LEVEL: L5-S1 Viadisc (N/A)    Humberto Dumont, " MD  04/22/2025                 [1]   No current facility-administered medications on file prior to encounter.     Current Outpatient Medications on File Prior to Encounter   Medication Sig Dispense Refill    empagliflozin (JARDIANCE) 10 mg tablet Take 1 tablet (10 mg total) by mouth once daily. 90 tablet 2    metFORMIN (GLUCOPHAGE) 1000 MG tablet Take 1 tablet (1,000 mg total) by mouth 2 (two) times daily with meals. 180 tablet 2    verapamiL (CALAN) 120 MG tablet Take 1 tablet (120 mg total) by mouth 2 (two) times daily. 180 tablet 5    b complex vitamins tablet Take 1 tablet by mouth once daily.      biotin 1 mg tablet Take 1,000 mcg by mouth every morning.       celecoxib (CELEBREX) 200 MG capsule TAKE 1 CAPSULE(200 MG) BY MOUTH TWICE DAILY WITH FOOD 120 capsule 1    clotrimazole-betamethasone 1-0.05% (LOTRISONE) cream Apply topically 2 (two) times daily. 45 g 2    diclofenac sodium (VOLTAREN) 1 % Gel Apply topically 4 (four) times daily. 100 g 2    ezetimibe (ZETIA) 10 mg tablet Take 1 tablet (10 mg total) by mouth once daily. 90 tablet 2    FLUoxetine 40 MG capsule Take 1 capsule (40 mg total) by mouth once daily. 90 capsule 2    fluticasone (FLONASE) 50 mcg/actuation nasal spray 2 sprays by Each Nare route once daily. 1 Bottle 0    furosemide (LASIX) 20 MG tablet Take 1 tablet (20 mg total) by mouth daily as needed (edema). 30 tablet 11    gabapentin (NEURONTIN) 300 MG capsule Take 1 capsule (300 mg total) by mouth 3 (three) times daily. 90 capsule 2    milnacipran (SAVELLA) 100 mg Tab Take 1 tablet (100 mg total) by mouth 2 (two) times daily. 180 tablet 0    omeprazole (PRILOSEC) 20 MG capsule Take 1 capsule (20 mg total) by mouth daily as needed (if needed with NSAIDS). 30 capsule 5    potassium chloride SA (K-DUR,KLOR-CON M) 10 MEQ tablet TAKE 1 TABLET(10 MEQ) BY MOUTH EVERY DAY 90 tablet 1    pulse oximeter (PULSE OXIMETER) device by Apply Externally route 2 (two) times a day. Use twice daily at 8 AM and 3  PM and record the value in MyChart as directed. 1 each 0    RSV, preF A and preF B,PF, (ABRYSVO, PF,) 120 mcg/0.5 mL SolR vaccine Inject 0.5ml into the muscle. 0.5 mL 0    semaglutide (OZEMPIC) 0.25 mg or 0.5 mg (2 mg/3 mL) pen injector Inject 0.5 mg into the skin every 7 days. 3 mL 5    topiramate (TOPAMAX) 25 MG tablet Take 1 tablet (25 mg total) by mouth 2 (two) times daily. 60 tablet 0    triamcinolone acetonide 0.025% (KENALOG) 0.025 % Oint Apply topically 2 (two) times daily. for 10 days (Patient taking differently: Apply topically 2 (two) times daily as needed.) 15 g 2    vitamin D (VITAMIN D3) 1000 units Tab Take 1,000 Units by mouth once daily.

## 2025-04-22 NOTE — DISCHARGE INSTRUCTIONS

## 2025-04-22 NOTE — OP NOTE
Viadisc Allograft Procedure    Patient Name Kaylin Mccollum    MRN:[id number]    INFORMED CONSENT: The procedure, risks, benefits and options were discussed with patient. There are no contraindications to the procedure. The patient expressed understanding and agreed to proceed. The personnel performing the procedure was discussed. I verify that I personally obtained Kaylin's consent prior to the start of the procedure and the signed consent can be found on the patient's chart.        Procedure Date:DATE@      Surgeon(s) and Role:  Humberto Dumont MD     Sedation: Conscious sedation provided by M.D     The patient was monitored with continuous pulse oximetry, EKG, and intermittent blood pressure monitors, immediately prior to administration of sedation.  The patient was hemodynamically stable throughout the entire process was responsive to voice, and breathing spontaneously.  Supplemental O2 was provided at 2L/min via nasal cannula.  Patient was comfortable for the duration of the procedure.      There was a total of 2mg IV Midazolam and 50 mcg Fentanyl titrated for the procedure     Total sedation time was >10minutes and <20minutes        Pre Procedure diagnosis: Other intervertebral disc degeneration, lumbar region [M51.36]  DDD (degenerative disc disease), lumbar [M51.36]     Post-Procedure diagnosis: Same     PROCEDURE:  Injection allograft intervertebral disc, L5-S1  DESCRIPTION OF PROCEDURE:Ancef 2 gm IV was administered for prophylaxis, 30 minutes prior to the procedure, see procedure record for time. The patient was brought to the fluoroscopy suite.  IV access was obtained prior to the procedure. The patient was positioned prone on the fluoroscopy table.  Continuous hemodynamic monitoring was initiated including blood pressure, EKG, and pulse oximetry.  IV sedation was administered incrementally to allow the patient to remain comfortable and conversant throughout the procedure. The area of the thoracolumbar spine  "was prepped with chlorhexidine and chlorhexidine three times and draped in a sterile fashion.     The targeted discs were identified by fluoroscopy and the skin and subcutaneous tissues overlying the needle insertion points were anesthetized using 5 cc of lidocaine 1%.  Under fluoroscopic guidance, 20 gauge 7" spinal needles were slowly advanced into the L5-S1 disc. The position of the needles was confirmed using oblique, AP and lateral fluoroscopic imaging. Negative aspiration for blood was confirmed and following this, the allograft material was reconstituted with normal saline in the ViaDisc kit. 2 cc were ultimately yielded and then injected in the nucleus pulposus without any resistance. The needles were removed and bleeding was nil.  A sterile dressing was applied. No specimens collected. The patient was taken to the Post-block Recovery Area for further observation.        "

## 2025-04-24 ENCOUNTER — PATIENT MESSAGE (OUTPATIENT)
Dept: PAIN MEDICINE | Facility: HOSPITAL | Age: 80
End: 2025-04-24
Payer: MEDICARE

## 2025-04-24 NOTE — PRE-PROCEDURE INSTRUCTIONS
Unable to reach patient regarding procedure scheduled on 4.29. PAT instructions and arrival time sent via [employer]     Arrival time 0745     Has patient been sick with fever or on antibiotics within the last 7 days? No     Does the patient have any open wounds, sores or rashes? No     Does the patient have any recent fractures? no     Has patient received a vaccination within the last 7 days? No     Received the COVID vaccination? yes     Has the patient stopped all medications as directed? na     Does patient have a pacemaker, defibrillator, or implantable stimulator? No     Does the patient have a ride to and from procedure and someone reliable to remain with patient?       Is the patient diabetic? yes      Does the patient have sleep apnea? Or use O2 at home? no     Is the patient receiving sedation? Yes      Is the patient instructed to remain NPO beginning at midnight the night before their procedure? yes     Procedure location confirmed with patient? Yes     Covid- Denies signs/symptoms. Instructed to notify PAT/MD if any changes.

## 2025-04-25 ENCOUNTER — LAB VISIT (OUTPATIENT)
Dept: LAB | Facility: HOSPITAL | Age: 80
End: 2025-04-25
Attending: PHYSICIAN ASSISTANT
Payer: MEDICARE

## 2025-04-25 DIAGNOSIS — E11.22 TYPE 2 DIABETES MELLITUS WITH STAGE 3A CHRONIC KIDNEY DISEASE, WITHOUT LONG-TERM CURRENT USE OF INSULIN: ICD-10-CM

## 2025-04-25 DIAGNOSIS — E11.69 DIABETES MELLITUS TYPE 2 IN OBESE: ICD-10-CM

## 2025-04-25 DIAGNOSIS — E11.69 HYPERLIPIDEMIA ASSOCIATED WITH TYPE 2 DIABETES MELLITUS: ICD-10-CM

## 2025-04-25 DIAGNOSIS — N18.31 TYPE 2 DIABETES MELLITUS WITH STAGE 3A CHRONIC KIDNEY DISEASE, WITHOUT LONG-TERM CURRENT USE OF INSULIN: ICD-10-CM

## 2025-04-25 DIAGNOSIS — E78.5 HYPERLIPIDEMIA ASSOCIATED WITH TYPE 2 DIABETES MELLITUS: ICD-10-CM

## 2025-04-25 DIAGNOSIS — E66.9 DIABETES MELLITUS TYPE 2 IN OBESE: ICD-10-CM

## 2025-04-25 DIAGNOSIS — I15.2 HYPERTENSION ASSOCIATED WITH DIABETES: ICD-10-CM

## 2025-04-25 DIAGNOSIS — E11.59 HYPERTENSION ASSOCIATED WITH DIABETES: ICD-10-CM

## 2025-04-25 LAB
ABSOLUTE EOSINOPHIL (OHS): 0.17 K/UL
ABSOLUTE MONOCYTE (OHS): 0.46 K/UL (ref 0.3–1)
ABSOLUTE NEUTROPHIL COUNT (OHS): 3.08 K/UL (ref 1.8–7.7)
ALBUMIN SERPL BCP-MCNC: 3.4 G/DL (ref 3.5–5.2)
ALBUMIN/CREAT UR: 20.5 UG/MG
ALP SERPL-CCNC: 52 UNIT/L (ref 40–150)
ALT SERPL W/O P-5'-P-CCNC: 8 UNIT/L (ref 10–44)
ANION GAP (OHS): 13 MMOL/L (ref 8–16)
AST SERPL-CCNC: 17 UNIT/L (ref 11–45)
BASOPHILS # BLD AUTO: 0.04 K/UL
BASOPHILS NFR BLD AUTO: 0.7 %
BILIRUB SERPL-MCNC: 0.3 MG/DL (ref 0.1–1)
BUN SERPL-MCNC: 14 MG/DL (ref 8–23)
CALCIUM SERPL-MCNC: 9.5 MG/DL (ref 8.7–10.5)
CHLORIDE SERPL-SCNC: 113 MMOL/L (ref 95–110)
CHOLEST SERPL-MCNC: 215 MG/DL (ref 120–199)
CHOLEST/HDLC SERPL: 4.4 {RATIO} (ref 2–5)
CO2 SERPL-SCNC: 21 MMOL/L (ref 23–29)
CREAT SERPL-MCNC: 0.8 MG/DL (ref 0.5–1.4)
CREAT UR-MCNC: 122 MG/DL (ref 15–325)
EAG (OHS): 126 MG/DL (ref 68–131)
ERYTHROCYTE [DISTWIDTH] IN BLOOD BY AUTOMATED COUNT: 14.3 % (ref 11.5–14.5)
GFR SERPLBLD CREATININE-BSD FMLA CKD-EPI: >60 ML/MIN/1.73/M2
GLUCOSE SERPL-MCNC: 102 MG/DL (ref 70–110)
HBA1C MFR BLD: 6 % (ref 4–5.6)
HCT VFR BLD AUTO: 42.8 % (ref 37–48.5)
HDLC SERPL-MCNC: 49 MG/DL (ref 40–75)
HDLC SERPL: 22.8 % (ref 20–50)
HGB BLD-MCNC: 13.2 GM/DL (ref 12–16)
IMM GRANULOCYTES # BLD AUTO: 0.02 K/UL (ref 0–0.04)
IMM GRANULOCYTES NFR BLD AUTO: 0.3 % (ref 0–0.5)
LDLC SERPL CALC-MCNC: 136.2 MG/DL (ref 63–159)
LYMPHOCYTES # BLD AUTO: 2.01 K/UL (ref 1–4.8)
MCH RBC QN AUTO: 29.7 PG (ref 27–31)
MCHC RBC AUTO-ENTMCNC: 30.8 G/DL (ref 32–36)
MCV RBC AUTO: 96 FL (ref 82–98)
MICROALBUMIN UR-MCNC: 25 UG/ML (ref ?–5000)
NONHDLC SERPL-MCNC: 166 MG/DL
NUCLEATED RBC (/100WBC) (OHS): 0 /100 WBC
PLATELET # BLD AUTO: 277 K/UL (ref 150–450)
PMV BLD AUTO: 9.8 FL (ref 9.2–12.9)
POTASSIUM SERPL-SCNC: 3.6 MMOL/L (ref 3.5–5.1)
PROT SERPL-MCNC: 6.6 GM/DL (ref 6–8.4)
RBC # BLD AUTO: 4.45 M/UL (ref 4–5.4)
RELATIVE EOSINOPHIL (OHS): 2.9 %
RELATIVE LYMPHOCYTE (OHS): 34.8 % (ref 18–48)
RELATIVE MONOCYTE (OHS): 8 % (ref 4–15)
RELATIVE NEUTROPHIL (OHS): 53.3 % (ref 38–73)
SODIUM SERPL-SCNC: 147 MMOL/L (ref 136–145)
TRIGL SERPL-MCNC: 149 MG/DL (ref 30–150)
TSH SERPL-ACNC: 0.64 UIU/ML (ref 0.4–4)
WBC # BLD AUTO: 5.78 K/UL (ref 3.9–12.7)

## 2025-04-25 PROCEDURE — 83036 HEMOGLOBIN GLYCOSYLATED A1C: CPT

## 2025-04-25 PROCEDURE — 36415 COLL VENOUS BLD VENIPUNCTURE: CPT

## 2025-04-25 PROCEDURE — 85025 COMPLETE CBC W/AUTO DIFF WBC: CPT

## 2025-04-25 PROCEDURE — 82043 UR ALBUMIN QUANTITATIVE: CPT

## 2025-04-25 PROCEDURE — 84443 ASSAY THYROID STIM HORMONE: CPT

## 2025-04-25 PROCEDURE — 82310 ASSAY OF CALCIUM: CPT

## 2025-04-25 PROCEDURE — 82465 ASSAY BLD/SERUM CHOLESTEROL: CPT

## 2025-04-29 ENCOUNTER — RESULTS FOLLOW-UP (OUTPATIENT)
Dept: INTERNAL MEDICINE | Facility: CLINIC | Age: 80
End: 2025-04-29

## 2025-04-29 ENCOUNTER — HOSPITAL ENCOUNTER (OUTPATIENT)
Facility: HOSPITAL | Age: 80
Discharge: HOME | End: 2025-04-29
Attending: ANESTHESIOLOGY | Admitting: ANESTHESIOLOGY
Payer: MEDICARE

## 2025-04-29 VITALS
OXYGEN SATURATION: 95 % | HEART RATE: 82 BPM | HEIGHT: 68 IN | BODY MASS INDEX: 24.71 KG/M2 | DIASTOLIC BLOOD PRESSURE: 76 MMHG | TEMPERATURE: 98 F | SYSTOLIC BLOOD PRESSURE: 166 MMHG | RESPIRATION RATE: 17 BRPM | WEIGHT: 163 LBS

## 2025-04-29 DIAGNOSIS — M51.360 DISCOGENIC LOW BACK PAIN: ICD-10-CM

## 2025-04-29 LAB — POCT GLUCOSE: 121 MG/DL (ref 70–110)

## 2025-04-29 PROCEDURE — 25000003 PHARM REV CODE 250: Performed by: ANESTHESIOLOGY

## 2025-04-29 PROCEDURE — 0627T PERQ NJX ALGC FLUOR LMBR 1ST: CPT | Performed by: ANESTHESIOLOGY

## 2025-04-29 PROCEDURE — 82962 GLUCOSE BLOOD TEST: CPT | Performed by: ANESTHESIOLOGY

## 2025-04-29 PROCEDURE — 0627T PERQ NJX ALGC FLUOR LMBR 1ST: CPT | Mod: ,,, | Performed by: ANESTHESIOLOGY

## 2025-04-29 PROCEDURE — 63600175 PHARM REV CODE 636 W HCPCS: Performed by: ANESTHESIOLOGY

## 2025-04-29 PROCEDURE — C1889 IMPLANT/INSERT DEVICE, NOC: HCPCS | Performed by: ANESTHESIOLOGY

## 2025-04-29 RX ORDER — SODIUM BICARBONATE 1 MEQ/ML
SYRINGE (ML) INTRAVENOUS
Status: DISCONTINUED | OUTPATIENT
Start: 2025-04-29 | End: 2025-04-29 | Stop reason: HOSPADM

## 2025-04-29 RX ORDER — LIDOCAINE HYDROCHLORIDE 10 MG/ML
INJECTION, SOLUTION EPIDURAL; INFILTRATION; INTRACAUDAL; PERINEURAL
Status: DISCONTINUED | OUTPATIENT
Start: 2025-04-29 | End: 2025-04-29 | Stop reason: HOSPADM

## 2025-04-29 RX ORDER — FENTANYL CITRATE 50 UG/ML
INJECTION, SOLUTION INTRAMUSCULAR; INTRAVENOUS
Status: DISCONTINUED | OUTPATIENT
Start: 2025-04-29 | End: 2025-04-29 | Stop reason: HOSPADM

## 2025-04-29 RX ORDER — MIDAZOLAM HYDROCHLORIDE 1 MG/ML
INJECTION, SOLUTION INTRAMUSCULAR; INTRAVENOUS
Status: DISCONTINUED | OUTPATIENT
Start: 2025-04-29 | End: 2025-04-29 | Stop reason: HOSPADM

## 2025-04-29 RX ORDER — CEFAZOLIN 2 G/1
2 INJECTION, POWDER, FOR SOLUTION INTRAMUSCULAR; INTRAVENOUS
Status: COMPLETED | OUTPATIENT
Start: 2025-04-29 | End: 2025-04-29

## 2025-04-29 RX ADMIN — CEFAZOLIN 2 G: 2 INJECTION, POWDER, LYOPHILIZED, FOR SOLUTION INTRAVENOUS at 07:04

## 2025-04-29 NOTE — DISCHARGE SUMMARY
Discharge Note  Short Stay      SUMMARY     Admit Date: 4/29/2025    Attending Physician: Humberto Dumont MD        Discharge Physician: Humberto Dumont MD        Discharge Date: 4/29/2025 8:17 AM    Procedure(s) (LRB):  INJECTION,ALLOGRAFT,INTERVERTEBRAL DISC,1ST LEVEL: L3-4 Viadisc (N/A)    Final Diagnosis: Degeneration of intervertebral disc of lumbar region with lower extremity pain [M51.361]    Disposition: Home or self care    Patient Instructions:   Current Discharge Medication List        CONTINUE these medications which have NOT CHANGED    Details   celecoxib (CELEBREX) 200 MG capsule TAKE 1 CAPSULE(200 MG) BY MOUTH TWICE DAILY WITH FOOD  Qty: 120 capsule, Refills: 1    Associated Diagnoses: Chondromalacia, right knee; Left shoulder tendinitis      empagliflozin (JARDIANCE) 10 mg tablet Take 1 tablet (10 mg total) by mouth once daily.  Qty: 90 tablet, Refills: 2    Associated Diagnoses: Diabetes mellitus type 2 in obese      ezetimibe (ZETIA) 10 mg tablet Take 1 tablet (10 mg total) by mouth once daily.  Qty: 90 tablet, Refills: 2    Associated Diagnoses: Hyperlipidemia associated with type 2 diabetes mellitus      FLUoxetine 40 MG capsule Take 1 capsule (40 mg total) by mouth once daily.  Qty: 90 capsule, Refills: 2    Associated Diagnoses: Fibromyalgia; Chronic major depressive disorder      gabapentin (NEURONTIN) 300 MG capsule Take 1 capsule (300 mg total) by mouth 3 (three) times daily.  Qty: 90 capsule, Refills: 2      metFORMIN (GLUCOPHAGE) 1000 MG tablet Take 1 tablet (1,000 mg total) by mouth 2 (two) times daily with meals.  Qty: 180 tablet, Refills: 2    Associated Diagnoses: Diabetes mellitus type 2 in obese      milnacipran (SAVELLA) 100 mg Tab Take 1 tablet (100 mg total) by mouth 2 (two) times daily.  Qty: 180 tablet, Refills: 0      omeprazole (PRILOSEC) 20 MG capsule Take 1 capsule (20 mg total) by mouth daily as needed (if needed with NSAIDS).  Qty: 30 capsule, Refills: 5    Associated  Diagnoses: Gastroesophageal reflux disease without esophagitis      topiramate (TOPAMAX) 25 MG tablet Take 1 tablet (25 mg total) by mouth 2 (two) times daily.  Qty: 60 tablet, Refills: 0    Associated Diagnoses: Headache, chronic daily      verapamiL (CALAN) 120 MG tablet Take 1 tablet (120 mg total) by mouth 2 (two) times daily.  Qty: 180 tablet, Refills: 5    Associated Diagnoses: MVP (mitral valve prolapse); Hypertension associated with diabetes      b complex vitamins tablet Take 1 tablet by mouth once daily.      biotin 1 mg tablet Take 1,000 mcg by mouth every morning.       clotrimazole-betamethasone 1-0.05% (LOTRISONE) cream Apply topically 2 (two) times daily.  Qty: 45 g, Refills: 2      diclofenac sodium (VOLTAREN) 1 % Gel Apply topically 4 (four) times daily.  Qty: 100 g, Refills: 2      fluticasone (FLONASE) 50 mcg/actuation nasal spray 2 sprays by Each Nare route once daily.  Qty: 1 Bottle, Refills: 0    Associated Diagnoses: Sinusitis      furosemide (LASIX) 20 MG tablet Take 1 tablet (20 mg total) by mouth daily as needed (edema).  Qty: 30 tablet, Refills: 11      potassium chloride SA (K-DUR,KLOR-CON M) 10 MEQ tablet TAKE 1 TABLET(10 MEQ) BY MOUTH EVERY DAY  Qty: 90 tablet, Refills: 1    Comments: Future refill requests to go to Dr. Dumont      pulse oximeter (PULSE OXIMETER) device by Apply Externally route 2 (two) times a day. Use twice daily at 8 AM and 3 PM and [facility] the [facility] as directed.  Qty: 1 each, Refills: 0    Comments: This is a NO CHARGE item.  Please override price to zero.  DO NOT PRINT.  NORMAL MODE e-PRESCRIBE ONLY.  Associated Diagnoses: COVID-19 virus detected      RSV, preF A and preF B,PF, (ABRYSVO, PF,) 120 mcg/0.5 mL SolR vaccine Inject 0.5ml into the muscle.  Qty: 0.5 mL, Refills: 0      semaglutide (OZEMPIC) 0.25 mg or 0.5 mg (2 mg/3 mL) pen injector Inject 0.5 mg into the skin every 7 days.  Qty: 3 mL, Refills: 5    Comments: Patient requests delivery  Associated  Diagnoses: Hypertension associated with diabetes      triamcinolone acetonide 0.025% (KENALOG) 0.025 % Oint Apply topically 2 (two) times daily. for 10 days  Qty: 15 g, Refills: 2    Associated Diagnoses: Tinea corporis      vitamin D (VITAMIN D3) 1000 units Tab Take 1,000 Units by mouth once daily.                 Discharge Diagnosis: Degeneration of intervertebral disc of lumbar region with lower extremity pain [M51.361]  Condition on Discharge: Stable with no complications to procedure   Diet on Discharge: Same as before.  Activity: as per instruction sheet.  Discharge to: Home with a responsible adult.  Follow up: 2-4 weeks       Please call the office at [phone] if you experience any weakness or loss of sensation, fever > 101.5, pain uncontrolled with oral medications, persistent nausea/vomiting/or diarrhea, redness or drainage from the incisions, or any other worrisome concerns. If physician on call was not reached or could not communicate with our office for any reason please go to the nearest emergency department

## 2025-04-29 NOTE — OP NOTE
Viadisc Allograft Procedure    Patient Name: Kaylin Mccollum        MRN:[id number]           INFORMED CONSENT: The procedure, risks, benefits and options were discussed with patient. There are no contraindications to the procedure. The patient expressed understanding and agreed to proceed. The personnel performing the procedure was discussed. I verify that I personally obtained Kaylin's consent prior to the start of the procedure and the signed consent can be found on the patient's chart.        Procedure Date: 04/29/2025        Surgeon(s) and Role:  Humberto Dumont MD     Sedation: Conscious sedation provided by M.D     The patient was monitored with continuous pulse oximetry, EKG, and intermittent blood pressure monitors, immediately prior to administration of sedation.  The patient was hemodynamically stable throughout the entire process was responsive to voice, and breathing spontaneously.  Supplemental O2 was provided at 2L/min via nasal cannula.  Patient was comfortable for the duration of the procedure.      There was a total of 2mg IV Midazolam and 50 mcg Fentanyl titrated for the procedure     Total sedation time was >20minutes and <30minutes        Pre Procedure diagnosis: Other intervertebral disc degeneration, lumbar region [M51.36]  DDD (degenerative disc disease), lumbar [M51.36]     Post-Procedure diagnosis: Same     PROCEDURE:  Injection allograft intervertebral disc, L3-L4  DESCRIPTION OF PROCEDURE:Ancef 2 gm IV was administered for prophylaxis, 30 minutes prior to the procedure, see procedure record for time. The patient was brought to the fluoroscopy suite.  IV access was obtained prior to the procedure. The patient was positioned prone on the fluoroscopy table.  Continuous hemodynamic monitoring was initiated including blood pressure, EKG, and pulse oximetry.  IV sedation was administered incrementally to allow the patient to remain comfortable and conversant throughout the procedure. The area of the  "thoracolumbar spine was prepped with chlorhexidine and chlorhexidine three times and draped in a sterile fashion.     The targeted discs were identified by fluoroscopy and the skin and subcutaneous tissues overlying the needle insertion points were anesthetized using 5 cc of lidocaine 1%.  Under fluoroscopic guidance, 20 gauge 7" spinal needles were slowly advanced into the L3-L4 disc. The position of the needles was confirmed using oblique, AP and lateral fluoroscopic imaging. Negative aspiration for blood was confirmed and following this, the allograft material was reconstituted with normal saline in the ViaDisc kit. 2 cc were ultimately yielded and then injected in the nucleus pulposus without any resistance. The needles were removed and bleeding was nil.  A sterile dressing was applied. No specimens collected. The patient was taken to the Post-block Recovery Area for further observation.        "

## 2025-04-29 NOTE — DISCHARGE INSTRUCTIONS

## 2025-05-06 ENCOUNTER — OFFICE VISIT (OUTPATIENT)
Dept: INTERNAL MEDICINE | Facility: CLINIC | Age: 80
End: 2025-05-06
Payer: MEDICARE

## 2025-05-06 VITALS
TEMPERATURE: 98 F | HEIGHT: 68 IN | BODY MASS INDEX: 24.56 KG/M2 | DIASTOLIC BLOOD PRESSURE: 70 MMHG | SYSTOLIC BLOOD PRESSURE: 134 MMHG | HEART RATE: 78 BPM | OXYGEN SATURATION: 96 % | WEIGHT: 162.06 LBS

## 2025-05-06 DIAGNOSIS — E11.69 HYPERLIPIDEMIA ASSOCIATED WITH TYPE 2 DIABETES MELLITUS: Primary | ICD-10-CM

## 2025-05-06 DIAGNOSIS — I15.2 HYPERTENSION ASSOCIATED WITH DIABETES: ICD-10-CM

## 2025-05-06 DIAGNOSIS — K21.9 GASTROESOPHAGEAL REFLUX DISEASE WITHOUT ESOPHAGITIS: ICD-10-CM

## 2025-05-06 DIAGNOSIS — M79.7 FIBROMYALGIA: Chronic | ICD-10-CM

## 2025-05-06 DIAGNOSIS — F32.9 CHRONIC MAJOR DEPRESSIVE DISORDER: Chronic | ICD-10-CM

## 2025-05-06 DIAGNOSIS — E11.8 CONTROLLED TYPE 2 DIABETES MELLITUS WITH COMPLICATION, WITHOUT LONG-TERM CURRENT USE OF INSULIN: ICD-10-CM

## 2025-05-06 DIAGNOSIS — E78.5 HYPERLIPIDEMIA ASSOCIATED WITH TYPE 2 DIABETES MELLITUS: Primary | ICD-10-CM

## 2025-05-06 DIAGNOSIS — E11.59 HYPERTENSION ASSOCIATED WITH DIABETES: ICD-10-CM

## 2025-05-06 PROCEDURE — 99215 OFFICE O/P EST HI 40 MIN: CPT | Mod: PBBFAC | Performed by: FAMILY MEDICINE

## 2025-05-06 PROCEDURE — 99999 PR PBB SHADOW E&M-EST. PATIENT-LVL V: CPT | Mod: PBBFAC,,, | Performed by: FAMILY MEDICINE

## 2025-05-06 PROCEDURE — G2211 COMPLEX E/M VISIT ADD ON: HCPCS | Mod: S$PBB,,, | Performed by: FAMILY MEDICINE

## 2025-05-06 PROCEDURE — 99214 OFFICE O/P EST MOD 30 MIN: CPT | Mod: S$PBB,,, | Performed by: FAMILY MEDICINE

## 2025-05-06 RX ORDER — SEMAGLUTIDE 0.68 MG/ML
0.5 INJECTION, SOLUTION SUBCUTANEOUS
Qty: 9 ML | Refills: 4 | Status: SHIPPED | OUTPATIENT
Start: 2025-05-06 | End: 2026-05-06

## 2025-05-06 RX ORDER — OMEPRAZOLE 20 MG/1
20 CAPSULE, DELAYED RELEASE ORAL DAILY PRN
Qty: 30 CAPSULE | Refills: 5 | Status: SHIPPED | OUTPATIENT
Start: 2025-05-06 | End: 2025-11-02

## 2025-05-06 RX ORDER — EZETIMIBE 10 MG/1
10 TABLET ORAL DAILY
Qty: 90 TABLET | Refills: 4 | Status: SHIPPED | OUTPATIENT
Start: 2025-05-06

## 2025-05-06 RX ORDER — METFORMIN HYDROCHLORIDE 1000 MG/1
1000 TABLET ORAL 2 TIMES DAILY WITH MEALS
Qty: 180 TABLET | Refills: 4 | Status: SHIPPED | OUTPATIENT
Start: 2025-05-06

## 2025-05-06 RX ORDER — FLUOXETINE HYDROCHLORIDE 40 MG/1
40 CAPSULE ORAL DAILY
Qty: 90 CAPSULE | Refills: 4 | Status: SHIPPED | OUTPATIENT
Start: 2025-05-06

## 2025-05-06 NOTE — PROGRESS NOTES
Subjective:       Patient ID: Kaylin Mccollum is a 80 y.o. female.    Chief Complaint: Annual Exam      History of Present Illness    Patient presents to clinic today for annual physical exam.  - Patient reports dizziness and fainting episodes in the past, with 3 falls in 1.5 years. The most recent fall occurred last June when she fainted. Prior to this, she was on 4 blood pressure medications and had constant dizziness, especially when standing. She was evaluated at the ER after fainting and informed the ER doctor about her symptoms.  - She reduced her blood pressure medication dosage but continued to have dizziness. She had significant difficulty walking in public spaces due to dizziness. She discontinued the medication completely about 2 weeks ago and has been monitoring her blood pressure daily. She now only takes the medication if her systolic pressure reaches around 150.  - Today, she noticed swelling in her leg, for which she took Lasix and another diuretic (possibly hydrochlorothiazide). She notes this is an infrequent occurrence.  - She reports daily headaches. A pain management PA prescribed Topamax for headache management, but it has been ineffective. She was given a 1-month supply to trial.  - She sees a cardiologist, Dr. Dumont, with an appointment scheduled for next week. She also has an upcoming appointment with Dr. Dumont, pain management, this month.  - She denies any recent falls since June of last year.  Patient is otherwise without concerns today.      Review of Systems   Constitutional:  Negative for chills, fatigue, fever and unexpected weight change.   HENT:  Negative for congestion, dental problem, ear pain, hearing loss, rhinorrhea and trouble swallowing.    Eyes:  Negative for pain and visual disturbance.   Respiratory:  Negative for cough and shortness of breath.    Cardiovascular:  Positive for leg swelling. Negative for chest pain and palpitations.   Gastrointestinal:  Negative for  "abdominal distention, abdominal pain, blood in stool, constipation, diarrhea, nausea and vomiting.   Genitourinary:  Negative for difficulty urinating and vaginal discharge.   Musculoskeletal:  Negative for arthralgias and myalgias.   Skin:  Negative for rash.   Neurological:  Positive for dizziness. Negative for weakness, numbness and headaches.   Hematological:  Negative for adenopathy. Does not bruise/bleed easily.   Psychiatric/Behavioral:  Negative for dysphoric mood and sleep disturbance. The patient is not nervous/anxious.        Objective:     Vitals:    05/06/25 1349   BP: 134/70   Pulse: 78   Temp: 98.2 °F (36.8 °C)   SpO2: 96%   Weight: 73.5 kg (162 lb 0.6 oz)   Height: 5' 8" (1.727 m)            Physical Exam  Vitals reviewed.   Constitutional:       General: She is not in acute distress.     Appearance: Normal appearance. She is well-developed.   HENT:      Head: Normocephalic and atraumatic.      Right Ear: Tympanic membrane, ear canal and external ear normal.      Left Ear: Tympanic membrane, ear canal and external ear normal.      Nose: Nose normal. No mucosal edema or rhinorrhea.      Mouth/Throat:      Pharynx: Uvula midline.   Eyes:      General: Lids are normal. No scleral icterus.     Extraocular Movements: Extraocular movements intact.      Conjunctiva/sclera: Conjunctivae normal.      Pupils: Pupils are equal, round, and reactive to light.   Neck:      Thyroid: No thyromegaly.   Cardiovascular:      Rate and Rhythm: Normal rate and regular rhythm.      Heart sounds: No murmur heard.     No friction rub. No gallop.   Pulmonary:      Effort: Pulmonary effort is normal.      Breath sounds: Normal breath sounds. No wheezing, rhonchi or rales.   Abdominal:      General: Bowel sounds are normal. There is no distension.      Palpations: Abdomen is soft. There is no mass.      Tenderness: There is no abdominal tenderness.   Musculoskeletal:         General: Normal range of motion.      Cervical back: " Normal range of motion and neck supple.   Lymphadenopathy:      Cervical: No cervical adenopathy.   Skin:     General: Skin is warm and dry.      Findings: No lesion or rash.      Nails: There is no clubbing.   Neurological:      Mental Status: She is alert and oriented to person, place, and time.      Cranial Nerves: No cranial nerve deficit.      Sensory: No sensory deficit.      Gait: Gait normal.   Psychiatric:         Mood and Affect: Mood and affect normal.         Lab Results   Component Value Date     (H) 04/25/2025    K 3.6 04/25/2025     (H) 04/25/2025    CO2 21 (L) 04/25/2025     04/25/2025    BUN 14 04/25/2025    CREATININE 0.8 04/25/2025    CALCIUM 9.5 04/25/2025    PROT 6.6 04/25/2025    ALBUMIN 3.4 (L) 04/25/2025    BILITOT 0.3 04/25/2025    ALKPHOS 52 04/25/2025    AST 17 04/25/2025    ALT 8 (L) 04/25/2025    EGFRNORACEVR >60 04/25/2025    ANIONGAP 13 04/25/2025       Lab Results   Component Value Date    CHOL 215 (H) 04/25/2025    TRIG 149 04/25/2025    HDL 49 04/25/2025    LDLCALC 136.2 04/25/2025       Lab Results   Component Value Date    WBC 5.78 04/25/2025    RBC 4.45 04/25/2025    HGB 13.2 04/25/2025    HCT 42.8 04/25/2025    MCV 96 04/25/2025    MCH 29.7 04/25/2025    MCHC 30.8 (L) 04/25/2025    RDW 14.3 04/25/2025     04/25/2025    MPV 9.8 04/25/2025        Lab Results   Component Value Date    TSH 0.638 04/25/2025       Lab Results   Component Value Date    HGBA1C 6.0 (H) 04/25/2025       Lab Results   Component Value Date    LABMICR 25.0 04/25/2025    CREATRANDUR 122.0 04/25/2025    MICALBCREAT 20.5 04/25/2025       Assessment:       1. Hyperlipidemia associated with type 2 diabetes mellitus    2. Hypertension associated with diabetes    3. Controlled type 2 diabetes mellitus with complication, without long-term current use of insulin    4. Fibromyalgia    5. Chronic major depressive disorder    6. Gastroesophageal reflux disease without esophagitis        Plan:    1. Hyperlipidemia associated with type 2 diabetes mellitus  -     Comprehensive Metabolic Panel; Future; Expected date: 11/06/2025  -     Lipid Panel; Future; Expected date: 11/06/2025  -     ezetimibe (ZETIA) 10 mg tablet; Take 1 tablet (10 mg total) by mouth once daily.  Dispense: 90 tablet; Refill: 4    2. Hypertension associated with diabetes    Controlled, continue current medications.    3. Controlled type 2 diabetes mellitus with complication, without long-term current use of insulin  -     Hemoglobin A1C; Future; Expected date: 11/06/2025  -     empagliflozin (JARDIANCE) 10 mg tablet; Take 1 tablet (10 mg total) by mouth once daily.  Dispense: 90 tablet; Refill: 4  -     metFORMIN (GLUCOPHAGE) 1000 MG tablet; Take 1 tablet (1,000 mg total) by mouth 2 (two) times daily with meals.  Dispense: 180 tablet; Refill: 4  -     semaglutide (OZEMPIC) 0.25 mg or 0.5 mg (2 mg/3 mL) pen injector; Inject 0.5 mg into the skin every 7 days.  Dispense: 9 mL; Refill: 4    Controlled, continue current medications.    4. Fibromyalgia  -     FLUoxetine 40 MG capsule; Take 1 capsule (40 mg total) by mouth once daily.  Dispense: 90 capsule; Refill: 4    5. Chronic major depressive disorder  -     FLUoxetine 40 MG capsule; Take 1 capsule (40 mg total) by mouth once daily.  Dispense: 90 capsule; Refill: 4    6. Gastroesophageal reflux disease without esophagitis  -     omeprazole (PRILOSEC) 20 MG capsule; Take 1 capsule (20 mg total) by mouth daily as needed (if needed with NSAIDS).  Dispense: 30 capsule; Refill: 5    HYPERTENSION AND ORTHOSTATIC HYPOTENSION:   Blood pressure is within acceptable range.   Patient reports dizziness and syncope related to antihypertensive medications, which led to self-discontinuation.   Patient checks blood pressure daily and has experienced hypotension episodes.   Instructed to monitor blood pressure at home before taking medications and to discuss medication adjustments and symptoms with  cardiologist at upcoming appointment.    HYPERLIPIDEMIA:   Cholesterol remains above target goal for diabetic patients.   Patient is not tolerating statins.   Continuing Zetia for cholesterol management and advised to discuss additional management options with cardiologist.    LOCALIZED EDEMA:   Patient experienced lower extremity edema and self-administered Lasix.   Recommend reducing sodium intake and discussed ongoing management of edema with Lasix.    GASTROESOPHAGEAL REFLUX DISEASE:   Continuing omeprazole as needed for acid reflux.    Patient expressed understanding and agreement with plan.    Visit today included increased complexity associated with the care of the episodic problem hyperlipidemia, which was addressed while instituting co-management of the longitudinal care of the patient due to the serious and/or complex managed problem(s) .    I have evaluated and discussed management associated with medical care services that serve as the continuing focal point for all needed health care services and/or with medical care services that are part of ongoing care related to my patient's single, serious condition or a complex condition(s).    I am providing ongoing care and I am the primary care provider for this patient, and they are being managed, monitored, and/or observed for their chronic conditions over time.     I have addressed their ongoing health maintenance requirements and needs for all health care services and reviewed co-management plans provided by specialty providers when available.    Health Maintenance Due   Topic Date Due    COVID-19 Vaccine (5 - 2024-25 season) 09/01/2024    Diabetic Eye Exam  08/08/2025     Health Maintenance reviewed/updated.    Follow up in about 6 months (around 11/6/2025), or if symptoms worsen or fail to improve, for 6 month f/u with [facility] PTA.    This note was generated with the assistance of ambient listening technology. Verbal consent was obtained by the  patient and accompanying visitor(s) for the recording of patient appointment to facilitate this note. I attest to having reviewed and edited the generated note for accuracy, though some syntax or spelling errors may persist. Please contact the author of this note for any clarification.

## 2025-05-08 DIAGNOSIS — I34.1 MVP (MITRAL VALVE PROLAPSE): ICD-10-CM

## 2025-05-08 DIAGNOSIS — E11.59 HYPERTENSION ASSOCIATED WITH DIABETES: Primary | Chronic | ICD-10-CM

## 2025-05-08 DIAGNOSIS — I15.2 HYPERTENSION ASSOCIATED WITH DIABETES: Primary | Chronic | ICD-10-CM

## 2025-05-12 ENCOUNTER — OFFICE VISIT (OUTPATIENT)
Dept: CARDIOLOGY | Facility: CLINIC | Age: 80
End: 2025-05-12
Payer: MEDICARE

## 2025-05-12 ENCOUNTER — HOSPITAL ENCOUNTER (OUTPATIENT)
Dept: CARDIOLOGY | Facility: HOSPITAL | Age: 80
Discharge: HOME | End: 2025-05-12
Attending: INTERNAL MEDICINE
Payer: MEDICARE

## 2025-05-12 VITALS
WEIGHT: 160.94 LBS | BODY MASS INDEX: 24.39 KG/M2 | OXYGEN SATURATION: 98 % | HEIGHT: 68 IN | DIASTOLIC BLOOD PRESSURE: 66 MMHG | HEART RATE: 112 BPM | SYSTOLIC BLOOD PRESSURE: 106 MMHG

## 2025-05-12 DIAGNOSIS — N18.31 TYPE 2 DIABETES MELLITUS WITH STAGE 3A CHRONIC KIDNEY DISEASE, WITHOUT LONG-TERM CURRENT USE OF INSULIN: ICD-10-CM

## 2025-05-12 DIAGNOSIS — E11.69 HYPERLIPIDEMIA ASSOCIATED WITH TYPE 2 DIABETES MELLITUS: ICD-10-CM

## 2025-05-12 DIAGNOSIS — E66.01 SEVERE OBESITY (BMI 35.0-39.9) WITH COMORBIDITY: ICD-10-CM

## 2025-05-12 DIAGNOSIS — E11.22 TYPE 2 DIABETES MELLITUS WITH STAGE 3A CHRONIC KIDNEY DISEASE, WITHOUT LONG-TERM CURRENT USE OF INSULIN: ICD-10-CM

## 2025-05-12 DIAGNOSIS — E11.59 HYPERTENSION ASSOCIATED WITH DIABETES: Chronic | ICD-10-CM

## 2025-05-12 DIAGNOSIS — I73.9 PVD (PERIPHERAL VASCULAR DISEASE): ICD-10-CM

## 2025-05-12 DIAGNOSIS — I49.3 PVC (PREMATURE VENTRICULAR CONTRACTION): ICD-10-CM

## 2025-05-12 DIAGNOSIS — I15.2 HYPERTENSION ASSOCIATED WITH DIABETES: Primary | Chronic | ICD-10-CM

## 2025-05-12 DIAGNOSIS — E11.59 HYPERTENSION ASSOCIATED WITH DIABETES: Primary | Chronic | ICD-10-CM

## 2025-05-12 DIAGNOSIS — I34.1 MVP (MITRAL VALVE PROLAPSE): ICD-10-CM

## 2025-05-12 DIAGNOSIS — I15.2 HYPERTENSION ASSOCIATED WITH DIABETES: Chronic | ICD-10-CM

## 2025-05-12 DIAGNOSIS — E78.5 HYPERLIPIDEMIA ASSOCIATED WITH TYPE 2 DIABETES MELLITUS: ICD-10-CM

## 2025-05-12 LAB
OHS QRS DURATION: 78 MS
OHS QTC CALCULATION: 472 MS

## 2025-05-12 PROCEDURE — 99999 PR PBB SHADOW E&M-EST. PATIENT-LVL V: CPT | Mod: PBBFAC,,, | Performed by: INTERNAL MEDICINE

## 2025-05-12 PROCEDURE — 99215 OFFICE O/P EST HI 40 MIN: CPT | Mod: PBBFAC,25 | Performed by: INTERNAL MEDICINE

## 2025-05-12 PROCEDURE — 93005 ELECTROCARDIOGRAM TRACING: CPT

## 2025-05-12 PROCEDURE — 93010 ELECTROCARDIOGRAM REPORT: CPT | Mod: ,,, | Performed by: INTERNAL MEDICINE

## 2025-05-12 RX ORDER — VERAPAMIL HYDROCHLORIDE 40 MG/1
40 TABLET ORAL 2 TIMES DAILY
Qty: 60 TABLET | Refills: 5 | Status: SHIPPED | OUTPATIENT
Start: 2025-05-12

## 2025-05-12 RX ORDER — VERAPAMIL HYDROCHLORIDE 40 MG/1
40 TABLET ORAL 2 TIMES DAILY
Qty: 60 TABLET | Refills: 5 | Status: SHIPPED | OUTPATIENT
Start: 2025-05-12 | End: 2025-05-12

## 2025-05-12 NOTE — PROGRESS NOTES
Subjective:   Patient ID:  Kaylin Mccollum is a 80 y.o. female who presents for follow up of Dizziness (Dizziness that happens occasionally, sometimes feels like she will pass out )      79 yo female, 6 months f/u  PMH h/o MVP, HTN DM 20 yrs. OH, vasovagal reaction, H/o PVCs. Statin intolerance, Fibromyalgia H/o COVID 19 in  quarantine at home. (sinus headache)  On verapamil since she was 54.   Palpitation and dizziness controlled by Verapamil  Occasional chest pain, with sharp pain.   C/o dry mouth  Chronic fatigue from fibra myalgia. Some shoulder pain from the fall  Mother had afib. Father healthy. Young brother had heart procedure at age of 6.  Not on regular exercise. No smoking/drinking  ekg today NSR and poor R progression on repcoridal leads    05/2021 visit   ECHO mild MR and normal EF. Stress test no ischemia; ZIOPATCH showed rare AT longest 13 beats.  Still dry mouth and occasional pinching chest pain  Headache chronic due to migraine  Chronic fatigue, mild exercise endurance  BP and LDL controlled. A1C 6.2 at OSH in      visit  PVD swelling of the leg controlled by lasix.   BP controlled  Still fatigue and weakness   ekg NSR. A1C 7.6 BP controled     visit  C/o chest tightness when walked from the parking to the office today. Resolved after rest.  Walking and shopping could cause the muscle pain and fatigue. '  Chronic lower back injection for the pain. occasionally faint and clammy  No palpitation and leg swelling      visit  Had steroid injection of the hips. Palpitation and controlled by verapamil. BRUNO with climbing the stairs. No dizziness and faint.    04/23 visit  Rx of fibromyalgia working but copay elevated and will wean off now  Occasional palpitation at night  No dizziness faint dyspnea. Chronic mild leg swelling  Ekg NSR     stress echo normal EF and mld MR. Normal stress test; BARDY unremarkable     07/23 visit  Feet and leg swelling  improved but 2+ some varicose on the feet skin. No palpitation dizziness faint  Reviewed digit HTN and BP up to 140 to 150 mmHG    10/23 visit  BP controlled at home per digit program   and taking zetia  Ekg NSR    01/24 visit   Some dizziness and improved after decreased BP medication.  EKG reviewed by myself today NSR Q wave in inferior leads and poor R progression on precordial leads. Old change  Leg swelling and left foot erythema. No pain and weakness    08/24 visit  Remains dizziness. Weight loss 10 lbs in 1yr started ozempic in 05/24 for DM  Sinus tachy now  Taking verapamil for migraine. Also helps for palpitation   Stopped metoprolol and Losartan.    11/24 visit  On verapamil 120 mg bid for migraine and palpitation,  BP stable. Improved positional dizziness and fall. No low BP   On compression socks    Interval history  Weight loss 50 lbs after ozempic Rx  Dizziness and BP soft.   EKG reviewed by myself today reveals NSR tachy  Took cardizem for HTN PVC and migraine  Some headche          History of Present Illness    CHIEF COMPLAINT:  - Kaylin presents with concerns about dizziness and low BP, possibly related to recent weight loss and medication adjustments.    HPI:  - Reports increased dizziness over the past few weeks and low BP recently, attributed to significant weight loss of 50 lbs since last visit in 08/2024 (approximately 40 lbs at last visit, now 50 lbs total)  - Began monitoring vital signs after taking BP medication and observed BP decrease significantly after taking the medicine  - Started checking BP and found it to be more normal the majority of the time, though sometimes still low  - Independently discontinued Verapamil for the past 2 weeks as she felt BP was normal without it, and reports feeling normal since  - Mentions headaches, sometimes migraines, with pain affecting her whole head and neck  - Recently prescribed Topamax to help with headaches but found it ineffective due to  "running out of magnesium, which she's been taking for years to help with headaches  - Reports minimal fluid retention in her ankles  - Takes rosuvastatin only when she notices fluid in her ankles, which occurs once every couple of weeks or so  - Currently taking Ozempic, which she reports has been effective in managing her A1C levels  - Weight has plateaued at 154 lbs    CARDIAC HISTORY:  - EKG ([id number]:00.000Z): possibly fast    MEDICATIONS:  - Verapamil 80 mg for HTN and palpitations, decreased from 120 mg, discontinued for the last 2 weeks, only taken a few times during this period  - Rosuvastatin PRN for fluid in ankles, frequency varies from weekly to once every few weeks  - Ozempic for A1C levels  - Topamax for headaches  - Magnesium supplement for headaches    TEST RESULTS:  - A1C: reported as "good" recently  - Sodium: recently high    MEDICAL HISTORY:  - Migraines    SOCIAL HISTORY:  - Occupation: Former medic, studied nursing  -  History: [place], [facility]          Past Medical History:   Diagnosis Date    Arthritis     Cataract     COVID-19 02/25/2021    Diabetes mellitus 1995    BS didn't check 09/13/2022    Diabetes mellitus, type 2     Fibromyalgia     General anesthetics causing adverse effect in therapeutic use     bradycardia     Hypertension     Migraines     Mitral valve prolapse     Osteoarthritis     Rotator cuff tear 04/01/2015       Past Surgical History:   Procedure Laterality Date    ARTHROSCOPY OF KNEE Right 03/10/2020    Procedure: ARTHROSCOPY, KNEE;  Surgeon: Les Schneider MD;  Location: [facility] OR;  Service: Orthopedics;  Laterality: Right;    CATARACT EXTRACTION W/  INTRAOCULAR LENS IMPLANT Left 07/19/2017    CATARACT EXTRACTION W/  INTRAOCULAR LENS IMPLANT Right 2017    CHOLECYSTECTOMY      CHONDROPLASTY OF KNEE Right 03/10/2020    Procedure: CHONDROPLASTY, KNEE;  Surgeon: Les Schneider MD;  Location: [facility] OR;  Service: Orthopedics;  " Laterality: Right;  Anterior compartment     COLONOSCOPY N/A 09/25/2018    Procedure: COLONOSCOPY;  Surgeon: Bethel Carmona MD;  Location: [facility] ENDO;  Service: Endoscopy;  Laterality: N/A;    EXCISION OF MEDIAL MENISCUS OF KNEE Right 03/10/2020    Procedure: MENISCECTOMY, KNEE, MEDIAL;  Surgeon: Les Schneider MD;  Location: [facility] OR;  Service: Orthopedics;  Laterality: Right;  Partial, Medial , Lateral     EYE SURGERY      INJECTION OF ANESTHETIC AGENT AROUND MEDIAL BRANCH NERVES INNERVATING LUMBAR FACET JOINT Bilateral 12/13/2022    Procedure: Bilateral L3-5 MBB;  Surgeon: Humberto Dumont MD;  Location: [facility] PAIN MGT;  Service: Pain Management;  Laterality: Bilateral;    INJECTION OF ANESTHETIC AGENT AROUND NERVE Right 08/08/2019    Procedure: Right Genicular nerve block with local;  Surgeon: Manpreet Dutta MD;  Location: [facility] PAIN MGT;  Service: Pain Management;  Laterality: Right;    INJECTION OF ANESTHETIC AGENT INTO SACROILIAC JOINT Bilateral 05/06/2020    Procedure: Bilateral Sacroiliac Joint Injection;  Surgeon: Manpreet Dutta MD;  Location: [facility] PAIN MGT;  Service: Pain Management;  Laterality: Bilateral;    INJECTION OF ANESTHETIC AGENT INTO SACROILIAC JOINT Bilateral 10/08/2020    Procedure: Bilateral SI and Bilateral GTB with RN IV sedation;  Surgeon: Manpreet Dutta MD;  Location: [facility] PAIN MGT;  Service: Pain Management;  Laterality: Bilateral;    INJECTION OF ANESTHETIC AGENT INTO SACROILIAC JOINT Bilateral 01/05/2021    Procedure: Bilateral BLOCK, SACROILIAC JOINT and Bilateral GTB witn RN IV sedation;  Surgeon: Daniel Burns MD;  Location: [facility] PAIN MGT;  Service: Pain Management;  Laterality: Bilateral;    INJECTION OF ANESTHETIC AGENT INTO SACROILIAC JOINT Bilateral 07/08/2021    Procedure: Bilateral BLOCK, SACROILIAC JOINT bilateral GTB RN IV sedation;  Surgeon: Manpreet Dutta MD;  Location: [facility] PAIN MGT;  Service: Pain Management;  Laterality: Bilateral;    INJECTION OF ANESTHETIC AGENT INTO  SACROILIAC JOINT Bilateral 03/01/2022    Procedure: Bilateral BLOCK, SACROILIAC JOINT and Bilateral GTB RN IV sedation;  Surgeon: Manpreet Dutta MD;  Location: [facility] PAIN MGT;  Service: Pain Management;  Laterality: Bilateral;    INJECTION OF ANESTHETIC AGENT INTO SACROILIAC JOINT Bilateral 10/10/2022    Procedure: Bilateral Sacroiliac Joint Injection;  Surgeon: Humberto Dumont MD;  Location: [facility] PAIN MGT;  Service: Pain Management;  Laterality: Bilateral;    INJECTION OF ANESTHETIC AGENT INTO SACROILIAC JOINT Bilateral 01/24/2023    Procedure: Bilateral Sacroiliac Joint Injection;  Surgeon: Humberto Dumont MD;  Location: [facility] PAIN MGT;  Service: Pain Management;  Laterality: Bilateral;    INJECTION OF ANESTHETIC AGENT INTO SACROILIAC JOINT Bilateral 06/21/2023    Procedure: Bilateral Sacroiliac Joint Injection;  Surgeon: Humberto Dumont MD;  Location: [facility] PAIN MGT;  Service: Pain Management;  Laterality: Bilateral;    INJECTION OF ANESTHETIC AGENT INTO SACROILIAC JOINT Bilateral 1/9/2024    Procedure: Bilateral SIJ Injection;  Surgeon: Humberto Dumont MD;  Location: [facility] PAIN MGT;  Service: Pain Management;  Laterality: Bilateral;    INJECTION OF JOINT Bilateral 09/05/2019    Procedure: Bilateral GT bursa injection;  Surgeon: Manpreet Dutta MD;  Location: [facility] PAIN MGT;  Service: Pain Management;  Laterality: Bilateral;    INJECTION OF JOINT Bilateral 05/06/2020    Procedure: Bilateral GT bursa injection;  Surgeon: Manpreet Dutta MD;  Location: [facility] PAIN MGT;  Service: Pain Management;  Laterality: Bilateral;    INJECTION OF JOINT Right 04/28/2022    Procedure: Injection, Joint Right Hip Injection RN IV sedation;  Surgeon: Manpreet Dutta MD;  Location: [facility] PAIN MGT;  Service: Pain Management;  Laterality: Right;    INJECTION OF JOINT Bilateral 10/10/2022    Procedure: Bilateral GT bursa injection with RN IV sedation;  Surgeon: Humberto Dumont MD;  Location: [facility] PAIN MGT;  Service: Pain Management;  Laterality: Bilateral;     INJECTION OF JOINT Bilateral 01/24/2023    Procedure: Bilateral GT bursa injection;  Surgeon: Humberto Dumont MD;  Location: [facility] PAIN MGT;  Service: Pain Management;  Laterality: Bilateral;    INJECTION OF JOINT Bilateral 05/23/2023    Procedure: Bilateral intraarticular knee injection with Synvisc-1; ;  Surgeon: Humberto Dumont MD;  Location: [facility] PAIN MGT;  Service: Pain Management;  Laterality: Bilateral;    INJECTION OF JOINT Bilateral 06/21/2023    Procedure: Bilateral GT bursa injection;  Surgeon: Humberto Dumont MD;  Location: [facility] PAIN MGT;  Service: Pain Management;  Laterality: Bilateral;    INJECTION OF JOINT Bilateral 1/9/2024    Procedure: Bilateral GTB injection;  Surgeon: Humberto Dumont MD;  Location: [facility] PAIN MGT;  Service: Pain Management;  Laterality: Bilateral;    INJECTION OF JOINT Bilateral 1/23/2024    Procedure: Bilateral intraarticular knee injection with Synvisc 1 ;  Surgeon: Humberto Dumont MD;  Location: [facility] PAIN MGT;  Service: Pain Management;  Laterality: Bilateral;    INJECTION OF JOINT Bilateral 7/23/2024    Procedure: Bilateral Synvisc Knee injection;  Surgeon: Humberto Dumont MD;  Location: [facility] PAIN MGT;  Service: Pain Management;  Laterality: Bilateral;    INJECTION, ALLOGRAFT, INTERVERTEBRAL DISC N/A 04/25/2023    Procedure: INJECTION,ALLOGRAFT,INTERVERTEBRAL DISC,1ST LEVEL: L5-S1;  Surgeon: Humberto Dumont MD;  Location: [facility] PAIN MGT;  Service: Pain Management;  Laterality: N/A;    INJECTION, ALLOGRAFT, INTERVERTEBRAL DISC N/A 05/09/2023    Procedure: INJECTION,ALLOGRAFT,INTERVERTEBRAL DISC,2nd LEVEL: L3-4;  Surgeon: Humberto Dumont MD;  Location: [facility] PAIN MGT;  Service: Pain Management;  Laterality: N/A;    INJECTION, ALLOGRAFT, INTERVERTEBRAL DISC N/A 4/22/2025    Procedure: INJECTION,ALLOGRAFT,INTERVERTEBRAL DISC,1ST LEVEL: L5-S1 Viadisc;  Surgeon: Humberto Dumont MD;  Location: [facility] PAIN MGT;  Service: Pain Management;  Laterality: N/A;    INJECTION, ALLOGRAFT,  INTERVERTEBRAL DISC N/A 4/29/2025    Procedure: INJECTION,ALLOGRAFT,INTERVERTEBRAL DISC,1ST LEVEL: L3-4 Viadisc;  Surgeon: Humberto Dumont MD;  Location: [facility] PAIN MGT;  Service: Pain Management;  Laterality: N/A;    INJECTION, SACROILIAC JOINT Bilateral 2/18/2025    Procedure: Bilateral SIJ + bilateral GT bursa injection;  Surgeon: Humberto Dumont MD;  Location: [facility] PAIN MGT;  Service: Pain Management;  Laterality: Bilateral;    KNEE ARTHROSCOPY Bilateral     LUMBAR PARAVERTEBRAL FACET JOINT NERVE BLOCK Bilateral 2/11/2025    Procedure: Bilateral Synvisc-One knee injection;  Surgeon: Humberto Dumont MD;  Location: [facility] PAIN MGT;  Service: Pain Management;  Laterality: Bilateral;    PARS PLANA VITRECTOMY W/ REPAIR OF MACULAR HOLE Left 02/22/2017    RADIOFREQUENCY THERMOCOAGULATION Left 07/11/2019    Procedure: Right SIJ RFA;  Surgeon: Manpreet Dutta MD;  Location: [facility] PAIN MGT;  Service: Pain Management;  Laterality: Left;    RADIOFREQUENCY THERMOCOAGULATION Right 07/25/2019    Procedure: Right SIJ RFA;  Surgeon: Manpreet Dutta MD;  Location: [facility] PAIN MGT;  Service: Pain Management;  Laterality: Right;    SELECTIVE INJECTION OF ANESTHETIC AGENT AROUND LUMBAR SPINAL NERVE ROOT BY TRANSFORAMINAL APPROACH Bilateral 4/9/2024    Procedure: Bilateral L4/5 TF JAMIN;  Surgeon: Humberto Dumont MD;  Location: [facility] PAIN MGT;  Service: Pain Management;  Laterality: Bilateral;    SELECTIVE INJECTION OF ANESTHETIC AGENT AROUND LUMBAR SPINAL NERVE ROOT BY TRANSFORAMINAL APPROACH Bilateral 9/17/2024    Procedure: Bilateral L4/5 TF JAMIN;  Surgeon: Humberto Dumont MD;  Location: [facility] PAIN MGT;  Service: Pain Management;  Laterality: Bilateral;    SHOULDER ARTHROSCOPY Right 06/04/2015       Social History[1]    Family History   Problem Relation Name Age of Onset    Heart disease Mother Beena Tuckerard Price         A-Fib    Glaucoma Mother Beena Thrasher     Cataracts Mother Beena Thrasher     Cancer Mother Beena Luke  Price         colon    Arthritis Mother Beena Thrasher         : 17    Depression Mother Beena Thrasher     Depression Brother Octavio & Gutierrez Thrasher     Birth defects Brother Octavio Thrasher         Cardiac    Heart disease Brother Octavio Thrasher         Heart Surgery  as 8 y.o.    Kidney disease Daughter Yolette Mcocllum         ESRD    Anesthesia problems Daughter Yolette Mccollum         cardiac arrest during nephrectomy    Birth defects Daughter Yolette Mccollum         Renal    Depression Daughter Yolette Mccollum     Learning disabilities Daughter Yolette Mccollum         Dyslexia, ADD    Depression Son Daniel & Brandon Mccollum     Learning disabilities Son Daniel & Brandon Mccollum         ADD & ADHD    Diabetes Maternal Aunt Nara Zamora          9/3/2023    Diabetes Maternal Uncle Madison Ti, Sr.          2019    Cancer Maternal Uncle Madison Ti, Sr.     Breast cancer Paternal Aunt      Diabetes Maternal Grandmother Vancourt Jaime Luke     Heart disease Maternal Grandmother Bonnie Jaime Luke         Congestive heart failure    Alcohol abuse Maternal Grandmother Bonnie Cervantesamy Luke         Death: 84    Depression Maternal Grandmother Bonnie Jaime Luke     Hypertension Maternal Grandmother Bonnie Jaime Luke     Cancer Maternal Grandmother Bonnie Jaime Luke     Arthritis Maternal Grandmother Bonnie Jaime Luke     Diabetes Maternal Grandfather Attila Luke     Cancer Maternal Grandfather Attila Luke     Alcohol abuse Maternal Grandfather Attila Wally Luke          1964    Asthma Son Brandon FREIRE    Objective:   Physical Exam    Lab Results   Component Value Date    CHOL 215 (H) 2025    CHOL 170 10/25/2024    CHOL 222 (H) 2024     Lab Results   Component Value Date    HDL 49 2025    HDL 49 10/25/2024    HDL 56 2024     Lab Results   Component Value Date    LDLCALC 136.2  "04/25/2025    LDLCALC 101.2 10/25/2024    LDLCALC 132.6 03/01/2024     Lab Results   Component Value Date    TRIG 149 04/25/2025    TRIG 99 10/25/2024    TRIG 167 (H) 03/01/2024     Lab Results   Component Value Date    CHOLHDL 22.8 04/25/2025    CHOLHDL 28.8 10/25/2024    CHOLHDL 25.2 03/01/2024       Chemistry        Component Value Date/Time     (H) 04/25/2025 1330     10/25/2024 1422    K 3.6 04/25/2025 1330    K 3.9 10/25/2024 1422     (H) 04/25/2025 1330     10/25/2024 1422    CO2 21 (L) 04/25/2025 1330    CO2 25 10/25/2024 1422    BUN 14 04/25/2025 1330    CREATININE 0.8 04/25/2025 1330     04/25/2025 1330     (H) 10/25/2024 1422        Component Value Date/Time    CALCIUM 9.5 04/25/2025 1330    CALCIUM 10.0 10/25/2024 1422    ALKPHOS 52 04/25/2025 1330    ALKPHOS 68 10/25/2024 1422    AST 17 04/25/2025 1330    AST 42 (H) 10/25/2024 1422    ALT 8 (L) 04/25/2025 1330    ALT 42 10/25/2024 1422    BILITOT 0.3 04/25/2025 1330    BILITOT 0.4 10/25/2024 1422    ESTGFRAFRICA >60.0 02/09/2022 1308    EGFRNONAA >60.0 02/09/2022 1308          Lab Results   Component Value Date    HGBA1C 6.0 (H) 04/25/2025     Lab Results   Component Value Date    TSH 0.638 04/25/2025     No results found for: "INR", "PROTIME"  Lab Results   Component Value Date    WBC 5.78 04/25/2025    HGB 13.2 04/25/2025    HCT 42.8 04/25/2025    MCV 96 04/25/2025     04/25/2025     BMP  Sodium   Date Value Ref Range Status   04/25/2025 147 (H) 136 - 145 mmol/L Final   10/25/2024 141 136 - 145 mmol/L Final     Potassium   Date Value Ref Range Status   04/25/2025 3.6 3.5 - 5.1 mmol/L Final   10/25/2024 3.9 3.5 - 5.1 mmol/L Final     Chloride   Date Value Ref Range Status   04/25/2025 113 (H) 95 - 110 mmol/L Final   10/25/2024 106 95 - 110 mmol/L Final     CO2   Date Value Ref Range Status   04/25/2025 21 (L) 23 - 29 mmol/L Final   10/25/2024 25 23 - 29 mmol/L Final     BUN   Date Value Ref Range Status "   04/25/2025 14 8 - 23 mg/dL Final     Creatinine   Date Value Ref Range Status   04/25/2025 0.8 0.5 - 1.4 mg/dL Final     Calcium   Date Value Ref Range Status   04/25/2025 9.5 8.7 - 10.5 mg/dL Final   10/25/2024 10.0 8.7 - 10.5 mg/dL Final     Anion Gap   Date Value Ref Range Status   04/25/2025 13 8 - 16 mmol/L Final     eGFR if    Date Value Ref Range Status   02/09/2022 >60.0 >60 mL/min/1.73 m^2 Final     eGFR if non    Date Value Ref Range Status   02/09/2022 >60.0 >60 mL/min/1.73 m^2 Final     Comment:     Calculation used to obtain the estimated glomerular filtration  rate (eGFR) is the CKD-EPI equation.        BNP  @LABRCNTIP(BNP,BNPTRIAGEBLO)@  @LABRCNTIP(troponini)@  CrCl cannot be calculated (Patient's most recent lab result is older than the maximum 7 days allowed.).  No results found in the last 24 hours.  No results found in the last 24 hours.  No results found in the last 24 hours.    Assessment:      1. Hypertension associated with diabetes    2. Severe obesity (BMI 35.0-39.9) with comorbidity    3. PVD (peripheral vascular disease)    4. PVC (premature ventricular contraction)    5. MVP (mitral valve prolapse)    6. Hyperlipidemia associated with type 2 diabetes mellitus    7. Type 2 diabetes mellitus with stage 3a chronic kidney disease, without long-term current use of insulin        Plan:     Assessment & Plan    IMPRESSION:  - Dizziness possibly related to low blood pressure.    HYPOTENSION:  - Discussed difference between types of sodium in the body (dietary intake vs. lab values).  - Gerleene to increase water intake and consider incorporating more salty foods in diet to help maintain blood pressure.    HYPERTENSION:  - Verapamil adjustment needed due to reported normal blood pressure readings - decreased from 80 mg to 40 mg twice daily. If dizziness persists, patient instructed to reduce Verapamil to 40 mg daily.    ABNORMAL WEIGHT LOSS:  - Continued Ozempic  at current dose.    LOCALIZED EDEMA:  - Continued Rosuvastatin to be taken only when fluid retention in ankles occurs.    FOLLOW-UP:  - Ordered labs in 6 weeks to reassess medication efficacy.  - Follow up in 6 weeks after labs.          Decrease cardizem to 40 mg bid  If remains on dizziness. Ok to hold it  Fall precaution          This note was generated with the assistance of ambient listening technology. Verbal consent was obtained by the patient and accompanying visitor(s) for the recording of patient appointment to facilitate this note. I attest to having reviewed and edited the generated note for accuracy, though some syntax or spelling errors may persist. Please contact the author of this note for any clarification.            [1]   Social History  Tobacco Use    Smoking status: Never    Smokeless tobacco: Never    Tobacco comments:     Never smoked   Substance Use Topics    Alcohol use: Not Currently    Drug use: No

## 2025-05-13 ENCOUNTER — OFFICE VISIT (OUTPATIENT)
Dept: OTOLARYNGOLOGY | Facility: CLINIC | Age: 80
End: 2025-05-13
Payer: MEDICARE

## 2025-05-13 ENCOUNTER — DOCUMENTATION ONLY (OUTPATIENT)
Dept: PAIN MEDICINE | Facility: CLINIC | Age: 80
End: 2025-05-13
Payer: MEDICARE

## 2025-05-13 DIAGNOSIS — R51.0 HEADACHE WITH ORTHOSTATIC COMPONENT, NOT ELSEWHERE CLASSIFIED: ICD-10-CM

## 2025-05-13 DIAGNOSIS — I95.1 ORTHOSTATIC HYPOTENSION: Primary | ICD-10-CM

## 2025-05-13 DIAGNOSIS — I10 ESSENTIAL (PRIMARY) HYPERTENSION: ICD-10-CM

## 2025-05-13 DIAGNOSIS — R42 DIZZINESS: ICD-10-CM

## 2025-05-13 PROCEDURE — 99214 OFFICE O/P EST MOD 30 MIN: CPT | Mod: S$PBB,,, | Performed by: OTOLARYNGOLOGY

## 2025-05-13 NOTE — PROGRESS NOTES
Referring Provider:    No referring provider defined for this encounter.  Subjective:   Patient: Kaylin Mccollum [id number], :1945   Visit date:2025 4:04 PM    Chief Complaint:  No chief complaint on file.    HPI:    Prior notes reviewed by myself.  Clinical documentation obtained by nursing staff reviewed.     History of Present Illness    CHIEF COMPLAINT:  80-year-old female presents today for dizziness and headaches.    DIZZINESS:  She experiences dizziness primarily with position changes, characterized as lightheadedness rather than vertigo. She reports feeling dizzy the entire day yesterday and denies any pre-syncopal symptoms during these episodes.    HEADACHES:  She reports frequent headaches over the past month associated with neck stiffness and generalized head pain. She manages symptoms with Tylenol and ice packs, requiring bed rest during episodes. She distinguishes these from migraines, noting the pain is not localized.    ENT:  She reports intermittent decreased hearing in left ear, particularly noticeable when lying on left side.    CARDIOVASCULAR:  She has a complex history of blood pressure management. Previously on four antihypertensive medications prescribed by her cardiologist over the past year, which caused significant orthostatic dizziness. Following a fall in  requiring ER evaluation, three medications were discontinued with physician approval. She has been on verapamil for approximately 30 years. Due to persistent dizziness with verapamil 120 mg and 80 mg, she independently discontinued the medication for 2-3 weeks. She was recently prescribed verapamil 40 mg.    MEDICAL HISTORY:  She reports 50-pound weight loss since August while taking Ozempic.    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      ROS:  General: -fever, -chills, -fatigue, -weight gain, +weight loss  Eyes:  -vision changes, -redness, -discharge  ENT: +ear pain, -nasal congestion, -sore throat, +hearing loss  Cardiovascular: -chest pain, -palpitations, -lower extremity edema  Respiratory: -cough, -shortness of breath  Gastrointestinal: -abdominal pain, -nausea, -vomiting, -diarrhea, -constipation, -blood in stool  Genitourinary: -dysuria, -hematuria, -frequency  Musculoskeletal: -joint pain, -muscle pain, +muscle cramps, +neck stiffness, +neck pain  Skin: -rash, -lesion  Neurological: +headache, +dizziness, -numbness, -tingling, +orthostatic symptoms, +lightheadedness  Psychiatric: -anxiety, -depression, -sleep difficulty         Objective:     Physical Exam:  Vitals:  There were no vitals taken for this visit.  General appearance:  Well developed, well nourished    Ears:  Otoscopy of external auditory canals and tympanic membranes was normal, clinical speech reception thresholds grossly intact, no mass/lesion of auricle.    Nose:  No masses/lesions of external nose, nasal mucosa, septum, and turbinates were within normal limits.    Mouth:  No mass/lesion of lips, teeth, gums, hard/soft palate, tongue, tonsils, or oropharynx.    Neck & Lymphatics:  No cervical lymphadenopathy, no neck mass/crepitus/ asymmetry, trachea is midline, no thyroid enlargement/tenderness/mass.        [x]  Data Reviewed:    Lab Results   Component Value Date    WBC 5.78 04/25/2025    HGB 13.2 04/25/2025    HCT 42.8 04/25/2025    MCV 96 04/25/2025    EOSINOPHIL 1.7 06/28/2024         Reviewed notes from Dr. Dumont    Assessment & Plan:   Orthostatic hypotension    Headache with orthostatic component, not elsewhere classified    Essential (primary) hypertension    Dizziness        Assessment & Plan    I95.1 Orthostatic hypotension  R51.0 Headache with orthostatic component, not elsewhere classified  I10 Essential (primary) hypertension      ORTHOSTATIC HYPOTENSION:  - Assessed dizziness and headaches, likely related to orthostatic hypotension.  -  BP reading of 106/66, indicating very low BP.  - Previous multiple BP medications may impact symptoms.  -Discussed options for vestibular workup if  controlling BP not effective      EAR PAIN AND HEARING LOSS:  - Evaluated potential connection between ear pain and headaches.   -offered to order audio but patient declined        Kamran Samuel M.D.  Department of Otolaryngology - Head & Neck Surgery  [address] [facility].  [place] [address]  P: [phone]  F: [phone]        DISCLAIMER: This note was prepared with [employer] voice recognition transcription software. Garbled syntax, mangled pronouns, and other bizarre constructions may be attributed to that software system. While efforts were made to correct any mistakes made by this voice recognition program, some errors and/or omissions may remain in the note that were missed when the note was originally created.     This note was generated with the assistance of ambient listening technology. Verbal consent was obtained by the patient and accompanying visitor(s) for the recording of patient appointment to facilitate this note. I attest to having reviewed and edited the generated note for accuracy, though some syntax or spelling errors may persist. Please contact the author of this note for any clarification.

## 2025-05-17 DIAGNOSIS — R51.9 HEADACHE, CHRONIC DAILY: ICD-10-CM

## 2025-05-18 PROBLEM — E11.8 CONTROLLED TYPE 2 DIABETES MELLITUS WITH COMPLICATION, WITHOUT LONG-TERM CURRENT USE OF INSULIN: Status: ACTIVE | Noted: 2017-10-25

## 2025-05-18 PROBLEM — K21.9 GASTROESOPHAGEAL REFLUX DISEASE WITHOUT ESOPHAGITIS: Status: ACTIVE | Noted: 2025-05-18

## 2025-05-19 NOTE — PROGRESS NOTES
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  PSYCH:  Mood and affect appropriate.  SKIN: Skin color & texture with no obvious abnormalities.    HEAD/FACE:  Normocephalic, atraumatic.    PULM:  No difficulty breathing. No nasal flaring. No obvious wheezing.  Patient performed SIJ testing:  - TTP over SI joint: Present, also over GTB bilaterally   - Minesh's/ Atif's: Positive    - Sacroiliac Distraction Test (anterior pressure): Positive  - Sacroiliac Compression Test (lateral pressure): Positive    EXTREMITIES: No obvious deformities. Moving all extremities well, appears to have symmetric strength throughout.  MUSCULOSKELETAL: No obvious atrophy abnormalities are noted.   GAIT: sitting.     Physical Exam: last in clinic visit:  General: alert and oriented, in no apparent distress.  Gait: normal gait.  Skin: no rashes, no discoloration, no obvious lesions  HEENT: normocephalic, atraumatic. Pupils equal and round.  Cardiovascular: no significant peripheral edema present.  Respiratory: without use of accessory muscles of respiration.    Musculoskeletal - Lumbar Spine:  - ROM fairly preserved   - Pain on flexion of lumbar spine: Absent   - Pain on extension of lumbar spine: Absent         - Lumbar facet loading: Absent   - TTP over the lumbar facet joints: Absent  - TTP over the lumbar paraspinals: Present   - TTP over the SI joints: Present  - TTP over GT bursa: Present, minimal   - Straight " Leg Raise: Negative  - CHERYLE: Present    Right Knee:  - TTP: Present over medial/ lateral joint line  - Pain with extension: Present  - Pain with flexion: Present  - Crepitus: Present     Neuro - Lower Extremities:  - BLE Strength: R/L: HF: 5/5, HE: 5/5, KF: 5/5; KE: 5/5; FE: 5/5; FF: 5/5  - Extremity Reflexes: Brisk and symmetric throughout  - Sensory: Sensation to light touch intact bilaterally      Psych:  Mood and affect is appropriate      Assessment:  Kaylin Mccollum is a 80 y.o. year old female who is presenting with       ICD-10-CM ICD-9-CM    1. Chronic migraine without aura, with intractable migraine, so stated, with status migrainosus  G43.711 346.73 onabotulinumtoxina injection 100 Units      onabotulinumtoxina injection 100 Units      Prior authorization Order      2. Cervical spondylosis  M47.812 721.0       3. Primary osteoarthritis of both knees  M17.0 715.16       4. Painful diabetic neuropathy  E11.40 250.60      357.2           Plan:  1. Interventional:     Botox Treatments:  -Botox 05/20/2025    - Schedule for Botox Treatment for chronic headache treatment in 3 mo.  Patient is a candidate for Botox as they have migraine headaches exceeding 15 days out of the month and has failed to have improvement with numerous prior agents including:      -Preventative:  -Beta Blockers/ Calcium channel blockers: Verapamil  -Adjuvant: Savella    -Abortive:  -Anti-inflammatories: ASA, Ibuprofen, Naproxen, Excedrin migraine, Tylenol, Toradol  -Ergots: Cafergot   -Barbituates: Fiorinal   -Serotonin Agonists: Imitrex  -Selective serotonin receptor agonists: Zomig    Pre-Op Diagnosis: chronic intractable migraine  Post-Op Diagnosis:  Same    Procedure:  Botox injections for headache  Anticipated Botox mapping:        - muscles:  5 units bilaterally, 10 units total  -Procerus muscle: 5 units  -Frontalis muscle:  4 sites, 5 units each, 20 units total  -Temporalis muscle: 5 units , 4 sites bilaterally, 20  units total  -Occipitalis muscle: 5 units, 3 sites bilaterally, 30 units total  -Trapezius muscles:  5 units, 3 sites bilaterally, 30 units total  -cervical paraspinous:  5 units, 2 sites bilaterally, 20 units total    Total: at least 135 Units  Waste: 65 Units     - Scheduled for in clinic QUTENZA® (capsaicin) 8% topical system (4 patches) for diabetic peripheral neuropathy (DPN) of the feet.   Patient has failed to have improvement with 2 classes of neuropathic medications including membrane stabilizing agents such as Lyrica, Gralise, Gabapentin and Cymbalta, topical agents such as compound cream and capsaicin, anti-inflammatory (naproxen). Explained the risks and benefits of the procedure in detail with the patient in clinic along with alternative treatment options, and the patient elected to pursue the intervention at this time.      Anticoagulation:  None, no anticoagulation  - [place]  reviewed and appropriate.    2. Pharmacologic:   -Continue Gabapentin 300mg QAM/ 300mg at lunch/ 600mg QHS.  We have previously reviewed potential side effects of this medication including daytime somnolence, weight gain and peripheral edema.    -Continue Savella 100mg BID - as this tremendously helps with symptoms of fibromyalgia.  We have previously discussed that Savella is FDA approved and has demonstrated improvement in multiple measures including pain, global impression of change in physical function.  We have discussed that the most frequent side effects of this medication can include nausea, constipation, vomiting, dry mouth, flushing or tachycardia.      -Continue Celebrex 200mg BID PRN - from Orthopedics.  We have previously discussed potential deleterious side effects of NSAIDs on the cardiovascular, gastrointestinal and renal systems. We have discussed judicious use of this medication.      3. Rehabilitative:   -We discussed continuing at home physician directed physical therapy to help manage the patient/s painful  condition. The patient was counseled that muscle strengthening will improve the long term prognosis in regards to pain and may also help increase range of motion and mobility.     4. Diagnostic: - Reviewed relevant imaging (MRI Lumbar Spine, x-ray cervical spine) and answered patient's questions.    5. Consult:   -Dr. Schneider for knee and shoulder pain PRN  -Rheumatology: Fibromyalgia  -PCP: Dr. Olvera:  Follow-up for potential renal insufficiency    6. Follow up:    In clinic Botox treatment  in 3 mo for headaches   In clinic Qutenza patch treatment         - Patient Questions: Answered all of the patient's questions regarding diagnosis, therapy, and treatment.    - This condition does not require this patient to take time off of work, and the primary goal of our Pain Management services is to improve the patient's functional capacity.   - I discussed the risks, benefits, and alternatives to potential treatment options. All questions and concerns were fully addressed today in clinic.     Visit today included increased complexity associated with the care of the episodic problem of chronic pain which was addressed and continue to manage the longitudinal care of the patient due to the serious and/or complex managed problem(s) listed above.      Humberto Dumont MD  [facility]    Disclaimer:  This note was prepared using voice recognition system and is likely to have sound alike errors that may have been overlooked even after proof reading.  Please call me with any questions.

## 2025-05-19 NOTE — TELEPHONE ENCOUNTER
Pt is requesting for a refill of: topiramate (TOPAMAX) 25 MG tablet   Last filled:4/03/25   Last encounter: 3/20/25   Up coming apt: 5/20/25   Ph[id number]

## 2025-05-20 ENCOUNTER — OFFICE VISIT (OUTPATIENT)
Dept: PAIN MEDICINE | Facility: CLINIC | Age: 80
End: 2025-05-20
Payer: MEDICARE

## 2025-05-20 VITALS
SYSTOLIC BLOOD PRESSURE: 140 MMHG | BODY MASS INDEX: 23.99 KG/M2 | WEIGHT: 158.31 LBS | DIASTOLIC BLOOD PRESSURE: 79 MMHG | HEIGHT: 68 IN | HEART RATE: 106 BPM

## 2025-05-20 DIAGNOSIS — E11.40 PAINFUL DIABETIC NEUROPATHY: ICD-10-CM

## 2025-05-20 DIAGNOSIS — M47.812 CERVICAL SPONDYLOSIS: ICD-10-CM

## 2025-05-20 DIAGNOSIS — G43.711 CHRONIC MIGRAINE WITHOUT AURA, WITH INTRACTABLE MIGRAINE, SO STATED, WITH STATUS MIGRAINOSUS: Primary | ICD-10-CM

## 2025-05-20 DIAGNOSIS — M17.0 PRIMARY OSTEOARTHRITIS OF BOTH KNEES: ICD-10-CM

## 2025-05-20 PROCEDURE — 99999PBSHW PR PBB SHADOW TECHNICAL ONLY FILED TO HB: Mod: JZ,PBBFAC,,

## 2025-05-20 PROCEDURE — 64615 CHEMODENERV MUSC MIGRAINE: CPT | Mod: PBBFAC | Performed by: ANESTHESIOLOGY

## 2025-05-20 PROCEDURE — 64612 DESTROY NERVE FACE MUSCLE: CPT | Mod: PBBFAC | Performed by: ANESTHESIOLOGY

## 2025-05-20 PROCEDURE — 99215 OFFICE O/P EST HI 40 MIN: CPT | Mod: PBBFAC | Performed by: ANESTHESIOLOGY

## 2025-05-20 PROCEDURE — 99214 OFFICE O/P EST MOD 30 MIN: CPT | Mod: 25,S$PBB,, | Performed by: ANESTHESIOLOGY

## 2025-05-20 PROCEDURE — 64615 CHEMODENERV MUSC MIGRAINE: CPT | Mod: S$PBB,,, | Performed by: ANESTHESIOLOGY

## 2025-05-20 PROCEDURE — 99999 PR PBB SHADOW E&M-EST. PATIENT-LVL V: CPT | Mod: PBBFAC,,, | Performed by: ANESTHESIOLOGY

## 2025-05-20 RX ADMIN — ONABOTULINUMTOXINA 100 UNITS: 100 INJECTION, POWDER, LYOPHILIZED, FOR SOLUTION INTRADERMAL; INTRAMUSCULAR at 01:05

## 2025-05-21 RX ORDER — TOPIRAMATE 25 MG/1
25 TABLET, FILM COATED ORAL 2 TIMES DAILY
Qty: 60 TABLET | Refills: 0 | Status: SHIPPED | OUTPATIENT
Start: 2025-05-21

## 2025-05-23 DIAGNOSIS — M79.7 FIBROMYALGIA: Primary | ICD-10-CM

## 2025-05-23 NOTE — TELEPHONE ENCOUNTER
Good Morning/Afternoon,     Pt is requesting for a refill of: gabapentin (NEURONTIN) 300 MG capsule   Last filed: 03/18/2025  Last encounter: 05/20/2025  Up coming apt: 06/03/2025  Pharmacy: [facility] #[id number] - [place], [place] - [address] AT [facility]     Medical Assistant  Glory ALFARO ([facility], Interventional Pain Management Surgery Scheduler)       Good Morning/Afternoon,     Pt is requesting for a refill of: milnacipran (SAVELLA) 100 mg Tab   Last filed: 03/18/2025  Last encounter:05/20/2025  Up coming apt: 06/03/2025  Pharmacy: [facility] #[id number] - [place], [place] - [address] AT [facility]     Medical Assistant  Glory [facility] Scheduler)

## 2025-05-26 RX ORDER — GABAPENTIN 300 MG/1
300 CAPSULE ORAL 3 TIMES DAILY
Qty: 90 CAPSULE | Refills: 2 | Status: SHIPPED | OUTPATIENT
Start: 2025-05-26

## 2025-05-27 ENCOUNTER — TELEPHONE (OUTPATIENT)
Dept: PAIN MEDICINE | Facility: CLINIC | Age: 80
End: 2025-05-27
Payer: MEDICARE

## 2025-05-27 NOTE — TELEPHONE ENCOUNTER
----- Message from Jamee sent at 5/27/2025  9:50 AM CDT -----  Type: Patient callWho called: Patient Does the patient know what this is regarding? Requesting a call back in regards to retuning a call stating appt needs to be rescheduled ; please advise Would the patient rather a call back or response via [facility]? [facility] back number: [phone] information:

## 2025-05-29 ENCOUNTER — TELEPHONE (OUTPATIENT)
Dept: PAIN MEDICINE | Facility: CLINIC | Age: 80
End: 2025-05-29
Payer: MEDICARE

## 2025-05-29 NOTE — TELEPHONE ENCOUNTER
Call patient to inform them that they can  their medication from the pharmacy. P.t  understand. All question answered.          APER

## 2025-05-29 NOTE — TELEPHONE ENCOUNTER
----- Message from Nubia sent at 5/29/2025  9:04 AM CDT -----  Contact: Patient, [phone]  Type: Rx Clarification/ Additional Information/ QuestionsMedication: milnacipran (SAVELLA) 100 mg TabWhat questions do you have about the medication, if any? Needs a prior authorization. Please advise. Thanks.What information is needed?Pharmacy number: [facility] #[id number] - [place] - [address] [facility] [id number] [facility] [address]: [phone] Fax: [phone] Pharmacy Contact Name:Comments:

## 2025-06-03 ENCOUNTER — OFFICE VISIT (OUTPATIENT)
Dept: PAIN MEDICINE | Facility: CLINIC | Age: 80
End: 2025-06-03
Payer: MEDICARE

## 2025-06-03 VITALS
RESPIRATION RATE: 17 BRPM | HEIGHT: 68 IN | HEART RATE: 110 BPM | DIASTOLIC BLOOD PRESSURE: 70 MMHG | SYSTOLIC BLOOD PRESSURE: 132 MMHG | WEIGHT: 166.69 LBS | BODY MASS INDEX: 25.26 KG/M2

## 2025-06-03 DIAGNOSIS — M79.7 FIBROMYALGIA: ICD-10-CM

## 2025-06-03 DIAGNOSIS — M47.816 LUMBAR SPONDYLOSIS: ICD-10-CM

## 2025-06-03 DIAGNOSIS — E11.40 PAINFUL DIABETIC NEUROPATHY: Primary | ICD-10-CM

## 2025-06-03 DIAGNOSIS — R26.89 BALANCE PROBLEM: ICD-10-CM

## 2025-06-03 PROCEDURE — 99499 UNLISTED E&M SERVICE: CPT | Mod: S$PBB,,, | Performed by: PHYSICIAN ASSISTANT

## 2025-06-03 PROCEDURE — 99999PBSHW PR PBB SHADOW TECHNICAL ONLY FILED TO HB: Mod: PBBFAC,,,

## 2025-06-03 PROCEDURE — 64999 UNLISTED PX NERVOUS SYSTEM: CPT | Mod: PBBFAC | Performed by: PHYSICIAN ASSISTANT

## 2025-06-03 PROCEDURE — 99215 OFFICE O/P EST HI 40 MIN: CPT | Mod: PBBFAC | Performed by: PHYSICIAN ASSISTANT

## 2025-06-03 PROCEDURE — 99999 PR PBB SHADOW E&M-EST. PATIENT-LVL V: CPT | Mod: PBBFAC,,, | Performed by: PHYSICIAN ASSISTANT

## 2025-06-03 PROCEDURE — 64999 UNLISTED PX NERVOUS SYSTEM: CPT | Mod: S$PBB,,, | Performed by: PHYSICIAN ASSISTANT

## 2025-06-12 DIAGNOSIS — M94.261 CHONDROMALACIA, RIGHT KNEE: ICD-10-CM

## 2025-06-12 DIAGNOSIS — M77.8 LEFT SHOULDER TENDINITIS: ICD-10-CM

## 2025-06-12 RX ORDER — CELECOXIB 200 MG/1
CAPSULE ORAL
Qty: 180 CAPSULE | Refills: 0 | Status: SHIPPED | OUTPATIENT
Start: 2025-06-12

## 2025-07-08 ENCOUNTER — CLINICAL SUPPORT (OUTPATIENT)
Dept: REHABILITATION | Facility: HOSPITAL | Age: 80
End: 2025-07-08
Payer: MEDICARE

## 2025-07-08 DIAGNOSIS — M79.7 FIBROMYALGIA: ICD-10-CM

## 2025-07-08 DIAGNOSIS — M47.816 LUMBAR SPONDYLOSIS: ICD-10-CM

## 2025-07-08 DIAGNOSIS — R26.89 BALANCE PROBLEM: ICD-10-CM

## 2025-07-08 PROCEDURE — 97112 NEUROMUSCULAR REEDUCATION: CPT | Mod: PN

## 2025-07-08 PROCEDURE — 97161 PT EVAL LOW COMPLEX 20 MIN: CPT | Mod: PN

## 2025-07-08 NOTE — PROGRESS NOTES
Outpatient Rehab    Physical Therapy Evaluation    Patient Name: Kaylin Mccollum  MRN: [id number]  YOB: 1945  Encounter Date: 7/8/2025    Therapy Diagnosis:   Encounter Diagnoses   Name Primary?    Lumbar spondylosis     Balance problem     Fibromyalgia      Physician: Kristin Mccall PA*    Physician Orders: Eval and Treat  Medical Diagnosis: Lumbar spondylosis  Balance problem  Fibromyalgia  Surgical Diagnosis: Not applicable for this Episode   Surgical Date: Not applicable for this Episode  Days Since Last Surgery: Not applicable for this Episode    Visit # / Visits Authorized:  1 / 1  Insurance Authorization Period: 6/3/2025 to 6/3/2026  Date of Evaluation: 7/8/2025  Plan of Care Certification: 7/8/2025 to 9/2/2025     Time In: 0830   Time Out: 0915  Total Time (in minutes): 45   Total Billable Time (in minutes):      Intake Outcome Measure for FOTO Survey    Therapist reviewed FOTO scores for Kaylin Mccollum on 7/8/2025.   FOTO report - see Media section or FOTO account episode details.     Intake Score: 54%    Precautions:       Subjective   History of Present Illness  Kaylin is a 80 y.o. female who reports to physical therapy with a chief concern of Decreased balance with falls, knee pain, and low back pain.                 History of Present Condition/Illness: Patient states that she feels pretty good after injections since the first of the year.   Reports when she is lifting she will have a fibromyalgia flare up.  Reports decreased balance and falling about 3 weeks ago.    Pain     Patient reports a current pain level of 2/10. Pain at best is reported as 1/10. Pain at worst is reported as 9/10.               Past Medical History/Physical Systems Review:   Kaylin Mccollum  has a past medical history of Arthritis, Cataract, COVID-19, Diabetes mellitus, Diabetes mellitus, type 2, Fibromyalgia, General anesthetics causing adverse effect in therapeutic use, Hypertension, Migraines, Mitral  valve prolapse, Osteoarthritis, and Rotator cuff tear.    Kaylin Mccollum  has a past surgical history that includes Cholecystectomy; Knee arthroscopy (Bilateral); Shoulder arthroscopy (Right, 06/04/2015); Pars plana vitrectomy w/ repair of macular hole (Left, 02/22/2017); Colonoscopy (N/A, 09/25/2018); Cataract extraction w/  intraocular lens implant (Left, 07/19/2017); Cataract extraction w/  intraocular lens implant (Right, 2017); Radiofrequency thermocoagulation (Left, 07/11/2019); Radiofrequency thermocoagulation (Right, 07/25/2019); Injection of anesthetic agent around nerve (Right, 08/08/2019); Injection of joint (Bilateral, 09/05/2019); Eye surgery; Arthroscopy of knee (Right, 03/10/2020); Chondroplasty of knee (Right, 03/10/2020); Excision of medial meniscus of knee (Right, 03/10/2020); Injection of joint (Bilateral, 05/06/2020); Injection of anesthetic agent into sacroiliac joint (Bilateral, 05/06/2020); Injection of anesthetic agent into sacroiliac joint (Bilateral, 10/08/2020); Injection of anesthetic agent into sacroiliac joint (Bilateral, 01/05/2021); Injection of anesthetic agent into sacroiliac joint (Bilateral, 07/08/2021); Injection of anesthetic agent into sacroiliac joint (Bilateral, 03/01/2022); Injection of joint (Right, 04/28/2022); Injection of joint (Bilateral, 10/10/2022); Injection of anesthetic agent into sacroiliac joint (Bilateral, 10/10/2022); Injection of anesthetic agent around medial branch nerves innervating lumbar facet joint (Bilateral, 12/13/2022); Injection of joint (Bilateral, 01/24/2023); Injection of anesthetic agent into sacroiliac joint (Bilateral, 01/24/2023); injection, allograft, intervertebral disc (N/A, 04/25/2023); injection, allograft, intervertebral disc (N/A, 05/09/2023); Injection of joint (Bilateral, 05/23/2023); Injection of joint (Bilateral, 06/21/2023); Injection of anesthetic agent into sacroiliac joint (Bilateral, 06/21/2023); Injection of anesthetic agent  "into sacroiliac joint (Bilateral, 1/9/2024); Injection of joint (Bilateral, 1/9/2024); Injection of joint (Bilateral, 1/23/2024); Selective injection of anesthetic agent around lumbar spinal nerve root by transforaminal approach (Bilateral, 4/9/2024); Injection of joint (Bilateral, 7/23/2024); Selective injection of anesthetic agent around lumbar spinal nerve root by transforaminal approach (Bilateral, 9/17/2024); Lumbar paravertebral facet joint nerve block (Bilateral, 2/11/2025); injection, sacroiliac joint (Bilateral, 2/18/2025); injection, allograft, intervertebral disc (N/A, 4/22/2025); and injection, allograft, intervertebral disc (N/A, 4/29/2025).    Kaylin has a current medication list which includes the following prescription(s): b complex vitamins, biotin, celecoxib, clotrimazole-betamethasone 1-0.05%, diclofenac sodium, empagliflozin, ezetimibe, fluoxetine, fluticasone propionate, furosemide, gabapentin, metformin, milnacipran, omeprazole, potassium chloride sa, pulse oximeter, abrysvo (pf), ozempic, topiramate, triamcinolone acetonide 0.025%, verapamil, and vitamin d.    Review of patient's allergies indicates:   Allergen Reactions    Demerol [meperidine] Other (See Comments)     Burning when adm IV  Able to tolerate IM, "Turned the veins in my hand purple."    Latex, natural rubber Other (See Comments)     "Burns my skin",  Symptoms get worse the longer she is exposed    Zocor [simvastatin] Other (See Comments)     Tightening of muscles    Statins-hmg-coa reductase inhibitors Other (See Comments)     myopathy    Sulfa (sulfonamide antibiotics)      Blurred vision    Nickel Rash     Pt states she had sores to ear lobes from nickel in earrings         Objective       Hip Range of Motion   Right Hip   Active (deg) Passive (deg) Pain   Flexion 115       Extension         ABduction         ADduction             Left Hip   Active (deg) Passive (deg) Pain   Flexion 115       Extension         ABduction       "   ADduction                  Knee Range of Motion   Right Knee   Active (deg) Passive (deg) Pain   Flexion 100       Extension -10           Left Knee   Active (deg) Passive (deg) Pain   Flexion 130       Extension 0                 Hip Strength - Planes of Motion   Right Strength Right Pain Left Strength Left  Pain   Flexion (L2) 4-   4-     Extension 4-   4-     ABduction 4-   4-         Knee Strength   Right Strength Right Pain Left Strength Left  Pain   Flexion (S2) 4-   4-     Prone Flexion           Extension (L3) 4   5           Timed Up & Go (TUG)  Time: 12 seconds     An older adult who takes >=12 seconds to complete the TUG is at risk for falling.       Treatment:  Balance/Neuromuscular Re-Education  NMR 1: Bridges  NMR 2: Supine clamshell with resistance  NMR 3: Standing hip extension with counter top support    Time Entry(in minutes):  PT Evaluation (Low) Time Entry: 35  Neuromuscular Re-Education Time Entry: 10    Assessment & Plan   Assessment  Functional Limitations: Activity tolerance, Completing self-care activities, Decreased ambulation distance/endurance, Functional mobility, Gait limitations, Getting off the floor, Increased risk of fall  Impairments: Impaired balance, Impaired physical strength    Patient Goal for Therapy (PT): Improve strength and balance  Prognosis: Good  Assessment Details: Single leg stance 12 right 15 seconds left LE    Plan  From a physical therapy perspective, the patient would benefit from: Skilled Rehab Services    Planned therapy interventions include: Therapeutic exercise, Therapeutic activities, Neuromuscular re-education, Manual therapy, ADLs/IADLs, and Gait training.    Planned modalities to include: Biofeedback, Electrical stimulation - attended, Electrical stimulation - passive/unattended, Thermotherapy (hot pack), and Cryotherapy (cold pack).        Visit Frequency: 2 times Per Week for 10 Weeks.       This plan was discussed with Patient.   Discussion  participants: Agreed Upon Plan of Care  Plan details: Frequency and duration of treatment to be adjusted as needed          The patient's spiritual, cultural, and educational needs were considered, and the patient is agreeable to the plan of care and goals.           Goals:   Active       Balance       The patient will improve TUG score to 9 seconds to indicate improved functional stability in gait       Start:  07/08/25    Expected End:  09/02/25               Functional outcome       Pt will improve FOTO disability score to 59% disability or less in order to improve overall QOL and return to PLOF.         Start:  07/08/25    Expected End:  09/02/25            Patient stated goal: Improve strength and balance        Start:  07/08/25    Expected End:  09/02/25               Home program performance       The patient will be independent in homre program.       Start:  07/08/25    Expected End:  08/05/25               Pain       Patient will report pain of 5/10 demonstrating a reduction of overall pain       Start:  07/08/25    Expected End:  08/05/25               Pain       Patient will report pain of 3/10 demonstrating a reduction of overall pain       Start:  07/08/25    Expected End:  09/02/25               Strength       Patient will achieve bilateral hip strength of 4/5       Start:  07/08/25    Expected End:  09/02/25                Jose Roberto Marte, PT

## 2025-07-21 ENCOUNTER — OFFICE VISIT (OUTPATIENT)
Dept: CARDIOLOGY | Facility: CLINIC | Age: 80
End: 2025-07-21
Payer: MEDICARE

## 2025-07-21 VITALS
BODY MASS INDEX: 25.38 KG/M2 | SYSTOLIC BLOOD PRESSURE: 122 MMHG | OXYGEN SATURATION: 97 % | WEIGHT: 166.88 LBS | HEART RATE: 98 BPM | DIASTOLIC BLOOD PRESSURE: 60 MMHG

## 2025-07-21 DIAGNOSIS — I77.89 OTHER SPECIFIED DISORDERS OF ARTERIES AND ARTERIOLES: ICD-10-CM

## 2025-07-21 DIAGNOSIS — I15.2 HYPERTENSION ASSOCIATED WITH DIABETES: Chronic | ICD-10-CM

## 2025-07-21 DIAGNOSIS — E78.5 HYPERLIPIDEMIA ASSOCIATED WITH TYPE 2 DIABETES MELLITUS: ICD-10-CM

## 2025-07-21 DIAGNOSIS — I34.1 MVP (MITRAL VALVE PROLAPSE): ICD-10-CM

## 2025-07-21 DIAGNOSIS — M54.16 LUMBAR RADICULOPATHY: Primary | ICD-10-CM

## 2025-07-21 DIAGNOSIS — E11.69 HYPERLIPIDEMIA ASSOCIATED WITH TYPE 2 DIABETES MELLITUS: ICD-10-CM

## 2025-07-21 DIAGNOSIS — I49.3 PVC (PREMATURE VENTRICULAR CONTRACTION): ICD-10-CM

## 2025-07-21 DIAGNOSIS — E11.59 HYPERTENSION ASSOCIATED WITH DIABETES: Chronic | ICD-10-CM

## 2025-07-21 DIAGNOSIS — I73.9 PVD (PERIPHERAL VASCULAR DISEASE): ICD-10-CM

## 2025-07-21 DIAGNOSIS — E11.8 CONTROLLED TYPE 2 DIABETES MELLITUS WITH COMPLICATION, WITHOUT LONG-TERM CURRENT USE OF INSULIN: ICD-10-CM

## 2025-07-21 PROCEDURE — 99214 OFFICE O/P EST MOD 30 MIN: CPT | Mod: S$PBB,,, | Performed by: INTERNAL MEDICINE

## 2025-07-21 PROCEDURE — 99999 PR PBB SHADOW E&M-EST. PATIENT-LVL V: CPT | Mod: PBBFAC,,, | Performed by: INTERNAL MEDICINE

## 2025-07-21 PROCEDURE — 99215 OFFICE O/P EST HI 40 MIN: CPT | Mod: PBBFAC | Performed by: INTERNAL MEDICINE

## 2025-07-21 RX ORDER — VERAPAMIL HYDROCHLORIDE 40 MG/1
40 TABLET ORAL 3 TIMES DAILY
Qty: 270 TABLET | Refills: 2 | Status: SHIPPED | OUTPATIENT
Start: 2025-07-21 | End: 2025-07-29 | Stop reason: SDUPTHER

## 2025-07-21 NOTE — PROGRESS NOTES
Subjective:   Patient ID:  Kaylin Mccollum is a 80 y.o. female who presents for follow up of Dizziness      79 yo female, 3 months f/u  PMH h/o MVP, HTN DM 20 yrs. OH, vasovagal reaction, H/o PVCs. Statin intolerance, Fibromyalgia H/o COVID 19 in  quarantine at home. (sinus headache)  On verapamil since she was 54.   Palpitation and dizziness controlled by Verapamil  Occasional chest pain, with sharp pain.   C/o dry mouth  Chronic fatigue from fibra myalgia. Some shoulder pain from the fall  Mother had afib. Father healthy. Young brother had heart procedure at age of 6.  Not on regular exercise. No smoking/drinking  ekg today NSR and poor R progression on repcoridal leads    05/2021 visit   ECHO mild MR and normal EF. Stress test no ischemia; ZIOPATCH showed rare AT longest 13 beats.  Still dry mouth and occasional pinching chest pain  Headache chronic due to migraine  Chronic fatigue, mild exercise endurance  BP and LDL controlled. A1C 6.2 at OSH in      visit  PVD swelling of the leg controlled by lasix.   BP controlled  Still fatigue and weakness   ekg NSR. A1C 7.6 BP controled     visit  C/o chest tightness when walked from the parking to the office today. Resolved after rest.  Walking and shopping could cause the muscle pain and fatigue. '  Chronic lower back injection for the pain. occasionally faint and clammy  No palpitation and leg swelling      visit  Had steroid injection of the hips. Palpitation and controlled by verapamil. BRUNO with climbing the stairs. No dizziness and faint.    04/23 visit  Rx of fibromyalgia working but copay elevated and will wean off now  Occasional palpitation at night  No dizziness faint dyspnea. Chronic mild leg swelling  Ekg NSR     stress echo normal EF and mld MR. Normal stress test; BARDY unremarkable     07/23 visit  Feet and leg swelling improved but 2+ some varicose on the feet skin. No palpitation dizziness  faint  Reviewed digit HTN and BP up to 140 to 150 mmHG    10/23 visit  BP controlled at home per digit program   and taking zetia  Ekg NSR    01/24 visit   Some dizziness and improved after decreased BP medication.  EKG reviewed by myself today NSR Q wave in inferior leads and poor R progression on precordial leads. Old change  Leg swelling and left foot erythema. No pain and weakness    08/24 visit  Remains dizziness. Weight loss 10 lbs in 1yr started ozempic in 05/24 for DM  Sinus tachy now  Taking verapamil for migraine. Also helps for palpitation   Stopped metoprolol and Losartan.    11/24 visit  On verapamil 120 mg bid for migraine and palpitation,  BP stable. Improved positional dizziness and fall. No low BP   On compression socks    05/25 visit  Weight loss 50 lbs after ozempic Rx  Dizziness and BP soft.   EKG reviewed by myself today reveals NSR tachy  Took cardizem for HTN PVC and migraine  Some headache    Interval history  Held ozempic and vomiting resolved.  BP controlled now and dizziness when turning   11/24 echo EF nml                History of Present Illness    CHIEF COMPLAINT:  - Kaylin presents for follow-up to discuss recent changes in medication, BP management, and ongoing dizziness.    HPI:  - Discontinued Ozempic due to side effects: weight loss, vomiting, and dizziness  - Nausea and vomiting resolved within 3 weeks  - Experiencing issues with BP control  - Noticed elevated BP for 4-5 days due to forgetting to take Verapamil  - BP improved upon resuming Verapamil  - Experiencing dizziness, particularly when turning, for 1 year  - Dizziness described as a sensation of imbalance  - Fell in the bathroom 3-4 weeks ago due to losing balance while turning, landing on knees without hitting head  - Being monitored remotely for BP and glucose  - Experienced increased BP after consuming high-sodium powdered gravy, leading to increased vigilance about sodium content in diet  - Denies any current  nausea or vomiting  - Denies any specific medical diagnoses    CARDIAC HISTORY:  - Carotid US ordered to evaluate dizziness    MEDICATIONS:  - Verapamil 40 mg 2x daily for HTN  - Zetia for cholesterol  - Discontinued Ozempic due to side effects of weight loss, vomiting, and dizziness  - Jardiance for diabetes  - Lasix PRN    TEST RESULTS:  - A1C: recent, 6.0  - LDL Cholesterol: recent, 136    MEDICAL HISTORY:  - HTN  - Diabetes  - PVD (PVD)          Past Medical History:   Diagnosis Date    Arthritis     Cataract     COVID-19 02/25/2021    Diabetes mellitus 1995    BS didn't check 09/13/2022    Diabetes mellitus, type 2     Fibromyalgia     General anesthetics causing adverse effect in therapeutic use     bradycardia     Hypertension     Migraines     Mitral valve prolapse     Osteoarthritis     Rotator cuff tear 04/01/2015       Past Surgical History:   Procedure Laterality Date    ARTHROSCOPY OF KNEE Right 03/10/2020    Procedure: ARTHROSCOPY, KNEE;  Surgeon: Les Schneider MD;  Location: [facility] OR;  Service: Orthopedics;  Laterality: Right;    CATARACT EXTRACTION W/  INTRAOCULAR LENS IMPLANT Left 07/19/2017    CATARACT EXTRACTION W/  INTRAOCULAR LENS IMPLANT Right 2017    CHOLECYSTECTOMY      CHONDROPLASTY OF KNEE Right 03/10/2020    Procedure: CHONDROPLASTY, KNEE;  Surgeon: Les Schneider MD;  Location: [facility] OR;  Service: Orthopedics;  Laterality: Right;  Anterior compartment     COLONOSCOPY N/A 09/25/2018    Procedure: COLONOSCOPY;  Surgeon: Bethel Carmona MD;  Location: [facility] ENDO;  Service: Endoscopy;  Laterality: N/A;    EXCISION OF MEDIAL MENISCUS OF KNEE Right 03/10/2020    Procedure: MENISCECTOMY, KNEE, MEDIAL;  Surgeon: Les Schneider MD;  Location: [facility] OR;  Service: Orthopedics;  Laterality: Right;  Partial, Medial , Lateral     EYE SURGERY      INJECTION OF ANESTHETIC AGENT AROUND MEDIAL BRANCH NERVES INNERVATING LUMBAR FACET JOINT Bilateral 12/13/2022    Procedure: Bilateral L3-5  MBB;  Surgeon: Humberto Dumont MD;  Location: [facility] PAIN MGT;  Service: Pain Management;  Laterality: Bilateral;    INJECTION OF ANESTHETIC AGENT AROUND NERVE Right 08/08/2019    Procedure: Right Genicular nerve block with local;  Surgeon: Manpreet Dutta MD;  Location: [facility] PAIN MGT;  Service: Pain Management;  Laterality: Right;    INJECTION OF ANESTHETIC AGENT INTO SACROILIAC JOINT Bilateral 05/06/2020    Procedure: Bilateral Sacroiliac Joint Injection;  Surgeon: Manpreet Dutta MD;  Location: [facility] PAIN MGT;  Service: Pain Management;  Laterality: Bilateral;    INJECTION OF ANESTHETIC AGENT INTO SACROILIAC JOINT Bilateral 10/08/2020    Procedure: Bilateral SI and Bilateral GTB with RN IV sedation;  Surgeon: Manpreet Dutta MD;  Location: [facility] PAIN MGT;  Service: Pain Management;  Laterality: Bilateral;    INJECTION OF ANESTHETIC AGENT INTO SACROILIAC JOINT Bilateral 01/05/2021    Procedure: Bilateral BLOCK, SACROILIAC JOINT and Bilateral GTB witn RN IV sedation;  Surgeon: Daniel Burns MD;  Location: [facility] PAIN MGT;  Service: Pain Management;  Laterality: Bilateral;    INJECTION OF ANESTHETIC AGENT INTO SACROILIAC JOINT Bilateral 07/08/2021    Procedure: Bilateral BLOCK, SACROILIAC JOINT bilateral GTB RN IV sedation;  Surgeon: Manpreet Dutta MD;  Location: [facility] PAIN MGT;  Service: Pain Management;  Laterality: Bilateral;    INJECTION OF ANESTHETIC AGENT INTO SACROILIAC JOINT Bilateral 03/01/2022    Procedure: Bilateral BLOCK, SACROILIAC JOINT and Bilateral GTB RN IV sedation;  Surgeon: Manpreet Dutta MD;  Location: [facility] PAIN MGT;  Service: Pain Management;  Laterality: Bilateral;    INJECTION OF ANESTHETIC AGENT INTO SACROILIAC JOINT Bilateral 10/10/2022    Procedure: Bilateral Sacroiliac Joint Injection;  Surgeon: Humberto Dumont MD;  Location: [facility] PAIN MGT;  Service: Pain Management;  Laterality: Bilateral;    INJECTION OF ANESTHETIC AGENT INTO SACROILIAC JOINT Bilateral 01/24/2023    Procedure: Bilateral Sacroiliac Joint  Injection;  Surgeon: Humberto Dumont MD;  Location: [facility] PAIN [facility]GT;  Service: Pain Management;  Laterality: Bilateral;    INJECTION OF ANESTHETIC AGENT INTO SACROILIAC JOINT Bilateral 06/21/2023    Procedure: Bilateral Sacroiliac Joint Injection;  Surgeon: Humberto Dumont MD;  Location: [facility] PAIN [facility]GT;  Service: Pain Management;  Laterality: Bilateral;    INJECTION OF ANESTHETIC AGENT INTO SACROILIAC JOINT Bilateral 1/9/2024    Procedure: Bilateral SIJ Injection;  Surgeon: Humberto Dumont MD;  Location: [facility]GT;  Service: Pain Management;  Laterality: Bilateral;    INJECTION OF JOINT Bilateral 09/05/2019    Procedure: Bilateral GT bursa injection;  Surgeon: Manpreet Dutta MD;  [facility];  Service: Pain Management;  Laterality: Bilateral;    INJECTION OF JOINT Bilateral 05/06/2020    Procedure: Bilateral GT bursa injection;  Surgeon: Manpreet Dutta MD;  Location: [facility];  Service: Pain Management;  Laterality: Bilateral;    INJECTION OF JOINT Right 04/28/2022    Procedure: Injection, Joint Right Hip Injection RN IV sedation;  Surgeon: Manpreet Dutta MD;  Location: [facility];  Service: Pain Management;  Laterality: Right;    INJECTION OF JOINT Bilateral 10/10/2022    Procedure: Bilateral GT bursa injection with RN IV sedation;  Surgeon: Humberto Dumont MD;  Location: [facility];  Service: Pain Management;  Laterality: Bilateral;    INJECTION OF JOINT Bilateral 01/24/2023    Procedure: Bilateral GT bursa injection;  Surgeon: Humberto Dumont MD;  Location: [facility];  Service: Pain Management;  Laterality: Bilateral;    INJECTION OF JOINT Bilateral 05/23/2023    Procedure: Bilateral intraarticular knee injection with Synvisc-1; ;  Surgeon: Humberto Dumont MD;  Location: [facility];  Service: Pain Management;  Laterality: Bilateral;    INJECTION OF JOINT Bilateral 06/21/2023    Procedure: Bilateral GT bursa injection;  Surgeon: Humberto Dumont MD;  Location: [facility];  Service: Pain  Management;  Laterality: Bilateral;    INJECTION OF JOINT Bilateral 1/9/2024    Procedure: Bilateral GTB injection;  Surgeon: Humberto Dumont MD;  Location: [facility] PAIN [facility]GT;  Service: Pain Management;  Laterality: Bilateral;    INJECTION OF JOINT Bilateral 1/23/2024    Procedure: Bilateral intraarticular knee injection with Synvisc 1 ;  Surgeon: Humberto Dumont MD;  Location: [facility] PAIN [facility]GT;  Service: Pain Management;  Laterality: Bilateral;    INJECTION OF JOINT Bilateral 7/23/2024    Procedure: Bilateral Synvisc Knee injection;  Surgeon: Humberto Dumont MD;  Location: [facility] PAIN [facility];  Service: Pain Management;  Laterality: Bilateral;    INJECTION, ALLOGRAFT, INTERVERTEBRAL DISC N/A 04/25/2023    Procedure: INJECTION,ALLOGRAFT,INTERVERTEBRAL DISC,1ST LEVEL: L5-S1;  Surgeon: Humberto Dumont MD;  Location: [facility]IN [facility]GT;  Service: Pain Management;  Laterality: N/A;    INJECTION, ALLOGRAFT, INTERVERTEBRAL DISC N/A 05/09/2023    Procedure: INJECTION,ALLOGRAFT,INTERVERTEBRAL DISC,2nd LEVEL: L3-4;  Surgeon: Humberto Dumont MD;  Location: [facility];  Service: Pain Management;  Laterality: N/A;    INJECTION, ALLOGRAFT, INTERVERTEBRAL DISC N/A 4/22/2025    Procedure: INJECTION,ALLOGRAFT,INTERVERTEBRAL DISC,1ST LEVEL: L5-S1 Viadisc;  Surgeon: Humberto Dumont MD;  Location: [facility];  Service: Pain Management;  Laterality: N/A;    INJECTION, ALLOGRAFT, INTERVERTEBRAL DISC N/A 4/29/2025    Procedure: INJECTION,ALLOGRAFT,INTERVERTEBRAL DISC,1ST LEVEL: L3-4 Viadisc;  Surgeon: Humberto Dumont MD;  Location: [facility];  Service: Pain Management;  Laterality: N/A;    INJECTION, SACROILIAC JOINT Bilateral 2/18/2025    Procedure: Bilateral SIJ + bilateral GT bursa injection;  Surgeon: Humberto Dumont MD;  Location: [facility];  Service: Pain Management;  Laterality: Bilateral;    KNEE ARTHROSCOPY Bilateral     LUMBAR PARAVERTEBRAL FACET JOINT NERVE BLOCK Bilateral 2/11/2025    Procedure: Bilateral Synvisc-One knee  injection;  Surgeon: Humberto Dumont MD;  Location: [facility];  Service: Pain Management;  Laterality: Bilateral;    PARS PLANA VITRECTOMY W/ REPAIR OF MACULAR HOLE Left 2017    RADIOFREQUENCY THERMOCOAGULATION Left 2019    Procedure: Right SIJ RFA;  Surgeon: Manpreet Dutta MD;  Location: [facility]IN [facility];  Service: Pain Management;  Laterality: Left;    RADIOFREQUENCY THERMOCOAGULATION Right 2019    Procedure: Right SIJ RFA;  Surgeon: Manpreet Dutta MD;  Location: [facility];  Service: Pain Management;  Laterality: Right;    SELECTIVE INJECTION OF ANESTHETIC AGENT AROUND LUMBAR SPINAL NERVE ROOT BY TRANSFORAMINAL APPROACH Bilateral 2024    Procedure: Bilateral L4/5 TF JAMIN;  Surgeon: Humberto Dumont MD;  Location: [facility];  Service: Pain Management;  Laterality: Bilateral;    SELECTIVE INJECTION OF ANESTHETIC AGENT AROUND LUMBAR SPINAL NERVE ROOT BY TRANSFORAMINAL APPROACH Bilateral 2024    Procedure: Bilateral L4/5 TF JAMIN;  Surgeon: Humberto Dumont MD;  Location: [facility];  Service: Pain Management;  Laterality: Bilateral;    SHOULDER ARTHROSCOPY Right 2015       Social History[1]    Family History   Problem Relation Name Age of Onset    Heart disease Mother Beena Thrasher         A-Fib    Glaucoma Mother Beena Thrasher     Cataracts Mother Beena Thrasher     Cancer Mother Beena Thrasher         colon    Arthritis Mother Beena Thrasher         : 17    Depression Mother Beena Thrasher     Depression Brother Octavio & Gutierrez Thrasher     Birth defects Brother Octavio Thrasher         Cardiac    Heart disease Brother Octavio Thrasher         Heart Surgery  as 8 y.o.    Kidney disease Daughter Yolette Mccollum         ESRD    Anesthesia problems Daughter Yolette Mccollum         cardiac arrest during nephrectomy    Birth defects Daughter Yolette Mccollum         Renal    Depression Daughter Yolette Mccollum      Learning disabilities Daughter Yolette Mccollum         Dyslexia, ADD    Depression Son Daniel & Brandon Mccollum     Learning disabilities Son Daniel & Brandon Mccollum         ADD & ADHD    Diabetes Maternal Aunt Nara Zamora          9/3/2023    Diabetes Maternal Uncle Madison Luke, Sr.          2019    Cancer Maternal Uncle Madison Luke, Sr.     Breast cancer Paternal Aunt      Diabetes Maternal Grandmother Bonnie Luke     Heart disease Maternal Grandmother Bonnie Luke         Congestive heart failure    Alcohol abuse Maternal Grandmother Bonnie Luke         Death: 84    Depression Maternal Grandmother Bonnie Luke     Hypertension Maternal Grandmother Bonnie Luke     Cancer Maternal Grandmother Bonnie Luke     Arthritis Maternal Grandmother Bonnie Luke     Diabetes Maternal Grandfather Attila Luke     Cancer Maternal Grandfather Attila Luke     Alcohol abuse Maternal Grandfather Attila Luke          1964    Asthma Son Brandon Mccollum          ROS    Objective:   Physical Exam  HENT:      Head: Normocephalic.   Eyes:      Pupils: Pupils are equal, round, and reactive to light.   Neck:      Thyroid: No thyromegaly.      Vascular: Normal carotid pulses. No carotid bruit or JVD.   Cardiovascular:      Rate and Rhythm: Normal rate and regular rhythm. No extrasystoles are present.     Chest Wall: PMI is not displaced.      Pulses: Normal pulses.      Heart sounds: Normal heart sounds. No murmur heard.     No gallop. No S3 sounds.   Pulmonary:      Effort: No respiratory distress.      Breath sounds: Normal breath sounds. No stridor.   Abdominal:      General: Bowel sounds are normal.      Palpations: Abdomen is soft.      Tenderness: There is no abdominal tenderness. There is no rebound.   Musculoskeletal:         General: Swelling present.   Skin:     Findings: No rash.   Neurological:      Mental  "Status: She is alert and oriented to person, place, and time.   Psychiatric:         Behavior: Behavior normal.         Lab Results   Component Value Date    CHOL 215 (H) 04/25/2025    CHOL 170 10/25/2024    CHOL 222 (H) 03/01/2024     Lab Results   Component Value Date    HDL 49 04/25/2025    HDL 49 10/25/2024    HDL 56 03/01/2024     Lab Results   Component Value Date    LDLCALC 136.2 04/25/2025    LDLCALC 101.2 10/25/2024    LDLCALC 132.6 03/01/2024     Lab Results   Component Value Date    TRIG 149 04/25/2025    TRIG 99 10/25/2024    TRIG 167 (H) 03/01/2024     Lab Results   Component Value Date    CHOLHDL 22.8 04/25/2025    CHOLHDL 28.8 10/25/2024    CHOLHDL 25.2 03/01/2024       Chemistry        Component Value Date/Time     (H) 04/25/2025 1330     10/25/2024 1422    K 3.6 04/25/2025 1330    K 3.9 10/25/2024 1422     (H) 04/25/2025 1330     10/25/2024 1422    CO2 21 (L) 04/25/2025 1330    CO2 25 10/25/2024 1422    BUN 14 04/25/2025 1330    CREATININE 0.8 04/25/2025 1330     04/25/2025 1330     (H) 10/25/2024 1422        Component Value Date/Time    CALCIUM 9.5 04/25/2025 1330    CALCIUM 10.0 10/25/2024 1422    ALKPHOS 52 04/25/2025 1330    ALKPHOS 68 10/25/2024 1422    AST 17 04/25/2025 1330    AST 42 (H) 10/25/2024 1422    ALT 8 (L) 04/25/2025 1330    ALT 42 10/25/2024 1422    BILITOT 0.3 04/25/2025 1330    BILITOT 0.4 10/25/2024 1422    ESTGFRAFRICA >60.0 02/09/2022 1308    EGFRNONAA >60.0 02/09/2022 1308          Lab Results   Component Value Date    HGBA1C 6.0 (H) 04/25/2025     Lab Results   Component Value Date    TSH 0.638 04/25/2025     No results found for: "INR", "PROTIME"  Lab Results   Component Value Date    WBC 5.78 04/25/2025    HGB 13.2 04/25/2025    HCT 42.8 04/25/2025    MCV 96 04/25/2025     04/25/2025     BMP  Sodium   Date Value Ref Range Status   04/25/2025 147 (H) 136 - 145 mmol/L Final   10/25/2024 141 136 - 145 mmol/L Final     Potassium "   Date Value Ref Range Status   04/25/2025 3.6 3.5 - 5.1 mmol/L Final   10/25/2024 3.9 3.5 - 5.1 mmol/L Final     Chloride   Date Value Ref Range Status   04/25/2025 113 (H) 95 - 110 mmol/L Final   10/25/2024 106 95 - 110 mmol/L Final     CO2   Date Value Ref Range Status   04/25/2025 21 (L) 23 - 29 mmol/L Final   10/25/2024 25 23 - 29 mmol/L Final     BUN   Date Value Ref Range Status   04/25/2025 14 8 - 23 mg/dL Final     Creatinine   Date Value Ref Range Status   04/25/2025 0.8 0.5 - 1.4 mg/dL Final     Calcium   Date Value Ref Range Status   04/25/2025 9.5 8.7 - 10.5 mg/dL Final   10/25/2024 10.0 8.7 - 10.5 mg/dL Final     Anion Gap   Date Value Ref Range Status   04/25/2025 13 8 - 16 mmol/L Final     eGFR if    Date Value Ref Range Status   02/09/2022 >60.0 >60 mL/min/1.73 m^2 Final     eGFR if non    Date Value Ref Range Status   02/09/2022 >60.0 >60 mL/min/1.73 m^2 Final     Comment:     Calculation used to obtain the estimated glomerular filtration  rate (eGFR) is the CKD-EPI equation.        BNP  @LABRCNTIP(BNP,BNPTRIAGEBLO)@  @LABRCNTIP(troponini)@  CrCl cannot be calculated (Patient's most recent lab result is older than the maximum 7 days allowed.).  No results found in the last 24 hours.  No results found in the last 24 hours.  No results found in the last 24 hours.    Assessment:      1. Lumbar radiculopathy    2. PVD (peripheral vascular disease)    3. PVC (premature ventricular contraction)    4. MVP (mitral valve prolapse)    5. Hypertension associated with diabetes    6. Hyperlipidemia associated with type 2 diabetes mellitus    7. Controlled type 2 diabetes mellitus with complication, without long-term current use of insulin    8. Other specified disorders of arteries and arterioles        Plan:     Assessment & Plan    IMPRESSION:  - BP has improved after discontinuation of Ozempic and resumption of Verapamil.  - LDL cholesterol level (136) elevated; potential  treatment options considered.  - No signs of infection noted in swelling assessment.    HYPERTENSION:  - Gerleene to continue monitoring sodium intake to manage blood pressure.  - Continued Verapamil 40 mg twice daily, with option to increase to 3 times daily if needed.  - Continued Lasix as needed.    TYPE 2 DIABETES:  - Continued Jardiance for diabetes.    HYPERLIPIDEMIA:  - Continued Zetia for cholesterol.    DIZZINESS AND FALL RISK:  - Discontinued Ozempic due to side effects (nausea, vomiting, dizziness).  - Ordered Carotid duplex to evaluate dizziness and screen for potential stroke risk.    FOLLOW-UP:  - Follow up in 6 months.  - Contact the office if any questions or concerns arise.            This note was generated with the assistance of ambient listening technology. Verbal consent was obtained by the patient and accompanying visitor(s) for the recording of patient appointment to facilitate this note. I attest to having reviewed and edited the generated note for accuracy, though some syntax or spelling errors may persist. Please contact the author of this note for any clarification.            [1]   Social History  Tobacco Use    Smoking status: Never    Smokeless tobacco: Never    Tobacco comments:     Never smoked   Substance Use Topics    Alcohol use: Not Currently    Drug use: No

## 2025-07-22 ENCOUNTER — PATIENT MESSAGE (OUTPATIENT)
Dept: CARDIOLOGY | Facility: HOSPITAL | Age: 80
End: 2025-07-22
Payer: MEDICARE

## 2025-07-22 ENCOUNTER — CLINICAL SUPPORT (OUTPATIENT)
Dept: REHABILITATION | Facility: HOSPITAL | Age: 80
End: 2025-07-22
Payer: MEDICARE

## 2025-07-22 DIAGNOSIS — Z74.09 IMPAIRED FUNCTIONAL MOBILITY, BALANCE, GAIT, AND ENDURANCE: Primary | ICD-10-CM

## 2025-07-22 PROCEDURE — 97110 THERAPEUTIC EXERCISES: CPT | Mod: PN

## 2025-07-22 PROCEDURE — 97112 NEUROMUSCULAR REEDUCATION: CPT | Mod: PN

## 2025-07-22 NOTE — PROGRESS NOTES
"  Outpatient Rehab    Physical Therapy Visit    Patient Name: Kaylin Mccollum  MRN: [id number]  YOB: 1945  Encounter Date: 7/22/2025    Therapy Diagnosis:   Encounter Diagnosis   Name Primary?    Impaired functional mobility, balance, gait, and endurance Yes     Physician: Kristin Mccall PA*    Physician Orders: Eval and Treat  Medical Diagnosis: Lumbar spondylosis  Balance problem  Fibromyalgia  Surgical Diagnosis: Not applicable for this Episode   Surgical Date: Not applicable for this Episode  Days Since Last Surgery: Not applicable for this Episode    Visit # / Visits Authorized:  1 / 10  Insurance Authorization Period: 6/27/2025 to 12/31/2025  Date of Evaluation: 7/8/2025  Plan of Care Certification: 7/13/2025 to 9/2/2025      PT/PTA:     Number of PTA visits since last PT visit:   Time In: 0705   Time Out: 0800  Total Time (in minutes): 55   Total Billable Time (in minutes): 55    FOTO:  Intake Score (%): 54  Survey Score 2 (%): Not applicable for this Episode  Survey Score 3 (%): Not applicable for this Episode    Precautions:         Subjective   The patient reports fall on her knees and having pain bilaterally by description.         Objective            Treatment:       CPT  Code Intervention    Duration / Intensity        TE NuStep 5 minutes   x NMR Standing hip abduction 3x10   x NMR Standing hip extension 3x10   x NMR Matrix knee extension 10# 3x10   x NMR Matrix knee flexion 15# 3x10   x NMR Step ups 6" 2x10   x TE Supine knee to chest X20 5 sec hold   x TE Lower trunk rotation X20   x NMR Posterior pelvic tilt X20   x NMR Side lying clamshell 2x15 YTB                                                                                                           Time Entry(in minutes):  Neuromuscular Re-Education Time Entry: 30  Therapeutic Exercise Time Entry: 25    Assessment & Plan   Assessment: The patient presents to physical therapy with pain and bilateral knees after her fall on her " knees. The patient is progressing lower extremity and hip strengthening to improve stability and performing standing stabilization exercise exercises to improve balance and decrease risk of fall.       The patient will continue to benefit from skilled outpatient physical therapy in order to address the deficits listed in the problem list on the initial evaluation, provide patient and family education, and maximize the patients level of independence in the home and community environments.     The patient's spiritual, cultural, and educational needs were considered, and the patient is agreeable to the plan of care and goals.           Plan: Outpatient Physical Therapy to include any combination of the following interventions: virtual visits, dry needling, modalities, electrical stimulation (IFC, Pre-Mod, Attended with Functional Dry Needling), Manual Therapy, Moist Heat/ Ice, Neuromuscular Re-ed, Patient Education, Self Care, Therapeutic Exercise, Functional Training, and Therapeutic Activites    Goals:   Active       Balance       The patient will improve TUG score to 9 seconds to indicate improved functional stability in gait       Start:  07/08/25    Expected End:  09/02/25               Functional outcome       Pt will improve FOTO disability score to 59% disability or less in order to improve overall QOL and return to PLOF.         Start:  07/08/25    Expected End:  09/02/25            Patient stated goal: Improve strength and balance        Start:  07/08/25    Expected End:  09/02/25               Home program performance       [facility]       Start:  07/08/25    Expected End:  08/05/25               Pain       Patient will report pain of 5/10 demonstrating a reduction of overall pain       Start:  07/08/25    Expected End:  08/05/25               Pain       Patient will report pain of 3/10 demonstrating a reduction of overall pain       Start:  07/08/25    Expected End:   09/02/25               Strength       Patient will achieve bilateral hip strength of 4/5       Start:  07/08/25    Expected End:  09/02/25                Jose Roberto Marte PT

## 2025-07-25 ENCOUNTER — HOSPITAL ENCOUNTER (OUTPATIENT)
Dept: CARDIOLOGY | Facility: HOSPITAL | Age: 80
Discharge: HOME | End: 2025-07-25
Attending: INTERNAL MEDICINE
Payer: MEDICARE

## 2025-07-25 VITALS
BODY MASS INDEX: 25.16 KG/M2 | HEIGHT: 68 IN | SYSTOLIC BLOOD PRESSURE: 122 MMHG | WEIGHT: 166 LBS | DIASTOLIC BLOOD PRESSURE: 60 MMHG

## 2025-07-25 DIAGNOSIS — I73.9 PVD (PERIPHERAL VASCULAR DISEASE): ICD-10-CM

## 2025-07-25 DIAGNOSIS — I77.89 OTHER SPECIFIED DISORDERS OF ARTERIES AND ARTERIOLES: ICD-10-CM

## 2025-07-25 LAB
LEFT ARM DIASTOLIC BLOOD PRESSURE: 58 MMHG
LEFT ARM SYSTOLIC BLOOD PRESSURE: 120 MMHG
LEFT CBA DIAS: 13 CM/S
LEFT CBA SYS: 59 CM/S
LEFT CCA DIST DIAS: 13 CM/S
LEFT CCA DIST SYS: 54 CM/S
LEFT CCA MID DIAS: 13 CM/S
LEFT CCA MID SYS: 53 CM/S
LEFT CCA PROX DIAS: 14 CM/S
LEFT CCA PROX SYS: 69 CM/S
LEFT ECA DIAS: 5 CM/S
LEFT ECA SYS: 57 CM/S
LEFT ICA DIST DIAS: 27 CM/S
LEFT ICA DIST SYS: 83 CM/S
LEFT ICA MID DIAS: 30 CM/S
LEFT ICA MID SYS: 86 CM/S
LEFT ICA PROX DIAS: 18 CM/S
LEFT ICA PROX SYS: 53 CM/S
LEFT VERTEBRAL DIAS: 10 CM/S
LEFT VERTEBRAL SYS: 43 CM/S
OHS CV CAROTID RIGHT ICA EDV HIGHEST: 31
OHS CV CAROTID ULTRASOUND LEFT ICA/CCA RATIO: 1.59
OHS CV CAROTID ULTRASOUND RIGHT ICA/CCA RATIO: 1.9
OHS CV CPX PATIENT HEIGHT IN: 68
OHS CV PV CAROTID LEFT HIGHEST CCA: 69
OHS CV PV CAROTID LEFT HIGHEST ICA: 86
OHS CV PV CAROTID RIGHT HIGHEST CCA: 75
OHS CV PV CAROTID RIGHT HIGHEST ICA: 95
OHS CV US CAROTID LEFT HIGHEST EDV: 30
RIGHT ARM DIASTOLIC BLOOD PRESSURE: 60 MMHG
RIGHT ARM SYSTOLIC BLOOD PRESSURE: 122 MMHG
RIGHT CBA DIAS: 11 CM/S
RIGHT CBA SYS: 40 CM/S
RIGHT CCA DIST DIAS: 11 CM/S
RIGHT CCA DIST SYS: 50 CM/S
RIGHT CCA MID DIAS: 11 CM/S
RIGHT CCA MID SYS: 65 CM/S
RIGHT CCA PROX DIAS: 10 CM/S
RIGHT CCA PROX SYS: 75 CM/S
RIGHT ECA DIAS: 5 CM/S
RIGHT ECA SYS: 52 CM/S
RIGHT ICA DIST DIAS: 31 CM/S
RIGHT ICA DIST SYS: 95 CM/S
RIGHT ICA MID DIAS: 23 CM/S
RIGHT ICA MID SYS: 66 CM/S
RIGHT ICA PROX DIAS: 12 CM/S
RIGHT ICA PROX SYS: 41 CM/S
RIGHT VERTEBRAL DIAS: 6 CM/S
RIGHT VERTEBRAL SYS: 46 CM/S

## 2025-07-25 PROCEDURE — 93880 EXTRACRANIAL BILAT STUDY: CPT

## 2025-07-25 PROCEDURE — 93880 EXTRACRANIAL BILAT STUDY: CPT | Mod: 26,,, | Performed by: INTERNAL MEDICINE

## 2025-07-28 ENCOUNTER — TELEPHONE (OUTPATIENT)
Dept: CARDIOLOGY | Facility: CLINIC | Age: 80
End: 2025-07-28
Payer: MEDICARE

## 2025-07-28 DIAGNOSIS — Z00.00 ENCOUNTER FOR MEDICARE ANNUAL WELLNESS EXAM: ICD-10-CM

## 2025-07-28 DIAGNOSIS — I15.2 HYPERTENSION ASSOCIATED WITH DIABETES: Chronic | ICD-10-CM

## 2025-07-28 DIAGNOSIS — E11.59 HYPERTENSION ASSOCIATED WITH DIABETES: Chronic | ICD-10-CM

## 2025-07-28 DIAGNOSIS — I34.1 MVP (MITRAL VALVE PROLAPSE): ICD-10-CM

## 2025-07-28 NOTE — TELEPHONE ENCOUNTER
Spoke with pt in regards to test results. Pt verbalized understanding information without any concerns. Pt stated taking verapamil tid was making her have a headache so she switched to bid. She has noticed since then her headache aches have stopped and blood pressure is under control. Pt was instructed to continue monitor bp and let office know if it is high.               ----- Message from Jesus Dumont MD sent at 7/27/2025 11:03 AM CDT -----  Carotid artery US showed mild Dz  Continue current Rx.   F/U as scheduled    ----- Message -----  From: Dakota Morton MD  Sent: 7/25/2025   3:56 PM CDT  To: Jesus Dumont MD

## 2025-07-29 RX ORDER — VERAPAMIL HYDROCHLORIDE 40 MG/1
40 TABLET ORAL 2 TIMES DAILY
Start: 2025-07-29

## 2025-07-30 DIAGNOSIS — M25.562 PAIN IN BOTH KNEES, UNSPECIFIED CHRONICITY: Primary | ICD-10-CM

## 2025-07-30 DIAGNOSIS — M25.561 PAIN IN BOTH KNEES, UNSPECIFIED CHRONICITY: Primary | ICD-10-CM

## 2025-08-01 ENCOUNTER — HOSPITAL ENCOUNTER (OUTPATIENT)
Dept: RADIOLOGY | Facility: HOSPITAL | Age: 80
Discharge: HOME | End: 2025-08-01
Attending: PHYSICIAN ASSISTANT
Payer: MEDICARE

## 2025-08-01 ENCOUNTER — OFFICE VISIT (OUTPATIENT)
Dept: ORTHOPEDICS | Facility: CLINIC | Age: 80
End: 2025-08-01
Payer: MEDICARE

## 2025-08-01 VITALS
SYSTOLIC BLOOD PRESSURE: 137 MMHG | DIASTOLIC BLOOD PRESSURE: 80 MMHG | WEIGHT: 166 LBS | BODY MASS INDEX: 25.16 KG/M2 | HEIGHT: 68 IN | HEART RATE: 92 BPM

## 2025-08-01 DIAGNOSIS — M54.16 LUMBAR RADICULOPATHY: ICD-10-CM

## 2025-08-01 DIAGNOSIS — M25.562 PAIN IN BOTH KNEES, UNSPECIFIED CHRONICITY: ICD-10-CM

## 2025-08-01 DIAGNOSIS — M25.561 PAIN IN BOTH KNEES, UNSPECIFIED CHRONICITY: ICD-10-CM

## 2025-08-01 DIAGNOSIS — M17.0 BILATERAL PRIMARY OSTEOARTHRITIS OF KNEE: Primary | ICD-10-CM

## 2025-08-01 PROCEDURE — 73564 X-RAY EXAM KNEE 4 OR MORE: CPT | Mod: TC,50

## 2025-08-01 PROCEDURE — 99214 OFFICE O/P EST MOD 30 MIN: CPT | Mod: PBBFAC,25 | Performed by: PHYSICIAN ASSISTANT

## 2025-08-01 PROCEDURE — 99999 PR PBB SHADOW E&M-EST. PATIENT-LVL IV: CPT | Mod: PBBFAC,,, | Performed by: PHYSICIAN ASSISTANT

## 2025-08-01 PROCEDURE — 73564 X-RAY EXAM KNEE 4 OR MORE: CPT | Mod: 26,50,, | Performed by: RADIOLOGY

## 2025-08-01 RX ORDER — METHYLPREDNISOLONE ACETATE 80 MG/ML
80 INJECTION, SUSPENSION INTRA-ARTICULAR; INTRALESIONAL; INTRAMUSCULAR; SOFT TISSUE
Status: DISCONTINUED | OUTPATIENT
Start: 2025-08-01 | End: 2025-08-01 | Stop reason: HOSPADM

## 2025-08-01 RX ORDER — LIDOCAINE HYDROCHLORIDE 10 MG/ML
5 INJECTION, SOLUTION INFILTRATION; PERINEURAL
Status: DISCONTINUED | OUTPATIENT
Start: 2025-08-01 | End: 2025-08-01 | Stop reason: HOSPADM

## 2025-08-01 RX ADMIN — METHYLPREDNISOLONE ACETATE 80 MG: 80 INJECTION, SUSPENSION INTRA-ARTICULAR; INTRALESIONAL; INTRAMUSCULAR; SOFT TISSUE at 08:08

## 2025-08-01 RX ADMIN — LIDOCAINE HYDROCHLORIDE 5 ML: 10 INJECTION, SOLUTION INFILTRATION; PERINEURAL at 08:08

## 2025-08-01 NOTE — PROCEDURES
Large Joint Aspiration/Injection: bilateral knee    Date/Time: 8/1/2025 8:00 AM    Performed by: Yoltete Godoy PA  Authorized by: Yolette Godoy PA    Site marked: the procedure site was marked      Local anesthesia used?: Yes    Local anesthetic:  Topical anesthetic    Details:  Needle Size:  21 G  Approach:  Anterolateral  Location:  Knee  Laterality:  Bilateral  Site:  Bilateral knee  Medications (Right):  5 mL LIDOcaine HCL 10 mg/ml (1%) 10 mg/mL (1 %); 80 mg methylPREDNISolone acetate 80 mg/mL  Medications (Left):  5 mL LIDOcaine HCL 10 mg/ml (1%) 10 mg/mL (1 %); 80 mg methylPREDNISolone acetate 80 mg/mL  Patient tolerance:  Patient tolerated the procedure well with no immediate complications     Verbal consent was obtained  The patient's ID, site, side was verified  The site was sterile prepped in standard fashion  The injection was performed in the diane-lateral side without complication  A sterile Band-Aid was applied    Patient was directed to apply ice today at roughly 15 minutes at a time as needed.     If steroid was received today:   It was discussed that they may be sore for the next few days or week   Please avoid strenuous activity over the next 24 hours.   Effectiveness may take 1-2 weeks to show results especially with slow release medication  It was also discussed that the patient may have a increase in glucose if diabetic and should monitor levels.  Patient may also have a rise in blood pressure, monitor symptoms     If gel was received today:  Patient may be sore for 14 days with intermittent swelling  Avoid heavy activity, elevate and ice as need  This will take up to 8 weeks to show full effectiveness  Patient was instructed to call as needed.

## 2025-08-01 NOTE — PROGRESS NOTES
Subjective:     Patient ID: Kaylin Mccollum is a 80 y.o. female.    Chief Complaint: Pain of the Right Knee    HPI:  75-year-old retired from  who had been falling down stairs numerous occasions she has been having pain and catching and locking since several months now.  She gets up the knee catches she has to wiggle it some how to unlock it before she can walk. She does have quite a bit of lumbar pain that radiates with burning sensation down to the right anterior tibia and dorsal of the foot.  She is under the care of pain management for that.  Pain in the knee is around 4/10 with catching locking feeling.  Able to ambulate once the catching locking goes away for her minimum 200-300 yd without discomfort.  Unable to squat unable to do stairs due to the catching locking feeling.  She does ambulate without any assistive devices.  She does have an MRI in the electronic records.  At 1 point she had falling down the stairs and sustained right shoulder rotator cuff tear requiring repair.  Denies any numbness tingling in the hands.  She does have some cervical lumbar pain    05/21/2020  Status post her right knee arthroscopy 03/10/2020.  Findings of complex medial and lateral meniscus tears as well chondromalacia type 3 anterior and medial.  She was doing great postop for 5 weeks another side in twisted her knee and starting some pain.  Any twisting maneuver she hurts quite a bit.  Pain is 4/10.  She did her independent exercise program.  Denies any fever or chills or shortness of breath or difficulty chewing or swallowing.  Complains of some stiffness and swelling    06/25/2020  Right knee arthroscopy 03/10/2020 with complex medial and lateral meniscus tears as well as chondromalacia type 3 anterior medial compartment.  The other day could not get up from kneeling on the floor.  Her pain now is coming back as 6/10 with swelling and tightness in the knee.  Last visit given a steroid injection which helped for a  little bit but not too much.  She does take Celebrex 100 mg twice a day and a helps very little.  Last visit discussed injecting Synvisc-One into her knee and she wants to proceed.  I did tell her that Synvisc-One might take 6-8 weeks to see any effect.  In my not work and we had to resort to other treatments down the road.  Still feeling occasional catching.  Denies any fever chills shortness of breath difficulty chewing swallowing loss of bowel bladder control or loss of taste and smell    08/20/2020  Chondromalacia of the right knee anterior and medial compartment and status post partial medial lateral meniscectomy.  Synvisc-One given 06/25/2020 seems to have helped but still having quite excessive pain right now since her daughter was diagnosed with PE and had to run around with her.  But overall she feels Synvisc-One seems to have helped.  She is requesting a steroid injection today.  She does take Celebrex at night.  She is to have back injections she is holding off at this point.  Pain is 4/10.  Denies any fever or chills or shortness of breath or difficulty chewing or swallowing loss of bowel bladder control    11/19/2020  Right knee arthroscopy 03/10/2020 with medial and lateral partial meniscectomies and finding of chondromalacia type 3 of the anterior medial compartment.  She received Synvisc-One on 06/25/2020 with good relief.  She thought she is getting her Synvisc-One today and I refreshed her memory that it should be 6 months so she is not due until December 25th.  We need to get that approved.  She complains on occasional thigh pain occasional mid tibia pain.  She does have history of fibromyalgia and used to get hip injections by Dr. izquierdo last of which 10/08/2020 bilateral SI and bilateral greater trochanteric areas.  She does complain of both hips hurting over the greater trochanters.  Celebrex seems to help as well as the brace on her knee.  She asked she is going for dental work if she can take  ibuprofen I did tell her not with Celebrex she may take Tylenol.  Denies any fever or chills or shortness of breath or difficulty with chewing or swallowing loss of bowel bladder control or blurry vision double vision or loss of sense smell or taste    12/28/2020   New problem left shoulder pain  Right knee pain  Lately each patient is taking Celebrex 200 mg twice a day since she has been taking care of her daughter back in 4 to the hospital and doing a lot of walking.  She is running out of the Celebrex.  I did warn her about the side effects.  Her pain is 4/10.  Unable to sleep on the shoulder.  Lifting things seems to be very painful.  Previous right shoulder rotator cuff surgery.  She is having also worsening lower back and hip area and sacroiliac pain she sees pain management for that.    Denies any fever or chills or shortness of breath or difficulty with chewing or swallowing loss of bowel bladder control blurry vision double vision loss of sense of smell or taste    02/22/2021  Last visit patient received injection to the left shoulder subacromial space on 12/28/2020 as well into the right knee with grade 3 is a loose sugar for pain.  Her pain went down 2/10 in both the shoulder and the knee.  But she still feels some weakness in the shoulder and certain motions seems to be very weak and painful.  Previous history of injury to that area.  She did see pain management Dr. Burns who give her injections in her hips and that is doing really well.  Concerned about having rotator cuff injury to the left shoulder because certain motions and activities of daily living are limited .  Her hips are doing well her knee is doing well  Denies any fever or chills or shortness of breath or difficulty with chewing or swallowing or loss of bowel bladder control or blurry vision or double vision or loss sense smell or taste or numbness or tingling in her hands.  She does have some occasional neck pain    03/01/2021  Left  shoulder tenderness.  Pain today 0/10.  She is taking Celebrex and received a steroid injection 12/28/2020.  She is here for MRI results.  We spent time going over the report with her and copy of it given.  We discussed small slap lesion, mild arthritic changes and small loose body and calcific tendinitis.  As far as the knee is concerned she is doing okay at this point in time  Denies any fever or chills or shortness of breath or difficulty with chewing or swallowing loss of bowel bladder control or loss of sense smell or taste    03/10/2022  Bilateral shoulder pain left and right, right thumb locking and radiation down to the right upper extremity, right knee arthritis  Patient since last seen had stem cell injections x2 into the right knee and x1 into the left knee by Dr. Diamond Chaparro.  She is not too sure if they really helped her.  On 3/1/22 patient received bilateral sacroiliac joint injections and bilateral greater trochanteric injections by Dr. Dutta.  Each injection containing 40 mg of Depo-Medrol for a total of 160 mg. I did tell her it is too soon to receive any injections in the shoulder because too much steroid will increase her heart rate and messes up her sugars which could cause problems over the next few days.  She expressed understanding.  We did go over MRI performed on the left shoulder a year or so ago.  As well as we went over her knee x-rays and operative report from arthroscopic surgery    06/20/2022  Left shoulder pain, right knee pain  Previous MRI reviewed with the patient showing generalized arthritis as well as calcific tendinitis in the shoulder.  She did receive an injection in 2020 with good relief.  She would like another repeat injection.  She does take Celebrex and occasional Tylenol.  She does have fibromyalgia and she paces herself.  As far as the left knee is concerned she is tender over the pes anserine on the medial tibial flare in the Voltaren gel that she applies helps  that.  She also has pain inside the knee joint and underneath the kneecap.  As far as the right thumb is doing much better after we injected at last visit.  She did receive stem cell injections in the right knee by Dr. Chaparro and not too sure if it anything came out of it.  As far as the back she was seeing pain management where they gave her bilateral SI joint and bilateral trochanteric bursitis injections.  No fever no chills no shortness of breath difficulty with chewing or swallowing  Pain in the right knee 3/10 in the left shoulder around 4/10      12/12/2022   Bilateral shoulder pain the not hurting as much at this time.  She is having injections in her back to more 0 and I did tell her too much cortisone today they may cancel her back but she said she does not want to cancel her back injections because they help and make her better and they are at this time more important than her shoulders  In right knee chondromalacia and arthritis.  We give her steroid injection in June 2022 Depo-Medrol.  I did tell her at this time if she read insisted on having injection I will give her half of the does which is 40 mg Depo-Medrol.  Her pain level 6/10.  She still using Voltaren gel and gabapentin as needed.  She is seeing pain management for her back.    No fever no chills no shortness of breath or difficulty with chewing or swallowing loss of bowel bladder control blurry vision double vision loss sense smell or taste    04/13/2023   Right knee severe pain on the lateral aspect P it last time she was here we injected 40 mg of Depo-Medrol because she was receiving injections in her spine.  She has an MRI due for lumbar spine ordered by pain management.  She did receive injections in the back but she has something new to be injected is called allograft scheduled by pain management end of April and 1st part of May at different levels.  She was told it is some sort of a gel.  If she stands more than 20 minutes she feels  severe pain in the lumbar spine.  As far as the right knee is concerned her pain is 4/10.  She did discuss viscosupplementation with her.  I did discuss that the fact that if it helps you can have them repeated once every 6 months but we will get the insurance to approve.  She does ambulate without assistive devices.  We already scoped the knee before and did partial medial lateral meniscectomy with finding of the arthritis.  Back in December 2022 the x-ray showed almost complete loss of joint space in the lateral joint line with marginal osteophytes  No fever no chills no shortness of breath or difficulty with chewing swallowing loss of bowel bladder control    As far as the shoulder is concerned on the left is doing much better as well as both hips    8/1/25  Patient presents as a new patient to me today with chief complaint of bilateral knee pain, right greater than left.  Patient notes pain is at worst a 4/10 affecting activity of daily living and thus her quality of life.  She has a hard time walking long distances, going from a sitting to standing or standing to seated position, unlevel terrain or stairs.  The patient last received bilateral gel injections January of 2025 and states these provided great relief.  The the patient is also seen by our pain management department receiving injections for comorbidities.    Patient is presently denying any shortness of breath, chest pain, fever/chills, nausea/vomiting, loss of taste or smell, numbness/tingling or sensation changes, loss of bladder or bowel function, loss of taste/smell.     Past Medical History:   Diagnosis Date    Arthritis     Cataract     COVID-19 02/25/2021    Diabetes mellitus 1995    BS didn't check 09/13/2022    Diabetes mellitus, type 2     Fibromyalgia     General anesthetics causing adverse effect in therapeutic use     bradycardia     Hypertension     Migraines     Mitral valve prolapse     Osteoarthritis     Rotator cuff tear 04/01/2015      Past Surgical History:   Procedure Laterality Date    ARTHROSCOPY OF KNEE Right 03/10/2020    Procedure: ARTHROSCOPY, KNEE;  Surgeon: Les Schneider MD;  Location: [facility] OR;  Service: Orthopedics;  Laterality: Right;    CATARACT EXTRACTION W/  INTRAOCULAR LENS IMPLANT Left 07/19/2017    CATARACT EXTRACTION W/  INTRAOCULAR LENS IMPLANT Right 2017    CHOLECYSTECTOMY      CHONDROPLASTY OF KNEE Right 03/10/2020    Procedure: CHONDROPLASTY, KNEE;  Surgeon: Les Schneider MD;  Location: [facility] OR;  Service: Orthopedics;  Laterality: Right;  Anterior compartment     COLONOSCOPY N/A 09/25/2018    Procedure: COLONOSCOPY;  Surgeon: Bethel Carmona MD;  Location: [facility] ENDO;  Service: Endoscopy;  Laterality: N/A;    EXCISION OF MEDIAL MENISCUS OF KNEE Right 03/10/2020    Procedure: MENISCECTOMY, KNEE, MEDIAL;  Surgeon: Les Schneider MD;  Location: [facility] OR;  Service: Orthopedics;  Laterality: Right;  Partial, Medial , Lateral     EYE SURGERY      INJECTION OF ANESTHETIC AGENT AROUND MEDIAL BRANCH NERVES INNERVATING LUMBAR FACET JOINT Bilateral 12/13/2022    Procedure: Bilateral L3-5 MBB;  Surgeon: Humberto Dumont MD;  Location: [facility] PAIN MGT;  Service: Pain Management;  Laterality: Bilateral;    INJECTION OF ANESTHETIC AGENT AROUND NERVE Right 08/08/2019    Procedure: Right Genicular nerve block with local;  Surgeon: Manpreet Dutta MD;  Location: [facility] PAIN MGT;  Service: Pain Management;  Laterality: Right;    INJECTION OF ANESTHETIC AGENT INTO SACROILIAC JOINT Bilateral 05/06/2020    Procedure: Bilateral Sacroiliac Joint Injection;  Surgeon: Manpreet Dutta MD;  Location: [facility] PAIN MGT;  Service: Pain Management;  Laterality: Bilateral;    INJECTION OF ANESTHETIC AGENT INTO SACROILIAC JOINT Bilateral 10/08/2020    Procedure: Bilateral SI and Bilateral GTB with RN IV sedation;  Surgeon: Manpreet Dutta MD;  Location: [facility] PAIN MGT;  Service: Pain Management;  Laterality: Bilateral;    INJECTION OF ANESTHETIC AGENT  INTO SACROILIAC JOINT Bilateral 01/05/2021    Procedure: Bilateral BLOCK, SACROILIAC JOINT and Bilateral GTB witn RN IV sedation;  Surgeon: Daniel Burns MD;  Location: [facility] PAIN MGT;  Service: Pain Management;  Laterality: Bilateral;    INJECTION OF ANESTHETIC AGENT INTO SACROILIAC JOINT Bilateral 07/08/2021    Procedure: Bilateral BLOCK, SACROILIAC JOINT bilateral GTB RN IV sedation;  Surgeon: Manpreet Dutta MD;  Location: [facility] PAIN MGT;  Service: Pain Management;  Laterality: Bilateral;    INJECTION OF ANESTHETIC AGENT INTO SACROILIAC JOINT Bilateral 03/01/2022    Procedure: Bilateral BLOCK, SACROILIAC JOINT and Bilateral GTB RN IV sedation;  Surgeon: Manpreet Dutta MD;  Location: [facility] PAIN MGT;  Service: Pain Management;  Laterality: Bilateral;    INJECTION OF ANESTHETIC AGENT INTO SACROILIAC JOINT Bilateral 10/10/2022    Procedure: Bilateral Sacroiliac Joint Injection;  Surgeon: Humberto Dumont MD;  Location: [facility] PAIN MGT;  Service: Pain Management;  Laterality: Bilateral;    INJECTION OF ANESTHETIC AGENT INTO SACROILIAC JOINT Bilateral 01/24/2023    Procedure: Bilateral Sacroiliac Joint Injection;  Surgeon: Humberto Dumont MD;  [facility];  Service: Pain Management;  Laterality: Bilateral;    INJECTION OF ANESTHETIC AGENT INTO SACROILIAC JOINT Bilateral 06/21/2023    Procedure: Bilateral Sacroiliac Joint Injection;  Surgeon: Humberto Dumont MD;  Location: [facility] PAIN MGT;  Service: Pain Management;  Laterality: Bilateral;    INJECTION OF ANESTHETIC AGENT INTO SACROILIAC JOINT Bilateral 1/9/2024    Procedure: Bilateral SIJ Injection;  Surgeon: Humberto Dumont MD;  Location: [facility] PAIN MGT;  Service: Pain Management;  Laterality: Bilateral;    INJECTION OF JOINT Bilateral 09/05/2019    Procedure: Bilateral GT bursa injection;  Surgeon: Manpreet Dutta MD;  Location: [facility] PAIN MGT;  Service: Pain Management;  Laterality: Bilateral;    INJECTION OF JOINT Bilateral 05/06/2020    Procedure: Bilateral GT bursa  injection;  Surgeon: Manpreet Dutta MD;  Location: [facility] PAIN MGT;  Service: Pain Management;  Laterality: Bilateral;    INJECTION OF JOINT Right 04/28/2022    Procedure: Injection, Joint Right Hip Injection RN IV sedation;  Surgeon: Manpreet Dutta MD;  Location: [facility] PAIN MGT;  Service: Pain Management;  Laterality: Right;    INJECTION OF JOINT Bilateral 10/10/2022    Procedure: Bilateral GT bursa injection with RN IV sedation;  Surgeon: Humberto Dumont MD;  Location: [facility] PAIN MGT;  Service: Pain Management;  Laterality: Bilateral;    INJECTION OF JOINT Bilateral 01/24/2023    Procedure: Bilateral GT bursa injection;  Surgeon: Humberto Dumont MD;  Location: [facility] PAIN MGT;  Service: Pain Management;  Laterality: Bilateral;    INJECTION OF JOINT Bilateral 05/23/2023    Procedure: Bilateral intraarticular knee injection with Synvisc-1; ;  Surgeon: Humberto Dumont MD;  Location: [facility] PAIN MGT;  Service: Pain Management;  Laterality: Bilateral;    INJECTION OF JOINT Bilateral 06/21/2023    Procedure: Bilateral GT bursa injection;  Surgeon: Humberto Dumont MD;  Location: [facility] PAIN MGT;  Service: Pain Management;  Laterality: Bilateral;    INJECTION OF JOINT Bilateral 1/9/2024    Procedure: Bilateral GTB injection;  Surgeon: Humberto Dumont MD;  Location: [facility] PAIN [facility]GT;  Service: Pain Management;  Laterality: Bilateral;    INJECTION OF JOINT Bilateral 1/23/2024    Procedure: Bilateral intraarticular knee injection with Synvisc 1 ;  Surgeon: Humberto Dumont MD;  Location: [facility] PAIN MGT;  Service: Pain Management;  Laterality: Bilateral;    INJECTION OF JOINT Bilateral 7/23/2024    Procedure: Bilateral Synvisc Knee injection;  Surgeon: Humberto Dumont MD;  Location: [facility] PAIN [facility]GT;  Service: Pain Management;  Laterality: Bilateral;    INJECTION, ALLOGRAFT, INTERVERTEBRAL DISC N/A 04/25/2023    Procedure: INJECTION,ALLOGRAFT,INTERVERTEBRAL DISC,1ST LEVEL: L5-S1;  Surgeon: Humberto Dumont MD;  Location: [facility] PAIN MGT;   Service: Pain Management;  Laterality: N/A;    INJECTION, ALLOGRAFT, INTERVERTEBRAL DISC N/A 05/09/2023    Procedure: INJECTION,ALLOGRAFT,INTERVERTEBRAL DISC,2nd LEVEL: L3-4;  Surgeon: Humberto Dumont MD;  Location: [facility] PAIN [facility]GT;  Service: Pain Management;  Laterality: N/A;    INJECTION, ALLOGRAFT, INTERVERTEBRAL DISC N/A 4/22/2025    Procedure: INJECTION,ALLOGRAFT,INTERVERTEBRAL DISC,1ST LEVEL: L5-S1 [place];  Surgeon: Humberto Dumont MD;  Location: [facility] PAIN [facility]GT;  Service: Pain Management;  Laterality: N/A;    INJECTION, ALLOGRAFT, INTERVERTEBRAL DISC N/A 4/29/2025    Procedure: INJECTION,ALLOGRAFT,INTERVERTEBRAL DISC,1ST LEVEL: L3-4 [place]isc;  Surgeon: Humberto Dumont MD;  Location: [facility] PAIN [facility]GT;  Service: Pain Management;  Laterality: N/A;    INJECTION, SACROILIAC JOINT Bilateral 2/18/2025    Procedure: Bilateral SIJ + bilateral GT bursa injection;  Surgeon: Humberto Dumont MD;  Location: [facility] PAIN [facility]GT;  Service: Pain Management;  Laterality: Bilateral;    KNEE ARTHROSCOPY Bilateral     LUMBAR PARAVERTEBRAL FACET JOINT NERVE BLOCK Bilateral 2/11/2025    Procedure: Bilateral Synvisc-One knee injection;  Surgeon: Humberto Dumont MD;  Location: [facility] PAIN [facility]GT;  Service: Pain Management;  Laterality: Bilateral;    PARS PLANA VITRECTOMY W/ REPAIR OF MACULAR HOLE Left 02/22/2017    RADIOFREQUENCY THERMOCOAGULATION Left 07/11/2019    Procedure: Right SIJ RFA;  Surgeon: Manpreet Dutta MD;  Location: [facility] PAIN [facility]GT;  Service: Pain Management;  Laterality: Left;    RADIOFREQUENCY THERMOCOAGULATION Right 07/25/2019    Procedure: Right SIJ RFA;  Surgeon: Manpreet Dutta MD;  Location: [facility] PAIN [facility]GT;  Service: Pain Management;  Laterality: Right;    SELECTIVE INJECTION OF ANESTHETIC AGENT AROUND LUMBAR SPINAL NERVE ROOT BY TRANSFORAMINAL APPROACH Bilateral 4/9/2024    Procedure: Bilateral L4/5 TF JAMIN;  Surgeon: Humberto Dumont MD;  Location: [facility] PAIN MGT;  Service: Pain Management;  Laterality: Bilateral;    SELECTIVE INJECTION OF  ANESTHETIC AGENT AROUND LUMBAR SPINAL NERVE ROOT BY TRANSFORAMINAL APPROACH Bilateral 2024    Procedure: Bilateral L4/5 TF JAMIN;  Surgeon: Humberto Dumont MD;  [facility];  Service: Pain Management;  Laterality: Bilateral;    SHOULDER ARTHROSCOPY Right 2015     Family History   Problem Relation Name Age of Onset    Heart disease Mother Beena Thrasher         A-Fib    Glaucoma Mother Beena Thrasher     Cataracts Mother Beena Thrasher     Cancer Mother Beena Thrasher         colon    Arthritis Mother Beena Thrasher         : 17    Depression Mother Beena Thrasher     Depression Brother Octavio & Gutierrez Thrasher     Birth defects Brother Octavio Thrasher         Cardiac    Heart disease Brother Octavio Thrasher         Heart Surgery  as 6 y.o.    Kidney disease Daughter Yolette Mccollum         ESRD    Anesthesia problems Daughter Yolette Mccollum         cardiac arrest during nephrectomy    Birth defects Daughter Yolette Castle Young         Renal    Depression Daughter Yolette Castle Young     Learning disabilities Daughter Yolette Mccollum         Dyslexia, ADD    Depression Son Daniel & Brandon Mccollum     Learning disabilities Son Daniel & Brandon Mccollum         ADD & ADHD    Diabetes Maternal Aunt Nara Zamora          9/3/2023    Diabetes Maternal Uncle Foreignponce Ti, Sr.          2019    Cancer Maternal Uncle Foreignponce Ti, Sr.     Breast cancer Paternal Aunt      Diabetes Maternal Grandmother Bonniekashmir Luke     Heart disease Maternal Grandmother Butnerkashmir Luke         Congestive heart failure    Alcohol abuse Maternal Grandmother Bonnie Luke         Death: 84    Depression Maternal Grandmother Butnerdave Sandoval Ti     Hypertension Maternal Grandmother Butnerkashmir Sandoval Ti     Cancer Maternal Grandmother Butner Jaime Ti     Arthritis Maternal Grandmother Bonniekashmir Luke     Diabetes  Maternal Grandfather Attila Luke     Cancer Maternal Grandfather Attila Luke     Alcohol abuse Maternal Grandfather Attila Luke          1964    Asthma Son Brandon Mccollum      Social History     Socioeconomic History    Marital status:    Occupational History     Employer: [employer]   Tobacco Use    Smoking status: Never    Smokeless tobacco: Never    Tobacco comments:     Never smoked   Substance and Sexual Activity    Alcohol use: Not Currently    Drug use: No    Sexual activity: Not Currently     Birth control/protection: Abstinence, Post-menopausal   Social History Narrative    . 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     Social Drivers of Health     Financial Resource Strain: Low Risk  (2025)    Overall Financial Resource Strain (CARDIA)     Difficulty of Paying Living Expenses: Not hard at all   Food Insecurity: No Food Insecurity (2025)    Hunger Vital Sign     Worried About Running Out of Food in the Last Year: Never true     Ran Out of Food in the Last Year: Never true   Transportation Needs: No Transportation Needs (2025)    PRAPARE - Transportation     Lack of Transportation (Medical): No     Lack of Transportation (Non-Medical): No   Physical Activity: Insufficiently Active (2025)    Exercise Vital Sign     Days of Exercise per Week: 1 day     Minutes of Exercise per Session: 60 min   Stress: No Stress Concern Present (2025)    [employer] [place] [employer] - Occupational Stress Questionnaire     Feeling of Stress : Not at all   Housing Stability: Low Risk  (2025)    Housing Stability Vital Sign     Unable to Pay for Housing in the Last Year: No     Number of Times Moved in the Last Year: 0     Homeless in the Last Year: No     Medication List with Changes/Refills   Current Medications    B COMPLEX VITAMINS TABLET    Take 1 tablet by mouth once daily.    BIOTIN 1 MG TABLET    Take 1,000 mcg by mouth every  morning.     CELECOXIB (CELEBREX) 200 MG CAPSULE    TAKE 1 CAPSULE(200 MG) BY MOUTH TWICE DAILY WITH FOOD    CLOTRIMAZOLE-BETAMETHASONE 1-0.05% (LOTRISONE) CREAM    Apply topically 2 (two) times daily.    DICLOFENAC SODIUM (VOLTAREN) 1 % GEL    Apply topically 4 (four) times daily.    EMPAGLIFLOZIN (JARDIANCE) 10 MG TABLET    Take 1 tablet (10 mg total) by mouth once daily.    EZETIMIBE (ZETIA) 10 MG TABLET    Take 1 tablet (10 mg total) by mouth once daily.    FLUOXETINE 40 MG CAPSULE    Take 1 capsule (40 mg total) by mouth once daily.    FLUTICASONE (FLONASE) 50 MCG/ACTUATION NASAL SPRAY    2 sprays by Each Nare route once daily.    FUROSEMIDE (LASIX) 20 MG TABLET    Take 1 tablet (20 mg total) by mouth daily as needed (edema).    GABAPENTIN (NEURONTIN) 300 MG CAPSULE    Take 1 capsule (300 mg total) by mouth 3 (three) times daily.    METFORMIN (GLUCOPHAGE) 1000 MG TABLET    Take 1 tablet (1,000 mg total) by mouth 2 (two) times daily with meals.    MILNACIPRAN (SAVELLA) 100 MG TAB    Take 1 tablet (100 mg total) by mouth 2 (two) times daily.    OMEPRAZOLE (PRILOSEC) 20 MG CAPSULE    Take 1 capsule (20 mg total) by mouth daily as needed (if needed with NSAIDS).    POTASSIUM CHLORIDE SA (K-DUR,KLOR-CON M) 10 MEQ TABLET    TAKE 1 TABLET(10 MEQ) BY MOUTH EVERY DAY    PULSE OXIMETER (PULSE OXIMETER) DEVICE    by Apply Externally route 2 (two) times a day. Use twice daily at 8 AM and 3 PM and [facility].    RSV, PREF A AND PREF B,PF, (ABRYSVO, PF,) 120 MCG/0.5 ML SOLR VACCINE    Inject 0.5ml into the muscle.    TOPIRAMATE (TOPAMAX) 25 MG TABLET    TAKE 1 TABLET(25 MG) BY MOUTH TWICE DAILY    TRIAMCINOLONE ACETONIDE 0.025% (KENALOG) 0.025 % OINT    Apply topically 2 (two) times daily. for 10 days    VERAPAMIL (CALAN) 40 MG TAB    Take 1 tablet (40 mg total) by mouth 2 (two) times daily.    VITAMIN D (VITAMIN D3) 1000 UNITS TAB    Take 1,000 Units by mouth once daily.     Review of patient's  "allergies indicates:   Allergen Reactions    Demerol [meperidine] Other (See Comments)     Burning when adm IV  Able to tolerate IM, "Turned the veins in my hand purple."    Latex, natural rubber Other (See Comments)     "Burns my skin",  Symptoms get worse the longer she is exposed    Zocor [simvastatin] Other (See Comments)     Tightening of muscles    Cat dander     Statins-hmg-coa reductase inhibitors Other (See Comments)     myopathy    Sulfa (sulfonamide antibiotics)      Blurred vision    Nickel Rash     Pt states she had sores to ear lobes from nickel in earrings      Review of Systems   Constitutional: Negative for decreased appetite.   HENT:  Negative for tinnitus.    Eyes:  Negative for double vision.   Cardiovascular:  Negative for chest pain.   Respiratory:  Negative for wheezing.    Hematologic/Lymphatic: Negative for bleeding problem.   Skin:  Negative for dry skin.   Musculoskeletal:  Positive for arthritis, back pain, joint pain, neck pain and stiffness. Negative for gout.   Gastrointestinal:  Negative for abdominal pain.   Genitourinary:  Negative for bladder incontinence.   Neurological:  Negative for numbness, paresthesias and sensory change.   Psychiatric/Behavioral:  Negative for altered mental status.        Objective:   Body mass index is 25.24 kg/m².  Vitals:    08/01/25 0800   BP: 137/80   Pulse: 92          General    Constitutional: She is oriented to person, place, and time. She appears well-developed.   HENT:   Head: Atraumatic.   Eyes: EOM are normal.   Cardiovascular:  Normal rate.            Pulmonary/Chest: Effort normal.   Abdominal: Soft.   Neurological: She is alert and oriented to person, place, and time.   Psychiatric: Judgment normal.           Left Knee:  Patient has full range of motion   Collateral and cruciates are stable   Pain with palpation and crepitus of the patella although tracks midline  No defect in the patellar or quadriceps tendon     Right knee:  Full range of " motion   Collateral and cruciates are stable   Pain with palpation and crepitus of the patella although tracks midline   No defect in the patellar or quadriceps tendon   Df/PF intact bilaterally   Calves are soft, nontender   Sensation full   Cap refill less than 2   Skin warm to touch, varicosities noted    Xray:  FINDINGS:  Bilateral tricompartmental degenerative findings present, most severe within the right knee lateral compartment including significant joint space loss.  Chondrocalcinosis noted bilaterally.  Small right knee suprapatellar joint effusion.  Vascular calcifications present.     Impression:     As above  Kellgren Jaswinder 3    Assessment:     Encounter Diagnoses   Name Primary?    Pain in both knees, unspecified chronicity Yes    Bilateral primary osteoarthritis of knee     Lumbar radiculopathy         Plan:   Pain in both knees, unspecified chronicity    Bilateral primary osteoarthritis of knee    Lumbar radiculopathy    Other orders  -     Large Joint Aspiration/Injection: bilateral knee         There are no Patient Instructions on file for this visit.  The patient presents as a new patient to me today with chief complaint of bilateral knee pain, right greater than left. We had a long discussion today regarding degenerative arthritis. The patient understands that arthritis is chronic and will worsen over time.  The patient also understands that arthritis may cause episodic flare-ups in pain. Management or if arthritis is achieved through a multi-modal approach including weight loss in obese individuals, activity modification, NSAIDs (topical vs oral) where appropriate, periodic intra-articular steroid injections, viscosupplementation, physical therapy, knee bracing, ambulatory aids, as well as geniculate nerve blocks.  The patient wishes to proceed with short-acting steroid injection into both knees today.  Ask that she refrain from soaking in standing water such as a pool or tub.  She may  refrain and advance activity as tolerated.  I would like to see her back in 3 months for further evaluation.  She may call with any questions or concerns.    Dr. Schneider is aware of the patient & current presentation. He agrees with the current plan above.        Disclaimer: This note was prepared using a voice recognition system and is likely to have sound alike errors within the text.

## 2025-08-03 DIAGNOSIS — M79.7 FIBROMYALGIA: Chronic | ICD-10-CM

## 2025-08-03 DIAGNOSIS — F32.9 CHRONIC MAJOR DEPRESSIVE DISORDER: Chronic | ICD-10-CM

## 2025-08-03 DIAGNOSIS — M79.7 FIBROMYALGIA: ICD-10-CM

## 2025-08-04 RX ORDER — FLUOXETINE HYDROCHLORIDE 40 MG/1
40 CAPSULE ORAL DAILY
Qty: 90 CAPSULE | Refills: 4 | OUTPATIENT
Start: 2025-08-04

## 2025-08-04 NOTE — TELEPHONE ENCOUNTER
Good Morning/Afternoon,     Pt is requesting for a refill of: Savella 100 mg  Last filed: 5/27/25  Last encounter: 6/3/25  Up coming apt: 9/9/25  Pharmacy: [id number] - [place] - [id number] AT [facility]  Is this something you can do?      Rupinder Levy  Medical Assistant

## 2025-08-05 ENCOUNTER — OFFICE VISIT (OUTPATIENT)
Dept: OPHTHALMOLOGY | Facility: CLINIC | Age: 80
End: 2025-08-05
Payer: MEDICARE

## 2025-08-05 ENCOUNTER — TELEPHONE (OUTPATIENT)
Dept: PAIN MEDICINE | Facility: CLINIC | Age: 80
End: 2025-08-05
Payer: MEDICARE

## 2025-08-05 DIAGNOSIS — E11.9 DIABETES MELLITUS TYPE 2 WITHOUT RETINOPATHY: ICD-10-CM

## 2025-08-05 DIAGNOSIS — Z96.1 PSEUDOPHAKIA OF BOTH EYES: ICD-10-CM

## 2025-08-05 DIAGNOSIS — E11.22 TYPE 2 DIABETES MELLITUS WITH STAGE 3A CHRONIC KIDNEY DISEASE, WITHOUT LONG-TERM CURRENT USE OF INSULIN: ICD-10-CM

## 2025-08-05 DIAGNOSIS — H40.013 AT LOW RISK FOR OPEN-ANGLE GLAUCOMA IN BOTH EYES: Primary | ICD-10-CM

## 2025-08-05 DIAGNOSIS — N18.31 TYPE 2 DIABETES MELLITUS WITH STAGE 3A CHRONIC KIDNEY DISEASE, WITHOUT LONG-TERM CURRENT USE OF INSULIN: ICD-10-CM

## 2025-08-05 DIAGNOSIS — H52.7 REFRACTIVE ERRORS: ICD-10-CM

## 2025-08-05 DIAGNOSIS — H35.342 MACULAR HOLE, LEFT: ICD-10-CM

## 2025-08-05 PROCEDURE — 99213 OFFICE O/P EST LOW 20 MIN: CPT | Mod: PBBFAC,25 | Performed by: OPTOMETRIST

## 2025-08-05 PROCEDURE — 92133 CPTRZD OPH DX IMG PST SGM ON: CPT | Mod: PBBFAC | Performed by: OPTOMETRIST

## 2025-08-05 PROCEDURE — 99999 PR PBB SHADOW E&M-EST. PATIENT-LVL III: CPT | Mod: PBBFAC,,, | Performed by: OPTOMETRIST

## 2025-08-05 PROCEDURE — 92015 DETERMINE REFRACTIVE STATE: CPT | Mod: ,,, | Performed by: OPTOMETRIST

## 2025-08-05 PROCEDURE — 92014 COMPRE OPH EXAM EST PT 1/>: CPT | Mod: S$PBB,,, | Performed by: OPTOMETRIST

## 2025-08-05 PROCEDURE — 92083 EXTENDED VISUAL FIELD XM: CPT | Mod: PBBFAC | Performed by: OPTOMETRIST

## 2025-08-05 NOTE — PROGRESS NOTES
SUBJECTIVE  Kaylin Mccollum is 80 y.o. female  Uncorrected distance visual acuity was 20/25 in the right eye and 20/80 in the left eye.   Chief Complaint   Patient presents with    Diabetic Eye Exam     Pt presents for gOCT///Annual eye exam    Pt states no changes in VA. She says she is feeling pressure and pain behind OU.           HPI     Diabetic Eye Exam     Additional comments: Pt presents for gOCT///Annual eye exam    Pt states no changes in VA. She says she is feeling pressure and pain   behind OU.            Comments    Lab Results       Component                Value               Date                       HGBA1C                   6.0 (H)             04/25/2025                      Last edited by Princess Hammond on 8/5/2025  3:28 PM.         Assessment /Plan :  1. At low risk for open-angle glaucoma in both eyes  - Thomas Visual Field - OU - Extended - Both Eyes  - Posterior Segment OCT Optic Nerve- Both eyes  Improved VF & OCT    2. Diabetes mellitus type 2 without retinopathy  No Background Diabetic Retinopathy  Strict BG control, f/u w/ PCP, and annual DFE  Stressed importance of DM control to preserve vision     3. Type 2 diabetes mellitus with stage 3a chronic kidney disease, without long-term current use of insulin  See number 2    4. Pseudophakia of both eyes  Well-centered, stable IOL OU. Monitor annually.     5. Macular hole, left  Stable S/P repair     6. Refractive errors  Dispense Final Rx for glasses or may use OTC glasses.  RTC 1 year  Discussed above and answered questions.

## 2025-08-05 NOTE — TELEPHONE ENCOUNTER
Called patient to confirm appointment. Unable to confirm appt. Left VM for patient to call back. ( is out of office till November)

## 2025-08-14 ENCOUNTER — TELEPHONE (OUTPATIENT)
Dept: PAIN MEDICINE | Facility: CLINIC | Age: 80
End: 2025-08-14
Payer: MEDICARE

## 2025-08-16 DIAGNOSIS — E11.8 CONTROLLED TYPE 2 DIABETES MELLITUS WITH COMPLICATION, WITHOUT LONG-TERM CURRENT USE OF INSULIN: ICD-10-CM

## 2025-08-16 DIAGNOSIS — R51.9 HEADACHE, CHRONIC DAILY: ICD-10-CM

## 2025-08-16 DIAGNOSIS — E11.69 HYPERLIPIDEMIA ASSOCIATED WITH TYPE 2 DIABETES MELLITUS: ICD-10-CM

## 2025-08-16 DIAGNOSIS — E78.5 HYPERLIPIDEMIA ASSOCIATED WITH TYPE 2 DIABETES MELLITUS: ICD-10-CM

## 2025-08-17 RX ORDER — EZETIMIBE 10 MG/1
10 TABLET ORAL DAILY
Qty: 90 TABLET | Refills: 2 | Status: SHIPPED | OUTPATIENT
Start: 2025-08-17

## 2025-08-18 RX ORDER — METFORMIN HYDROCHLORIDE 1000 MG/1
1000 TABLET ORAL 2 TIMES DAILY WITH MEALS
Qty: 180 TABLET | Refills: 3 | Status: SHIPPED | OUTPATIENT
Start: 2025-08-18

## 2025-08-18 RX ORDER — TOPIRAMATE 25 MG/1
25 TABLET, FILM COATED ORAL 2 TIMES DAILY
Qty: 60 TABLET | Refills: 0 | Status: SHIPPED | OUTPATIENT
Start: 2025-08-18

## 2025-08-22 ENCOUNTER — TELEPHONE (OUTPATIENT)
Dept: PAIN MEDICINE | Facility: CLINIC | Age: 80
End: 2025-08-22
Payer: MEDICARE

## 2025-08-25 ENCOUNTER — TELEPHONE (OUTPATIENT)
Dept: PAIN MEDICINE | Facility: CLINIC | Age: 80
End: 2025-08-25
Payer: MEDICARE

## 2025-08-25 ENCOUNTER — OFFICE VISIT (OUTPATIENT)
Dept: PAIN MEDICINE | Facility: CLINIC | Age: 80
End: 2025-08-25
Payer: MEDICARE

## 2025-08-25 VITALS
BODY MASS INDEX: 25.39 KG/M2 | WEIGHT: 167.56 LBS | DIASTOLIC BLOOD PRESSURE: 69 MMHG | SYSTOLIC BLOOD PRESSURE: 119 MMHG | HEART RATE: 99 BPM | HEIGHT: 68 IN | RESPIRATION RATE: 17 BRPM

## 2025-08-25 DIAGNOSIS — G43.711 CHRONIC MIGRAINE WITHOUT AURA, WITH INTRACTABLE MIGRAINE, SO STATED, WITH STATUS MIGRAINOSUS: Primary | ICD-10-CM

## 2025-08-25 DIAGNOSIS — M79.7 FIBROMYALGIA: ICD-10-CM

## 2025-08-25 PROCEDURE — 64615 CHEMODENERV MUSC MIGRAINE: CPT | Mod: S$PBB,,, | Performed by: PHYSICAL MEDICINE & REHABILITATION

## 2025-08-25 PROCEDURE — 64615 CHEMODENERV MUSC MIGRAINE: CPT | Mod: PBBFAC | Performed by: PHYSICAL MEDICINE & REHABILITATION

## 2025-08-25 PROCEDURE — 99214 OFFICE O/P EST MOD 30 MIN: CPT | Mod: PBBFAC | Performed by: PHYSICAL MEDICINE & REHABILITATION

## 2025-08-25 PROCEDURE — 99214 OFFICE O/P EST MOD 30 MIN: CPT | Mod: 25,S$PBB,, | Performed by: PHYSICAL MEDICINE & REHABILITATION

## 2025-08-25 PROCEDURE — 99999PBSHW PR PBB SHADOW TECHNICAL ONLY FILED TO HB: Mod: JZ,PBBFAC,,

## 2025-08-25 PROCEDURE — 99999 PR PBB SHADOW E&M-EST. PATIENT-LVL IV: CPT | Mod: PBBFAC,,, | Performed by: PHYSICAL MEDICINE & REHABILITATION

## 2025-08-25 RX ORDER — MILNACIPRAN HYDROCHLORIDE 100 MG/1
100 TABLET, FILM COATED ORAL 2 TIMES DAILY
Qty: 60 TABLET | Refills: 11 | Status: SHIPPED | OUTPATIENT
Start: 2025-08-25 | End: 2026-08-25

## 2025-08-29 ENCOUNTER — TELEPHONE (OUTPATIENT)
Dept: ORTHOPEDICS | Facility: CLINIC | Age: 80
End: 2025-08-29
Payer: MEDICARE

## 2025-08-29 DIAGNOSIS — M79.7 FIBROMYALGIA: ICD-10-CM

## 2025-08-29 DIAGNOSIS — M17.0 PRIMARY OSTEOARTHRITIS OF BOTH KNEES: Primary | ICD-10-CM

## 2025-08-29 DIAGNOSIS — M54.16 LUMBAR RADICULOPATHY: Primary | ICD-10-CM

## 2025-08-29 RX ORDER — TRAMADOL HYDROCHLORIDE 50 MG/1
50 TABLET, FILM COATED ORAL EVERY 8 HOURS PRN
Qty: 21 TABLET | Refills: 0 | Status: SHIPPED | OUTPATIENT
Start: 2025-08-29 | End: 2025-09-05

## (undated) DEVICE — SOL GONAK

## (undated) DEVICE — SOL BETADINE 5%

## (undated) DEVICE — PAD CAST SPECIALIST STRL 6

## (undated) DEVICE — COVER MAYO STAND REINFRCD 30

## (undated) DEVICE — SEE MEDLINE ITEM 152622

## (undated) DEVICE — SEE MEDLINE ITEM 157216

## (undated) DEVICE — SOL IRR NACL .9% 3000ML

## (undated) DEVICE — SEE MEDLINE ITEM 152529

## (undated) DEVICE — ELECTRODE LOOP 20MMX12MM

## (undated) DEVICE — SEE MEDLINE ITEM 157131

## (undated) DEVICE — SEE MEDLINE ITEM 152523

## (undated) DEVICE — LENS VITRCTMY OPHTH 30DEG 59DE

## (undated) DEVICE — SUT ETHILON 3/0 18IN PS-1

## (undated) DEVICE — GAUZE SPONGE 4X4 12PLY

## (undated) DEVICE — BACKFLUSH 25GA SOFT-TIP DISP

## (undated) DEVICE — KNIFE OPHTH MICRO UNITOME 5MM

## (undated) DEVICE — KIT PERFLUOROCARBON LIQUID

## (undated) DEVICE — SOL BSS BALANCED SALT

## (undated) DEVICE — CONTAINER SPECIMEN STRL 4OZ

## (undated) DEVICE — SYR 1CC TB SG 27GX1/2

## (undated) DEVICE — CLOSURE SKIN STERI STRIP 1/2X4

## (undated) DEVICE — GARTER EYE ADULT

## (undated) DEVICE — FORCEP GRASPING 25GA SMOOTH

## (undated) DEVICE — SEE MEDLINE ITEM 146231

## (undated) DEVICE — MANIFOLD 4 PORT

## (undated) DEVICE — BANDAGE ACE ELASTIC 6"

## (undated) DEVICE — PACK INSTRUMENT COVER DISPO

## (undated) DEVICE — SYR 30CC LUER LOCK

## (undated) DEVICE — APPLICATOR CHLORAPREP ORN 26ML

## (undated) DEVICE — CORD FOR BIPOLAR FORCEPS 12

## (undated) DEVICE — PAD ABD 8X10 STERILE

## (undated) DEVICE — SEE MEDLINE ITEM 152739

## (undated) DEVICE — SYR 10CC LUER LOCK

## (undated) DEVICE — SOL BALANCED SALT 500ML

## (undated) DEVICE — SYRINGE 30CC LL W/O NDL

## (undated) DEVICE — CUTTER MENISCUS AGGRESSIVE 4.0

## (undated) DEVICE — SEE MEDLINE ITEM 146298

## (undated) DEVICE — Device

## (undated) DEVICE — KIT GREY EYE

## (undated) DEVICE — PROBE ILLUM FLEX CURVE LASER

## (undated) DEVICE — GOWN SMARTGOWN LVL4 X-LONG XL

## (undated) DEVICE — SEE MEDLINE ITEM 157027

## (undated) DEVICE — DRAPE EMERALD 87X114.75X113

## (undated) DEVICE — SEE MEDLINE ITEM 157117

## (undated) DEVICE — NDL HYPO A BEVEL 22X1 1/2

## (undated) DEVICE — SOL WATER STRL IRR 1000ML

## (undated) DEVICE — SYR DISP LL 5CC

## (undated) DEVICE — SHIELD EYE METAL FOX 50/BX

## (undated) DEVICE — TRAY MUSCLE LID EYE

## (undated) DEVICE — GOWN SURGICAL X-LARGE

## (undated) DEVICE — ALCOHOL 70% ISOP RUBBING 4OZ

## (undated) DEVICE — NEEDLE HYPODERMIC HUB LUER LOC

## (undated) DEVICE — TUBING ARTHRO IRR 4-LEAD

## (undated) DEVICE — DRESSING XEROFORM FOIL PK 1X8

## (undated) DEVICE — PACK TOTAL PLUS 25G VITRECTOMY

## (undated) DEVICE — SUT 7/0 18IN COATED VICRYL

## (undated) DEVICE — FORCEP GRIESHABER MAXGRIP 25G

## (undated) DEVICE — PADDING CAST 6IN DELTA ROLL

## (undated) DEVICE — SEE MEDLINE ITEM 146372

## (undated) DEVICE — NDL SPINAL 18GX3.5 SPINOCAN

## (undated) DEVICE — SEE MEDLINE ITEM 146292

## (undated) DEVICE — DRAPE PLASTIC U 60X72